# Patient Record
Sex: FEMALE | Race: WHITE | NOT HISPANIC OR LATINO | Employment: FULL TIME | URBAN - METROPOLITAN AREA
[De-identification: names, ages, dates, MRNs, and addresses within clinical notes are randomized per-mention and may not be internally consistent; named-entity substitution may affect disease eponyms.]

---

## 2017-01-10 RX ORDER — ESTROGENS, CONJUGATED 1.25 MG
TABLET ORAL
Qty: 30 TABLET | Refills: 0 | Status: SHIPPED | OUTPATIENT
Start: 2017-01-10 | End: 2017-08-15

## 2017-04-18 ENCOUNTER — TELEPHONE (OUTPATIENT)
Dept: OBSTETRICS AND GYNECOLOGY | Facility: CLINIC | Age: 47
End: 2017-04-18

## 2017-04-18 NOTE — TELEPHONE ENCOUNTER
----- Message from Bambi Castro sent at 4/18/2017  2:14 PM CDT -----  Contact: self  Patient called back, verified with patient that patient is requesting to switch from the Estradiol patch to the Premarin Oral pill. Patient states that she has a itchy red reaction around the site of the patch. Please advise, patient can be reached at [phone].

## 2017-04-18 NOTE — TELEPHONE ENCOUNTER
----- Message from Lucy Murray sent at 4/18/2017  1:24 PM CDT -----  x  1st Request  _  2nd Request  _  3rd Request        Who: BLANCA GUTHRIE [[id number]]    Why: Pt is requesting to get back on the Premarin instead of the patch. Please call her.    What Number to Call Back: [phone]    When to Expect a call back: (Before the end of the day)   -- if the call is after 12:00, the call back will be tomorrow.

## 2017-04-18 NOTE — TELEPHONE ENCOUNTER
Called patient and left message for her to call office for more information regarding her medication request.

## 2017-04-18 NOTE — TELEPHONE ENCOUNTER
Spoke with Kianna Zuleta.  She is having a reaction to patch  - redness and itching at site of patch.  Would like to switch back to Premarin.  I let her know Dr. Borrero was out of office until later in the week - he would need to approve medication change. Someone would be back in touch with her as soon as possible to accommodate her request.

## 2017-04-20 DIAGNOSIS — N95.1 MENOPAUSAL SYMPTOMS: Primary | ICD-10-CM

## 2017-04-20 NOTE — TELEPHONE ENCOUNTER
Patient unhappy with patch due to skin irritation. Per Dr. Adair Moulton 1.25 called into pharmacy. Patient notified mediation will be sent in today

## 2017-08-15 ENCOUNTER — OFFICE VISIT (OUTPATIENT)
Dept: FAMILY MEDICINE | Facility: CLINIC | Age: 47
End: 2017-08-15
Payer: COMMERCIAL

## 2017-08-15 ENCOUNTER — LAB VISIT (OUTPATIENT)
Dept: LAB | Facility: HOSPITAL | Age: 47
End: 2017-08-15
Attending: INTERNAL MEDICINE
Payer: COMMERCIAL

## 2017-08-15 VITALS
WEIGHT: 266.75 LBS | OXYGEN SATURATION: 98 % | TEMPERATURE: 99 F | HEIGHT: 64 IN | DIASTOLIC BLOOD PRESSURE: 80 MMHG | SYSTOLIC BLOOD PRESSURE: 128 MMHG | BODY MASS INDEX: 45.54 KG/M2 | HEART RATE: 91 BPM

## 2017-08-15 DIAGNOSIS — E55.9 VITAMIN D DEFICIENCY DISEASE: ICD-10-CM

## 2017-08-15 DIAGNOSIS — C7A.020 MALIGNANT CARCINOID TUMOR OF THE APPENDIX: ICD-10-CM

## 2017-08-15 DIAGNOSIS — K58.9 IRRITABLE BOWEL SYNDROME, UNSPECIFIED TYPE: ICD-10-CM

## 2017-08-15 DIAGNOSIS — Z00.00 ROUTINE MEDICAL EXAM: Primary | ICD-10-CM

## 2017-08-15 DIAGNOSIS — E03.9 HYPOTHYROIDISM, UNSPECIFIED TYPE: ICD-10-CM

## 2017-08-15 DIAGNOSIS — Z00.00 ROUTINE MEDICAL EXAM: ICD-10-CM

## 2017-08-15 LAB
25(OH)D3+25(OH)D2 SERPL-MCNC: 18 NG/ML
ALBUMIN SERPL BCP-MCNC: 4 G/DL
ALP SERPL-CCNC: 149 U/L
ALT SERPL W/O P-5'-P-CCNC: 16 U/L
ANION GAP SERPL CALC-SCNC: 10 MMOL/L
AST SERPL-CCNC: 17 U/L
BASOPHILS # BLD AUTO: 0.01 K/UL
BASOPHILS NFR BLD: 0.2 %
BILIRUB SERPL-MCNC: 0.3 MG/DL
BUN SERPL-MCNC: 12 MG/DL
CALCIUM SERPL-MCNC: 9.4 MG/DL
CHLORIDE SERPL-SCNC: 104 MMOL/L
CO2 SERPL-SCNC: 26 MMOL/L
CREAT SERPL-MCNC: 0.7 MG/DL
DIFFERENTIAL METHOD: ABNORMAL
EOSINOPHIL # BLD AUTO: 0.1 K/UL
EOSINOPHIL NFR BLD: 2.5 %
ERYTHROCYTE [DISTWIDTH] IN BLOOD BY AUTOMATED COUNT: 13.4 %
EST. GFR  (AFRICAN AMERICAN): >60 ML/MIN/1.73 M^2
EST. GFR  (NON AFRICAN AMERICAN): >60 ML/MIN/1.73 M^2
GLUCOSE SERPL-MCNC: 88 MG/DL
HCT VFR BLD AUTO: 40.7 %
HGB BLD-MCNC: 13.8 G/DL
LYMPHOCYTES # BLD AUTO: 1.3 K/UL
LYMPHOCYTES NFR BLD: 22.5 %
MCH RBC QN AUTO: 28.3 PG
MCHC RBC AUTO-ENTMCNC: 33.9 G/DL
MCV RBC AUTO: 83 FL
MONOCYTES # BLD AUTO: 0.3 K/UL
MONOCYTES NFR BLD: 5.4 %
NEUTROPHILS # BLD AUTO: 3.9 K/UL
NEUTROPHILS NFR BLD: 69.2 %
PLATELET # BLD AUTO: 211 K/UL
PMV BLD AUTO: 8.6 FL
POTASSIUM SERPL-SCNC: 3.9 MMOL/L
PROT SERPL-MCNC: 7.6 G/DL
RBC # BLD AUTO: 4.88 M/UL
SODIUM SERPL-SCNC: 140 MMOL/L
T4 FREE SERPL-MCNC: 0.8 NG/DL
TSH SERPL DL<=0.005 MIU/L-ACNC: 3.19 UIU/ML
WBC # BLD AUTO: 5.56 K/UL

## 2017-08-15 PROCEDURE — 84439 ASSAY OF FREE THYROXINE: CPT

## 2017-08-15 PROCEDURE — 83036 HEMOGLOBIN GLYCOSYLATED A1C: CPT

## 2017-08-15 PROCEDURE — 36415 COLL VENOUS BLD VENIPUNCTURE: CPT | Mod: PO

## 2017-08-15 PROCEDURE — 86316 IMMUNOASSAY TUMOR OTHER: CPT

## 2017-08-15 PROCEDURE — 99396 PREV VISIT EST AGE 40-64: CPT | Mod: S$GLB,,, | Performed by: INTERNAL MEDICINE

## 2017-08-15 PROCEDURE — 85025 COMPLETE CBC W/AUTO DIFF WBC: CPT

## 2017-08-15 PROCEDURE — 84260 ASSAY OF SEROTONIN: CPT

## 2017-08-15 PROCEDURE — 82306 VITAMIN D 25 HYDROXY: CPT

## 2017-08-15 PROCEDURE — 99999 PR PBB SHADOW E&M-EST. PATIENT-LVL III: CPT | Mod: PBBFAC,,, | Performed by: INTERNAL MEDICINE

## 2017-08-15 PROCEDURE — 84443 ASSAY THYROID STIM HORMONE: CPT

## 2017-08-15 PROCEDURE — 80053 COMPREHEN METABOLIC PANEL: CPT

## 2017-08-15 RX ORDER — LEVOTHYROXINE SODIUM 75 UG/1
75 TABLET ORAL DAILY
Qty: 90 TABLET | Refills: 3 | Status: SHIPPED | OUTPATIENT
Start: 2017-08-15 | End: 2017-11-14

## 2017-08-15 NOTE — PROGRESS NOTES
Chief complaint physical exam    47-year-old white female with irritable bowel syndrome and hypothyroidism.  She simply ran out of her thyroid medication many many months ago.  She has gained about 14 pounds over the last 2 years.  She has not seen her neuroendocrine surgeon in about 2 years.  She has some occasional flushing and occasional diarrhea but nothing in a pattern to suggest recurrent carcinoid all her blood work over the years has been normal.  She gets an echo yearly with her outside cardiologist [facility].  She's had no cardiopulmonary symptoms.  She is working as an .  He discussed lab reassessment and eventually reassessing her lipids once her thyroid is back under control.  She continues to follow with her GYN and she is up-to-date on mammogram.  She follows with GI as well    ROS:   CONST: weight stable. EYES: no vision change. ENT: no sore throat. CV: no chest pain w/ exertion. RESP: no shortness of breath. GI: no nausea, vomiting, diarrhea. No dysphagia. : no urinary issues. MUSCULOSKELETAL: no new myalgias or arthralgias. SKIN: no new changes. NEURO: no focal deficits. PSYCH: no new issues. ENDOCRINE: no polyuria. HEME: no lymph nodes. ALLERGY: no general pruritis.    PAST MEDICAL HISTORY:                                                        1. Intersitial cystitis.                                                     2. Fibromyalgia.                                                             3. Mitral valve prolapse.                                                    4. IBS. Dr Rowe , now Dr. Mckeon                                                                 5. L-spine disk bulging.  L4-5 -Dr Singh at                                                    6. Total hysterectomy in 2004.   7.  Hypothyroid.                                                             8.  Incidental carcinoid tumor of the appendix.                              9.  Now on  gluten-free diet.                                                 10.  Vitamin D deficiency, followed by outside Rheumatology.                  11.  Neurogenic bladder, followed by outside urologist. Ranjeet Christina    PAST SURGICAL HISTORY: Laparotomy x5 Left pelvic mass 12/20/05, pelvic pain 07/31/03, pelvic pain 06/13/02, endometriosis Laparoscopy 10/01,BUBBA and BS&O 04/08/04, Lap cholecystectomy 12/20/05, Appendectomy 12/20/05,Letitia surgery 5/08,Placement of new right InterStim lead and Ablation 1/09, Da Char-assisted lysis of severe adhesions, resection of severe adhesions/possible ovarian remnant.    SOCIAL HISTORY: Never smoked. The patient seldom drinks alcohol. Doesn't use recreational drugs. Lives alone. Single.     FAMILY HISTORY: The patient's family members had Ovarian cancer.No family history of breast cancer. No family history of colon cancer    Vitals as above,   Gen: no distress  EYES: conjunctiva clear, non-icteric, PERRL  ENT: nose clear, nasal mucosa normal, oropharynx clear and moist, teeth good  NECK:supple, thyroid non-palpable  RESP: effort is good, lungs clear  CV: heart RRR w/o murmur, gallops or rubs; no carotid bruits, no edema  GI: abdomen soft, non-distended, non-tender, no hepatosplenomegaly  MS: gait normal, no clubbing or cyanosis of the digits  SKIN: no rashes, warm to touch    Prior labs and interval clinic notes reviewed    Kianna was seen today for [facility].    Diagnoses and all orders for this visit:    Routine medical exam, keep follow-up with GYN, update lab work  -     SEROTONIN SERUM; Future  -     CHROMOGRANIN A; Future  -     5-HIAA Plasma (Neuroendocrine); Future  -     Hemoglobin A1c; Future  -     Comprehensive metabolic panel; Future  -     Vitamin D; Future  -     CBC auto differential; Future  -     TSH; Future  -     T4, free; Future    Hypothyroidism, unspecified type, likely uncontrolled, expect thyroid to be abnormal and we'll restart the prior Synthroid 75  -      "TSH; Future  -     T4, free; Future    Vitamin D deficiency disease, reassess  -     Vitamin D; Future    Irritable bowel syndrome, unspecified type, stable, keep follow-up with GYN    Malignant carcinoid tumor of the appendix, will send at least some initial blood work that was done before, keep echo follow-up with cardiology, consider reestablishing follow-up with their endocrine surgery  -     SEROTONIN SERUM; Future  -     CHROMOGRANIN A; Future  -     5-HIAA Plasma (Neuroendocrine); Future    Other orders  -     levothyroxine (SYNTHROID) 75 MCG tablet; Take 1 tablet (75 mcg total) by mouth once daily.         Based on patient's anxiety referable to the above as well as compliance issues, access would not be therapeutic"This note will not be shared with the patient."  "

## 2017-08-16 LAB
ESTIMATED AVG GLUCOSE: 100 MG/DL
HBA1C MFR BLD HPLC: 5.1 %

## 2017-08-21 LAB
CGA SERPL-MCNC: 5 NG/ML
SEROTONIN: <30 NG/ML

## 2017-09-26 ENCOUNTER — OFFICE VISIT (OUTPATIENT)
Dept: URGENT CARE | Facility: CLINIC | Age: 47
End: 2017-09-26
Payer: COMMERCIAL

## 2017-09-26 ENCOUNTER — OFFICE VISIT (OUTPATIENT)
Dept: SLEEP MEDICINE | Facility: CLINIC | Age: 47
End: 2017-09-26
Payer: COMMERCIAL

## 2017-09-26 VITALS
RESPIRATION RATE: 16 BRPM | DIASTOLIC BLOOD PRESSURE: 86 MMHG | BODY MASS INDEX: 42.68 KG/M2 | WEIGHT: 250 LBS | TEMPERATURE: 99 F | HEIGHT: 64 IN | HEART RATE: 100 BPM | SYSTOLIC BLOOD PRESSURE: 125 MMHG | OXYGEN SATURATION: 97 %

## 2017-09-26 VITALS
BODY MASS INDEX: 45.37 KG/M2 | HEART RATE: 88 BPM | WEIGHT: 264.31 LBS | SYSTOLIC BLOOD PRESSURE: 153 MMHG | DIASTOLIC BLOOD PRESSURE: 98 MMHG

## 2017-09-26 DIAGNOSIS — F51.01 PRIMARY INSOMNIA: ICD-10-CM

## 2017-09-26 DIAGNOSIS — S90.121A CONTUSION OF FIFTH TOE OF RIGHT FOOT, INITIAL ENCOUNTER: ICD-10-CM

## 2017-09-26 DIAGNOSIS — G47.30 SLEEP APNEA, UNSPECIFIED TYPE: Primary | ICD-10-CM

## 2017-09-26 DIAGNOSIS — S99.921A RIGHT FOOT INJURY, INITIAL ENCOUNTER: Primary | ICD-10-CM

## 2017-09-26 PROCEDURE — 99204 OFFICE O/P NEW MOD 45 MIN: CPT | Mod: S$GLB,,, | Performed by: PSYCHIATRY & NEUROLOGY

## 2017-09-26 PROCEDURE — 99213 OFFICE O/P EST LOW 20 MIN: CPT | Mod: S$GLB,,, | Performed by: NURSE PRACTITIONER

## 2017-09-26 PROCEDURE — 99999 PR PBB SHADOW E&M-EST. PATIENT-LVL III: CPT | Mod: PBBFAC,,, | Performed by: PSYCHIATRY & NEUROLOGY

## 2017-09-26 PROCEDURE — 3008F BODY MASS INDEX DOCD: CPT | Mod: S$GLB,,, | Performed by: PSYCHIATRY & NEUROLOGY

## 2017-09-26 PROCEDURE — 3008F BODY MASS INDEX DOCD: CPT | Mod: S$GLB,,, | Performed by: NURSE PRACTITIONER

## 2017-09-26 NOTE — PATIENT INSTRUCTIONS
Patient is candidate for home sleep testing.  Set up testing with [facility].  Scheduled by [facility]    1. Kianna/Eduardo will contact you to schedule your sleep study [phone] (ext [phone])  2. Sleep Lab is located in the [facility], take [place] elevator to 7th floor; You will see sign for [facility]

## 2017-09-26 NOTE — PATIENT INSTRUCTIONS
Lower Extremity Contusion  You have a contusion (bruise) of a lower extremity (leg, knee, ankle, foot, or toe). Symptoms include pain, swelling, and skin discoloration. No bones are broken. This injury may take from a few days to a few weeks to heal.  During that time, the bruise may change from reddish in color, to purple-blue, to green-yellow, to yellow-brown.  Home care  · Unless another medicine was prescribed, you can take acetaminophen, ibuprofen, or naproxen to control pain. (If you have chronic liver or kidney disease or ever had a stomach ulcer or gastrointestinal bleeding, talk with your doctor before using these medicines.)  · Elevate the injured area to reduce pain and swelling. As much as possible, sit or lie down with the injured area raised about the level of your heart. This is especially important during the first 48 hours.  · Ice the injured area to help reduce pain and swelling. Wrap a cold source (ice pack or ice cubes in a plastic bag) in a thin towel. Apply to the bruised area for 20 minutes every 1 to 2 hours the first day. Continue this 3 to 4 times a day until the pain and swelling goes away.  · If crutches have been advised, do not bear full weight on the injured leg until you can do so without pain. You may return to sports when you are able to put full weight and impact on the injured leg without pain.  Follow up  Follow up with your healthcare provider or our staff as advised. Call if you are not improving within the next 1 to 2 weeks.  When to seek medical advice   Call your healthcare provider right away if any of these occur:  · Increased pain or swelling  · Foot or toes become cold, blue, numb or tingly  · Signs of infection: Warmth, drainage, or increased redness or pain around the injury  · Inability to move the injured area   · Frequent bruising for unknown reasons  Date Last Reviewed: 2/1/2017  © 0185-5962 [employer]. [address] [place] [address], [place] [address].  All rights reserved. This information is not intended as a substitute for professional medical care. Always follow your healthcare professional's instructions.        R.I.C.E.    R.I.C.E. stands for Rest, Ice, Compression, and Elevation. Doing these things helps limit pain and swelling after an injury. R.I.C.E. also helps injuries heal faster. Use R.I.C.E. for sprains, strains, and severe bruises or bumps. Follow the tips on this handout and begin R.I.C.E. as soon as possible after an injury.  ? Rest  Pain is your bodys way of telling you to rest an injured area. Whether you have hurt an elbow, hand, foot, or knee, limiting its use will prevent further injury and help you heal.  ? Ice  Applying ice right after an injury helps prevent swelling and reduce pain. Dont place ice directly on your skin.  · Wrap a cold pack or bag of ice in a thin cloth. Place it over the injured area.  · Ice for 10 minutes every 3 hours. Dont ice for more than 20 minutes at a time.  ? Compression  Putting pressure (compression) on an injury helps prevent swelling and provides support.  · Wrap the injured area firmly with an elastic bandage. If your hand or foot tingles, becomes discolored, or feels cold to the touch, the bandage may be too tight. Rewrap it more loosely.  · If your bandage becomes too loose, rewrap it.  · Do not wear an elastic bandage overnight.  ? Elevation  Keeping an injury elevated helps reduce swelling, pain, and throbbing. Elevation is most effective when the injury is kept elevated higher than the heart.     Call your healthcare provider if you notice any of the following:  · Fingers or toes feel numb, are cold to the touch, or change color  · Skin looks shiny or tight  · Pain, swelling, or bruising worsens and is not improved with elevation   Date Last Reviewed: 9/3/2015  © 9222-2364 [employer]. [address] [place] [address], [place] [address]. All rights reserved. This information is not intended as a  substitute for professional medical care. Always follow your healthcare professional's instructions.

## 2017-09-26 NOTE — PROGRESS NOTES
"Subjective:       Patient ID: Kianna Zuleta is a 47 y.o. female.    Vitals:  height is 5' 4" (1.626 m) and weight is 113.4 kg (250 lb). Her tympanic temperature is 98.9 °F (37.2 °C). Her blood pressure is 125/86 and her pulse is 100. Her respiration is 16 and oxygen saturation is 97%.     Chief Complaint: Foot Injury    States she was getting out of the shower and she slipped. States her right 4th and 5th digit on the foot went under the other toes and popped out. States it hurts to bear weight on the affected extremity.      Foot Injury    The incident occurred less than 1 hour ago. The incident occurred at home. The injury mechanism was a twisting injury. The pain is present in the right toes. The quality of the pain is described as burning and stabbing. The pain is at a severity of 9/10. The pain is severe. The pain has been intermittent since onset. Pertinent negatives include no inability to bear weight, numbness or tingling. She reports no foreign bodies present. The symptoms are aggravated by weight bearing and movement. She has tried nothing for the symptoms.     Review of Systems   Constitution: Negative for weakness and malaise/fatigue.   HENT: Negative for nosebleeds.    Cardiovascular: Negative for chest pain and syncope.   Respiratory: Negative for shortness of breath.    Skin: Negative for color change.   Musculoskeletal: Positive for joint pain. Negative for back pain and neck pain.   Gastrointestinal: Negative for abdominal pain.   Genitourinary: Negative for hematuria.   Neurological: Negative for dizziness, numbness and tingling.   All other systems reviewed and are negative.      Objective:      Physical Exam   Constitutional: She is oriented to person, place, and time. She appears well-developed and well-nourished.   HENT:   Head: Normocephalic and atraumatic.   Eyes: Conjunctivae and EOM are normal. Pupils are equal, round, and reactive to light.   Neck: Normal range of motion. Neck supple. "   Cardiovascular: Normal rate and intact distal pulses.    Pulses:       Dorsalis pedis pulses are 2+ on the right side.        Posterior tibial pulses are 2+ on the right side.   Pulmonary/Chest: Effort normal.   Musculoskeletal: Normal range of motion. She exhibits tenderness.        Right foot: There is tenderness and bony tenderness. There is normal range of motion, no swelling, normal capillary refill, no crepitus, no deformity and no laceration.        Feet:    Feet:   Right Foot:   Skin Integrity: Negative for skin breakdown, erythema or warmth.   Neurological: She is alert and oriented to person, place, and time. She displays normal reflexes. No cranial nerve deficit or sensory deficit. She exhibits normal muscle tone. Coordination normal.   Skin: Skin is warm and dry. Capillary refill takes less than 2 seconds. No rash noted. No erythema. No pallor.   Psychiatric: She has a normal mood and affect.   Nursing note reviewed.     Xray read per radiology, negative for acute fracture.   Assessment:       1. Right foot injury, initial encounter    2. Contusion of fifth toe of right foot, initial encounter        Plan:         Right foot injury, initial encounter  -     X-Ray Foot Complete Right; Future; Expected date: 09/26/2017    Contusion of fifth toe of right foot, initial encounter      Please take Ibuprofen for your pain

## 2017-09-26 NOTE — PROGRESS NOTES
"This 47 y.o. female patient presents for the evaluation of possible obstructive sleep apnea. Referred by Dr. Javier.    Patient attempted at sleep study in August 2015, but did not sleep well, because a neighbor snored all night. This occurred [place] the [place]. Records are not available today. No recents scans to media in EPIC system    Not sleeping well for years.  Currently taking Trazodone 100 mg;  Previously took ambien/sonata    ESS = 3    In bed at 8 pm in attempt to get "enough sleep"  Takes medication at 8 pm (trazodone, percocent, tizandine, neurontin)  Falling asleep at 9 pm  Wakes at 6 am  Feels rested     Functions well during the day as a .  She has cut out caffeine during the week.    She has back pains (bulging discs) for which she takes percocent, tizandine, neurontin    SLEEP ROUTINE:   Bed partner: none   Time to bed: 8 pm (weekdays) to 11 pm (weekends)   Sleep onset latency: varies   Disruptions or awakenings: 2-3 (varies)   Wakeup time: 6 am (weekdays) 8 am (weekends)   Perceived sleep quality: refreshed   Daytime naps: fair    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    MVP (mitral valve prolapse)     POTS (postural orthostatic tachycardia syndrome)     Ulcer     Vitamin D deficiency disease 11/6/2012       Past Surgical History:   Procedure Laterality Date    ANKLE SURGERY      lt    APPENDECTOMY      BACK SURGERY      CHOLECYSTECTOMY      choley & ovarian cyst removed  12/2005    cystoscope      multiple    HYSTERECTOMY  4/8/2004    BUBBA BS&O     interstim      OOPHORECTOMY  4/8/2004    with hysterectomy     PELVIC LAPAROSCOPY      ME EXPLORATORY OF ABDOMEN      SINUS SURGERY  03/01/2016    SPINE SURGERY  "        Family History   Problem Relation Age of Onset    Hypertension Father     Alcohol abuse Brother     Cancer Paternal Uncle     Colon cancer Paternal Uncle     Depression Maternal Grandmother     Hearing loss Maternal Grandmother     Heart disease Maternal Grandmother     Kidney disease Maternal Grandmother     Cancer Paternal Grandmother     Arthritis Paternal Grandfather     Diabetes Paternal Grandfather     Stroke Paternal Grandfather     Breast cancer Neg Hx     Ovarian cancer Neg Hx        Social History   Substance Use Topics    Smoking status: Never Smoker    Smokeless tobacco: Never Used    Alcohol use No       ALLERGIES: Reviewed in EPIC    CURRENT MEDICATIONS: Reviewed in EPIC    REVIEW OF SYSTEMS:  Sleep related symptoms as per HPI;    Denies weight gain;    Denies sinus problems;    Denies dyspnea;    Sometimes palpitations;    Denies acid reflux;    Denies polyuria;    Denies headaches;    Denies mood disturbance;    Denies anemia;    Otherwise, a balance of systems reviewed is negative.    PHYSICAL EXAM:  BP (!) 153/98 (BP Location: Right arm, Patient Position: Sitting)   Pulse 88   Wt 119.9 kg (264 lb 5.3 oz)   BMI 45.37 kg/m²   GENERAL: Well groomed; Normally developed; Overweight  HEENT: Conjunctivae are non-erythematous; Pupils equal, round, and reactive to light;    Nose is symmetrical; Nasal mucosa is pink and moist; Septum is midline;    Turbinates are normal; Nasal airflow is normal;    Posterior pharynx is pink, but crowded due to lateral narrowing; Posterior palate is normal; Modified Mallampati: I   Uvula is normal; Tongue is normal; Tonsils +1;    Dentition is fair; No TMJ tenderness;    Jaw opening and protrusion without click and without discomfort.  NECK: Supple. No thyromegaly. No palpable nodes. Neck circumference in inches is 16  SKIN: On face and neck: No abrasions, no rashes, no lesions.     No subcutaneous nodules are palpable.  RESPIRATORY: Chest is clear  to auscultation.     Normal chest expansion and non-labored breathing at rest.  CARDIOVASCULAR: Normal S1, S2.  No murmurs, gallops or rubs.   No carotid bruits bilaterally.  EXTREMITIES: No clubbing. No cyanosis. Edema is absent.   NEURO: Oriented to time, place and person.    Normal attention span and concentration.  Station normal. Gait normal.  PSYCH: Affect is full. Mood is normal.      ASSESSMENT:    1. Insomnia - difficulty with sleep initiation and maintenance, requiring medication for sleep. With sleep medication, initiation of sleep is improved, but maintenance issues persist.    2. Sleep Apnea, unspecified. The patient symptomatically has difficulty with with exam findings of a crowded oral airway, elevated neck circumference with medical co-morbidities of (elevated blood pressure) and obesity. By STOP-BANG criteria, she qualifies for moderate-high risk of sleep apnea (3 of 8 score). This warrants further investigation for possible obstructive sleep apnea.         PLAN:    Diagnostic: Home sleep study. The nature of this procedure and its indication was discussed with the patient.    Education: During our discussion today, we talked about the etiology of obstructive sleep apnea as well as the potential ramifications of untreated sleep apnea, which could include daytime sleepiness, hypertension, heart disease and/or stroke.       Precautions: The patient was advised to abstain from driving should they feel sleepy or drowsy.    Follow up: I will see the patient back after the sleep study has been completed. At this time, we can review the data and discuss potential treatment options. MD/NP

## 2017-10-01 ENCOUNTER — OFFICE VISIT (OUTPATIENT)
Dept: URGENT CARE | Facility: CLINIC | Age: 47
End: 2017-10-01
Payer: COMMERCIAL

## 2017-10-01 VITALS
BODY MASS INDEX: 42.68 KG/M2 | RESPIRATION RATE: 18 BRPM | DIASTOLIC BLOOD PRESSURE: 78 MMHG | SYSTOLIC BLOOD PRESSURE: 123 MMHG | HEIGHT: 64 IN | HEART RATE: 112 BPM | WEIGHT: 250 LBS | OXYGEN SATURATION: 96 % | TEMPERATURE: 103 F

## 2017-10-01 DIAGNOSIS — J01.90 ACUTE NON-RECURRENT SINUSITIS, UNSPECIFIED LOCATION: Primary | ICD-10-CM

## 2017-10-01 LAB
CTP QC/QA: YES
FLUAV AG NPH QL: NEGATIVE
FLUBV AG NPH QL: NEGATIVE

## 2017-10-01 PROCEDURE — 99213 OFFICE O/P EST LOW 20 MIN: CPT | Mod: 25,S$GLB,, | Performed by: NURSE PRACTITIONER

## 2017-10-01 PROCEDURE — 3008F BODY MASS INDEX DOCD: CPT | Mod: S$GLB,,, | Performed by: NURSE PRACTITIONER

## 2017-10-01 PROCEDURE — 87804 INFLUENZA ASSAY W/OPTIC: CPT | Mod: QW,S$GLB,, | Performed by: NURSE PRACTITIONER

## 2017-10-01 RX ORDER — LEVOFLOXACIN 750 MG/1
750 TABLET ORAL DAILY
Qty: 7 TABLET | Refills: 0 | Status: SHIPPED | OUTPATIENT
Start: 2017-10-01 | End: 2017-10-08

## 2017-10-01 RX ORDER — ACETAMINOPHEN 325 MG/1
975 TABLET ORAL
Status: COMPLETED | OUTPATIENT
Start: 2017-10-01 | End: 2017-10-01

## 2017-10-01 RX ADMIN — ACETAMINOPHEN 975 MG: 325 TABLET ORAL at 09:10

## 2017-10-01 NOTE — PROGRESS NOTES
"Subjective:       Patient ID: Kianna Zuleta is a 47 y.o. female.    Vitals:  height is 5' 4" (1.626 m) and weight is 113.4 kg (250 lb). Her tympanic temperature is 102.9 °F (39.4 °C) (abnormal). Her blood pressure is 123/78 and her pulse is 112 (abnormal). Her respiration is 18 and oxygen saturation is 96%.     Chief Complaint: Nasal Congestion    Other   This is a new problem. The current episode started yesterday. The problem occurs constantly. Associated symptoms include chills, congestion, coughing, a fever and nausea. Pertinent negatives include no abdominal pain, chest pain, headaches, myalgias or sore throat. Nothing aggravates the symptoms. She has tried NSAIDs for the symptoms. The treatment provided mild relief.     Review of Systems   Constitution: Positive for chills and fever. Negative for malaise/fatigue.   HENT: Positive for congestion. Negative for ear pain, hoarse voice and sore throat.    Eyes: Negative for discharge and redness.   Cardiovascular: Negative for chest pain, dyspnea on exertion and leg swelling.   Respiratory: Positive for cough and sputum production (yellowish phlegm). Negative for shortness of breath and wheezing.    Musculoskeletal: Negative for myalgias.   Gastrointestinal: Positive for nausea. Negative for abdominal pain.   Neurological: Negative for headaches.       Objective:      Physical Exam   Constitutional: She is oriented to person, place, and time. She appears well-developed and well-nourished. She appears ill.   HENT:   Head: Normocephalic and atraumatic.   Right Ear: Hearing, tympanic membrane, external ear and ear canal normal.   Left Ear: Hearing, tympanic membrane, external ear and ear canal normal.   Nose: Mucosal edema and rhinorrhea present. Right sinus exhibits maxillary sinus tenderness. Left sinus exhibits maxillary sinus tenderness.   Mouth/Throat: Uvula is midline. Posterior oropharyngeal edema and posterior oropharyngeal erythema present. No oropharyngeal " exudate. Tonsils are 1+ on the right. Tonsils are 1+ on the left. No tonsillar exudate.   Eyes: Conjunctivae and lids are normal.   Neck: Normal range of motion and full passive range of motion without pain. Neck supple. No neck rigidity. No edema, no erythema and normal range of motion present.   Cardiovascular: Normal rate, regular rhythm and normal heart sounds.    Pulmonary/Chest: Effort normal and breath sounds normal. No accessory muscle usage. No apnea, no tachypnea and no bradypnea. No respiratory distress. She has no decreased breath sounds. She has no wheezes. She has no rhonchi. She has no rales.   Abdominal: Soft. Normal appearance and bowel sounds are normal.   Lymphadenopathy:        Head (right side): No submental, no submandibular, no tonsillar, no preauricular, no posterior auricular and no occipital adenopathy present.        Head (left side): No submental, no submandibular, no tonsillar, no preauricular, no posterior auricular and no occipital adenopathy present.     She has cervical adenopathy.        Right cervical: Superficial cervical adenopathy present.        Left cervical: Superficial cervical adenopathy present.   Neurological: She is alert and oriented to person, place, and time.   Skin: Skin is warm.   Psychiatric: She has a normal mood and affect. Her speech is normal and behavior is normal.   Nursing note and vitals reviewed.      Assessment:       1. Upper respiratory tract infection, unspecified type        Plan:         Upper respiratory tract infection, unspecified type  -     POCT Influenza A/B  -     acetaminophen tablet 975 mg; Take 3 tablets (975 mg total) by mouth one time.    Pt instructed to follow up with primary care for no improvement or go to ER for worsening of symptoms.

## 2017-10-01 NOTE — PATIENT INSTRUCTIONS
Please drink plenty of fluids.  Please get plenty of rest.    Please return here or go to the Emergency Department for any concerns or worsening of condition.    If you were prescribed antibiotics, please take them to completion.    If you were given wait & see antibiotics, please wait 5-7 days before taking them, and only take them if your symptoms have worsened or not improved.  If you do begin taking the antibiotics, please take them to completion.    If you were prescribed a narcotic medication, do not drive or operate heavy equipment or machinery while taking these medications.    If you do not have Hypertension or any history of palpitations, it is ok to take over the counter Sudafed or Mucinex D or Allegra-D or Claritin-D or Zyrtec-D.    If you do take one of the above, it is ok to combine that with plain over the counter Mucinex or Allegra or Claritin or Zyrtec.  If for example you are taking Zyrtec -D, you can combine that with Mucinex, but not Mucinex-D.  If you are taking Mucinex-D, you can combine that with plain Allegra or Claritin or Zyrtec.     If you do have Hypertension or palpitations, it is safe to take Coricidin HBP for relief of sinus symptoms.    If not allergic, please take over the counter Tylenol (Acetaminophen) and/or Motrin (Ibuprofen) as directed for control of pain and/or fever.  Please follow up with your primary care doctor or specialist as needed.    If you  smoke, please stop smoking.      Acute Bacterial Rhinosinusitis (ABRS)    Acute bacterial rhinosinusitis (ABRS) is an infection of your nasal cavity and sinuses. Its caused by bacteria. Acute means that youve had symptoms for less than 12 weeks.  Understanding your sinuses  The nasal cavity is the large air-filled space behind your nose. The sinuses are a group of spaces formed by the bones of your face. They connect with your nasal cavity. ABRS causes the tissue lining these spaces to become inflamed. Mucus may not drain  normally. This leads to facial pain and other symptoms.  What causes ABRS?  ABRS most often follows an upper respiratory infection caused by a virus. Bacteria then infect the lining of your nasal cavity and sinuses. But you can also get ABRS if you have:  · Nasal allergies  · Long-term nasal swelling and congestion not caused by allergies  · Blockage in the nose  Symptoms of ABRS  The symptoms of ABRS may be different for each person, and can include:  · Nasal congestion  · Runny nose  · Fluid draining from the nose down the throat (postnasal drip)  · Headache  · Cough  · Pain in the sinuses  · Thick, colored fluid from the nose (mucus)  · Fever  Diagnosing ABRS  ABRS may be diagnosed if youve had an upper respiratory infection like a cold and cough for longer than 10 to 14 days. Your health care provider will ask about your symptoms and your medical history. The provider will check your vital signs, including your temperature. Youll have a physical exam. The health care provider will check your ears, nose, and throat. You likely wont need any tests. If ABRS comes back, you may have a culture or other tests.  Treatment for ABRS  Treatment may include:  · Antibiotic medicine. This is for symptoms that last for at least 10 to 14 days.  · Nasal corticosteroid medicine. Drops or spray used in the nose can lessen swelling and congestion.  · Over-the-counter pain medicine. This is to lessen sinus pain and pressure.  · Nasal decongestant medicine. Spray or drops may help to lessen congestion. Do not use them for more than a few days.  · Salt wash (saline irrigation). This can help to loosen mucus.  Possible complications of ABRS  ABRS may come back or become long-term (chronic).  In rare cases, ABRS may cause complications such as:   · Inflamed tissue around the brain and spinal cord (meningitis)  · Inflamed tissue around the eyes (orbital cellulitis)  · Inflamed bones around the sinuses (osteitis)  These problems may  need to be treated in a hospital with intravenous (IV) antibiotic medicine or surgery.  When to call the health care provider  Call your health care provider if you have any of the following:  · Symptoms that dont get better, or get worse  · Symptoms that dont get better after 3 to 5 days on antibiotics  · Trouble seeing  · Swelling around your eyes  · Confusion or trouble staying awake   Date Last Reviewed: 3/3/2015  © 6153-1730 [email or web address]. [address] [place] [address], [place] [address]. All rights reserved. This information is not intended as a substitute for professional medical care. Always follow your healthcare professional's instructions.

## 2017-10-03 ENCOUNTER — TELEPHONE (OUTPATIENT)
Dept: OBSTETRICS AND GYNECOLOGY | Facility: CLINIC | Age: 47
End: 2017-10-03

## 2017-10-03 DIAGNOSIS — Z12.31 VISIT FOR SCREENING MAMMOGRAM: Primary | ICD-10-CM

## 2017-10-09 ENCOUNTER — TELEPHONE (OUTPATIENT)
Dept: FAMILY MEDICINE | Facility: CLINIC | Age: 47
End: 2017-10-09

## 2017-10-09 NOTE — TELEPHONE ENCOUNTER
----- Message from Tonja Elder sent at 10/9/2017 11:39 AM CDT -----  Contact: [phone]  Calling to speak with nurse regarding protocol to get breathing machine

## 2017-10-09 NOTE — TELEPHONE ENCOUNTER
Will bring in the discharge paperwork from [facility] to her appt in the morning with Sukhjinder Rowland.

## 2017-10-10 ENCOUNTER — OFFICE VISIT (OUTPATIENT)
Dept: FAMILY MEDICINE | Facility: CLINIC | Age: 47
End: 2017-10-10
Payer: COMMERCIAL

## 2017-10-10 VITALS
HEIGHT: 60 IN | DIASTOLIC BLOOD PRESSURE: 62 MMHG | TEMPERATURE: 99 F | OXYGEN SATURATION: 91 % | SYSTOLIC BLOOD PRESSURE: 120 MMHG | HEART RATE: 101 BPM | BODY MASS INDEX: 52.11 KG/M2 | WEIGHT: 265.44 LBS

## 2017-10-10 DIAGNOSIS — J18.9 COMMUNITY ACQUIRED PNEUMONIA, UNSPECIFIED LATERALITY: Primary | ICD-10-CM

## 2017-10-10 PROCEDURE — 99214 OFFICE O/P EST MOD 30 MIN: CPT | Mod: S$GLB,,, | Performed by: NURSE PRACTITIONER

## 2017-10-10 PROCEDURE — 99999 PR PBB SHADOW E&M-EST. PATIENT-LVL IV: CPT | Mod: PBBFAC,,, | Performed by: NURSE PRACTITIONER

## 2017-10-10 RX ORDER — HYDROCODONE BITARTRATE AND HOMATROPINE METHYLBROMIDE 5; 1.5 MG/5ML; MG/5ML
SOLUTION ORAL
Refills: 0 | COMMUNITY
Start: 2017-10-04 | End: 2017-10-13 | Stop reason: ALTCHOICE

## 2017-10-10 RX ORDER — TIZANIDINE 4 MG/1
TABLET ORAL
Refills: 0 | COMMUNITY
Start: 2017-10-02 | End: 2017-10-13 | Stop reason: SDUPTHER

## 2017-10-10 RX ORDER — AZITHROMYCIN 250 MG/1
250 TABLET, FILM COATED ORAL DAILY
Qty: 5 TABLET | Refills: 0 | Status: SHIPPED | OUTPATIENT
Start: 2017-10-10 | End: 2017-10-15

## 2017-10-10 RX ORDER — AMOXICILLIN AND CLAVULANATE POTASSIUM 875; 125 MG/1; MG/1
1 TABLET, FILM COATED ORAL EVERY 12 HOURS
Qty: 10 TABLET | Refills: 0 | Status: SHIPPED | OUTPATIENT
Start: 2017-10-10 | End: 2017-10-15

## 2017-10-10 RX ORDER — AZITHROMYCIN 1 G/1
1 POWDER, FOR SUSPENSION ORAL ONCE
COMMUNITY
End: 2017-10-13 | Stop reason: ALTCHOICE

## 2017-10-10 RX ORDER — ALBUTEROL SULFATE 90 UG/1
AEROSOL, METERED RESPIRATORY (INHALATION)
Refills: 2 | COMMUNITY
Start: 2017-10-04 | End: 2018-02-02

## 2017-10-10 RX ORDER — AZITHROMYCIN 500 MG/1
TABLET, FILM COATED ORAL
Refills: 0 | COMMUNITY
Start: 2017-10-04 | End: 2017-10-13 | Stop reason: ALTCHOICE

## 2017-10-10 NOTE — PROGRESS NOTES
This dictation has been generated using Dragon Dictation some phonetic errors may occur.     Kianna was seen today for hospital follow up.    Diagnoses and all orders for this visit:    Community acquired pneumonia, unspecified laterality    Other orders  -     amoxicillin-clavulanate 875-125mg (AUGMENTIN) 875-125 mg per tablet; Take 1 tablet by mouth every 12 (twelve) hours.  -     azithromycin (ZITHROMAX Z-JARAD) 250 MG tablet; Take 1 tablet (250 mg total) by mouth once daily.      Follow-up from the hospital.  Admitted at [facility] for 5 days for community acquired pneumonia.  She has 1 more dose of her medicines.  She still notes some shortness of breath and fevers since discharge.  Does not appear septic.  Doesn't appear acutely ill.  She is talking in full sentences.  No excessive cough and noted during office visit.  We will extend her antibiotic coverage and have her follow-up with us in 3 days.  I explained to the patient that I suspect that some of her symptoms will linger through the week that she should note gradual improvement.    Return in about 3 days (around 10/13/2017).      ________________________________________________________________  ________________________________________________________________        Chief Complaint   Patient presents with    Hospital Follow Up     [place]     History of present illness  This 47 y.o. presents today for complaint of follow-up from ER visit and admit to [facility].  Patient went to the hospital on October 4 and was admitted for community-acquired pneumonia.  Discharge date October 9 yesterday.  Patient reports that on admit she had fever coughing flushing productive cough and increased heart rate.  She was monitored on telemetry while in the hospital.  She had a urgent care visit prior to admit to the hospital but was not responding to antibiotics and steroid shot.  She has received Rocephin while in the hospital.  She was discharged on  Augmentin and Zithromax.  Patient also reports prior Levaquin use.  She saw pulmonology while admitted.  Review of systems  Still with low-grade temp.  No chills or body aches.  Patient notes continued productive cough.  No shortness of breath or chest pain.  No nausea or vomiting.  Antibiotics have given her some diarrhea.  Appetite is fair.  Past medical and social history reviewed  Reviewed the TCC information from patient.  Reviewed CBC CMP report from [place].  She had a slightly low white blood cell count and some other indices changes not surprising with acute illness.    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    MVP (mitral valve prolapse)     POTS (postural orthostatic tachycardia syndrome)     Ulcer     Vitamin D deficiency disease 11/6/2012       Past Surgical History:   Procedure Laterality Date    ANKLE SURGERY      lt    APPENDECTOMY      BACK SURGERY      CHOLECYSTECTOMY      choley & ovarian cyst removed  12/2005    cystoscope      multiple    HYSTERECTOMY  4/8/2004    BUBBA BS&O     interstim      OOPHORECTOMY  4/8/2004    with hysterectomy     PELVIC LAPAROSCOPY      [place] EXPLORATORY OF ABDOMEN      SINUS SURGERY  03/01/2016    SPINE SURGERY         Family History   Problem Relation Age of Onset    Hypertension Father     Alcohol abuse Brother     Cancer Paternal Uncle     Colon cancer Paternal Uncle     Depression Maternal Grandmother     Hearing loss Maternal Grandmother     Heart disease Maternal Grandmother     Kidney disease Maternal Grandmother     Cancer Paternal Grandmother     Arthritis Paternal Grandfather     Diabetes Paternal Grandfather     Stroke Paternal Grandfather     Breast cancer Neg  Hx     Ovarian cancer Neg Hx        Social History     Social History    Marital status: Single     Spouse name: N/A    Number of children: N/A    Years of education: N/A     Social History Main Topics    Smoking status: Never Smoker    Smokeless tobacco: Never Used    Alcohol use No    Drug use: No    Sexual activity: No     Other Topics Concern    None     Social History Narrative    None       Current Outpatient Prescriptions   Medication Sig Dispense Refill    azelastine (ASTELIN) 137 mcg (0.1 %) nasal spray U 2 SPRAYS IEN Q 12 H PRN  6    azithromycin (ZITHROMAX) 500 MG tablet TK 1 T PO QD  0    BRINTELLIX 20 mg Tab once daily.   2    dicyclomine (BENTYL) 10 MG capsule TK ONE C PO TID  11    estrogens, conjugated, (PREMARIN) 1.25 MG tablet Take 1 tablet (1.25 mg total) by mouth once daily. 30 tablet 7    estropipate (OGEN) 1.5 MG tablet TK 1 T PO ONCE D  7    gabapentin (NEURONTIN) 300 MG capsule Take 300 mg by mouth 2 (two) times daily. 2-300 mg cap in AM and 30 300 mg cap in PM      HYDROMET 5-1.5 mg/5 mL Syrp TK 15ML PO Q 6 H PRF COUGH  0    levocetirizine (XYZAL) 5 MG tablet TK 1 T PO  ONCE D  9    levothyroxine (SYNTHROID) 75 MCG tablet Take 1 tablet (75 mcg total) by mouth once daily. 90 tablet 3    metoprolol tartrate (LOPRESSOR) 50 MG tablet Take 50 mg by mouth 3 (three) times daily. 1 Tablet Oral Three times a day      montelukast (SINGULAIR) 10 mg tablet TK 1 T PO  ONCE D  3    tizanidine (ZANAFLEX) 4 MG tablet Take by mouth every 8 (eight) hours. 3 Tablet Oral Three times a day      trazodone (DESYREL) 100 MG tablet 150 mg nightly as needed.   2    amoxicillin-clavulanate 875-125mg (AUGMENTIN) 875-125 mg per tablet Take 1 tablet by mouth every 12 (twelve) hours. 10 tablet 0    azithromycin (ZITHROMAX Z-JARAD) 250 MG tablet Take 1 tablet (250 mg total) by mouth once daily. 5 tablet 0    azithromycin (ZITHROMAX) 1 gram Pack Take 1 g by mouth once.      oxycodone-acetaminophen   mg (PERCOCET)  mg per tablet Take 1 tablet by mouth every 4 (four) hours as needed.      VENTOLIN HFA 90 mcg/actuation inhaler INHALE 2 PUFFS Q 6 H PRF SOB/COUGH  2    VIRTUSSIN AC  mg/5 mL syrup TK 10 ML PO EVERY 6 H PRN FOR COUGH  0     No current facility-administered medications for this visit.        Review of patient's allergies indicates:   Allergen Reactions    Benadryl allergy decongestant      Other reaction(s): Anaphylaxis    Dilaudid  [hydromorphone (bulk)]      Other reaction(s): Anaphylaxis    Fludrocortisone Hives    Gluten      Other reaction(s) GI upset    Indomethacin Hives    Penicillins Rash    Sulfa (sulfonamide antibiotics) Rash    Vancomycin analogues Rash       Physical examination  Vitals Reviewed  Gen. Well-dressed well-nourished no apparent distress  Skin warm dry and intact.  No rashes noted.  Neck is supple without adenopathy  Chest.  Respirations are even unlabored.  Lungs are clear to auscultation.  Cardiac regular rate and rhythm.  No chest wall adenopathy noted.  Neuro. Awake alert oriented x4.  Normal judgment and cognition noted.  Extremities no clubbing cyanosis or edema noted.      Call or return to clinic prn if these symptoms worsen or fail to improve as anticipated.

## 2017-10-12 ENCOUNTER — TELEPHONE (OUTPATIENT)
Dept: FAMILY MEDICINE | Facility: CLINIC | Age: 47
End: 2017-10-12

## 2017-10-12 ENCOUNTER — TELEPHONE (OUTPATIENT)
Dept: SLEEP MEDICINE | Facility: OTHER | Age: 47
End: 2017-10-12

## 2017-10-12 NOTE — TELEPHONE ENCOUNTER
----- Message from Karrie Santo sent at 10/12/2017  9:29 AM CDT -----  Contact: Self   Patient says she has a question regarding  her medication. Patient says she was taking 2 a day and Sukhjinder Rowland changed it to 1 a day and she would like to make sure that was correct.  Please call at [phone]        azithromycin (ZITHROMAX)

## 2017-10-12 NOTE — TELEPHONE ENCOUNTER
Called pt ; no answer- left pt a message that the change was noted in NPs notes from yesterday's visit that frequency was changed from twice daily to daily.

## 2017-10-13 ENCOUNTER — HOSPITAL ENCOUNTER (OUTPATIENT)
Dept: RADIOLOGY | Facility: HOSPITAL | Age: 47
Discharge: HOME | End: 2017-10-13
Attending: NURSE PRACTITIONER
Payer: COMMERCIAL

## 2017-10-13 ENCOUNTER — OFFICE VISIT (OUTPATIENT)
Dept: FAMILY MEDICINE | Facility: CLINIC | Age: 47
End: 2017-10-13
Payer: COMMERCIAL

## 2017-10-13 ENCOUNTER — PATIENT MESSAGE (OUTPATIENT)
Dept: FAMILY MEDICINE | Facility: CLINIC | Age: 47
End: 2017-10-13

## 2017-10-13 VITALS
HEART RATE: 113 BPM | OXYGEN SATURATION: 95 % | WEIGHT: 265 LBS | TEMPERATURE: 99 F | BODY MASS INDEX: 52.03 KG/M2 | SYSTOLIC BLOOD PRESSURE: 132 MMHG | HEIGHT: 60 IN | DIASTOLIC BLOOD PRESSURE: 78 MMHG

## 2017-10-13 DIAGNOSIS — J18.9 PNEUMONIA DUE TO INFECTIOUS ORGANISM, UNSPECIFIED LATERALITY, UNSPECIFIED PART OF LUNG: ICD-10-CM

## 2017-10-13 DIAGNOSIS — J18.9 PNEUMONIA DUE TO INFECTIOUS ORGANISM, UNSPECIFIED LATERALITY, UNSPECIFIED PART OF LUNG: Primary | ICD-10-CM

## 2017-10-13 PROCEDURE — 71020 XR CHEST PA AND LATERAL: CPT | Mod: TC,PO

## 2017-10-13 PROCEDURE — 99999 PR PBB SHADOW E&M-EST. PATIENT-LVL IV: CPT | Mod: PBBFAC,,, | Performed by: NURSE PRACTITIONER

## 2017-10-13 PROCEDURE — 71020 XR CHEST PA AND LATERAL: CPT | Mod: 26,,, | Performed by: RADIOLOGY

## 2017-10-13 PROCEDURE — 99214 OFFICE O/P EST MOD 30 MIN: CPT | Mod: S$GLB,,, | Performed by: NURSE PRACTITIONER

## 2017-10-13 RX ORDER — ONDANSETRON 4 MG/1
4 TABLET, FILM COATED ORAL DAILY
Refills: 0 | COMMUNITY
Start: 2017-10-09 | End: 2017-12-06

## 2017-10-13 RX ORDER — TIZANIDINE 4 MG/1
TABLET ORAL
Qty: 120 ML | Refills: 0 | Status: SHIPPED | OUTPATIENT
Start: 2017-10-13 | End: 2018-04-03

## 2017-10-13 NOTE — PROGRESS NOTES
This dictation has been generated using Dragon Dictation some phonetic errors may occur.     Kianna was seen today for pneumonia.    Diagnoses and all orders for this visit:    Pneumonia due to infectious organism, unspecified laterality, unspecified part of lung  Comments:  LLL  Orders:  -     X-Ray Chest PA And Lateral; Future      Refill cough med.   Pneumonia left lower quadrant.  No record from [facility] obtained as of yet.  Check chest x-ray as above.  I'll review and address accordingly.  She'll still need to follow-up in 10 days for further evaluation and repeat chest x-ray.  I reviewed today's check serial x-ray images.  There is minimal patchy changes in the right middle lobe not that impressive.  Continue current antibiotics for now.  I have message patient if symptoms worsen to return to clinic and antibiotic change recommended at that point.  ________________________________________________________________  ________________________________________________________________        Chief Complaint   Patient presents with    Pneumonia     follow-up     History of present illness  This 47 y.o. presents today for complaint of follow-up on pneumonia.  I saw this patient recently for follow-up from [facility].  We had requested the records.  She still running a very low-grade temp.  She notes cough which is worse at nighttime.  Denies any chest pain.  Max temp 100 or less.  Continues to take Augmentin and Zithromax as ordered.  She'll have coverage on the antibiotics until the end of next week.  Review of systems  Feverish noted.  No chills.  No body aches.  She does note fatigue.  Denies chest pain.  Patient notes some shortness of breath and wheezing.  She notes coughing.  Cough is nonproductive.  No nausea vomiting or diarrhea.  Appetite is fair.     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    MVP (mitral valve prolapse)     POTS (postural orthostatic tachycardia syndrome)     Ulcer     Vitamin D deficiency disease 11/6/2012       Past Surgical History:   Procedure Laterality Date    ANKLE SURGERY      lt    APPENDECTOMY      BACK SURGERY      CHOLECYSTECTOMY      choley & ovarian cyst removed  12/2005    cystoscope      multiple    HYSTERECTOMY  4/8/2004    BUBBA BS&O     interstim      OOPHORECTOMY  4/8/2004    with hysterectomy     PELVIC LAPAROSCOPY      [place] EXPLORATORY OF ABDOMEN      SINUS SURGERY  03/01/2016    SPINE SURGERY         Family History   Problem Relation Age of Onset    Hypertension Father     Alcohol abuse Brother     Cancer Paternal Uncle     Colon cancer Paternal Uncle     Depression Maternal Grandmother     Hearing loss Maternal Grandmother     Heart disease Maternal Grandmother     Kidney disease Maternal Grandmother     Cancer Paternal Grandmother     Arthritis Paternal Grandfather     Diabetes Paternal Grandfather     Stroke Paternal Grandfather     Breast cancer Neg Hx     Ovarian cancer Neg Hx        Social History     Social History    Marital status: Single     Spouse name: N/A    Number of children: N/A    Years of education: N/A     Social History Main Topics    Smoking status: Never Smoker    Smokeless tobacco: Never Used    Alcohol use No    Drug use: No    Sexual activity: No     Other Topics Concern    None     Social History Narrative    None       Current Outpatient Prescriptions   Medication Sig Dispense Refill    amoxicillin-clavulanate 875-125mg (AUGMENTIN) 875-125 mg per tablet Take 1 tablet by mouth every 12 (twelve) hours. 10 tablet 0    azelastine (ASTELIN) 137 mcg (0.1 %) nasal spray U 2 SPRAYS IEN Q 12 H PRN  6     azithromycin (ZITHROMAX Z-JARAD) 250 MG tablet Take 1 tablet (250 mg total) by mouth once daily. 5 tablet 0    azithromycin (ZITHROMAX) 1 gram Pack Take 1 g by mouth once.      azithromycin (ZITHROMAX) 500 MG tablet TK 1 T PO QD  0    BRINTELLIX 20 mg Tab once daily.   2    dicyclomine (BENTYL) 10 MG capsule TK ONE C PO TID  11    estrogens, conjugated, (PREMARIN) 1.25 MG tablet Take 1 tablet (1.25 mg total) by mouth once daily. 30 tablet 7    estropipate (OGEN) 1.5 MG tablet TK 1 T PO ONCE D  7    gabapentin (NEURONTIN) 300 MG capsule Take 300 mg by mouth 2 (two) times daily. 2-300 mg cap in AM and 30 300 mg cap in PM      HYDROMET 5-1.5 mg/5 mL Syrp TK 15ML PO Q 6 H PRF COUGH  0    levocetirizine (XYZAL) 5 MG tablet TK 1 T PO  ONCE D  9    levothyroxine (SYNTHROID) 75 MCG tablet Take 1 tablet (75 mcg total) by mouth once daily. 90 tablet 3    metoprolol tartrate (LOPRESSOR) 50 MG tablet Take 50 mg by mouth 3 (three) times daily. 1 Tablet Oral Three times a day      montelukast (SINGULAIR) 10 mg tablet TK 1 T PO  ONCE D  3    oxycodone-acetaminophen  mg (PERCOCET)  mg per tablet Take 1 tablet by mouth every 4 (four) hours as needed.      tizanidine (ZANAFLEX) 4 MG tablet Take by mouth every 8 (eight) hours. 3 Tablet Oral Three times a day      trazodone (DESYREL) 100 MG tablet 150 mg nightly as needed.   2    VENTOLIN HFA 90 mcg/actuation inhaler INHALE 2 PUFFS Q 6 H PRF SOB/COUGH  2    VIRTUSSIN AC  mg/5 mL syrup TK 10 ML PO EVERY 6 H PRN FOR COUGH  0    ondansetron (ZOFRAN) 4 MG tablet Take 4 mg by mouth once daily.  0     No current facility-administered medications for this visit.        Review of patient's allergies indicates:   Allergen Reactions    Benadryl allergy decongestant      Other reaction(s): Anaphylaxis    Dilaudid  [hydromorphone (bulk)]      Other reaction(s): Anaphylaxis    Fludrocortisone Hives    Gluten      Other reaction(s) GI upset    Indomethacin  Hives    Penicillins Rash    Sulfa (sulfonamide antibiotics) Rash    Vancomycin analogues Rash       Physical examination  Vitals Reviewed  Gen. Well-dressed well-nourished no apparent distress  Skin warm dry and intact.  No rashes noted.  Neck is supple without adenopathy  Chest.  Respirations are even unlabored.  Lungs are clear to auscultation.  Cardiac regular rate and rhythm.  No chest wall adenopathy noted.  Neuro. Awake alert oriented x4.  Normal judgment and cognition noted.  Extremities no clubbing cyanosis or edema noted.     Call or return to clinic prn if these symptoms worsen or fail to improve as anticipated.

## 2017-10-14 ENCOUNTER — OFFICE VISIT (OUTPATIENT)
Dept: FAMILY MEDICINE | Facility: CLINIC | Age: 47
End: 2017-10-14
Payer: COMMERCIAL

## 2017-10-14 VITALS
SYSTOLIC BLOOD PRESSURE: 134 MMHG | RESPIRATION RATE: 12 BRPM | HEIGHT: 64 IN | DIASTOLIC BLOOD PRESSURE: 80 MMHG | TEMPERATURE: 99 F | WEIGHT: 265.63 LBS | HEART RATE: 100 BPM | OXYGEN SATURATION: 93 % | BODY MASS INDEX: 45.35 KG/M2

## 2017-10-14 DIAGNOSIS — J15.3 PNEUMONIA DUE TO GROUP B STREPTOCOCCUS, UNSPECIFIED LATERALITY, UNSPECIFIED PART OF LUNG: Primary | ICD-10-CM

## 2017-10-14 PROCEDURE — 94640 AIRWAY INHALATION TREATMENT: CPT | Mod: S$GLB,,, | Performed by: NURSE PRACTITIONER

## 2017-10-14 PROCEDURE — 99213 OFFICE O/P EST LOW 20 MIN: CPT | Mod: 25,S$GLB,, | Performed by: NURSE PRACTITIONER

## 2017-10-14 PROCEDURE — 99999 PR PBB SHADOW E&M-EST. PATIENT-LVL IV: CPT | Mod: PBBFAC,,, | Performed by: NURSE PRACTITIONER

## 2017-10-14 RX ORDER — IPRATROPIUM BROMIDE AND ALBUTEROL SULFATE 2.5; .5 MG/3ML; MG/3ML
3 SOLUTION RESPIRATORY (INHALATION)
Status: COMPLETED | OUTPATIENT
Start: 2017-10-14 | End: 2017-10-14

## 2017-10-14 RX ORDER — IPRATROPIUM BROMIDE AND ALBUTEROL SULFATE 2.5; .5 MG/3ML; MG/3ML
3 SOLUTION RESPIRATORY (INHALATION) EVERY 6 HOURS PRN
Qty: 1 BOX | Refills: 0 | Status: SHIPPED | OUTPATIENT
Start: 2017-10-14 | End: 2017-10-19 | Stop reason: SDUPTHER

## 2017-10-14 RX ORDER — BENZONATATE 200 MG/1
200 CAPSULE ORAL 3 TIMES DAILY PRN
Qty: 30 CAPSULE | Refills: 0 | Status: SHIPPED | OUTPATIENT
Start: 2017-10-14 | End: 2017-10-24

## 2017-10-14 RX ADMIN — IPRATROPIUM BROMIDE AND ALBUTEROL SULFATE 3 ML: 2.5; .5 SOLUTION RESPIRATORY (INHALATION) at 10:10

## 2017-10-14 NOTE — PROGRESS NOTES
Subjective:       Patient ID: Kianna Zuleta is a 47 y.o. female.This 47 y.o. presents today for complaint of follow-up on pneumonia, has been seen 10/10 and 10/13.  Was in [place] in Sept for pheumonia, now says she was told she had sterp pneumonia, today she is complaining of continued cough with brown sputum with blood at times, chest pain with coughing, worried because she is not better yet. Does say she thinks she might be slighlty better, low grade temp  She notes cough which is worse at nighttime, current cough med only works for one hour.  Max temp 100 or less.  Continues to take Augmentin and Zithromax as ordered.  She'll have coverage on the antibiotics until the end of next week.    Chief Complaint: fu community acquired pneumona  HPI  Review of Systems   Constitutional: Positive for activity change, fatigue and fever. Negative for appetite change, chills and diaphoresis.   HENT: Positive for congestion. Negative for dental problem, drooling, ear discharge, ear pain, facial swelling, hearing loss, mouth sores, nosebleeds, postnasal drip, rhinorrhea, sinus pain, sinus pressure, sneezing, sore throat, tinnitus, trouble swallowing and voice change.    Respiratory: Positive for cough. Negative for apnea, choking, chest tightness, shortness of breath, wheezing and stridor.    Cardiovascular: Negative for chest pain, palpitations and leg swelling.   Neurological: Negative for dizziness, facial asymmetry, light-headedness and headaches.   Psychiatric/Behavioral: Negative for agitation, behavioral problems, confusion and decreased concentration.       Objective:      Physical Exam   Constitutional: She is oriented to person, place, and time. She appears well-developed and well-nourished. No distress.   Cardiovascular: Normal rate, regular rhythm and normal heart sounds.    Pulmonary/Chest: Effort normal. No respiratory distress. She has wheezes. She has no rales. She exhibits no tenderness.   Neurological: She is alert  and oriented to person, place, and time. No cranial nerve deficit.   Skin: Skin is warm and dry.   Psychiatric: She has a normal mood and affect. Her behavior is normal. Judgment and thought content normal.   Nursing note and vitals reviewed.      Assessment:       1. Pneumonia due to group B Streptococcus, unspecified laterality, unspecified part of lung        Plan:       Nebulizer treatment while in clinic   feeling better after treatment, no wheezing  Will order nebulizer for home use    Diagnoses and all orders for this visit:    Pneumonia due to group B Streptococcus, unspecified laterality, unspecified part of lung  -     NEBULIZER FOR HOME USE  -     NEBULIZER KIT (SUPPLIES) FOR HOME USE    Other orders  -     albuterol-ipratropium 2.5mg-0.5mg/3mL nebulizer solution 3 mL; Take 3 mLs by nebulization one time.  -     albuterol-ipratropium 2.5mg-0.5mg/3mL (DUO-NEB) 0.5 mg-3 mg(2.5 mg base)/3 mL nebulizer solution; Take 3 mLs by nebulization every 6 (six) hours as needed for Wheezing. Rescue  -     benzonatate (TESSALON) 200 MG capsule; Take 1 capsule (200 mg total) by mouth 3 (three) times daily as needed.        Education  Pt has been given instructions populated from [employer] database and those entered into patient instructions field and has verbalized understanding of the after visit summary and information contained wherein.    Follow Up  Fu with AMAURI Rowland for fu CXR in 7-10 days.    In Case of Emergency   May go to ER for acute shortness of breath, lightheadedness, fever, or any other emergent complaints or changes in condition.

## 2017-10-16 ENCOUNTER — TELEPHONE (OUTPATIENT)
Dept: FAMILY MEDICINE | Facility: CLINIC | Age: 47
End: 2017-10-16

## 2017-10-16 ENCOUNTER — OFFICE VISIT (OUTPATIENT)
Dept: FAMILY MEDICINE | Facility: CLINIC | Age: 47
End: 2017-10-16
Payer: COMMERCIAL

## 2017-10-16 VITALS
HEART RATE: 96 BPM | HEIGHT: 64 IN | DIASTOLIC BLOOD PRESSURE: 89 MMHG | OXYGEN SATURATION: 97 % | SYSTOLIC BLOOD PRESSURE: 138 MMHG | TEMPERATURE: 99 F | BODY MASS INDEX: 44.58 KG/M2 | WEIGHT: 261.13 LBS

## 2017-10-16 DIAGNOSIS — R05.9 COUGH: Primary | ICD-10-CM

## 2017-10-16 DIAGNOSIS — J15.3 PNEUMONIA DUE TO GROUP B STREPTOCOCCUS, UNSPECIFIED LATERALITY, UNSPECIFIED PART OF LUNG: ICD-10-CM

## 2017-10-16 DIAGNOSIS — R21 RASH AND OTHER NONSPECIFIC SKIN ERUPTION: ICD-10-CM

## 2017-10-16 DIAGNOSIS — J30.9 CHRONIC ALLERGIC RHINITIS, UNSPECIFIED SEASONALITY, UNSPECIFIED TRIGGER: ICD-10-CM

## 2017-10-16 PROCEDURE — 99214 OFFICE O/P EST MOD 30 MIN: CPT | Mod: S$GLB,,, | Performed by: FAMILY MEDICINE

## 2017-10-16 PROCEDURE — 99999 PR PBB SHADOW E&M-EST. PATIENT-LVL III: CPT | Mod: PBBFAC,,, | Performed by: FAMILY MEDICINE

## 2017-10-16 RX ORDER — CLOTRIMAZOLE 1 %
CREAM (GRAM) TOPICAL 2 TIMES DAILY
Qty: 1 TUBE | Refills: 0 | Status: SHIPPED | OUTPATIENT
Start: 2017-10-16 | End: 2017-11-14

## 2017-10-16 NOTE — TELEPHONE ENCOUNTER
----- Message from Adrienne Vazquez sent at 10/16/2017  1:58 PM CDT -----  Contact: self  Calling regarding an order for a nebulizer that she was told was being sent to [employer] . She states the home [employer] company did not receive an order .    [phone]

## 2017-10-16 NOTE — PROGRESS NOTES
Office Visit    Patient Name: Kianna Zuleta    : 1970  MRN: [id number]      Assessment/Plan:  Kianna Zuleta is a 47 y.o. female who presents today for :    Cough  Pneumonia due to group B Streptococcus, unspecified laterality, unspecified part of lung  Chronic allergic rhinitis, unspecified seasonality, unspecified trigger    -CXR 10/13/17 reviewed.  Patient is s/p Abx treatment which consisted of Rocephin/Augmentin/Zithromax/Levaquin.    -She is AFVSS, pleasant and in no acute distress on exam today.  She continues to have a productive cough that's worse at night accompanied by increased drainage in her throat.  I suspect most of this coughing is due to post-nasal drip and have advised pt to take her singulair and Xyzal at nighttime.  -advised to use air humidifier/filter at home, changing AC filters regularly, avoid second-hand smoking  -advised cont supportive therapy and symptomatic management. May try nasal rinses.    RTC in 1 week for f/u and reapeat CXR.    Rash and other nonspecific skin eruption  -     clotrimazole (LOTRIMIN) 1 % cream; Apply topically 2 (two) times daily.  Dispense: 1 Tube; Refill: 0            Return for any evaluation as needed.     This note was created by combination of typed  and Dragon dictation.  Transcription errors may be present.  If there are any questions, please contact me.      ----------------------------------------------------------------------------------------------------------------------      HPI:  Kianna is a 47 y.o. female who presents today for:    Follow-up (pneumonia) and Rash        This patient has multiple medical diagnoses as noted below.    This patient is new to me     Pt had been seen in our clinic the past week for f/u of PNA, as she was admitted to  for inpatient treatment and was discharged on 10/9. She is s/p Abx treatment.    XR done in our clinic on 10/13 shows possible R middle lobe infiltrate.  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  Pt also has h/o allergies, needing to take daily Xyzal (morning) and Singulair (night time). She states that her cough is occasionally productive of clear/brown phelegm, cough is worse at night.   She was prescribed a neb machine on 10/14, but hadn't received it yet.  She states her albuterol inhaler is helping her breath better.        Additional ROS  No vision changes  No F/C/wt changes/fatigue  No dysphagia, mild sore throat  No CP/palpitations/swelling  No wheezing/SOB  No nausea/vomiting/abd pain/blood in stool, no constipation. She had some mild loose stools.  No muscle aches/joint pain  + red rash on L elbow the past week.  No weakness/HA/tingling/numbness  No anxiety/depression    Patient Active Problem List   Diagnosis    Vitamin D deficiency disease    Hypothyroidism    Irritable bowel syndrome    Foreign body    Elevated alkaline phosphatase level    Malignant carcinoid tumor of the appendix    Acute non-recurrent sinusitis       Past Surgical History:   Procedure Laterality Date    ANKLE SURGERY      lt    APPENDECTOMY      BACK SURGERY      CHOLECYSTECTOMY      choley & ovarian cyst removed  12/2005    cystoscope      multiple    HYSTERECTOMY  4/8/2004    BUBBA BS&O     interstim      OOPHORECTOMY  4/8/2004    with hysterectomy     PELVIC LAPAROSCOPY      [place] EXPLORATORY OF ABDOMEN      SINUS SURGERY  03/01/2016    SPINE SURGERY         Family History   Problem Relation Age of Onset    Hypertension Father     Alcohol abuse Brother     Cancer Paternal Uncle     Colon cancer Paternal Uncle  "    Depression Maternal Grandmother     Hearing loss Maternal Grandmother     Heart disease Maternal Grandmother     Kidney disease Maternal Grandmother     Cancer Paternal Grandmother     Arthritis Paternal Grandfather     Diabetes Paternal Grandfather     Stroke Paternal Grandfather     Breast cancer Neg Hx     Ovarian cancer Neg Hx        Social History     Social History    Marital status: Single     Spouse name: N/A    Number of children: N/A    Years of education: N/A     Occupational History    Not on file.     Social History Main Topics    Smoking status: Never Smoker    Smokeless tobacco: Never Used    Alcohol use No    Drug use: No    Sexual activity: No     Other Topics Concern    Not on file     Social History Narrative    No narrative on file       Current Medications  Medications reviewed and updated.     Allergies   Review of patient's allergies indicates:   Allergen Reactions    Benadryl allergy decongestant      Other reaction(s): Anaphylaxis    Dilaudid  [hydromorphone (bulk)]      Other reaction(s): Anaphylaxis    Fludrocortisone Hives    Gluten      Other reaction(s) GI upset    Indomethacin Hives    Penicillins Rash    Sulfa (sulfonamide antibiotics) Rash    Vancomycin analogues Rash             Review of Systems  See HPI      Physical Exam  /89 (BP Location: Right arm, Patient Position: Sitting, BP Method: Large (Manual))   Pulse 96   Temp 99.1 °F (37.3 °C) (Oral)   Ht 5' 4" (1.626 m)   Wt 118.5 kg (261 lb 2.2 oz)   SpO2 97%   BMI 44.82 kg/m²     GEN: NAD, well developed, pleasant, well nourished  HEENT: NCAT, PERRLA, EOMI, sclera clear, anicteric, boggy nasal mucosa, O/P with moderate drainage but otherwise clear, MMM with no lesions  NECK: normal, supple with midline trachea, no LAD, no thyromegaly  LUNGS: CTAB, no w/r/r, no increased work of breathing   HEART: RRR, normal S1 and S2, no m/r/g, no edema  ABD: s/nt/nd, NABS  MSK: warm/well perfused, " normal ROM in all 4 extremities, no c/c/e.  SKIN: normal turgor, 2cm annular erythematous macule on proximal L forearm  PSYCH: AOx3, appropriate mood and affect      Labs  Lab Results   Component Value Date    HGBA1C 5.1 08/15/2017     Lab Results   Component Value Date     08/15/2017    K 3.9 08/15/2017     08/15/2017    CO2 26 08/15/2017    BUN 12 08/15/2017    CREATININE 0.7 08/15/2017    CALCIUM 9.4 08/15/2017    ANIONGAP 10 08/15/2017    ESTGFRAFRICA >60.0 08/15/2017    EGFRNONAA >60.0 08/15/2017     Lab Results   Component Value Date    CHOL 228 (H) 10/23/2012    CHOL 202 (H) 11/08/2008    CHOL 199 12/13/2005     Lab Results   Component Value Date    HDL 55 10/23/2012    HDL 63 11/08/2008    HDL 47.0 12/13/2005     Lab Results   Component Value Date    LDLCALC 144.0 10/23/2012    LDLCALC 124.6 11/08/2008    LDLCALC 126.0 12/13/2005     Lab Results   Component Value Date    TRIG 143 10/23/2012    TRIG 72 11/08/2008    TRIG 130 12/13/2005     Lab Results   Component Value Date    CHOLHDL 24.1 10/23/2012    CHOLHDL 31.2 11/08/2008    CHOLHDL 23.6 12/13/2005     Last set of blood work has been reviewed as noted above.      Health Maintenance  Health Maintenance       Date Due Completion Date    Pneumococcal PCV13 (High Risk) (1 - PCV13 Required) 06/12/1973 ---    TETANUS VACCINE 06/12/1990 ---    Pneumococcal PPSV23 (High Risk) (1) 06/12/1990 ---    Influenza Vaccine 08/01/2017 ---    Mammogram 11/22/2018 11/22/2016    Lipid Panel 05/29/2020 5/29/2015

## 2017-10-16 NOTE — TELEPHONE ENCOUNTER
Please send a prescription, written if necessary wherever needed, or even just call into the pharmacy

## 2017-10-16 NOTE — LETTER
October 16, 2017      [facility]  [address] [facility]  [place] [address]  Phone: [phone]  Fax: [phone]       Patient: Kianna Zuleta   YOB: 1970  Date of Visit: 10/16/2017    To Whom It May Concern:    Mitra Zuleta  was at [facility] on 10/16/2017. She may return to work/school on 10/17/2017 with no restrictions. If you have any questions or concerns, or if I can be of further assistance, please do not hesitate to contact me.    Sincerely,    Laquita Virk MA

## 2017-10-17 ENCOUNTER — PATIENT MESSAGE (OUTPATIENT)
Dept: FAMILY MEDICINE | Facility: CLINIC | Age: 47
End: 2017-10-17

## 2017-10-17 ENCOUNTER — TELEPHONE (OUTPATIENT)
Dept: SURGERY | Facility: CLINIC | Age: 47
End: 2017-10-17

## 2017-10-17 NOTE — TELEPHONE ENCOUNTER
----- Message from Adrienne Vazquez sent at 10/16/2017  1:58 PM CDT -----  Contact: self  Calling regarding an order for a nebulizer that she was told was being sent to [facility] . She states the home [employer] company did not receive an order .    [phone] SJ

## 2017-10-18 ENCOUNTER — LAB VISIT (OUTPATIENT)
Dept: LAB | Facility: HOSPITAL | Age: 47
End: 2017-10-18
Attending: NURSE PRACTITIONER
Payer: COMMERCIAL

## 2017-10-18 ENCOUNTER — PATIENT MESSAGE (OUTPATIENT)
Dept: FAMILY MEDICINE | Facility: CLINIC | Age: 47
End: 2017-10-18

## 2017-10-18 ENCOUNTER — OFFICE VISIT (OUTPATIENT)
Dept: FAMILY MEDICINE | Facility: CLINIC | Age: 47
End: 2017-10-18
Payer: COMMERCIAL

## 2017-10-18 VITALS
SYSTOLIC BLOOD PRESSURE: 130 MMHG | HEIGHT: 64 IN | OXYGEN SATURATION: 95 % | DIASTOLIC BLOOD PRESSURE: 84 MMHG | WEIGHT: 260.81 LBS | HEART RATE: 102 BPM | TEMPERATURE: 99 F | BODY MASS INDEX: 44.53 KG/M2

## 2017-10-18 DIAGNOSIS — J18.9 PNEUMONIA OF RIGHT MIDDLE LOBE DUE TO INFECTIOUS ORGANISM: ICD-10-CM

## 2017-10-18 DIAGNOSIS — J18.9 PNEUMONIA OF RIGHT MIDDLE LOBE DUE TO INFECTIOUS ORGANISM: Primary | ICD-10-CM

## 2017-10-18 DIAGNOSIS — R21 RASH: ICD-10-CM

## 2017-10-18 LAB
BACTERIA #/AREA URNS AUTO: ABNORMAL /HPF
BILIRUB UR QL STRIP: NEGATIVE
CLARITY UR REFRACT.AUTO: ABNORMAL
COLOR UR AUTO: YELLOW
CREAT UR-MCNC: 168 MG/DL
GLUCOSE UR QL STRIP: NEGATIVE
HGB UR QL STRIP: NEGATIVE
KETONES UR QL STRIP: NEGATIVE
LEUKOCYTE ESTERASE UR QL STRIP: NEGATIVE
MICROSCOPIC COMMENT: ABNORMAL
NITRITE UR QL STRIP: NEGATIVE
PH UR STRIP: 6 [PH] (ref 5–8)
PROT UR QL STRIP: NEGATIVE
PROT UR-MCNC: 12 MG/DL
PROT/CREAT RATIO, UR: 0.07
RBC #/AREA URNS AUTO: 1 /HPF (ref 0–4)
SP GR UR STRIP: 1.02 (ref 1–1.03)
SQUAMOUS #/AREA URNS AUTO: 9 /HPF
URN SPEC COLLECT METH UR: ABNORMAL
UROBILINOGEN UR STRIP-ACNC: NEGATIVE EU/DL
WBC #/AREA URNS AUTO: 2 /HPF (ref 0–5)
YEAST UR QL AUTO: ABNORMAL

## 2017-10-18 PROCEDURE — 99999 PR PBB SHADOW E&M-EST. PATIENT-LVL IV: CPT | Mod: PBBFAC,,, | Performed by: NURSE PRACTITIONER

## 2017-10-18 PROCEDURE — 81001 URINALYSIS AUTO W/SCOPE: CPT

## 2017-10-18 PROCEDURE — 84156 ASSAY OF PROTEIN URINE: CPT

## 2017-10-18 PROCEDURE — 99215 OFFICE O/P EST HI 40 MIN: CPT | Mod: S$GLB,,, | Performed by: NURSE PRACTITIONER

## 2017-10-18 RX ORDER — MUPIROCIN 20 MG/G
OINTMENT TOPICAL 3 TIMES DAILY
Qty: 22 G | Refills: 1 | Status: SHIPPED | OUTPATIENT
Start: 2017-10-18 | End: 2017-10-25

## 2017-10-18 NOTE — PROGRESS NOTES
This dictation has been generated using Dragon Dictation some phonetic errors may occur.     Kianna was seen today for pneuemonia.    Diagnoses and all orders for this visit:    Pneumonia of right middle lobe due to infectious organism  -     CBC auto differential; Future  -     Comprehensive metabolic panel; Future  -     Sedimentation rate, manual; Future  -     C-reactive protein; Future  -     NIKOLAY; Future  -     Rheumatoid factor; Future  -     Cyclic citrul peptide antibody, IgG; Future  -     Sjogrens syndrome-A extractable nuclear antibody; Future  -     Cancel: Urinalysis  -     Cancel: Protein / creatinine ratio, urine  -     Hepatitis C antibody; Future  -     RPR; Future  -     Urinalysis; Future  -     Protein / creatinine ratio, urine; Future    Rash  -     CBC auto differential; Future  -     Comprehensive metabolic panel; Future  -     Sedimentation rate, manual; Future  -     C-reactive protein; Future  -     NIKOLAY; Future  -     Rheumatoid factor; Future  -     Cyclic citrul peptide antibody, IgG; Future  -     Sjogrens syndrome-A extractable nuclear antibody; Future  -     Cancel: Urinalysis  -     Cancel: Protein / creatinine ratio, urine  -     Hepatitis C antibody; Future  -     RPR; Future  -     Urinalysis; Future  -     Protein / creatinine ratio, urine; Future    Other orders  -     mupirocin (BACTROBAN) 2 % ointment; Apply topically 3 (three) times daily. Rash right arm      Follow-up on pneumonia.  She had questionable development of a right middle lobe pneumonia while on antibiotics.  Still running a low-grade fever.  Still having some fatigue.  Pulse rate is decreased to normal range.  No history of autoimmunity disease however did have prior evaluation with rheumatology.  Not sure what was going on at that time to trigger workup.  Currently concerned about autoimmune disease contributing to infection.  She also has a right arm rash which did not respond to steroids.  I'll treat her for  bacterial infection but proceed with workup as above.  Continue current therapies for now.  Still covered under the Zithromax.  I'll review results and see her back on Tuesday.  Discussed with PCP.   Reviewed images of cxr from the 13th.    Return in about 1 week (around 10/25/2017).  ________________________________________________________________  ________________________________________________________________    Chief Complaint   Patient presents with    pneuemonia     History of present illness  This 47 y.o. presents today for complaint of follow-up on pneumonia.  Patient notes still coughing which is productive of yellow sputum.  She denies any shortness of breath or chest pain.  Still running a low-grade temp.  She notes development of a rash on the right arm and scattered rash on the chest.  She does note itching.  She's also had some redness of the face.  She saw one of my partners recently who added a steroid cream to the arm.  Patient indicates flare in symptoms after application.  She had some skin peeling.  No improvement in the rash with steroid use.  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  Ros  No chills or rigors.  No coryza   Notes rash chest scattered and redness to face.  Circular rash to right arm.  No sob or GUTIÉRREZ.  No chest pain  No NVD  No urinary complaints  No polydipsia or polyuria.    Denies family history of rheumatic diseases.  Did have work up with rheum previously for unclear reason, but varied history IC, IBS, thyroid, MVP probable reasons to suspect autoimmunity.  She stopped going.  No record yet from . Reviewed the labs from the hospital follow up ([facility]) visit.  Paperwork indicated LLQ pneumonia.     Past Medical History:   Diagnosis Date    Anxiety     Bulging disc neck and back    Depression     Elevated alkaline phosphatase level 7/17/2013    Hypothyroidism 11/6/2012    Interstitial cystitis      Irritable bowel syndrome 11/6/2012    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    MVP (mitral valve prolapse)     POTS (postural orthostatic tachycardia syndrome)     Ulcer     Vitamin D deficiency disease 11/6/2012       Past Surgical History:   Procedure Laterality Date    ANKLE SURGERY      lt    APPENDECTOMY      BACK SURGERY      CHOLECYSTECTOMY      choley & ovarian cyst removed  12/2005    cystoscope      multiple    HYSTERECTOMY  4/8/2004    BUBBA BS&O     interstim      OOPHORECTOMY  4/8/2004    with hysterectomy     PELVIC LAPAROSCOPY      [place] EXPLORATORY OF ABDOMEN      SINUS SURGERY  03/01/2016    SPINE SURGERY         Family History   Problem Relation Age of Onset    Hypertension Father     Alcohol abuse Brother     Cancer Paternal Uncle     Colon cancer Paternal Uncle     Depression Maternal Grandmother     Hearing loss Maternal Grandmother     Heart disease Maternal Grandmother     Kidney disease Maternal Grandmother     Cancer Paternal Grandmother     Arthritis Paternal Grandfather     Diabetes Paternal Grandfather     Stroke Paternal Grandfather     Breast cancer Neg Hx     Ovarian cancer Neg Hx        Social History     Social History    Marital status: Single     Spouse name: N/A    Number of children: N/A    Years of education: N/A     Social History Main Topics    Smoking status: Never Smoker    Smokeless tobacco: Never Used    Alcohol use No    Drug use: No    Sexual activity: No     Other Topics Concern    None     Social History Narrative    None       Current Outpatient Prescriptions   Medication Sig Dispense Refill    ALBUTEROL INHL Inhale into the lungs.      albuterol-ipratropium 2.5mg-0.5mg/3mL (DUO-NEB) 0.5 mg-3 mg(2.5 mg base)/3 mL nebulizer solution Take 3 mLs by nebulization every 6 (six) hours as needed for Wheezing. Rescue 1 Box 0    azelastine (ASTELIN) 137 mcg (0.1 %) nasal spray U 2 SPRAYS IEN Q 12 H PRN  6     benzonatate (TESSALON) 200 MG capsule Take 1 capsule (200 mg total) by mouth 3 (three) times daily as needed. 30 capsule 0    BRINTELLIX 20 mg Tab once daily.   2    DICLOFENAC SODIUM (PENNSAID TOP) Apply topically. AA bid      dicyclomine (BENTYL) 10 MG capsule TK ONE C PO TID  11    estrogens, conjugated, (PREMARIN) 1.25 MG tablet Take 1 tablet (1.25 mg total) by mouth once daily. 30 tablet 7    estropipate (OGEN) 1.5 MG tablet TK 1 T PO ONCE D  7    gabapentin (NEURONTIN) 300 MG capsule Take 300 mg by mouth 2 (two) times daily. 2-300 mg cap in AM and 30 300 mg cap in PM      levocetirizine (XYZAL) 5 MG tablet TK 1 T PO  ONCE D  9    levothyroxine (SYNTHROID) 75 MCG tablet Take 1 tablet (75 mcg total) by mouth once daily. 90 tablet 3    metoprolol tartrate (LOPRESSOR) 50 MG tablet Take 50 mg by mouth 4 (four) times daily. 1 Tablet Oral Three times a day      montelukast (SINGULAIR) 10 mg tablet TK 1 T PO  ONCE D  3    ondansetron (ZOFRAN) 4 MG tablet Take 4 mg by mouth once daily.  0    oxycodone-acetaminophen  mg (PERCOCET)  mg per tablet Take 1 tablet by mouth every 4 (four) hours as needed.      tizanidine (ZANAFLEX) 4 MG tablet Take by mouth every 8 (eight) hours. 3 Tablet Oral Three times a day      trazodone (DESYREL) 100 MG tablet 150 mg nightly as needed.   2    VENTOLIN HFA 90 mcg/actuation inhaler INHALE 2 PUFFS Q 6 H PRF SOB/COUGH  2    VIRTUSSIN AC  mg/5 mL syrup TK 10 ML PO EVERY 6 H PRN FOR COUGH 120 mL 0    clotrimazole (LOTRIMIN) 1 % cream Apply topically 2 (two) times daily. 1 Tube 0    mupirocin (BACTROBAN) 2 % ointment Apply topically 3 (three) times daily. Rash right arm 22 g 1     No current facility-administered medications for this visit.        Review of patient's allergies indicates:   Allergen Reactions    Benadryl allergy decongestant      Other reaction(s): Anaphylaxis    Dilaudid  [hydromorphone (bulk)]      Other reaction(s): Anaphylaxis     Fludrocortisone Hives    Gluten      Other reaction(s) GI upset    Indomethacin Hives    Penicillins Rash    Sulfa (sulfonamide antibiotics) Rash    Vancomycin analogues Rash       Physical examination  Vitals Reviewed  Gen. Well-dressed well-nourished does not look septic.    Skin warm dry and intact.  Anular rash to spots about zuhair to quarter size right arm at AC space.  Scattered fine rash chest wall.  Redness to right side of the face fading at bridge of nose. Faint redness on the left.  Not a definite butterfly pattern.    Neck is supple without adenopathy  Chest.  Respirations are even unlabored.  Lungs are clear to auscultation.  Cardiac regular rate and rhythm.  No chest wall adenopathy noted.  Neuro. Awake alert oriented x4.  Normal judgment and cognition noted.  Extremities no clubbing cyanosis or edema noted.     Call or return to clinic prn if these symptoms worsen or fail to improve as anticipated.

## 2017-10-20 RX ORDER — IPRATROPIUM BROMIDE AND ALBUTEROL SULFATE 2.5; .5 MG/3ML; MG/3ML
3 SOLUTION RESPIRATORY (INHALATION) EVERY 6 HOURS PRN
Qty: 1 BOX | Refills: 0 | Status: SHIPPED | OUTPATIENT
Start: 2017-10-20 | End: 2018-02-14 | Stop reason: SDUPTHER

## 2017-10-23 ENCOUNTER — PATIENT MESSAGE (OUTPATIENT)
Dept: FAMILY MEDICINE | Facility: CLINIC | Age: 47
End: 2017-10-23

## 2017-10-24 ENCOUNTER — PATIENT MESSAGE (OUTPATIENT)
Dept: FAMILY MEDICINE | Facility: CLINIC | Age: 47
End: 2017-10-24

## 2017-10-24 ENCOUNTER — HOSPITAL ENCOUNTER (OUTPATIENT)
Dept: RADIOLOGY | Facility: HOSPITAL | Age: 47
Discharge: HOME | End: 2017-10-24
Attending: NURSE PRACTITIONER
Payer: COMMERCIAL

## 2017-10-24 ENCOUNTER — OFFICE VISIT (OUTPATIENT)
Dept: FAMILY MEDICINE | Facility: CLINIC | Age: 47
End: 2017-10-24
Payer: COMMERCIAL

## 2017-10-24 VITALS
SYSTOLIC BLOOD PRESSURE: 122 MMHG | HEIGHT: 64 IN | WEIGHT: 259.25 LBS | HEART RATE: 137 BPM | DIASTOLIC BLOOD PRESSURE: 82 MMHG | BODY MASS INDEX: 44.26 KG/M2 | OXYGEN SATURATION: 96 % | TEMPERATURE: 99 F

## 2017-10-24 DIAGNOSIS — R50.9 FEVER, UNSPECIFIED FEVER CAUSE: ICD-10-CM

## 2017-10-24 DIAGNOSIS — J18.9 PNEUMONIA OF RIGHT MIDDLE LOBE DUE TO INFECTIOUS ORGANISM: ICD-10-CM

## 2017-10-24 DIAGNOSIS — R21 RASH: Primary | ICD-10-CM

## 2017-10-24 DIAGNOSIS — R05.9 COUGH: ICD-10-CM

## 2017-10-24 DIAGNOSIS — R00.0 TACHYCARDIA: ICD-10-CM

## 2017-10-24 PROCEDURE — 93005 ELECTROCARDIOGRAM TRACING: CPT | Mod: S$GLB,,, | Performed by: NURSE PRACTITIONER

## 2017-10-24 PROCEDURE — 71020 XR CHEST PA AND LATERAL: CPT | Mod: 26,,, | Performed by: RADIOLOGY

## 2017-10-24 PROCEDURE — 99999 PR PBB SHADOW E&M-EST. PATIENT-LVL V: CPT | Mod: PBBFAC,,, | Performed by: NURSE PRACTITIONER

## 2017-10-24 PROCEDURE — 99214 OFFICE O/P EST MOD 30 MIN: CPT | Mod: S$GLB,,, | Performed by: NURSE PRACTITIONER

## 2017-10-24 PROCEDURE — 93010 ELECTROCARDIOGRAM REPORT: CPT | Mod: S$GLB,,, | Performed by: INTERNAL MEDICINE

## 2017-10-24 PROCEDURE — 71020 XR CHEST PA AND LATERAL: CPT | Mod: TC,PO

## 2017-10-24 RX ORDER — GRISEOFULVIN 250 MG/1
500 TABLET ORAL DAILY
Qty: 30 TABLET | Refills: 0 | Status: SHIPPED | OUTPATIENT
Start: 2017-10-24 | End: 2017-11-23

## 2017-10-24 NOTE — PROGRESS NOTES
This dictation has been generated using Dragon Dictation some phonetic errors may occur.     Kianna was seen today for cough and rash.    Diagnoses and all orders for this visit:    Rash    Fever, unspecified fever cause  -     X-Ray Chest PA And Lateral; Future  -     EKG 12-lead  -     HIV 1 / 2 ANTIBODY; Future    Cough    Pneumonia of right middle lobe due to infectious organism  -     X-Ray Chest PA And Lateral; Future    Tachycardia  -     EKG 12-lead    Other orders  -     griseofulvin (GRIFULVIN V) 500 mg tablet; Take 1 tablet (500 mg total) by mouth once daily.      Rash clearly ringworm.  It hasn't responded to steroids or antibiotics.  Now has classic annular pattern.  Add griseofulvin as above.  Fever and ongoing cough.  She had a left lower lobe and right middle lobe pneumonia.  She had developed a right middle lobe pneumonia on antibiotic.  Workup for autoimmune is pretty unremarkable except for mild elevation of some inflammatory markers.  She is still running fever and coughing so I asked her to follow-up with pulmonology for reevaluation.  Tachycardia has had elevated heart rate in the past.  EKG was unremarkable per my interpretation mild sinus tach rate 103.  No ST changes.  Patient notes GI upset since completion of antibiotics.  She notes soft stools and denies any watery diarrhea.  She states when she eats something goes straight through her.  We discussed taking a probiotic or yogurt and such.  She was able to get an appointment November 1 with pulmonology Dr. Jaimes.  She will be out of work until then.  She is still feverish.    Return in about 1 week (around 10/31/2017).  ________________________________________________________________  ________________________________________________________________    Chief Complaint   Patient presents with    Cough    Rash     chest arm neck itching     History of present illness  This 47 y.o. presents today for complaint of rash, fever, and follow-up on  pneumonia.  Rash has worsened since last visit.  It was not responding to a steroid cream so we put her on antibiotic ointment.  The rash has worsened.  She notes itching and some burning.  Now has some rash on her neck.  Unfortunately continues to cough.  She still running a low-grade temp.  Patient reports temperature as being 100 at home.  Cough is occasionally productive.  Denies any shortness of breath or chest pain.  Reviewed results from last visit.  No evidence of autoimmune disease.  Patient notes some GI upset since being on antibiotics.  She notes soft stools and denies any watery diarrhea.  She states whenever she goes straight through her.  She hasn't tried anything for her problem.  Review of systems  Fever noted no chills or body aches  Rash present and stated places neck and left shoulder and left elbow.  No rash elsewhere.  Denies any oral or vaginal lesions  Answers for HPI/ROS submitted by the patient on 10/23/2017   activity change: Yes  hearing loss: No  rhinorrhea: Yes  trouble swallowing: No  eye discharge: No  visual disturbance: No  chest tightness: Yes  wheezing: Yes  chest pain: Yes.  I clarified and patient said no one I asked her about chest pain.  palpitations: Yes  blood in stool: No  constipation: No  vomiting: No  diarrhea: Yes.  Described as soft stools.  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    MVP (mitral valve prolapse)     POTS (postural orthostatic  tachycardia syndrome)     Ulcer     Vitamin D deficiency disease 11/6/2012       Past Surgical History:   Procedure Laterality Date    ANKLE SURGERY      lt    APPENDECTOMY      BACK SURGERY      CHOLECYSTECTOMY      choley & ovarian cyst removed  12/2005    cystoscope      multiple    HYSTERECTOMY  4/8/2004    BUBBA BS&O     interstim      OOPHORECTOMY  4/8/2004    with hysterectomy     PELVIC LAPAROSCOPY      [place] EXPLORATORY OF ABDOMEN      SINUS SURGERY  03/01/2016    SPINE SURGERY         Family History   Problem Relation Age of Onset    Hypertension Father     Alcohol abuse Brother     Cancer Paternal Uncle     Colon cancer Paternal Uncle     Depression Maternal Grandmother     Hearing loss Maternal Grandmother     Heart disease Maternal Grandmother     Kidney disease Maternal Grandmother     Cancer Paternal Grandmother     Arthritis Paternal Grandfather     Diabetes Paternal Grandfather     Stroke Paternal Grandfather     Breast cancer Neg Hx     Ovarian cancer Neg Hx        Social History     Social History    Marital status: Single     Spouse name: N/A    Number of children: N/A    Years of education: N/A     Social History Main Topics    Smoking status: Never Smoker    Smokeless tobacco: Never Used    Alcohol use No    Drug use: No    Sexual activity: No     Other Topics Concern    None     Social History Narrative    None       Current Outpatient Prescriptions   Medication Sig Dispense Refill    ALBUTEROL INHL Inhale into the lungs.      albuterol-ipratropium 2.5mg-0.5mg/3mL (DUO-NEB) 0.5 mg-3 mg(2.5 mg base)/3 mL nebulizer solution Take 3 mLs by nebulization every 6 (six) hours as needed for Wheezing. Rescue 1 Box 0    azelastine (ASTELIN) 137 mcg (0.1 %) nasal spray U 2 SPRAYS IEN Q 12 H PRN  6    benzonatate (TESSALON) 200 MG capsule Take 1 capsule (200 mg total) by mouth 3 (three) times daily as needed. 30 capsule 0    BRINTELLIX 20 mg Tab once daily.   2     clotrimazole (LOTRIMIN) 1 % cream Apply topically 2 (two) times daily. 1 Tube 0    DICLOFENAC SODIUM (PENNSAID TOP) Apply topically. AA bid      dicyclomine (BENTYL) 10 MG capsule TK ONE C PO TID  11    estrogens, conjugated, (PREMARIN) 1.25 MG tablet Take 1 tablet (1.25 mg total) by mouth once daily. 30 tablet 7    estropipate (OGEN) 1.5 MG tablet TK 1 T PO ONCE D  7    gabapentin (NEURONTIN) 300 MG capsule Take 300 mg by mouth 2 (two) times daily. 2-300 mg cap in AM and 30 300 mg cap in PM      levocetirizine (XYZAL) 5 MG tablet TK 1 T PO  ONCE D  9    levothyroxine (SYNTHROID) 75 MCG tablet Take 1 tablet (75 mcg total) by mouth once daily. 90 tablet 3    metoprolol tartrate (LOPRESSOR) 50 MG tablet Take 50 mg by mouth 4 (four) times daily. 1 Tablet Oral Three times a day      montelukast (SINGULAIR) 10 mg tablet TK 1 T PO  ONCE D  3    mupirocin (BACTROBAN) 2 % ointment Apply topically 3 (three) times daily. Rash right arm 22 g 1    ondansetron (ZOFRAN) 4 MG tablet Take 4 mg by mouth once daily.  0    oxycodone-acetaminophen  mg (PERCOCET)  mg per tablet Take 1 tablet by mouth every 4 (four) hours as needed.      tizanidine (ZANAFLEX) 4 MG tablet Take by mouth every 8 (eight) hours. 3 Tablet Oral Three times a day      trazodone (DESYREL) 100 MG tablet 150 mg nightly as needed.   2    VENTOLIN HFA 90 mcg/actuation inhaler INHALE 2 PUFFS Q 6 H PRF SOB/COUGH  2    VIRTUSSIN AC  mg/5 mL syrup TK 10 ML PO EVERY 6 H PRN FOR COUGH 120 mL 0    griseofulvin (GRIFULVIN V) 500 mg tablet Take 1 tablet (500 mg total) by mouth once daily. 30 tablet 0     No current facility-administered medications for this visit.        Review of patient's allergies indicates:   Allergen Reactions    Benadryl allergy decongestant      Other reaction(s): Anaphylaxis    Dilaudid  [hydromorphone (bulk)]      Other reaction(s): Anaphylaxis    Fludrocortisone Hives    Gluten      Other reaction(s) GI upset     Indomethacin Hives    Penicillins Rash    Sulfa (sulfonamide antibiotics) Rash    Vancomycin analogues Rash       Physical examination  Vitals Reviewed  Gen. Well-dressed well-nourished no apparent distress  Skin warm dry and intact.  Annular rash noted left elbow AC space.  Trace rash left shoulder.  There is a annular rash appearing on the left side of the anterior neck.  HEENT.  TM intact bilateral with normal light reflex.  No mastoid tenderness during percussion.  Nares patent bilateral.  Pharynx is unremarkable.  No maxillary or frontal sinus tenderness when percussed.    Neck is supple without adenopathy  Chest.  Respirations are even unlabored.  Lungs are clear to auscultation.  Cardiac regular rate and rhythm.  No chest wall adenopathy noted.  Neuro. Awake alert oriented x4.  Normal judgment and cognition noted.  Extremities no clubbing cyanosis or edema noted.     Call or return to clinic prn if these symptoms worsen or fail to improve as anticipated.

## 2017-10-26 ENCOUNTER — TELEPHONE (OUTPATIENT)
Dept: SLEEP MEDICINE | Facility: OTHER | Age: 47
End: 2017-10-26

## 2017-10-26 ENCOUNTER — HOSPITAL ENCOUNTER (OUTPATIENT)
Dept: SLEEP MEDICINE | Facility: OTHER | Age: 47
Discharge: HOME | End: 2017-10-26
Attending: PSYCHIATRY & NEUROLOGY
Payer: COMMERCIAL

## 2017-10-26 DIAGNOSIS — G47.30 SLEEP APNEA, UNSPECIFIED TYPE: ICD-10-CM

## 2017-10-26 PROCEDURE — 95800 SLP STDY UNATTENDED: CPT | Mod: 26,,, | Performed by: PSYCHIATRY & NEUROLOGY

## 2017-10-26 PROCEDURE — 95800 SLP STDY UNATTENDED: CPT

## 2017-10-26 NOTE — PROGRESS NOTES
Per physician orders, patient was given home sleep testing device and instructed on how to apply the device before going to bed tonight.  I sized the device and showed the patient using a mirror how the device fits and what it should look like so they can use a mirror when putting it on themselves at home.  We reviewed the instruction booklet and the number to call if they have any questions at night.  Patient understood and was instructed to return the device the next back to the sleep lab.

## 2017-10-27 ENCOUNTER — PATIENT MESSAGE (OUTPATIENT)
Dept: FAMILY MEDICINE | Facility: CLINIC | Age: 47
End: 2017-10-27

## 2017-10-30 ENCOUNTER — TELEPHONE (OUTPATIENT)
Dept: SLEEP MEDICINE | Facility: OTHER | Age: 47
End: 2017-10-30

## 2017-10-30 ENCOUNTER — PATIENT MESSAGE (OUTPATIENT)
Dept: FAMILY MEDICINE | Facility: CLINIC | Age: 47
End: 2017-10-30

## 2017-10-30 DIAGNOSIS — G47.30 SLEEP APNEA, UNSPECIFIED TYPE: Primary | ICD-10-CM

## 2017-10-30 NOTE — TELEPHONE ENCOUNTER
Please inform patient that their home sleep study was negative/inconclusive. As the next step, an in-lab overnight sleep study will be ordered to more conclusively evaluate for possibility of sleep apnea. This is standard of care.

## 2017-11-01 ENCOUNTER — TELEPHONE (OUTPATIENT)
Dept: FAMILY MEDICINE | Facility: CLINIC | Age: 47
End: 2017-11-01

## 2017-11-01 ENCOUNTER — PATIENT MESSAGE (OUTPATIENT)
Dept: FAMILY MEDICINE | Facility: CLINIC | Age: 47
End: 2017-11-01

## 2017-11-01 NOTE — TELEPHONE ENCOUNTER
----- Message from Fiorella Bautista sent at 11/1/2017  1:27 PM CDT -----  Contact: Dr Sandy Delgado at [facility] calling to speak with Dr Celis directly about patient. Please call [phone].    Pulmonary Medicine

## 2017-11-01 NOTE — TELEPHONE ENCOUNTER
Spoke with pulmonary.  They plan to repeat her chest CT.  She continues with some shortness of breath but also having some flushing episodes and diarrhea and so forth and she does have a history of carcinoid.  We discussed this issue.  Apparently patient stopped following with neuroendocrine.  Pulmonary we'll do some initial serotonin blood work.    It appears that attempted recently in October to send the serotonin-related blood work she was getting before but I do not see any results.  Either the testing could not be done patient did not have done but she did have the other blood work.  Somewhat of a mystery

## 2017-11-02 ENCOUNTER — PATIENT MESSAGE (OUTPATIENT)
Dept: FAMILY MEDICINE | Facility: CLINIC | Age: 47
End: 2017-11-02

## 2017-11-03 ENCOUNTER — TELEPHONE (OUTPATIENT)
Dept: NEUROLOGY | Facility: HOSPITAL | Age: 47
End: 2017-11-03

## 2017-11-03 DIAGNOSIS — C7A.020 MALIGNANT CARCINOID TUMOR OF APPENDIX: Primary | ICD-10-CM

## 2017-11-03 NOTE — TELEPHONE ENCOUNTER
----- Message from Lucy Krissy sent at 11/3/2017 10:00 AM CDT -----  Contact: Patient  EAW- Patient called for an appointment with Dr. Olvera. She has been having a low grade fever and since had pneumonia.I gave patient appt for Monday 11/14 but  she needs to have a ct and labs before her appointment with . Patient will call back with fax number for new orders for labs and ct

## 2017-11-03 NOTE — TELEPHONE ENCOUNTER
----- Message from Lucy Rey sent at 11/3/2017  1:41 PM CDT -----  Contact: Patient  EAW- Patient would like to her to have her orders put in epic to get scheduled here at [facility]. Patients call back number is [phone]

## 2017-11-03 NOTE — TELEPHONE ENCOUNTER
----- Message from Lucy Rey sent at 11/3/2017 10:12 AM CDT -----  Contact: Patient  EAW- the fax number to send orders is [phone]

## 2017-11-06 ENCOUNTER — HOSPITAL ENCOUNTER (OUTPATIENT)
Dept: RADIOLOGY | Facility: HOSPITAL | Age: 47
Discharge: HOME | End: 2017-11-06
Attending: SURGERY
Payer: COMMERCIAL

## 2017-11-06 DIAGNOSIS — C7A.020 MALIGNANT CARCINOID TUMOR OF APPENDIX: ICD-10-CM

## 2017-11-06 LAB
EXT 24 HR UR METANEPHRINE: ABNORMAL
EXT 24 HR UR NORMETANEPHRINE: ABNORMAL
EXT 24 HR UR NORMETANEPHRINE: ABNORMAL
EXT 25 HYDROXY VIT D2: ABNORMAL
EXT 25 HYDROXY VIT D3: ABNORMAL
EXT 5 HIAA 24 HR URINE: ABNORMAL
EXT 5 HIAA BLOOD: 18 NG/ML (ref 0–22)
EXT ACTH: ABNORMAL
EXT AFP: ABNORMAL
EXT ALBUMIN: 4.3 G/DL (ref 3.6–5.1)
EXT ALKALINE PHOSPHATASE: 105 U/L (ref 33–115)
EXT ALT: 11 U/L (ref 6–29)
EXT AMYLASE: ABNORMAL
EXT ANTI ISLET CELL AB: ABNORMAL
EXT ANTI PARIETAL CELL AB: ABNORMAL
EXT ANTI THYROID AB: ABNORMAL
EXT AST: 14 U/L (ref 10–35)
EXT BILIRUBIN DIRECT: ABNORMAL
EXT BILIRUBIN TOTAL: 0.3 MG/DL (ref 0.2–1.2)
EXT BK VIRUS DNA QN PCR: ABNORMAL
EXT BUN: 15 MG/DL (ref 7–25)
EXT C PEPTIDE: ABNORMAL
EXT CA 125: ABNORMAL
EXT CA 19-9: ABNORMAL
EXT CA 27-29: ABNORMAL
EXT CALCITONIN: ABNORMAL
EXT CALCIUM: 9.2 MG/DL (ref 8.6–10.2)
EXT CEA: ABNORMAL
EXT CHLORIDE: 104 MMOL/L (ref 98–110)
EXT CHOLESTEROL: ABNORMAL
EXT CHROMOGRANIN A: ABNORMAL
EXT CO2: 27 MMOL/L (ref 20–31)
EXT CREATININE UA: ABNORMAL
EXT CREATININE: 0.77 MG/DL (ref 0.5–1.1)
EXT CYCLOSPORONE LEVEL: ABNORMAL
EXT DOPAMINE: ABNORMAL
EXT EBV DNA BY PCR: ABNORMAL
EXT EPINEPHRINE: ABNORMAL
EXT FOLATE: ABNORMAL
EXT FREE T3: ABNORMAL
EXT FREE T4: ABNORMAL
EXT FSH: ABNORMAL
EXT GASTRIN RELEASING PEPTIDE: ABNORMAL
EXT GASTRIN RELEASING PEPTIDE: ABNORMAL
EXT GASTRIN: ABNORMAL
EXT GGT: ABNORMAL
EXT GHRELIN: ABNORMAL
EXT GLUCAGON: ABNORMAL
EXT GLUCOSE: 90 MG/DL (ref 65–99)
EXT GROWTH HORMONE: ABNORMAL
EXT HCV RNA QUANT PCR: ABNORMAL
EXT HDL: ABNORMAL
EXT HEMATOCRIT: 41 % (ref 35–45)
EXT HEMOGLOBIN A1C: ABNORMAL
EXT HEMOGLOBIN: 13.4 G/DL (ref 11.7–15.5)
EXT HISTAMINE 24 HR URINE: ABNORMAL
EXT HISTAMINE: ABNORMAL
EXT IGF-1: ABNORMAL
EXT IMMUNKNOW (NON-STIMULATED): ABNORMAL
EXT IMMUNKNOW (STIMULATED): ABNORMAL
EXT INR: ABNORMAL
EXT INSULIN: ABNORMAL
EXT LANREOTIDE LEVEL: ABNORMAL
EXT LDH, TOTAL: ABNORMAL
EXT LDL CHOLESTEROL: ABNORMAL
EXT LIPASE: ABNORMAL
EXT MAGNESIUM: ABNORMAL
EXT METANEPHRINE FREE PLASMA: ABNORMAL
EXT MOTILIN: ABNORMAL
EXT NEUROKININ A CAMB: ABNORMAL
EXT NEUROKININ A ISI: 12 PG/ML (ref 0–40)
EXT NEUROTENSIN: ABNORMAL
EXT NOREPINEPHRINE: ABNORMAL
EXT NORMETANEPHRINE: ABNORMAL
EXT NSE: ABNORMAL
EXT OCTREOTIDE LEVEL: ABNORMAL
EXT PANCREASTATIN CAMB: ABNORMAL
EXT PANCREASTATIN ISI: 72 PG/ML (ref 10–135)
EXT PANCREATIC POLYPEPTIDE: ABNORMAL
EXT PHOSPHORUS: ABNORMAL
EXT PLATELETS: 210 1000/UL (ref 140–400)
EXT POTASSIUM: 4.3 MMOL/L (ref 3.5–5.3)
EXT PROGRAF LEVEL: ABNORMAL
EXT PROLACTIN: ABNORMAL
EXT PROTEIN TOTAL: 6.9 G/DL (ref 6.1–8.1)
EXT PROTEIN UA: ABNORMAL
EXT PT: ABNORMAL
EXT PTH, INTACT: ABNORMAL
EXT PTT: ABNORMAL
EXT RAPAMUNE LEVEL: ABNORMAL
EXT SEROTONIN: 22 NG/ML (ref 56–244)
EXT SODIUM: 140 MMOL/L (ref 135–146)
EXT SOMATOSTATIN: ABNORMAL
EXT SUBSTANCE P: ABNORMAL
EXT TRIGLYCERIDES: ABNORMAL
EXT TRYPTASE: ABNORMAL
EXT TSH: ABNORMAL
EXT URIC ACID: ABNORMAL
EXT URINE AMYLASE U/HR: ABNORMAL
EXT URINE AMYLASE U/L: ABNORMAL
EXT VASOACTIVE INTESTINAL POLYPEPTIDE: ABNORMAL
EXT VITAMIN B12: ABNORMAL
EXT VMA 24 HR URINE: ABNORMAL
EXT WBC: 4.4 1000/UL (ref 3.8–10.8)
NEURON SPECIFIC ENOLASE: ABNORMAL

## 2017-11-06 PROCEDURE — 74178 CT ABD&PLV WO CNTR FLWD CNTR: CPT | Mod: 26,,, | Performed by: RADIOLOGY

## 2017-11-06 PROCEDURE — 74178 CT ABD&PLV WO CNTR FLWD CNTR: CPT | Mod: TC

## 2017-11-06 PROCEDURE — 25500020 PHARM REV CODE 255: Performed by: SURGERY

## 2017-11-06 RX ADMIN — IOHEXOL 15 ML: 300 INJECTION, SOLUTION INTRAVENOUS at 02:11

## 2017-11-06 RX ADMIN — IOHEXOL 75 ML: 350 INJECTION, SOLUTION INTRAVENOUS at 02:11

## 2017-11-07 ENCOUNTER — TELEPHONE (OUTPATIENT)
Dept: FAMILY MEDICINE | Facility: CLINIC | Age: 47
End: 2017-11-07

## 2017-11-09 ENCOUNTER — HOSPITAL ENCOUNTER (OUTPATIENT)
Dept: CARDIOLOGY | Facility: HOSPITAL | Age: 47
Discharge: HOME | End: 2017-11-09
Attending: SURGERY
Payer: COMMERCIAL

## 2017-11-09 DIAGNOSIS — C7A.020 MALIGNANT CARCINOID TUMOR OF APPENDIX: ICD-10-CM

## 2017-11-09 PROCEDURE — 93306 TTE W/DOPPLER COMPLETE: CPT

## 2017-11-09 PROCEDURE — 93306 TTE W/DOPPLER COMPLETE: CPT | Mod: 26,,, | Performed by: INTERNAL MEDICINE

## 2017-11-10 LAB
DIASTOLIC DYSFUNCTION: NO
MITRAL VALVE MOBILITY: NORMAL
RETIRED EF AND QEF - SEE NOTES: 65 (ref 55–65)

## 2017-11-14 ENCOUNTER — PATIENT MESSAGE (OUTPATIENT)
Dept: FAMILY MEDICINE | Facility: CLINIC | Age: 47
End: 2017-11-14

## 2017-11-14 ENCOUNTER — OFFICE VISIT (OUTPATIENT)
Dept: NEUROLOGY | Facility: HOSPITAL | Age: 47
End: 2017-11-14
Attending: SURGERY
Payer: COMMERCIAL

## 2017-11-14 VITALS
WEIGHT: 257.25 LBS | TEMPERATURE: 99 F | SYSTOLIC BLOOD PRESSURE: 156 MMHG | RESPIRATION RATE: 16 BRPM | BODY MASS INDEX: 43.92 KG/M2 | HEIGHT: 64 IN | DIASTOLIC BLOOD PRESSURE: 100 MMHG

## 2017-11-14 DIAGNOSIS — C7A.020 MALIGNANT CARCINOID TUMOR OF APPENDIX: Primary | ICD-10-CM

## 2017-11-14 LAB
EXT 24 HR UR METANEPHRINE: NORMAL
EXT 24 HR UR NORMETANEPHRINE: NORMAL
EXT 24 HR UR NORMETANEPHRINE: NORMAL
EXT 25 HYDROXY VIT D2: NORMAL
EXT 25 HYDROXY VIT D3: NORMAL
EXT 5 HIAA 24 HR URINE: NORMAL
EXT 5 HIAA BLOOD: NORMAL
EXT ACTH: NORMAL
EXT AFP: NORMAL
EXT ALBUMIN: NORMAL
EXT ALKALINE PHOSPHATASE: NORMAL
EXT ALT: NORMAL
EXT AMYLASE: NORMAL
EXT ANTI ISLET CELL AB: NORMAL
EXT ANTI PARIETAL CELL AB: NORMAL
EXT ANTI THYROID AB: NORMAL
EXT AST: NORMAL
EXT BILIRUBIN DIRECT: NORMAL MG/DL
EXT BILIRUBIN TOTAL: NORMAL
EXT BK VIRUS DNA QN PCR: NORMAL
EXT BUN: NORMAL
EXT C PEPTIDE: NORMAL
EXT CA 125: NORMAL
EXT CA 19-9: NORMAL
EXT CA 27-29: NORMAL
EXT CALCITONIN: 3 PG/ML (ref 0–5)
EXT CALCIUM: NORMAL
EXT CEA: NORMAL
EXT CHLORIDE: NORMAL
EXT CHOLESTEROL: NORMAL
EXT CHROMOGRANIN A: NORMAL
EXT CO2: NORMAL
EXT CREATININE UA: NORMAL
EXT CREATININE: NORMAL MG/DL
EXT CYCLOSPORONE LEVEL: NORMAL
EXT DOPAMINE: NORMAL
EXT EBV DNA BY PCR: NORMAL
EXT EPINEPHRINE: NORMAL
EXT FOLATE: NORMAL
EXT FREE T3: NORMAL
EXT FREE T4: NORMAL
EXT FSH: NORMAL
EXT GASTRIN RELEASING PEPTIDE: NORMAL
EXT GASTRIN RELEASING PEPTIDE: NORMAL
EXT GASTRIN: <15 PG/ML (ref 0–100)
EXT GGT: NORMAL
EXT GHRELIN: NORMAL
EXT GLUCAGON: NORMAL
EXT GLUCOSE: NORMAL
EXT GROWTH HORMONE: NORMAL
EXT HCV RNA QUANT PCR: NORMAL
EXT HDL: NORMAL
EXT HEMATOCRIT: NORMAL
EXT HEMOGLOBIN A1C: NORMAL
EXT HEMOGLOBIN: NORMAL
EXT HISTAMINE 24 HR URINE: NORMAL
EXT HISTAMINE: <1.5 NG/ML (ref 0.1–1.8)
EXT IGF-1: NORMAL
EXT IMMUNKNOW (NON-STIMULATED): NORMAL
EXT IMMUNKNOW (STIMULATED): NORMAL
EXT INR: NORMAL
EXT INSULIN: NORMAL
EXT LANREOTIDE LEVEL: NORMAL
EXT LDH, TOTAL: NORMAL
EXT LDL CHOLESTEROL: NORMAL
EXT LIPASE: NORMAL
EXT MAGNESIUM: NORMAL
EXT METANEPHRINE FREE PLASMA: NORMAL
EXT MOTILIN: NORMAL
EXT NEUROKININ A CAMB: NORMAL
EXT NEUROKININ A ISI: NORMAL
EXT NEUROTENSIN: NORMAL
EXT NOREPINEPHRINE: NORMAL
EXT NORMETANEPHRINE: NORMAL
EXT NSE: NORMAL
EXT OCTREOTIDE LEVEL: NORMAL
EXT PANCREASTATIN CAMB: NORMAL
EXT PANCREASTATIN ISI: NORMAL
EXT PANCREATIC POLYPEPTIDE: NORMAL
EXT PHOSPHORUS: NORMAL
EXT PLATELETS: NORMAL
EXT POTASSIUM: NORMAL
EXT PROGRAF LEVEL: NORMAL
EXT PROLACTIN: NORMAL
EXT PROTEIN TOTAL: NORMAL
EXT PROTEIN UA: NORMAL
EXT PT: NORMAL
EXT PTH, INTACT: NORMAL
EXT PTT: NORMAL
EXT RAPAMUNE LEVEL: NORMAL
EXT SEROTONIN: NORMAL
EXT SODIUM: NORMAL MMOL/L
EXT SOMATOSTATIN: NORMAL
EXT SUBSTANCE P: <11 PG/ML (ref 40–270)
EXT TRIGLYCERIDES: NORMAL
EXT TRYPTASE: 3 NG/ML (ref 0–11)
EXT TSH: NORMAL
EXT URIC ACID: NORMAL
EXT URINE AMYLASE U/HR: NORMAL
EXT URINE AMYLASE U/L: NORMAL
EXT VASOACTIVE INTESTINAL POLYPEPTIDE: <50 PG/ML (ref 0–75)
EXT VITAMIN B12: NORMAL
EXT VMA 24 HR URINE: NORMAL
EXT WBC: NORMAL
NEURON SPECIFIC ENOLASE: NORMAL

## 2017-11-14 PROCEDURE — 99215 OFFICE O/P EST HI 40 MIN: CPT | Performed by: SURGERY

## 2017-11-14 NOTE — PROGRESS NOTES
"NOLANETS:  [facility]  A collaboration between [facility] and [facility]    PATIENT: Kianna Zuleta  MRN: [id number]  DATE: 11/14/2017    Vitals:    11/14/17 1047   BP: (!) 156/100   BP Location: Left arm   Patient Position: Sitting   BP Method: Small (Automatic)   Resp: 16   Temp: 99 °F (37.2 °C)   TempSrc: Oral   Weight: 116.7 kg (257 lb 4.4 oz)   Height: 5' 3.78" (1.62 m)      Last 2 Weight Measurements:   Wt Readings from Last 2 Encounters:   11/14/17 116.7 kg (257 lb 4.4 oz)   10/24/17 117.6 kg (259 lb 4.2 oz)     BMI: Body mass index is 44.47 kg/m².    Karnofsky Score    Karnofsky Score:  60% - Requires Occasional Assistance but is Able to Care for Needs       Diagnosis:   1. Malignant carcinoid tumor of appendix      Interval History: Ms. 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    MVP (mitral valve prolapse)     Pneumonia     POTS (postural orthostatic tachycardia syndrome)     Ulcer     Vitamin D deficiency disease 11/6/2012       Past Surgical History:   Procedure Laterality Date    ANKLE SURGERY      lt    APPENDECTOMY      BACK SURGERY      CHOLECYSTECTOMY      choley & ovarian cyst removed  12/2005    cystoscope      multiple    HYSTERECTOMY  4/8/2004    BUBBA BS&O     interstim      OOPHORECTOMY  4/8/2004    with hysterectomy     PELVIC LAPAROSCOPY      [place] EXPLORATORY OF ABDOMEN      SINUS SURGERY  03/01/2016    SPINE SURGERY         Review of patient's allergies indicates:   Allergen Reactions    Benadryl " allergy decongestant Anaphylaxis     Other reaction(s): Anaphylaxis    Dilaudid [hydromorphone (bulk)] Anaphylaxis     Other reaction(s): Anaphylaxis    Fludrocortisone Hives    Gluten Other (See Comments)     Other reaction(s) GI upset    Indomethacin Hives    Penicillins Rash    Sulfa (sulfonamide antibiotics) Rash    Vancomycin analogues Rash       Current Outpatient Prescriptions   Medication Sig Dispense Refill    ALBUTEROL INHL Inhale into the lungs as needed.       albuterol-ipratropium 2.5mg-0.5mg/3mL (DUO-NEB) 0.5 mg-3 mg(2.5 mg base)/3 mL nebulizer solution Take 3 mLs by nebulization every 6 (six) hours as needed for Wheezing. Rescue 1 Box 0    BRINTELLIX 20 mg Tab once daily.   2    dicyclomine (BENTYL) 10 MG capsule TK ONE C PO TID  11    estropipate (OGEN) 1.5 MG tablet TK 1 T PO ONCE D  7    gabapentin (NEURONTIN) 300 MG capsule Take 300 mg by mouth 2 (two) times daily. 2-300 mg cap in AM and 3-300 mg cap in PM      griseofulvin (GRIFULVIN V) 500 mg tablet Take 1 tablet (500 mg total) by mouth once daily. 30 tablet 0    levocetirizine (XYZAL) 5 MG tablet TK 1 T PO  ONCE Day  9    LORAZEPAM ORAL Take by mouth nightly as needed.      metoprolol tartrate (LOPRESSOR) 50 MG tablet Take 50 mg by mouth 4 (four) times daily. 1 Tablet Oral Three times a day      montelukast (SINGULAIR) 10 mg tablet TK 1 T PO  ONCE D  3    ondansetron (ZOFRAN) 4 MG tablet Take 4 mg by mouth once daily.  0    oxycodone-acetaminophen  mg (PERCOCET)  mg per tablet Take 1 tablet by mouth every 4 (four) hours as needed.      TIZANIDINE HCL (ZANAFLEX ORAL) Take 4 mg by mouth every 8 (eight) hours. 2-3 Tablet Oral Three times a day prn      VENTOLIN HFA 90 mcg/actuation inhaler INHALE 2 PUFFS Q 6 H PRF SOB/COUGH  2    VIRTUSSIN AC  mg/5 mL syrup TK 10 ML PO EVERY 6 H PRN FOR COUGH 120 mL 0    azelastine (ASTELIN) 137 mcg (0.1 %) nasal spray U 2 SPRAYS IEN Q 12 H PRN  6    DICLOFENAC SODIUM  (PENNSAID TOP) Apply topically. AA bid       No current facility-administered medications for this visit.        Review of Systems     Number of Days per Week Number per Day   DIARRHEA 7 10-15   BRISTOL STOOL SCALE RATING Type 6-7    FLUSHING 7 constant   WHEEZING 1 2--has sob--     WEIGHT GAIN/LOSS 257 today-- down on diet from 270   ENERGY RATING (0-10) 10      Physical Exam deferred.     Pathology Data:   no new tissue    Laboratory Data:  Neuroendocrine Labs Latest Ref Rng & Units 8/15/2017   EXT 5 HIAA BLOOD 0 - 22 ng/ml    EXT SEROTONIN 56 - 244 ng/ml    CHROMOGRANIN A < OR = 15 ng/mL 5   EXT CHROMOGRANIN A 0 - 15 ng/ml    SEROTONIN <=230 ng/mL <30   EXT PANCREASTATIN LINDSAY 10 - 135 pg/ml    EXT NEUROKININ A LINDSAY 0 - 40 pg/ml    FSH mIU/ml    FREE T4 0.78 - 2.19 ng/dL    TSH 0.400 - 4.000 uIU/mL 3.189   WBC 3.90 - 12.70 K/uL 5.56   EXT WBC 3.8 - 10.8 1000/ul    HGB 12.0 - 16.0 g/dL 13.8   EXT HGB 11.7 - 15.5 g/dl    HCT 37.0 - 48.5 % 40.7   EXT HCT 35.0 - 45.0 %    PLATLETS 150 - 350 K/uL 211   EXT PLATLETS 140 - 400 1000/ul    PT 9.0 - 12.5 sec    PTT 21.0 - 32.0 sec    INR 0.8 - 1.2    GLUCOSE 70 - 110 mg/dL 88   EXT GLUCOSE 65 - 99 mg/dl    BUN 6 - 20 mg/dL 12   EXT BUN 7 - 25 mg/dl    CREATININE 0.5 - 1.4 mg/dL 0.7   EXT CREATININE 0.50 - 1.10 mg/dl     - 145 mmol/L 140   EXT  - 146 mmol/l    K 3.5 - 5.1 mmol/L 3.9   EXT K 3.5 - 5.3 mmol/l    CHLORIDE 95 - 110 mmol/L 104   EXT CHLORIDE 98 - 110 mmol/l    CO2 23 - 29 mmol/L 26   EXT CO2 20 - 31 mmol/l    CALCIUM 8.7 - 10.5 mg/dL 9.4   EXT CALCIUM 8.6 - 10.2 mg/dl    PROTEIN, TOTAL 6.0 - 8.4 g/dL 7.6   EXT PROTEIN, TOTAL 6.1 - 8.1 g/dl    PHOSPHORUS 2.7 - 4.5 mg/dl    ALBUMIN 3.5 - 5.2 g/dL 4.0   EXT ALBUMIN 3.6 - 5.1 g/dl    TOTAL BILIRUBIN 0.1 - 1.0 mg/dL 0.3   EXT TOTAL BILIRUBIN 0.2 - 1.2 mg/dl    DIRECT BILIRUBIN 0.0 - 0.2 mg/dl    ALK PHOSPHATASE 55 - 135 U/L 149 (H)   EXT ALK PHOSPHATASE 33 - 115 u/l    SGOT (AST) 10 - 40 U/L 17   EXT SGOT  (AST) 10 - 35 u/l    SGPT (ALT) 10 - 44 U/L 16   EXT ALT 6 - 29 u/l    LIPASE 4 - 60 U/L    SERUM AMYLASE 20 - 110 U/L    TRIGLYCERIDES 30 - 150 mg/dL    CHOLERSTEROL 120 - 199 mg/dL    HDL 40 - 75 mg/dL    LDL 63 - 159 mg/dL    MG 1.6 - 2.4 mg/dl    24 HR URINE PROTEIN 0 - 15 mg/dL    24 HR CREATININE CLEARANCE 15.0 - 325.0 mg/dL    HEMOGLOBIN A1C 4.0 - 5.6 % 5.1   Weight     METANEPHRINE FREE PLASMA 0 - 0.49 nmol/L    NOREPINEPHRINE 70 - 750 pg/mL    METANEPHRINE FREE PLASMA 0 - 0.49 nmol/L    NORMETANEPHRINE FREE PLASMA 0 - 0.89 nmol/L    EPINEPHRINE 0 - 110 pg/mL    NOREPINEPHRINE 70 - 750 pg/mL    EXT NOREPINEPHRINE  70 - 750 pg/mL    PLASMA DOPAMINE 0 - 29 pg/mL    Rheumatoid Factor 0.0 - 15.0 IU/mL    Sedimentation Rate, Manual 0 - 20 mm/Hr          Radiology Data:    TEST DESCRIPTION   Technical Quality: This is a technically adequate study.     Aorta: The aortic root is normal in size, measuring 2.8 cm at sinotubular junction.     Left Atrium: The left atrial volume index is normal, measuring 21.41 cc/m2.     Left Ventricle: The left ventricle is normal in size, with an end-diastolic diameter of 3.7 cm, and an end-systolic diameter of 2.1 cm. LV wall thickness is normal, with the septum measuring 1.5 cm and the posterior wall measuring 1.3 cm across. Relative   wall thickness was increased at 0.70, and the LV mass index was 100.6 g/m2 consistent with concentric remodeling. There are no regional wall motion abnormalities. Left ventricular systolic function appears normal. Visually estimated ejection fraction is   60-65%. The LV Doppler derived stroke volume equals 64.0 ccs.     Diastolic indices: E wave velocity 0.7 m/s, E/A ratio 0.7,  msec., Diastolic function is normal.     Right Atrium: The right atrium is normal in size, measuring 4.0 cm in length in the apical view.     Right Ventricle: The right ventricle is normal in size measuring 2.0 cm at the base in the apical right ventricle-focused view.  Global right ventricular systolic function appears normal.     Aortic Valve:  The aortic valve is mildly sclerotic with normal leaflet mobility.     Mitral Valve:  The mitral valve is normal in structure with normal leaflet mobility.     Tricuspid Valve:  The tricuspid valve is normal in structure with normal leaflet mobility.     Pulmonary Valve:  The pulmonic valve is not well seen.     IVC: IVC is normal in size and collapses > 50% with a sniff, suggesting normal right atrial pressure of 3 mmHg.     Atrial Septum: The atrial septum is intact.     Intracavitary: There is no evidence of pericardial effusion, intracavity mass, thrombi, or vegetation.         CONCLUSIONS     1 - Normal left ventricular systolic function (EF 60-65%).     2 - Normal left ventricular diastolic function.     3 - Normal right ventricular systolic function .         Narrative     CT ABDOMEN AND PELVIS WITH AND WITHOUT CONTRAST    DATE:  C7 A.0 20    INDICATION:  CT of the abdomen and pelvis dated September 18, 2015    TECHNIQUE:  Contiguous 1.25 mm axial slices were obtained from lung bases through the pelvis with the administration of intravenous contrast.  Coronal and Sagittal reconstructions were obtained    COMPARISON:  CT of abdomen and pelvis dated September 18, 2015 and May 20, 2015    FINDINGS:  The visualized lung bases are clear bilaterally. The visualized inferior mediastinum is unremarkable.     Punctate nonocclusive renal stones are identified in the kidneys bilaterally. Otherwise, kidneys are unremarkable bilaterally. There is no hydronephrosis or hydroureter.     Note is made of fatty infiltration of the liver. The liver enhances homogenously without evidence of focal enhancing lesions.  The adrenals, spleen, and pancreas are unremarkable. Small and large bowel is normal in appearance. The appendix is unremarkable. There is no free air or free fluid. Patient status post cholecystectomy and hysterectomy. The bladder  unremarkable.    There is no inguinal, retroperitoneal or mesenteric adenopathy.  Aorta is of normal course and caliber. There is no bony abnormality.   Impression       No evidence for recurrent or metastatic disease.  Fatty infiltration of the liver.  Bilateral nephrolithiasis.  Patient status post cholecystectomy and hysterectomy.       TWO VIEW CHEST:     Comparison: 10/13/2017    Findings:    The cardiac silhouette and the pulmonary vasculature are normal in size.  The mediastinal and hilar contours are unremarkable.  There is no pneumothorax.  No pleural effusion.  The lungs are clear.  No acute bony abnormality.   Impression         No acute cardiothoracic disease evident.            Impression:  1.  JENNIFER-- needs labs report in about 3-4 weeks ( got blood recently)       Plan:restage yearly with labs ct and MRI       Labs: Markers: 12 month intervals            Other: 12 month intervals    Scans: 12 month intervals    Return to Clinic:12 month intervals    Orders placed this visit:   Orders Placed This Encounter   Procedures    CT Abdomen Pelvis With Contrast    CBC auto differential    Comprehensive metabolic panel    5-HIAA Plasma (Neuoendocrine)    Serotonin serum    Pancreastatin    Neurokinin A    Gastrin        25 Minutes Face-to-Face; Counseling/Coordination of Care > 50 percent of Visit     Roger Olvera MD, FACS  [employer] Professor of Surgery, [facility]  [address]  [place]  [address]  Cell [phone]  ph. [phone]; [phone]  fax. [phone]  [email or web address]

## 2017-11-14 NOTE — PATIENT INSTRUCTIONS
Labs every year-orders given to patient    CT prior to returning to clinic-call [phone] to schedule     Echocardiogram yearly    Return to clinic in 12 months-call for an appt

## 2017-11-14 NOTE — LETTER
November 14, 2017        Gigi Celis MD  [address]  [place] [address]       [facility]: [facility]  A collaboration between [facility] and [facility] [address]  [address]  [place] [address]  Phone: [phone]  Fax: [phone]        BRITTANY Perez M.D., FACS  Ari Garcia M.D.  Jose F Olvera M.D.   Alek Staton M.D., FACS  Luis Eduardo King, DO Roger Olvera M.D., FACS   Patient: Kianna Zuleta   MR Number: [id number]   YOB: 1970   Date of Visit: 11/14/2017     Dear Dr. Celis    Thank you for the kind referral of Kianna Zuleta. We saw this patient on 11/14/2017, and a copy of our clinic note is enclosed. We certainly appreciate the opportunity to participate in your patient's care.     If you have any questions or wish to discuss your patient further, please do not hesitate to contact us at [phone].       Kindest personal regards,          Roger Olvera MD, FACS  The [employer] Professor of Surgery & Neurosciences  [facility], Dept. Of Surgery  [address] [place] [address], [address]  [place] [address] [phone]

## 2017-11-15 ENCOUNTER — OFFICE VISIT (OUTPATIENT)
Dept: FAMILY MEDICINE | Facility: CLINIC | Age: 47
End: 2017-11-15
Payer: COMMERCIAL

## 2017-11-15 ENCOUNTER — PATIENT MESSAGE (OUTPATIENT)
Dept: FAMILY MEDICINE | Facility: CLINIC | Age: 47
End: 2017-11-15

## 2017-11-15 ENCOUNTER — LAB VISIT (OUTPATIENT)
Dept: LAB | Facility: HOSPITAL | Age: 47
End: 2017-11-15
Attending: NURSE PRACTITIONER
Payer: COMMERCIAL

## 2017-11-15 VITALS
HEART RATE: 124 BPM | WEIGHT: 255.94 LBS | SYSTOLIC BLOOD PRESSURE: 130 MMHG | BODY MASS INDEX: 45.35 KG/M2 | DIASTOLIC BLOOD PRESSURE: 80 MMHG | TEMPERATURE: 99 F | OXYGEN SATURATION: 95 % | HEIGHT: 63 IN

## 2017-11-15 DIAGNOSIS — N20.0 RENAL STONE: ICD-10-CM

## 2017-11-15 DIAGNOSIS — R19.7 DIARRHEA, UNSPECIFIED TYPE: Primary | ICD-10-CM

## 2017-11-15 DIAGNOSIS — R50.9 FEVER, UNSPECIFIED FEVER CAUSE: ICD-10-CM

## 2017-11-15 LAB
BILIRUB UR QL STRIP: NEGATIVE
CLARITY UR REFRACT.AUTO: ABNORMAL
COLOR UR AUTO: YELLOW
GLUCOSE UR QL STRIP: NEGATIVE
HGB UR QL STRIP: NEGATIVE
KETONES UR QL STRIP: NEGATIVE
LEUKOCYTE ESTERASE UR QL STRIP: NEGATIVE
NITRITE UR QL STRIP: NEGATIVE
PH UR STRIP: 5 [PH] (ref 5–8)
PROT UR QL STRIP: NEGATIVE
SP GR UR STRIP: 1.02 (ref 1–1.03)
URN SPEC COLLECT METH UR: ABNORMAL
UROBILINOGEN UR STRIP-ACNC: NEGATIVE EU/DL

## 2017-11-15 PROCEDURE — 87088 URINE BACTERIA CULTURE: CPT

## 2017-11-15 PROCEDURE — 87086 URINE CULTURE/COLONY COUNT: CPT

## 2017-11-15 PROCEDURE — 99214 OFFICE O/P EST MOD 30 MIN: CPT | Mod: S$GLB,,, | Performed by: NURSE PRACTITIONER

## 2017-11-15 PROCEDURE — 87077 CULTURE AEROBIC IDENTIFY: CPT

## 2017-11-15 PROCEDURE — 81003 URINALYSIS AUTO W/O SCOPE: CPT

## 2017-11-15 PROCEDURE — 87186 SC STD MICRODIL/AGAR DIL: CPT

## 2017-11-15 PROCEDURE — 99999 PR PBB SHADOW E&M-EST. PATIENT-LVL V: CPT | Mod: PBBFAC,,, | Performed by: NURSE PRACTITIONER

## 2017-11-15 RX ORDER — VALSARTAN 40 MG/1
40 TABLET ORAL DAILY
COMMUNITY
End: 2018-04-05

## 2017-11-15 NOTE — PROGRESS NOTES
This dictation has been generated using Dragon Dictation some phonetic errors may occur.     Kianna was seen today for pneuomina.    Diagnoses and all orders for this visit:    Diarrhea, unspecified type  -     WBC, Stool; Future  -     Stool culture; Future  -     Clostridium difficile EIA; Future    Fever, unspecified fever cause  -     Urinalysis; Future  -     Urine culture; Future  -     WBC, Stool; Future  -     Stool culture; Future  -     Clostridium difficile EIA; Future    Renal stone      Diarrhea, fever, and recent abx use. Rule out c.diff. And uti. Consider renal stones complicating infection however not obstructing.   Saw pul[facility]ology at [facility]  Saw dermatology  Rheumatology workup unremarkable  Pt saw the carcinoid specialist for workup.    Return in about 2 weeks (around 11/29/2017).      ________________________________________________________________  ________________________________________________________________        Chief Complaint   Patient presents with    pneuomina     History of present illness  This 47 y.o. presents today for complaint of follow-up on rash, pneumonia, fever, and diarrhea.  Relapse on rash.  She did see dermatology.  Their opinion was eczema.  She uses steroids without improvement.  I gave her some griseofulvin however she has relapse on the rash.  Pneumonia has resolved.  Cough is improved.  She does run fever when she moves around.  She is afebrile at the clinic today.  Patient also reports diarrhea.  What I saw on the 24th she did have soft stools.  She has been doing probiotics without improvement.  Diarrhea is intermittently watery now.  Given the multiple antibiotics were given within the last month we will check her for C. difficile.  Complains of 15 bowel movements yesterday.    Answers for HPI/ROS submitted by the patient on 11/9/2017   activity change: No  unexpected weight change: Yes  neck pain: No  hearing loss: No  rhinorrhea: Yes  trouble swallowing:  No  eye discharge: No  visual disturbance: No  chest tightness: No  wheezing: No  chest pain: No  palpitations: Yes  blood in stool: No  constipation: No  vomiting: No  diarrhea: Yes  polydipsia: No  polyuria: No  difficulty urinating: No  hematuria: No  menstrual problem: No  dysuria: No  joint swelling: No  arthralgias: No  headaches: Yes  weakness: No   confusion: No  dysphoric mood: No    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    MVP (mitral valve prolapse)     Pneumonia     POTS (postural orthostatic tachycardia syndrome)     Ulcer     Vitamin D deficiency disease 11/6/2012       Past Surgical History:   Procedure Laterality Date    ANKLE SURGERY      lt    APPENDECTOMY      BACK SURGERY      CHOLECYSTECTOMY      choley & ovarian cyst removed  12/2005    cystoscope      multiple    HYSTERECTOMY  4/8/2004    BUBBA BS&O     interstim      OOPHORECTOMY  4/8/2004    with hysterectomy     PELVIC LAPAROSCOPY      [place] EXPLORATORY OF ABDOMEN      SINUS SURGERY  03/01/2016    SPINE SURGERY         Family History   Problem Relation Age of Onset    Hypertension Father     Alcohol abuse Brother     Cancer Paternal Uncle     Colon cancer Paternal Uncle     Depression Maternal Grandmother     Hearing loss Maternal Grandmother     Heart disease Maternal Grandmother     Kidney disease Maternal Grandmother     Cancer Paternal Grandmother     Arthritis Paternal Grandfather     Diabetes Paternal Grandfather     Stroke Paternal Grandfather     Breast cancer Neg Hx     Ovarian cancer Neg Hx        Social History     Social History    Marital status: Single     Spouse name: N/A    Number of children: N/A    Years of education: N/A      Social History Main Topics    Smoking status: Never Smoker    Smokeless tobacco: Never Used    Alcohol use No    Drug use: No    Sexual activity: No     Other Topics Concern    None     Social History Narrative    None       Current Outpatient Prescriptions   Medication Sig Dispense Refill    ALBUTEROL INHL Inhale into the lungs as needed.       albuterol-ipratropium 2.5mg-0.5mg/3mL (DUO-NEB) 0.5 mg-3 mg(2.5 mg base)/3 mL nebulizer solution Take 3 mLs by nebulization every 6 (six) hours as needed for Wheezing. Rescue 1 Box 0    azelastine (ASTELIN) 137 mcg (0.1 %) nasal spray U 2 SPRAYS IEN Q 12 H PRN  6    BRINTELLIX 20 mg Tab once daily.   2    DICLOFENAC SODIUM (PENNSAID TOP) Apply topically. AA bid      dicyclomine (BENTYL) 10 MG capsule TK ONE C PO TID  11    estropipate (OGEN) 1.5 MG tablet TK 1 T PO ONCE D  7    gabapentin (NEURONTIN) 300 MG capsule Take 300 mg by mouth 2 (two) times daily. 2-300 mg cap in AM and 3-300 mg cap in PM      griseofulvin (GRIFULVIN V) 500 mg tablet Take 1 tablet (500 mg total) by mouth once daily. 30 tablet 0    levocetirizine (XYZAL) 5 MG tablet TK 1 T PO  ONCE Day  9    LORAZEPAM ORAL Take by mouth nightly as needed.      metoprolol tartrate (LOPRESSOR) 50 MG tablet Take 50 mg by mouth 4 (four) times daily. 1 Tablet Oral Three times a day      montelukast (SINGULAIR) 10 mg tablet TK 1 T PO  ONCE D  3    ondansetron (ZOFRAN) 4 MG tablet Take 4 mg by mouth once daily.  0    oxycodone-acetaminophen  mg (PERCOCET)  mg per tablet Take 1 tablet by mouth every 4 (four) hours as needed.      TIZANIDINE HCL (ZANAFLEX ORAL) Take 4 mg by mouth every 8 (eight) hours. 2-3 Tablet Oral Three times a day prn      valsartan (DIOVAN) 40 MG tablet Take 40 mg by mouth once daily.      VENTOLIN HFA 90 mcg/actuation inhaler INHALE 2 PUFFS Q 6 H PRF SOB/COUGH  2    VIRTUSSIN AC  mg/5 mL syrup TK 10 ML PO EVERY 6 H PRN FOR COUGH 120 mL 0     No current  facility-administered medications for this visit.        Review of patient's allergies indicates:   Allergen Reactions    Benadryl allergy decongestant Anaphylaxis     Other reaction(s): Anaphylaxis    Dilaudid [hydromorphone (bulk)] Anaphylaxis     Other reaction(s): Anaphylaxis    Fludrocortisone Hives    Gluten Other (See Comments)     Other reaction(s) GI upset    Indomethacin Hives    Penicillins Rash    Sulfa (sulfonamide antibiotics) Rash    Vancomycin analogues Rash       Physical examination  Vitals Reviewed  Gen. Well-dressed well-nourished no apparent distress  Skin warm dry and intact.  No rashes noted.  Neck is supple without adenopathy  Chest.  Respirations are even unlabored.    Neuro. Awake alert oriented x4.  Normal judgment and cognition noted.  Extremities no clubbing cyanosis or edema noted.     Call or return to clinic prn if these symptoms worsen or fail to improve as anticipated.

## 2017-11-17 ENCOUNTER — TELEPHONE (OUTPATIENT)
Dept: FAMILY MEDICINE | Facility: CLINIC | Age: 47
End: 2017-11-17

## 2017-11-17 NOTE — TELEPHONE ENCOUNTER
----- Message from Liu Resendez sent at 11/16/2017  9:51 AM CST -----  Regarding: Lab Client Services  Contact: [phone]  Hi,           my name is Liu I work in the lab. We received a specimen for Clostridium Difficile test. The test was unable to be performed due to the stool sample was formed stool. If you have any questions regarding this please contact client services at [phone]. Thank You

## 2017-11-18 ENCOUNTER — PATIENT MESSAGE (OUTPATIENT)
Dept: FAMILY MEDICINE | Facility: CLINIC | Age: 47
End: 2017-11-18

## 2017-11-18 LAB — BACTERIA UR CULT: NORMAL

## 2017-11-18 RX ORDER — NITROFURANTOIN 25; 75 MG/1; MG/1
100 CAPSULE ORAL 2 TIMES DAILY
Qty: 60 CAPSULE | Refills: 0 | Status: SHIPPED | OUTPATIENT
Start: 2017-11-18 | End: 2017-12-13

## 2017-11-20 ENCOUNTER — TELEPHONE (OUTPATIENT)
Dept: FAMILY MEDICINE | Facility: CLINIC | Age: 47
End: 2017-11-20

## 2017-11-20 ENCOUNTER — PATIENT MESSAGE (OUTPATIENT)
Dept: FAMILY MEDICINE | Facility: CLINIC | Age: 47
End: 2017-11-20

## 2017-11-20 RX ORDER — PHENAZOPYRIDINE HYDROCHLORIDE 200 MG/1
200 TABLET, FILM COATED ORAL 3 TIMES DAILY PRN
Qty: 30 TABLET | Refills: 0 | Status: SHIPPED | OUTPATIENT
Start: 2017-11-20 | End: 2017-11-29 | Stop reason: SDUPTHER

## 2017-11-24 ENCOUNTER — HOSPITAL ENCOUNTER (OUTPATIENT)
Dept: RADIOLOGY | Facility: HOSPITAL | Age: 47
Discharge: HOME | End: 2017-11-24
Attending: OBSTETRICS & GYNECOLOGY
Payer: COMMERCIAL

## 2017-11-24 DIAGNOSIS — Z12.31 VISIT FOR SCREENING MAMMOGRAM: ICD-10-CM

## 2017-11-24 PROCEDURE — 77063 BREAST TOMOSYNTHESIS BI: CPT | Mod: 26,,, | Performed by: RADIOLOGY

## 2017-11-24 PROCEDURE — 77067 SCR MAMMO BI INCL CAD: CPT | Mod: 26,,, | Performed by: RADIOLOGY

## 2017-11-24 PROCEDURE — 77067 SCR MAMMO BI INCL CAD: CPT | Mod: TC,PO

## 2017-11-29 ENCOUNTER — OFFICE VISIT (OUTPATIENT)
Dept: FAMILY MEDICINE | Facility: CLINIC | Age: 47
End: 2017-11-29
Payer: COMMERCIAL

## 2017-11-29 ENCOUNTER — OFFICE VISIT (OUTPATIENT)
Dept: OBSTETRICS AND GYNECOLOGY | Facility: CLINIC | Age: 47
End: 2017-11-29
Attending: OBSTETRICS & GYNECOLOGY
Payer: COMMERCIAL

## 2017-11-29 VITALS
WEIGHT: 257.94 LBS | BODY MASS INDEX: 45.7 KG/M2 | DIASTOLIC BLOOD PRESSURE: 78 MMHG | HEIGHT: 63 IN | SYSTOLIC BLOOD PRESSURE: 116 MMHG

## 2017-11-29 VITALS
SYSTOLIC BLOOD PRESSURE: 122 MMHG | DIASTOLIC BLOOD PRESSURE: 70 MMHG | OXYGEN SATURATION: 95 % | HEART RATE: 118 BPM | TEMPERATURE: 99 F | HEIGHT: 63 IN | WEIGHT: 257.5 LBS | BODY MASS INDEX: 45.62 KG/M2

## 2017-11-29 DIAGNOSIS — Z90.710 HISTORY OF HYSTERECTOMY WITH BILATERAL OOPHORECTOMY: ICD-10-CM

## 2017-11-29 DIAGNOSIS — R79.82 CRP ELEVATED: ICD-10-CM

## 2017-11-29 DIAGNOSIS — Z79.890 POSTMENOPAUSAL HRT (HORMONE REPLACEMENT THERAPY): ICD-10-CM

## 2017-11-29 DIAGNOSIS — Z01.419 WELL WOMAN EXAM WITH ROUTINE GYNECOLOGICAL EXAM: Primary | ICD-10-CM

## 2017-11-29 DIAGNOSIS — R70.0 ELEVATED SED RATE: ICD-10-CM

## 2017-11-29 DIAGNOSIS — Z90.722 HISTORY OF HYSTERECTOMY WITH BILATERAL OOPHORECTOMY: ICD-10-CM

## 2017-11-29 DIAGNOSIS — R50.9 FEVER, UNSPECIFIED FEVER CAUSE: Primary | ICD-10-CM

## 2017-11-29 DIAGNOSIS — Z12.4 PAP SMEAR FOR CERVICAL CANCER SCREENING: ICD-10-CM

## 2017-11-29 PROCEDURE — 99999 PR PBB SHADOW E&M-EST. PATIENT-LVL II: CPT | Mod: PBBFAC,,, | Performed by: OBSTETRICS & GYNECOLOGY

## 2017-11-29 PROCEDURE — 99999 PR PBB SHADOW E&M-EST. PATIENT-LVL IV: CPT | Mod: PBBFAC,,, | Performed by: NURSE PRACTITIONER

## 2017-11-29 PROCEDURE — 88142 CYTOPATH C/V THIN LAYER: CPT

## 2017-11-29 PROCEDURE — 99396 PREV VISIT EST AGE 40-64: CPT | Mod: S$GLB,,, | Performed by: OBSTETRICS & GYNECOLOGY

## 2017-11-29 PROCEDURE — 99214 OFFICE O/P EST MOD 30 MIN: CPT | Mod: S$GLB,,, | Performed by: NURSE PRACTITIONER

## 2017-11-29 RX ORDER — DIAZEPAM 5 MG/1
TABLET ORAL
Refills: 2 | COMMUNITY
Start: 2017-11-21 | End: 2017-12-13

## 2017-11-29 RX ORDER — PHENAZOPYRIDINE HYDROCHLORIDE 200 MG/1
200 TABLET, FILM COATED ORAL 3 TIMES DAILY PRN
Qty: 30 TABLET | Refills: 0 | Status: SHIPPED | OUTPATIENT
Start: 2017-11-29 | End: 2017-12-09

## 2017-11-29 RX ORDER — VALSARTAN 80 MG/1
TABLET ORAL
Refills: 3 | COMMUNITY
Start: 2017-11-22 | End: 2017-12-13

## 2017-11-29 NOTE — PROGRESS NOTES
Kianna Zuleta is a 47 y.o. year old  who presents for annual exam.  She is S/P BUBBA / BSO  on ERT.  She is currently taking estropipate daily without hot flashes / sweats.  She feels that she is doing well on ERT and desires to continue.  She desires having history of abnormal paps requiring laser to cervix prior to her hysterectomy.  Reports history of kidney stones over the past year.  No GYN complaints.    Mammogram 17: Negative    Past Medical History:   Diagnosis Date    Anxiety     Bulging disc neck and back    Depression     Elevated alkaline phosphatase level 2013    Hypothyroidism 2012    Interstitial cystitis     Irritable bowel syndrome 2012    Malignant carcinoid tumor of the appendix 2005    carcinoid    MVP (mitral valve prolapse)     Pneumonia     POTS (postural orthostatic tachycardia syndrome)     Ulcer     Vitamin D deficiency disease 2012       Past Surgical History:   Procedure Laterality Date    ANKLE SURGERY      lt    APPENDECTOMY      BACK SURGERY      CHOLECYSTECTOMY      choley & ovarian cyst removed  2005    cystoscope      multiple    HYSTERECTOMY  2004    BUBBA BS&O     interstim      OOPHORECTOMY  2004    with hysterectomy     PELVIC LAPAROSCOPY      [place] EXPLORATORY OF ABDOMEN      SINUS SURGERY  2016    SPINE SURGERY         OB History      Para Term  AB Living    0   0          SAB TAB Ectopic Multiple Live Births                       ROS:  GENERAL: Feeling well overall.   SKIN: Denies rash or lesions.   HEAD: Denies head injury or headache.   NODES: Denies enlarged lymph nodes.   CHEST: Denies chest pain or shortness of breath.   CARDIOVASCULAR: Denies palpitations or left sided chest pain.   ABDOMEN: No abdominal pain, nausea, vomiting or rectal bleeding.   URINARY: No dysuria or hematuria.  REPRODUCTIVE: See HPI.   BREASTS: Denies pain, lumps, or nipple discharge.   HEMATOLOGIC: No easy  bruisability or excessive bleeding.   MUSCULOSKELETAL: Reports some back discomfort.   NEUROLOGIC: Denies syncope or weakness.   PSYCHIATRIC: Denies depression.    PE:  (chaperone present during entire exam)  APPEARANCE: Well nourished, well developed, in no acute distress.  NODES: No inguinal lymph node enlargement.  ABDOMEN: Soft. No tenderness or masses. No hernias.  BREASTS: Symmetrical, no skin changes or visible lesions. No palpable masses, nipple discharge or adenopathy bilaterally.  PELVIC: Normal external female genitalia without lesions. Normal hair distribution. Adequate perineal body, normal urethral meatus. Vagina without lesions or discharge. No significant cystocele or rectocele. Uterus and cervix surgically absent. Bimanual exam revealed no masses, tenderness or abnormality.  ANUS: Normal.    Diagnosis:  1. Well woman exam with routine gynecological exam    2. Pap smear for cervical cancer screening    3. History of hysterectomy with bilateral oophorectomy    4. Postmenopausal HRT (hormone replacement therapy)          PLAN:    Orders Placed This Encounter    Liquid-based pap smear, screening       Patient was counseled today on postmenopausal issues and her usage of ERT: r / b / studies, WHI.  She is doing well on ERT and desires to continue.    Follow-up in 1 year.

## 2017-11-30 NOTE — PROGRESS NOTES
This dictation has been generated using Dragon Dictation some phonetic errors may occur.     Kianna was seen today for f/u pneumonia.    Diagnoses and all orders for this visit:    Fever, unspecified fever cause  -     Lactate dehydrogenase; Future  -     Ambulatory referral to Rheumatology  -     CBC auto differential; Future  -     Urinalysis; Future    Elevated sed rate  -     Lactate dehydrogenase; Future  -     Ambulatory referral to Rheumatology    CRP elevated  -     Lactate dehydrogenase; Future  -     Ambulatory referral to Rheumatology    Other orders  -     phenazopyridine (PYRIDIUM) 200 MG tablet; Take 1 tablet (200 mg total) by mouth 3 (three) times daily as needed for Pain.      Fever unknown cause.  Check LDH as above.  Refer her to rheumatology.  Elevated CRP and ESR.  With recent pneumonia and development of a second pneumonia while on antibiotics and would want to rule out any rheumatic causes.  Also, still running a low-grade temperature.  I am holding off for infectious disease referral at this point.  Patient has already seen pulmonology Dr Pop.  Some dysuria.  She does have renal stones.  Denies any hematuria.  Continue Pyridium.  The right upper quadrant abdominal symptoms.  Consider gastritis.  Recommended that the patient see if symptoms are related to eating and take Rolaids or Tums.  In excessive of 25 minutes spent with patient with greater than 50% of time dedicated to education on symptoms, diagnosis, treatments, and coordination of care.   No Follow-up on file.      ________________________________________________________________  ________________________________________________________________        Chief Complaint   Patient presents with    f/u pneumonia     History of present illness  This 47 y.o. presents today for complaint of follow-up on fever and pneumonia.  Patient had pneumonia and fevers in October.  He has been out of work this month.  MAXIMUM TEMPERATURE was 100.4.  She  does have a low-grade temp at today's visit.  We discussed referral to rheumatology.  Patient denies any joint pains other than bilateral ankle pain.  Unsure of any family history of rheumatic problems  She did have a rash in diarrhea issues recently.  Patient notes some right upper quadrant abdominal pain.  Symptoms have been a couple nights ago.  She had not eaten.  She had some associated symptoms of nausea and took nausea med with relief.  She didn't take any antacids.  Patient went to sleep.  Answers for HPI/ROS submitted by the patient on 11/28/2017   activity change: No  unexpected weight change: No  neck pain: No  hearing loss: No  rhinorrhea: No  trouble swallowing: No  eye discharge: No  visual disturbance: No  chest tightness: No  wheezing: No  chest pain: No  palpitations: Yes  blood in stool: No  constipation: No  vomiting: No  diarrhea: Yes. No watery stools. Culture rejected.   polydipsia: No  polyuria: No  difficulty urinating: No  hematuria: No  menstrual problem: No  dysuria: No  joint swelling: No  arthralgias: Yes. Bilateral ankle pain.   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    MVP (mitral valve prolapse)     Pneumonia     POTS (postural orthostatic tachycardia syndrome)     Ulcer     Vitamin D deficiency disease 11/6/2012       Past Surgical History:   Procedure Laterality Date    ANKLE SURGERY      lt    APPENDECTOMY      BACK SURGERY      CHOLECYSTECTOMY      choley & ovarian cyst removed  12/2005    cystoscope      multiple    HYSTERECTOMY  4/8/2004    BUBBA BS&O     interstim      OOPHORECTOMY  4/8/2004    with hysterectomy     PELVIC  LAPAROSCOPY      [place] EXPLORATORY OF ABDOMEN      SINUS SURGERY  03/01/2016    SPINE SURGERY         Family History   Problem Relation Age of Onset    Hypertension Father     Alcohol abuse Brother     Cancer Paternal Uncle     Colon cancer Paternal Uncle     Depression Maternal Grandmother     Hearing loss Maternal Grandmother     Heart disease Maternal Grandmother     Kidney disease Maternal Grandmother     Cancer Paternal Grandmother     Arthritis Paternal Grandfather     Diabetes Paternal Grandfather     Stroke Paternal Grandfather     Breast cancer Neg Hx     Ovarian cancer Neg Hx        Social History     Social History    Marital status: Single     Spouse name: N/A    Number of children: N/A    Years of education: N/A     Social History Main Topics    Smoking status: Never Smoker    Smokeless tobacco: Never Used    Alcohol use No    Drug use: No    Sexual activity: No     Other Topics Concern    None     Social History Narrative    None       Current Outpatient Prescriptions   Medication Sig Dispense Refill    ALBUTEROL INHL Inhale into the lungs as needed.       albuterol-ipratropium 2.5mg-0.5mg/3mL (DUO-NEB) 0.5 mg-3 mg(2.5 mg base)/3 mL nebulizer solution Take 3 mLs by nebulization every 6 (six) hours as needed for Wheezing. Rescue 1 Box 0    azelastine (ASTELIN) 137 mcg (0.1 %) nasal spray U 2 SPRAYS IEN Q 12 H PRN  6    BRINTELLIX 20 mg Tab once daily.   2    DICLOFENAC SODIUM (PENNSAID TOP) Apply topically. AA bid      dicyclomine (BENTYL) 10 MG capsule TK ONE C PO TID  11    estropipate (OGEN) 1.5 MG tablet TK 1 T PO ONCE D  7    gabapentin (NEURONTIN) 300 MG capsule Take 300 mg by mouth 2 (two) times daily. 2-300 mg cap in AM and 3-300 mg cap in PM      levocetirizine (XYZAL) 5 MG tablet TK 1 T PO  ONCE Day  9    LORAZEPAM ORAL Take by mouth nightly as needed.      metoprolol tartrate (LOPRESSOR) 50 MG tablet Take 50 mg by mouth 4 (four) times daily. 1 Tablet Oral  Three times a day      montelukast (SINGULAIR) 10 mg tablet TK 1 T PO  ONCE D  3    nitrofurantoin, macrocrystal-monohydrate, (MACROBID) 100 MG capsule Take 1 capsule (100 mg total) by mouth 2 (two) times daily. 60 capsule 0    ondansetron (ZOFRAN) 4 MG tablet Take 4 mg by mouth once daily.  0    oxycodone-acetaminophen  mg (PERCOCET)  mg per tablet Take 1 tablet by mouth every 4 (four) hours as needed.      phenazopyridine (PYRIDIUM) 200 MG tablet Take 1 tablet (200 mg total) by mouth 3 (three) times daily as needed for Pain. 30 tablet 0    TIZANIDINE HCL (ZANAFLEX ORAL) Take 4 mg by mouth every 8 (eight) hours. 2-3 Tablet Oral Three times a day prn      valsartan (DIOVAN) 40 MG tablet Take 40 mg by mouth once daily.      VENTOLIN HFA 90 mcg/actuation inhaler INHALE 2 PUFFS Q 6 H PRF SOB/COUGH  2    VIRTUSSIN AC  mg/5 mL syrup TK 10 ML PO EVERY 6 H PRN FOR COUGH 120 mL 0    diazePAM (VALIUM) 5 MG tablet TK 1 TO 2 TS PO HS PRN. MUST LAST 30 DAYS  2    valsartan (DIOVAN) 80 MG tablet TK 1 T PO QD  3     No current facility-administered medications for this visit.        Review of patient's allergies indicates:   Allergen Reactions    Benadryl allergy decongestant Anaphylaxis     Other reaction(s): Anaphylaxis    Dilaudid [hydromorphone (bulk)] Anaphylaxis     Other reaction(s): Anaphylaxis    Fludrocortisone Hives    Gluten Other (See Comments)     Other reaction(s) GI upset    Indomethacin Hives    Penicillins Rash    Sulfa (sulfonamide antibiotics) Rash    Vancomycin analogues Rash       Physical examination  Vitals Reviewed  Gen. Well-dressed well-nourished overall does look better.  Skin warm dry and intact.  No rashes noted.  Some erythematous changes to the cheeks.  HEENT.  TM intact bilateral with normal light reflex.  No mastoid tenderness during percussion.  Nares patent bilateral.  Pharynx is unremarkable.  No maxillary or frontal sinus tenderness when percussed.    Neck  is supple without adenopathy  Chest.  Respirations are even unlabored.  Lungs are clear to auscultation.  Cardiac regular rate and rhythm.  No chest wall adenopathy noted.  Neuro. Awake alert oriented x4.  Normal judgment and cognition noted.  Extremities no clubbing cyanosis or edema noted.     Call or return to clinic prn if these symptoms worsen or fail to improve as anticipated.

## 2017-12-01 ENCOUNTER — LAB VISIT (OUTPATIENT)
Dept: LAB | Facility: HOSPITAL | Age: 47
End: 2017-12-01
Payer: COMMERCIAL

## 2017-12-01 ENCOUNTER — PATIENT MESSAGE (OUTPATIENT)
Dept: FAMILY MEDICINE | Facility: CLINIC | Age: 47
End: 2017-12-01

## 2017-12-01 DIAGNOSIS — R70.0 ELEVATED SED RATE: ICD-10-CM

## 2017-12-01 DIAGNOSIS — R79.82 CRP ELEVATED: ICD-10-CM

## 2017-12-01 DIAGNOSIS — R50.9 FEVER, UNSPECIFIED FEVER CAUSE: ICD-10-CM

## 2017-12-01 LAB
BASOPHILS # BLD AUTO: 0.02 K/UL
BASOPHILS NFR BLD: 0.6 %
DIFFERENTIAL METHOD: ABNORMAL
EOSINOPHIL # BLD AUTO: 0.1 K/UL
EOSINOPHIL NFR BLD: 3.3 %
ERYTHROCYTE [DISTWIDTH] IN BLOOD BY AUTOMATED COUNT: 13 %
HCT VFR BLD AUTO: 39.4 %
HGB BLD-MCNC: 12.7 G/DL
IMM GRANULOCYTES # BLD AUTO: 0.01 K/UL
IMM GRANULOCYTES NFR BLD AUTO: 0.3 %
LDH SERPL L TO P-CCNC: 141 U/L
LYMPHOCYTES # BLD AUTO: 1.1 K/UL
LYMPHOCYTES NFR BLD: 32.6 %
MCH RBC QN AUTO: 27.7 PG
MCHC RBC AUTO-ENTMCNC: 32.2 G/DL
MCV RBC AUTO: 86 FL
MONOCYTES # BLD AUTO: 0.3 K/UL
MONOCYTES NFR BLD: 8.4 %
NEUTROPHILS # BLD AUTO: 1.8 K/UL
NEUTROPHILS NFR BLD: 54.8 %
NRBC BLD-RTO: 0 /100 WBC
PLATELET # BLD AUTO: 187 K/UL
PMV BLD AUTO: 8.9 FL
RBC # BLD AUTO: 4.58 M/UL
WBC # BLD AUTO: 3.34 K/UL

## 2017-12-01 PROCEDURE — 85025 COMPLETE CBC W/AUTO DIFF WBC: CPT

## 2017-12-01 PROCEDURE — 83615 LACTATE (LD) (LDH) ENZYME: CPT

## 2017-12-01 PROCEDURE — 36415 COLL VENOUS BLD VENIPUNCTURE: CPT | Mod: PO

## 2017-12-05 ENCOUNTER — PATIENT MESSAGE (OUTPATIENT)
Dept: FAMILY MEDICINE | Facility: CLINIC | Age: 47
End: 2017-12-05

## 2017-12-05 DIAGNOSIS — R70.0 ELEVATED SED RATE: Primary | ICD-10-CM

## 2017-12-05 DIAGNOSIS — R50.9 FEVER, UNSPECIFIED FEVER CAUSE: ICD-10-CM

## 2017-12-05 NOTE — TELEPHONE ENCOUNTER
External referral to that rheumatology put in.  The referral is printed so we can go ahead and fax it is well

## 2017-12-06 ENCOUNTER — OFFICE VISIT (OUTPATIENT)
Dept: FAMILY MEDICINE | Facility: CLINIC | Age: 47
End: 2017-12-06
Payer: COMMERCIAL

## 2017-12-06 ENCOUNTER — PATIENT MESSAGE (OUTPATIENT)
Dept: OBSTETRICS AND GYNECOLOGY | Facility: CLINIC | Age: 47
End: 2017-12-06

## 2017-12-06 ENCOUNTER — PATIENT MESSAGE (OUTPATIENT)
Dept: FAMILY MEDICINE | Facility: CLINIC | Age: 47
End: 2017-12-06

## 2017-12-06 VITALS
DIASTOLIC BLOOD PRESSURE: 62 MMHG | WEIGHT: 254.44 LBS | BODY MASS INDEX: 45.08 KG/M2 | HEIGHT: 63 IN | TEMPERATURE: 100 F | HEART RATE: 135 BPM | SYSTOLIC BLOOD PRESSURE: 124 MMHG | OXYGEN SATURATION: 96 %

## 2017-12-06 DIAGNOSIS — R19.7 NAUSEA VOMITING AND DIARRHEA: Primary | ICD-10-CM

## 2017-12-06 DIAGNOSIS — R11.2 NAUSEA VOMITING AND DIARRHEA: Primary | ICD-10-CM

## 2017-12-06 PROCEDURE — 99999 PR PBB SHADOW E&M-EST. PATIENT-LVL IV: CPT | Mod: PBBFAC,,, | Performed by: NURSE PRACTITIONER

## 2017-12-06 PROCEDURE — 99213 OFFICE O/P EST LOW 20 MIN: CPT | Mod: S$GLB,,, | Performed by: NURSE PRACTITIONER

## 2017-12-06 RX ORDER — DICYCLOMINE HYDROCHLORIDE 20 MG/1
20 TABLET ORAL EVERY 6 HOURS
Qty: 120 TABLET | Refills: 0 | Status: SHIPPED | OUTPATIENT
Start: 2017-12-06 | End: 2018-01-05

## 2017-12-06 RX ORDER — TEMAZEPAM 15 MG/1
15 CAPSULE ORAL NIGHTLY
Refills: 0 | COMMUNITY
Start: 2017-12-05 | End: 2017-12-19

## 2017-12-06 RX ORDER — ONDANSETRON 4 MG/1
4 TABLET, ORALLY DISINTEGRATING ORAL EVERY 8 HOURS PRN
Qty: 20 TABLET | Refills: 0 | Status: SHIPPED | OUTPATIENT
Start: 2017-12-06 | End: 2018-07-20 | Stop reason: SDUPTHER

## 2017-12-07 ENCOUNTER — PATIENT MESSAGE (OUTPATIENT)
Dept: FAMILY MEDICINE | Facility: CLINIC | Age: 47
End: 2017-12-07

## 2017-12-07 ENCOUNTER — HOSPITAL ENCOUNTER (EMERGENCY)
Facility: HOSPITAL | Age: 47
Discharge: HOME | End: 2017-12-07
Attending: EMERGENCY MEDICINE
Payer: COMMERCIAL

## 2017-12-07 VITALS
HEART RATE: 100 BPM | RESPIRATION RATE: 18 BRPM | SYSTOLIC BLOOD PRESSURE: 146 MMHG | WEIGHT: 254 LBS | HEIGHT: 63 IN | TEMPERATURE: 99 F | BODY MASS INDEX: 45 KG/M2 | OXYGEN SATURATION: 96 % | DIASTOLIC BLOOD PRESSURE: 70 MMHG

## 2017-12-07 DIAGNOSIS — R10.9 RIGHT FLANK PAIN: Primary | ICD-10-CM

## 2017-12-07 LAB
ALBUMIN SERPL BCP-MCNC: 4.2 G/DL
ALP SERPL-CCNC: 141 U/L
ALT SERPL W/O P-5'-P-CCNC: 16 U/L
ANION GAP SERPL CALC-SCNC: 15 MMOL/L
AST SERPL-CCNC: 20 U/L
BASOPHILS # BLD AUTO: 0.02 K/UL
BASOPHILS NFR BLD: 0.4 %
BILIRUB SERPL-MCNC: 0.4 MG/DL
BILIRUB UR QL STRIP: NEGATIVE
BUN SERPL-MCNC: 13 MG/DL
CALCIUM SERPL-MCNC: 9.7 MG/DL
CHLORIDE SERPL-SCNC: 106 MMOL/L
CLARITY UR: CLEAR
CO2 SERPL-SCNC: 20 MMOL/L
COLOR UR: YELLOW
CREAT SERPL-MCNC: 0.9 MG/DL
DIFFERENTIAL METHOD: ABNORMAL
EOSINOPHIL # BLD AUTO: 0.1 K/UL
EOSINOPHIL NFR BLD: 1.8 %
ERYTHROCYTE [DISTWIDTH] IN BLOOD BY AUTOMATED COUNT: 13.5 %
EST. GFR  (AFRICAN AMERICAN): >60 ML/MIN/1.73 M^2
EST. GFR  (NON AFRICAN AMERICAN): >60 ML/MIN/1.73 M^2
GLUCOSE SERPL-MCNC: 87 MG/DL
GLUCOSE UR QL STRIP: NEGATIVE
HCT VFR BLD AUTO: 44.7 %
HGB BLD-MCNC: 14.8 G/DL
HGB UR QL STRIP: NEGATIVE
KETONES UR QL STRIP: ABNORMAL
LEUKOCYTE ESTERASE UR QL STRIP: NEGATIVE
LIPASE SERPL-CCNC: 16 U/L
LYMPHOCYTES # BLD AUTO: 1.2 K/UL
LYMPHOCYTES NFR BLD: 25.1 %
MCH RBC QN AUTO: 27.7 PG
MCHC RBC AUTO-ENTMCNC: 33.1 G/DL
MCV RBC AUTO: 84 FL
MONOCYTES # BLD AUTO: 0.3 K/UL
MONOCYTES NFR BLD: 6.4 %
NEUTROPHILS # BLD AUTO: 3.2 K/UL
NEUTROPHILS NFR BLD: 66.3 %
NITRITE UR QL STRIP: NEGATIVE
PH UR STRIP: 5 [PH] (ref 5–8)
PLATELET # BLD AUTO: 176 K/UL
PMV BLD AUTO: 8.6 FL
POTASSIUM SERPL-SCNC: 4.1 MMOL/L
PROT SERPL-MCNC: 8 G/DL
PROT UR QL STRIP: NEGATIVE
RBC # BLD AUTO: 5.34 M/UL
SODIUM SERPL-SCNC: 141 MMOL/L
SP GR UR STRIP: 1.02 (ref 1–1.03)
URN SPEC COLLECT METH UR: ABNORMAL
UROBILINOGEN UR STRIP-ACNC: NEGATIVE EU/DL
WBC # BLD AUTO: 4.87 K/UL

## 2017-12-07 PROCEDURE — 85025 COMPLETE CBC W/AUTO DIFF WBC: CPT

## 2017-12-07 PROCEDURE — 99284 EMERGENCY DEPT VISIT MOD MDM: CPT | Mod: 25

## 2017-12-07 PROCEDURE — 83690 ASSAY OF LIPASE: CPT

## 2017-12-07 PROCEDURE — 63600175 PHARM REV CODE 636 W HCPCS: Performed by: EMERGENCY MEDICINE

## 2017-12-07 PROCEDURE — 25000003 PHARM REV CODE 250: Performed by: EMERGENCY MEDICINE

## 2017-12-07 PROCEDURE — 96374 THER/PROPH/DIAG INJ IV PUSH: CPT

## 2017-12-07 PROCEDURE — 80053 COMPREHEN METABOLIC PANEL: CPT

## 2017-12-07 PROCEDURE — 81003 URINALYSIS AUTO W/O SCOPE: CPT

## 2017-12-07 PROCEDURE — 96361 HYDRATE IV INFUSION ADD-ON: CPT

## 2017-12-07 RX ORDER — METOCLOPRAMIDE HYDROCHLORIDE 10 MG/2ML
10 INJECTION, SOLUTION INTRAMUSCULAR; INTRAVENOUS
Status: COMPLETED | OUTPATIENT
Start: 2017-12-07 | End: 2017-12-07

## 2017-12-07 RX ORDER — METOCLOPRAMIDE 10 MG/1
10 TABLET ORAL EVERY 6 HOURS
Qty: 30 TABLET | Refills: 0 | Status: SHIPPED | OUTPATIENT
Start: 2017-12-07 | End: 2018-11-02

## 2017-12-07 RX ADMIN — SODIUM CHLORIDE 1000 ML: 0.9 INJECTION, SOLUTION INTRAVENOUS at 08:12

## 2017-12-07 RX ADMIN — METOCLOPRAMIDE HYDROCHLORIDE 10 MG: 10 INJECTION, SOLUTION INTRAMUSCULAR; INTRAVENOUS at 08:12

## 2017-12-07 NOTE — PROGRESS NOTES
This dictation has been generated using Dragon Dictation some phonetic errors may occur.     Kianna was seen today for nausea and emesis.    Diagnoses and all orders for this visit:    Nausea vomiting and diarrhea    Other orders  -     ondansetron (ZOFRAN-ODT) 4 MG TbDL; Take 1 tablet (4 mg total) by mouth every 8 (eight) hours as needed (nausea).  -     dicyclomine (BENTYL) 20 mg tablet; Take 1 tablet (20 mg total) by mouth every 6 (six) hours.      Nausea and vomiting x a few episodes. Diarrhea x 1. Zofran and Bentyl as above. Increase Bentyl dosing.   We did discuss the renal stone noted on imaging and possibility of stone acting as a foreign body and infection lingering, however at this point pt does not seem to be Symptomatic.   If symptoms worsen return to clinic. Empiric therapy for viral gastro which has been prevalent.     Return if symptoms worsen or fail to improve.      ________________________________________________________________  ________________________________________________________________        Chief Complaint   Patient presents with    Nausea    Emesis     History of present illness  This 47 y.o. presents today for complaint of N/V/D.  Continues with low grade fevers of uncertain etiology.  She has seen Carcinoid specialist and labs pending.  She plans follow up with rheumatologist of her choice.  She saw pulmonology outside system after discharge from hospital-dx pneumonia.  Nausea and vomiting over the past couple of days.  One episode of diarrhea.  Patient notes vomiting to the point of vomiting bile.  She has had some epigastric abdominal pain.  Answers for HPI/ROS submitted by the patient on 12/6/2017   activity change: No  unexpected weight change: No  neck pain: No  hearing loss: No  rhinorrhea: No  trouble swallowing: No  eye discharge: No  visual disturbance: No  chest tightness: No  wheezing: No  chest pain: No  palpitations: Yes  blood in stool: No  constipation: No  vomiting:  Yes  diarrhea: Yes  polydipsia: No  polyuria: No  difficulty urinating: No  hematuria: No  menstrual problem: No  dysuria: No  joint swelling: No  arthralgias: No  headaches: No  weakness: No  confusion: No  dysphoric mood: No      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    MVP (mitral valve prolapse)     Pneumonia     POTS (postural orthostatic tachycardia syndrome)     Ulcer     Vitamin D deficiency disease 11/6/2012       Past Surgical History:   Procedure Laterality Date    ANKLE SURGERY      lt    APPENDECTOMY      BACK SURGERY      CHOLECYSTECTOMY      choley & ovarian cyst removed  12/2005    cystoscope      multiple    HYSTERECTOMY  4/8/2004    BUBBA BS&O     interstim      OOPHORECTOMY  4/8/2004    with hysterectomy     PELVIC LAPAROSCOPY      DC EXPLORATORY OF ABDOMEN      SINUS SURGERY  03/01/2016    SPINE SURGERY         Family History   Problem Relation Age of Onset    Hypertension Father     Alcohol abuse Brother     Cancer Paternal Uncle     Colon cancer Paternal Uncle     Depression Maternal Grandmother     Hearing loss Maternal Grandmother     Heart disease Maternal Grandmother     Kidney disease Maternal Grandmother     Cancer Paternal Grandmother     Arthritis Paternal Grandfather     Diabetes Paternal Grandfather     Stroke Paternal Grandfather     Breast cancer Neg Hx     Ovarian cancer Neg Hx        Social History     Social History    Marital status: Single     Spouse name: N/A    Number of children: N/A    Years of education: N/A     Social History Main Topics    Smoking status: Never Smoker    Smokeless tobacco: Never Used    Alcohol use No    Drug use: No    Sexual activity: No     Other  Topics Concern    None     Social History Narrative    None       Current Outpatient Prescriptions   Medication Sig Dispense Refill    ALBUTEROL INHL Inhale into the lungs as needed.       albuterol-ipratropium 2.5mg-0.5mg/3mL (DUO-NEB) 0.5 mg-3 mg(2.5 mg base)/3 mL nebulizer solution Take 3 mLs by nebulization every 6 (six) hours as needed for Wheezing. Rescue 1 Box 0    azelastine (ASTELIN) 137 mcg (0.1 %) nasal spray U 2 SPRAYS IEN Q 12 H PRN  6    BRINTELLIX 20 mg Tab once daily.   2    diazePAM (VALIUM) 5 MG tablet TK 1 TO 2 TS PO HS PRN. MUST LAST 30 DAYS  2    DICLOFENAC SODIUM (PENNSAID TOP) Apply topically. AA bid      estropipate (OGEN) 1.5 MG tablet TK 1 T PO ONCE D  7    gabapentin (NEURONTIN) 300 MG capsule Take 300 mg by mouth 2 (two) times daily. 2-300 mg cap in AM and 3-300 mg cap in PM      levocetirizine (XYZAL) 5 MG tablet TK 1 T PO  ONCE Day  9    LORAZEPAM ORAL Take by mouth nightly as needed.      metoprolol tartrate (LOPRESSOR) 50 MG tablet Take 50 mg by mouth 4 (four) times daily. 1 Tablet Oral Three times a day      montelukast (SINGULAIR) 10 mg tablet TK 1 T PO  ONCE D  3    nitrofurantoin, macrocrystal-monohydrate, (MACROBID) 100 MG capsule Take 1 capsule (100 mg total) by mouth 2 (two) times daily. 60 capsule 0    oxycodone-acetaminophen  mg (PERCOCET)  mg per tablet Take 1 tablet by mouth every 4 (four) hours as needed.      phenazopyridine (PYRIDIUM) 200 MG tablet Take 1 tablet (200 mg total) by mouth 3 (three) times daily as needed for Pain. 30 tablet 0    temazepam (RESTORIL) 15 mg Cap Take 15 mg by mouth every evening.  0    TIZANIDINE HCL (ZANAFLEX ORAL) Take 4 mg by mouth every 8 (eight) hours. 2-3 Tablet Oral Three times a day prn      valsartan (DIOVAN) 40 MG tablet Take 40 mg by mouth once daily.      valsartan (DIOVAN) 80 MG tablet TK 1 T PO QD  3    VENTOLIN HFA 90 mcg/actuation inhaler INHALE 2 PUFFS Q 6 H PRF SOB/COUGH  2    VIRTUSSIN AC   mg/5 mL syrup TK 10 ML PO EVERY 6 H PRN FOR COUGH 120 mL 0    dicyclomine (BENTYL) 20 mg tablet Take 1 tablet (20 mg total) by mouth every 6 (six) hours. 120 tablet 0    ondansetron (ZOFRAN-ODT) 4 MG TbDL Take 1 tablet (4 mg total) by mouth every 8 (eight) hours as needed (nausea). 20 tablet 0     No current facility-administered medications for this visit.        Review of patient's allergies indicates:   Allergen Reactions    Benadryl allergy decongestant Anaphylaxis     Other reaction(s): Anaphylaxis    Dilaudid [hydromorphone (bulk)] Anaphylaxis     Other reaction(s): Anaphylaxis    Fludrocortisone Hives    Gluten Other (See Comments)     Other reaction(s) GI upset    Indomethacin Hives    Penicillins Rash    Sulfa (sulfonamide antibiotics) Rash    Vancomycin analogues Rash       Physical examination  Vitals Reviewed  Gen. Well-dressed well-nourished no apparent distress  Skin warm dry and intact.  No rashes noted. Petechia noted. No change from previous.   Neuro. Awake alert oriented x4.  Normal judgment and cognition noted.  Extremities no clubbing cyanosis or edema noted.     Call or return to clinic prn if these symptoms worsen or fail to improve as anticipated.

## 2017-12-07 NOTE — ED TRIAGE NOTES
Arrived via personal transportation. Epigastric pain that sometimes radiates to upper quadrant of abdomen that started a few weeks ago. Right sided flank pain that also started a few weeks ago. Diarrhea and fever since September. NV that started 2 days ago. Pt reports taking zofran for NV but experienced no relief. Pt reprots having a history of cancer. AAOx3. Respirations even and unlabored.

## 2017-12-08 ENCOUNTER — PATIENT MESSAGE (OUTPATIENT)
Dept: FAMILY MEDICINE | Facility: CLINIC | Age: 47
End: 2017-12-08

## 2017-12-08 ENCOUNTER — INITIAL CONSULT (OUTPATIENT)
Dept: RHEUMATOLOGY | Facility: CLINIC | Age: 47
End: 2017-12-08
Payer: COMMERCIAL

## 2017-12-08 VITALS
HEART RATE: 108 BPM | DIASTOLIC BLOOD PRESSURE: 101 MMHG | SYSTOLIC BLOOD PRESSURE: 177 MMHG | TEMPERATURE: 99 F | WEIGHT: 255.63 LBS | BODY MASS INDEX: 43.64 KG/M2 | HEIGHT: 64 IN

## 2017-12-08 DIAGNOSIS — I10 ESSENTIAL HYPERTENSION: ICD-10-CM

## 2017-12-08 DIAGNOSIS — E03.8 OTHER SPECIFIED HYPOTHYROIDISM: ICD-10-CM

## 2017-12-08 DIAGNOSIS — R21 RASH: Primary | ICD-10-CM

## 2017-12-08 DIAGNOSIS — R74.8 ELEVATED ALKALINE PHOSPHATASE LEVEL: ICD-10-CM

## 2017-12-08 DIAGNOSIS — R50.81 FEVER IN OTHER DISEASES: ICD-10-CM

## 2017-12-08 DIAGNOSIS — R79.82 CRP ELEVATED: ICD-10-CM

## 2017-12-08 DIAGNOSIS — E66.01 MORBID OBESITY: ICD-10-CM

## 2017-12-08 DIAGNOSIS — R74.8 ALKALINE PHOSPHATASE ELEVATION: ICD-10-CM

## 2017-12-08 DIAGNOSIS — E55.9 VITAMIN D DEFICIENCY DISEASE: ICD-10-CM

## 2017-12-08 DIAGNOSIS — R70.0 ELEVATED SED RATE: ICD-10-CM

## 2017-12-08 PROBLEM — R50.9 FEVER: Status: ACTIVE | Noted: 2017-12-08

## 2017-12-08 PROCEDURE — 99204 OFFICE O/P NEW MOD 45 MIN: CPT | Mod: S$GLB,,, | Performed by: INTERNAL MEDICINE

## 2017-12-08 PROCEDURE — 99999 PR PBB SHADOW E&M-EST. PATIENT-LVL V: CPT | Mod: PBBFAC,,, | Performed by: INTERNAL MEDICINE

## 2017-12-08 RX ORDER — ERGOCALCIFEROL 1.25 MG/1
50000 CAPSULE ORAL
Qty: 12 CAPSULE | Refills: 3 | Status: SHIPPED | OUTPATIENT
Start: 2017-12-08 | End: 2018-11-01 | Stop reason: SDUPTHER

## 2017-12-08 ASSESSMENT — ROUTINE ASSESSMENT OF PATIENT INDEX DATA (RAPID3)
WHEN YOU AWAKENED IN THE MORNING OVER THE LAST WEEK, PLEASE INDICATE THE AMOUNT OF TIME IT TAKES UNTIL YOU ARE AS LIMBER AS YOU WILL BE FOR THE DAY: 30 MINS
PSYCHOLOGICAL DISTRESS SCORE: 2.2
MDHAQ FUNCTION SCORE: .7
AM STIFFNESS SCORE: 1, YES
PATIENT GLOBAL ASSESSMENT SCORE: 5
TOTAL RAPID3 SCORE: 4.78
FATIGUE SCORE: 7
PAIN SCORE: 7

## 2017-12-08 NOTE — ASSESSMENT & PLAN NOTE
99.4F    R/o vasculitis(ti ANCA with chronic sinusitis, recent pneumonitis ? Infectious), dermatomyositis, NIKOLAY neg makes SLE unlikely    ESR, CRP, ANCA, PR3, MPO, C3, C4, CH50, cryoglobulins, Hepatitis B, QG-TB, Myomarker 3  Ref to Derm for skin biopsy left antecubital rash  Ref to ID for persistent fever ? Origin  RTC 2 wks

## 2017-12-08 NOTE — LETTER
December 8, 2017      Rajesh Rowland, NP  [address]  [place] [address]           [facility]  [address]  [place] [address]  Phone: [phone]  Fax: [phone]          Patient: Kianna Zuleta   MR Number: [id number]   YOB: 1970   Date of Visit: 12/8/2017       Dear Rajesh Rowland:    Thank you for referring Kianna Zuleta to me for evaluation. Attached you will find relevant portions of my assessment and plan of care.    If you have questions, please do not hesitate to call me. I look forward to following Kianna Zuleta along with you.    Sincerely,    Luis Eduardo Brown MD    Enclosure  CC:  No Recipients    If you would like to receive this communication electronically, please contact [email or web address] or [phone] to request more information on [employer] access.    For providers and/or their staff who would like to refer a patient to [facility], please contact us through our one-stop-shop provider referral line, [facility], [place], at [phone].    If you feel you have received this communication in error or would no longer like to receive these types of communications, please e-mail [email or web address]

## 2017-12-08 NOTE — PROGRESS NOTES
"Subjective:       Patient ID: Kianna Zuleta is a 47 y.o. female.    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  In Sept hospitalized x 5 days with fever 103, cough, hemoptysis, wheeze after sinusitis, pneumonia on one side then another. Respiratory symptoms resolved with 5 different antibiotics, Since then has had daily fever 99.4 occ up to 100.4 occ non-shaking chills. Diarrhea: increased gastrocolic reflex. Loose, occ watery bilelike, no blood.  + associated abd cramps, pain. Appetite. Has lost 15# with these symptoms.  No hair fall. No oral or nasal ulcers. Occ ocular photosensitivity, no pain or redness.  Has intermittent rash left antecubital, left neck. Saw Derm ? Eczema ? Ring worm. + purpura on right forearm. No cutaneous photosensitivity.  Also some petechiae, face, neck and chest, back. Some right wrist and bilateral ankle pain, without swelling redness or warmth.  No Raynaud's. Occ dry cough, occ SOB. No chest pain +POTs on metoprolol and valsartan, has seen Marquez Posada, now Dr. Mcgrath.    States she saw Dr. Laura Rogers, rheumatologist about 7 years ago and was on Plaquenil for about a year and half. She called her office to find out diagnosis and told "osteoarthritis"     Has h/o carcinoid 2005. Had Gyn-Onc surgery with hysterectomy age 34 for endometriosis. Then BSO and incidental appendectomy, found to have carcinoid in appendix. Then starting seeing Dr. Olvera last  labs 11/14/17 gastrin, VIP, substance P calcitonin, histamine, tryptase all neg or normal 5HIAA pending  Review of Systems   Constitutional: Positive for fatigue, fever and unexpected weight change. Negative for appetite change.   HENT: Negative for mouth sores.    Eyes: Negative for visual disturbance.   Respiratory: Positive for shortness of breath. Negative for cough and wheezing.    Cardiovascular: Negative for chest pain and palpitations. " "  Gastrointestinal: Positive for diarrhea. Negative for abdominal pain, anal bleeding, blood in stool, constipation, nausea and vomiting.   Genitourinary: Negative for dysuria, frequency and urgency.   Musculoskeletal: Negative for arthralgias, back pain, gait problem, joint swelling, myalgias, neck pain and neck stiffness.   Skin: Positive for rash.   Neurological: Negative for weakness, numbness and headaches.   Hematological: Negative for adenopathy. Does not bruise/bleed easily.   Psychiatric/Behavioral: Negative for sleep disturbance. The patient is not nervous/anxious.          Objective:   BP (!) 177/101   Pulse 108   Temp 99.4 °F (37.4 °C)   Ht 5' 3.6" (1.615 m)   Wt 115.9 kg (255 lb 9.6 oz)   BMI 44.43 kg/m²      Physical Exam   Constitutional: She is oriented to person, place, and time and well-developed, well-nourished, and in no distress.   obese   HENT:   Head: Normocephalic and atraumatic.   Mouth/Throat: Oropharynx is clear and moist.   Eyes: Conjunctivae and EOM are normal.   Neck: Normal range of motion. Neck supple. No thyromegaly present.   Cardiovascular: Normal rate, regular rhythm, normal heart sounds and intact distal pulses.  Exam reveals no gallop and no friction rub.    No murmur heard.  Pulmonary/Chest: Breath sounds normal. She has no wheezes. She has no rales. She exhibits no tenderness.   Abdominal: Soft. She exhibits no distension and no mass. There is no tenderness.   obese       Right Side Rheumatological Exam     Examination finds the shoulder, elbow, wrist, knee, 1st PIP, 1st MCP, 2nd PIP, 2nd MCP, 3rd PIP, 3rd MCP, 4th PIP, 4th MCP, 5th PIP and 5th MCP normal.    Left Side Rheumatological Exam     Examination finds the shoulder, elbow, wrist, knee, 1st PIP, 1st MCP, 2nd PIP, 2nd MCP, 3rd PIP, 3rd MCP, 4th PIP, 4th MCP, 5th PIP and 5th MCP normal.      Lymphadenopathy:     She has no cervical adenopathy.   Neurological: She is alert and oriented to person, place, and time. She " displays normal reflexes. Gait normal.   Nl motor strength UE and LE bilateral   Skin: Rash (eczematous plaque just above left antecubital fossa) noted.     Faint blanchable erythema left neck 3cm diam  Faint erythema over left 3rd and 4th mcps     Musculoskeletal: She exhibits no edema.         Results for BLANCA GUTHRIE (MRN [id number]) as of 12/8/2017 14:58   Ref. Range 8/2/2007 06:00 11/7/2008 18:00 10/18/2017 11:51   Sed Rate Latest Ref Range: 0 - 20 mm/Hr 29 (H) 3 27 (H)   Results for BLANCA GUTHRIE (MRN [id number]) as of 12/8/2017 14:58   Ref. Range 10/18/2017 11:51   CRP Latest Ref Range: 0.0 - 8.2 mg/L 15.4 (H)   Results for BLANCA GUTHRIE (MRN [id number]) as of 12/8/2017 14:58   Ref. Range 10/18/2017 11:51   Anti-SSA Antibody Latest Ref Range: 0.00 - 19.99 EU 1.61   Anti-SSA Interpretation Latest Ref Range: Negative  Negative   CCP Antibodies Latest Ref Range: <5.0 U/mL <0.5   Rheumatoid Factor Latest Ref Range: 0.0 - 15.0 IU/mL <10.0   Results for BLANCA GUTHRIE (MRN [id number]) as of 12/8/2017 14:58   Ref. Range 10/18/2017 11:51   NIKOLAY Screen Latest Ref Range: Negative <1:160  Negative <1:160     Assessment/Plan         Problem List Items Addressed This Visit     RESOLVED: Vitamin D deficiency disease    Overview     Results for BLANCA GUTHRIE (MRN [id number]) as of 12/8/2017 14:58   Ref. Range 8/15/2017 16:28   Vit D, 25-Hydroxy Latest Ref Range: 30 - 96 ng/mL 18 (L)            Relevant Orders    Vitamin D    Hypothyroidism    Overview     Results for BLANCA GUTHRIE (MRN [id number]) as of 12/8/2017 14:58   Ref. Range 10/10/2011 08:09 10/23/2012 08:38 2/22/2013 08:45 3/24/2014 11:24 8/15/2017 16:28   TSH Latest Ref Range: 0.400 - 4.000 uIU/mL 3.60 4.087 (H) 0.240 (L) 0.984 3.189            Elevated alkaline phosphatase level    Overview     Results for BLANCA GUTHRIE (MRN [id number]) as of 12/8/2017 14:58   Ref. Range 10/23/2012 08:38 6/23/2013 09:50 8/15/2017 16:28 10/18/2017 11:51 12/7/2017 08:08   Alkaline Phosphatase Latest Ref Range:  55 - 135 U/L 145 (H) 169 (H) 149 (H) 132 141 (H)            Current Assessment & Plan     GGT, alk phos isoenzymes         Relevant Orders    Aldolase    CK    C3 complement    C4 complement    Sedimentation rate, manual    C-reactive protein    ANTI-NEUTROPHILIC CYTOPLASMIC ANTIBODY    Proteinase 3 Autoantibodies    Myeloperoxidase Antibody (MPO)    MyoMarker Panel 3    Ambulatory Referral to Dermatology    Ambulatory consult to Infectious Disease    Fever    Current Assessment & Plan     99.4F    R/o vasculitis(ti ANCA with chronic sinusitis, recent pneumonitis ? Infectious), dermatomyositis, NIKOLAY neg makes SLE unlikely    ESR, CRP, ANCA, PR3, MPO, C3, C4, CH50, cryoglobulins, Hepatitis B, QG-TB, Myomarker 3  Ref to Derm for skin biopsy left antecubital rash  Ref to ID for persistent fever ? Origin  RTC 2 wks                   Relevant Orders    Aldolase    CK    C3 complement    C4 complement    Sedimentation rate, manual    C-reactive protein    ANTI-NEUTROPHILIC CYTOPLASMIC ANTIBODY    Proteinase 3 Autoantibodies    Myeloperoxidase Antibody (MPO)    MyoMarker Panel 3    Ambulatory Referral to Dermatology    Ambulatory consult to Infectious Disease    Hepatitis B surface antigen    HBcAB    Hepatitis B surface antibody    Quantiferon Gold TB    CRYOGLOBULIN    Complement, total    Elevated sed rate    Overview     Results for BLANCA GUTHRIE (MRN [id number]) as of 12/8/2017 14:58   Ref. Range 8/2/2007 06:00 11/7/2008 18:00 10/18/2017 11:51   Sed Rate Latest Ref Range: 0 - 20 mm/Hr 29 (H) 3 27 (H)            Relevant Orders    Aldolase    CK    Vitamin D    C3 complement    C4 complement    Sedimentation rate, manual    C-reactive protein    ANTI-NEUTROPHILIC CYTOPLASMIC ANTIBODY    Proteinase 3 Autoantibodies    Myeloperoxidase Antibody (MPO)    MyoMarker Panel 3    Ambulatory Referral to Dermatology    Ambulatory consult to Infectious Disease    CRYOGLOBULIN    Complement, total    CRP elevated    Overview      Results for BLANCA GUTHRIE (MRN [id number]) as of 12/8/2017 14:58   Ref. Range 10/18/2017 11:51   CRP Latest Ref Range: 0.0 - 8.2 mg/L 15.4 (H)            Relevant Orders    Aldolase    CK    Vitamin D    C3 complement    C4 complement    Sedimentation rate, manual    C-reactive protein    ANTI-NEUTROPHILIC CYTOPLASMIC ANTIBODY    Proteinase 3 Autoantibodies    Myeloperoxidase Antibody (MPO)    MyoMarker Panel 3    Ambulatory Referral to Dermatology    Ambulatory consult to Infectious Disease    CRYOGLOBULIN    Complement, total    Hypertension    Current Assessment & Plan     177/101    F/u Dr. Calixensinfadi         Morbid obesity    Overview     BMI 44.43           Other Visit Diagnoses     Rash    -  Primary    Alkaline phosphatase elevation        Relevant Orders    Gamma GT    Alkaline phosphatase, isoenzymes

## 2017-12-09 ENCOUNTER — TELEPHONE (OUTPATIENT)
Dept: RHEUMATOLOGY | Facility: CLINIC | Age: 47
End: 2017-12-09

## 2017-12-09 DIAGNOSIS — R74.8 HIGH SERUM BONE-SPECIFIC ALKALINE PHOSPHATASE: Primary | ICD-10-CM

## 2017-12-09 NOTE — ED PROVIDER NOTES
"Encounter Date: 12/7/2017       History     Chief Complaint   Patient presents with    Flank Pain     "I got kidney stones, nausea, and vomitting. I have hx of carcinoid cancer. I've been running fever since September." Pain to both sides of stomach and sides. I have POTS syndrome. I'm on antibiotics"      This was a 47-year-old female complaining of pain all over.  She has been suffering with unexplained pain in different areas of her body for the past few months.  Her primary care doctor has been working her up.  There is a question that she has a recurrence of carcinoid tumor and she is awaiting the results of her CAT scan.  Today, she has lower abdominal pain that goes up to the right flank.   She has been having nausea but no vomiting or diarrhea.  There has been no fever or chills.          Review of patient's allergies indicates:   Allergen Reactions    Benadryl allergy decongestant Anaphylaxis     Other reaction(s): Anaphylaxis    Dilaudid [hydromorphone (bulk)] Anaphylaxis     Other reaction(s): Anaphylaxis    Fludrocortisone Hives    Gluten Other (See Comments)     Other reaction(s) GI upset    Indomethacin Hives    Penicillins Rash    Sulfa (sulfonamide antibiotics) Rash    Vancomycin analogues Rash     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    MVP (mitral valve prolapse)     Pneumonia     POTS (postural orthostatic tachycardia syndrome)     Ulcer     Vitamin D deficiency disease 11/6/2012     Past Surgical History:   Procedure Laterality Date    ANKLE SURGERY      lt    APPENDECTOMY      BACK SURGERY      CHOLECYSTECTOMY      choley & ovarian cyst removed  12/2005    cystoscope      multiple    HYSTERECTOMY  4/8/2004 "    BUBBA BS&O     interstim      OOPHORECTOMY  4/8/2004    with hysterectomy     PELVIC LAPAROSCOPY      ID EXPLORATORY OF ABDOMEN      SINUS SURGERY  03/01/2016    SPINE SURGERY       Family History   Problem Relation Age of Onset    Hypertension Father     Alcohol abuse Brother     Cancer Paternal Uncle     Colon cancer Paternal Uncle     Depression Maternal Grandmother     Hearing loss Maternal Grandmother     Heart disease Maternal Grandmother     Kidney disease Maternal Grandmother     Cancer Paternal Grandmother     Arthritis Paternal Grandfather     Diabetes Paternal Grandfather     Stroke Paternal Grandfather     Breast cancer Neg Hx     Ovarian cancer Neg Hx      Social History   Substance Use Topics    Smoking status: Never Smoker    Smokeless tobacco: Never Used    Alcohol use No     Review of Systems   Constitutional: Negative for fever.   HENT: Negative for sore throat.    Respiratory: Negative for shortness of breath.    Cardiovascular: Negative for chest pain.   Gastrointestinal: Negative for nausea.   Genitourinary: Negative for dysuria.   Musculoskeletal: Negative for back pain.   Skin: Negative for rash.   Neurological: Negative for weakness.   Hematological: Does not bruise/bleed easily.       Physical Exam     Initial Vitals [12/07/17 0751]   BP Pulse Resp Temp SpO2   (!) 175/81 97 20 98.9 °F (37.2 °C) 98 %      MAP       112.33         Physical Exam    Nursing note and vitals reviewed.  Constitutional: She appears well-developed and well-nourished.  Non-toxic appearance. She does not appear ill.   HENT:   Head: Normocephalic and atraumatic.   Eyes: EOM are normal.   Neck: Neck supple.   Cardiovascular: Normal rate and regular rhythm.   Pulmonary/Chest: Effort normal and breath sounds normal. No respiratory distress.   Abdominal: Soft. Normal appearance and bowel sounds are normal. She exhibits no distension. There is no tenderness.   Musculoskeletal: Normal range of motion.    Neurological: She is alert.   Skin: Skin is warm and dry.   Psychiatric: She has a normal mood and affect.         ED Course   Procedures  Labs Reviewed   CBC W/ AUTO DIFFERENTIAL - Abnormal; Notable for the following:        Result Value    MPV 8.6 (*)     All other components within normal limits   COMPREHENSIVE METABOLIC PANEL - Abnormal; Notable for the following:     CO2 20 (*)     Alkaline Phosphatase 141 (*)     All other components within normal limits   URINALYSIS - Abnormal; Notable for the following:     Ketones, UA Trace (*)     All other components within normal limits   LIPASE             Medical Decision Making:   History:   Old Medical Records: I decided to obtain old medical records.  Clinical Tests:   Lab Tests: Ordered and Reviewed  Radiological Study: Ordered and Reviewed  ED Management:  Given the patient's previous diagnosis of kidney stones and worsening right flank pain, I was concerned about an obstructing kidney stone.  A CT renal stone study was negative for an obstructing stone.  There are punctate nonobstructing renal stones bilaterally.  She has no evidence of pancreatitis, hepatitis, renal insufficiency.  There is no leukocytosis.  She does not have a UTI or pyelonephritis.  The cause of the patient's acute on chronic abdominal pain is unclear.  She was advised to follow-up with her primary care doctor for further evaluation and management.  She was discharged in stable condition.                   ED Course      Clinical Impression:   The encounter diagnosis was Right flank pain.    Disposition:   Disposition: Discharged  Condition: Stable                        Eduardo Nicole MD  12/08/17 0865

## 2017-12-11 ENCOUNTER — TELEPHONE (OUTPATIENT)
Dept: NEUROLOGY | Facility: HOSPITAL | Age: 47
End: 2017-12-11

## 2017-12-11 ENCOUNTER — PATIENT MESSAGE (OUTPATIENT)
Dept: FAMILY MEDICINE | Facility: CLINIC | Age: 47
End: 2017-12-11

## 2017-12-11 NOTE — TELEPHONE ENCOUNTER
----- Message from Lucy Rey sent at 12/11/2017  9:10 AM CST -----  Contact: Patient  EAW- Patient was calling to see if we received the rest of her lab markers ([id number]). Patient's call back number is [phone]

## 2017-12-12 ENCOUNTER — PATIENT MESSAGE (OUTPATIENT)
Dept: RHEUMATOLOGY | Facility: CLINIC | Age: 47
End: 2017-12-12

## 2017-12-13 ENCOUNTER — LAB VISIT (OUTPATIENT)
Dept: LAB | Facility: HOSPITAL | Age: 47
End: 2017-12-13
Attending: INTERNAL MEDICINE
Payer: COMMERCIAL

## 2017-12-13 ENCOUNTER — TELEPHONE (OUTPATIENT)
Dept: RHEUMATOLOGY | Facility: CLINIC | Age: 47
End: 2017-12-13

## 2017-12-13 ENCOUNTER — OFFICE VISIT (OUTPATIENT)
Dept: INFECTIOUS DISEASES | Facility: CLINIC | Age: 47
End: 2017-12-13
Payer: COMMERCIAL

## 2017-12-13 VITALS
DIASTOLIC BLOOD PRESSURE: 87 MMHG | WEIGHT: 255.31 LBS | HEIGHT: 64 IN | HEART RATE: 97 BPM | TEMPERATURE: 99 F | BODY MASS INDEX: 43.59 KG/M2 | SYSTOLIC BLOOD PRESSURE: 157 MMHG

## 2017-12-13 DIAGNOSIS — R70.0 ELEVATED SED RATE: ICD-10-CM

## 2017-12-13 DIAGNOSIS — R74.8 ELEVATED ALKALINE PHOSPHATASE LEVEL: ICD-10-CM

## 2017-12-13 DIAGNOSIS — R50.9 FUO (FEVER OF UNKNOWN ORIGIN): Primary | ICD-10-CM

## 2017-12-13 DIAGNOSIS — R79.82 CRP ELEVATED: ICD-10-CM

## 2017-12-13 DIAGNOSIS — R50.81 FEVER IN OTHER DISEASES: ICD-10-CM

## 2017-12-13 DIAGNOSIS — E66.01 MORBID OBESITY: ICD-10-CM

## 2017-12-13 DIAGNOSIS — C7A.020 MALIGNANT CARCINOID TUMOR OF APPENDIX: ICD-10-CM

## 2017-12-13 LAB
HBV CORE AB SERPL QL IA: NEGATIVE
HBV SURFACE AB SER-ACNC: NEGATIVE M[IU]/ML
HBV SURFACE AG SERPL QL IA: NEGATIVE

## 2017-12-13 PROCEDURE — 99205 OFFICE O/P NEW HI 60 MIN: CPT | Mod: S$GLB,,, | Performed by: INTERNAL MEDICINE

## 2017-12-13 PROCEDURE — 99999 PR PBB SHADOW E&M-EST. PATIENT-LVL III: CPT | Mod: PBBFAC,,, | Performed by: INTERNAL MEDICINE

## 2017-12-13 PROCEDURE — 86162 COMPLEMENT TOTAL (CH50): CPT

## 2017-12-13 PROCEDURE — 82595 ASSAY OF CRYOGLOBULIN: CPT

## 2017-12-13 PROCEDURE — 87340 HEPATITIS B SURFACE AG IA: CPT

## 2017-12-13 PROCEDURE — 86704 HEP B CORE ANTIBODY TOTAL: CPT

## 2017-12-13 PROCEDURE — 86706 HEP B SURFACE ANTIBODY: CPT

## 2017-12-13 NOTE — LETTER
December 13, 2017      Luis Eduardo Brown MD  [address]  [facility] [address]           [facility] - Infectious Diseases  [address]  [facility] [address]  Phone: [phone]  Fax: [phone]          Patient: Kianna Zuleta   MR Number: [id number]   YOB: 1970   Date of Visit: 12/13/2017       Dear Dr. Luis Eduardo Brown:    Thank you for referring Kianna Zuleta to me for evaluation. Attached you will find relevant portions of my assessment and plan of care.    If you have questions, please do not hesitate to call me. I look forward to following Kianna Zuleta along with you.    Sincerely,    Vincent Mack MD    Enclosure  CC:  No Recipients    If you would like to receive this communication electronically, please contact [email or web address] or [phone] to request more information on [employer] Link access.    For providers and/or their staff who would like to refer a patient to [facility], please contact us through our one-stop-shop provider referral line, [facility], at [phone].    If you feel you have received this communication in error or would no longer like to receive these types of communications, please e-mail [email or web address]

## 2017-12-13 NOTE — PROGRESS NOTES
Subjective:      Patient ID: Kianna Zuleta is a 47 y.o. female.    Chief Complaint:  Fever evaluation , referred by Dr. Brown      History of Present Illness  Single  who lives in [facility]. Has a pet dog. No ill relatives or friends. No travel out of [place] since July ([place])    Complex pt who has hx of carcinoid syndrome and developed febrile illness with abnormal CXR in Sept and was hospitalized at [facility] and treated with multiple antibiotics. Since then she has had a multitude of symptoms which include:    Photosensitive rashes primarily on face, upper chest and arms  Fever from .8  Intermittent watery, non-bloody diarrhea  Kidney stones  Right facial numbness  Elevated alkaline phosphatase  Elevated C reactive protein (11.3)  Follow up hospitalization CXR was normal on 10/24/17    Labs to date which are normal include:  CBC, CMP (except for alk phos), GGT, CPK, ANCA, hep B serologies, UA, anti SSA ab,  C4, total complement  (C3 elevated).    Microbiology:  Urine 11/15- > 100K enterococcus  No blood cultures at [facility]  Review of Systems   Constitution: Positive for decreased appetite and fever. Negative for chills, weakness, malaise/fatigue, night sweats, weight gain and weight loss.   HENT: Positive for congestion. Negative for ear pain, hearing loss, hoarse voice, sore throat and tinnitus.    Eyes: Negative for blurred vision, redness and visual disturbance.   Cardiovascular: Positive for palpitations. Negative for chest pain and leg swelling.   Respiratory: Negative for cough, hemoptysis, shortness of breath and sputum production.    Hematologic/Lymphatic: Negative for adenopathy. Does not bruise/bleed easily.   Skin: Positive for itching and rash. Negative for dry skin and suspicious lesions.   Musculoskeletal: Positive for back pain. Negative for joint pain, myalgias and neck pain.   Gastrointestinal: Positive for abdominal pain, diarrhea and nausea. Negative for constipation, heartburn and  vomiting.   Genitourinary: Negative for dysuria, flank pain, frequency, hematuria, hesitancy and urgency.   Neurological: Negative for dizziness, headaches, numbness and paresthesias.   Psychiatric/Behavioral: Negative for depression and memory loss. The patient does not have insomnia and is not nervous/anxious.      Objective:   Physical Exam   Constitutional: She is oriented to person, place, and time. She appears well-developed and well-nourished.   obese   HENT:   Right Ear: External ear normal.   Left Ear: External ear normal.   Mouth/Throat: Oropharynx is clear and moist.   Neck: Carotid bruit is not present. No thyroid mass and no thyromegaly present.   Cardiovascular: Normal rate, regular rhythm and normal heart sounds.  Exam reveals no gallop and no friction rub.    No murmur heard.  Pulmonary/Chest: Effort normal and breath sounds normal. No respiratory distress. She has no wheezes. She has no rales.   Abdominal: Soft. Bowel sounds are normal. She exhibits no distension and no mass. There is no splenomegaly or hepatomegaly. There is no tenderness.   Musculoskeletal: She exhibits no edema or tenderness.   Lymphadenopathy:     She has no cervical adenopathy.   Neurological: She is alert and oriented to person, place, and time.   Skin: Rash noted. There is erythema.   Dramatic erythema of face, upper chest and arms ( all sun exposed areas)   Psychiatric: She has a normal mood and affect.   Vitals reviewed.            Assessment:       1. FUO (fever of unknown origin)    2. Malignant carcinoid tumor of appendix    3. Morbid obesity    4. CRP elevated    5. Elevated alkaline phosphatase level      6.   H/O abnormal CXR Sept with resolution   7.   Photosensitive rash  Plan:        1. Continue work up with rheum for CTD   2. Blood cultures x 2   3.  Obtain records from pulmonologist (Dr. Jaimes) and [facility] for review   4. Total body gallium scan   5. Review after 1-4

## 2017-12-13 NOTE — Clinical Note
1. Continue work up with rheum for CTD  2. Blood cultures x 2  3.  Obtain records from pulmonologist (Dr. Jaimes) and [facility] for review  4. Total body gallium scan  5. Review after 1-4

## 2017-12-13 NOTE — TELEPHONE ENCOUNTER
Received from [facility] outside records:    Labs:    10/917:     WBC 2800 ANC 2160 (low)  Hb 11.7  MCV 85.5(low)    cmp nl except alk phos 137(117)  AST 40(37) eGFR > 60  Blood cultures x 2 neg    Discharged on Augmentin 875-125  Zithrromax 250mg   Ondansetron 4m g    Please tell pt the provided records only with labs    Need: discharge summary, reports of all imaging. Thanks. NADIA

## 2017-12-14 ENCOUNTER — PATIENT MESSAGE (OUTPATIENT)
Dept: RHEUMATOLOGY | Facility: CLINIC | Age: 47
End: 2017-12-14

## 2017-12-15 ENCOUNTER — PATIENT MESSAGE (OUTPATIENT)
Dept: FAMILY MEDICINE | Facility: CLINIC | Age: 47
End: 2017-12-15

## 2017-12-15 ENCOUNTER — TELEPHONE (OUTPATIENT)
Dept: RHEUMATOLOGY | Facility: CLINIC | Age: 47
End: 2017-12-15

## 2017-12-15 DIAGNOSIS — R59.0 BILATERAL HILAR ADENOPATHY SYNDROME: ICD-10-CM

## 2017-12-15 DIAGNOSIS — R59.0 MEDIASTINAL ADENOPATHY: ICD-10-CM

## 2017-12-15 DIAGNOSIS — R50.9 FUO (FEVER OF UNKNOWN ORIGIN): Primary | ICD-10-CM

## 2017-12-15 DIAGNOSIS — D3A.00 CARCINOID TUMOR: ICD-10-CM

## 2017-12-15 LAB — CH50 SERPL-ACNC: 86 U/ML

## 2017-12-16 NOTE — TELEPHONE ENCOUNTER
Please schedule PET-CT, ACE level and needs to see Derm for skin biopsy this week or next, not the end of Feb as scheduled. Thanks NADIA

## 2017-12-17 ENCOUNTER — PATIENT MESSAGE (OUTPATIENT)
Dept: FAMILY MEDICINE | Facility: CLINIC | Age: 47
End: 2017-12-17

## 2017-12-18 ENCOUNTER — PATIENT MESSAGE (OUTPATIENT)
Dept: FAMILY MEDICINE | Facility: CLINIC | Age: 47
End: 2017-12-18

## 2017-12-18 ENCOUNTER — TELEPHONE (OUTPATIENT)
Dept: RHEUMATOLOGY | Facility: CLINIC | Age: 47
End: 2017-12-18

## 2017-12-19 ENCOUNTER — OFFICE VISIT (OUTPATIENT)
Dept: RHEUMATOLOGY | Facility: CLINIC | Age: 47
End: 2017-12-19
Payer: COMMERCIAL

## 2017-12-19 ENCOUNTER — LAB VISIT (OUTPATIENT)
Dept: LAB | Facility: HOSPITAL | Age: 47
End: 2017-12-19
Attending: INTERNAL MEDICINE
Payer: COMMERCIAL

## 2017-12-19 ENCOUNTER — PATIENT MESSAGE (OUTPATIENT)
Dept: RHEUMATOLOGY | Facility: CLINIC | Age: 47
End: 2017-12-19

## 2017-12-19 ENCOUNTER — TELEPHONE (OUTPATIENT)
Dept: NEUROLOGY | Facility: HOSPITAL | Age: 47
End: 2017-12-19

## 2017-12-19 VITALS
SYSTOLIC BLOOD PRESSURE: 152 MMHG | HEART RATE: 105 BPM | RESPIRATION RATE: 20 BRPM | DIASTOLIC BLOOD PRESSURE: 98 MMHG | WEIGHT: 254.63 LBS | BODY MASS INDEX: 44.25 KG/M2

## 2017-12-19 DIAGNOSIS — R50.9 FUO (FEVER OF UNKNOWN ORIGIN): ICD-10-CM

## 2017-12-19 DIAGNOSIS — R79.82 CRP ELEVATED: ICD-10-CM

## 2017-12-19 DIAGNOSIS — E55.9 VITAMIN D DEFICIENCY: ICD-10-CM

## 2017-12-19 DIAGNOSIS — R20.0 RIGHT FACIAL NUMBNESS: Primary | ICD-10-CM

## 2017-12-19 DIAGNOSIS — R74.8 ELEVATED ALKALINE PHOSPHATASE LEVEL: ICD-10-CM

## 2017-12-19 DIAGNOSIS — R21 RASH: ICD-10-CM

## 2017-12-19 DIAGNOSIS — R50.9 FEVER, UNSPECIFIED FEVER CAUSE: ICD-10-CM

## 2017-12-19 DIAGNOSIS — C7A.020 MALIGNANT CARCINOID TUMOR OF APPENDIX: ICD-10-CM

## 2017-12-19 DIAGNOSIS — R93.89 ABNORMAL CT OF THE CHEST: ICD-10-CM

## 2017-12-19 DIAGNOSIS — I10 ESSENTIAL HYPERTENSION: ICD-10-CM

## 2017-12-19 DIAGNOSIS — R70.0 ELEVATED SED RATE: ICD-10-CM

## 2017-12-19 PROCEDURE — 99214 OFFICE O/P EST MOD 30 MIN: CPT | Mod: S$GLB,,, | Performed by: INTERNAL MEDICINE

## 2017-12-19 PROCEDURE — 99999 PR PBB SHADOW E&M-EST. PATIENT-LVL V: CPT | Mod: PBBFAC,,, | Performed by: INTERNAL MEDICINE

## 2017-12-19 PROCEDURE — 87040 BLOOD CULTURE FOR BACTERIA: CPT | Mod: 59

## 2017-12-19 PROCEDURE — 36415 COLL VENOUS BLD VENIPUNCTURE: CPT

## 2017-12-19 RX ORDER — TRAZODONE HYDROCHLORIDE 100 MG/1
TABLET ORAL
Refills: 1 | COMMUNITY
Start: 2017-12-14 | End: 2018-11-02

## 2017-12-19 ASSESSMENT — ROUTINE ASSESSMENT OF PATIENT INDEX DATA (RAPID3)
MDHAQ FUNCTION SCORE: .6
FATIGUE SCORE: 7
AM STIFFNESS SCORE: 1, YES
WHEN YOU AWAKENED IN THE MORNING OVER THE LAST WEEK, PLEASE INDICATE THE AMOUNT OF TIME IT TAKES UNTIL YOU ARE AS LIMBER AS YOU WILL BE FOR THE DAY: 30 MIN
PATIENT GLOBAL ASSESSMENT SCORE: 7
PAIN SCORE: 5
PSYCHOLOGICAL DISTRESS SCORE: 4.4
TOTAL RAPID3 SCORE: 4.67

## 2017-12-19 NOTE — TELEPHONE ENCOUNTER
Returned pts call. Pt inquiring why 5HIAA never resulted. Looked at [facility] portal, no result. Called [facility] to follow up, verbal result of 18 given. Pt notified. [facility] will fax result flowsheet.

## 2017-12-19 NOTE — TELEPHONE ENCOUNTER
----- Message from Lucy Rey sent at 12/19/2017  1:44 PM CST -----  Contact: Patient  EAW- Patient was returning nurse's call. Call back number is [phone]

## 2017-12-19 NOTE — ASSESSMENT & PLAN NOTE
CT chest suspicious for sarcoidosis which could link all symptoms, need to r/o lymphoma ? Metastatic carcinoid    Reorder Myomarker 3, not enough blood drawn last visit  ACE level, blood cultures x 2 today per Dr. Mack    PET-CT  WB NM bone scan  Gallium scan per Dr. Mack  Diagnosis will depend on tissue biopsy in all likelihood: skin, mediastinal or hilar nodes  RTC 4 wks

## 2017-12-19 NOTE — PROGRESS NOTES
Chief Complaint: [facility] reports right sided facial numbness x 6 wks. Has not been seen by Neurology nor had brain imaging. Has intermittent daily fever averager 99.5 sometimes normal Tmax 100.8 No shaking chills. Intermittent rash left antecubital fossa and also bright red face(rosacea) Some pain left lateral hip  No myalgias or arthralgias. Has some recurrent dry cough, no SOB. No chest pain or palpitations(POTs, Dr. Mcgrath Cardiologist, and  could not do ablation for technical reasons).  Has diarrhea several days/wk but overall improved.   Review of Systems      Objective:   BP (!) 152/98   Pulse 105   Resp 20   Wt 115.5 kg (254 lb 9.6 oz)   BMI 44.25 kg/m²      Physical Exam   Constitutional: She is oriented to person, place, and time and well-developed, well-nourished, and in no distress.   HENT:   Head: Normocephalic and atraumatic.   Mouth/Throat: Oropharynx is clear and moist.   Eyes: Conjunctivae and EOM are normal.   Neck: Normal range of motion. Neck supple. No thyromegaly present.   Cardiovascular: Normal rate, regular rhythm, normal heart sounds and intact distal pulses.  Exam reveals no gallop and no friction rub.    No murmur heard.  Pulmonary/Chest: Breath sounds normal. She has no wheezes. She has no rales. She exhibits no tenderness.   Abdominal: Soft. She exhibits no distension and no mass. There is tenderness (RUQ).   obese       Right Side Rheumatological Exam     Examination finds the shoulder, elbow, wrist, knee, 1st PIP, 1st MCP, 2nd PIP, 2nd MCP, 3rd PIP, 3rd MCP, 4th PIP, 4th MCP, 5th PIP and 5th MCP normal.    Left Side Rheumatological Exam     Examination finds the shoulder, elbow, wrist, knee, 1st PIP, 1st MCP, 2nd PIP, 2nd MCP, 3rd PIP, 3rd MCP, 4th PIP, 4th MCP, 5th PIP and 5th MCP normal.      Lymphadenopathy:     She has no cervical adenopathy.   Neurological: She is alert and oriented to person, place, and time. She displays normal reflexes. Gait normal.   Nl  motor strength UE and LE bilateral   Skin: Rash noted.     Rosacea like rash on cheeks(see photo)  Eczematous erythematous rash left antecubital fossa   Musculoskeletal: She exhibits no edema.                                                      Assessment/Plan         Problem List Items Addressed This Visit     Vitamin D deficiency    Overview       Results for BLANCA GUTHRIE (MRN [id number]) as of 12/19/2017 13:07   Ref. Range 8/15/2017 16:28 10/18/2017 11:51 12/1/2017 08:48 12/7/2017 08:08 12/8/2017 16:04   Vit D, 25-Hydroxy Latest Ref Range: 30 - 96 ng/mL 18 (L)    17 (L)            Current Assessment & Plan     Vitamin D2 50,000 units once a week         Elevated alkaline phosphatase level    Overview     Results for BLANCA GUTHRIE (MRN [id number]) as of 12/8/2017 14:58   Ref. Range 10/23/2012 08:38 6/23/2013 09:50 8/15/2017 16:28 10/18/2017 11:51 12/7/2017 08:08   Alkaline Phosphatase Latest Ref Range: 55 - 135 U/L 145 (H) 169 (H) 149 (H) 132 141 (H)   Results for BLANCA GUTHRIE (MRN [id number]) as of 12/19/2017 13:50   Ref. Range 12/8/2017 16:04   GGT Latest Ref Range: 8 - 55 U/L 30            Current Assessment & Plan     WB NM bone scan scheduled.         Malignant carcinoid tumor of appendix    Current Assessment & Plan     5HIAA ordered by Dr. Olvera 11/9/17 not drawn, will add to today's labs         Fever    Overview     Results for BLANCA GUTHRIE (MRN [id number]) as of 12/19/2017 13:07   Ref. Range 10/18/2017 11:51 12/8/2017 16:04 12/8/2017 16:04 12/13/2017 12:36   Anti-SSA Antibody Latest Ref Range: 0.00 - 19.99 EU 1.61      Anti-SSA Interpretation Latest Ref Range: Negative  Negative      Cytoplasmic Neutrophilic Ab Latest Ref Range: <1:20 Titer  <1:20     Perinuclear (P-ANCA) Latest Ref Range: <1:20 Titer  <1:20     ANCA Proteinase 3 Latest Ref Range: <0.4 (Negative) U  <0.2     MPO Latest Ref Range: <=20 UNITS  4     Complement (C-3) Latest Ref Range: 50 - 180 mg/dL  209 (H)     Complement (C-4) Latest Ref Range:  11 - 44 mg/dL  37     Complement,Total, Serum Latest Ref Range: 42 - 95 U/mL  92  86   Anti-Kaylynn-1 Antibody Unknown  Test Not Performed Test Not Performed    Anti-PM/Scl Ab Unknown  Test Not Performed Test Not Performed    Anti-SS-A 52 kD Ab, IgG Unknown  Test Not Performed Test Not Performed    Anti-U1-RNP  Ab Unknown  Test Not Performed Test Not Performed    CCP Antibodies Latest Ref Range: <5.0 U/mL <0.5      EJ Unknown  Test Not Performed Test Not Performed    Fibrillarin (U3 RNP) Unknown  Test Not Performed Test Not Performed    Ku Unknown  Test Not Performed Test Not Performed    MDA-5 (P140) (CADM-140) Unknown  Test Not Performed Test Not Performed    MI-2 Unknown  Test Not Performed Test Not Performed    NXP-2 (P140) Unknown  Test Not Performed Test Not Performed    OJ Unknown  Test Not Performed Test Not Performed    PL-12 Unknown  Test Not Performed Test Not Performed    PL-7 Unknown  Test Not Performed Test Not Performed    Rheumatoid Factor Latest Ref Range: 0.0 - 15.0 IU/mL <10.0      SRP Unknown  Test Not Performed Test Not Performed    TIF1 GAMMA (P155/140) Unknown  Test Not Performed Test Not Performed    U2 snRNP Unknown  Test Not Performed Test Not Performed             Current Assessment & Plan     CT chest suspicious for sarcoidosis which could link all symptoms, need to r/o lymphoma ? Metastatic carcinoid    Reorder Myomarker 3, not enough blood drawn last visit  ACE level, blood cultures x 2 today per Dr. Mack    PET-CT  WB NM bone scan  Gallium scan per Dr. Mack  Diagnosis will depend on tissue biopsy in all likelihood: skin, mediastinal or hilar nodes  RTC 4 wks         Elevated sed rate    Overview       Results for BLANCA GUTHRIE (MRN [id number]) as of 12/19/2017 13:07   Ref. Range 8/2/2007 06:00 11/7/2008 18:00 10/18/2017 11:51 12/8/2017 16:04   Sed Rate Latest Ref Range: 0 - 20 mm/Hr 29 (H) 3 27 (H) 21 (H)            Current Assessment & Plan     Sed rate improved, only mildly elevated  and lower than 2007         Relevant Orders    Ambulatory Referral to Neurology    DRVVT    Cardiolipin antibody    Beta-2 glycoprotein antibodies    CRP elevated    Overview     Results for BLANCA GUTHRIE (MRN [id number]) as of 12/19/2017 13:07   Ref. Range 10/18/2017 11:51 12/8/2017 16:04   CRP Latest Ref Range: 0.0 - 8.2 mg/L 15.4 (H) 11.3 (H)            Current Assessment & Plan     CRP mildly elevated and improving.         Relevant Orders    Ambulatory Referral to Neurology    DRVVT    Cardiolipin antibody    Beta-2 glycoprotein antibodies    Hypertension    Current Assessment & Plan     152/98    F/u Dr. Calixensinfadi         Abnormal CT of the chest    Overview     10/4/17: CTA chest: no evidence of pulmonary embolus, bilateral patchy consolidation both lower lobes/ pneumonia, mild hilar and mediastinal lymphadenopathy,  Right paratracheal and precarinal  And subcarinal region. fatty liver, prior cholecystectomy    11/3/17: interval improvement with near complete resolution of bilateral lower lobe consolidation  V. 10/4/17 with mild patch convex attenuation remaining in similar distribution. Interval decrease in size of reactive appearing mediastinal and hilar lymph nodes. mild residual appearing palatine tissue in the anterior mediastinum         Relevant Orders    MyoMarker Panel 3    Rash    Current Assessment & Plan     Expedited Derm consult for diagnostic skin biopsy with DIF         Right facial numbness - Primary    Current Assessment & Plan     Ref to Neurology  Await other imaging, may need MRI brain but will leave to Neurology         Relevant Orders    Ambulatory Referral to Neurology

## 2017-12-19 NOTE — TELEPHONE ENCOUNTER
----- Message from Daisy Treviños sent at 12/18/2017 11:15 AM CST -----  EAW- Patient is calling in regards to her lab results. Please call back to assist.

## 2017-12-20 ENCOUNTER — TELEPHONE (OUTPATIENT)
Dept: FAMILY MEDICINE | Facility: CLINIC | Age: 47
End: 2017-12-20

## 2017-12-20 ENCOUNTER — PATIENT MESSAGE (OUTPATIENT)
Dept: FAMILY MEDICINE | Facility: CLINIC | Age: 47
End: 2017-12-20

## 2017-12-20 NOTE — TELEPHONE ENCOUNTER
----- Message from Fiorella Bautista sent at 12/20/2017  9:39 AM CST -----  Contact: self  Pt is calling to discuss taking a sabbatical leave. Please call [phone].

## 2017-12-21 ENCOUNTER — PATIENT MESSAGE (OUTPATIENT)
Dept: FAMILY MEDICINE | Facility: CLINIC | Age: 47
End: 2017-12-21

## 2017-12-22 ENCOUNTER — HOSPITAL ENCOUNTER (OUTPATIENT)
Dept: RADIOLOGY | Facility: HOSPITAL | Age: 47
Discharge: HOME | End: 2017-12-22
Attending: INTERNAL MEDICINE
Payer: COMMERCIAL

## 2017-12-22 DIAGNOSIS — R59.0 BILATERAL HILAR ADENOPATHY SYNDROME: ICD-10-CM

## 2017-12-22 DIAGNOSIS — D3A.00 CARCINOID TUMOR: ICD-10-CM

## 2017-12-22 DIAGNOSIS — R59.0 MEDIASTINAL ADENOPATHY: ICD-10-CM

## 2017-12-22 DIAGNOSIS — R50.9 FUO (FEVER OF UNKNOWN ORIGIN): ICD-10-CM

## 2017-12-22 LAB — CRYOGLOB SER QL: NORMAL

## 2017-12-22 PROCEDURE — A9552 F18 FDG: HCPCS

## 2017-12-22 PROCEDURE — 78815 PET IMAGE W/CT SKULL-THIGH: CPT | Mod: 26,PI,, | Performed by: RADIOLOGY

## 2017-12-22 NOTE — TELEPHONE ENCOUNTER
Form signed, please fax and then have patient keep the original    make sure she knows we do not keep copies of the papers

## 2017-12-24 LAB
BACTERIA BLD CULT: NORMAL
BACTERIA BLD CULT: NORMAL

## 2017-12-26 LAB — POCT GLUCOSE: 88 MG/DL (ref 70–110)

## 2017-12-27 ENCOUNTER — HOSPITAL ENCOUNTER (OUTPATIENT)
Dept: RADIOLOGY | Facility: HOSPITAL | Age: 47
Discharge: HOME | End: 2017-12-27
Attending: INTERNAL MEDICINE
Payer: COMMERCIAL

## 2017-12-27 ENCOUNTER — PATIENT MESSAGE (OUTPATIENT)
Dept: RHEUMATOLOGY | Facility: CLINIC | Age: 47
End: 2017-12-27

## 2017-12-27 DIAGNOSIS — R74.8 HIGH SERUM BONE-SPECIFIC ALKALINE PHOSPHATASE: ICD-10-CM

## 2017-12-27 DIAGNOSIS — R50.9 FUO (FEVER OF UNKNOWN ORIGIN): ICD-10-CM

## 2017-12-27 PROCEDURE — 78806 NM INFLAMMATORY WHOLE BODY GALLIUM: CPT | Mod: 26,,, | Performed by: RADIOLOGY

## 2017-12-27 PROCEDURE — A9503 TC99M MEDRONATE: HCPCS

## 2017-12-27 PROCEDURE — 78306 BONE IMAGING WHOLE BODY: CPT | Mod: 26,,, | Performed by: RADIOLOGY

## 2017-12-28 ENCOUNTER — TELEPHONE (OUTPATIENT)
Dept: RADIOLOGY | Facility: HOSPITAL | Age: 47
End: 2017-12-28

## 2017-12-28 ENCOUNTER — TELEPHONE (OUTPATIENT)
Dept: SLEEP MEDICINE | Facility: OTHER | Age: 47
End: 2017-12-28

## 2017-12-29 ENCOUNTER — PATIENT MESSAGE (OUTPATIENT)
Dept: RHEUMATOLOGY | Facility: CLINIC | Age: 47
End: 2017-12-29

## 2017-12-29 ENCOUNTER — HOSPITAL ENCOUNTER (OUTPATIENT)
Dept: RADIOLOGY | Facility: HOSPITAL | Age: 47
Discharge: HOME | End: 2017-12-29
Attending: INTERNAL MEDICINE
Payer: COMMERCIAL

## 2017-12-29 PROCEDURE — A9556 GA67 GALLIUM: HCPCS

## 2018-01-01 PROBLEM — J01.90 ACUTE NON-RECURRENT SINUSITIS: Status: RESOLVED | Noted: 2017-10-01 | Resolved: 2018-01-01

## 2018-01-03 ENCOUNTER — PATIENT MESSAGE (OUTPATIENT)
Dept: INFECTIOUS DISEASES | Facility: CLINIC | Age: 48
End: 2018-01-03

## 2018-01-03 ENCOUNTER — OFFICE VISIT (OUTPATIENT)
Dept: INFECTIOUS DISEASES | Facility: CLINIC | Age: 48
End: 2018-01-03
Payer: COMMERCIAL

## 2018-01-03 VITALS
BODY MASS INDEX: 45.12 KG/M2 | HEART RATE: 80 BPM | HEIGHT: 63 IN | WEIGHT: 254.63 LBS | SYSTOLIC BLOOD PRESSURE: 148 MMHG | TEMPERATURE: 99 F | DIASTOLIC BLOOD PRESSURE: 88 MMHG

## 2018-01-03 DIAGNOSIS — R50.9 FEVER, UNSPECIFIED FEVER CAUSE: Primary | ICD-10-CM

## 2018-01-03 PROCEDURE — 99999 PR PBB SHADOW E&M-EST. PATIENT-LVL III: CPT | Mod: PBBFAC,,, | Performed by: INTERNAL MEDICINE

## 2018-01-03 PROCEDURE — 99214 OFFICE O/P EST MOD 30 MIN: CPT | Mod: S$GLB,,, | Performed by: INTERNAL MEDICINE

## 2018-01-03 NOTE — PROGRESS NOTES
Subjective:      Patient ID: Kianna Zuleta is a 47 y.o. female.    Chief Complaint:Follow-up (test results)      History of Present Illness  HPI     Follow-up    Additional comments: test results       Last edited by Leonor Castillo LPN on 1/3/2018 10:29 AM. (History)      pts results reviewed with her. Blood cultures were all negative and total body indium scan normal. Also had a PET scan which was negative. No evidence for infection as cause of fever and rash. I suspect this is a CTD. Work up by rheumatology and derm still in progress    Review of Systems   Constitution: Positive for fever. Negative for chills, decreased appetite, weakness, malaise/fatigue, night sweats, weight gain and weight loss.   HENT: Negative for congestion, ear pain, hearing loss, hoarse voice, sore throat and tinnitus.    Eyes: Negative for blurred vision, redness and visual disturbance.   Cardiovascular: Positive for palpitations. Negative for chest pain and leg swelling.   Respiratory: Positive for cough. Negative for hemoptysis, shortness of breath and sputum production.    Hematologic/Lymphatic: Negative for adenopathy. Does not bruise/bleed easily.   Skin: Positive for itching and rash. Negative for dry skin and suspicious lesions.   Musculoskeletal: Positive for back pain, joint pain and myalgias.   Gastrointestinal: Positive for diarrhea and nausea. Negative for constipation, heartburn and vomiting.   Genitourinary: Negative for dysuria, flank pain, frequency, hematuria, hesitancy and urgency.   Neurological: Positive for numbness. Negative for dizziness, headaches and paresthesias.   Psychiatric/Behavioral: Negative for depression and memory loss. The patient does not have insomnia and is not nervous/anxious.      Objective:   Physical Exam   Constitutional: She appears well-developed and well-nourished. No distress.   Skin:   Still with erythroderma on chest   Vitals reviewed.    Assessment:       1. Fever, unspecified fever cause           Plan:        f/u with rheum and derm

## 2018-01-04 ENCOUNTER — OFFICE VISIT (OUTPATIENT)
Dept: NEUROLOGY | Facility: CLINIC | Age: 48
End: 2018-01-04
Payer: COMMERCIAL

## 2018-01-04 ENCOUNTER — INITIAL CONSULT (OUTPATIENT)
Dept: DERMATOLOGY | Facility: CLINIC | Age: 48
End: 2018-01-04
Payer: COMMERCIAL

## 2018-01-04 ENCOUNTER — PATIENT MESSAGE (OUTPATIENT)
Dept: RHEUMATOLOGY | Facility: CLINIC | Age: 48
End: 2018-01-04

## 2018-01-04 VITALS
HEIGHT: 63 IN | WEIGHT: 254 LBS | DIASTOLIC BLOOD PRESSURE: 88 MMHG | HEART RATE: 78 BPM | SYSTOLIC BLOOD PRESSURE: 148 MMHG | BODY MASS INDEX: 45 KG/M2

## 2018-01-04 DIAGNOSIS — D22.9 BENIGN NEVUS: ICD-10-CM

## 2018-01-04 DIAGNOSIS — R20.2 PARESTHESIAS: Primary | ICD-10-CM

## 2018-01-04 DIAGNOSIS — L98.9 DISEASE OF SKIN AND SUBCUTANEOUS TISSUE: Primary | ICD-10-CM

## 2018-01-04 PROCEDURE — 11100 PR BIOPSY OF SKIN LESION: CPT | Mod: S$GLB,,, | Performed by: DERMATOLOGY

## 2018-01-04 PROCEDURE — 99203 OFFICE O/P NEW LOW 30 MIN: CPT | Mod: 25,S$GLB,, | Performed by: DERMATOLOGY

## 2018-01-04 PROCEDURE — 88305 TISSUE EXAM BY PATHOLOGIST: CPT | Performed by: PATHOLOGY

## 2018-01-04 PROCEDURE — 99999 PR PBB SHADOW E&M-EST. PATIENT-LVL III: CPT | Mod: PBBFAC,,, | Performed by: PSYCHIATRY & NEUROLOGY

## 2018-01-04 PROCEDURE — 99999 PR PBB SHADOW E&M-EST. PATIENT-LVL III: CPT | Mod: PBBFAC,,, | Performed by: DERMATOLOGY

## 2018-01-04 PROCEDURE — 99205 OFFICE O/P NEW HI 60 MIN: CPT | Mod: S$GLB,,, | Performed by: PSYCHIATRY & NEUROLOGY

## 2018-01-04 RX ORDER — CARBAMAZEPINE 200 MG/1
200 TABLET, EXTENDED RELEASE ORAL DAILY
Qty: 30 TABLET | Refills: 5 | Status: SHIPPED | OUTPATIENT
Start: 2018-01-04 | End: 2018-01-04 | Stop reason: SDUPTHER

## 2018-01-04 RX ORDER — VALSARTAN 80 MG/1
TABLET ORAL
Refills: 3 | COMMUNITY
Start: 2017-12-19 | End: 2018-02-02 | Stop reason: SDUPTHER

## 2018-01-04 RX ORDER — DESOXIMETASONE 0.5 MG/G
OINTMENT TOPICAL
Qty: 60 G | Refills: 3 | Status: SHIPPED | OUTPATIENT
Start: 2018-01-04 | End: 2018-04-03

## 2018-01-04 RX ORDER — CARBAMAZEPINE 200 MG/1
200 TABLET, EXTENDED RELEASE ORAL DAILY
Qty: 90 TABLET | Refills: 1 | Status: SHIPPED | OUTPATIENT
Start: 2018-01-04 | End: 2018-02-01 | Stop reason: SDUPTHER

## 2018-01-04 NOTE — LETTER
January 5, 2018      Luis Eduardo Brown MD  [address]  [facility] [address]           [facility]  [address]  [facility] [address]  Phone: [phone]  Fax: [phone]          Patient: Kianna uZleta   MR Number: [id number]   YOB: 1970   Date of Visit: 1/4/2018       Dear Dr. Luis Eduardo Brown:    Thank you for referring Kianna Zuleta to me for evaluation. Attached you will find relevant portions of my assessment and plan of care.    If you have questions, please do not hesitate to call me. I look forward to following Kianna Zuleta along with you.    Sincerely,    Ismael Almanzar MD    Enclosure  CC:  No Recipients    If you would like to receive this communication electronically, please contact [email or web address] or [phone] to request more information on [employer] Link access.    For providers and/or their staff who would like to refer a patient to [facility], please contact us through our one-stop-shop provider referral line, [facility] , at [phone].    If you feel you have received this communication in error or would no longer like to receive these types of communications, please e-mail [email or web address]

## 2018-01-04 NOTE — PATIENT INSTRUCTIONS
Punch Biopsy Wound Care    Your doctor has performed a punch biopsy today.  A band aid and antibiotic ointment has been placed over the site.  This should remain in place for 24 hours.  It is recommended that you keep the area dry for the first 24 hours.  After 24 hours, you may remove the band aid and wash the area with warm soap and water and apply Vaseline jelly.  Many patients prefer to use Neosporin or Bacitracin ointment.  This is acceptable; however know that you can develop an allergy to this medication even if you have used it safely for years.  It is important to keep the area moist.  Letting it dry out and get air slows healing time, will worsen the scar, and make it more difficult to remove the stitches if they were placed.  Band aid is optional after first 24 hours.      If you notice increasing redness, tenderness, pain, or yellow drainage at the biopsy or surgical site, please notify your doctor.  These are signs of an infection.    If your biopsy/surgical site is bleeding, apply firm pressure for 15 minutes straight.  Repeat for another 15 minutes, if it is still bleeding.   If the surgical site continues to bleed, then please contact your doctor.      [address] [place] [address]/ [phone] FAX/ [email or web address]       XEROSIS (DRY SKIN)        1. Definition    Xerosis is the term for dry skin.  We all have a natural oil coating over our skin produced by the skin oil glands.  If this oil is removed, the skin becomes dry which can lead to cracking, which can lead to inflammation.  Xerosis is usually a long-term problem that recurs often, especially in the winter.    2. Cause     Long hot baths or showers can remove our natural oil and lead to xerosis.  One should never take more than one bath or shower a day and for no longer than ten minutes.   Use of harsh soaps such as Zest, Dial, and Ivory can worsen and cause xerosis.   Cold winter weather worsens xerosis  because the amount of moisture contained in cold air is much less than the amount of moisture in warm air.    3. Treatment     Treatment is intended to restore the natural oil to your skin.  Keep the skin lubricated.     Do not take more than one bath or shower a day.  Use lukewarm water, not hot.  Hot water dries out the skin.     Use a gentle moisturizing soap such as Cetaphil soap, Oil of Olay, Dove, Basis, Ivory moisture care, Restoraderm cleanser.     When toweling dry, dont rub.  Blot the skin so there is still some water left on the skin.  You should apply a moisturizing cream to all of the skin such as Cerave cream, Cetaphil cream, Restoraderm or Eucerin Original Formula cream.   Alpha hydroxyacid lotions, i.e., AmLactin, also work very well for preventing dry skin, but may burn when used on inflamed or reddened skin.     If you like to swim during the winter months, you should not use soap when getting out of the pool.  When you have finished swimming, rinse off the chlorine with cool to warm water.  If this will be the only shower of the day, then you may use Cetaphil or another mild soap to cleanse your skin.  After the shower, apply a moisturizing cream to all of the skin as above.        [address] [place] [address]/ [phone] FAX/ [email or web address]

## 2018-01-04 NOTE — LETTER
January 4, 2018      Luis Eduardo Brown MD  [address]  [facility] [address]           [facility]  [address]  [facility] [address]  Phone: [phone]  Fax: [phone]          Patient: Kianna Zuleta   MR Number: [id number]   YOB: 1970   Date of Visit: 1/4/2018       Dear Dr. Luis Eduardo Brown:    Thank you for referring Kianna Zuleta to me for evaluation. Attached you will find relevant portions of my assessment and plan of care.    If you have questions, please do not hesitate to call me. I look forward to following Kianna Zuleta along with you.    Sincerely,    Meghan Gonzales MD    Enclosure  CC:  No Recipients    If you would like to receive this communication electronically, please contact [email or web address] or [phone] to request more information on [employer] Link access.    For providers and/or their staff who would like to refer a patient to [facility], please contact us through our one-stop-shop provider referral line, [facility], at [phone].    If you feel you have received this communication in error or would no longer like to receive these types of communications, please e-mail [email or web address]

## 2018-01-04 NOTE — PROGRESS NOTES
"  Subjective:       Patient ID:  Kianna Zuleta is a 47 y.o. female who presents for   Chief Complaint   Patient presents with    Rash     neck chest left arm legs x 2-3 months, itchy Rx-oint     Started after hospitalization end of sept - beginning of oct 2017 for pneumonia    Has h/o carcinoid appendix 2005    Seen by dr. casiano who refers pt here today for bx    NIKOLAY negative  C3 elevated at 209  10/24/17 CXR nl but pt states had chest CT done at [facility] nov 2017 which showed ? Sarcoid vs met  ESR and CRP slightly elevated    Showers/baths bid using random soaps; not too hot water; cerave cream prn      Rash  - Initial  Affected locations: left arm, neck and chest  Duration: 3 months  Signs / symptoms: itching (legs also itchy but no rash)  Timing: intermittent  Aggravated by: nothing  Treatments tried: saw Dr. Mayen 10/17 dx as "eczema" - treated with unknown cream - no help per pt.        Review of Systems   Constitutional: Positive for fever (99 - 102 since hospitalization) and fatigue. Negative for chills.   Respiratory: Positive for cough and shortness of breath.    Gastrointestinal: Positive for nausea and diarrhea.   Musculoskeletal: Positive for arthralgias (ankles - chronic). Negative for joint swelling and muscle weakness.   Skin: Positive for itching and rash.   Neurological: Positive for numbness (right side face).        Objective:    Physical Exam   Constitutional: She appears well-developed and well-nourished. She is obese.  No distress.   Neurological: She is alert and oriented to person, place, and time. She is not disoriented.   Psychiatric: She has a normal mood and affect.   Skin:   Areas Examined (abnormalities noted in diagram):   Scalp / Hair Palpated and Inspected  Head / Face Inspection Performed  Neck Inspection Performed  Chest / Axilla Inspection Performed  Abdomen Inspection Performed  Genitals / Buttocks / Groin Inspection Performed  Back Inspection Performed  RUE Inspected  LUE " Inspection Performed  RLE Inspected  LLE Inspection Performed  Nails and Digits Inspection Performed                   Diagram Legend     Erythematous scaling macule/papule c/w actinic keratosis       Vascular papule c/w angioma      Pigmented verrucoid papule/plaque c/w seborrheic keratosis      Yellow umbilicated papule c/w sebaceous hyperplasia      Irregularly shaped tan macule c/w lentigo     1-2 mm smooth white papules consistent with Milia      Movable subcutaneous cyst with punctum c/w epidermal inclusion cyst      Subcutaneous movable cyst c/w pilar cyst      Firm pink to brown papule c/w dermatofibroma      Pedunculated fleshy papule(s) c/w skin tag(s)      Evenly pigmented macule c/w junctional nevus     Mildly variegated pigmented, slightly irregular-bordered macule c/w mildly atypical nevus      Flesh colored to evenly pigmented papule c/w intradermal nevus       Pink pearly papule/plaque c/w basal cell carcinoma      Erythematous hyperkeratotic cursted plaque c/w SCC      Surgical scar with no sign of skin cancer recurrence      Open and closed comedones      Inflammatory papules and pustules      Verrucoid papule consistent consistent with wart     Erythematous eczematous patches and plaques     Dystrophic onycholytic nail with subungual debris c/w onychomycosis     Umbilicated papule    Erythematous-base heme-crusted tan verrucoid plaque consistent with inflamed seborrheic keratosis     Erythematous Silvery Scaling Plaque c/w Psoriasis     See annotation                  Assessment / Plan:      Pathology Orders:     Normal Orders This Visit    Tissue Specimen To Pathology, Dermatology     Questions:    Directional Terms:  Other(comment)    Clinical information:  r/o eczema vs sarcoid vs other    Specific Site:  right abdomen        Disease of skin and subcutaneous tissue  -     Tissue Specimen To Pathology, Dermatology  Punch biopsy procedure note:  Punch biopsy performed after verbal consent  obtained. Area marked and prepped with alcohol. Approximately 1cc of 1% lidocaine with epinephrine injected. 4 mm disposable punch used to remove lesion. Hemostasis obtained and biopsy site closed with 1 - 2 Prolene sutures. Wound care instructions reviewed with patient and handout given.    -     desoximetasone (TOPICORT) 0.05 % Oint; AAA bid  Dispense: 60 g; Refill: 3  Good skin care regimen discussed including limiting to one bath or shower/day, using lukewarm water with mild soap and moisturizing cream to skin 1 - 2x/day. Brochure was provided and reviewed with patient.  cetaphil soap and cerave cream    Benign nevus - right upper abdomen  Reassurance given to patient. No treatment is necessary.              Return in about 2 weeks (around 1/18/2018).

## 2018-01-04 NOTE — PATIENT INSTRUCTIONS
Take tegretol 1 tab (200mg) in the morning.  If no improvement after 2 weeks, increase to 2 tabs in the morning.

## 2018-01-05 ENCOUNTER — TELEPHONE (OUTPATIENT)
Dept: RHEUMATOLOGY | Facility: CLINIC | Age: 48
End: 2018-01-05

## 2018-01-05 NOTE — PROGRESS NOTES
[facility] NEUROLOGY  [facility] [place]    Date: January 5, 2018   Patient Name: Kianna Zuleta   MRN: [id number]   PCP: Gigi Celis  Referring Provider: Luis Eduardo Brown MD    Assessment:      This is Kianna Zuleta, 47 y.o. female with a history of carcinoid tumor of appendix s/p resection and recurrent fevers as well as recent development of rash who presents for abnormal sensation most consistent with trigeminal neuralgia.  Will evaluate for compressive lesion v demyelination and treat empirically     Plan:      Tegretol -400mg qd  MRI brain with and without    Follow up when complete for med check       I discussed side effects of the medications. I asked the patient to  stop the medication if she notices serious adverse effects as we discussed and to seek immediate medical attention at an ER.     Ismael Almanzar MD  [facility]   Department of Neurology    Subjective:      HPI:   Ms. Kianna Zuleta is a 47 y.o. female who presents with a chief complaint of facial paresthesias    Approximately November 2017 she developed tingling paresthesias in right>left V2 distribution which are bothersome but not extremely painful.  These will come and go without clear provocation, she has not had any associated visual changes, tearing, facial weakness, or headaches.    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    MVP (mitral valve prolapse)     Pneumonia     POTS (postural orthostatic tachycardia syndrome)     Ulcer     Vitamin D deficiency disease 11/6/2012       PAST SURGICAL HISTORY:  Past Surgical History:   Procedure Laterality Date    ANKLE SURGERY       lt    APPENDECTOMY      BACK SURGERY      CHOLECYSTECTOMY      choley & ovarian cyst removed  12/2005    cystoscope      multiple    HYSTERECTOMY  4/8/2004    BUBBA BS&O     interstim      OOPHORECTOMY  4/8/2004    with hysterectomy     PELVIC LAPAROSCOPY      [place] EXPLORATORY OF ABDOMEN      SINUS SURGERY  03/01/2016    SPINE SURGERY         CURRENT MEDS:  Current Outpatient Prescriptions   Medication Sig Dispense Refill    ALBUTEROL INHL Inhale into the lungs as needed.       albuterol-ipratropium 2.5mg-0.5mg/3mL (DUO-NEB) 0.5 mg-3 mg(2.5 mg base)/3 mL nebulizer solution Take 3 mLs by nebulization every 6 (six) hours as needed for Wheezing. Rescue 1 Box 0    azelastine (ASTELIN) 137 mcg (0.1 %) nasal spray U 2 SPRAYS IEN Q 12 H PRN  6    BRINTELLIX 20 mg Tab once daily.   2    desoximetasone (TOPICORT) 0.05 % Oint AAA bid 60 g 3    dicyclomine (BENTYL) 20 mg tablet Take 1 tablet (20 mg total) by mouth every 6 (six) hours. 120 tablet 0    ergocalciferol (ERGOCALCIFEROL) 50,000 unit Cap Take 1 capsule (50,000 Units total) by mouth every 7 days. 12 capsule 3    estropipate (OGEN) 1.5 MG tablet Take 1 tablet (1.5 mg total) by mouth once daily. 90 tablet 3    gabapentin (NEURONTIN) 300 MG capsule Take 300 mg by mouth 2 (two) times daily. 2-300 mg cap in AM and 3-300 mg cap in PM      levocetirizine (XYZAL) 5 MG tablet TK 1 T PO  ONCE Day  9    metoclopramide HCl (REGLAN) 10 MG tablet Take 1 tablet (10 mg total) by mouth every 6 (six) hours. 30 tablet 0    metoprolol tartrate (LOPRESSOR) 50 MG tablet Take 50 mg by mouth 4 (four) times daily. 1 Tablet Oral Three times a day      montelukast (SINGULAIR) 10 mg tablet TK 1 T PO  ONCE D  3    ondansetron (ZOFRAN-ODT) 4 MG TbDL Take 1 tablet (4 mg total) by mouth every 8 (eight) hours as needed (nausea). 20 tablet 0    oxycodone-acetaminophen  mg (PERCOCET)  mg per tablet Take 1 tablet by mouth every 4 (four) hours as needed.       TIZANIDINE HCL (ZANAFLEX ORAL) Take 4 mg by mouth every 8 (eight) hours. 2-3 Tablet Oral Three times a day prn      traZODone (DESYREL) 100 MG tablet TK 1 T PO  HS  1    valsartan (DIOVAN) 40 MG tablet Take 40 mg by mouth once daily.      valsartan (DIOVAN) 80 MG tablet TK 1 T PO QD  3    VENTOLIN HFA 90 mcg/actuation inhaler INHALE 2 PUFFS Q 6 H PRF SOB/COUGH  2    VIRTUSSIN AC  mg/5 mL syrup TK 10 ML PO EVERY 6 H PRN FOR COUGH 120 mL 0    carBAMazepine (TEGRETOL XR) 200 MG 12 hr tablet Take 1 tablet (200 mg total) by mouth once daily. 90 tablet 1     No current facility-administered medications for this visit.        ALLERGIES:  Review of patient's allergies indicates:   Allergen Reactions    Benadryl allergy decongestant Anaphylaxis     Other reaction(s): Anaphylaxis    Dilaudid [hydromorphone (bulk)] Anaphylaxis     Other reaction(s): Anaphylaxis    Fludrocortisone Hives    Gluten Other (See Comments)     Other reaction(s) GI upset    Indomethacin Hives    Penicillins Rash    Sulfa (sulfonamide antibiotics) Rash    Vancomycin analogues Rash       FAMILY HISTORY:  Family History   Problem Relation Age of Onset    Hypertension Father     Alcohol abuse Brother     Cancer Paternal Uncle     Colon cancer Paternal Uncle     Depression Maternal Grandmother     Hearing loss Maternal Grandmother     Heart disease Maternal Grandmother     Kidney disease Maternal Grandmother     Cancer Paternal Grandmother     Arthritis Paternal Grandfather     Diabetes Paternal Grandfather     Stroke Paternal Grandfather     Breast cancer Neg Hx     Ovarian cancer Neg Hx        SOCIAL HISTORY:  Social History   Substance Use Topics    Smoking status: Never Smoker    Smokeless tobacco: Never Used    Alcohol use No       Review of Systems:  12 review of systems is negative except for the symptoms mentioned in HPI.        Objective:     Vitals:    01/04/18 1444   BP: (!) 148/88   Pulse: 78   Weight: 115.2  "kg (254 lb)   Height: 5' 3" (1.6 m)       General: NAD, well nourished   Eyes: no tearing, discharge, no erythema   ENT: moist mucous membranes of the oral cavity, nares patent    Neck: Supple, full range of motion  Cardiovascular: Warm and well perfused, pulses equal and symmetrical  Lungs: Normal work of breathing, normal chest wall excursions  Skin: No rash, lesions, or breakdown on exposed skin  Psychiatry: Mood and affect are appropriate   Abdomen: soft, non tender, non distended  Extremeties: No cyanosis, clubbing or edema.    Neurological   MENTAL STATUS: Alert and oriented to person, place, and time. Attention and concentration within normal limits. Speech without dysarthria, able to name and repeat without difficulty. Recent and remote memory within normal limits   CRANIAL NERVES: Visual fields intact. PERRL. EOMI. Facial sensation intact. Face symmetrical. Hearing grossly intact. Full shoulder shrug bilaterally. Tongue protrudes midline   SENSORY: Sensation is intact to light touch throughout.  Negative Romberg.   MOTOR: Normal bulk and tone. No pronator drift.  5/5 deltoid, biceps, triceps, interosseous, hand  bilaterally. 5/5 iliopsoas, knee extension/flexion, foot dorsi/plantarflexion bilaterally.    REFLEXES: Symmetric and 2+ throughout.    CEREBELLAR/COORDINATION/GAIT: Gait steady with normal arm swing and stride length.  Heel to shin intact. Finger to nose intact. Normal rapid alternating movements.   "

## 2018-01-08 ENCOUNTER — TELEPHONE (OUTPATIENT)
Dept: RHEUMATOLOGY | Facility: CLINIC | Age: 48
End: 2018-01-08

## 2018-01-08 ENCOUNTER — OFFICE VISIT (OUTPATIENT)
Dept: FAMILY MEDICINE | Facility: CLINIC | Age: 48
End: 2018-01-08
Payer: COMMERCIAL

## 2018-01-08 ENCOUNTER — PATIENT MESSAGE (OUTPATIENT)
Dept: FAMILY MEDICINE | Facility: CLINIC | Age: 48
End: 2018-01-08

## 2018-01-08 VITALS
OXYGEN SATURATION: 96 % | TEMPERATURE: 99 F | BODY MASS INDEX: 43.96 KG/M2 | WEIGHT: 257.5 LBS | HEART RATE: 114 BPM | SYSTOLIC BLOOD PRESSURE: 118 MMHG | HEIGHT: 64 IN | DIASTOLIC BLOOD PRESSURE: 60 MMHG

## 2018-01-08 DIAGNOSIS — J02.9 SORE THROAT: Primary | ICD-10-CM

## 2018-01-08 DIAGNOSIS — R50.9 FUO (FEVER OF UNKNOWN ORIGIN): Primary | ICD-10-CM

## 2018-01-08 PROCEDURE — 99213 OFFICE O/P EST LOW 20 MIN: CPT | Mod: S$GLB,,, | Performed by: NURSE PRACTITIONER

## 2018-01-08 PROCEDURE — 99999 PR PBB SHADOW E&M-EST. PATIENT-LVL IV: CPT | Mod: PBBFAC,,, | Performed by: NURSE PRACTITIONER

## 2018-01-08 RX ORDER — AZELASTINE 1 MG/ML
SPRAY, METERED NASAL
Qty: 30 ML | Refills: 6 | Status: SHIPPED | OUTPATIENT
Start: 2018-01-08 | End: 2021-01-10 | Stop reason: ALTCHOICE

## 2018-01-08 NOTE — PROGRESS NOTES
Answers for HPI/ROS submitted by the patient on 1/5/2018   Sore throat  Chronicity: new  Onset: in the past 7 days  Progression since onset: unchanged  Pain worse on: right  Fever: 100 - 100.9 F  Fever duration: 1 to 2 days  Pain - numeric: 7/10  abdominal pain: Yes  congestion: Yes  cough: Yes  diarrhea: Yes  drooling: No  ear discharge: No  ear pain: Yes  headaches: Yes  hoarse voice: No  neck pain: No  plugged ear sensation: No  shortness of breath: No  stridor: No  swollen glands: Yes  trouble swallowing: No  vomiting: No  strep: No  mono: No  Treatments tried: cool liquids  Improvement on treatment: mild  Pain severity: moderate

## 2018-01-08 NOTE — PROGRESS NOTES
This dictation has been generated using Dragon Dictation some phonetic errors may occur.     Kianna was seen today for cough and sore throat.    Diagnoses and all orders for this visit:    Sore throat      Patient Instructions   Zyrtec(cetirizine) 10mg for runny nose, post nasal drip, and congestion.   Continue Singulair   Restart the Astelin.   no flu    Return in about 1 week (around 1/15/2018), or if symptoms worsen or fail to improve.      ________________________________________________________________  ________________________________________________________________        Chief Complaint   Patient presents with    Cough    Sore Throat     History of present illness  This 47 y.o. presents today for complaint of sore throat.  Symptoms are present for a week.  She notes some runny nose and postnasal drip symptoms.  She has had fever.  Difficult to tell if this is the ongoing fever issue or new illness.  She has seen dermatology and had biopsy.  Has follow-up appointment with other specialties rheumatology etc.  Answers for HPI/ROS submitted by the patient on 1/5/2018   Sore throat  Chronicity: new  Onset: in the past 7 days  Progression since onset: unchanged  Pain worse on: right  Fever: 100 - 100.9 F  Fever duration: 1 to 2 days  Pain - numeric: 7/10  abdominal pain: Yes  congestion: Yes  cough: Yes  diarrhea: Yes  drooling: No  ear discharge: No  ear pain: Yes  headaches: Yes  hoarse voice: No  neck pain: No  plugged ear sensation: No  shortness of breath: No  stridor: No  swollen glands: Yes  trouble swallowing: No  vomiting: No  strep: No  mono: No  Treatments tried: cool liquids  Improvement on treatment: mild  Pain severity: moderate      Past Medical History:   Diagnosis Date    Allergy     seasonal    Anxiety     Bulging disc neck and back    Depression     Elevated alkaline phosphatase level 7/17/2013    Hypothyroidism 11/6/2012    Interstitial cystitis     Irritable bowel syndrome 11/6/2012     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    MVP (mitral valve prolapse)     Pneumonia     POTS (postural orthostatic tachycardia syndrome)     Ulcer     Vitamin D deficiency disease 11/6/2012       Past Surgical History:   Procedure Laterality Date    ANKLE SURGERY      lt    APPENDECTOMY      BACK SURGERY      CHOLECYSTECTOMY      choley & ovarian cyst removed  12/2005    cystoscope      multiple    HYSTERECTOMY  4/8/2004    BUBBA BS&O     interstim      OOPHORECTOMY  4/8/2004    with hysterectomy     PELVIC LAPAROSCOPY      [place] EXPLORATORY OF ABDOMEN      SINUS SURGERY  03/01/2016    SPINE SURGERY         Family History   Problem Relation Age of Onset    Hypertension Father     Alcohol abuse Brother     Cancer Paternal Uncle     Colon cancer Paternal Uncle     Depression Maternal Grandmother     Hearing loss Maternal Grandmother     Heart disease Maternal Grandmother     Kidney disease Maternal Grandmother     Cancer Paternal Grandmother     Arthritis Paternal Grandfather     Diabetes Paternal Grandfather     Stroke Paternal Grandfather     Breast cancer Neg Hx     Ovarian cancer Neg Hx        Social History     Social History    Marital status: Single     Spouse name: N/A    Number of children: N/A    Years of education: N/A     Social History Main Topics    Smoking status: Never Smoker    Smokeless tobacco: Never Used    Alcohol use No    Drug use: No    Sexual activity: No     Other Topics Concern    Are You Pregnant Or Think You May Be? No    Breast-Feeding No     Social History Narrative    None       Current Outpatient Prescriptions   Medication Sig Dispense Refill    ALBUTEROL INHL Inhale into the lungs as needed.       albuterol-ipratropium 2.5mg-0.5mg/3mL (DUO-NEB) 0.5 mg-3 mg(2.5 mg base)/3 mL nebulizer solution Take 3 mLs by nebulization every 6 (six) hours as needed for Wheezing. Rescue 1 Box 0    azelastine (ASTELIN) 137 mcg (0.1 %)  nasal spray U 2 SPRAYS IEN Q 12 H PRN  6    BRINTELLIX 20 mg Tab once daily.   2    carBAMazepine (TEGRETOL XR) 200 MG 12 hr tablet Take 1 tablet (200 mg total) by mouth once daily. 90 tablet 1    desoximetasone (TOPICORT) 0.05 % Oint AAA bid 60 g 3    ergocalciferol (ERGOCALCIFEROL) 50,000 unit Cap Take 1 capsule (50,000 Units total) by mouth every 7 days. 12 capsule 3    estropipate (OGEN) 1.5 MG tablet Take 1 tablet (1.5 mg total) by mouth once daily. 90 tablet 3    gabapentin (NEURONTIN) 300 MG capsule Take 300 mg by mouth 2 (two) times daily. 2-300 mg cap in AM and 3-300 mg cap in PM      levocetirizine (XYZAL) 5 MG tablet TK 1 T PO  ONCE Day  9    metoclopramide HCl (REGLAN) 10 MG tablet Take 1 tablet (10 mg total) by mouth every 6 (six) hours. 30 tablet 0    metoprolol tartrate (LOPRESSOR) 50 MG tablet Take 50 mg by mouth 4 (four) times daily. 1 Tablet Oral Three times a day      montelukast (SINGULAIR) 10 mg tablet TK 1 T PO  ONCE D  3    ondansetron (ZOFRAN-ODT) 4 MG TbDL Take 1 tablet (4 mg total) by mouth every 8 (eight) hours as needed (nausea). 20 tablet 0    oxycodone-acetaminophen  mg (PERCOCET)  mg per tablet Take 1 tablet by mouth every 4 (four) hours as needed.      TIZANIDINE HCL (ZANAFLEX ORAL) Take 4 mg by mouth every 8 (eight) hours. 2-3 Tablet Oral Three times a day prn      traZODone (DESYREL) 100 MG tablet TK 1 T PO  HS  1    valsartan (DIOVAN) 40 MG tablet Take 40 mg by mouth once daily.      valsartan (DIOVAN) 80 MG tablet TK 1 T PO QD  3    VENTOLIN HFA 90 mcg/actuation inhaler INHALE 2 PUFFS Q 6 H PRF SOB/COUGH  2    VIRTUSSIN AC  mg/5 mL syrup TK 10 ML PO EVERY 6 H PRN FOR COUGH 120 mL 0     No current facility-administered medications for this visit.        Review of patient's allergies indicates:   Allergen Reactions    Benadryl allergy decongestant Anaphylaxis     Other reaction(s): Anaphylaxis    Dilaudid [hydromorphone (bulk)] Anaphylaxis      Other reaction(s): Anaphylaxis    Fludrocortisone Hives    Gluten Other (See Comments)     Other reaction(s) GI upset    Indomethacin Hives    Penicillins Rash    Sulfa (sulfonamide antibiotics) Rash    Vancomycin analogues Rash       Physical examination  Vitals Reviewed  Gen. Well-dressed well-nourished no apparent distress  Skin warm dry and intact.  No rashes noted.  HEENT.  TM intact bilateral with normal light reflex.  No mastoid tenderness during percussion.  Nares patent bilateral.  Pharynx is unremarkable.  No maxillary or frontal sinus tenderness when percussed.    Neck is supple without adenopathy  Chest.  Respirations are even unlabored.  Lungs are clear to auscultation.  Cardiac regular rate and rhythm.  No chest wall adenopathy noted.  Neuro. Awake alert oriented x4.  Normal judgment and cognition noted.  Extremities no clubbing cyanosis or edema noted.     Call or return to clinic prn if these symptoms worsen or fail to improve as anticipated.

## 2018-01-08 NOTE — PATIENT INSTRUCTIONS
Zyrtec(cetirizine) 10mg for runny nose, post nasal drip, and congestion.   Continue Singulair   Restart the Astelin.

## 2018-01-09 ENCOUNTER — TELEPHONE (OUTPATIENT)
Dept: RHEUMATOLOGY | Facility: CLINIC | Age: 48
End: 2018-01-09

## 2018-01-11 ENCOUNTER — TELEPHONE (OUTPATIENT)
Dept: RHEUMATOLOGY | Facility: CLINIC | Age: 48
End: 2018-01-11

## 2018-01-11 ENCOUNTER — HOSPITAL ENCOUNTER (OUTPATIENT)
Dept: RADIOLOGY | Facility: HOSPITAL | Age: 48
Discharge: HOME | End: 2018-01-11
Attending: PSYCHIATRY & NEUROLOGY
Payer: COMMERCIAL

## 2018-01-11 DIAGNOSIS — D80.1 HYPOGAMMAGLOBULINEMIA: Primary | ICD-10-CM

## 2018-01-11 DIAGNOSIS — R20.2 PARESTHESIAS: ICD-10-CM

## 2018-01-11 PROCEDURE — 25500020 PHARM REV CODE 255: Performed by: PSYCHIATRY & NEUROLOGY

## 2018-01-11 PROCEDURE — A9585 GADOBUTROL INJECTION: HCPCS | Performed by: PSYCHIATRY & NEUROLOGY

## 2018-01-11 PROCEDURE — 70553 MRI BRAIN STEM W/O & W/DYE: CPT | Mod: TC

## 2018-01-11 PROCEDURE — 70553 MRI BRAIN STEM W/O & W/DYE: CPT | Mod: 26,,, | Performed by: RADIOLOGY

## 2018-01-11 RX ORDER — GADOBUTROL 604.72 MG/ML
10 INJECTION INTRAVENOUS
Status: COMPLETED | OUTPATIENT
Start: 2018-01-11 | End: 2018-01-11

## 2018-01-11 RX ADMIN — GADOBUTROL 10 ML: 604.72 INJECTION INTRAVENOUS at 11:01

## 2018-01-12 ENCOUNTER — PATIENT MESSAGE (OUTPATIENT)
Dept: FAMILY MEDICINE | Facility: CLINIC | Age: 48
End: 2018-01-12

## 2018-01-19 ENCOUNTER — TELEPHONE (OUTPATIENT)
Dept: SLEEP MEDICINE | Facility: OTHER | Age: 48
End: 2018-01-19

## 2018-01-19 ENCOUNTER — OFFICE VISIT (OUTPATIENT)
Dept: DERMATOLOGY | Facility: CLINIC | Age: 48
End: 2018-01-19
Payer: COMMERCIAL

## 2018-01-19 DIAGNOSIS — L30.9 ECZEMA, UNSPECIFIED TYPE: Primary | ICD-10-CM

## 2018-01-19 PROCEDURE — 99999 PR PBB SHADOW E&M-EST. PATIENT-LVL II: CPT | Mod: PBBFAC,,, | Performed by: DERMATOLOGY

## 2018-01-19 PROCEDURE — 99213 OFFICE O/P EST LOW 20 MIN: CPT | Mod: S$GLB,,, | Performed by: DERMATOLOGY

## 2018-01-19 RX ORDER — TIZANIDINE HYDROCHLORIDE 4 MG/1
CAPSULE, GELATIN COATED ORAL
Refills: 1 | COMMUNITY
Start: 2018-01-12 | End: 2019-07-02 | Stop reason: SDUPTHER

## 2018-01-19 NOTE — PROGRESS NOTES
Allergy Clinic Note  [facility]    Subjective:      Patient ID: Kianna Zuleta is a 47 y.o. female.    Chief Complaint: No chief complaint on file.      Referring Provider: Luis Eduardo Brown    History of Present Illness: 47-year-old female referred for a serum IgG level of 557 found during an extensive laboratory workup for persistent low-grade fevers by Dr. Brown.    Infection history:  - Patient reports one severe episode of pneumonia requiring a 5 day admission in October 2017  - She has significant sinus disease and is status post balloon sinus last seen in March 2016.  Prior to surgery she was experiencing sinus infections approximately 4 times a year, sometimes associated with fever.  She states infections typically occurred in October and March.  Since her surgery she has noted improvement in both the severity and frequency of her sinus infections.  -Her only otitis episodes as an adult have been during sinus infections.  -She had an injection site infection around 2002.  -She denies abscesses, hepatitis, or any unusual infections.    He had chickenpox as a child without sequela.  She had all her childhood immunizations.  She had a recent tetanus shot and gets regular flu shots.  She has not had the pneumonia shot.    She is currently on sabbatical from her job as an , evidently in order to reduce infectious exposures.    Prescribed medicines include Singulair, DuoNeb, Astelin and Xyzal.    Additional History:   Past medical history is significant for malignant carcinoid tumor of the appendix, hypertension, and thyroid disease.  She is status post sinus surgery.  She has not had tonsillectomy, adenoidectomy or PE tubes.  Family history is significant for hypertension.  Client reports  reports that she has never smoked. She has never used smokeless tobacco.  Exposures are notable for elementary age schoolchildren and shelly books.  No exposure to pets, smoke, molds, or  unusual substances..     Patient Active Problem List   Diagnosis    Vitamin D deficiency    Hypothyroidism    Irritable bowel syndrome    Elevated alkaline phosphatase level    Malignant carcinoid tumor of appendix    Fever    Elevated sed rate    CRP elevated    Hypertension    Morbid obesity    Abnormal CT of the chest    Rash    Right facial numbness     Current Outpatient Prescriptions on File Prior to Visit   Medication Sig Dispense Refill    ALBUTEROL INHL Inhale into the lungs as needed.       albuterol-ipratropium 2.5mg-0.5mg/3mL (DUO-NEB) 0.5 mg-3 mg(2.5 mg base)/3 mL nebulizer solution Take 3 mLs by nebulization every 6 (six) hours as needed for Wheezing. Rescue 1 Box 0    azelastine (ASTELIN) 137 mcg (0.1 %) nasal spray 2 SPRAYS each nostril Q 12 H PRN 30 mL 6    BRINTELLIX 20 mg Tab once daily.   2    carBAMazepine (TEGRETOL XR) 200 MG 12 hr tablet Take 1 tablet (200 mg total) by mouth once daily. 90 tablet 1    desoximetasone (TOPICORT) 0.05 % Oint AAA bid 60 g 3    ergocalciferol (ERGOCALCIFEROL) 50,000 unit Cap Take 1 capsule (50,000 Units total) by mouth every 7 days. 12 capsule 3    estropipate (OGEN) 1.5 MG tablet Take 1 tablet (1.5 mg total) by mouth once daily. 90 tablet 3    gabapentin (NEURONTIN) 300 MG capsule Take 300 mg by mouth 2 (two) times daily. 2-300 mg cap in AM and 3-300 mg cap in PM      levocetirizine (XYZAL) 5 MG tablet TK 1 T PO  ONCE Day  9    metoclopramide HCl (REGLAN) 10 MG tablet Take 1 tablet (10 mg total) by mouth every 6 (six) hours. 30 tablet 0    metoprolol tartrate (LOPRESSOR) 50 MG tablet Take 50 mg by mouth 4 (four) times daily. 1 Tablet Oral Three times a day      montelukast (SINGULAIR) 10 mg tablet TK 1 T PO  ONCE D  3    ondansetron (ZOFRAN-ODT) 4 MG TbDL Take 1 tablet (4 mg total) by mouth every 8 (eight) hours as needed (nausea). 20 tablet 0    oxycodone-acetaminophen  mg (PERCOCET)  mg per tablet Take 1 tablet by mouth  "every 4 (four) hours as needed.      tiZANidine 4 mg Cap TK 2 TO 3 CS PO TID WITH FOOD  1    TIZANIDINE HCL (ZANAFLEX ORAL) Take 4 mg by mouth every 8 (eight) hours. 2-3 Tablet Oral Three times a day prn      traZODone (DESYREL) 100 MG tablet TK 1 T PO  HS  1    valsartan (DIOVAN) 40 MG tablet Take 40 mg by mouth once daily.      valsartan (DIOVAN) 80 MG tablet TK 1 T PO QD  3    VENTOLIN HFA 90 mcg/actuation inhaler INHALE 2 PUFFS Q 6 H PRF SOB/COUGH  2    VIRTUSSIN AC  mg/5 mL syrup TK 10 ML PO EVERY 6 H PRN FOR COUGH 120 mL 0     No current facility-administered medications on file prior to visit.          Review of Systems   Constitutional: Positive for fever. Negative for chills and malaise/fatigue.   HENT: Negative for ear discharge.    Eyes: Negative for pain and discharge.   Respiratory: Negative for hemoptysis, shortness of breath, wheezing and stridor.    Cardiovascular: Negative for chest pain and palpitations.   Gastrointestinal: Negative for abdominal pain, blood in stool, nausea and vomiting.   Genitourinary: Negative for dysuria, flank pain and hematuria.   Musculoskeletal: Negative for joint pain and myalgias.   Skin: Positive for rash. Negative for itching.        Intermittent redness of her arms, chest, or face with out itching, lasting about an hour for several years.   Neurological: Negative for seizures and loss of consciousness.       Objective:   BP (!) 140/98 (BP Location: Right arm, Patient Position: Sitting)   Ht 5' 3" (1.6 m)   Wt 117.6 kg (259 lb 4.2 oz)   BMI 45.93 kg/m²       Physical Exam   Constitutional: She is oriented to person, place, and time and well-developed, well-nourished, and in no distress. No distress.   HENT:   Right Ear: Tympanic membrane normal.   Left Ear: Tympanic membrane normal.   Nose: No nasal deformity. No epistaxis.  No foreign bodies.   Mouth/Throat: No uvula swelling. No oropharyngeal exudate or tonsillar abscesses.   Oropharynx red rimmed.  " Nares pink, non-boggy bilaterally.   Eyes: Conjunctivae are normal. Right eye exhibits no discharge. Left eye exhibits no discharge.   Cardiovascular: Normal heart sounds.    Pulmonary/Chest: Breath sounds normal. No stridor. She has no wheezes. She has no rales.   Abdominal: Soft. She exhibits no mass. There is no tenderness.   Musculoskeletal: She exhibits no edema or deformity.   Nails normal   Lymphadenopathy:     She has cervical adenopathy.   Neurological: She is alert and oriented to person, place, and time. Gait normal.   Skin: Skin is warm and dry. Rash noted.   Flushing of her upper chest   Psychiatric: Memory, affect and judgment normal.       Data:   IgG 557 mg/dl (normal 650 to 1600)  IgA 347  IgM 90  IgG subtypes low for IgG 1, 2, and 4    Assessment:     1. Low serum IgG for age    2. Sinus disease    3. Rhinitis, unspecified chronicity, unspecified type    4. Hyposmia      Discussion: Doubt that borderline low IgG level is related to low-grade fevers.  The question to be answered is whether it was related to the pneumonia episode and/or recurrent sinus infections.  Need to reviewed available documentation of past infections, and need to check for other comorbidities such as ALLERGIC rhinitis or an anatomic abnormality of the sinuses.    Plan to further investigate immune status with specific antibody titers to prior vaccinations.  She has not received the Pneumovax or H flu vaccination, I substituted varicella-zoster and mumps.  Plan:     Diagnoses and all orders for this visit:    Low serum IgG for age  Dx  -     IgM; Future  -     IgG; Future  -     Diphtheria / Tetanus Antibody Panel; Future  -     Haemophilius influenzae B Ab IgG; Future  -     IgA; Future  -     Rubella antibody, IgG; Future  -     VARICELLA ZOSTER ANTIBODY, IGG; Future  -     Attempt to obtain discharge summary from pneumonia       hospitalization at next visit  Tx   -     (In Office Administered) Pneumococcal Polysaccharide  Vaccine  (23 Valent) (SQ/IM)               - Anticipate checking titer in one month    Sinus disease  -     CT Sinuses without Contrast; Future    Rhinitis, unspecified chronicity, unspecified type  -     IgE; Future  -     Dermatophagoides Depue; Future  -     Dermatophagoides Pteronyssinus; Future  -     [place]; Future  -     [place]; Future  -     [place]; Future  -     English Plantain; Future  -     Oak Pecan; Future  -     [place]; Future  -     Ragweed; Future  -     Alternaria; Future  -     Aspergillus; Future  -     Cat; Future  -     Cockroach; Future  -     Dog; Future      Hyposmia  -     CT Sinuses without Contrast; Future              Patient Instructions   IgG level is a little low, but not too bad.  Need to figure out whether low IgG is contributing to sinus infections.    Doubt that low IgE has anything to do with your fevers.    I do not recommend IgG type food testing.  IgG measures exposure, not allergy.      Testing  Blood work for allergy testing and further immune testing today  CAT scan of sinuses at your convenience  Plan to check your response to pneumonia vaccine in a month    Treatment  Continue same nasal sprays for now  Pneumonia vaccine (Pneumovax) given today  Consider vaccination with H. Influenza in future, depending on titers          Return to see me one week after scan        Follow-up in about 2 weeks (around 2/5/2018).    Kaylin Miller MD

## 2018-01-19 NOTE — PROGRESS NOTES
Subjective:       Patient ID:  Kianna Zuleta is a 47 y.o. female who presents for   Chief Complaint   Patient presents with    Rash     suture removal and biopsy results     bx done at last visit 1/4/18:    FINAL PATHOLOGIC DIAGNOSIS  1. Skin, right abdomen, punch biopsy:  - SUBACUTE SPONGIOTIC DERMATITIS (See Comment).  MICROSCOPIC DESCRIPTION: The biopsy shows parakeratosis, epidermal acanthosis with elongation of rete  ridges, spongiosis with exocytosis of lymphocytes and a superficial perivascular dermal lymphohistiocytic infiltrate  with interstitial eosinophils.  COMMENT: Differential diagnosis includes atopic dermatitis, allergic contact dermatitis, nummular dermatitis or id  reaction.      Rash  - Follow-up  Symptom course: improving  Currently using: Topicort oint bid.  Affected locations: chest  Signs / symptoms: asymptomatic        Review of Systems   Skin: Positive for rash. Negative for itching.        Objective:    Physical Exam   Constitutional: She appears well-developed and well-nourished. She is obese.  No distress.   Neurological: She is alert and oriented to person, place, and time. She is not disoriented.   Psychiatric: She has a normal mood and affect.   Skin:   Areas Examined (abnormalities noted in diagram):   Scalp / Hair Palpated and Inspected  Head / Face Inspection Performed  Neck Inspection Performed  Chest / Axilla Inspection Performed  Abdomen Inspection Performed  Genitals / Buttocks / Groin Inspection Performed  Back Inspection Performed  RUE Inspected  LUE Inspection Performed  RLE Inspected  LLE Inspection Performed  Nails and Digits Inspection Performed              Diagram Legend     Erythematous scaling macule/papule c/w actinic keratosis       Vascular papule c/w angioma      Pigmented verrucoid papule/plaque c/w seborrheic keratosis      Yellow umbilicated papule c/w sebaceous hyperplasia      Irregularly shaped tan macule c/w lentigo     1-2 mm smooth white papules  consistent with Milia      Movable subcutaneous cyst with punctum c/w epidermal inclusion cyst      Subcutaneous movable cyst c/w pilar cyst      Firm pink to brown papule c/w dermatofibroma      Pedunculated fleshy papule(s) c/w skin tag(s)      Evenly pigmented macule c/w junctional nevus     Mildly variegated pigmented, slightly irregular-bordered macule c/w mildly atypical nevus      Flesh colored to evenly pigmented papule c/w intradermal nevus       Pink pearly papule/plaque c/w basal cell carcinoma      Erythematous hyperkeratotic cursted plaque c/w SCC      Surgical scar with no sign of skin cancer recurrence      Open and closed comedones      Inflammatory papules and pustules      Verrucoid papule consistent consistent with wart     Erythematous eczematous patches and plaques     Dystrophic onycholytic nail with subungual debris c/w onychomycosis     Umbilicated papule    Erythematous-base heme-crusted tan verrucoid plaque consistent with inflamed seborrheic keratosis     Erythematous Silvery Scaling Plaque c/w Psoriasis     See annotation      Assessment / Plan:        Eczema, unspecified type - much improved  No new flares  Cont topicort oint bid to one remaining area right chest  Cont good skin care regimen             Follow-up if symptoms worsen or fail to improve.

## 2018-01-22 ENCOUNTER — OFFICE VISIT (OUTPATIENT)
Dept: ALLERGY | Facility: CLINIC | Age: 48
End: 2018-01-22
Payer: COMMERCIAL

## 2018-01-22 ENCOUNTER — HOSPITAL ENCOUNTER (OUTPATIENT)
Dept: RADIOLOGY | Facility: HOSPITAL | Age: 48
Discharge: HOME | End: 2018-01-22
Attending: STUDENT IN AN ORGANIZED HEALTH CARE EDUCATION/TRAINING PROGRAM
Payer: COMMERCIAL

## 2018-01-22 VITALS
WEIGHT: 259.25 LBS | SYSTOLIC BLOOD PRESSURE: 140 MMHG | BODY MASS INDEX: 45.93 KG/M2 | HEIGHT: 63 IN | DIASTOLIC BLOOD PRESSURE: 98 MMHG

## 2018-01-22 DIAGNOSIS — J34.9 SINUS DISEASE: ICD-10-CM

## 2018-01-22 DIAGNOSIS — R76.8 LOW SERUM IGG FOR AGE: Primary | ICD-10-CM

## 2018-01-22 DIAGNOSIS — J31.0 RHINITIS, UNSPECIFIED CHRONICITY, UNSPECIFIED TYPE: ICD-10-CM

## 2018-01-22 DIAGNOSIS — R43.8 HYPOSMIA: ICD-10-CM

## 2018-01-22 PROCEDURE — 90732 PPSV23 VACC 2 YRS+ SUBQ/IM: CPT | Mod: S$GLB,,, | Performed by: STUDENT IN AN ORGANIZED HEALTH CARE EDUCATION/TRAINING PROGRAM

## 2018-01-22 PROCEDURE — 90471 IMMUNIZATION ADMIN: CPT | Mod: S$GLB,,, | Performed by: STUDENT IN AN ORGANIZED HEALTH CARE EDUCATION/TRAINING PROGRAM

## 2018-01-22 PROCEDURE — 99244 OFF/OP CNSLTJ NEW/EST MOD 40: CPT | Mod: 25,S$GLB,, | Performed by: STUDENT IN AN ORGANIZED HEALTH CARE EDUCATION/TRAINING PROGRAM

## 2018-01-22 PROCEDURE — 70486 CT MAXILLOFACIAL W/O DYE: CPT | Mod: 26,,, | Performed by: RADIOLOGY

## 2018-01-22 PROCEDURE — 99999 PR PBB SHADOW E&M-EST. PATIENT-LVL III: CPT | Mod: PBBFAC,,, | Performed by: STUDENT IN AN ORGANIZED HEALTH CARE EDUCATION/TRAINING PROGRAM

## 2018-01-22 PROCEDURE — 70486 CT MAXILLOFACIAL W/O DYE: CPT | Mod: TC

## 2018-01-22 NOTE — LETTER
January 22, 2018      Luis Eduardo Brown MD  [address]  [facility] [address]           [facility]  [address]  [facility] [address]  Phone: [phone]  Fax: [phone]          Patient: Kianna Zuleta   MR Number: [id number]   YOB: 1970   Date of Visit: 1/22/2018       Dear Dr. Luis Eduardo Brown:    Thank you for referring Kianna Zuleta to me for evaluation. Attached you will find relevant portions of my assessment and plan of care.    If you have questions, please do not hesitate to call me. I look forward to following Kianna Zuleta along with you.    Sincerely,    Kaylin Miller MD    Enclosure  CC:  No Recipients    If you would like to receive this communication electronically, please contact [email or web address] or [phone] to request more information on [facility] access.    For providers and/or their staff who would like to refer a patient to [facility], please contact us through our one-stop-shop provider referral line, [facility], at [phone].    If you feel you have received this communication in error or would no longer like to receive these types of communications, please e-mail [email or web address]

## 2018-01-22 NOTE — PATIENT INSTRUCTIONS
IgG level is a little low, but not too bad.  Need to figure out whether low IgG is contributing to sinus infections.    Doubt that low IgE has anything to do with your fevers.    I do not recommend IgG type food testing.  IgG measures exposure, not allergy.      Testing  Blood work for allergy testing and further immune testing today  CAT scan of sinuses at your convenience  Plan to check your response to pneumonia vaccine in a month    Treatment  Continue same nasal sprays for now  Pneumonia vaccine (Pneumovax) given today  Consider vaccination with H. Influenza in future, depending on titers          Return to see me one week after scan

## 2018-01-26 ENCOUNTER — PATIENT MESSAGE (OUTPATIENT)
Dept: ALLERGY | Facility: CLINIC | Age: 48
End: 2018-01-26

## 2018-01-30 ENCOUNTER — PATIENT MESSAGE (OUTPATIENT)
Dept: FAMILY MEDICINE | Facility: CLINIC | Age: 48
End: 2018-01-30

## 2018-02-01 ENCOUNTER — OFFICE VISIT (OUTPATIENT)
Dept: NEUROLOGY | Facility: CLINIC | Age: 48
End: 2018-02-01
Payer: COMMERCIAL

## 2018-02-01 VITALS — BODY MASS INDEX: 45.04 KG/M2 | HEIGHT: 63 IN | WEIGHT: 254.19 LBS

## 2018-02-01 DIAGNOSIS — E53.8 VITAMIN B12 DEFICIENCY: Primary | ICD-10-CM

## 2018-02-01 DIAGNOSIS — R20.2 PARESTHESIAS: ICD-10-CM

## 2018-02-01 PROCEDURE — 99213 OFFICE O/P EST LOW 20 MIN: CPT | Mod: S$GLB,,, | Performed by: PSYCHIATRY & NEUROLOGY

## 2018-02-01 PROCEDURE — 99999 PR PBB SHADOW E&M-EST. PATIENT-LVL III: CPT | Mod: PBBFAC,,, | Performed by: PSYCHIATRY & NEUROLOGY

## 2018-02-01 PROCEDURE — 3008F BODY MASS INDEX DOCD: CPT | Mod: S$GLB,,, | Performed by: PSYCHIATRY & NEUROLOGY

## 2018-02-01 RX ORDER — CARBAMAZEPINE 200 MG/1
TABLET, EXTENDED RELEASE ORAL
Qty: 120 TABLET | Refills: 5 | Status: SHIPPED | OUTPATIENT
Start: 2018-02-01 | End: 2018-09-28

## 2018-02-01 NOTE — PROGRESS NOTES
[facility] - NEUROLOGY  [facility] [place] [facility]    Date: February 1, 2018   Patient Name: Kianna Zuleta   MRN: [id number]   PCP: Gigi Celis  Referring Provider: No ref. provider found    Assessment:      This is Kianna Zuleta, 47 y.o. female with a history of carcinoid tumor of appendix s/p resection and recurrent fevers as well as recent development of rash who presents for abnormal sensation most consistent with trigeminal neuralgia.  Images of MRI brain reviewed and without pathology to explain symptoms.      Plan:      Tegretol XR 400mg bid  Labs today    Follow up 3 motnhs       I discussed side effects of the medications. I asked the patient to  stop the medication if she notices serious adverse effects as we discussed and to seek immediate medical attention at an ER.     Ismael Almanzar MD  [facility]   Department of Neurology    Subjective:   No improvement, no changes, currently taking CMP only 200mg daily    HPI 1/2018:   Ms. Kianna Zuleta is a 47 y.o. female who presents with a chief complaint of facial paresthesias    Approximately November 2017 she developed tingling paresthesias in right>left V2 distribution which are bothersome but not extremely painful.  These will come and go without clear provocation, she has not had any associated visual changes, tearing, facial weakness, or headaches.    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    MVP (mitral valve prolapse)     Pneumonia     POTS (postural orthostatic tachycardia syndrome)     Recurrent upper respiratory infection (URI)     Ulcer     Vitamin D deficiency disease 11/6/2012       PAST  SURGICAL HISTORY:  Past Surgical History:   Procedure Laterality Date    ANKLE SURGERY      lt    APPENDECTOMY      BACK SURGERY      CHOLECYSTECTOMY      choley & ovarian cyst removed  12/2005    cystoscope      multiple    HYSTERECTOMY  4/8/2004    BUBBA BS&O     interstim      OOPHORECTOMY  4/8/2004    with hysterectomy     PELVIC LAPAROSCOPY      TN EXPLORATORY OF ABDOMEN      SINUS SURGERY  03/01/2016    SPINE SURGERY         CURRENT MEDS:  Current Outpatient Prescriptions   Medication Sig Dispense Refill    ALBUTEROL INHL Inhale into the lungs as needed.       albuterol-ipratropium 2.5mg-0.5mg/3mL (DUO-NEB) 0.5 mg-3 mg(2.5 mg base)/3 mL nebulizer solution Take 3 mLs by nebulization every 6 (six) hours as needed for Wheezing. Rescue 1 Box 0    azelastine (ASTELIN) 137 mcg (0.1 %) nasal spray 2 SPRAYS each nostril Q 12 H PRN 30 mL 6    BRINTELLIX 20 mg Tab once daily.   2    carBAMazepine (TEGRETOL XR) 200 MG 12 hr tablet Take 1 tablet (200 mg total) by mouth once daily. 90 tablet 1    desoximetasone (TOPICORT) 0.05 % Oint AAA bid 60 g 3    ergocalciferol (ERGOCALCIFEROL) 50,000 unit Cap Take 1 capsule (50,000 Units total) by mouth every 7 days. 12 capsule 3    estropipate (OGEN) 1.5 MG tablet Take 1 tablet (1.5 mg total) by mouth once daily. 90 tablet 3    gabapentin (NEURONTIN) 300 MG capsule Take 300 mg by mouth 2 (two) times daily. 2-300 mg cap in AM and 3-300 mg cap in PM      levocetirizine (XYZAL) 5 MG tablet TK 1 T PO  ONCE Day  9    metoclopramide HCl (REGLAN) 10 MG tablet Take 1 tablet (10 mg total) by mouth every 6 (six) hours. 30 tablet 0    metoprolol tartrate (LOPRESSOR) 50 MG tablet Take 50 mg by mouth 4 (four) times daily. 1 Tablet Oral Three times a day      montelukast (SINGULAIR) 10 mg tablet TK 1 T PO  ONCE D  3    ondansetron (ZOFRAN-ODT) 4 MG TbDL Take 1 tablet (4 mg total) by mouth every 8 (eight) hours as needed (nausea). 20 tablet 0    oxycodone-acetaminophen   mg (PERCOCET)  mg per tablet Take 1 tablet by mouth every 4 (four) hours as needed.      tiZANidine 4 mg Cap TK 2 TO 3 CS PO TID WITH FOOD  1    TIZANIDINE HCL (ZANAFLEX ORAL) Take 4 mg by mouth every 8 (eight) hours. 2-3 Tablet Oral Three times a day prn      traZODone (DESYREL) 100 MG tablet TK 1 T PO  HS  1    valsartan (DIOVAN) 40 MG tablet Take 40 mg by mouth once daily.      valsartan (DIOVAN) 80 MG tablet TK 1 T PO QD  3    VENTOLIN HFA 90 mcg/actuation inhaler INHALE 2 PUFFS Q 6 H PRF SOB/COUGH  2    VIRTUSSIN AC  mg/5 mL syrup TK 10 ML PO EVERY 6 H PRN FOR COUGH 120 mL 0     No current facility-administered medications for this visit.        ALLERGIES:  Review of patient's allergies indicates:   Allergen Reactions    Benadryl allergy decongestant Anaphylaxis     Other reaction(s): Anaphylaxis    Dilaudid [hydromorphone (bulk)] Anaphylaxis     Other reaction(s): Anaphylaxis    Fludrocortisone Hives    Gluten Other (See Comments)     Other reaction(s) GI upset    Indomethacin Hives    Penicillins Rash    Sulfa (sulfonamide antibiotics) Rash    Vancomycin analogues Rash       FAMILY HISTORY:  Family History   Problem Relation Age of Onset    Hypertension Father     Alcohol abuse Brother     Cancer Paternal Uncle     Colon cancer Paternal Uncle     Depression Maternal Grandmother     Hearing loss Maternal Grandmother     Heart disease Maternal Grandmother     Kidney disease Maternal Grandmother     Cancer Paternal Grandmother     Arthritis Paternal Grandfather     Diabetes Paternal Grandfather     Stroke Paternal Grandfather     Breast cancer Neg Hx     Ovarian cancer Neg Hx        SOCIAL HISTORY:  Social History   Substance Use Topics    Smoking status: Never Smoker    Smokeless tobacco: Never Used    Alcohol use No       Review of Systems:  12 review of systems is negative except for the symptoms mentioned in HPI.        Objective:     Vitals:    02/01/18  "0954   Weight: 115.3 kg (254 lb 3.1 oz)   Height: 5' 3" (1.6 m)       General: NAD, well nourished   Eyes: no tearing, discharge, no erythema   ENT: moist mucous membranes of the oral cavity, nares patent    Neck: Supple, full range of motion  Cardiovascular: Warm and well perfused, pulses equal and symmetrical  Lungs: Normal work of breathing, normal chest wall excursions  Skin: No rash, lesions, or breakdown on exposed skin  Psychiatry: Mood and affect are appropriate   Abdomen: soft, non tender, non distended  Extremeties: No cyanosis, clubbing or edema.    Neurological   MENTAL STATUS: Alert and oriented to person, place, and time. Attention and concentration within normal limits. Speech without dysarthria, able to name and repeat without difficulty. Recent and remote memory within normal limits   CRANIAL NERVES: Visual fields intact. PERRL. EOMI. Facial decreased to LT in left V2. Face symmetrical. Hearing grossly intact. Full shoulder shrug bilaterally. Tongue protrudes midline   SENSORY: Sensation is intact to light touch throughout.  Negative Romberg.   MOTOR: Normal bulk and tone. No pronator drift.  5/5 deltoid, biceps, triceps, interosseous, hand  bilaterally. 5/5 iliopsoas, knee extension/flexion, foot dorsi/plantarflexion bilaterally.    REFLEXES: Symmetric and 2+ throughout.    CEREBELLAR/COORDINATION/GAIT: Gait steady with normal arm swing and stride length.  Heel to shin intact. Finger to nose intact. Normal rapid alternating movements.   "

## 2018-02-01 NOTE — PATIENT INSTRUCTIONS
TAKE TEGRETOL 1 TAB TWICE DAILY.  IF NO IMPROVEMENT AFTER 2 WEEKS INCREASE TO 2 TABS TWICE DAILY.

## 2018-02-02 ENCOUNTER — OFFICE VISIT (OUTPATIENT)
Dept: FAMILY MEDICINE | Facility: CLINIC | Age: 48
End: 2018-02-02
Payer: COMMERCIAL

## 2018-02-02 ENCOUNTER — TELEPHONE (OUTPATIENT)
Dept: ALLERGY | Facility: CLINIC | Age: 48
End: 2018-02-02

## 2018-02-02 ENCOUNTER — OFFICE VISIT (OUTPATIENT)
Dept: RHEUMATOLOGY | Facility: CLINIC | Age: 48
End: 2018-02-02
Payer: COMMERCIAL

## 2018-02-02 ENCOUNTER — PATIENT MESSAGE (OUTPATIENT)
Dept: RHEUMATOLOGY | Facility: CLINIC | Age: 48
End: 2018-02-02

## 2018-02-02 VITALS
HEIGHT: 65 IN | WEIGHT: 252.13 LBS | SYSTOLIC BLOOD PRESSURE: 198 MMHG | DIASTOLIC BLOOD PRESSURE: 83 MMHG | HEART RATE: 94 BPM | BODY MASS INDEX: 42.01 KG/M2

## 2018-02-02 VITALS
OXYGEN SATURATION: 99 % | WEIGHT: 251.13 LBS | BODY MASS INDEX: 42.87 KG/M2 | HEIGHT: 64 IN | TEMPERATURE: 99 F | SYSTOLIC BLOOD PRESSURE: 150 MMHG | HEART RATE: 94 BPM | DIASTOLIC BLOOD PRESSURE: 90 MMHG

## 2018-02-02 DIAGNOSIS — G50.0 TRIGEMINAL NEURALGIA OF RIGHT SIDE OF FACE: ICD-10-CM

## 2018-02-02 DIAGNOSIS — R74.8 ELEVATED ALKALINE PHOSPHATASE LEVEL: ICD-10-CM

## 2018-02-02 DIAGNOSIS — L30.8 OTHER ECZEMA: ICD-10-CM

## 2018-02-02 DIAGNOSIS — E55.9 VITAMIN D DEFICIENCY: ICD-10-CM

## 2018-02-02 DIAGNOSIS — I10 ESSENTIAL HYPERTENSION: Primary | ICD-10-CM

## 2018-02-02 DIAGNOSIS — E53.8 B12 DEFICIENCY: ICD-10-CM

## 2018-02-02 DIAGNOSIS — R70.0 ELEVATED SED RATE: ICD-10-CM

## 2018-02-02 DIAGNOSIS — E53.8 VITAMIN B 12 DEFICIENCY: ICD-10-CM

## 2018-02-02 DIAGNOSIS — I10 ESSENTIAL HYPERTENSION: ICD-10-CM

## 2018-02-02 DIAGNOSIS — R93.89 ABNORMAL CT OF THE CHEST: ICD-10-CM

## 2018-02-02 DIAGNOSIS — R50.9 FEVER, UNSPECIFIED FEVER CAUSE: ICD-10-CM

## 2018-02-02 DIAGNOSIS — R93.89 ABNORMAL CHEST CT: Primary | ICD-10-CM

## 2018-02-02 DIAGNOSIS — R79.82 CRP ELEVATED: ICD-10-CM

## 2018-02-02 DIAGNOSIS — D80.3 IGG DEFICIENCY: ICD-10-CM

## 2018-02-02 PROBLEM — L30.9 ECZEMA: Status: ACTIVE | Noted: 2017-12-19

## 2018-02-02 PROCEDURE — 99999 PR PBB SHADOW E&M-EST. PATIENT-LVL IV: CPT | Mod: PBBFAC,,, | Performed by: NURSE PRACTITIONER

## 2018-02-02 PROCEDURE — 99215 OFFICE O/P EST HI 40 MIN: CPT | Mod: S$GLB,,, | Performed by: NURSE PRACTITIONER

## 2018-02-02 PROCEDURE — 3008F BODY MASS INDEX DOCD: CPT | Mod: S$GLB,,, | Performed by: INTERNAL MEDICINE

## 2018-02-02 PROCEDURE — 99999 PR PBB SHADOW E&M-EST. PATIENT-LVL IV: CPT | Mod: PBBFAC,,, | Performed by: INTERNAL MEDICINE

## 2018-02-02 PROCEDURE — 99214 OFFICE O/P EST MOD 30 MIN: CPT | Mod: S$GLB,,, | Performed by: INTERNAL MEDICINE

## 2018-02-02 PROCEDURE — 3008F BODY MASS INDEX DOCD: CPT | Mod: S$GLB,,, | Performed by: NURSE PRACTITIONER

## 2018-02-02 ASSESSMENT — ROUTINE ASSESSMENT OF PATIENT INDEX DATA (RAPID3)
PAIN SCORE: 5
FATIGUE SCORE: 8
TOTAL RAPID3 SCORE: 4.5
AM STIFFNESS SCORE: 1, YES
MDHAQ FUNCTION SCORE: .6
WHEN YOU AWAKENED IN THE MORNING OVER THE LAST WEEK, PLEASE INDICATE THE AMOUNT OF TIME IT TAKES UNTIL YOU ARE AS LIMBER AS YOU WILL BE FOR THE DAY: 15 MINS
PSYCHOLOGICAL DISTRESS SCORE: 2.2
PATIENT GLOBAL ASSESSMENT SCORE: 6.5

## 2018-02-02 NOTE — TELEPHONE ENCOUNTER
"----- Message from Kaylin Miller MD sent at 1/22/2018  5:09 PM CST -----  Please call pt "sinus scan is normal"  "

## 2018-02-02 NOTE — PROGRESS NOTES
"[id number]:       Patient ID: Kianna Zuleta is a 47 y.o. female.    Chief Complaint: FUO, h/o carcinoid, trigeminal neuralgia    HPI Has diarrhea this week. Fever unchanged. Skin improved. Still cough and SOB  Review of Systems   Constitutional: Positive for fever and unexpected weight change. Negative for appetite change and fatigue.   HENT: Positive for mouth sores.    Eyes: Negative for visual disturbance.   Respiratory: Positive for cough and shortness of breath. Negative for wheezing.    Cardiovascular: Negative for chest pain and palpitations.   Gastrointestinal: Positive for diarrhea. Negative for abdominal pain, anal bleeding, blood in stool, constipation, nausea and vomiting.   Genitourinary: Negative for dysuria, frequency and urgency.   Musculoskeletal: Negative for arthralgias, back pain, gait problem, joint swelling, myalgias, neck pain and neck stiffness.   Skin: Positive for rash.   Neurological: Positive for headaches. Negative for weakness and numbness.   Hematological: Negative for adenopathy. Does not bruise/bleed easily.   Psychiatric/Behavioral: Negative for sleep disturbance. The patient is not nervous/anxious.          Objective:   BP (!) 153/107   Pulse 94   Ht 5' 4.8" (1.646 m)   Wt 114.4 kg (252 lb 1.6 oz)   BMI 42.21 kg/m²      Physical Exam   Constitutional: She is oriented to person, place, and time and well-developed, well-nourished, and in no distress.   HENT:   Head: Normocephalic and atraumatic.   Mouth/Throat: Oropharynx is clear and moist.   Eyes: Conjunctivae and EOM are normal.   Neck: Normal range of motion. Neck supple. No thyromegaly present.   Cardiovascular: Normal rate, regular rhythm, normal heart sounds and intact distal pulses.  Exam reveals no gallop and no friction rub.    No murmur heard.  Pulmonary/Chest: Breath sounds normal. She has no wheezes. She has no rales. She exhibits no tenderness.   Abdominal: Soft. She exhibits no distension and no mass. There is no " tenderness.       Right Side Rheumatological Exam     Examination finds the shoulder, elbow, wrist, knee, 1st PIP, 1st MCP, 2nd PIP, 2nd MCP, 3rd PIP, 3rd MCP, 4th PIP, 4th MCP, 5th PIP and 5th MCP normal.    Left Side Rheumatological Exam     Examination finds the shoulder, elbow, wrist, knee, 1st PIP, 1st MCP, 2nd PIP, 2nd MCP, 3rd PIP, 3rd MCP, 4th PIP, 4th MCP, 5th PIP and 5th MCP normal.      Lymphadenopathy:     She has no cervical adenopathy.   Neurological: She is alert and oriented to person, place, and time. She displays normal reflexes. Gait normal.   Nl motor strength UE and LE bilateral   Skin:     Dry scaly erythema face.   Musculoskeletal: She exhibits no edema.           Assessment/Plan         Problem List Items Addressed This Visit     Vitamin D deficiency    Overview       Results for BLANCA GUTHRIE (MRN [id number]) as of 12/19/2017 13:07   Ref. Range 8/15/2017 16:28 10/18/2017 11:51 12/1/2017 08:48 12/7/2017 08:08 12/8/2017 16:04   Vit D, 25-Hydroxy Latest Ref Range: 30 - 96 ng/mL 18 (L)    17 (L)   Results for BLANCA GUTHRIE (MRN [id number]) as of 2/2/2018 08:02   Ref. Range 2/1/2018 11:00   Vit D, 25-Hydroxy Latest Ref Range: 30 - 96 ng/mL 22 (L)            Current Assessment & Plan     Cont vitamin D2 50,000 units once a week and vitamin D3 1000 units daily  Recheck vitamin D in 3 mo         Elevated alkaline phosphatase level    Overview     Results for BLANAC GUTHRIE (MRN [id number]) as of 12/8/2017 14:58   Ref. Range 10/23/2012 08:38 6/23/2013 09:50 8/15/2017 16:28 10/18/2017 11:51 12/7/2017 08:08   Alkaline Phosphatase Latest Ref Range: 55 - 135 U/L 145 (H) 169 (H) 149 (H) 132 141 (H)   Results for BLANCA GUTHRIE (MRN [id number]) as of 12/19/2017 13:50   Ref. Range 12/8/2017 16:04   GGT Latest Ref Range: 8 - 55 U/L 30     Findings: Patient was administered 24.9 mCi of technetium 99m labeled intravenously.    There is hyperostosis of the skull. There is degenerative changes of the shoulders, hands, wrists,  knees, ankles, feet and spine, and hips. There is no evidence of trauma, infection, neoplasm, or metabolic bone disease. No evidence of Paget's disease.   Impression      No significant abnormality seen.              Current Assessment & Plan     Alk phos isoenzymes today         Fever    Overview     Results for BLANCA GUTHRIE (MRN [id number]) as of 12/19/2017 13:07   Ref. Range 10/18/2017 11:51 12/8/2017 16:04 12/8/2017 16:04 12/13/2017 12:36   Anti-SSA Antibody Latest Ref Range: 0.00 - 19.99 EU 1.61      Anti-SSA Interpretation Latest Ref Range: Negative  Negative      Cytoplasmic Neutrophilic Ab Latest Ref Range: <1:20 Titer  <1:20     Perinuclear (P-ANCA) Latest Ref Range: <1:20 Titer  <1:20     ANCA Proteinase 3 Latest Ref Range: <0.4 (Negative) U  <0.2     MPO Latest Ref Range: <=20 UNITS  4     Complement (C-3) Latest Ref Range: 50 - 180 mg/dL  209 (H)     Complement (C-4) Latest Ref Range: 11 - 44 mg/dL  37     Complement,Total, Serum Latest Ref Range: 42 - 95 U/mL  92  86   Anti-Kaylynn-1 Antibody Unknown  Test Not Performed Test Not Performed    Anti-PM/Scl Ab Unknown  Test Not Performed Test Not Performed    Anti-SS-A 52 kD Ab, IgG Unknown  Test Not Performed Test Not Performed    Anti-U1-RNP  Ab Unknown  Test Not Performed Test Not Performed    CCP Antibodies Latest Ref Range: <5.0 U/mL <0.5      EJ Unknown  Test Not Performed Test Not Performed    Fibrillarin (U3 RNP) Unknown  Test Not Performed Test Not Performed    Ku Unknown  Test Not Performed Test Not Performed    MDA-5 (P140) (CADM-140) Unknown  Test Not Performed Test Not Performed    MI-2 Unknown  Test Not Performed Test Not Performed    NXP-2 (P140) Unknown  Test Not Performed Test Not Performed    OJ Unknown  Test Not Performed Test Not Performed    PL-12 Unknown  Test Not Performed Test Not Performed    PL-7 Unknown  Test Not Performed Test Not Performed    Rheumatoid Factor Latest Ref Range: 0.0 - 15.0 IU/mL <10.0      SRP Unknown  Test Not Performed  Test Not Performed    TIF1 GAMMA (P155/140) Unknown  Test Not Performed Test Not Performed    U2 snRNP Unknown  Test Not Performed Test Not Performed      Findings: Patient was administered 8.1 mCi of Ga-67 citrate. There is physiologic salivary, nasopharynx, marrow, liver, spleen, GI and  activity. There is no evidence of an infectious inflammatory focus.   Impression      No evidence of recurrent neoplasm.      Electronically signed by: JAYCEE DAMICO MD  Date: 12/29/17  Time: 10:28      The PET-CT is negative, but for mild muscle activity Your muscle enzymes and strength are normal. Await the myositis autoantibody panel(Myomarker 3). [facility]          PACS Images     Show images for NM PET CT Routine Skull to Mid Thigh   Reviewed By Marika Brown MD on 12/22/2017 23:21   Patient Result Comments     Viewed by Kianna Zuleta on 1/6/2018  6:29 PM   Written by Luis Eduardo Brown MD on 12/22/2017 11:21 PM   The PET-CT is negative, but for mild muscle activity Your muscle enzymes and strength are normal. Await the myositis autoantibody panel(Myomarker 3). [facility]   External Result Report     External Result Report   Narrative     Findings: Patient was administered 11.13 mCi of FDG intravenously. There is physiologic intracranial, head, neck activity. There is red marrow reconversion. Heart and mediastinum are normal. There is mild muscle activation. There is physiologic liver, spleen, GI  and pelvic activity. Bowel loops are normal. No osseous or lung lesions are seen.   Impression      No evidence of active malignancy.      Electronically signed by: JAYCEE DAMICO MD  Date: 12/22/17  Time: 11:41     Encounter     View Encounter            <20  Test Not PerformedR, CM  Test Not PerformedR, CM        PL-7 Negative Negative  Test Not PerformedR, CM  Test Not PerformedR, CM     PL-12 Negative Negative  Test Not PerformedR, CM  Test Not PerformedR, CM     EJ Negative Negative  Test Not PerformedR, CM  Test Not  PerformedR, CM     OJ Negative Negative  Test Not PerformedR, CM  Test Not PerformedR, CM     SRP Negative Negative  Test Not PerformedR, CM  Test Not PerformedR, CM     MI-2 Negative Negative  Test Not PerformedR, CM  Test Not PerformedR, CM     TIF1 GAMMA (P155/140) <20 Units <20  Test Not PerformedR, CM  Test Not PerformedR, CM     MDA-5 (P140) (CADM-140) <20 Units <20  Test Not PerformedR, CM  Test Not PerformedR, CM     NXP-2 (P140) <20 Units <20  Test Not PerformedR, CM  Test Not PerformedR, CM     Anti-PM/Scl Ab <20 Units <20  Test Not PerformedR, CM  Test Not PerformedR, CM     Fibrillarin (U3 RNP) Negative Negative  Test Not PerformedR, CM  Test Not PerformedR, CM     U2 snRNP Negative Negative  Test Not PerformedR, CM  Test Not PerformedR, CM     Anti-U1-RNP  Ab <20 Units <20  Test Not PerformedR, CM  Test Not PerformedR, CM     Ku Negative Negative  Test Not PerformedR, CM  Test Not PerformedR, CM     Anti-SS-A 52 kD Ab, IgG <20 Units <20  Test Not PerformedR, CM  Test Not PerformedR, CM    Comments: -------------------ADDITIONAL INFORMATION-------------------      Your sinus scan is normal.     AB    External Result Report     External Result Report   Narrative     Noncontrast CT of the paranasal sinuses..     Technique:  0.625 mm axial images of the paranasal sinuses without contrast.  Coronal reformatted imaging from the axial acquisition.    Comparison: None    Findings:   The frontal sinuses are clear bilaterally.                   Current Assessment & Plan     T 98.9 today         Relevant Orders    CBC auto differential    Comprehensive metabolic panel    Aldolase    CK    Alkaline phosphatase, isoenzymes    Sedimentation rate, manual    C-reactive protein    Ambulatory Referral to Pulmonary Disease Management    Elevated sed rate    Overview       Results for BLANCA GUTHRIE (MRN [id number]) as of 12/19/2017 13:07   Ref. Range 8/2/2007 06:00 11/7/2008 18:00 10/18/2017 11:51 12/8/2017 16:04   Sed Rate  Latest Ref Range: 0 - 20 mm/Hr 29 (H) 3 27 (H) 21 (H)            Current Assessment & Plan     Sed rate today         CRP elevated    Overview     Results for BLANCA GUTHRIE (MRN [id number]) as of 12/19/2017 13:07   Ref. Range 10/18/2017 11:51 12/8/2017 16:04   CRP Latest Ref Range: 0.0 - 8.2 mg/L 15.4 (H) 11.3 (H)            Current Assessment & Plan     CRP today         Hypertension    Current Assessment & Plan     153/107      Message to Dr. Celis's ma sent  F/u Dr. Celis. With fever and severe hypertension, may need w/u for pheo         Abnormal CT of the chest    Overview     10/4/17: CTA chest: no evidence of pulmonary embolus, bilateral patchy consolidation both lower lobes/ pneumonia, mild hilar and mediastinal lymphadenopathy,  Right paratracheal and precarinal  And subcarinal region. fatty liver, prior cholecystectomy    11/3/17: interval improvement with near complete resolution of bilateral lower lobe consolidation  V. 10/4/17 with mild patch convex attenuation remaining in similar distribution. Interval decrease in size of reactive appearing mediastinal and hilar lymph nodes. mild residual appearing palatine tissue in the anterior mediastinum         Current Assessment & Plan     Obtain Pulmonary consult here of abnormal CT chest ? sarcoid         Eczema    Overview     bx done at last visit 1/4/18:     FINAL PATHOLOGIC DIAGNOSIS  1. Skin, right abdomen, punch biopsy:  - SUBACUTE SPONGIOTIC DERMATITIS (See Comment).  MICROSCOPIC DESCRIPTION: The biopsy shows parakeratosis, epidermal acanthosis with elongation of rete  ridges, spongiosis with exocytosis of lymphocytes and a superficial perivascular dermal lymphohistiocytic infiltrate  with interstitial eosinophils.  COMMENT: Differential diagnosis includes atopic dermatitis, allergic contact dermatitis, nummular dermatitis or id  Reaction.     Eczema, unspecified type - much improved  No new flares  Cont topicort oint bid to one remaining area right  chest  Cont good skin care regimen           Current Assessment & Plan     Eczema, unspecified type - much improved  No new flares  Cont topicort oint bid to one remaining area right chest  Cont good skin care regimen           Trigeminal neuralgia of right side of face    Overview      Unremarkable imaging of the proximal trigeminal nerve specifically without evidence for abnormal signal, mass effect or enhancement along the cisternal or  ganglionic portions of the trigeminal nerve trigeminal neuralgia.    Otherwise unremarkable MRI of the brain specifically without evidence for acute infarction or enhancing lesion.      Electronically signed by: LUCY AVILES DO  Date: 01/11/18  Time: 11:31     Encounter     View Encounter            Unremarkable imaging of the proximal trigeminal nerve specifically without evidence for abnormal signal, mass effect or enhancement along the cisternal or  ganglionic portions of the trigeminal nerve trigeminal neuralgia.    Otherwise unremarkable MRI of the brain specifically without evidence for acute infarction or enhancing lesion.      Electronically signed by: LUCY AVILES DO  Date: 01/11/18  Time: 11:31     Encounter     View Encounter                    Current Assessment & Plan     Saw Dr. Almanzar in Neurology yesterday, diagnosed trigeminal neuralgia  Tegretol XR 400mg bid  Labs today     Follow up 3 motnhs         IgG deficiency    Overview     Results for BLANCA GUTHRIE (MRN [id number]) as of 2/2/2018 08:02   Ref. Range 1/11/2018 08:06 1/22/2018 14:42   IgG - Serum Latest Ref Range: 650 - 1600 mg/dL 557 (L) 600 (L)   IgM Latest Ref Range: 50 - 300 mg/dL 90 78   IgA Latest Ref Range: 40 - 350 mg/dL 347 343   IgE Latest Ref Range: 0 - 100 IU/mL  <35   IgG 1 Latest Ref Range: 382 - 929 mg/dL 293 (L)    IgG 2 Latest Ref Range: 242 - 700 mg/dL 126 (L)    IgG 3 Latest Ref Range: 22 - 176 mg/dL 52    IgG 4 Latest Ref Range: 4 - 86 mg/dL 3 (L)               Other Visit Diagnoses      Abnormal chest CT    -  Primary    Relevant Orders    CBC auto differential    Comprehensive metabolic panel    Aldolase    CK    Alkaline phosphatase, isoenzymes    Sedimentation rate, manual    C-reactive protein    Ambulatory Referral to Pulmonary Disease Management    B12 deficiency        Relevant Orders    METHYLMALONIC ACID, SERUM

## 2018-02-02 NOTE — ASSESSMENT & PLAN NOTE
153/107      Message to Dr. Celis's ma sent  F/u Dr. Celis. With fever and severe hypertension, may need w/u for pheo

## 2018-02-02 NOTE — ASSESSMENT & PLAN NOTE
Cont vitamin D2 50,000 units once a week and vitamin D3 1000 units daily  Recheck vitamin D in 3 mo

## 2018-02-02 NOTE — PROGRESS NOTES
"This dictation has been generated using Dragon Dictation some phonetic errors may occur.     Kianna was seen today for hypertension.    Diagnoses and all orders for this visit:    Essential hypertension    Vitamin B 12 deficiency    Other orders  -     syringe with needle, safety 3 mL 22 gauge x 1 1/2" Syrg; B12 injection every day for 5 days then weekly for 5 weeks then monthly.      htn BP variations. Rheum request work up for Pheo. I reviewed their note and NE note.  I discussed with PCP who advised getting Neuroendocrine input on workup. Messaged.  Vitamin B 12 Needles.  In excessive of 45 minutes spent with patient with greater than 50% of time dedicated to coordination of care.     No Follow-up on file.      ________________________________________________________________  ________________________________________________________________        Chief Complaint   Patient presents with    Hypertension     History of present illness  This 47 y.o. presents today for complaint of elevated blood pressure.  Patient saw rheumatology earlier today.  Blood pressure was noted to be remarkably elevated.  Patient does have palpitations but does have POTS.  She does have occasional headaches.  She had some redness to arms and face.  She does have a history of hypertension.  She is taking her blood pressure medicine.  Answers for HPI/ROS submitted by the patient on 2/2/2018   Hypertension  Chronicity: chronic  Onset: more than 1 year ago  Progression since onset: waxing and waning  Condition status: resistant  anxiety: Yes  blurred vision: Yes  chest pain: Yes  headaches: Yes  malaise/fatigue: Yes  neck pain: No  orthopnea: No  palpitations: No  peripheral edema: No  PND: No  shortness of breath: Yes  sweats: No  Agents associated with hypertension: no associated agents  CAD risks: obesity, sedentary lifestyle  Compliance problems: exercise  Past treatments: calcium channel blockers  Improvement on treatment: no " improvement      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    MVP (mitral valve prolapse)     Pneumonia     POTS (postural orthostatic tachycardia syndrome)     Recurrent upper respiratory infection (URI)     Ulcer     Vitamin D deficiency disease 11/6/2012       Past Surgical History:   Procedure Laterality Date    ANKLE SURGERY      lt    APPENDECTOMY      BACK SURGERY      CHOLECYSTECTOMY      choley & ovarian cyst removed  12/2005    cystoscope      multiple    HYSTERECTOMY  4/8/2004    BUBBA BS&O     interstim      OOPHORECTOMY  4/8/2004    with hysterectomy     PELVIC LAPAROSCOPY      [place] EXPLORATORY OF ABDOMEN      SINUS SURGERY  03/01/2016    SPINE SURGERY         Family History   Problem Relation Age of Onset    Hypertension Father     Alcohol abuse Brother     Cancer Paternal Uncle     Colon cancer Paternal Uncle     Depression Maternal Grandmother     Hearing loss Maternal Grandmother     Heart disease Maternal Grandmother     Kidney disease Maternal Grandmother     Cancer Paternal Grandmother     Arthritis Paternal Grandfather     Diabetes Paternal Grandfather     Stroke Paternal Grandfather     Breast cancer Neg Hx     Ovarian cancer Neg Hx        Social History     Social History    Marital status: Single     Spouse name: N/A    Number of children: N/A    Years of education: N/A     Social History Main Topics    Smoking status: Never Smoker    Smokeless tobacco: Never Used    Alcohol use No    Drug use: No    Sexual activity: No     Other Topics Concern    Are You Pregnant Or Think You May Be? No    Breast-Feeding No     Social History Narrative    None       Current  Outpatient Prescriptions   Medication Sig Dispense Refill    ALBUTEROL INHL Inhale into the lungs as needed.       albuterol-ipratropium 2.5mg-0.5mg/3mL (DUO-NEB) 0.5 mg-3 mg(2.5 mg base)/3 mL nebulizer solution Take 3 mLs by nebulization every 6 (six) hours as needed for Wheezing. Rescue 1 Box 0    azelastine (ASTELIN) 137 mcg (0.1 %) nasal spray 2 SPRAYS each nostril Q 12 H PRN 30 mL 6    BRINTELLIX 20 mg Tab once daily.   2    carBAMazepine (TEGRETOL XR) 200 MG 12 hr tablet Take 1 tab twice daily.  If no improvement after 2 weeks, take 2 tabs twice daily. 120 tablet 5    cyanocobalamin, vitamin B-12, 1,000 mcg/mL Kit Inject 1,000 mcg as directed every 28 days. Take daily for 5 days then weekly for 5 weeks then monthly 12 kit 1    desoximetasone (TOPICORT) 0.05 % Oint AAA bid 60 g 3    ergocalciferol (ERGOCALCIFEROL) 50,000 unit Cap Take 1 capsule (50,000 Units total) by mouth every 7 days. 12 capsule 3    estropipate (OGEN) 1.5 MG tablet Take 1 tablet (1.5 mg total) by mouth once daily. 90 tablet 3    gabapentin (NEURONTIN) 300 MG capsule Take 300 mg by mouth 2 (two) times daily. 2-300 mg cap in AM and 3-300 mg cap in PM      levocetirizine (XYZAL) 5 MG tablet TK 1 T PO  ONCE Day  9    metoclopramide HCl (REGLAN) 10 MG tablet Take 1 tablet (10 mg total) by mouth every 6 (six) hours. 30 tablet 0    metoprolol tartrate (LOPRESSOR) 50 MG tablet Take 50 mg by mouth 4 (four) times daily. 1 Tablet Oral Three times a day      montelukast (SINGULAIR) 10 mg tablet TK 1 T PO  ONCE D  3    ondansetron (ZOFRAN-ODT) 4 MG TbDL Take 1 tablet (4 mg total) by mouth every 8 (eight) hours as needed (nausea). 20 tablet 0    oxycodone-acetaminophen  mg (PERCOCET)  mg per tablet Take 1 tablet by mouth every 4 (four) hours as needed.      tiZANidine 4 mg Cap TK 2 TO 3 CS PO TID WITH FOOD  1    TIZANIDINE HCL (ZANAFLEX ORAL) Take 4 mg by mouth every 8 (eight) hours. 2-3 Tablet Oral Three times a day prn    "   traZODone (DESYREL) 100 MG tablet TK 1 T PO  HS  1    valsartan (DIOVAN) 40 MG tablet Take 40 mg by mouth once daily.      VIRTUSSIN AC  mg/5 mL syrup TK 10 ML PO EVERY 6 H PRN FOR COUGH 120 mL 0    syringe with needle, safety 3 mL 22 gauge x 1 1/2" Syrg B12 injection every day for 5 days then weekly for 5 weeks then monthly. 50 Syringe 3     No current facility-administered medications for this visit.        Review of patient's allergies indicates:   Allergen Reactions    Benadryl allergy decongestant Anaphylaxis     Other reaction(s): Anaphylaxis    Dilaudid [hydromorphone (bulk)] Anaphylaxis     Other reaction(s): Anaphylaxis    Fludrocortisone Hives    Gluten Other (See Comments)     Other reaction(s) GI upset    Indomethacin Hives    Penicillins Rash    Sulfa (sulfonamide antibiotics) Rash    Vancomycin analogues Rash       Physical examination  Vitals Reviewed  Gen. Well-dressed well-nourished no apparent distress  Skin warm dry and intact.  No rashes noted.  Neck is supple without adenopathy  Chest.  Respirations are even unlabored.  Lungs are clear to auscultation.  Cardiac regular rate and rhythm.  No chest wall adenopathy noted.    Neuro. Awake alert oriented x4.  Normal judgment and cognition noted.  Extremities no clubbing cyanosis or edema noted.     Call or return to clinic prn if these symptoms worsen or fail to improve as anticipated.    "

## 2018-02-02 NOTE — ASSESSMENT & PLAN NOTE
Eczema, unspecified type - much improved  No new flares  Cont topicort oint bid to one remaining area right chest  Cont good skin care regimen

## 2018-02-02 NOTE — ASSESSMENT & PLAN NOTE
Saw Dr. Almanzar in Neurology yesterday, diagnosed trigeminal neuralgia  Tegretol XR 400mg bid  Labs today     Follow up 3 motnhs

## 2018-02-04 ENCOUNTER — PATIENT MESSAGE (OUTPATIENT)
Dept: ALLERGY | Facility: CLINIC | Age: 48
End: 2018-02-04

## 2018-02-05 ENCOUNTER — TELEPHONE (OUTPATIENT)
Dept: ALLERGY | Facility: CLINIC | Age: 48
End: 2018-02-05

## 2018-02-05 NOTE — TELEPHONE ENCOUNTER
----- Message from Dante Kwong sent at 2/5/2018 10:07 AM CST -----  Contact: self [phone]  Patient is returning a call from the office . Please leave a details voicemail if she doesn't answer patient request  . Please advise, Thanks

## 2018-02-06 ENCOUNTER — PATIENT MESSAGE (OUTPATIENT)
Dept: FAMILY MEDICINE | Facility: CLINIC | Age: 48
End: 2018-02-06

## 2018-02-08 ENCOUNTER — PATIENT MESSAGE (OUTPATIENT)
Dept: FAMILY MEDICINE | Facility: CLINIC | Age: 48
End: 2018-02-08

## 2018-02-08 DIAGNOSIS — I99.8 BLOOD PRESSURE INSTABILITY: Primary | ICD-10-CM

## 2018-02-09 ENCOUNTER — PATIENT MESSAGE (OUTPATIENT)
Dept: FAMILY MEDICINE | Facility: CLINIC | Age: 48
End: 2018-02-09

## 2018-02-12 ENCOUNTER — OFFICE VISIT (OUTPATIENT)
Dept: FAMILY MEDICINE | Facility: CLINIC | Age: 48
End: 2018-02-12
Payer: COMMERCIAL

## 2018-02-12 ENCOUNTER — PATIENT MESSAGE (OUTPATIENT)
Dept: FAMILY MEDICINE | Facility: CLINIC | Age: 48
End: 2018-02-12

## 2018-02-12 ENCOUNTER — LAB VISIT (OUTPATIENT)
Dept: LAB | Facility: HOSPITAL | Age: 48
End: 2018-02-12
Attending: NURSE PRACTITIONER
Payer: COMMERCIAL

## 2018-02-12 VITALS
OXYGEN SATURATION: 96 % | WEIGHT: 251.31 LBS | DIASTOLIC BLOOD PRESSURE: 78 MMHG | HEART RATE: 115 BPM | SYSTOLIC BLOOD PRESSURE: 140 MMHG | TEMPERATURE: 99 F | BODY MASS INDEX: 43.14 KG/M2

## 2018-02-12 DIAGNOSIS — I99.8 BLOOD PRESSURE INSTABILITY: ICD-10-CM

## 2018-02-12 DIAGNOSIS — J01.00 ACUTE NON-RECURRENT MAXILLARY SINUSITIS: ICD-10-CM

## 2018-02-12 DIAGNOSIS — R50.9 FEVER, UNSPECIFIED FEVER CAUSE: Primary | ICD-10-CM

## 2018-02-12 LAB
FLUAV AG SPEC QL IA: POSITIVE
FLUBV AG SPEC QL IA: NEGATIVE
SPECIMEN SOURCE: ABNORMAL

## 2018-02-12 PROCEDURE — 99214 OFFICE O/P EST MOD 30 MIN: CPT | Mod: S$GLB,,, | Performed by: NURSE PRACTITIONER

## 2018-02-12 PROCEDURE — 36415 COLL VENOUS BLD VENIPUNCTURE: CPT | Mod: PO

## 2018-02-12 PROCEDURE — 83835 ASSAY OF METANEPHRINES: CPT

## 2018-02-12 PROCEDURE — 99999 PR PBB SHADOW E&M-EST. PATIENT-LVL IV: CPT | Mod: PBBFAC,,, | Performed by: NURSE PRACTITIONER

## 2018-02-12 PROCEDURE — 87400 INFLUENZA A/B EACH AG IA: CPT | Mod: 59,PO

## 2018-02-12 PROCEDURE — 3008F BODY MASS INDEX DOCD: CPT | Mod: S$GLB,,, | Performed by: NURSE PRACTITIONER

## 2018-02-12 RX ORDER — SYRINGE WITH NEEDLE, 1 ML 25GX5/8"
SYRINGE, EMPTY DISPOSABLE MISCELLANEOUS
Refills: 3 | COMMUNITY
Start: 2018-02-02 | End: 2020-07-08

## 2018-02-12 RX ORDER — OSELTAMIVIR PHOSPHATE 75 MG/1
75 CAPSULE ORAL 2 TIMES DAILY
Qty: 10 CAPSULE | Refills: 0 | Status: SHIPPED | OUTPATIENT
Start: 2018-02-12 | End: 2018-02-17

## 2018-02-12 RX ORDER — DOXYCYCLINE 100 MG/1
100 CAPSULE ORAL 2 TIMES DAILY
Qty: 20 CAPSULE | Refills: 0 | Status: SHIPPED | OUTPATIENT
Start: 2018-02-12 | End: 2018-04-03

## 2018-02-12 RX ORDER — CYANOCOBALAMIN 1000 UG/ML
INJECTION, SOLUTION INTRAMUSCULAR; SUBCUTANEOUS
Refills: 1 | COMMUNITY
Start: 2018-02-01 | End: 2018-04-03 | Stop reason: SDUPTHER

## 2018-02-12 RX ORDER — PROMETHAZINE HYDROCHLORIDE AND DEXTROMETHORPHAN HYDROBROMIDE 6.25; 15 MG/5ML; MG/5ML
5 SYRUP ORAL 4 TIMES DAILY PRN
Qty: 120 ML | Refills: 0 | Status: SHIPPED | OUTPATIENT
Start: 2018-02-12 | End: 2018-02-19

## 2018-02-12 NOTE — PROGRESS NOTES
"This dictation has been generated using Dragon Dictation some phonetic errors may occur.     Kianna was seen today for fever, cough and headache.    Diagnoses and all orders for this visit:    Fever, unspecified fever cause  -     Influenza antigen Nasopharyngeal Swab  -     oseltamivir (TAMIFLU) 75 MG capsule; Take 1 capsule (75 mg total) by mouth 2 (two) times daily.    Acute non-recurrent maxillary sinusitis  -     Influenza antigen Nasopharyngeal Swab    Other orders  -     doxycycline (MONODOX) 100 MG capsule; Take 1 capsule (100 mg total) by mouth 2 (two) times daily.  -     promethazine-dextromethorphan (PROMETHAZINE-DM) 6.25-15 mg/5 mL Syrp; Take 5 mLs by mouth 4 (four) times daily as needed.      Fever without sudden onset of symptoms doubtful of influenza however patient did spike a fever 101 yesterday.  Check influenza as above.  Testing was positive treat with Tamiflu.  Acute sinusitis.  Doxycycline Promethazine DM as above.  Hold antibiotic for 2 days and if symptoms are not improving start med.  Patient may contact us for advice.  I released her results.    Follow-up if symptoms worsen or fail to improve.      ________________________________________________________________  ________________________________________________________________        Chief Complaint   Patient presents with    Fever    Cough    Headache     History of present illness  This 47 y.o. presents today for complaint of headache fever and coughing.  She notes some sore throat symptoms which started on Thursday or Friday.  She's had some cough productive of the "neon yellow sputum."  Yesterday she spiked a fever of 101.  She does note that she coughed up small spots of blood more recently.  Review of systems  Chills and body aches noted  Patient notes sinus pain frontal and maxillary  No chest pain  No shortness of breath  No nausea vomiting or diarrhea  Productive cough without rustAnswers for HPI/ROS submitted by the patient on " 2/10/2018   Sore throat  Onset: in the past 7 days  Progression since onset: gradually worsening  Pain worse on: neither  Fever: no fever  Fever duration: 5 days or more  Pain - numeric: 5/10  congestion: Yes  cough: No  hoarse voice: Yes  neck pain: No  plugged ear sensation: No  shortness of breath: No  stridor: No  swollen glands: No  trouble swallowing: No  vomiting: No  strep: No  mono: No  Treatments tried: NSAIDs  Improvement on treatment: mild  Pain severity: mild   colored sputum.      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    MVP (mitral valve prolapse)     Pneumonia     POTS (postural orthostatic tachycardia syndrome)     Recurrent upper respiratory infection (URI)     Ulcer     Vitamin D deficiency disease 11/6/2012       Past Surgical History:   Procedure Laterality Date    ANKLE SURGERY      lt    APPENDECTOMY      BACK SURGERY      CHOLECYSTECTOMY      choley & ovarian cyst removed  12/2005    cystoscope      multiple    HYSTERECTOMY  4/8/2004    BUBBA BS&O     interstim      OOPHORECTOMY  4/8/2004    with hysterectomy     PELVIC LAPAROSCOPY      IN EXPLORATORY OF ABDOMEN      SINUS SURGERY  03/01/2016    SPINE SURGERY         Family History   Problem Relation Age of Onset    Hypertension Father     Alcohol abuse Brother     Cancer Paternal Uncle     Colon cancer Paternal Uncle     Depression Maternal Grandmother     Hearing loss Maternal Grandmother     Heart disease Maternal Grandmother     Kidney disease Maternal Grandmother     Cancer Paternal Grandmother     Arthritis Paternal Grandfather     Diabetes Paternal Grandfather     Stroke Paternal Grandfather     Breast cancer Neg  Hx     Ovarian cancer Neg Hx        Social History     Social History    Marital status: Single     Spouse name: N/A    Number of children: N/A    Years of education: N/A     Social History Main Topics    Smoking status: Never Smoker    Smokeless tobacco: Never Used    Alcohol use No    Drug use: No    Sexual activity: No     Other Topics Concern    Are You Pregnant Or Think You May Be? No    Breast-Feeding No     Social History Narrative    None       Current Outpatient Prescriptions   Medication Sig Dispense Refill    ALBUTEROL INHL Inhale into the lungs as needed.       albuterol-ipratropium 2.5mg-0.5mg/3mL (DUO-NEB) 0.5 mg-3 mg(2.5 mg base)/3 mL nebulizer solution Take 3 mLs by nebulization every 6 (six) hours as needed for Wheezing. Rescue 1 Box 0    azelastine (ASTELIN) 137 mcg (0.1 %) nasal spray 2 SPRAYS each nostril Q 12 H PRN 30 mL 6    BRINTELLIX 20 mg Tab once daily.   2    carBAMazepine (TEGRETOL XR) 200 MG 12 hr tablet Take 1 tab twice daily.  If no improvement after 2 weeks, take 2 tabs twice daily. 120 tablet 5    cyanocobalamin, vitamin B-12, 1,000 mcg/mL Kit Inject 1,000 mcg as directed every 28 days. Take daily for 5 days then weekly for 5 weeks then monthly 12 kit 1    desoximetasone (TOPICORT) 0.05 % Oint AAA bid 60 g 3    ergocalciferol (ERGOCALCIFEROL) 50,000 unit Cap Take 1 capsule (50,000 Units total) by mouth every 7 days. 12 capsule 3    estropipate (OGEN) 1.5 MG tablet Take 1 tablet (1.5 mg total) by mouth once daily. 90 tablet 3    gabapentin (NEURONTIN) 300 MG capsule Take 300 mg by mouth 2 (two) times daily. 2-300 mg cap in AM and 3-300 mg cap in PM      levocetirizine (XYZAL) 5 MG tablet TK 1 T PO  ONCE Day  9    metoclopramide HCl (REGLAN) 10 MG tablet Take 1 tablet (10 mg total) by mouth every 6 (six) hours. 30 tablet 0    metoprolol tartrate (LOPRESSOR) 50 MG tablet Take 50 mg by mouth 4 (four) times daily. 1 Tablet Oral Three times a day      montelukast  "(SINGULAIR) 10 mg tablet TK 1 T PO  ONCE D  3    ondansetron (ZOFRAN-ODT) 4 MG TbDL Take 1 tablet (4 mg total) by mouth every 8 (eight) hours as needed (nausea). 20 tablet 0    oxycodone-acetaminophen  mg (PERCOCET)  mg per tablet Take 1 tablet by mouth every 4 (four) hours as needed.      syringe with needle, safety 3 mL 22 gauge x 1 1/2" Syrg B12 injection every day for 5 days then weekly for 5 weeks then monthly. 50 Syringe 3    tiZANidine 4 mg Cap TK 2 TO 3 CS PO TID WITH FOOD  1    TIZANIDINE HCL (ZANAFLEX ORAL) Take 4 mg by mouth every 8 (eight) hours. 2-3 Tablet Oral Three times a day prn      traZODone (DESYREL) 100 MG tablet TK 1 T PO  HS  1    valsartan (DIOVAN) 40 MG tablet Take 40 mg by mouth once daily.      VIRTUSSIN AC  mg/5 mL syrup TK 10 ML PO EVERY 6 H PRN FOR COUGH 120 mL 0    BD LUER-NANCY SYRINGE 3 mL 22 gauge x 1" Syrg INJECT QD FOR 5 DAYS THEN WEEKLY FOR 5 WEEKS THEN MONTHLY  3    cyanocobalamin 1,000 mcg/mL injection   1    doxycycline (MONODOX) 100 MG capsule Take 1 capsule (100 mg total) by mouth 2 (two) times daily. 20 capsule 0    oseltamivir (TAMIFLU) 75 MG capsule Take 1 capsule (75 mg total) by mouth 2 (two) times daily. 10 capsule 0    promethazine-dextromethorphan (PROMETHAZINE-DM) 6.25-15 mg/5 mL Syrp Take 5 mLs by mouth 4 (four) times daily as needed. 120 mL 0     No current facility-administered medications for this visit.        Review of patient's allergies indicates:   Allergen Reactions    Benadryl allergy decongestant Anaphylaxis     Other reaction(s): Anaphylaxis    Dilaudid [hydromorphone (bulk)] Anaphylaxis     Other reaction(s): Anaphylaxis    Fludrocortisone Hives    Gluten Other (See Comments)     Other reaction(s) GI upset    Indomethacin Hives    Penicillins Rash    Sulfa (sulfonamide antibiotics) Rash    Vancomycin analogues Rash       Physical examination  Vitals Reviewed  Gen. Well-dressed well-nourished no apparent " distress  Skin warm dry and intact.  No rashes noted.  HEENT.  TM intact bilateral with normal light reflex.  No mastoid tenderness during percussion.  Nares patent bilateral.  Pharynx is unremarkable.  No maxillary or frontal sinus tenderness when percussed.    Neck is supple without adenopathy  Chest.  Respirations are even unlabored.  Lungs are clear to auscultation.  Cardiac regular rate and rhythm.  No chest wall adenopathy noted.  Neuro. Awake alert oriented x4.  Normal judgment and cognition noted.  Extremities no clubbing cyanosis or edema noted.     Call or return to clinic prn if these symptoms worsen or fail to improve as anticipated.

## 2018-02-14 ENCOUNTER — PATIENT MESSAGE (OUTPATIENT)
Dept: FAMILY MEDICINE | Facility: CLINIC | Age: 48
End: 2018-02-14

## 2018-02-14 RX ORDER — IPRATROPIUM BROMIDE AND ALBUTEROL SULFATE 2.5; .5 MG/3ML; MG/3ML
3 SOLUTION RESPIRATORY (INHALATION) EVERY 6 HOURS PRN
Qty: 1 BOX | Refills: 2 | Status: SHIPPED | OUTPATIENT
Start: 2018-02-14 | End: 2019-08-01 | Stop reason: ALTCHOICE

## 2018-02-15 ENCOUNTER — PATIENT MESSAGE (OUTPATIENT)
Dept: FAMILY MEDICINE | Facility: CLINIC | Age: 48
End: 2018-02-15

## 2018-02-19 LAB
METANEPH FREE SERPL-MCNC: 27 PG/ML
METANEPHS SERPL-MCNC: 144 PG/ML
NORMETANEPH FREE SERPL-MCNC: 117 PG/ML

## 2018-02-20 ENCOUNTER — PATIENT MESSAGE (OUTPATIENT)
Dept: FAMILY MEDICINE | Facility: CLINIC | Age: 48
End: 2018-02-20

## 2018-03-01 ENCOUNTER — HOSPITAL ENCOUNTER (OUTPATIENT)
Dept: RADIOLOGY | Facility: HOSPITAL | Age: 48
Discharge: HOME | End: 2018-03-01
Attending: NURSE PRACTITIONER
Payer: COMMERCIAL

## 2018-03-01 DIAGNOSIS — I99.8 BLOOD PRESSURE INSTABILITY: ICD-10-CM

## 2018-03-01 PROCEDURE — 78803 RP LOCLZJ TUM SPECT 1 AREA: CPT | Mod: 26,,, | Performed by: RADIOLOGY

## 2018-03-01 PROCEDURE — 78804 RP LOCLZJ TUM WHBDY 2+D IMG: CPT | Mod: 26,,, | Performed by: RADIOLOGY

## 2018-03-02 ENCOUNTER — HOSPITAL ENCOUNTER (OUTPATIENT)
Dept: RADIOLOGY | Facility: HOSPITAL | Age: 48
Discharge: HOME | End: 2018-03-02
Attending: NURSE PRACTITIONER
Payer: COMMERCIAL

## 2018-03-02 ENCOUNTER — OFFICE VISIT (OUTPATIENT)
Dept: PULMONOLOGY | Facility: CLINIC | Age: 48
End: 2018-03-02
Payer: COMMERCIAL

## 2018-03-02 ENCOUNTER — TELEPHONE (OUTPATIENT)
Dept: FAMILY MEDICINE | Facility: CLINIC | Age: 48
End: 2018-03-02

## 2018-03-02 ENCOUNTER — PATIENT MESSAGE (OUTPATIENT)
Dept: DERMATOLOGY | Facility: CLINIC | Age: 48
End: 2018-03-02

## 2018-03-02 VITALS
HEART RATE: 108 BPM | BODY MASS INDEX: 43.1 KG/M2 | HEIGHT: 64 IN | DIASTOLIC BLOOD PRESSURE: 82 MMHG | OXYGEN SATURATION: 98 % | WEIGHT: 252.44 LBS | RESPIRATION RATE: 14 BRPM | SYSTOLIC BLOOD PRESSURE: 150 MMHG

## 2018-03-02 DIAGNOSIS — C7A.020 MALIGNANT CARCINOID TUMOR OF APPENDIX: Primary | ICD-10-CM

## 2018-03-02 DIAGNOSIS — E66.01 MORBID OBESITY: ICD-10-CM

## 2018-03-02 DIAGNOSIS — R50.9 FEVER, UNSPECIFIED FEVER CAUSE: ICD-10-CM

## 2018-03-02 DIAGNOSIS — J18.9 PNEUMONIA OF BOTH LOWER LOBES DUE TO INFECTIOUS ORGANISM: ICD-10-CM

## 2018-03-02 DIAGNOSIS — R23.2 FLUSHING REACTION: ICD-10-CM

## 2018-03-02 PROCEDURE — 78804 RP LOCLZJ TUM WHBDY 2+D IMG: CPT | Mod: TC

## 2018-03-02 PROCEDURE — 3077F SYST BP >= 140 MM HG: CPT | Mod: S$GLB,,, | Performed by: INTERNAL MEDICINE

## 2018-03-02 PROCEDURE — 78803 RP LOCLZJ TUM SPECT 1 AREA: CPT | Mod: 26,,, | Performed by: RADIOLOGY

## 2018-03-02 PROCEDURE — 78803 RP LOCLZJ TUM SPECT 1 AREA: CPT | Mod: TC

## 2018-03-02 PROCEDURE — 99999 PR PBB SHADOW E&M-EST. PATIENT-LVL III: CPT | Mod: PBBFAC,,, | Performed by: INTERNAL MEDICINE

## 2018-03-02 PROCEDURE — 3079F DIAST BP 80-89 MM HG: CPT | Mod: S$GLB,,, | Performed by: INTERNAL MEDICINE

## 2018-03-02 PROCEDURE — 99204 OFFICE O/P NEW MOD 45 MIN: CPT | Mod: S$GLB,,, | Performed by: INTERNAL MEDICINE

## 2018-03-02 NOTE — PATIENT INSTRUCTIONS
Further evaluation of lungs likely low yield at present.  Would reconsider if she develops recurrent parenchymal infiltrates or increased resp symptoms.  Call/return if needed.

## 2018-03-02 NOTE — TELEPHONE ENCOUNTER
----- Message from Siddhartha Davies sent at 3/1/2018  9:52 AM CST -----  Contact: Shirin [phone]  Pt needs orders added to the system for the tests that she is having done in [place] today. Please call at your earliest convenience.

## 2018-03-02 NOTE — PROGRESS NOTES
Subjective:       Patient ID: Kianna Zuleta is a 47 y.o. female.    Chief Complaint: Pneumonia    HPI HISTORY OF PRESENT ILLNESS:  Mrs. Zuleta is a 47-year-old nonsmoker, who is   referred for evaluation of a previous pneumonia and chronic fever.  She has had    ongoing fever which prompted an extensive medical evaluation.  She   explains that she was hospitalized at [facility] in October   of last year with high fever, cough, and sputum which was discolored and   bloody.  She was treated for acute pneumonia.  Although her micro studies were   apparently negative, she had a favorable response to this empiric therapy.  Her respiratory symptoms have now largely resolved.  She does have a mild residual   cough.  She has not had any wheezing or shortness of breath.  She was prescribed   albuterol, which she has used occasionally over the last several months.  She   actually dislikes this medication because it interferes with her ability to sleep   at night.    Mrs. Zuleta does not know of any proven past lung diseases.  Her activities are   not normally limited by respiratory symptoms.  The question of possible   sarcoidosis was raised, but her ACE level is normal and a [facility] did not show granulomatous histology.  She is a lifelong   nonsmoker.  Her family history is of note in that her paternal grandmother had   lung cancer.  She was a long-term smoker.  She does not know of any other family   history for lung diseases.  She works as a teacher and .    DATA:  I have reviewed the images from chest x-rays done in October.  The   cardiac silhouette is not enlarged.  There are no acute vascular abnormalities   or pleural effusions.  The x-rays done here in October were several weeks after   her hospitalization at [facility].  In the earlier x-ray   there is an ill-defined focal opacity in the upper lung zone on the right.  This    appears resolved in a subsequent chest x-ray later that month.    I have also reviewed the images from the PET CT study from December.  These show   mild mosaic variation in lung parenchymal density at the bases.  Otherwise, the   lungs appear clear.      CB/HN  dd: 03/02/2018 19:54:15 (CST)  td: 03/03/2018 14:04:55 (CST)  Doc ID   #[id number]  Job ID #[id number]    CC:       Review of Systems   Constitutional: Positive for fever. Negative for fatigue.   HENT: Negative for postnasal drip, sinus pressure, voice change and congestion.    Respiratory: Positive for cough. Negative for sputum production, shortness of breath, wheezing and dyspnea on extertion.    Cardiovascular: Negative for chest pain and leg swelling.   Genitourinary: Negative for difficulty urinating.   Musculoskeletal: Negative for arthralgias and back pain.   Skin: Positive for rash.   Gastrointestinal: Negative for abdominal pain and acid reflux.   Neurological: Negative for dizziness and weakness.   Hematological: Negative for adenopathy.       Objective:      Physical Exam   Constitutional: She is oriented to person, place, and time. She appears well-developed and well-nourished.   HENT:   Head: Normocephalic.   Nose: Nose normal.   Mouth/Throat: No oropharyngeal exudate.   Neck: Normal range of motion. No JVD present. No tracheal deviation present. No thyromegaly present.   Cardiovascular: Regular rhythm and normal heart sounds.    No murmur heard.  Regular Tachycardia.   Pulmonary/Chest: No stridor. She has no wheezes. She has no rhonchi. She has no rales. She exhibits no tenderness.   Abdominal: Soft. There is no tenderness.   Musculoskeletal: She exhibits no edema.   Lymphadenopathy:     She has no cervical adenopathy.   Neurological: She is alert and oriented to person, place, and time.   Skin: Skin is warm and dry. No rash noted. No erythema. Nails show no clubbing.   Psychiatric: She has a normal mood and affect.   Vitals reviewed.    Personal  "Diagnostic Review    No flowsheet data found.      Assessment:       1. Malignant carcinoid tumor of appendix    2. Fever, unspecified fever cause    3. Pneumonia of both lower lobes due to infectious organism    4. Morbid obesity        Outpatient Encounter Prescriptions as of 3/2/2018   Medication Sig Dispense Refill    ALBUTEROL INHL Inhale into the lungs as needed.       albuterol-ipratropium 2.5mg-0.5mg/3mL (DUO-NEB) 0.5 mg-3 mg(2.5 mg base)/3 mL nebulizer solution Take 3 mLs by nebulization every 6 (six) hours as needed for Wheezing. Rescue 1 Box 2    azelastine (ASTELIN) 137 mcg (0.1 %) nasal spray 2 SPRAYS each nostril Q 12 H PRN 30 mL 6    BD LUER-NANCY SYRINGE 3 mL 22 gauge x 1" Syrg INJECT QD FOR 5 DAYS THEN WEEKLY FOR 5 WEEKS THEN MONTHLY  3    BRINTELLIX 20 mg Tab once daily.   2    carBAMazepine (TEGRETOL XR) 200 MG 12 hr tablet Take 1 tab twice daily.  If no improvement after 2 weeks, take 2 tabs twice daily. 120 tablet 5    cyanocobalamin 1,000 mcg/mL injection   1    cyanocobalamin, vitamin B-12, 1,000 mcg/mL Kit Inject 1,000 mcg as directed every 28 days. Take daily for 5 days then weekly for 5 weeks then monthly 12 kit 1    desoximetasone (TOPICORT) 0.05 % Oint AAA bid 60 g 3    doxycycline (MONODOX) 100 MG capsule Take 1 capsule (100 mg total) by mouth 2 (two) times daily. 20 capsule 0    ergocalciferol (ERGOCALCIFEROL) 50,000 unit Cap Take 1 capsule (50,000 Units total) by mouth every 7 days. 12 capsule 3    estropipate (OGEN) 1.5 MG tablet Take 1 tablet (1.5 mg total) by mouth once daily. 90 tablet 3    gabapentin (NEURONTIN) 300 MG capsule Take 300 mg by mouth 2 (two) times daily. 2-300 mg cap in AM and 3-300 mg cap in PM      levocetirizine (XYZAL) 5 MG tablet TK 1 T PO  ONCE Day  9    metoclopramide HCl (REGLAN) 10 MG tablet Take 1 tablet (10 mg total) by mouth every 6 (six) hours. 30 tablet 0    metoprolol tartrate (LOPRESSOR) 50 MG tablet Take 50 mg by mouth 4 (four) times " "daily. 1 Tablet Oral Three times a day      montelukast (SINGULAIR) 10 mg tablet TK 1 T PO  ONCE D  3    ondansetron (ZOFRAN-ODT) 4 MG TbDL Take 1 tablet (4 mg total) by mouth every 8 (eight) hours as needed (nausea). 20 tablet 0    oxycodone-acetaminophen  mg (PERCOCET)  mg per tablet Take 1 tablet by mouth every 4 (four) hours as needed.      syringe with needle, safety 3 mL 22 gauge x 1 1/2" Syrg B12 injection every day for 5 days then weekly for 5 weeks then monthly. 50 Syringe 3    tiZANidine 4 mg Cap TK 2 TO 3 CS PO TID WITH FOOD  1    TIZANIDINE HCL (ZANAFLEX ORAL) Take 4 mg by mouth every 8 (eight) hours. 2-3 Tablet Oral Three times a day prn      traZODone (DESYREL) 100 MG tablet TK 1 T PO  HS  1    valsartan (DIOVAN) 40 MG tablet Take 40 mg by mouth once daily.      VIRTUSSIN AC  mg/5 mL syrup TK 10 ML PO EVERY 6 H PRN FOR COUGH 120 mL 0     No facility-administered encounter medications on file as of 3/2/2018.      No orders of the defined types were placed in this encounter.    Plan:     Further evaluation of lungs likely low yield at present.  Would reconsider if she develops recurrent parenchymal infiltrates or increased resp symptoms.  Call/return if needed.       "

## 2018-03-05 NOTE — TELEPHONE ENCOUNTER
We needed to speak to radiology the day of.  Probably no point discussing now with staff.   Ask pt if test was done.

## 2018-03-06 ENCOUNTER — TELEPHONE (OUTPATIENT)
Dept: FAMILY MEDICINE | Facility: CLINIC | Age: 48
End: 2018-03-06

## 2018-03-06 ENCOUNTER — PATIENT MESSAGE (OUTPATIENT)
Dept: FAMILY MEDICINE | Facility: CLINIC | Age: 48
End: 2018-03-06

## 2018-03-09 ENCOUNTER — PATIENT MESSAGE (OUTPATIENT)
Dept: FAMILY MEDICINE | Facility: CLINIC | Age: 48
End: 2018-03-09

## 2018-03-12 ENCOUNTER — PATIENT MESSAGE (OUTPATIENT)
Dept: FAMILY MEDICINE | Facility: CLINIC | Age: 48
End: 2018-03-12

## 2018-03-14 ENCOUNTER — TELEPHONE (OUTPATIENT)
Dept: FAMILY MEDICINE | Facility: CLINIC | Age: 48
End: 2018-03-14

## 2018-03-14 NOTE — TELEPHONE ENCOUNTER
----- Message from Ulises Hernandez sent at 3/13/2018 12:27 PM CDT -----  Contact: Ric Mart  Called to clarify restrictions on medical request form dated- Jan 24. Roseline states form states pt can not go back to work until May- calling to verify this information. Roseline can be reached @ [phone].

## 2018-03-16 ENCOUNTER — OFFICE VISIT (OUTPATIENT)
Dept: FAMILY MEDICINE | Facility: CLINIC | Age: 48
End: 2018-03-16
Payer: COMMERCIAL

## 2018-03-16 ENCOUNTER — OFFICE VISIT (OUTPATIENT)
Dept: ALLERGY | Facility: CLINIC | Age: 48
End: 2018-03-16
Payer: COMMERCIAL

## 2018-03-16 VITALS
WEIGHT: 253.75 LBS | HEART RATE: 112 BPM | SYSTOLIC BLOOD PRESSURE: 128 MMHG | TEMPERATURE: 99 F | DIASTOLIC BLOOD PRESSURE: 82 MMHG | BODY MASS INDEX: 43.56 KG/M2 | OXYGEN SATURATION: 98 %

## 2018-03-16 VITALS
DIASTOLIC BLOOD PRESSURE: 80 MMHG | WEIGHT: 253.75 LBS | BODY MASS INDEX: 43.56 KG/M2 | SYSTOLIC BLOOD PRESSURE: 112 MMHG

## 2018-03-16 DIAGNOSIS — R23.2 FLUSHING: ICD-10-CM

## 2018-03-16 DIAGNOSIS — J06.9 UPPER RESPIRATORY TRACT INFECTION, UNSPECIFIED TYPE: ICD-10-CM

## 2018-03-16 DIAGNOSIS — Z23 NEED FOR IMMUNIZATION AGAINST RUBELLA: ICD-10-CM

## 2018-03-16 DIAGNOSIS — D80.3 IGG DEFICIENCY: ICD-10-CM

## 2018-03-16 DIAGNOSIS — J31.0 NONALLERGIC RHINITIS: ICD-10-CM

## 2018-03-16 DIAGNOSIS — R09.81 SINUS CONGESTION: Primary | ICD-10-CM

## 2018-03-16 DIAGNOSIS — D84.9: ICD-10-CM

## 2018-03-16 DIAGNOSIS — R76.8 LOW SERUM IGG FOR AGE: Primary | ICD-10-CM

## 2018-03-16 PROCEDURE — 90670 PCV13 VACCINE IM: CPT | Mod: S$GLB,,, | Performed by: STUDENT IN AN ORGANIZED HEALTH CARE EDUCATION/TRAINING PROGRAM

## 2018-03-16 PROCEDURE — 3074F SYST BP LT 130 MM HG: CPT | Mod: CPTII,S$GLB,, | Performed by: STUDENT IN AN ORGANIZED HEALTH CARE EDUCATION/TRAINING PROGRAM

## 2018-03-16 PROCEDURE — 90707 MMR VACCINE SC: CPT | Mod: S$GLB,,, | Performed by: STUDENT IN AN ORGANIZED HEALTH CARE EDUCATION/TRAINING PROGRAM

## 2018-03-16 PROCEDURE — 90471 IMMUNIZATION ADMIN: CPT | Mod: S$GLB,,, | Performed by: STUDENT IN AN ORGANIZED HEALTH CARE EDUCATION/TRAINING PROGRAM

## 2018-03-16 PROCEDURE — 99999 PR PBB SHADOW E&M-EST. PATIENT-LVL IV: CPT | Mod: PBBFAC,,, | Performed by: NURSE PRACTITIONER

## 2018-03-16 PROCEDURE — 99214 OFFICE O/P EST MOD 30 MIN: CPT | Mod: S$GLB,,, | Performed by: NURSE PRACTITIONER

## 2018-03-16 PROCEDURE — 99214 OFFICE O/P EST MOD 30 MIN: CPT | Mod: 25,S$GLB,, | Performed by: STUDENT IN AN ORGANIZED HEALTH CARE EDUCATION/TRAINING PROGRAM

## 2018-03-16 PROCEDURE — 3079F DIAST BP 80-89 MM HG: CPT | Mod: CPTII,S$GLB,, | Performed by: STUDENT IN AN ORGANIZED HEALTH CARE EDUCATION/TRAINING PROGRAM

## 2018-03-16 PROCEDURE — 90472 IMMUNIZATION ADMIN EACH ADD: CPT | Mod: S$GLB,,, | Performed by: STUDENT IN AN ORGANIZED HEALTH CARE EDUCATION/TRAINING PROGRAM

## 2018-03-16 PROCEDURE — 3074F SYST BP LT 130 MM HG: CPT | Mod: CPTII,S$GLB,, | Performed by: NURSE PRACTITIONER

## 2018-03-16 PROCEDURE — 99999 PR PBB SHADOW E&M-EST. PATIENT-LVL IV: CPT | Mod: PBBFAC,,, | Performed by: STUDENT IN AN ORGANIZED HEALTH CARE EDUCATION/TRAINING PROGRAM

## 2018-03-16 PROCEDURE — 3079F DIAST BP 80-89 MM HG: CPT | Mod: CPTII,S$GLB,, | Performed by: NURSE PRACTITIONER

## 2018-03-16 NOTE — PROGRESS NOTES
This dictation has been generated using Dragon Dictation some phonetic errors may occur.     Problem List Items Addressed This Visit     IgG deficiency    Overview     Results for BLANCA GUTHRIE (MRN [id number]) as of 2/2/2018 08:02   Ref. Range 1/11/2018 08:06 1/22/2018 14:42   IgG - Serum Latest Ref Range: 650 - 1600 mg/dL 557 (L) 600 (L)   IgM Latest Ref Range: 50 - 300 mg/dL 90 78   IgA Latest Ref Range: 40 - 350 mg/dL 347 343   IgE Latest Ref Range: 0 - 100 IU/mL  <35   IgG 1 Latest Ref Range: 382 - 929 mg/dL 293 (L)    IgG 2 Latest Ref Range: 242 - 700 mg/dL 126 (L)    IgG 3 Latest Ref Range: 22 - 176 mg/dL 52    IgG 4 Latest Ref Range: 4 - 86 mg/dL 3 (L)               Other Visit Diagnoses     Sinus congestion    -  Primary    Flushing                 Sinus congestion with some left sided maxillary pressure. She has an appointment with Allergy/Immunology this PM so no antihistamines for now. I will discuss with pt again this pm after that visit.   Continue nasal spray. Call for fever or sinus pain.   Flushing consider continuing mass cell stabilizer.  Also trial of clonidine  In excessive of 25 minutes spent with patient with greater than 50% of time dedicated to education on symptoms, diagnosis, treatments, and coordination of care.     No Follow-up on file.    ________________________________________________________________  ________________________________________________________________      Chief Complaint   Patient presents with    paperwork    Follow-up    Sinusitis     History of present illness  This 47 y.o. presents today for complaint of sinus congestion, Flushing, paperwork, and approval for epidural.  Patient notes sinus congestion present less than 10 days.  She notes some left-sided maxillary sinus pressure greater than right-sided sinus pressure.  She denies any fever or chills.  She hasn't taken any meds for her symptoms.  Patient notes flushing.  She notes that at times her arms are more  flushed than her face.  Sometimes the left arm is more flushed than the right arm.  She does ask about medicines but we do have to consider history of POTS.    Pt needs paperwork completed. They are requesting clarification of dates and restrictions. 10/4/17-2/5/18. RTW without restrictions.   Requesting ok for epidural. Pt receives injections in the back and neck. She has disc bulges.     Answers for HPI/ROS submitted by the patient on 3/14/2018   activity change: No  unexpected weight change: No  neck pain: No  hearing loss: No  rhinorrhea: Yes  trouble swallowing: No  eye discharge: No  visual disturbance: No  chest tightness: Yes  wheezing: No  chest pain: No  palpitations: Yes  blood in stool: No  constipation: No  vomiting: No  diarrhea: No  polydipsia: No  polyuria: No  difficulty urinating: No  hematuria: No  menstrual problem: No  dysuria: No  joint swelling: No  arthralgias: No  headaches: Yes  weakness: No  confusion: No  dysphoric mood: No      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    MVP (mitral valve prolapse)     Pneumonia     POTS (postural orthostatic tachycardia syndrome)     Recurrent upper respiratory infection (URI)     Ulcer     Vitamin D deficiency disease 11/6/2012       Past Surgical History:   Procedure Laterality Date    ANKLE SURGERY      lt    APPENDECTOMY      BACK SURGERY      CHOLECYSTECTOMY      choley & ovarian cyst removed  12/2005    cystoscope      multiple    HYSTERECTOMY  4/8/2004    BUBBA BS&O     interstim      OOPHORECTOMY  4/8/2004    with hysterectomy     PELVIC LAPAROSCOPY      VT EXPLORATORY OF ABDOMEN      SINUS SURGERY  03/01/2016    SPINE SURGERY    "      Family History   Problem Relation Age of Onset    Hypertension Father     Alcohol abuse Brother     Cancer Paternal Uncle     Colon cancer Paternal Uncle     Depression Maternal Grandmother     Hearing loss Maternal Grandmother     Heart disease Maternal Grandmother     Kidney disease Maternal Grandmother     Cancer Paternal Grandmother     Arthritis Paternal Grandfather     Diabetes Paternal Grandfather     Stroke Paternal Grandfather     Breast cancer Neg Hx     Ovarian cancer Neg Hx     Asthma Neg Hx     Emphysema Neg Hx        Social History     Social History    Marital status: Single     Spouse name: N/A    Number of children: N/A    Years of education: N/A     Social History Main Topics    Smoking status: Never Smoker    Smokeless tobacco: Never Used    Alcohol use No    Drug use: No    Sexual activity: No     Other Topics Concern    Are You Pregnant Or Think You May Be? No    Breast-Feeding No     Social History Narrative    None       Current Outpatient Prescriptions   Medication Sig Dispense Refill    ALBUTEROL INHL Inhale into the lungs as needed.       albuterol-ipratropium 2.5mg-0.5mg/3mL (DUO-NEB) 0.5 mg-3 mg(2.5 mg base)/3 mL nebulizer solution Take 3 mLs by nebulization every 6 (six) hours as needed for Wheezing. Rescue 1 Box 2    azelastine (ASTELIN) 137 mcg (0.1 %) nasal spray 2 SPRAYS each nostril Q 12 H PRN 30 mL 6    BD LUER-NANCY SYRINGE 3 mL 22 gauge x 1" Syrg INJECT QD FOR 5 DAYS THEN WEEKLY FOR 5 WEEKS THEN MONTHLY  3    BRINTELLIX 20 mg Tab once daily.   2    carBAMazepine (TEGRETOL XR) 200 MG 12 hr tablet Take 1 tab twice daily.  If no improvement after 2 weeks, take 2 tabs twice daily. 120 tablet 5    cyanocobalamin 1,000 mcg/mL injection   1    cyanocobalamin, vitamin B-12, 1,000 mcg/mL Kit Inject 1,000 mcg as directed every 28 days. Take daily for 5 days then weekly for 5 weeks then monthly 12 kit 1    desoximetasone (TOPICORT) 0.05 % Oint AAA bid " "60 g 3    doxycycline (MONODOX) 100 MG capsule Take 1 capsule (100 mg total) by mouth 2 (two) times daily. 20 capsule 0    ergocalciferol (ERGOCALCIFEROL) 50,000 unit Cap Take 1 capsule (50,000 Units total) by mouth every 7 days. 12 capsule 3    estropipate (OGEN) 1.5 MG tablet Take 1 tablet (1.5 mg total) by mouth once daily. 90 tablet 3    gabapentin (NEURONTIN) 300 MG capsule Take 300 mg by mouth 2 (two) times daily. 2-300 mg cap in AM and 3-300 mg cap in PM      levocetirizine (XYZAL) 5 MG tablet TK 1 T PO  ONCE Day  9    metoclopramide HCl (REGLAN) 10 MG tablet Take 1 tablet (10 mg total) by mouth every 6 (six) hours. 30 tablet 0    metoprolol tartrate (LOPRESSOR) 50 MG tablet Take 50 mg by mouth 4 (four) times daily. 1 Tablet Oral Three times a day      montelukast (SINGULAIR) 10 mg tablet TK 1 T PO  ONCE D  3    ondansetron (ZOFRAN-ODT) 4 MG TbDL Take 1 tablet (4 mg total) by mouth every 8 (eight) hours as needed (nausea). 20 tablet 0    oxycodone-acetaminophen  mg (PERCOCET)  mg per tablet Take 1 tablet by mouth every 4 (four) hours as needed.      syringe with needle, safety 3 mL 22 gauge x 1 1/2" Syrg B12 injection every day for 5 days then weekly for 5 weeks then monthly. 50 Syringe 3    tiZANidine 4 mg Cap TK 2 TO 3 CS PO TID WITH FOOD  1    TIZANIDINE HCL (ZANAFLEX ORAL) Take 4 mg by mouth every 8 (eight) hours. 2-3 Tablet Oral Three times a day prn      traZODone (DESYREL) 100 MG tablet TK 1 T PO  HS  1    valsartan (DIOVAN) 40 MG tablet Take 40 mg by mouth once daily.      VIRTUSSIN AC  mg/5 mL syrup TK 10 ML PO EVERY 6 H PRN FOR COUGH 120 mL 0     No current facility-administered medications for this visit.        Review of patient's allergies indicates:   Allergen Reactions    Benadryl allergy decongestant Anaphylaxis     Other reaction(s): Anaphylaxis    Dilaudid [hydromorphone (bulk)] Anaphylaxis     Other reaction(s): Anaphylaxis    Fludrocortisone Hives    " Gluten Other (See Comments)     Other reaction(s) GI upset    Indomethacin Hives    Penicillins Rash    Sulfa (sulfonamide antibiotics) Rash    Vancomycin analogues Rash       Physical examination  Vitals Reviewed  Gen. Well-dressed well-nourished no apparent distress  Skin warm dry and intact.  No rashes noted.  Redness noted to the arms.   HEENT.  TM intact bilateral with normal light reflex.  No mastoid tenderness during percussion.  Nares patent bilateral.  Pharynx is unremarkable.  No maxillary or frontal sinus tenderness when percussed.    Neck is supple without adenopathy  Chest.  Respirations are even unlabored.  Lungs are clear to auscultation.  Cardiac regular rate and rhythm.  No chest wall adenopathy noted.  Neuro. Awake alert oriented x4.  Normal judgment and cognition noted.  Extremities no clubbing cyanosis or edema noted.     Call or return to clinic prn if these symptoms worsen or fail to improve as anticipated.

## 2018-03-16 NOTE — PATIENT INSTRUCTIONS
No evidence of allergies  No longstanding sinus infection  Mildly deviated nasal septum    Continue Astelin as needed for post nasal drip  Nasal rinse BID minimum until well.

## 2018-03-16 NOTE — PROGRESS NOTES
"Allergy Clinic Note  [facility]    Subjective:      Patient ID: Kianna Zuleta is a 47 y.o. female.    Chief Complaint: Follow-up    Allergy problem list  IgG 557 r/o immune deficiency  Frequent sinus infections  Nonallergic rhinitis    History of Present Illness: 47-year-old female initially referred for borderline low IgG level (557).  She has been lost to follow-up since 1/22/2018.    Today she presents complaining of sinus symptoms since yesterday manifest by postnasal drip and face pain.    No other problems or complaints    Additional History:  Interval history is significant for episode of influenza B, otherwise past medical, family, and social histories are unchanged.          Patient Active Problem List   Diagnosis    Vitamin D deficiency    Hypothyroidism    Irritable bowel syndrome    Elevated alkaline phosphatase level    Malignant carcinoid tumor of appendix    Fever    Elevated sed rate    CRP elevated    Hypertension    Morbid obesity    Abnormal CT of the chest    Eczema    Trigeminal neuralgia of right side of face    IgG deficiency     Current Outpatient Prescriptions on File Prior to Visit   Medication Sig Dispense Refill    ALBUTEROL INHL Inhale into the lungs as needed.       albuterol-ipratropium 2.5mg-0.5mg/3mL (DUO-NEB) 0.5 mg-3 mg(2.5 mg base)/3 mL nebulizer solution Take 3 mLs by nebulization every 6 (six) hours as needed for Wheezing. Rescue 1 Box 2    azelastine (ASTELIN) 137 mcg (0.1 %) nasal spray 2 SPRAYS each nostril Q 12 H PRN 30 mL 6    BD LUER-NANCY SYRINGE 3 mL 22 gauge x 1" Syrg INJECT QD FOR 5 DAYS THEN WEEKLY FOR 5 WEEKS THEN MONTHLY  3    BRINTELLIX 20 mg Tab once daily.   2    carBAMazepine (TEGRETOL XR) 200 MG 12 hr tablet Take 1 tab twice daily.  If no improvement after 2 weeks, take 2 tabs twice daily. 120 tablet 5    cyanocobalamin 1,000 mcg/mL injection   1    cyanocobalamin, vitamin B-12, 1,000 mcg/mL Kit Inject 1,000 mcg as directed " "every 28 days. Take daily for 5 days then weekly for 5 weeks then monthly 12 kit 1    desoximetasone (TOPICORT) 0.05 % Oint AAA bid 60 g 3    doxycycline (MONODOX) 100 MG capsule Take 1 capsule (100 mg total) by mouth 2 (two) times daily. 20 capsule 0    ergocalciferol (ERGOCALCIFEROL) 50,000 unit Cap Take 1 capsule (50,000 Units total) by mouth every 7 days. 12 capsule 3    estropipate (OGEN) 1.5 MG tablet Take 1 tablet (1.5 mg total) by mouth once daily. 90 tablet 3    gabapentin (NEURONTIN) 300 MG capsule Take 300 mg by mouth 2 (two) times daily. 2-300 mg cap in AM and 3-300 mg cap in PM      levocetirizine (XYZAL) 5 MG tablet TK 1 T PO  ONCE Day  9    metoclopramide HCl (REGLAN) 10 MG tablet Take 1 tablet (10 mg total) by mouth every 6 (six) hours. 30 tablet 0    metoprolol tartrate (LOPRESSOR) 50 MG tablet Take 50 mg by mouth 4 (four) times daily. 1 Tablet Oral Three times a day      montelukast (SINGULAIR) 10 mg tablet TK 1 T PO  ONCE D  3    ondansetron (ZOFRAN-ODT) 4 MG TbDL Take 1 tablet (4 mg total) by mouth every 8 (eight) hours as needed (nausea). 20 tablet 0    oxycodone-acetaminophen  mg (PERCOCET)  mg per tablet Take 1 tablet by mouth every 4 (four) hours as needed.      syringe with needle, safety 3 mL 22 gauge x 1 1/2" Syrg B12 injection every day for 5 days then weekly for 5 weeks then monthly. 50 Syringe 3    tiZANidine 4 mg Cap TK 2 TO 3 CS PO TID WITH FOOD  1    TIZANIDINE HCL (ZANAFLEX ORAL) Take 4 mg by mouth every 8 (eight) hours. 2-3 Tablet Oral Three times a day prn      traZODone (DESYREL) 100 MG tablet TK 1 T PO  HS  1    valsartan (DIOVAN) 40 MG tablet Take 40 mg by mouth once daily.      VIRTUSSIN AC  mg/5 mL syrup TK 10 ML PO EVERY 6 H PRN FOR COUGH 120 mL 0     No current facility-administered medications on file prior to visit.          Review of Systems   Constitutional: Negative for chills and fever.   HENT: Negative for ear discharge and " "nosebleeds.    Eyes: Negative for discharge and redness.   Respiratory: Negative for hemoptysis, sputum production and stridor.    Gastrointestinal: Negative for blood in stool, melena and vomiting.   Genitourinary: Negative for hematuria.   Skin: Negative for itching and rash.   Neurological: Negative for seizures and loss of consciousness.       Objective:   /80 (BP Location: Left arm, Patient Position: Sitting, BP Method: Medium (Manual))   Wt 115.1 kg (253 lb 12 oz)   BMI 43.56 kg/m²     Physical Exam   Constitutional: She is oriented to person, place, and time and well-developed, well-nourished, and in no distress.   HENT:   Head: Normocephalic and atraumatic.   Nose: Nose normal.   Eyes: Conjunctivae are normal. No scleral icterus.   Neck: Neck supple.   Cardiovascular: Normal rate, regular rhythm and intact distal pulses.    Pulmonary/Chest: Effort normal. No stridor. No respiratory distress.   Abdominal: She exhibits no distension.   Musculoskeletal: She exhibits no edema or deformity.   Neurological: She is alert and oriented to person, place, and time.   Skin: No rash noted. No erythema.   Psychiatric: Affect and judgment normal.       Data: Labs 1/22/2018:  IgG 600  IgM 78  IgA 43  Low titers for anti-rubella antibodies  Normal titers for varicella antibodies  Normal titers for diptheria antibodies  Normal titers for Haemophilus influenza antibodies    Negative aeroallergen testing by the serum immunocap method.    23 valent Pneumovax given 1/22/2018    Labs 3/1/2018  Low "post" pneumococcal antibodies for 13 of 14 serotypes.    CT sinus 1/22/2018: Normal  "Unremarkable noncontrast CT of the paranasal sinuses without significant paranasal sinus opacification or air-fluid level."  Assessment:     1. Low serum IgG for age    2. Nonallergic rhinitis    3. Alteration in immune system    4. Need for immunization against rubella    5. Upper respiratory tract infection, unspecified type  "       Comorbidities:  HTN  fever  Plan:     Kianna was seen today for follow-up.    Diagnoses and all orders for this visit:    Alteration in immune system / Low serum IgG for age  M/b poor response to pneumovax  Give Prevnar and recheck titers  Client may have specific antibody deficiency  -     (In Office Administered) Pneumococcal Conjugate Vaccine (13 Valent) (IM)  - recheck titers  - will also check T and B cell subsets    Need for immunization against rubella  -     (In Office Administered) MMR Vaccine (SQ)        -     plan to rechack titers      Nonallergic rhinitis  Laboratory results discussed and copy given  CT results discussed and copy given.  No need for environmental changes, antibiotics, or surgery.    URI vs early sinusitis  Recommended Astelin as needed for postnasal drip  Recommended nasal washes twice a day minimum to help prevent bacterial sinusitis.      Patient Instructions   No evidence of allergies  No longstanding sinus infection  Mildly deviated nasal septum    Continue Astelin as needed for post nasal drip  Nasal rinse BID minimum until well.      Follow-up in about 5 weeks (around 4/20/2018).    Kaylin Miller MD

## 2018-03-22 ENCOUNTER — PATIENT MESSAGE (OUTPATIENT)
Dept: FAMILY MEDICINE | Facility: CLINIC | Age: 48
End: 2018-03-22

## 2018-03-22 RX ORDER — CLONIDINE HYDROCHLORIDE 0.1 MG/1
0.1 TABLET ORAL EVERY 6 HOURS PRN
Qty: 20 TABLET | Refills: 0 | Status: SHIPPED | OUTPATIENT
Start: 2018-03-22 | End: 2018-03-24 | Stop reason: SDUPTHER

## 2018-03-26 RX ORDER — CLONIDINE HYDROCHLORIDE 0.1 MG/1
TABLET ORAL
Qty: 20 TABLET | Refills: 0 | Status: SHIPPED | OUTPATIENT
Start: 2018-03-26 | End: 2018-04-03 | Stop reason: SDUPTHER

## 2018-03-26 RX ORDER — CLONIDINE HYDROCHLORIDE 0.1 MG/1
TABLET ORAL
Qty: 20 TABLET | Refills: 0 | Status: SHIPPED | OUTPATIENT
Start: 2018-03-26 | End: 2018-11-02

## 2018-03-28 RX ORDER — CLONIDINE HYDROCHLORIDE 0.1 MG/1
TABLET ORAL
Qty: 20 TABLET | Refills: 0 | Status: SHIPPED | OUTPATIENT
Start: 2018-03-28 | End: 2018-04-03 | Stop reason: SDUPTHER

## 2018-04-03 ENCOUNTER — LAB VISIT (OUTPATIENT)
Dept: LAB | Facility: HOSPITAL | Age: 48
End: 2018-04-03
Payer: COMMERCIAL

## 2018-04-03 ENCOUNTER — OFFICE VISIT (OUTPATIENT)
Dept: RHEUMATOLOGY | Facility: CLINIC | Age: 48
End: 2018-04-03
Payer: COMMERCIAL

## 2018-04-03 VITALS
SYSTOLIC BLOOD PRESSURE: 166 MMHG | WEIGHT: 256.63 LBS | BODY MASS INDEX: 42.76 KG/M2 | HEART RATE: 100 BPM | HEIGHT: 65 IN | DIASTOLIC BLOOD PRESSURE: 99 MMHG

## 2018-04-03 DIAGNOSIS — R79.89 LOW VITAMIN D LEVEL: ICD-10-CM

## 2018-04-03 DIAGNOSIS — R70.0 ELEVATED SED RATE: ICD-10-CM

## 2018-04-03 DIAGNOSIS — R79.82 CRP ELEVATED: Primary | ICD-10-CM

## 2018-04-03 DIAGNOSIS — R79.82 CRP ELEVATED: ICD-10-CM

## 2018-04-03 LAB
25(OH)D3+25(OH)D2 SERPL-MCNC: 24 NG/ML
CRP SERPL-MCNC: 25.8 MG/L
ERYTHROCYTE [SEDIMENTATION RATE] IN BLOOD BY WESTERGREN METHOD: 12 MM/HR

## 2018-04-03 PROCEDURE — 99999 PR PBB SHADOW E&M-EST. PATIENT-LVL IV: CPT | Mod: PBBFAC,,, | Performed by: INTERNAL MEDICINE

## 2018-04-03 PROCEDURE — 86140 C-REACTIVE PROTEIN: CPT

## 2018-04-03 PROCEDURE — 3077F SYST BP >= 140 MM HG: CPT | Mod: CPTII,S$GLB,, | Performed by: INTERNAL MEDICINE

## 2018-04-03 PROCEDURE — 82306 VITAMIN D 25 HYDROXY: CPT

## 2018-04-03 PROCEDURE — 36415 COLL VENOUS BLD VENIPUNCTURE: CPT

## 2018-04-03 PROCEDURE — 85651 RBC SED RATE NONAUTOMATED: CPT

## 2018-04-03 PROCEDURE — 3080F DIAST BP >= 90 MM HG: CPT | Mod: CPTII,S$GLB,, | Performed by: INTERNAL MEDICINE

## 2018-04-03 PROCEDURE — 99214 OFFICE O/P EST MOD 30 MIN: CPT | Mod: S$GLB,,, | Performed by: INTERNAL MEDICINE

## 2018-04-03 ASSESSMENT — ROUTINE ASSESSMENT OF PATIENT INDEX DATA (RAPID3)
PAIN SCORE: 5
TOTAL RAPID3 SCORE: 3.67
PATIENT GLOBAL ASSESSMENT SCORE: 4
MDHAQ FUNCTION SCORE: .6
AM STIFFNESS SCORE: 1, YES
PSYCHOLOGICAL DISTRESS SCORE: 3.3
WHEN YOU AWAKENED IN THE MORNING OVER THE LAST WEEK, PLEASE INDICATE THE AMOUNT OF TIME IT TAKES UNTIL YOU ARE AS LIMBER AS YOU WILL BE FOR THE DAY: 30 MINS
FATIGUE SCORE: 7

## 2018-04-03 NOTE — PROGRESS NOTES
I have personally taken the history and examined the patient and agree with the resident,s note as stated above  Has new erythematous maculopapular rash forearms> upper arms, non-photosensitive. ? Fever, she no longer checks    Exam: confirms rash as above, otherwise normal exam    Results for BLANCA GUTHRIE (MRN [id number]) as of 4/3/2018 11:09   Ref. Range 2/2/2018 09:27   WBC Latest Ref Range: 3.90 - 12.70 K/uL 3.74 (L)   RBC Latest Ref Range: 4.00 - 5.40 M/uL 4.94   Hemoglobin Latest Ref Range: 12.0 - 16.0 g/dL 13.9   Hematocrit Latest Ref Range: 37.0 - 48.5 % 41.6   MCV Latest Ref Range: 82 - 98 fL 84   MCH Latest Ref Range: 27.0 - 31.0 pg 28.1   MCHC Latest Ref Range: 32.0 - 36.0 g/dL 33.4   RDW Latest Ref Range: 11.5 - 14.5 % 13.2   Platelets Latest Ref Range: 150 - 350 K/uL 174   MPV Latest Ref Range: 9.2 - 12.9 fL 8.7 (L)   Gran% Latest Ref Range: 38.0 - 73.0 % 68.2   Gran # (ANC) Latest Ref Range: 1.8 - 7.7 K/uL 2.6   Immature Granulocytes Latest Ref Range: 0.0 - 0.5 % 0.3   Immature Grans (Abs) Latest Ref Range: 0.00 - 0.04 K/uL 0.01   Lymph% Latest Ref Range: 18.0 - 48.0 % 22.7   Lymph # Latest Ref Range: 1.0 - 4.8 K/uL 0.9 (L)   Mono% Latest Ref Range: 4.0 - 15.0 % 5.9   Mono # Latest Ref Range: 0.3 - 1.0 K/uL 0.2 (L)   Eosinophil% Latest Ref Range: 0.0 - 8.0 % 2.1   Eos # Latest Ref Range: 0.0 - 0.5 K/uL 0.1   Basophil% Latest Ref Range: 0.0 - 1.9 % 0.8   Baso # Latest Ref Range: 0.00 - 0.20 K/uL 0.03   nRBC Latest Ref Range: 0 /100 WBC 0   Methlymalonic Acid Latest Ref Range: <0.40 umol/L 0.18   Sed Rate Latest Ref Range: 0 - 20 mm/Hr 13   Sodium Latest Ref Range: 136 - 145 mmol/L 143   Potassium Latest Ref Range: 3.5 - 5.1 mmol/L 3.8   Chloride Latest Ref Range: 95 - 110 mmol/L 105   CO2 Latest Ref Range: 23 - 29 mmol/L 26   Anion Gap Latest Ref Range: 8 - 16 mmol/L 12   BUN, Bld Latest Ref Range: 6 - 20 mg/dL 10   Creatinine Latest Ref Range: 0.5 - 1.4 mg/dL 0.8   eGFR if non African American  Latest Ref Range: >60 mL/min/1.73 m^2 >60.0   eGFR if African American Latest Ref Range: >60 mL/min/1.73 m^2 >60.0   Glucose Latest Ref Range: 70 - 110 mg/dL 86   Calcium Latest Ref Range: 8.7 - 10.5 mg/dL 9.5   Alkaline Phosphatase Latest Ref Range: 55 - 135 U/L 145 (H)   Total Protein Latest Ref Range: 6.0 - 8.4 g/dL 7.7   Albumin Latest Ref Range: 3.5 - 5.2 g/dL 4.0   Total Bilirubin Latest Ref Range: 0.1 - 1.0 mg/dL 0.2   AST Latest Ref Range: 10 - 40 U/L 23   ALT Latest Ref Range: 10 - 44 U/L 28   CRP Latest Ref Range: 0.0 - 8.2 mg/L 10.0 (H)   CPK Latest Ref Range: 20 - 180 U/L 87   Bone, Alk Phos Latest Ref Range: 5 - 58 U/L 40     Results for BLANCA GUTHRIE (MRN [id number]) as of 4/3/2018 11:09   Ref. Range 1/22/2018 14:42   IgG - Serum Latest Ref Range: 650 - 1600 mg/dL 600 (L)   IgM Latest Ref Range: 50 - 300 mg/dL 78   IgA Latest Ref Range: 40 - 350 mg/dL 343   IgE Latest Ref Range: 0 - 100 IU/mL <35        bx done at last visit 1/4/18:     FINAL PATHOLOGIC DIAGNOSIS  1. Skin, right abdomen, punch biopsy:  - SUBACUTE SPONGIOTIC DERMATITIS (See Comment).  MICROSCOPIC DESCRIPTION: The biopsy shows parakeratosis, epidermal acanthosis with elongation of rete  ridges, spongiosis with exocytosis of lymphocytes and a superficial perivascular dermal lymphohistiocytic infiltrate  with interstitial eosinophils.  COMMENT: Differential diagnosis includes atopic dermatitis, allergic contact dermatitis, nummular dermatitis or id  Reaction.             Resolved eczema  Vitamin D deficiency  Hypertension not controlled with metoprolol 50mg qid, clonidine 0.1mg, valsartan 40mg daily  Asthma  Allergic rhinitis  Mild IgG deficiency  H/o fever  H/o carcinoid  Trigeminal neuralgia on carbamazepine, gabapentin      Recheck esr, crp, vitamin D today  Ref back to Dr. Gonzales for new rash  Cont vitamin D2 50,000 units once a week and vitamin D3 1000 units daily  F/u Dr. Celis re: hypertension  RTC  6 months

## 2018-04-03 NOTE — PROGRESS NOTES
"Subjective:       Patient ID: Kianna Zuleta is a 47 y.o. female.    Chief Complaint: FUO, hx carcinoid, trigeminal neuralgia    Since last clinic visit pt says she does not think she has had any fevers but is not sure. Continues to have episodes of flushing, HTN, and feeling warm. Two weeks ago developed bilateral upper extremity (predominantly forearm with some upper arm involvement) rash that is red, non-pruritic, flat, and not associated with exposure to the sun. Pt says that she has not been exposed to any new foods / soaps / detergents or any other known allergens. No other symptoms associated with rash, including joint pain/swellling, other rashes, or pain.    Last ESR normalized, CRP high-normal, Vit D last 22, pt on 1000U daily + 50K U weekly D3    Saw AI for hypo IgG, saw pulm for lymph node (said unlikely to be sarcoidosis given normal ACE and lack of findings on skin biopsy). Prior skin biopsy consistent with nonspecific inflammation or atopic dermatitis, pt using ointment and bland soap.      Review of Systems   Constitutional: Negative for unexpected weight change.   HENT: Negative for congestion.    Eyes: Negative for visual disturbance.   Respiratory: Negative for shortness of breath.    Cardiovascular: Negative for chest pain.   Gastrointestinal: Negative for abdominal pain.   Musculoskeletal: Negative for arthralgias.   Skin: Negative for wound.   Neurological: Negative for headaches.   Psychiatric/Behavioral: Negative for sleep disturbance.         Objective:   BP (!) 166/99   Pulse 100   Ht 5' 4.8" (1.646 m)   Wt 116.4 kg (256 lb 9.6 oz)   BMI 42.96 kg/m²      Physical Exam   Constitutional: She is oriented to person, place, and time and well-developed, well-nourished, and in no distress. No distress.   HENT:   Head: Normocephalic and atraumatic.   Right Ear: External ear normal.   Left Ear: External ear normal.   Eyes: Conjunctivae and EOM are normal. Pupils are equal, round, and " reactive to light.   Neck: Normal range of motion. No JVD present.   Cardiovascular: Normal rate, regular rhythm, normal heart sounds and intact distal pulses.    No murmur heard.  Pulmonary/Chest: Effort normal and breath sounds normal. She has no wheezes. She has no rales.   Abdominal: Soft. There is no tenderness.   Lymphadenopathy:     She has no cervical adenopathy.   Neurological: She is alert and oriented to person, place, and time.   Skin: Rash (Bilateral forearm rash on extensor surfaces. Rash is erythematous, macular with ill-defined borders and pinpoint papules approx 1-2 mm which are bright red. Rash is blanching and non-painful, non-pruritic, not associated with sun exposure.) noted.     Musculoskeletal: Normal range of motion.           Assessment:       1. CRP elevated    2. Elevated sed rate    3. Low vitamin D level            Plan:       Recheck CRP, ESR, Vit D    Refer to dermatology for further evaluation of rash    Return in 6 months

## 2018-04-04 ENCOUNTER — PATIENT MESSAGE (OUTPATIENT)
Dept: RHEUMATOLOGY | Facility: CLINIC | Age: 48
End: 2018-04-04

## 2018-04-04 ENCOUNTER — TELEPHONE (OUTPATIENT)
Dept: DERMATOLOGY | Facility: CLINIC | Age: 48
End: 2018-04-04

## 2018-04-04 NOTE — TELEPHONE ENCOUNTER
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

## 2018-04-05 ENCOUNTER — PATIENT MESSAGE (OUTPATIENT)
Dept: FAMILY MEDICINE | Facility: CLINIC | Age: 48
End: 2018-04-05

## 2018-04-05 ENCOUNTER — OFFICE VISIT (OUTPATIENT)
Dept: FAMILY MEDICINE | Facility: CLINIC | Age: 48
End: 2018-04-05
Payer: COMMERCIAL

## 2018-04-05 VITALS
WEIGHT: 254.19 LBS | HEART RATE: 100 BPM | HEIGHT: 64 IN | SYSTOLIC BLOOD PRESSURE: 122 MMHG | DIASTOLIC BLOOD PRESSURE: 90 MMHG | OXYGEN SATURATION: 95 % | BODY MASS INDEX: 43.4 KG/M2 | TEMPERATURE: 99 F

## 2018-04-05 DIAGNOSIS — L29.9 ITCHING: ICD-10-CM

## 2018-04-05 DIAGNOSIS — R21 RASH: Primary | ICD-10-CM

## 2018-04-05 PROCEDURE — 3080F DIAST BP >= 90 MM HG: CPT | Mod: CPTII,S$GLB,, | Performed by: NURSE PRACTITIONER

## 2018-04-05 PROCEDURE — 3074F SYST BP LT 130 MM HG: CPT | Mod: CPTII,S$GLB,, | Performed by: NURSE PRACTITIONER

## 2018-04-05 PROCEDURE — 99214 OFFICE O/P EST MOD 30 MIN: CPT | Mod: S$GLB,,, | Performed by: NURSE PRACTITIONER

## 2018-04-05 PROCEDURE — 99999 PR PBB SHADOW E&M-EST. PATIENT-LVL IV: CPT | Mod: PBBFAC,,, | Performed by: NURSE PRACTITIONER

## 2018-04-05 RX ORDER — VALSARTAN 80 MG/1
TABLET ORAL
Refills: 8 | COMMUNITY
Start: 2018-03-11 | End: 2018-09-28

## 2018-04-05 RX ORDER — TEMAZEPAM 15 MG/1
15 CAPSULE ORAL NIGHTLY
Refills: 3 | COMMUNITY
Start: 2018-04-04 | End: 2018-09-28

## 2018-04-05 RX ORDER — ZALEPLON 10 MG/1
10 CAPSULE ORAL DAILY
Refills: 2 | COMMUNITY
Start: 2018-04-04 | End: 2024-02-20

## 2018-04-05 RX ORDER — HYDROXYZINE HYDROCHLORIDE 25 MG/1
25 TABLET, FILM COATED ORAL 3 TIMES DAILY
Qty: 45 TABLET | Refills: 1 | Status: SHIPPED | OUTPATIENT
Start: 2018-04-05 | End: 2018-07-20 | Stop reason: SDUPTHER

## 2018-04-06 ENCOUNTER — LAB VISIT (OUTPATIENT)
Dept: LAB | Facility: HOSPITAL | Age: 48
End: 2018-04-06
Attending: STUDENT IN AN ORGANIZED HEALTH CARE EDUCATION/TRAINING PROGRAM
Payer: COMMERCIAL

## 2018-04-06 DIAGNOSIS — R76.8 LOW SERUM IGG FOR AGE: ICD-10-CM

## 2018-04-06 PROCEDURE — 86317 IMMUNOASSAY INFECTIOUS AGENT: CPT | Mod: 59

## 2018-04-06 PROCEDURE — 86359 T CELLS TOTAL COUNT: CPT

## 2018-04-06 PROCEDURE — 86357 NK CELLS TOTAL COUNT: CPT

## 2018-04-06 PROCEDURE — 86762 RUBELLA ANTIBODY: CPT

## 2018-04-06 PROCEDURE — 36415 COLL VENOUS BLD VENIPUNCTURE: CPT | Mod: PO

## 2018-04-06 PROCEDURE — 86360 T CELL ABSOLUTE COUNT/RATIO: CPT

## 2018-04-06 PROCEDURE — 86355 B CELLS TOTAL COUNT: CPT

## 2018-04-09 LAB
CD3+CD4+ CELLS # BLD: 437 CELLS/UL (ref 300–1400)
CD3+CD4+ CELLS NFR BLD: 43.4 % (ref 28–57)
LYMPHOCYTES NFR CSF MANUAL: 1.01 % (ref 0.9–3.6)
LYMPHOCYTES NFR CSF MANUAL: 4.1 % (ref 7–31)
LYMPHOCYTES NFR CSF MANUAL: 41 CELLS/UL (ref 90–600)
LYMPHOCYTES NFR CSF MANUAL: 43.1 % (ref 10–39)
LYMPHOCYTES NFR CSF MANUAL: 434 CELLS/UL (ref 200–900)
LYMPHOCYTES NFR CSF MANUAL: 6.1 % (ref 6–19)
LYMPHOCYTES NFR CSF MANUAL: 61 CELLS/UL (ref 100–500)
LYMPHOCYTES NFR CSF MANUAL: 88.6 % (ref 55–83)
LYMPHOCYTES NFR CSF MANUAL: 893 CELLS/UL (ref 700–2100)
RUBV IGG SER-ACNC: 210 IU/ML
RUBV IGG SER-IMP: REACTIVE

## 2018-04-10 ENCOUNTER — PATIENT MESSAGE (OUTPATIENT)
Dept: FAMILY MEDICINE | Facility: CLINIC | Age: 48
End: 2018-04-10

## 2018-04-10 ENCOUNTER — PATIENT MESSAGE (OUTPATIENT)
Dept: ALLERGY | Facility: CLINIC | Age: 48
End: 2018-04-10

## 2018-04-11 ENCOUNTER — PATIENT MESSAGE (OUTPATIENT)
Dept: FAMILY MEDICINE | Facility: CLINIC | Age: 48
End: 2018-04-11

## 2018-04-11 LAB
DEPRECATED S PNEUM 1 IGG SER-MCNC: 1.2 MCG/ML
DEPRECATED S PNEUM12 IGG SER-MCNC: 0.3 MCG/ML
DEPRECATED S PNEUM14 IGG SER-MCNC: 0.4 MCG/ML
DEPRECATED S PNEUM19 IGG SER-MCNC: 0.6 MCG/ML
DEPRECATED S PNEUM23 IGG SER-MCNC: <0.3 MCG/ML
DEPRECATED S PNEUM3 IGG SER-MCNC: 3.5 MCG/ML
DEPRECATED S PNEUM4 IGG SER-MCNC: <0.3 MCG/ML
DEPRECATED S PNEUM5 IGG SER-MCNC: <0.3 MCG/ML
DEPRECATED S PNEUM8 IGG SER-MCNC: <0.3 MCG/ML
DEPRECATED S PNEUM9 IGG SER-MCNC: 1 MCG/ML
S PNEUM DA 18C IGG SER-MCNC: <0.3 MCG/ML
S PNEUM DA 6B IGG SER-MCNC: 0.6 MCG/ML
S PNEUM DA 7F IGG SER-MCNC: <0.3 MCG/ML
S PNEUM DA 9V IGG SER-MCNC: <0.3 MCG/ML

## 2018-04-13 ENCOUNTER — OFFICE VISIT (OUTPATIENT)
Dept: ALLERGY | Facility: CLINIC | Age: 48
End: 2018-04-13
Payer: COMMERCIAL

## 2018-04-13 VITALS
HEIGHT: 64 IN | SYSTOLIC BLOOD PRESSURE: 136 MMHG | DIASTOLIC BLOOD PRESSURE: 76 MMHG | WEIGHT: 257.25 LBS | BODY MASS INDEX: 43.92 KG/M2

## 2018-04-13 DIAGNOSIS — D83.9 COMMON VARIABLE IMMUNODEFICIENCY: Primary | ICD-10-CM

## 2018-04-13 DIAGNOSIS — D80.6 SPECIFIC ANTIBODY DEFICIENCY WITH NORMAL IG CONCENTRATION AND NORMAL NUMBER OF B CELLS: ICD-10-CM

## 2018-04-13 PROCEDURE — 3075F SYST BP GE 130 - 139MM HG: CPT | Mod: CPTII,S$GLB,, | Performed by: STUDENT IN AN ORGANIZED HEALTH CARE EDUCATION/TRAINING PROGRAM

## 2018-04-13 PROCEDURE — 3078F DIAST BP <80 MM HG: CPT | Mod: CPTII,S$GLB,, | Performed by: STUDENT IN AN ORGANIZED HEALTH CARE EDUCATION/TRAINING PROGRAM

## 2018-04-13 PROCEDURE — 99999 PR PBB SHADOW E&M-EST. PATIENT-LVL II: CPT | Mod: PBBFAC,,, | Performed by: STUDENT IN AN ORGANIZED HEALTH CARE EDUCATION/TRAINING PROGRAM

## 2018-04-13 PROCEDURE — 99214 OFFICE O/P EST MOD 30 MIN: CPT | Mod: S$GLB,,, | Performed by: STUDENT IN AN ORGANIZED HEALTH CARE EDUCATION/TRAINING PROGRAM

## 2018-04-13 NOTE — PATIENT INSTRUCTIONS
6 month trial of IVIg      If okayed by insurance      And if scheduling is acceptable.    Continue current regimen for now    Infection diary.

## 2018-04-13 NOTE — PROGRESS NOTES
"Allergy Clinic Note  [facility]    Subjective:      Patient ID: Kianna Zuleta is a 47 y.o. female.    Chief Complaint: Follow-up (discuss labs)        History of Present Illness: Nonatopic 47-year-old female with multiple sinopulmonary infections returns at my request to discuss immune findings and treatment options.    Her only complaint is of a constant rash on her forearms which is intermittently pruritic.  She is undergoing workup at dermatology.    Infection history reviewed:   Pneumonia:  One episode pneumonia (October 2017, [facility]).  "Community acquired pneumonia", bilateral, admitted for 5 days.  No other pneumonias  Otitis : 3-5 times a year as an adult.  Sometimes asymptomatic sometimes presenting with an off-balance sensation  Strep pharyngitis: 7-8 episodes as an adult  Sinusitis: Distended 12 episodes per year, 3-4 with fever greater than 101.    Additional History:  She is a lifetime nonsmoker. Past medical, family, and social histories are unchanged.      Patient Active Problem List   Diagnosis    Vitamin D deficiency    Hypothyroidism    Irritable bowel syndrome    Elevated alkaline phosphatase level    Malignant carcinoid tumor of appendix    Fever    Elevated sed rate    CRP elevated    Hypertension    Morbid obesity    Abnormal CT of the chest    Eczema    Trigeminal neuralgia of right side of face    IgG deficiency     Current Outpatient Prescriptions on File Prior to Visit   Medication Sig Dispense Refill    azelastine (ASTELIN) 137 mcg (0.1 %) nasal spray 2 SPRAYS each nostril Q 12 H PRN 30 mL 6    BD LUER-NANCY SYRINGE 3 mL 22 gauge x 1" Syrg INJECT QD FOR 5 DAYS THEN WEEKLY FOR 5 WEEKS THEN MONTHLY  3    BRINTELLIX 20 mg Tab once daily.   2    carBAMazepine (TEGRETOL XR) 200 MG 12 hr tablet Take 1 tab twice daily.  If no improvement after 2 weeks, take 2 tabs twice daily. 120 tablet 5    cloNIDine (CATAPRES) 0.1 MG tablet TAKE 1 TABLET BY MOUTH EVERY 6 HOURS AS " "NEEDED FOR FLUSHING 20 tablet 0    cyanocobalamin, vitamin B-12, 1,000 mcg/mL Kit Inject 1,000 mcg as directed every 28 days. Take daily for 5 days then weekly for 5 weeks then monthly 12 kit 1    ergocalciferol (ERGOCALCIFEROL) 50,000 unit Cap Take 1 capsule (50,000 Units total) by mouth every 7 days. 12 capsule 3    estropipate (OGEN) 1.5 MG tablet Take 1 tablet (1.5 mg total) by mouth once daily. 90 tablet 3    gabapentin (NEURONTIN) 300 MG capsule Take 300 mg by mouth 2 (two) times daily. 2-300 mg cap in AM and 3-300 mg cap in PM      hydrOXYzine HCl (ATARAX) 25 MG tablet Take 1 tablet (25 mg total) by mouth 3 (three) times daily. 45 tablet 1    levocetirizine (XYZAL) 5 MG tablet TK 1 T PO  ONCE Day  9    metoprolol tartrate (LOPRESSOR) 50 MG tablet Take 50 mg by mouth 4 (four) times daily. 1 Tablet Oral Three times a day      montelukast (SINGULAIR) 10 mg tablet TK 1 T PO  ONCE D  3    oxycodone-acetaminophen  mg (PERCOCET)  mg per tablet Take 1 tablet by mouth every 4 (four) hours as needed.      syringe with needle, safety 3 mL 22 gauge x 1 1/2" Syrg B12 injection every day for 5 days then weekly for 5 weeks then monthly. 50 Syringe 3    temazepam (RESTORIL) 15 mg Cap Take 15 mg by mouth every evening.  3    tiZANidine 4 mg Cap TK 2 TO 3 CS PO TID WITH FOOD  1    traZODone (DESYREL) 100 MG tablet TK 1 T PO  HS  1    valsartan (DIOVAN) 80 MG tablet TK 1 T PO QD  8    zaleplon (SONATA) 10 MG capsule Take 10 mg by mouth once daily.  2    ALBUTEROL INHL Inhale into the lungs as needed.       albuterol-ipratropium 2.5mg-0.5mg/3mL (DUO-NEB) 0.5 mg-3 mg(2.5 mg base)/3 mL nebulizer solution Take 3 mLs by nebulization every 6 (six) hours as needed for Wheezing. Rescue 1 Box 2    metoclopramide HCl (REGLAN) 10 MG tablet Take 1 tablet (10 mg total) by mouth every 6 (six) hours. 30 tablet 0    ondansetron (ZOFRAN-ODT) 4 MG TbDL Take 1 tablet (4 mg total) by mouth every 8 (eight) hours as " "needed (nausea). 20 tablet 0     No current facility-administered medications on file prior to visit.          Review of Systems   Constitutional: Negative for chills and fever.   HENT: Negative for ear discharge and nosebleeds.    Eyes: Negative for discharge and redness.   Respiratory: Negative for hemoptysis, sputum production and stridor.    Gastrointestinal: Negative for blood in stool, melena and vomiting.   Genitourinary: Negative for hematuria.   Skin: Negative for itching and rash.   Neurological: Negative for seizures and loss of consciousness.       Objective:   /76 (BP Location: Left arm, Patient Position: Sitting, BP Method: Medium (Manual))   Ht 5' 4" (1.626 m)   Wt 116.7 kg (257 lb 4.4 oz)   BMI 44.16 kg/m²     Physical Exam   Constitutional: She is oriented to person, place, and time and well-developed, well-nourished, and in no distress.   HENT:   Head: Normocephalic and atraumatic.   Nose: Nose normal.   Eyes: Conjunctivae are normal. No scleral icterus.   Neck: Neck supple.   Pulmonary/Chest: Effort normal. No stridor. No respiratory distress.   Abdominal: She exhibits no distension.   Musculoskeletal: She exhibits no edema or deformity.   Neurological: She is alert and oriented to person, place, and time.   Skin: Rash noted. No erythema.   bilateral forearms:  Posterior surface red from wrist to elbow with pinpoint darker red papules.  Punch biopsy site healing well left forearm   Psychiatric: Affect and judgment normal.       Data:   Immune function:  Pneumo  - poor response to vaccine x2  titers low x2 (3/1/2018, 4/6/2018)         S/p Pneumovax 1/22/2018         S/p Prev-13   3/16/2018    Rubellas titers - good response to booster  Low titers   S/p MMR vaccine 3/16/2018  Normal titers 4/6/2018    Normal varicella zoster titer (1/22/2018)    Assessment:     1. Common variable immunodeficiency      Laboratory Dx of CVID with:  - low # of B cells  - slightly low IgG  - poor response to " pneumococcal vaccine, both unconjugated and conjugated.    Clinical history of multiple sinopulmonary infections  Plan:     Kianna was seen today for follow-up.    Diagnoses and all orders for this visit:    Common variable immunodeficiency  Discussed immune findings in detail and copies given.  Discussed risks and benefits of IVIG  After an extensive discussion, we are opting for a 6 month trial of treatment.    Defer to derm re rash.  Patient Instructions   6 month trial of IVIg      If okayed by insurance      And if scheduling is acceptable.    Continue current regimen for now    Infection diary.          Follow-up in about 3 months (around 7/13/2018).    Kaylin Miller MD

## 2018-04-14 ENCOUNTER — PATIENT MESSAGE (OUTPATIENT)
Dept: FAMILY MEDICINE | Facility: CLINIC | Age: 48
End: 2018-04-14

## 2018-04-16 ENCOUNTER — PATIENT MESSAGE (OUTPATIENT)
Dept: FAMILY MEDICINE | Facility: CLINIC | Age: 48
End: 2018-04-16

## 2018-04-16 ENCOUNTER — PATIENT MESSAGE (OUTPATIENT)
Dept: RHEUMATOLOGY | Facility: CLINIC | Age: 48
End: 2018-04-16

## 2018-04-18 ENCOUNTER — PATIENT MESSAGE (OUTPATIENT)
Dept: ALLERGY | Facility: CLINIC | Age: 48
End: 2018-04-18

## 2018-04-18 ENCOUNTER — PATIENT MESSAGE (OUTPATIENT)
Dept: FAMILY MEDICINE | Facility: CLINIC | Age: 48
End: 2018-04-18

## 2018-04-20 ENCOUNTER — INITIAL CONSULT (OUTPATIENT)
Dept: ELECTROPHYSIOLOGY | Facility: CLINIC | Age: 48
End: 2018-04-20
Payer: COMMERCIAL

## 2018-04-20 ENCOUNTER — HOSPITAL ENCOUNTER (OUTPATIENT)
Dept: CARDIOLOGY | Facility: CLINIC | Age: 48
Discharge: HOME | End: 2018-04-20
Payer: COMMERCIAL

## 2018-04-20 VITALS
BODY MASS INDEX: 48.2 KG/M2 | HEIGHT: 61 IN | WEIGHT: 255.31 LBS | DIASTOLIC BLOOD PRESSURE: 84 MMHG | SYSTOLIC BLOOD PRESSURE: 146 MMHG | HEART RATE: 107 BPM

## 2018-04-20 DIAGNOSIS — G90.A POTS (POSTURAL ORTHOSTATIC TACHYCARDIA SYNDROME): Chronic | ICD-10-CM

## 2018-04-20 DIAGNOSIS — E66.01 MORBID OBESITY: ICD-10-CM

## 2018-04-20 DIAGNOSIS — G50.0 TRIGEMINAL NEURALGIA OF RIGHT SIDE OF FACE: ICD-10-CM

## 2018-04-20 DIAGNOSIS — I10 ESSENTIAL HYPERTENSION: Primary | ICD-10-CM

## 2018-04-20 DIAGNOSIS — E03.4 HYPOTHYROIDISM DUE TO ACQUIRED ATROPHY OF THYROID: ICD-10-CM

## 2018-04-20 DIAGNOSIS — G90.A POTS (POSTURAL ORTHOSTATIC TACHYCARDIA SYNDROME): Primary | ICD-10-CM

## 2018-04-20 PROCEDURE — 93000 ELECTROCARDIOGRAM COMPLETE: CPT | Mod: S$GLB,,, | Performed by: INTERNAL MEDICINE

## 2018-04-20 PROCEDURE — 99999 PR PBB SHADOW E&M-EST. PATIENT-LVL III: CPT | Mod: PBBFAC,,, | Performed by: INTERNAL MEDICINE

## 2018-04-20 PROCEDURE — 99244 OFF/OP CNSLTJ NEW/EST MOD 40: CPT | Mod: S$GLB,,, | Performed by: INTERNAL MEDICINE

## 2018-04-20 NOTE — LETTER
April 20, 2018      Rajesh Rowland, NP  [address]  [place] [address]           [facility]  [address]  [facility] [address]  Phone: [phone]  Fax: [phone]          Patient: Kianna Zuleta   MR Number: [id number]   YOB: 1970   Date of Visit: 4/20/2018       Dear Rajesh Rowland:    Thank you for referring Kianna Zuleta to me for evaluation. Attached you will find relevant portions of my assessment and plan of care.    If you have questions, please do not hesitate to call me. I look forward to following Kianna Zuleta along with you.    Sincerely,    Marquez Posada MD    Enclosure  CC:  No Recipients    If you would like to receive this communication electronically, please contact [email or web address] or [phone] to request more information on [facility] access.    For providers and/or their staff who would like to refer a patient to [facility], please contact us through our one-stop-shop provider referral line, [facility], at [phone].    If you feel you have received this communication in error or would no longer like to receive these types of communications, please e-mail [email or web address]

## 2018-04-20 NOTE — PROGRESS NOTES
Subjective:    Patient ID:  Kianna Zuleta is a 47 y.o. female who presents for evaluation of POTS      HPI 48 yo female with IAST/POTS, morbid obesity, Htn, hypothyroidism, trigeminal neuralgia.  Seen by me in 2082-0607 for IAST.  We ultimately performed EPS and considered sinus node modification.  Noted phrenic nerve capture >> deferred extensive RFA.  Has not been seen by me since 2010.  Things ultimately stabilized, and she was feeling relatively well for a period of time.  Has noted increasing palpitations over the past couple of months.  Notes flushing, elevated HR, and elevated blood pressure, associated with chest tightness.  10/17 admitted with pneumonia to [facility].  Recently diagnosed with CVID.  Has noted 30-40 lbs weight gain over the same time period.  Sleep medications recently adjusted as they were not effective.  ECG reveals sinus tachycardia at 107 bpm.      Review of Systems   Constitution: Positive for malaise/fatigue. Negative for weakness.   Cardiovascular: Positive for palpitations. Negative for chest pain, dyspnea on exertion, irregular heartbeat, leg swelling, near-syncope, orthopnea, paroxysmal nocturnal dyspnea and syncope.   Respiratory: Positive for shortness of breath. Negative for cough.    Neurological: Negative for dizziness and light-headedness.   All other systems reviewed and are negative.       Objective:    Physical Exam   Constitutional: She is oriented to person, place, and time. She appears well-developed and well-nourished.   Eyes: Conjunctivae are normal. No scleral icterus.   Neck: No JVD present. No tracheal deviation present.   Cardiovascular: Normal rate and regular rhythm.  PMI is not displaced.    Pulmonary/Chest: Effort normal and breath sounds normal. No respiratory distress.   Abdominal: Soft. There is no hepatosplenomegaly. There is no tenderness.   Musculoskeletal: She exhibits no edema or tenderness.   Neurological: She is alert and oriented to person,  place, and time.   Skin: Skin is warm and dry. No rash noted.   Psychiatric: She has a normal mood and affect. Her behavior is normal.         Assessment:       1. Essential hypertension    2. POTS (postural orthostatic tachycardia syndrome)    3. Trigeminal neuralgia of right side of face    4. Hypothyroidism due to acquired atrophy of thyroid    5. Morbid obesity         Plan:       Her symptoms are c/w hyper-adrenergia as opposed to arrhythmia.  Discussed at length.  Discussed lifestyle modification (hydration, improve sleep patterns, evaluate for sleep apnea which has been scheduled, and exercise).

## 2018-04-23 ENCOUNTER — TELEPHONE (OUTPATIENT)
Dept: RHEUMATOLOGY | Facility: CLINIC | Age: 48
End: 2018-04-23

## 2018-04-23 ENCOUNTER — PATIENT MESSAGE (OUTPATIENT)
Dept: RHEUMATOLOGY | Facility: CLINIC | Age: 48
End: 2018-04-23

## 2018-04-23 DIAGNOSIS — R76.0 LUPUS ANTICOAGULANT POSITIVE: Primary | ICD-10-CM

## 2018-04-24 ENCOUNTER — PATIENT MESSAGE (OUTPATIENT)
Dept: FAMILY MEDICINE | Facility: CLINIC | Age: 48
End: 2018-04-24

## 2018-04-25 ENCOUNTER — LAB VISIT (OUTPATIENT)
Dept: LAB | Facility: HOSPITAL | Age: 48
End: 2018-04-25
Attending: INTERNAL MEDICINE
Payer: COMMERCIAL

## 2018-04-25 DIAGNOSIS — R76.0 LUPUS ANTICOAGULANT POSITIVE: ICD-10-CM

## 2018-04-25 PROCEDURE — 86147 CARDIOLIPIN ANTIBODY EA IG: CPT | Mod: 59

## 2018-04-25 PROCEDURE — 86146 BETA-2 GLYCOPROTEIN ANTIBODY: CPT | Mod: 59

## 2018-04-25 PROCEDURE — 85598 HEXAGNAL PHOSPH PLTLT NEUTRL: CPT

## 2018-04-25 PROCEDURE — 85613 RUSSELL VIPER VENOM DILUTED: CPT

## 2018-04-26 ENCOUNTER — PATIENT MESSAGE (OUTPATIENT)
Dept: FAMILY MEDICINE | Facility: CLINIC | Age: 48
End: 2018-04-26

## 2018-04-26 RX ORDER — DOXYCYCLINE 100 MG/1
100 CAPSULE ORAL 2 TIMES DAILY
Qty: 20 CAPSULE | Refills: 0 | Status: SHIPPED | OUTPATIENT
Start: 2018-04-26 | End: 2018-09-28

## 2018-04-27 LAB
CARDIOLIPIN IGG SER IA-ACNC: <9.4 GPL
CARDIOLIPIN IGM SER IA-ACNC: <9.4 MPL

## 2018-04-28 LAB
B2 GLYCOPROT1 IGA SER QL: <9 SAU
B2 GLYCOPROT1 IGG SER QL: <9 SGU
B2 GLYCOPROT1 IGM SER QL: <9 SMU

## 2018-04-30 ENCOUNTER — TELEPHONE (OUTPATIENT)
Dept: RHEUMATOLOGY | Facility: CLINIC | Age: 48
End: 2018-04-30

## 2018-04-30 LAB — APTT HEX PL PPP: NEGATIVE S

## 2018-05-01 ENCOUNTER — PATIENT MESSAGE (OUTPATIENT)
Dept: ALLERGY | Facility: CLINIC | Age: 48
End: 2018-05-01

## 2018-05-01 LAB — LA PPP-IMP: NEGATIVE

## 2018-05-02 NOTE — TELEPHONE ENCOUNTER
Dr. Miller,    I do not see where any IVIG medication was prescribed. I see your chart note that you wanted to try a trial IVIG.   Please advise.    Thanks,    Joanie

## 2018-05-07 ENCOUNTER — PATIENT MESSAGE (OUTPATIENT)
Dept: ALLERGY | Facility: CLINIC | Age: 48
End: 2018-05-07

## 2018-05-07 ENCOUNTER — PATIENT MESSAGE (OUTPATIENT)
Dept: FAMILY MEDICINE | Facility: CLINIC | Age: 48
End: 2018-05-07

## 2018-05-11 NOTE — TELEPHONE ENCOUNTER
Is the patient doing Privigen at the hospital?  If so, you have to enter a treatment plan in EPIC so that a Prior Authorization can be initiated?    Is patient doing sub-q infusions or another type of IVIG that is not done at [facility]?  I do not see anything in her medication list.    Please advise.

## 2018-05-15 ENCOUNTER — PATIENT MESSAGE (OUTPATIENT)
Dept: ALLERGY | Facility: CLINIC | Age: 48
End: 2018-05-15

## 2018-05-15 ENCOUNTER — OFFICE VISIT (OUTPATIENT)
Dept: FAMILY MEDICINE | Facility: CLINIC | Age: 48
End: 2018-05-15
Payer: COMMERCIAL

## 2018-05-15 VITALS
OXYGEN SATURATION: 96 % | SYSTOLIC BLOOD PRESSURE: 130 MMHG | HEIGHT: 64 IN | TEMPERATURE: 99 F | BODY MASS INDEX: 43.96 KG/M2 | DIASTOLIC BLOOD PRESSURE: 68 MMHG | HEART RATE: 115 BPM | WEIGHT: 257.5 LBS

## 2018-05-15 DIAGNOSIS — H65.92 LEFT OTITIS MEDIA WITH EFFUSION: Primary | ICD-10-CM

## 2018-05-15 PROCEDURE — 3075F SYST BP GE 130 - 139MM HG: CPT | Mod: CPTII,S$GLB,, | Performed by: NURSE PRACTITIONER

## 2018-05-15 PROCEDURE — 3008F BODY MASS INDEX DOCD: CPT | Mod: CPTII,S$GLB,, | Performed by: NURSE PRACTITIONER

## 2018-05-15 PROCEDURE — 3078F DIAST BP <80 MM HG: CPT | Mod: CPTII,S$GLB,, | Performed by: NURSE PRACTITIONER

## 2018-05-15 PROCEDURE — 99214 OFFICE O/P EST MOD 30 MIN: CPT | Mod: S$GLB,,, | Performed by: NURSE PRACTITIONER

## 2018-05-15 PROCEDURE — 99999 PR PBB SHADOW E&M-EST. PATIENT-LVL V: CPT | Mod: PBBFAC,,, | Performed by: NURSE PRACTITIONER

## 2018-05-15 RX ORDER — AZITHROMYCIN 250 MG/1
TABLET, FILM COATED ORAL
Qty: 6 TABLET | Refills: 0 | Status: SHIPPED | OUTPATIENT
Start: 2018-05-15 | End: 2018-05-28

## 2018-05-15 NOTE — PATIENT INSTRUCTIONS
Claritin(loratadine), Allegra(fexofenadine), or zyrtec(cetirizine) for runny nose and congestion.

## 2018-05-16 ENCOUNTER — PATIENT MESSAGE (OUTPATIENT)
Dept: FAMILY MEDICINE | Facility: CLINIC | Age: 48
End: 2018-05-16

## 2018-05-16 NOTE — PROGRESS NOTES
This dictation has been generated using Dragon Dictation some phonetic errors may occur.     Problem List Items Addressed This Visit     None      Visit Diagnoses     Left otitis media with effusion    -  Primary          Orders Placed This Encounter    azithromycin (Z-JARAD) 250 MG tablet     Otitis media with effusion.  Z-Jarad as above.  Decongestants contraindicated.  Currently taking an antihistamine.  Steroids contraindicated.  Refer to ENT if symptoms are not improving.  Patient has ENT.  Still awaiting paperwork from dermatology on last visit and biopsy result 2 forearm rash.  Needs to follow-up with [facility] immunology.    Follow-up if symptoms worsen or fail to improve.    ________________________________________________________________  ________________________________________________________________      Chief Complaint   Patient presents with    Otalgia    Cough     History of present illness  This 47 y.o. presents today for complaint of earache on the left and some coughing.  Earache started yesterday.  Cough isn't present for a longer period of time.  She has some drainage in the ear over the weekend but that resolved.  She noted that the drainage was brown.  Denies any external ear pain.  Notes sharp in her ear pains.  She does take antihistamines currently.  She does have a history of pots and we would avoid the decongestants.  Discussed ongoing rash issue.  Laboratory Dx of CVID with: - low # of B cells - slightly low IgG - poor response to pneumococcal vaccine, both unconjugated and conjugated  Review of systems  No fever or chills  No chest pain or shortness of breath.  Patient does note palpitations.  Patient notes ongoing rash and itching  Denies gland swelling  No sore throat.  Note some current sinus issues.      Past Medical History:   Diagnosis Date    Allergy     seasonal    Anxiety     Bulging disc neck and back    Depression     Elevated alkaline phosphatase level 7/17/2013     Hypothyroidism 11/6/2012    Interstitial cystitis     Irritable bowel syndrome 11/6/2012    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    MVP (mitral valve prolapse)     Pneumonia     POTS (postural orthostatic tachycardia syndrome)     Recurrent upper respiratory infection (URI)     Ulcer     Vitamin D deficiency disease 11/6/2012       Past Surgical History:   Procedure Laterality Date    ANKLE SURGERY      lt    APPENDECTOMY      BACK SURGERY      CHOLECYSTECTOMY      choley & ovarian cyst removed  12/2005    cystoscope      multiple    HYSTERECTOMY  4/8/2004    BUBBA BS&O     interstim      OOPHORECTOMY  4/8/2004    with hysterectomy     PELVIC LAPAROSCOPY      MT EXPLORATORY OF ABDOMEN      SINUS SURGERY  03/01/2016    SPINE SURGERY         Family History   Problem Relation Age of Onset    Hypertension Father     Alcohol abuse Brother     Cancer Paternal Uncle     Colon cancer Paternal Uncle     Depression Maternal Grandmother     Hearing loss Maternal Grandmother     Heart disease Maternal Grandmother     Kidney disease Maternal Grandmother     Cancer Paternal Grandmother     Arthritis Paternal Grandfather     Diabetes Paternal Grandfather     Stroke Paternal Grandfather     Breast cancer Neg Hx     Ovarian cancer Neg Hx     Asthma Neg Hx     Emphysema Neg Hx        Social History     Social History    Marital status: Single     Spouse name: N/A    Number of children: N/A    Years of education: N/A     Social History Main Topics    Smoking status: Never Smoker    Smokeless tobacco: Never Used    Alcohol use No    Drug use: No    Sexual activity: No     Other Topics Concern    Are You Pregnant Or Think You May Be? No    Breast-Feeding No     Social History Narrative    None       Current Outpatient Prescriptions   Medication Sig Dispense Refill    ALBUTEROL INHL Inhale into the lungs as needed.       albuterol-ipratropium 2.5mg-0.5mg/3mL  "(DUO-NEB) 0.5 mg-3 mg(2.5 mg base)/3 mL nebulizer solution Take 3 mLs by nebulization every 6 (six) hours as needed for Wheezing. Rescue 1 Box 2    azelastine (ASTELIN) 137 mcg (0.1 %) nasal spray 2 SPRAYS each nostril Q 12 H PRN 30 mL 6    BD LUER-NANCY SYRINGE 3 mL 22 gauge x 1" Syrg INJECT QD FOR 5 DAYS THEN WEEKLY FOR 5 WEEKS THEN MONTHLY  3    BRINTELLIX 20 mg Tab once daily.   2    carBAMazepine (TEGRETOL XR) 200 MG 12 hr tablet Take 1 tab twice daily.  If no improvement after 2 weeks, take 2 tabs twice daily. 120 tablet 5    cloNIDine (CATAPRES) 0.1 MG tablet TAKE 1 TABLET BY MOUTH EVERY 6 HOURS AS NEEDED FOR FLUSHING 20 tablet 0    cyanocobalamin, vitamin B-12, 1,000 mcg/mL Kit Inject 1,000 mcg as directed every 28 days. Take daily for 5 days then weekly for 5 weeks then monthly 12 kit 1    doxycycline (MONODOX) 100 MG capsule Take 1 capsule (100 mg total) by mouth 2 (two) times daily. 20 capsule 0    ergocalciferol (ERGOCALCIFEROL) 50,000 unit Cap Take 1 capsule (50,000 Units total) by mouth every 7 days. 12 capsule 3    estropipate (OGEN) 1.5 MG tablet Take 1 tablet (1.5 mg total) by mouth once daily. 90 tablet 3    gabapentin (NEURONTIN) 300 MG capsule Take 300 mg by mouth 2 (two) times daily. 2-300 mg cap in AM and 3-300 mg cap in PM      hydrOXYzine HCl (ATARAX) 25 MG tablet Take 1 tablet (25 mg total) by mouth 3 (three) times daily. 45 tablet 1    immune globul G-gly-IgA avg 46 (GAMUNEX-C) 40 gram/400 mL (10 %) Soln Inject 400 mLs (40 g total) as directed every 28 days. 1 vial 5    levocetirizine (XYZAL) 5 MG tablet TK 1 T PO  ONCE Day  9    metoclopramide HCl (REGLAN) 10 MG tablet Take 1 tablet (10 mg total) by mouth every 6 (six) hours. 30 tablet 0    metoprolol tartrate (LOPRESSOR) 50 MG tablet Take 50 mg by mouth 4 (four) times daily. 1 Tablet Oral Three times a day      montelukast (SINGULAIR) 10 mg tablet TK 1 T PO  ONCE D  3    ondansetron (ZOFRAN-ODT) 4 MG TbDL Take 1 tablet (4 " "mg total) by mouth every 8 (eight) hours as needed (nausea). 20 tablet 0    oxycodone-acetaminophen  mg (PERCOCET)  mg per tablet Take 1 tablet by mouth every 4 (four) hours as needed.      syringe with needle, safety 3 mL 22 gauge x 1 1/2" Syrg B12 injection every day for 5 days then weekly for 5 weeks then monthly. 50 Syringe 3    temazepam (RESTORIL) 15 mg Cap Take 15 mg by mouth every evening.  3    tiZANidine 4 mg Cap TK 2 TO 3 CS PO TID WITH FOOD  1    traZODone (DESYREL) 100 MG tablet TK 1 T PO  HS  1    valsartan (DIOVAN) 80 MG tablet TK 1 T PO QD  8    zaleplon (SONATA) 10 MG capsule Take 10 mg by mouth once daily.  2    azithromycin (Z-JARAD) 250 MG tablet Take 2 tablets by mouth on day 1; Take 1 tablet by mouth on days 2-5 6 tablet 0     No current facility-administered medications for this visit.        Review of patient's allergies indicates:   Allergen Reactions    Benadryl allergy decongestant Anaphylaxis     Other reaction(s): Anaphylaxis    Dilaudid [hydromorphone (bulk)] Anaphylaxis     Other reaction(s): Anaphylaxis    Fludrocortisone Hives    Gluten Other (See Comments)     Other reaction(s) GI upset    Diphenhydramine hcl     Hydromorphone hcl     Indomethacin Hives    Penicillins Rash    Sulfa (sulfonamide antibiotics) Rash    Vancomycin analogues Rash       Physical examination  Vitals Reviewed  Gen. Well-dressed well-nourished no apparent distress  Skin warm dry and intact.  No rashes noted.  HEENT.  TM intact on the right with normal light reflex.  Left TM slightly red and bulging.  No mastoid tenderness during percussion.  Nares patent bilateral.  Pharynx is unremarkable.  No maxillary or frontal sinus tenderness when percussed.    Neck is supple without adenopathy.   Chest.  Respirations are even unlabored.  Lungs are clear to auscultation.  Cardiac regular rate and rhythm.  No chest wall adenopathy noted.  Neuro. Awake alert oriented x4.  Normal judgment and " cognition noted.  Extremities no clubbing cyanosis or edema noted.     Call or return to clinic prn if these symptoms worsen or fail to improve as anticipated.

## 2018-05-17 DIAGNOSIS — D83.9 COMMON VARIABLE IMMUNODEFICIENCY: Primary | ICD-10-CM

## 2018-05-19 ENCOUNTER — PATIENT MESSAGE (OUTPATIENT)
Dept: FAMILY MEDICINE | Facility: CLINIC | Age: 48
End: 2018-05-19

## 2018-05-20 ENCOUNTER — PATIENT MESSAGE (OUTPATIENT)
Dept: FAMILY MEDICINE | Facility: CLINIC | Age: 48
End: 2018-05-20

## 2018-05-25 DIAGNOSIS — D83.9 COMMON VARIABLE IMMUNODEFICIENCY: ICD-10-CM

## 2018-05-28 ENCOUNTER — OFFICE VISIT (OUTPATIENT)
Dept: NEUROLOGY | Facility: CLINIC | Age: 48
End: 2018-05-28
Payer: COMMERCIAL

## 2018-05-28 ENCOUNTER — OFFICE VISIT (OUTPATIENT)
Dept: ALLERGY | Facility: CLINIC | Age: 48
End: 2018-05-28
Payer: COMMERCIAL

## 2018-05-28 VITALS
HEIGHT: 64 IN | SYSTOLIC BLOOD PRESSURE: 122 MMHG | WEIGHT: 258.63 LBS | BODY MASS INDEX: 44.15 KG/M2 | DIASTOLIC BLOOD PRESSURE: 90 MMHG

## 2018-05-28 VITALS
BODY MASS INDEX: 43.92 KG/M2 | HEIGHT: 64 IN | SYSTOLIC BLOOD PRESSURE: 122 MMHG | WEIGHT: 257.25 LBS | DIASTOLIC BLOOD PRESSURE: 90 MMHG | HEART RATE: 78 BPM

## 2018-05-28 DIAGNOSIS — D83.9 COMMON VARIABLE IMMUNODEFICIENCY: Primary | ICD-10-CM

## 2018-05-28 DIAGNOSIS — G50.0 TRIGEMINAL NEURALGIA OF RIGHT SIDE OF FACE: Primary | ICD-10-CM

## 2018-05-28 DIAGNOSIS — H69.90 DYSFUNCTION OF EUSTACHIAN TUBE, UNSPECIFIED LATERALITY: ICD-10-CM

## 2018-05-28 PROCEDURE — 99212 OFFICE O/P EST SF 10 MIN: CPT | Mod: S$GLB,,, | Performed by: STUDENT IN AN ORGANIZED HEALTH CARE EDUCATION/TRAINING PROGRAM

## 2018-05-28 PROCEDURE — 3080F DIAST BP >= 90 MM HG: CPT | Mod: CPTII,S$GLB,, | Performed by: STUDENT IN AN ORGANIZED HEALTH CARE EDUCATION/TRAINING PROGRAM

## 2018-05-28 PROCEDURE — 3074F SYST BP LT 130 MM HG: CPT | Mod: CPTII,S$GLB,, | Performed by: STUDENT IN AN ORGANIZED HEALTH CARE EDUCATION/TRAINING PROGRAM

## 2018-05-28 PROCEDURE — 3080F DIAST BP >= 90 MM HG: CPT | Mod: CPTII,S$GLB,, | Performed by: PSYCHIATRY & NEUROLOGY

## 2018-05-28 PROCEDURE — 99213 OFFICE O/P EST LOW 20 MIN: CPT | Mod: S$GLB,,, | Performed by: PSYCHIATRY & NEUROLOGY

## 2018-05-28 PROCEDURE — 3008F BODY MASS INDEX DOCD: CPT | Mod: CPTII,S$GLB,, | Performed by: STUDENT IN AN ORGANIZED HEALTH CARE EDUCATION/TRAINING PROGRAM

## 2018-05-28 PROCEDURE — 99999 PR PBB SHADOW E&M-EST. PATIENT-LVL III: CPT | Mod: PBBFAC,,, | Performed by: STUDENT IN AN ORGANIZED HEALTH CARE EDUCATION/TRAINING PROGRAM

## 2018-05-28 PROCEDURE — 3074F SYST BP LT 130 MM HG: CPT | Mod: CPTII,S$GLB,, | Performed by: PSYCHIATRY & NEUROLOGY

## 2018-05-28 PROCEDURE — 3008F BODY MASS INDEX DOCD: CPT | Mod: CPTII,S$GLB,, | Performed by: PSYCHIATRY & NEUROLOGY

## 2018-05-28 PROCEDURE — 99999 PR PBB SHADOW E&M-EST. PATIENT-LVL III: CPT | Mod: PBBFAC,,, | Performed by: PSYCHIATRY & NEUROLOGY

## 2018-05-28 RX ORDER — HYDROXYZINE HYDROCHLORIDE 25 MG/1
TABLET, FILM COATED ORAL
COMMUNITY
End: 2018-05-28 | Stop reason: SDUPTHER

## 2018-05-28 RX ORDER — ERGOCALCIFEROL 1.25 MG/1
CAPSULE ORAL
COMMUNITY
End: 2018-05-28 | Stop reason: SDUPTHER

## 2018-05-28 RX ORDER — CLONIDINE HYDROCHLORIDE 0.1 MG/1
TABLET ORAL
COMMUNITY
End: 2018-05-28 | Stop reason: SDUPTHER

## 2018-05-28 RX ORDER — CARBAMAZEPINE 200 MG/1
TABLET ORAL
COMMUNITY
End: 2018-05-28 | Stop reason: SDUPTHER

## 2018-05-28 RX ORDER — VALSARTAN 40 MG/1
TABLET ORAL
COMMUNITY
End: 2018-05-28 | Stop reason: SDUPTHER

## 2018-05-28 RX ORDER — TRIAMCINOLONE ACETONIDE 1 MG/G
CREAM TOPICAL 2 TIMES DAILY
Refills: 0 | COMMUNITY
Start: 2018-04-05 | End: 2018-11-02

## 2018-05-28 RX ORDER — TRAZODONE HYDROCHLORIDE 100 MG/1
TABLET ORAL
COMMUNITY
End: 2018-05-28 | Stop reason: SDUPTHER

## 2018-05-28 RX ORDER — ONDANSETRON 8 MG/1
TABLET, ORALLY DISINTEGRATING ORAL
COMMUNITY
End: 2018-05-28 | Stop reason: SDUPTHER

## 2018-05-28 RX ORDER — CYANOCOBALAMIN 1000 UG/ML
INJECTION, SOLUTION INTRAMUSCULAR; SUBCUTANEOUS
COMMUNITY
End: 2018-05-28 | Stop reason: SDUPTHER

## 2018-05-28 RX ORDER — MONTELUKAST SODIUM 10 MG/1
10 TABLET ORAL DAILY
COMMUNITY
End: 2023-04-21

## 2018-05-28 RX ORDER — ZALEPLON 10 MG/1
CAPSULE ORAL
COMMUNITY
End: 2018-05-28 | Stop reason: SDUPTHER

## 2018-05-28 RX ORDER — LEVOCETIRIZINE DIHYDROCHLORIDE 5 MG/1
TABLET, FILM COATED ORAL
COMMUNITY
End: 2018-05-28 | Stop reason: SDUPTHER

## 2018-05-28 RX ORDER — DICYCLOMINE HYDROCHLORIDE 10 MG/1
CAPSULE ORAL
COMMUNITY
End: 2021-06-17

## 2018-05-28 RX ORDER — DESOXIMETASONE 0.5 MG/G
CREAM TOPICAL
COMMUNITY
End: 2019-08-01 | Stop reason: ALTCHOICE

## 2018-05-28 RX ORDER — GABAPENTIN 300 MG/1
CAPSULE ORAL
COMMUNITY
Start: 2018-04-04 | End: 2018-05-28 | Stop reason: SDUPTHER

## 2018-05-28 RX ORDER — OXYCODONE AND ACETAMINOPHEN 10; 325 MG/1; MG/1
TABLET ORAL
COMMUNITY
Start: 2018-05-24 | End: 2018-06-22

## 2018-05-28 RX ORDER — TIZANIDINE HYDROCHLORIDE 4 MG/1
CAPSULE, GELATIN COATED ORAL
COMMUNITY
Start: 2018-04-11 | End: 2018-07-09

## 2018-05-28 RX ORDER — METOPROLOL TARTRATE 50 MG/1
TABLET ORAL
COMMUNITY
End: 2018-05-28 | Stop reason: SDUPTHER

## 2018-05-28 RX ORDER — TEMAZEPAM 15 MG/1
CAPSULE ORAL
COMMUNITY
End: 2018-05-28 | Stop reason: SDUPTHER

## 2018-05-28 NOTE — PROGRESS NOTES
"Allergy Clinic Note  [facility]    Subjective:          Chief Complaint: Follow-up (discuss IVIG)      Allergy problem list  CVID/SAD  Recurrent sinopulm infections    History of Present Illness: Kianna Zuleta is a 47 y.o. 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    After last visit, I ordered IVIg.  Order later changed to SQ immunoglobulin at client's request.  Hizentra at [facility] is anticipated, with change to home self-administration.    Pt asks about pros/cons of IV vs SQ and any precautions needed.    Addendum to infection Hx:  Pt reports an episode of urosepsis about 10 years ago, that strated with a kidney stone.    Interval Hx is ear pain Dx/Tx at ENT as eustachian tube dysfunction.  She continues to have intermittent pain.  She is unable to take decongestants.    No other new problems or complaints.    Additional History:   Client is a lifetime nonsmoker.   Past medical, family, and social histories are unchanged.       Patient Active Problem List   Diagnosis    Vitamin D deficiency    Hypothyroidism    Irritable bowel syndrome    Elevated alkaline phosphatase level    Malignant carcinoid tumor of appendix    Fever    Elevated sed rate    CRP elevated    Hypertension    Morbid obesity    Abnormal CT of the chest    Eczema    Trigeminal neuralgia of right side of face    IgG deficiency    POTS (postural orthostatic tachycardia syndrome)     Current Outpatient Prescriptions on File Prior to Visit   Medication Sig Dispense Refill    azelastine (ASTELIN) 137 mcg (0.1 %) nasal spray 2 SPRAYS each nostril Q 12 H PRN 30 mL 6    BD LUER-NANCY SYRINGE 3 mL 22 gauge x 1" Syrg INJECT QD FOR 5 DAYS THEN WEEKLY FOR 5 WEEKS THEN MONTHLY  3    BRINTELLIX 20 mg Tab once daily.   2    carBAMazepine (TEGRETOL XR) 200 MG 12 hr tablet Take 1 tab twice daily.  If no improvement after 2 weeks, take 2 tabs twice " "daily. 120 tablet 5    cloNIDine (CATAPRES) 0.1 MG tablet TAKE 1 TABLET BY MOUTH EVERY 6 HOURS AS NEEDED FOR FLUSHING 20 tablet 0    cyanocobalamin, vitamin B-12, 1,000 mcg/mL Kit Inject 1,000 mcg as directed every 28 days. Take daily for 5 days then weekly for 5 weeks then monthly 12 kit 1    doxycycline (MONODOX) 100 MG capsule Take 1 capsule (100 mg total) by mouth 2 (two) times daily. 20 capsule 0    ergocalciferol (ERGOCALCIFEROL) 50,000 unit Cap Take 1 capsule (50,000 Units total) by mouth every 7 days. 12 capsule 3    estropipate (OGEN) 1.5 MG tablet Take 1 tablet (1.5 mg total) by mouth once daily. 90 tablet 3    gabapentin (NEURONTIN) 300 MG capsule Take 300 mg by mouth 2 (two) times daily. 2-300 mg cap in AM and 3-300 mg cap in PM      hydrOXYzine HCl (ATARAX) 25 MG tablet Take 1 tablet (25 mg total) by mouth 3 (three) times daily. 45 tablet 1    immun glob G,IgG,-pro-IgA 0-50 (HIZENTRA) 10 gram/50 mL (20 %) Soln Inject 200 mg/kg into the skin every 7 days. 4 vial 5    levocetirizine (XYZAL) 5 MG tablet TK 1 T PO  ONCE Day  9    metoclopramide HCl (REGLAN) 10 MG tablet Take 1 tablet (10 mg total) by mouth every 6 (six) hours. 30 tablet 0    metoprolol tartrate (LOPRESSOR) 50 MG tablet Take 50 mg by mouth 4 (four) times daily. 1 Tablet Oral Three times a day      ondansetron (ZOFRAN-ODT) 4 MG TbDL Take 1 tablet (4 mg total) by mouth every 8 (eight) hours as needed (nausea). 20 tablet 0    syringe with needle, safety 3 mL 22 gauge x 1 1/2" Syrg B12 injection every day for 5 days then weekly for 5 weeks then monthly. 50 Syringe 3    temazepam (RESTORIL) 15 mg Cap Take 15 mg by mouth every evening.  3    tiZANidine 4 mg Cap TK 2 TO 3 CS PO TID WITH FOOD  1    traZODone (DESYREL) 100 MG tablet TK 1 T PO  HS  1    valsartan (DIOVAN) 80 MG tablet TK 1 T PO QD  8    zaleplon (SONATA) 10 MG capsule Take 10 mg by mouth once daily.  2    ALBUTEROL INHL Inhale into the lungs as needed.       " "albuterol-ipratropium 2.5mg-0.5mg/3mL (DUO-NEB) 0.5 mg-3 mg(2.5 mg base)/3 mL nebulizer solution Take 3 mLs by nebulization every 6 (six) hours as needed for Wheezing. Rescue 1 Box 2    oxycodone-acetaminophen  mg (PERCOCET)  mg per tablet Take 1 tablet by mouth every 4 (four) hours as needed.      [DISCONTINUED] azithromycin (Z-JARAD) 250 MG tablet Take 2 tablets by mouth on day 1; Take 1 tablet by mouth on days 2-5 6 tablet 0    [DISCONTINUED] montelukast (SINGULAIR) 10 mg tablet TK 1 T PO  ONCE D  3     No current facility-administered medications on file prior to visit.          Review of Systems   Constitutional: Negative for chills and fever.   HENT: Positive for ear pain. Negative for ear discharge and nosebleeds.    Eyes: Negative for discharge and redness.   Respiratory: Negative for hemoptysis, sputum production and stridor.    Gastrointestinal: Negative for blood in stool, melena and vomiting.   Genitourinary: Negative for hematuria.   Skin: Negative for itching and rash.   Neurological: Negative for seizures and loss of consciousness.       Objective:   BP (!) 122/90 (BP Location: Left arm, Patient Position: Sitting)   Ht 5' 4" (1.626 m)   Wt 117.3 kg (258 lb 9.6 oz)   BMI 44.39 kg/m²       Physical Exam   Constitutional: She is oriented to person, place, and time and well-developed, well-nourished, and in no distress.   HENT:   Head: Normocephalic and atraumatic.   Nose: Nose normal.   Eyes: Conjunctivae are normal. No scleral icterus.   Neck: Neck supple.   Pulmonary/Chest: Effort normal. No stridor. No respiratory distress.   Abdominal: She exhibits no distension.   Musculoskeletal: She exhibits no edema or deformity.   Neurological: She is alert and oriented to person, place, and time.   Skin: No rash noted. No erythema.   Psychiatric: Affect and judgment normal.       Data:   IgG 600 (01/25/2018)  Poor response to pneumococcal vaccine    Assessment:     1. Common variable " immunodeficiency    2. Dysfunction of Eustachian tube, unspecified laterality        Plan:     Kianna was seen today for follow-up.    Diagnoses and all orders for this visit:    Common variable immunodeficiency  SQ Ig when approved  Discussed importance of cultures with infections   Discussed using bacteriocidal antibiotics  Discussed infusion-related side effects      Dysfunction of Eustachian tube, unspecified laterality  Increase nasal steroid      Patient Instructions   Plan SQ Ig   You will be contacted when ready.    For your ear recommend increasing dose or frequency of nasal spray.      Follow-up in about 3 months (around 8/28/2018), or if symptoms worsen or fail to improve.    Kaylin Miller MD

## 2018-05-28 NOTE — PATIENT INSTRUCTIONS
Plan SQ Ig   You will be contacted when ready.    For your ear recommend increasing dose or frequency of nasal spray.

## 2018-05-30 LAB
EXT 24 HR UR METANEPHRINE: NORMAL
EXT 24 HR UR NORMETANEPHRINE: NORMAL
EXT 24 HR UR NORMETANEPHRINE: NORMAL
EXT 25 HYDROXY VIT D2: NORMAL
EXT 25 HYDROXY VIT D3: NORMAL
EXT 5 HIAA 24 HR URINE: NORMAL
EXT 5 HIAA BLOOD: 9 NG/ML (ref 0–22)
EXT ACTH: NORMAL
EXT AFP: NORMAL
EXT ALBUMIN: NORMAL
EXT ALKALINE PHOSPHATASE: NORMAL
EXT ALT: NORMAL
EXT AMYLASE: NORMAL
EXT ANTI ISLET CELL AB: NORMAL
EXT ANTI PARIETAL CELL AB: NORMAL
EXT ANTI THYROID AB: NORMAL
EXT AST: NORMAL
EXT BILIRUBIN DIRECT: NORMAL MG/DL
EXT BILIRUBIN TOTAL: NORMAL
EXT BK VIRUS DNA QN PCR: NORMAL
EXT BUN: NORMAL
EXT C PEPTIDE: NORMAL
EXT CA 125: NORMAL
EXT CA 19-9: NORMAL
EXT CA 27-29: NORMAL
EXT CALCITONIN: NORMAL
EXT CALCIUM: NORMAL
EXT CEA: NORMAL
EXT CHLORIDE: NORMAL
EXT CHOLESTEROL: NORMAL
EXT CHROMOGRANIN A: NORMAL
EXT CO2: NORMAL
EXT CREATININE UA: NORMAL
EXT CREATININE: NORMAL MG/DL
EXT CYCLOSPORONE LEVEL: NORMAL
EXT DOPAMINE: NORMAL
EXT EBV DNA BY PCR: NORMAL
EXT EPINEPHRINE: NORMAL
EXT FOLATE: NORMAL
EXT FREE T3: NORMAL
EXT FREE T4: NORMAL
EXT FSH: NORMAL
EXT GASTRIN RELEASING PEPTIDE: NORMAL
EXT GASTRIN RELEASING PEPTIDE: NORMAL
EXT GASTRIN: NORMAL
EXT GGT: NORMAL
EXT GHRELIN: NORMAL
EXT GLUCAGON: NORMAL
EXT GLUCOSE: NORMAL
EXT GROWTH HORMONE: NORMAL
EXT HCV RNA QUANT PCR: NORMAL
EXT HDL: NORMAL
EXT HEMATOCRIT: NORMAL
EXT HEMOGLOBIN A1C: NORMAL
EXT HEMOGLOBIN: NORMAL
EXT HISTAMINE 24 HR URINE: NORMAL
EXT HISTAMINE: NORMAL
EXT IGF-1: NORMAL
EXT IMMUNKNOW (NON-STIMULATED): NORMAL
EXT IMMUNKNOW (STIMULATED): NORMAL
EXT INR: NORMAL
EXT INSULIN: NORMAL
EXT LANREOTIDE LEVEL: NORMAL
EXT LDH, TOTAL: NORMAL
EXT LDL CHOLESTEROL: NORMAL
EXT LIPASE: NORMAL
EXT MAGNESIUM: NORMAL
EXT METANEPHRINE FREE PLASMA: NORMAL
EXT MOTILIN: NORMAL
EXT NEUROKININ A CAMB: NORMAL
EXT NEUROKININ A ISI: 15 PG/ML (ref 0–40)
EXT NEUROTENSIN: NORMAL
EXT NOREPINEPHRINE: NORMAL
EXT NORMETANEPHRINE: NORMAL
EXT NSE: NORMAL
EXT OCTREOTIDE LEVEL: NORMAL
EXT PANCREASTATIN CAMB: NORMAL
EXT PANCREASTATIN ISI: 60 PG/ML (ref 10–135)
EXT PANCREATIC POLYPEPTIDE: NORMAL
EXT PHOSPHORUS: NORMAL
EXT PLATELETS: NORMAL
EXT POTASSIUM: NORMAL
EXT PROGRAF LEVEL: NORMAL
EXT PROLACTIN: NORMAL
EXT PROTEIN TOTAL: NORMAL
EXT PROTEIN UA: NORMAL
EXT PT: NORMAL
EXT PTH, INTACT: NORMAL
EXT PTT: NORMAL
EXT RAPAMUNE LEVEL: NORMAL
EXT SEROTONIN: <10 NG/ML (ref 56–244)
EXT SODIUM: NORMAL MMOL/L
EXT SOMATOSTATIN: NORMAL
EXT SUBSTANCE P: NORMAL
EXT TRIGLYCERIDES: NORMAL
EXT TRYPTASE: NORMAL
EXT TSH: NORMAL
EXT URIC ACID: NORMAL
EXT URINE AMYLASE U/HR: NORMAL
EXT URINE AMYLASE U/L: NORMAL
EXT VASOACTIVE INTESTINAL POLYPEPTIDE: NORMAL
EXT VITAMIN B12: NORMAL
EXT VMA 24 HR URINE: NORMAL
EXT WBC: NORMAL
NEURON SPECIFIC ENOLASE: NORMAL

## 2018-05-31 ENCOUNTER — TELEPHONE (OUTPATIENT)
Dept: ALLERGY | Facility: CLINIC | Age: 48
End: 2018-05-31

## 2018-05-31 NOTE — TELEPHONE ENCOUNTER
Spoke to Lee Ann.  She is questioning the dosage for Hizentra.  She wants to know if you want it to be mg/kg which is about 22g or 14grams.

## 2018-05-31 NOTE — TELEPHONE ENCOUNTER
----- Message from Aristeo Hare sent at 5/31/2018 11:53 AM CDT -----  Contact: Lee Ann/Stephen/[phone]  Office is calling to get clarification on the medication immun glob G,IgG,-pro-IgA 0-50 (HIZENTRA) 10 gram/50 mL (20 %) Soln. Please advise.      Thanks

## 2018-06-05 ENCOUNTER — TELEPHONE (OUTPATIENT)
Dept: ALLERGY | Facility: CLINIC | Age: 48
End: 2018-06-05

## 2018-06-05 ENCOUNTER — PATIENT MESSAGE (OUTPATIENT)
Dept: ALLERGY | Facility: CLINIC | Age: 48
End: 2018-06-05

## 2018-06-05 NOTE — TELEPHONE ENCOUNTER
Called Lee Ann at [facility].  Had questions re:  Correct dosage of Hizentra.  Patient was originally going to receive 40g of Gamunex q4wks.  Patients then changed to Hizentra.  Dosage needs to be correct for conversion.  Called Joanie who spoke with Dr. Dennison for conversion.  Based on pts wt of 116 kg, 400-600mg/kg to convert is 116kg x 400 = 46.4g/mo or 12g/wk.  Dose was rounded to 14g/ wk.  Called Lee Ann at [facility] and gave verbal order for 14g/week of Hizentra.  Lee Ann will call insurance company to get approval.  She will then fax over new order and we will have one of our physicians sign order.

## 2018-06-05 NOTE — TELEPHONE ENCOUNTER
----- Message from Hailey Hebert MA sent at 6/5/2018  9:54 AM CDT -----  Contact: Paige/ [facility]/[phone]  Nurse would like speak with someone regarding the pt's Hizentra orders and dosing. Please advise.    Thanks

## 2018-06-06 ENCOUNTER — PATIENT MESSAGE (OUTPATIENT)
Dept: FAMILY MEDICINE | Facility: CLINIC | Age: 48
End: 2018-06-06

## 2018-06-07 ENCOUNTER — TELEPHONE (OUTPATIENT)
Dept: ALLERGY | Facility: CLINIC | Age: 48
End: 2018-06-07

## 2018-06-07 RX ORDER — EPINEPHRINE 0.3 MG/.3ML
2 INJECTION SUBCUTANEOUS ONCE
Qty: 0.6 ML | Refills: 0 | Status: SHIPPED | OUTPATIENT
Start: 2018-06-07 | End: 2018-06-12 | Stop reason: SDUPTHER

## 2018-06-07 NOTE — TELEPHONE ENCOUNTER
Received orders for Hizentra 14gm/70 mL from [facility]. Order was signed by Dr. Miller and faxed back to [phone].

## 2018-06-11 ENCOUNTER — PATIENT MESSAGE (OUTPATIENT)
Dept: ALLERGY | Facility: CLINIC | Age: 48
End: 2018-06-11

## 2018-06-12 NOTE — TELEPHONE ENCOUNTER
Patient has fist infusion on Thursday and [facility] has not dispensed Auvi Q. Patient requesting that epinephrine be called into [facility].

## 2018-06-13 ENCOUNTER — PATIENT MESSAGE (OUTPATIENT)
Dept: FAMILY MEDICINE | Facility: CLINIC | Age: 48
End: 2018-06-13

## 2018-06-14 ENCOUNTER — PATIENT MESSAGE (OUTPATIENT)
Dept: FAMILY MEDICINE | Facility: CLINIC | Age: 48
End: 2018-06-14

## 2018-06-14 ENCOUNTER — PATIENT MESSAGE (OUTPATIENT)
Dept: ALLERGY | Facility: CLINIC | Age: 48
End: 2018-06-14

## 2018-06-14 ENCOUNTER — TELEPHONE (OUTPATIENT)
Dept: ALLERGY | Facility: CLINIC | Age: 48
End: 2018-06-14

## 2018-06-14 RX ORDER — EPINEPHRINE 0.3 MG/.3ML
1 INJECTION SUBCUTANEOUS ONCE
Qty: 2 DEVICE | Refills: 0 | Status: SHIPPED | OUTPATIENT
Start: 2018-06-14 | End: 2023-02-22 | Stop reason: SDUPTHER

## 2018-06-14 NOTE — TELEPHONE ENCOUNTER
----- Message from Hailey Hebert MA sent at 6/14/2018  3:06 PM CDT -----  Contact: [facility]/[phone]  Pharmacy needs clarification on the pt's rx for EPINEPHrine (AUVI-Q) 0.3 mg/0.3 mL AtIn. Please advise.    Thanks

## 2018-06-14 NOTE — TELEPHONE ENCOUNTER
----- Message from Dante Kwong sent at 6/14/2018  3:48 PM CDT -----  Contact: [facility] [phone]  [facility] stated they need clarification on medication EPINEPHrine (AUVI-Q) 0.3 mg/0.3 mL AtIn . Please call and advise , Thanks

## 2018-07-07 ENCOUNTER — PATIENT MESSAGE (OUTPATIENT)
Dept: ALLERGY | Facility: CLINIC | Age: 48
End: 2018-07-07

## 2018-07-10 ENCOUNTER — PATIENT MESSAGE (OUTPATIENT)
Dept: ALLERGY | Facility: CLINIC | Age: 48
End: 2018-07-10

## 2018-07-20 ENCOUNTER — PATIENT MESSAGE (OUTPATIENT)
Dept: ALLERGY | Facility: CLINIC | Age: 48
End: 2018-07-20

## 2018-07-20 ENCOUNTER — PATIENT MESSAGE (OUTPATIENT)
Dept: RHEUMATOLOGY | Facility: CLINIC | Age: 48
End: 2018-07-20

## 2018-07-20 RX ORDER — ONDANSETRON 4 MG/1
4 TABLET, ORALLY DISINTEGRATING ORAL EVERY 8 HOURS PRN
Qty: 20 TABLET | Refills: 0 | Status: SHIPPED | OUTPATIENT
Start: 2018-07-20 | End: 2018-07-27 | Stop reason: SDUPTHER

## 2018-07-20 RX ORDER — HYDROXYZINE HYDROCHLORIDE 25 MG/1
25 TABLET, FILM COATED ORAL 3 TIMES DAILY
Qty: 45 TABLET | Refills: 1 | Status: SHIPPED | OUTPATIENT
Start: 2018-07-20 | End: 2020-07-16 | Stop reason: SDUPTHER

## 2018-07-23 ENCOUNTER — TELEPHONE (OUTPATIENT)
Dept: ALLERGY | Facility: CLINIC | Age: 48
End: 2018-07-23

## 2018-07-23 NOTE — TELEPHONE ENCOUNTER
Telephoned patient explaining regime she is using for side effects are good, refills ordered----- Message from Kaylin Miller MD sent at 7/20/2018  5:02 PM CDT -----  Junior.  ----- Message -----  From: Brittanie Frye LPN  Sent: 7/20/2018   4:04 PM  To: Kaylin Miller MD    Yes ma'am, she states on the 12th and 18th(while/after infusing), she has N/V, itching, redness and burning at site along with a low grade fever(99.5). She has used Zofran and atarax for the symptoms, is this ok and she will need refills on these meds.    Brittanie  ----- Message -----  From: Kaylin Miller MD  Sent: 7/20/2018   2:34 PM  To: Brittanie Frye LPN    Thank you for the prompt.  I left a portal message for Ms Zuleta about the timing of he SC Ig.  Is there anything else I need to do?

## 2018-07-27 ENCOUNTER — PATIENT MESSAGE (OUTPATIENT)
Dept: ALLERGY | Facility: CLINIC | Age: 48
End: 2018-07-27

## 2018-07-27 DIAGNOSIS — D83.9 CVID (COMMON VARIABLE IMMUNODEFICIENCY): Primary | ICD-10-CM

## 2018-07-27 RX ORDER — ONDANSETRON 4 MG/1
4 TABLET, ORALLY DISINTEGRATING ORAL EVERY 8 HOURS PRN
Qty: 20 TABLET | Refills: 0 | Status: SHIPPED | OUTPATIENT
Start: 2018-07-27 | End: 2019-12-27 | Stop reason: SDUPTHER

## 2018-08-28 ENCOUNTER — PATIENT MESSAGE (OUTPATIENT)
Dept: ALLERGY | Facility: CLINIC | Age: 48
End: 2018-08-28

## 2018-09-10 ENCOUNTER — PATIENT MESSAGE (OUTPATIENT)
Dept: FAMILY MEDICINE | Facility: CLINIC | Age: 48
End: 2018-09-10

## 2018-09-10 ENCOUNTER — PATIENT MESSAGE (OUTPATIENT)
Dept: ALLERGY | Facility: CLINIC | Age: 48
End: 2018-09-10

## 2018-09-13 ENCOUNTER — PATIENT MESSAGE (OUTPATIENT)
Dept: ALLERGY | Facility: CLINIC | Age: 48
End: 2018-09-13

## 2018-09-13 ENCOUNTER — PATIENT MESSAGE (OUTPATIENT)
Dept: FAMILY MEDICINE | Facility: CLINIC | Age: 48
End: 2018-09-13

## 2018-09-13 ENCOUNTER — OFFICE VISIT (OUTPATIENT)
Dept: FAMILY MEDICINE | Facility: CLINIC | Age: 48
End: 2018-09-13
Payer: COMMERCIAL

## 2018-09-13 VITALS
BODY MASS INDEX: 44.23 KG/M2 | TEMPERATURE: 99 F | WEIGHT: 259.06 LBS | DIASTOLIC BLOOD PRESSURE: 88 MMHG | HEART RATE: 98 BPM | SYSTOLIC BLOOD PRESSURE: 126 MMHG | OXYGEN SATURATION: 98 % | HEIGHT: 64 IN

## 2018-09-13 DIAGNOSIS — J06.9 UPPER RESPIRATORY TRACT INFECTION, UNSPECIFIED TYPE: Primary | ICD-10-CM

## 2018-09-13 LAB
FLUAV AG SPEC QL IA: NEGATIVE
FLUBV AG SPEC QL IA: NEGATIVE
SPECIMEN SOURCE: NORMAL

## 2018-09-13 PROCEDURE — 87400 INFLUENZA A/B EACH AG IA: CPT | Mod: PO

## 2018-09-13 PROCEDURE — 3079F DIAST BP 80-89 MM HG: CPT | Mod: CPTII,S$GLB,, | Performed by: NURSE PRACTITIONER

## 2018-09-13 PROCEDURE — 3008F BODY MASS INDEX DOCD: CPT | Mod: CPTII,S$GLB,, | Performed by: NURSE PRACTITIONER

## 2018-09-13 PROCEDURE — 3074F SYST BP LT 130 MM HG: CPT | Mod: CPTII,S$GLB,, | Performed by: NURSE PRACTITIONER

## 2018-09-13 PROCEDURE — 99999 PR PBB SHADOW E&M-EST. PATIENT-LVL V: CPT | Mod: PBBFAC,,, | Performed by: NURSE PRACTITIONER

## 2018-09-13 PROCEDURE — 99214 OFFICE O/P EST MOD 30 MIN: CPT | Mod: S$GLB,,, | Performed by: NURSE PRACTITIONER

## 2018-09-13 RX ORDER — CODEINE PHOSPHATE AND GUAIFENESIN 10; 100 MG/5ML; MG/5ML
10 SOLUTION ORAL EVERY 4 HOURS PRN
Qty: 120 ML | Refills: 0 | Status: SHIPPED | OUTPATIENT
Start: 2018-09-13 | End: 2019-05-31

## 2018-09-13 NOTE — PATIENT INSTRUCTIONS
Zyrtec(cetirizine) 10 mg twice a day for runny nose and congestion. Take for 5 days.   astelin  flonase

## 2018-09-13 NOTE — PROGRESS NOTES
This dictation has been generated using Modal Fluency Dictation some phonetic errors may occur. Please contact author for clarification if needed.     Problem List Items Addressed This Visit     None      Visit Diagnoses     Upper respiratory tract infection, unspecified type    -  Primary    Relevant Orders    Influenza antigen Nasopharyngeal Swab          Orders Placed This Encounter    Influenza antigen Nasopharyngeal Swab    guaifenesin-codeine 100-10 mg/5 ml (CHERATUSSIN AC)  mg/5 mL syrup     Patient Instructions   Zyrtec(cetirizine) 10 mg twice a day for runny nose and congestion. Take for 5 days.   astelin  flonase       Upper respiratory infection by our Radiology suspected.  Eval for flu.  Patient states flu contact.  No Follow-up on file.    ________________________________________________________________  ________________________________________________________________      Chief Complaint   Patient presents with    Sinus Problem    Cough     History of present illness  This 48 y.o. presents today for complaint of sinus fever and coughing.  Symptoms are worse at night.  Symptoms started last weekend and worsened Sunday and Monday.  She notes fever chills and body aches.  She has had sinus symptoms and cough.  Cough is productive green sputum.  Denies any shortness of breath or chest pain.  She has been taking Astelin, antihistamine    Answers for HPI/ROS submitted by the patient on 9/13/2018   Cough  Chronicity: recurrent  Onset: in the past 7 days  Progression since onset: rapidly worsening  Frequency: every few minutes  Cough characteristics: productive of purulent sputum  chest pain: No  chills: Yes  ear congestion: Yes  ear pain: No  fever: Yes  headaches: Yes  heartburn: No  hemoptysis: No  myalgias: No  nasal congestion: Yes  postnasal drip: Yes  rash: No  rhinorrhea: Yes  shortness of breath: No  sore throat: Yes  sweats: Yes  weight loss: No  wheezing: No  Aggravated by: other  asthma:  No  bronchiectasis: No  bronchitis: Yes  COPD: No  emphysema: No  environmental allergies: Yes  pneumonia: Yes  Treatments tried: leukotriene antagonists, steroid inhaler  Improvement on treatment: no relief      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    MVP (mitral valve prolapse)     Pneumonia     POTS (postural orthostatic tachycardia syndrome)     Recurrent upper respiratory infection (URI)     Ulcer     Vitamin D deficiency disease 11/6/2012       Past Surgical History:   Procedure Laterality Date    ANKLE SURGERY      lt    APPENDECTOMY      BACK SURGERY      CHOLECYSTECTOMY      choley & ovarian cyst removed  12/2005    cystoscope      multiple    HYSTERECTOMY  4/8/2004    BUBBA BS&O     interstim      OOPHORECTOMY  4/8/2004    with hysterectomy     PELVIC LAPAROSCOPY      [facility] EXPLORATORY OF ABDOMEN      REMOVAL, NEUROSTIMULATOR, SACRAL N/A 5/15/2013    Per[facility]    SINUS SURGERY  03/01/2016    SPINE SURGERY         Family History   Problem Relation Age of Onset    Hypertension Father     Alcohol abuse Brother     Cancer Paternal Uncle     Colon cancer Paternal Uncle     Depression Maternal Grandmother     Hearing loss Maternal Grandmother     Heart disease Maternal Grandmother     Kidney disease Maternal Grandmother     Cancer Paternal Grandmother     Arthritis Paternal Grandfather     Diabetes Paternal Grandfather     Stroke Paternal Grandfather     Breast cancer Neg Hx     Ovarian cancer Neg Hx     Asthma Neg Hx     Emphysema Neg Hx        Social History     Socioeconomic History    Marital status: Single     Spouse name: None    Number of  "children: None    Years of education: None    Highest education level: None   Social Needs    Financial resource strain: None    Food insecurity - worry: None    Food insecurity - inability: None    Transportation needs - medical: None    Transportation needs - non-medical: None   Occupational History    None   Tobacco Use    Smoking status: Never Smoker    Smokeless tobacco: Never Used   Substance and Sexual Activity    Alcohol use: No    Drug use: No    Sexual activity: No   Other Topics Concern    Are you pregnant or think you may be? No    Breast-feeding No   Social History Narrative    None       Current Outpatient Medications   Medication Sig Dispense Refill    ALBUTEROL INHL Inhale into the lungs as needed.       albuterol-ipratropium 2.5mg-0.5mg/3mL (DUO-NEB) 0.5 mg-3 mg(2.5 mg base)/3 mL nebulizer solution Take 3 mLs by nebulization every 6 (six) hours as needed for Wheezing. Rescue 1 Box 2    azelastine (ASTELIN) 137 mcg (0.1 %) nasal spray 2 SPRAYS each nostril Q 12 H PRN 30 mL 6    BD LUER-NANCY SYRINGE 3 mL 22 gauge x 1" Syrg INJECT QD FOR 5 DAYS THEN WEEKLY FOR 5 WEEKS THEN MONTHLY  3    BRINTELLIX 20 mg Tab once daily.   2    carBAMazepine (TEGRETOL XR) 200 MG 12 hr tablet Take 1 tab twice daily.  If no improvement after 2 weeks, take 2 tabs twice daily. 120 tablet 5    cloNIDine (CATAPRES) 0.1 MG tablet TAKE 1 TABLET BY MOUTH EVERY 6 HOURS AS NEEDED FOR FLUSHING 20 tablet 0    cyanocobalamin, vitamin B-12, 1,000 mcg/mL Kit Inject 1,000 mcg as directed every 28 days. Take daily for 5 days then weekly for 5 weeks then monthly 12 kit 1    desoximetasone (TOPICORT) 0.05 % cream Apply topically.      dicyclomine (BENTYL) 10 MG capsule Take by mouth.      doxycycline (MONODOX) 100 MG capsule Take 1 capsule (100 mg total) by mouth 2 (two) times daily. 20 capsule 0    ergocalciferol (ERGOCALCIFEROL) 50,000 unit Cap Take 1 capsule (50,000 Units total) by mouth every 7 days. 12 " "capsule 3    estrogens, conjugated, (PREMARIN) 1.25 MG tablet Take by mouth.      estropipate (OGEN) 1.5 MG tablet Take 1 tablet (1.5 mg total) by mouth once daily. 90 tablet 3    gabapentin (NEURONTIN) 300 MG capsule Take 300 mg by mouth 2 (two) times daily. 2-300 mg cap in AM and 3-300 mg cap in PM      hydrOXYzine HCl (ATARAX) 25 MG tablet Take 1 tablet (25 mg total) by mouth 3 (three) times daily. 45 tablet 1    immun glob G,IgG,-pro-IgA 0-50 (HIZENTRA) 10 gram/50 mL (20 %) Soln Inject 200 mg/kg into the skin every 7 days. 4 vial 5    levocetirizine (XYZAL) 5 MG tablet TK 1 T PO  ONCE Day  9    metoclopramide HCl (REGLAN) 10 MG tablet Take 1 tablet (10 mg total) by mouth every 6 (six) hours. 30 tablet 0    metoprolol tartrate (LOPRESSOR) 50 MG tablet Take 50 mg by mouth 4 (four) times daily. 1 Tablet Oral Three times a day      montelukast (SINGULAIR) 10 mg tablet Take by mouth.      ondansetron (ZOFRAN-ODT) 4 MG TbDL Take 1 tablet (4 mg total) by mouth every 8 (eight) hours as needed (nausea). 20 tablet 0    oxycodone-acetaminophen  mg (PERCOCET)  mg per tablet Take 1 tablet by mouth every 4 (four) hours as needed.      syringe with needle, safety 3 mL 22 gauge x 1 1/2" Syrg B12 injection every day for 5 days then weekly for 5 weeks then monthly. 50 Syringe 3    temazepam (RESTORIL) 15 mg Cap Take 15 mg by mouth every evening.  3    tiZANidine 4 mg Cap TK 2 TO 3 CS PO TID WITH FOOD  1    traZODone (DESYREL) 100 MG tablet TK 1 T PO  HS  1    triamcinolone acetonide 0.1% (KENALOG) 0.1 % cream 2 (two) times daily. Apply to affected area  0    valsartan (DIOVAN) 80 MG tablet TK 1 T PO QD  8    vortioxetine (BRINTELLIX) 10 mg Tab Take by mouth.      zaleplon (SONATA) 10 MG capsule Take 10 mg by mouth once daily.  2    EPINEPHrine (AUVI-Q) 0.3 mg/0.3 mL AtIn Inject 0.3 mLs (0.3 mg total) into the muscle once. 2 Device 0    guaifenesin-codeine 100-10 mg/5 ml (CHERATUSSIN AC)  " mg/5 mL syrup Take 10 mLs by mouth every 4 (four) hours as needed for Cough. 120 mL 0     No current facility-administered medications for this visit.        Review of patient's allergies indicates:   Allergen Reactions    Benadryl allergy decongestant Anaphylaxis     Other reaction(s): Anaphylaxis    Dilaudid [hydromorphone (bulk)] Anaphylaxis     Other reaction(s): Anaphylaxis    Fludrocortisone Hives    Gluten Other (See Comments)     Other reaction(s) GI upset    Diphenhydramine hcl     Hydromorphone hcl     Indomethacin Hives    Penicillins Rash    Sulfa (sulfonamide antibiotics) Rash    Vancomycin analogues Rash       Physical examination  Vitals Reviewed  Gen. Well-dressed well-nourished   Skin warm dry and intact.  No rashes noted.  HEENT.  TM intact bilateral with normal light reflex.  No mastoid tenderness during percussion.  Nares patent bilateral.  Pharynx is unremarkable except postnasal drip.  No maxillary or frontal sinus tenderness when percussed.    Neck is supple without adenopathy  Chest.  Respirations are even unlabored.  Lungs are clear to auscultation.  Cardiac regular rate and rhythm.  No chest wall adenopathy noted.  Neuro. Awake alert oriented x4.  Normal judgment and cognition noted.  Extremities no clubbing cyanosis or edema noted.     Call or return to clinic prn if these symptoms worsen or fail to improve as anticipated.

## 2018-09-14 ENCOUNTER — TELEPHONE (OUTPATIENT)
Dept: FAMILY MEDICINE | Facility: CLINIC | Age: 48
End: 2018-09-14

## 2018-09-14 ENCOUNTER — PATIENT MESSAGE (OUTPATIENT)
Dept: FAMILY MEDICINE | Facility: CLINIC | Age: 48
End: 2018-09-14

## 2018-09-14 NOTE — TELEPHONE ENCOUNTER
----- Message from Siddhartha Davies sent at 9/14/2018  3:57 PM CDT -----  Contact: Ramila [phone]  Patient is calling to notify  that her glands are still swollen. Please call at your earliest convenience.

## 2018-09-17 ENCOUNTER — OFFICE VISIT (OUTPATIENT)
Dept: FAMILY MEDICINE | Facility: CLINIC | Age: 48
End: 2018-09-17
Payer: COMMERCIAL

## 2018-09-17 VITALS
SYSTOLIC BLOOD PRESSURE: 120 MMHG | HEART RATE: 102 BPM | DIASTOLIC BLOOD PRESSURE: 80 MMHG | OXYGEN SATURATION: 98 % | HEIGHT: 64 IN | BODY MASS INDEX: 44.48 KG/M2 | WEIGHT: 260.56 LBS | TEMPERATURE: 99 F

## 2018-09-17 DIAGNOSIS — J06.9 UPPER RESPIRATORY TRACT INFECTION, UNSPECIFIED TYPE: Primary | ICD-10-CM

## 2018-09-17 DIAGNOSIS — R59.9 ADENOPATHY: ICD-10-CM

## 2018-09-17 PROCEDURE — 3074F SYST BP LT 130 MM HG: CPT | Mod: CPTII,S$GLB,, | Performed by: NURSE PRACTITIONER

## 2018-09-17 PROCEDURE — 99999 PR PBB SHADOW E&M-EST. PATIENT-LVL III: CPT | Mod: PBBFAC,,, | Performed by: NURSE PRACTITIONER

## 2018-09-17 PROCEDURE — 3079F DIAST BP 80-89 MM HG: CPT | Mod: CPTII,S$GLB,, | Performed by: NURSE PRACTITIONER

## 2018-09-17 PROCEDURE — 3008F BODY MASS INDEX DOCD: CPT | Mod: CPTII,S$GLB,, | Performed by: NURSE PRACTITIONER

## 2018-09-17 PROCEDURE — 99212 OFFICE O/P EST SF 10 MIN: CPT | Mod: S$GLB,,, | Performed by: NURSE PRACTITIONER

## 2018-09-17 NOTE — PROGRESS NOTES
This dictation has been generated using [employer] Dictation some phonetic errors may occur. Please contact author for clarification if needed.     Problem List Items Addressed This Visit     None      Visit Diagnoses     Upper respiratory tract infection, unspecified type    -  Primary    Adenopathy            URI with cough  Trace adenopathy in the right anterior cervical chain. Reactive. Reassured. No strep. Monitor     No Follow-up on file.    ________________________________________________________________  ________________________________________________________________      Chief Complaint   Patient presents with    URI     History of present illness  This 48 y.o. presents today for complaint of following up on URI with cough and gland swelling right neck. At last visit she noted: sinus fever and coughing.  Symptoms are worse at night.  Symptoms started last weekend and worsened Sunday and Monday.  She notes fever chills and body aches.  She has had sinus symptoms and cough.  Cough is productive green sputum.  Denies any shortness of breath or chest pain.  She has been taking Astelin, antihistamine  Today she is concerned about the gland swelling right anterior cervical. They are tender.     Answers for HPI/ROS submitted by the patient on 9/15/2018   activity change: No  unexpected weight change: No  neck pain: Yes  hearing loss: No  rhinorrhea: Yes  trouble swallowing: No  eye discharge: No  visual disturbance: No  chest tightness: No  wheezing: No  chest pain: No  palpitations: No  blood in stool: No  constipation: No  vomiting: No  diarrhea: No  polydipsia: No  polyuria: No  difficulty urinating: No  hematuria: No  menstrual problem: No  dysuria: No  joint swelling: No  arthralgias: No  headaches: Yes  weakness: No  confusion: No  dysphoric mood: No        Past Medical History:   Diagnosis Date    Allergy     seasonal    Anxiety     Bulging disc neck and back    Depression     Elevated alkaline  phosphatase level 7/17/2013    Hypothyroidism 11/6/2012    Interstitial cystitis     Irritable bowel syndrome 11/6/2012    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    MVP (mitral valve prolapse)     Pneumonia     POTS (postural orthostatic tachycardia syndrome)     Recurrent upper respiratory infection (URI)     Ulcer     Vitamin D deficiency disease 11/6/2012       Past Surgical History:   Procedure Laterality Date    ANKLE SURGERY      lt    APPENDECTOMY      BACK SURGERY      CHOLECYSTECTOMY      choley & ovarian cyst removed  12/2005    cystoscope      multiple    HYSTERECTOMY  4/8/2004    BUBBA BS&O     interstim      OOPHORECTOMY  4/8/2004    with hysterectomy     PELVIC LAPAROSCOPY      NM EXPLORATORY OF ABDOMEN      REMOVAL, NEUROSTIMULATOR, SACRAL N/A 5/15/2013    Performed by Olimpia Germain MD at [facility] OR    SINUS SURGERY  03/01/2016    SPINE SURGERY         Family History   Problem Relation Age of Onset    Hypertension Father     Alcohol abuse Brother     Cancer Paternal Uncle     Colon cancer Paternal Uncle     Depression Maternal Grandmother     Hearing loss Maternal Grandmother     Heart disease Maternal Grandmother     Kidney disease Maternal Grandmother     Cancer Paternal Grandmother     Arthritis Paternal Grandfather     Diabetes Paternal Grandfather     Stroke Paternal Grandfather     Breast cancer Neg Hx     Ovarian cancer Neg Hx     Asthma Neg Hx     Emphysema Neg Hx        Social History     Socioeconomic History    Marital status: Single     Spouse name: None    Number of children: None    Years of education: None    Highest education level: None   Social Needs    Financial resource strain: None    Food insecurity - worry: None    Food insecurity - inability: None    Transportation needs - medical: None    Transportation needs - non-medical: None   Occupational History    None   Tobacco Use    Smoking status: Never Smoker  "   Smokeless tobacco: Never Used   Substance and Sexual Activity    Alcohol use: No    Drug use: No    Sexual activity: No   Other Topics Concern    Are you pregnant or think you may be? No    Breast-feeding No   Social History Narrative    None       Current Outpatient Medications   Medication Sig Dispense Refill    ALBUTEROL INHL Inhale into the lungs as needed.       albuterol-ipratropium 2.5mg-0.5mg/3mL (DUO-NEB) 0.5 mg-3 mg(2.5 mg base)/3 mL nebulizer solution Take 3 mLs by nebulization every 6 (six) hours as needed for Wheezing. Rescue 1 Box 2    azelastine (ASTELIN) 137 mcg (0.1 %) nasal spray 2 SPRAYS each nostril Q 12 H PRN 30 mL 6    BD LUER-NANCY SYRINGE 3 mL 22 gauge x 1" Syrg INJECT QD FOR 5 DAYS THEN WEEKLY FOR 5 WEEKS THEN MONTHLY  3    BRINTELLIX 20 mg Tab once daily.   2    carBAMazepine (TEGRETOL XR) 200 MG 12 hr tablet Take 1 tab twice daily.  If no improvement after 2 weeks, take 2 tabs twice daily. 120 tablet 5    cloNIDine (CATAPRES) 0.1 MG tablet TAKE 1 TABLET BY MOUTH EVERY 6 HOURS AS NEEDED FOR FLUSHING 20 tablet 0    cyanocobalamin, vitamin B-12, 1,000 mcg/mL Kit Inject 1,000 mcg as directed every 28 days. Take daily for 5 days then weekly for 5 weeks then monthly 12 kit 1    desoximetasone (TOPICORT) 0.05 % cream Apply topically.      dicyclomine (BENTYL) 10 MG capsule Take by mouth.      doxycycline (MONODOX) 100 MG capsule Take 1 capsule (100 mg total) by mouth 2 (two) times daily. 20 capsule 0    ergocalciferol (ERGOCALCIFEROL) 50,000 unit Cap Take 1 capsule (50,000 Units total) by mouth every 7 days. 12 capsule 3    estrogens, conjugated, (PREMARIN) 1.25 MG tablet Take by mouth.      estropipate (OGEN) 1.5 MG tablet Take 1 tablet (1.5 mg total) by mouth once daily. 90 tablet 3    gabapentin (NEURONTIN) 300 MG capsule Take 300 mg by mouth 2 (two) times daily. 2-300 mg cap in AM and 3-300 mg cap in PM      guaifenesin-codeine 100-10 mg/5 ml (CHERATUSSIN AC)  " "mg/5 mL syrup Take 10 mLs by mouth every 4 (four) hours as needed for Cough. 120 mL 0    hydrOXYzine HCl (ATARAX) 25 MG tablet Take 1 tablet (25 mg total) by mouth 3 (three) times daily. 45 tablet 1    immun glob G,IgG,-pro-IgA 0-50 (HIZENTRA) 10 gram/50 mL (20 %) Soln Inject 200 mg/kg into the skin every 7 days. 4 vial 5    levocetirizine (XYZAL) 5 MG tablet TK 1 T PO  ONCE Day  9    metoclopramide HCl (REGLAN) 10 MG tablet Take 1 tablet (10 mg total) by mouth every 6 (six) hours. 30 tablet 0    metoprolol tartrate (LOPRESSOR) 50 MG tablet Take 50 mg by mouth 4 (four) times daily. 1 Tablet Oral Three times a day      montelukast (SINGULAIR) 10 mg tablet Take by mouth.      ondansetron (ZOFRAN-ODT) 4 MG TbDL Take 1 tablet (4 mg total) by mouth every 8 (eight) hours as needed (nausea). 20 tablet 0    oxycodone-acetaminophen  mg (PERCOCET)  mg per tablet Take 1 tablet by mouth every 4 (four) hours as needed.      syringe with needle, safety 3 mL 22 gauge x 1 1/2" Syrg B12 injection every day for 5 days then weekly for 5 weeks then monthly. 50 Syringe 3    temazepam (RESTORIL) 15 mg Cap Take 15 mg by mouth every evening.  3    tiZANidine 4 mg Cap TK 2 TO 3 CS PO TID WITH FOOD  1    traZODone (DESYREL) 100 MG tablet TK 1 T PO  HS  1    triamcinolone acetonide 0.1% (KENALOG) 0.1 % cream 2 (two) times daily. Apply to affected area  0    valsartan (DIOVAN) 80 MG tablet TK 1 T PO QD  8    vortioxetine (BRINTELLIX) 10 mg Tab Take by mouth.      zaleplon (SONATA) 10 MG capsule Take 10 mg by mouth once daily.  2    EPINEPHrine (AUVI-Q) 0.3 mg/0.3 mL AtIn Inject 0.3 mLs (0.3 mg total) into the muscle once. 2 Device 0     No current facility-administered medications for this visit.        Review of patient's allergies indicates:   Allergen Reactions    Benadryl allergy decongestant Anaphylaxis     Other reaction(s): Anaphylaxis    Dilaudid [hydromorphone (bulk)] Anaphylaxis     Other reaction(s): " Anaphylaxis    Fludrocortisone Hives    Gluten Other (See Comments)     Other reaction(s) GI upset    Diphenhydramine hcl     Hydromorphone hcl     Indomethacin Hives    Penicillins Rash    Sulfa (sulfonamide antibiotics) Rash    Vancomycin analogues Rash       Physical examination  Vitals Reviewed  Gen. Well-dressed well-nourished   Skin warm dry and intact.  No rashes noted.  HEENT.  TM intact bilateral with normal light reflex.  No mastoid tenderness during percussion.  Nares patent bilateral.  Pharynx is unremarkable except postnasal drip.  No maxillary or frontal sinus tenderness when percussed.    Neck is supple without adenopathy  Chest.  Respirations are even unlabored.  Lungs are clear to auscultation.  Cardiac regular rate and rhythm.  No chest wall adenopathy noted.  Neuro. Awake alert oriented x4.  Normal judgment and cognition noted.  Extremities no clubbing cyanosis or edema noted.     Call or return to clinic prn if these symptoms worsen or fail to improve as anticipated.

## 2018-09-27 ENCOUNTER — PATIENT MESSAGE (OUTPATIENT)
Dept: FAMILY MEDICINE | Facility: CLINIC | Age: 48
End: 2018-09-27

## 2018-09-27 NOTE — TELEPHONE ENCOUNTER
Please advise.   Pt message: Rajesh,    I am still suffering with the sinus issues.  Mainly postnasal and cough that worsens at night time.  Should this keep lingering? It improved and I was feeling much better, however that last few days it has worsened. I started waking up with nose bleeds so I dropped down from 2 zyrtecs to one per day last Wednesday.     Thanks  Kianna Zuleta

## 2018-09-28 ENCOUNTER — OFFICE VISIT (OUTPATIENT)
Dept: ALLERGY | Facility: CLINIC | Age: 48
End: 2018-09-28
Payer: COMMERCIAL

## 2018-09-28 ENCOUNTER — LAB VISIT (OUTPATIENT)
Dept: LAB | Facility: HOSPITAL | Age: 48
End: 2018-09-28
Attending: NURSE PRACTITIONER
Payer: COMMERCIAL

## 2018-09-28 VITALS
SYSTOLIC BLOOD PRESSURE: 138 MMHG | BODY MASS INDEX: 45.03 KG/M2 | DIASTOLIC BLOOD PRESSURE: 88 MMHG | WEIGHT: 262.38 LBS

## 2018-09-28 DIAGNOSIS — D83.9 COMMON VARIABLE IMMUNODEFICIENCY: Primary | ICD-10-CM

## 2018-09-28 DIAGNOSIS — R35.0 URINARY FREQUENCY: ICD-10-CM

## 2018-09-28 DIAGNOSIS — J31.0 NONALLERGIC RHINITIS: ICD-10-CM

## 2018-09-28 DIAGNOSIS — R35.0 URINARY FREQUENCY: Primary | ICD-10-CM

## 2018-09-28 DIAGNOSIS — H69.90 DYSFUNCTION OF EUSTACHIAN TUBE, UNSPECIFIED LATERALITY: ICD-10-CM

## 2018-09-28 LAB
BILIRUB UR QL STRIP: NEGATIVE
CLARITY UR REFRACT.AUTO: CLEAR
COLOR UR AUTO: YELLOW
GLUCOSE UR QL STRIP: NEGATIVE
HGB UR QL STRIP: NEGATIVE
KETONES UR QL STRIP: NEGATIVE
LEUKOCYTE ESTERASE UR QL STRIP: NEGATIVE
NITRITE UR QL STRIP: NEGATIVE
PH UR STRIP: 7 [PH] (ref 5–8)
PROT UR QL STRIP: NEGATIVE
SP GR UR STRIP: 1.02 (ref 1–1.03)
URN SPEC COLLECT METH UR: NORMAL
UROBILINOGEN UR STRIP-ACNC: NEGATIVE EU/DL

## 2018-09-28 PROCEDURE — 81003 URINALYSIS AUTO W/O SCOPE: CPT

## 2018-09-28 PROCEDURE — 3079F DIAST BP 80-89 MM HG: CPT | Mod: CPTII,S$GLB,, | Performed by: STUDENT IN AN ORGANIZED HEALTH CARE EDUCATION/TRAINING PROGRAM

## 2018-09-28 PROCEDURE — 99213 OFFICE O/P EST LOW 20 MIN: CPT | Mod: S$GLB,,, | Performed by: STUDENT IN AN ORGANIZED HEALTH CARE EDUCATION/TRAINING PROGRAM

## 2018-09-28 PROCEDURE — 3075F SYST BP GE 130 - 139MM HG: CPT | Mod: CPTII,S$GLB,, | Performed by: STUDENT IN AN ORGANIZED HEALTH CARE EDUCATION/TRAINING PROGRAM

## 2018-09-28 PROCEDURE — 99999 PR PBB SHADOW E&M-EST. PATIENT-LVL II: CPT | Mod: PBBFAC,,, | Performed by: STUDENT IN AN ORGANIZED HEALTH CARE EDUCATION/TRAINING PROGRAM

## 2018-09-28 PROCEDURE — 3008F BODY MASS INDEX DOCD: CPT | Mod: CPTII,S$GLB,, | Performed by: STUDENT IN AN ORGANIZED HEALTH CARE EDUCATION/TRAINING PROGRAM

## 2018-09-28 RX ORDER — METOPROLOL TARTRATE 50 MG/1
TABLET ORAL
COMMUNITY
End: 2018-11-02 | Stop reason: SDUPTHER

## 2018-09-28 RX ORDER — NITROFURANTOIN MACROCRYSTALS 50 MG/1
CAPSULE ORAL
COMMUNITY
End: 2018-12-04

## 2018-09-29 ENCOUNTER — PATIENT MESSAGE (OUTPATIENT)
Dept: FAMILY MEDICINE | Facility: CLINIC | Age: 48
End: 2018-09-29

## 2018-09-29 DIAGNOSIS — R39.15 URINARY URGENCY: ICD-10-CM

## 2018-09-29 DIAGNOSIS — R35.0 URINARY FREQUENCY: Primary | ICD-10-CM

## 2018-10-01 ENCOUNTER — TELEPHONE (OUTPATIENT)
Dept: FAMILY MEDICINE | Facility: CLINIC | Age: 48
End: 2018-10-01

## 2018-10-01 ENCOUNTER — PATIENT MESSAGE (OUTPATIENT)
Dept: FAMILY MEDICINE | Facility: CLINIC | Age: 48
End: 2018-10-01

## 2018-10-02 ENCOUNTER — PATIENT MESSAGE (OUTPATIENT)
Dept: OBSTETRICS AND GYNECOLOGY | Facility: CLINIC | Age: 48
End: 2018-10-02

## 2018-10-02 ENCOUNTER — PATIENT MESSAGE (OUTPATIENT)
Dept: FAMILY MEDICINE | Facility: CLINIC | Age: 48
End: 2018-10-02

## 2018-10-02 ENCOUNTER — TELEPHONE (OUTPATIENT)
Dept: OBSTETRICS AND GYNECOLOGY | Facility: CLINIC | Age: 48
End: 2018-10-02

## 2018-10-02 DIAGNOSIS — Z12.31 VISIT FOR SCREENING MAMMOGRAM: Primary | ICD-10-CM

## 2018-10-02 NOTE — TELEPHONE ENCOUNTER
----- Message from Daisy Hardy sent at 10/1/2018 11:40 AM CDT -----  Contact: self [phone]  Pt returned staff's call.

## 2018-10-18 ENCOUNTER — PATIENT MESSAGE (OUTPATIENT)
Dept: ALLERGY | Facility: CLINIC | Age: 48
End: 2018-10-18

## 2018-10-27 ENCOUNTER — OFFICE VISIT (OUTPATIENT)
Dept: URGENT CARE | Facility: CLINIC | Age: 48
End: 2018-10-27
Payer: COMMERCIAL

## 2018-10-27 VITALS
RESPIRATION RATE: 18 BRPM | TEMPERATURE: 99 F | HEIGHT: 64 IN | OXYGEN SATURATION: 97 % | BODY MASS INDEX: 44.39 KG/M2 | HEART RATE: 104 BPM | WEIGHT: 260 LBS

## 2018-10-27 DIAGNOSIS — J02.9 SORE THROAT: ICD-10-CM

## 2018-10-27 DIAGNOSIS — J02.9 ACUTE PHARYNGITIS, UNSPECIFIED ETIOLOGY: Primary | ICD-10-CM

## 2018-10-27 LAB
CTP QC/QA: YES
S PYO RRNA THROAT QL PROBE: NEGATIVE

## 2018-10-27 PROCEDURE — 3008F BODY MASS INDEX DOCD: CPT | Mod: CPTII,S$GLB,, | Performed by: NURSE PRACTITIONER

## 2018-10-27 PROCEDURE — 87880 STREP A ASSAY W/OPTIC: CPT | Mod: QW,S$GLB,, | Performed by: NURSE PRACTITIONER

## 2018-10-27 PROCEDURE — 99214 OFFICE O/P EST MOD 30 MIN: CPT | Mod: S$GLB,,, | Performed by: NURSE PRACTITIONER

## 2018-10-27 NOTE — PATIENT INSTRUCTIONS
FOLLOW UP IF YOU WORSEN OR DO NOT IMPROVE.     You must understand that you've received an Urgent Care treatment only and that you may be released before all your medical problems are known or treated. You, the patient, will arrange for follow up care as instructed.  If your condition worsens we recommend that you receive another evaluation at the emergency room immediately or contact your primary medical clinics after hours call service to discuss your concerns.  Please return here or go to the Emergency Department for any concerns or worsening of condition.      Self-Care for Sore Throats    Sore throats happen for many reasons, such as colds, allergies, and infections caused by viruses or bacteria. In any case, your throat becomes red and sore. Your goal for self-care is to reduce your discomfort while giving your throat a chance to heal.  Moisten and soothe your throat  Tips include the following:  · Try a sip of water first thing after waking up.  · Keep your throat moist by drinking 6 or more glasses of clear liquids every day.  · Run a cool-air humidifier in your room overnight.  · Avoid cigarette smoke.   · Suck on throat lozenges, cough drops, hard candy, ice chips, or frozen fruit-juice bars. Use the sugar-free versions if your diet or medical condition requires them.  Gargle to ease irritation  Gargling every hour or 2 can ease irritation. Try gargling with 1 of these solutions:  · 1/4 teaspoon of salt in 1/2 cup of warm water  · An over-the-counter anesthetic gargle  Use medicine for more relief  Over-the-counter medicine can reduce sore throat symptoms. Ask your pharmacist if you have questions about which medicine to use:  · Ease pain with anesthetic sprays. Aspirin or an aspirin substitute also helps. Remember, never give aspirin to anyone 18 or younger, or if you are already taking blood thinners.   · For sore throats caused by allergies, try antihistamines to block the allergic  reaction.  · Remember: unless a sore throat is caused by a bacterial infection, antibiotics wont help you.  Prevent future sore throats  Prevention tips include the following:  · Stop smoking or reduce contact with secondhand smoke. Smoke irritates the tender throat lining.  · Limit contact with pets and with allergy-causing substances, such as pollen and mold.  · When youre around someone with a sore throat or cold, wash your hands often to keep viruses or bacteria from spreading.  · Dont strain your vocal cords.  Call your healthcare provider  Contact your healthcare provider if you have:  · A temperature over 101°F (38.3°C)  · White spots on the throat  · Great difficulty swallowing  · Trouble breathing  · A skin rash  · Recent exposure to someone else with strep bacteria  · Severe hoarseness and swollen glands in the neck or jaw   Date Last Reviewed: 8/1/2016  © 4414-2283 [employer]. [address] [place] [address], [place] [address]. All rights reserved. This information is not intended as a substitute for professional medical care. Always follow your healthcare professional's instructions.

## 2018-10-27 NOTE — PROGRESS NOTES
"Subjective:       Patient ID: Kianna Zuleta is a 48 y.o. female.    Vitals:  height is 5' 4" (1.626 m) and weight is 117.9 kg (260 lb). Her oral temperature is 99.4 °F (37.4 °C). Her pulse is 104. Her respiration is 18 and oxygen saturation is 97%.     Chief Complaint: Sore Throat    Patient states she is a . She has been having a sore throat since Wednesday and reports that kids have been out with strep throat.       Sore Throat    This is a new problem. The current episode started in the past 7 days. The problem has been gradually worsening. The pain is worse on the right side. The pain is at a severity of 7/10. The pain is moderate. Associated symptoms include congestion, swollen glands and trouble swallowing. Pertinent negatives include no abdominal pain, coughing, ear pain, headaches, hoarse voice or shortness of breath. She has tried gargles and acetaminophen for the symptoms. The treatment provided no relief.     Review of Systems   Constitution: Positive for chills, fever and night sweats. Negative for malaise/fatigue.   HENT: Positive for congestion, sore throat and trouble swallowing. Negative for ear pain and hoarse voice.    Eyes: Negative for discharge and redness.   Cardiovascular: Negative for chest pain, dyspnea on exertion and leg swelling.   Respiratory: Negative for cough, shortness of breath, sputum production and wheezing.    Musculoskeletal: Negative for myalgias.   Gastrointestinal: Negative for abdominal pain and nausea.   Neurological: Negative for headaches.       Objective:      Physical Exam   Constitutional: She is oriented to person, place, and time. She appears well-developed and well-nourished. She is cooperative.  Non-toxic appearance. She does not appear ill. No distress.   HENT:   Head: Normocephalic and atraumatic.   Right Ear: Hearing, tympanic membrane, external ear and ear canal normal.   Left Ear: Hearing, tympanic membrane, external ear and ear canal " normal.   Nose: Nose normal. No mucosal edema, rhinorrhea or nasal deformity. No epistaxis. Right sinus exhibits no maxillary sinus tenderness and no frontal sinus tenderness. Left sinus exhibits no maxillary sinus tenderness and no frontal sinus tenderness.   Mouth/Throat: Uvula is midline and mucous membranes are normal. No trismus in the jaw. Normal dentition. No uvula swelling. Posterior oropharyngeal erythema present. Tonsils are 1+ on the right. Tonsils are 1+ on the left. No tonsillar exudate.   Eyes: Conjunctivae and lids are normal. No scleral icterus.   Sclera clear bilat   Neck: Trachea normal, full passive range of motion without pain and phonation normal. Neck supple.   Cardiovascular: Normal rate, regular rhythm, normal heart sounds, intact distal pulses and normal pulses.   Pulmonary/Chest: Effort normal and breath sounds normal. No respiratory distress.   Abdominal: Soft. Normal appearance and bowel sounds are normal. She exhibits no distension. There is no tenderness.   Musculoskeletal: Normal range of motion. She exhibits no edema or deformity.   Neurological: She is alert and oriented to person, place, and time. She exhibits normal muscle tone. Coordination normal.   Skin: Skin is warm, dry and intact. She is not diaphoretic. No pallor.   Psychiatric: She has a normal mood and affect. Her speech is normal and behavior is normal. Judgment and thought content normal. Cognition and memory are normal.   Nursing note and vitals reviewed.      Results for orders placed or performed in visit on 10/27/18   POCT rapid strep A   Result Value Ref Range    Rapid Strep A Screen Negative Negative     Acceptable Yes      Assessment:       1. Acute pharyngitis, unspecified etiology    2. Sore throat        Plan:         Acute pharyngitis, unspecified etiology    Sore throat  -     POCT rapid strep A            Patient Instructions     FOLLOW UP IF YOU WORSEN OR DO NOT IMPROVE.     You must  understand that you've received an Urgent Care treatment only and that you may be released before all your medical problems are known or treated. You, the patient, will arrange for follow up care as instructed.  If your condition worsens we recommend that you receive another evaluation at the emergency room immediately or contact your primary medical clinics after hours call service to discuss your concerns.  Please return here or go to the Emergency Department for any concerns or worsening of condition.      Self-Care for Sore Throats    Sore throats happen for many reasons, such as colds, allergies, and infections caused by viruses or bacteria. In any case, your throat becomes red and sore. Your goal for self-care is to reduce your discomfort while giving your throat a chance to heal.  Moisten and soothe your throat  Tips include the following:  · Try a sip of water first thing after waking up.  · Keep your throat moist by drinking 6 or more glasses of clear liquids every day.  · Run a cool-air humidifier in your room overnight.  · Avoid cigarette smoke.   · Suck on throat lozenges, cough drops, hard candy, ice chips, or frozen fruit-juice bars. Use the sugar-free versions if your diet or medical condition requires them.  Gargle to ease irritation  Gargling every hour or 2 can ease irritation. Try gargling with 1 of these solutions:  · 1/4 teaspoon of salt in 1/2 cup of warm water  · An over-the-counter anesthetic gargle  Use medicine for more relief  Over-the-counter medicine can reduce sore throat symptoms. Ask your pharmacist if you have questions about which medicine to use:  · Ease pain with anesthetic sprays. Aspirin or an aspirin substitute also helps. Remember, never give aspirin to anyone 18 or younger, or if you are already taking blood thinners.   · For sore throats caused by allergies, try antihistamines to block the allergic reaction.  · Remember: unless a sore throat is caused by a bacterial  infection, antibiotics wont help you.  Prevent future sore throats  Prevention tips include the following:  · Stop smoking or reduce contact with secondhand smoke. Smoke irritates the tender throat lining.  · Limit contact with pets and with allergy-causing substances, such as pollen and mold.  · When youre around someone with a sore throat or cold, wash your hands often to keep viruses or bacteria from spreading.  · Dont strain your vocal cords.  Call your healthcare provider  Contact your healthcare provider if you have:  · A temperature over 101°F (38.3°C)  · White spots on the throat  · Great difficulty swallowing  · Trouble breathing  · A skin rash  · Recent exposure to someone else with strep bacteria  · Severe hoarseness and swollen glands in the neck or jaw   Date Last Reviewed: 8/1/2016  © 2562-8713 [employer]. [address] [place] [address], [place] [address]. All rights reserved. This information is not intended as a substitute for professional medical care. Always follow your healthcare professional's instructions.

## 2018-10-30 ENCOUNTER — OFFICE VISIT (OUTPATIENT)
Dept: FAMILY MEDICINE | Facility: CLINIC | Age: 48
End: 2018-10-30
Payer: COMMERCIAL

## 2018-10-30 VITALS
BODY MASS INDEX: 44.45 KG/M2 | HEIGHT: 64 IN | WEIGHT: 260.38 LBS | TEMPERATURE: 99 F | DIASTOLIC BLOOD PRESSURE: 104 MMHG | HEART RATE: 100 BPM | OXYGEN SATURATION: 95 % | SYSTOLIC BLOOD PRESSURE: 176 MMHG

## 2018-10-30 DIAGNOSIS — E03.9 HYPOTHYROIDISM, UNSPECIFIED TYPE: ICD-10-CM

## 2018-10-30 DIAGNOSIS — R50.9 FEVER, UNSPECIFIED FEVER CAUSE: ICD-10-CM

## 2018-10-30 DIAGNOSIS — J02.9 ACUTE PHARYNGITIS, UNSPECIFIED ETIOLOGY: Primary | ICD-10-CM

## 2018-10-30 DIAGNOSIS — D80.3 IGG DEFICIENCY: ICD-10-CM

## 2018-10-30 PROCEDURE — 3077F SYST BP >= 140 MM HG: CPT | Mod: CPTII,S$GLB,, | Performed by: INTERNAL MEDICINE

## 2018-10-30 PROCEDURE — 3080F DIAST BP >= 90 MM HG: CPT | Mod: CPTII,S$GLB,, | Performed by: INTERNAL MEDICINE

## 2018-10-30 PROCEDURE — 99214 OFFICE O/P EST MOD 30 MIN: CPT | Mod: S$GLB,,, | Performed by: INTERNAL MEDICINE

## 2018-10-30 PROCEDURE — 99999 PR PBB SHADOW E&M-EST. PATIENT-LVL III: CPT | Mod: PBBFAC,,, | Performed by: INTERNAL MEDICINE

## 2018-10-30 PROCEDURE — 3008F BODY MASS INDEX DOCD: CPT | Mod: CPTII,S$GLB,, | Performed by: INTERNAL MEDICINE

## 2018-10-30 RX ORDER — AZITHROMYCIN 250 MG/1
TABLET, FILM COATED ORAL
Qty: 6 TABLET | Refills: 0 | Status: SHIPPED | OUTPATIENT
Start: 2018-10-30 | End: 2018-12-04

## 2018-10-30 NOTE — PROGRESS NOTES
Chief complaint follow-up on sore throat      48-year-old white female with irritable bowel syndrome and hypothyroidism.  Patient went to Urgent Care over the weekend.  Today is Tuesday.  Apparently she had sore throat but no fever.  Her strep testing was negative.  I can tell by records that she was given any particular treatment or antibiotics.  She is followed by Allergy and immunology for an immune immunodeficiency.  She is treated for that condition.  Moderate sore throat getting worse over the last couple of days and yesterday she developed a fever of 101.  She did not have fever before that.  She is trying lozenges and so forth without much relief.  She has multiple allergies which were reviewed.  She did have exposure to children with strep and she works as a teacher.  Her strep testing was negative but we did discuss the possibility of still having strep.  She really has no other URI symptoms.     She is working as an .     She continues to follow with her GYN and she is up-to-date on mammogram.  She follows with GI as well    ROS:   CONST: weight stable. EYES: no vision change. ENT: no sore throat. CV: no chest pain w/ exertion. RESP: no shortness of breath. GI: no nausea, vomiting, diarrhea. No dysphagia. : no urinary issues. MUSCULOSKELETAL: no new myalgias or arthralgias. SKIN: no new changes. NEURO: no focal deficits. PSYCH: no new issues. ENDOCRINE: no polyuria. HEME: no lymph nodes. ALLERGY: no general pruritis.    PAST MEDICAL HISTORY:                                                        1. Intersitial cystitis.                                                     2. Fibromyalgia.                                                             3. Mitral valve prolapse.                                                    4. IBS. Dr Rowe , now Dr. Mckeon                                                                 5. L-spine disk bulging.  L4-5 -Dr Singh at [facility]                                                    6. Total hysterectomy in 2004.   7.  Hypothyroid.                                                             8.  Incidental carcinoid tumor of the appendix.                              9.  Now on gluten-free diet.                                                 10.  Vitamin D deficiency, followed by outside Rheumatology.                  11.  Neurogenic bladder, followed by outside urologist. Ranjeet Vasquez  12.  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    SOCIAL HISTORY: Never smoked. The patient seldom drinks alcohol. Doesn't use recreational drugs. Lives alone. Single.     FAMILY HISTORY: The patient's family members had Ovarian cancer.No family history of breast cancer. No family history of colon cancer    Vitals as above,   Gen: no distress  EYES: conjunctiva clear, non-icteric, PERRL  ENT: nose congested, nasal mucosa red, oropharynx moderately red and moist, teeth good, tonsils are small with no exudate  NECK:supple, thyroid non-palpable, no cervical lymph nodes  RESP: effort is good, lungs clear  CV: heart RRR w/o murmur, gallops or rubs; no carotid bruits, no edema  GI: abdomen soft, non-distended, non-tender  MS: gait normal, no clubbing or cyanosis of the digits  SKIN: no rashes, warm to touch, no sinus pain to touch      Assessment and plan:    1.  Acute pharyngitis associated with progressive sore throat, now with documented fever associated  "with immuno deficiency.  Although strep testing negative, this still well could be strep especially with exposure to strep and will cover with Z-Levi.  Otherwise it may well be self limited and continue to use topical anesthetics.  I did give 1 sample of viscous lidocaine for patient to use at night and she has tolerated and use this from her ENT in the past.    Two.  Elevated blood pressure recording, anxious and in pain and encouraged her to monitor       Based on patient's anxiety referable to the above as well as compliance issues, access would not be therapeutic"  Answers for HPI/ROS submitted by the patient on 10/30/2018   Sore throat  Chronicity: recurrent  Onset: in the past 7 days  Progression since onset: gradually worsening  Pain worse on: neither  Fever: 100 - 100.9 F  Fever duration: 5 days or more  Pain - numeric: 7/10  abdominal pain: No  congestion: Yes  cough: Yes  diarrhea: No  drooling: No  ear discharge: No  ear pain: No  headaches: Yes  hoarse voice: Yes  neck pain: Yes  plugged ear sensation: Yes  shortness of breath: Yes  stridor: No  swollen glands: Yes  trouble swallowing: Yes  vomiting: No  strep: Yes  mono: No  Treatments tried: NSAIDs, acetaminophen, cool liquids, gargles, oral narcotic analgesics  Improvement on treatment: mild  Pain severity: severe      Clinical no be sensitive based on her expressed anxiety referral to the issues above"This note will not be shared with the patient."  "

## 2018-11-01 RX ORDER — ERGOCALCIFEROL 1.25 MG/1
CAPSULE ORAL
Qty: 12 CAPSULE | Refills: 3 | Status: SHIPPED | OUTPATIENT
Start: 2018-11-01 | End: 2019-09-29 | Stop reason: SDUPTHER

## 2018-11-02 ENCOUNTER — TELEPHONE (OUTPATIENT)
Dept: RHEUMATOLOGY | Facility: CLINIC | Age: 48
End: 2018-11-02

## 2018-11-02 ENCOUNTER — OFFICE VISIT (OUTPATIENT)
Dept: RHEUMATOLOGY | Facility: CLINIC | Age: 48
End: 2018-11-02
Payer: COMMERCIAL

## 2018-11-02 ENCOUNTER — CLINICAL SUPPORT (OUTPATIENT)
Dept: INFECTIOUS DISEASES | Facility: CLINIC | Age: 48
End: 2018-11-02
Payer: COMMERCIAL

## 2018-11-02 VITALS
DIASTOLIC BLOOD PRESSURE: 99 MMHG | HEART RATE: 104 BPM | BODY MASS INDEX: 43.32 KG/M2 | HEIGHT: 65 IN | SYSTOLIC BLOOD PRESSURE: 159 MMHG | WEIGHT: 260 LBS

## 2018-11-02 DIAGNOSIS — D80.1 HYPOGAMMAGLOBULINEMIA: ICD-10-CM

## 2018-11-02 DIAGNOSIS — Z23 IMMUNIZATION DUE: ICD-10-CM

## 2018-11-02 DIAGNOSIS — E55.9 VITAMIN D DEFICIENCY: Primary | ICD-10-CM

## 2018-11-02 DIAGNOSIS — R50.9 FEVER, UNSPECIFIED FEVER CAUSE: ICD-10-CM

## 2018-11-02 DIAGNOSIS — M60.9 MYOSITIS, UNSPECIFIED MYOSITIS TYPE, UNSPECIFIED SITE: ICD-10-CM

## 2018-11-02 PROCEDURE — 90471 IMMUNIZATION ADMIN: CPT | Mod: S$GLB,,, | Performed by: INTERNAL MEDICINE

## 2018-11-02 PROCEDURE — 3080F DIAST BP >= 90 MM HG: CPT | Mod: CPTII,S$GLB,, | Performed by: INTERNAL MEDICINE

## 2018-11-02 PROCEDURE — 99999 PR PBB SHADOW E&M-EST. PATIENT-LVL III: CPT | Mod: PBBFAC,,,

## 2018-11-02 PROCEDURE — 90715 TDAP VACCINE 7 YRS/> IM: CPT | Mod: S$GLB,,, | Performed by: INTERNAL MEDICINE

## 2018-11-02 PROCEDURE — 99999 PR PBB SHADOW E&M-EST. PATIENT-LVL IV: CPT | Mod: PBBFAC,,, | Performed by: INTERNAL MEDICINE

## 2018-11-02 PROCEDURE — 99213 OFFICE O/P EST LOW 20 MIN: CPT | Mod: S$GLB,,, | Performed by: INTERNAL MEDICINE

## 2018-11-02 PROCEDURE — 3077F SYST BP >= 140 MM HG: CPT | Mod: CPTII,S$GLB,, | Performed by: INTERNAL MEDICINE

## 2018-11-02 PROCEDURE — 3008F BODY MASS INDEX DOCD: CPT | Mod: CPTII,S$GLB,, | Performed by: INTERNAL MEDICINE

## 2018-11-02 ASSESSMENT — ROUTINE ASSESSMENT OF PATIENT INDEX DATA (RAPID3)
WHEN YOU AWAKENED IN THE MORNING OVER THE LAST WEEK, PLEASE INDICATE THE AMOUNT OF TIME IT TAKES UNTIL YOU ARE AS LIMBER AS YOU WILL BE FOR THE DAY: 1 HR
MDHAQ FUNCTION SCORE: .6
AM STIFFNESS SCORE: 1, YES
TOTAL RAPID3 SCORE: 4
PSYCHOLOGICAL DISTRESS SCORE: 6.6
FATIGUE SCORE: 8
PAIN SCORE: 4
PATIENT GLOBAL ASSESSMENT SCORE: 6

## 2018-11-02 NOTE — PROGRESS NOTES
"Subjective:       Patient ID: Kianna Zuleta is a 48 y.o. female.    Chief Complaint: No chief complaint on file.     is a 51 year old with a hx of eczema, Vitamin D deficiency, Hypertension not controlled with metoprolol 50mg qid, clonidine 0.1mg, valsartan 40mg daily, Allergic rhinitis, Mild IgG deficiency, H/o fever, H/o carcinoid, Trigeminal neuralgia on carbamazepine, gabapentin. She recently was given the diagnosis of common variable immunodeficiency. She had a rash at there last visit and was referred to dermatology. She has been taking the IVIG for 21 weeks. She says she is much better than she was last year. Fewer fevers and fewer infections. She has been injecting herself once a week. She has been having fewer flare ups of eczema. Her rash is much improved as well.       Review of Systems   Constitutional: Positive for fatigue and fever (Fewer fevers since IVIG.). Negative for activity change.   HENT: Positive for congestion (nasal.) and sore throat.    Eyes: Negative for pain and redness.   Respiratory: Positive for cough. Negative for shortness of breath.    Cardiovascular: Negative for palpitations and leg swelling.   Gastrointestinal: Positive for abdominal pain (intermittently; IBS). Negative for nausea and vomiting.   Endocrine: Negative for polydipsia and polyuria.   Genitourinary: Negative for dysuria and frequency.   Musculoskeletal: Positive for back pain (Constant). Negative for joint swelling.   Skin: Negative for color change.   Neurological: Negative for dizziness and numbness.   Psychiatric/Behavioral: Negative for behavioral problems.         Objective:   BP (!) 159/99   Pulse 104   Ht 5' 4.8" (1.646 m)   Wt 117.9 kg (260 lb)   BMI 43.53 kg/m²      Physical Exam   Constitutional: She is oriented to person, place, and time and well-developed, well-nourished, and in no distress. No distress.   HENT:   Head: Normocephalic and atraumatic.   Nose: Nose normal.   Mouth/Throat: No " oropharyngeal exudate.   Eyes: Conjunctivae are normal. Left eye exhibits no discharge. No scleral icterus.   Cardiovascular: Normal rate, regular rhythm and normal heart sounds.    Pulmonary/Chest: Effort normal and breath sounds normal. No respiratory distress. She has no wheezes. She has no rales. She exhibits no tenderness.   Abdominal: She exhibits no distension. There is no tenderness.       Right Side Rheumatological Exam     Examination finds the shoulder, elbow, wrist, knee, 1st PIP, 1st MCP, 2nd PIP, 2nd MCP, 3rd PIP, 3rd MCP, 4th PIP, 4th MCP, 5th PIP and 5th MCP normal.    Shoulder Exam   Tenderness Location: no tenderness    Range of Motion   The patient has normal right shoulder ROM.    Knee Exam     Range of Motion   The patient has normal right knee ROM.    Elbow/Wrist Exam   Tenderness Location: no elbow tenderness    Muscle Strength (0-5 scale):  Deltoid:  5  Biceps: 5/5   Triceps:  5  : 5/5   Iliopsoas: 5  Quadriceps:  5   Distal Lower Extremity: 5    Left Side Rheumatological Exam     Examination finds the shoulder, wrist, knee, 1st PIP, 1st MCP, 2nd PIP, 2nd MCP, 3rd PIP, 3rd MCP, 4th PIP, 4th MCP, 5th PIP and 5th MCP normal.    Shoulder Exam   Tenderness Location: no tenderness    Range of Motion   The patient has normal left shoulder ROM.    Knee Exam     Range of Motion   The patient has normal left knee ROM.    Elbow/Wrist Exam   Tenderness Location: no elbow tenderness    Muscle Strength (0-5 scale):  Deltoid:  5  Biceps: 5/5   Triceps:  5  :  5/5   Iliopsoas: 5  Quadriceps:  5   Distal Lower Extremity: 5      Neurological: She is alert and oriented to person, place, and time. She has normal reflexes. No cranial nerve deficit. Gait normal. GCS score is 15.   Reflex Scores:       Bicep reflexes are 2+ on the right side and 2+ on the left side.       Brachioradialis reflexes are 2+ on the right side and 2+ on the left side.       Patellar reflexes are 2+ on the right side and 2+ on  the left side.       Achilles reflexes are 2+ on the right side and 2+ on the left side.  Skin: Skin is warm and dry. No rash noted. She is not diaphoretic. No erythema.     Psychiatric: Mood, memory, affect and judgment normal.   Musculoskeletal: Normal range of motion. She exhibits no edema or tenderness.           Assessment:        is a 51 year old with a hx of CVID, eczema, Vitamin D deficiency, Hypertension not controlled with metoprolol 50mg qid, clonidine 0.1mg, valsartan 40mg daily. She has been doing better after taking her IVIG weekly. Fewer fevers and fewer infections. Rash and eczema also improving. Myositis labs today.        1. Vitamin D deficiency    2. Fever, unspecified fever cause    3. Myositis, unspecified myositis type, unspecified site            Plan:       -Sed rate, CRP, CBC and diff, CMP, CK, Aldolase, Vit D labs today.  -TDap today  -Return to clinic PRN; pending lab results.

## 2018-11-02 NOTE — PROGRESS NOTES
I have personally taken the history and examined the patient and agree with the resident,s note as stated above  Much better since starting Ig sc, fewer infections, no more rash she feels improved with CeraVe.      Results for BLANCA GUTHRIE (MRN [id number]) as of 11/2/2018 08:18   Ref. Range 4/3/2018 10:39   Vit D, 25-Hydroxy Latest Ref Range: 30 - 96 ng/mL 24 (L)   Results for BLANCA GUTHRIE (MRN [id number]) as of 11/2/2018 08:18   Ref. Range 9/28/2018 16:46   Specimen UA Unknown Urine, Clean Catch   Color, UA Latest Ref Range: Yellow, Straw, Staci  Yellow   pH, UA Latest Ref Range: 5.0 - 8.0  7.0   Specific Gravity, UA Latest Ref Range: 1.005 - 1.030  1.020   Appearance, UA Latest Ref Range: Clear  Clear   Protein, UA Latest Ref Range: Negative  Negative   Glucose, UA Latest Ref Range: Negative  Negative   Ketones, UA Latest Ref Range: Negative  Negative   Occult Blood UA Latest Ref Range: Negative  Negative   Nitrite, UA Latest Ref Range: Negative  Negative   Urobilinogen, UA Latest Ref Range: <2.0 EU/dL Negative   Bilirubin (UA) Latest Ref Range: Negative  Negative   Leukocytes, UA Latest Ref Range: Negative  Negative     Results for BLANCA GUTHRIE (MRN [id number]) as of 11/2/2018 08:18   Ref. Range 12/19/2017 14:42   Anti-Kaylynn-1 Antibody Latest Ref Range: <20 Units <20   Anti-PM/Scl Ab Latest Ref Range: <20 Units <20   Anti-SS-A 52 kD Ab, IgG Latest Ref Range: <20 Units <20   Anti-U1-RNP  Ab Latest Ref Range: <20 Units <20   [facility] Latest Ref Range: Negative  Negative   Fibrillarin (U3 RNP) Latest Ref Range: Negative  Negative   Ku Latest Ref Range: Negative  Negative   MDA-5 (P140) (CADM-140) Latest Ref Range: <20 Units <20   MI-2 Latest Ref Range: Negative  Negative   NXP-2 (P140) Latest Ref Range: <20 Units <20   [facility]J Latest Ref Range: Negative  Negative   PL-12 Latest Ref Range: Negative  Negative   PL-7 Latest Ref Range: Negative  Negative   SRP Latest Ref Range: Negative  Negative   TIF1 GAMMA (P155/140)  Latest Ref Range: <20 Units <20   U2 snRNP Latest Ref Range: Negative  Negative   Results for BLANCA GUTHRIE (MRN [id number]) as of 11/2/2018 08:18   Ref. Range 10/18/2017 11:51   Anti-SSA Interpretation Latest Ref Range: Negative  Negative     Results for BLANCA GUTHRIE (MRN [id number]) as of 11/2/2018 08:18   Ref. Range 12/8/2017 16:04   ANCA Proteinase 3 Latest Ref Range: <0.4 (Negative) U <0.2   Cytoplasmic Neutrophilic Ab Latest Ref Range: <1:20 Titer <1:20   MPO Latest Ref Range: <=20 UNITS 4   Perinuclear (P-ANCA) Latest Ref Range: <1:20 Titer <1:20       Results for BLANCA GUTHRIE (MRN [id number]) as of 11/2/2018 08:18   Ref. Range 12/8/2017 16:04 12/13/2017 12:36   Complement (C-3) Latest Ref Range: 50 - 180 mg/dL 209 (H)    Complement (C-4) Latest Ref Range: 11 - 44 mg/dL 37    Complement,Total, Serum Latest Ref Range: 42 - 95 U/mL 92 86       Results for BLANCA GUTHRIE (MRN [id number]) as of 11/2/2018 08:18   Ref. Range 1/11/2018 08:06 1/22/2018 14:42   IgG - Serum Latest Ref Range: 650 - 1600 mg/dL 557 (L) 600 (L)   IgM Latest Ref Range: 50 - 300 mg/dL 90 78   IgA Latest Ref Range: 40 - 350 mg/dL 347 343   IgE Latest Ref Range: 0 - 100 IU/mL  <35   IgG 1 Latest Ref Range: 382 - 929 mg/dL 293 (L)    IgG 2 Latest Ref Range: 242 - 700 mg/dL 126 (L)    IgG 3 Latest Ref Range: 22 - 176 mg/dL 52    IgG 4 Latest Ref Range: 4 - 86 mg/dL 3 (L)                      Results for BLANCA GUTHRIE (MRN [id number]) as of 11/2/2018 08:18   Ref. Range 12/13/2017 12:36   Cryoglobulin, Qualitative Latest Ref Range: Absent  Absent            Normal abdominal SPECT I-123 MIBG examination. No MIBG avid lesions are seen.        Resolved eczema  Vitamin D deficiency  Hypertension not controlled with metoprolol 50mg qid, clonidine 0.1mg, valsartan 40mg daily 159/99 today  Asthma  Allergic rhinitis   IgG deficiency(IgG1, IgG2, IgG4)  H/o fever  H/o carcinoid  Trigeminal neuralgia on carbamazepine, gabapentin      *Tdap  today  Ig sc per Dr. Miller  Recheck cbc, cmp, esr, crp, ck, aldolase, vitamin D today  cont vitamin D2 50,000 units once a week and vitamin D3 1000 units daily  F/u Dr. Celis re: hypertension  RTC prn if today's labs normal

## 2018-11-06 LAB
EXT 24 HR UR METANEPHRINE: ABNORMAL
EXT 24 HR UR NORMETANEPHRINE: ABNORMAL
EXT 24 HR UR NORMETANEPHRINE: ABNORMAL
EXT 25 HYDROXY VIT D2: ABNORMAL
EXT 25 HYDROXY VIT D3: ABNORMAL
EXT 5 HIAA 24 HR URINE: ABNORMAL
EXT 5 HIAA BLOOD: 8 NG/ML (ref 0–22)
EXT ACTH: ABNORMAL
EXT AFP: ABNORMAL
EXT ALBUMIN: ABNORMAL
EXT ALKALINE PHOSPHATASE: ABNORMAL
EXT ALT: ABNORMAL
EXT AMYLASE: ABNORMAL
EXT ANTI ISLET CELL AB: ABNORMAL
EXT ANTI PARIETAL CELL AB: ABNORMAL
EXT ANTI THYROID AB: ABNORMAL
EXT AST: ABNORMAL
EXT BILIRUBIN DIRECT: ABNORMAL MG/DL
EXT BILIRUBIN TOTAL: ABNORMAL
EXT BK VIRUS DNA QN PCR: ABNORMAL
EXT BUN: ABNORMAL
EXT C PEPTIDE: ABNORMAL
EXT CA 125: ABNORMAL
EXT CA 19-9: ABNORMAL
EXT CA 27-29: ABNORMAL
EXT CALCITONIN: ABNORMAL
EXT CALCIUM: ABNORMAL
EXT CEA: ABNORMAL
EXT CHLORIDE: ABNORMAL
EXT CHOLESTEROL: ABNORMAL
EXT CHROMOGRANIN A: ABNORMAL
EXT CO2: ABNORMAL
EXT CREATININE UA: ABNORMAL
EXT CREATININE: ABNORMAL MG/DL
EXT CYCLOSPORONE LEVEL: ABNORMAL
EXT DOPAMINE: ABNORMAL
EXT EBV DNA BY PCR: ABNORMAL
EXT EPINEPHRINE: ABNORMAL
EXT FOLATE: ABNORMAL
EXT FREE T3: ABNORMAL
EXT FREE T4: ABNORMAL
EXT FSH: ABNORMAL
EXT GASTRIN RELEASING PEPTIDE: ABNORMAL
EXT GASTRIN RELEASING PEPTIDE: ABNORMAL
EXT GASTRIN: ABNORMAL
EXT GGT: ABNORMAL
EXT GHRELIN: ABNORMAL
EXT GLUCAGON: ABNORMAL
EXT GLUCOSE: ABNORMAL
EXT GROWTH HORMONE: ABNORMAL
EXT HCV RNA QUANT PCR: ABNORMAL
EXT HDL: ABNORMAL
EXT HEMATOCRIT: ABNORMAL
EXT HEMOGLOBIN A1C: ABNORMAL
EXT HEMOGLOBIN: ABNORMAL
EXT HISTAMINE 24 HR URINE: ABNORMAL
EXT HISTAMINE: ABNORMAL
EXT IGF-1: ABNORMAL
EXT IMMUNKNOW (NON-STIMULATED): ABNORMAL
EXT IMMUNKNOW (STIMULATED): ABNORMAL
EXT INR: ABNORMAL
EXT INSULIN: ABNORMAL
EXT LANREOTIDE LEVEL: ABNORMAL
EXT LDH, TOTAL: ABNORMAL
EXT LDL CHOLESTEROL: ABNORMAL
EXT LIPASE: ABNORMAL
EXT MAGNESIUM: ABNORMAL
EXT METANEPHRINE FREE PLASMA: ABNORMAL
EXT MOTILIN: ABNORMAL
EXT NEUROKININ A CAMB: ABNORMAL
EXT NEUROKININ A ISI: 12 PG/ML (ref 0–40)
EXT NEUROTENSIN: ABNORMAL
EXT NOREPINEPHRINE: ABNORMAL
EXT NORMETANEPHRINE: ABNORMAL
EXT NSE: ABNORMAL
EXT OCTREOTIDE LEVEL: ABNORMAL
EXT PANCREASTATIN CAMB: ABNORMAL
EXT PANCREASTATIN ISI: 52 PG/ML (ref 10–135)
EXT PANCREATIC POLYPEPTIDE: ABNORMAL
EXT PHOSPHORUS: ABNORMAL
EXT PLATELETS: ABNORMAL
EXT POTASSIUM: ABNORMAL
EXT PROGRAF LEVEL: ABNORMAL
EXT PROLACTIN: ABNORMAL
EXT PROTEIN TOTAL: ABNORMAL
EXT PROTEIN UA: ABNORMAL
EXT PT: ABNORMAL
EXT PTH, INTACT: ABNORMAL
EXT PTT: ABNORMAL
EXT RAPAMUNE LEVEL: ABNORMAL
EXT SEROTONIN: 45 NG/ML (ref 56–244)
EXT SODIUM: ABNORMAL MMOL/L
EXT SOMATOSTATIN: ABNORMAL
EXT SUBSTANCE P: ABNORMAL
EXT TRIGLYCERIDES: ABNORMAL
EXT TRYPTASE: ABNORMAL
EXT TSH: ABNORMAL
EXT URIC ACID: ABNORMAL
EXT URINE AMYLASE U/HR: ABNORMAL
EXT URINE AMYLASE U/L: ABNORMAL
EXT VASOACTIVE INTESTINAL POLYPEPTIDE: ABNORMAL
EXT VITAMIN B12: ABNORMAL
EXT VMA 24 HR URINE: ABNORMAL
EXT WBC: ABNORMAL
NEURON SPECIFIC ENOLASE: ABNORMAL

## 2018-11-13 ENCOUNTER — CLINICAL SUPPORT (OUTPATIENT)
Dept: FAMILY MEDICINE | Facility: CLINIC | Age: 48
End: 2018-11-13
Payer: COMMERCIAL

## 2018-11-13 VITALS — OXYGEN SATURATION: 97 % | SYSTOLIC BLOOD PRESSURE: 138 MMHG | HEART RATE: 94 BPM | DIASTOLIC BLOOD PRESSURE: 82 MMHG

## 2018-11-13 DIAGNOSIS — I10 ESSENTIAL HYPERTENSION: Primary | ICD-10-CM

## 2018-11-13 PROCEDURE — 99499 UNLISTED E&M SERVICE: CPT | Mod: S$GLB,,, | Performed by: INTERNAL MEDICINE

## 2018-11-13 PROCEDURE — 99999 PR PBB SHADOW E&M-EST. PATIENT-LVL II: CPT | Mod: PBBFAC,,,

## 2018-11-13 NOTE — PROGRESS NOTES
"Kianna Zuleta 48 y.o. female is here today for Blood Pressure check.   History of HTN yes.    Review of patient's allergies indicates:   Allergen Reactions    Benadryl allergy decongestant Anaphylaxis     Other reaction(s): Anaphylaxis    Dilaudid [hydromorphone (bulk)] Anaphylaxis     Other reaction(s): Anaphylaxis    Fludrocortisone Hives    Gluten Other (See Comments)     Other reaction(s) GI upset    Diphenhydramine hcl     Hydromorphone hcl     Indomethacin Hives    Penicillins Rash    Sulfa (sulfonamide antibiotics) Rash    Vancomycin analogues Rash     Creatinine   Date Value Ref Range Status   11/02/2018 0.7 0.5 - 1.4 mg/dL Final     Sodium   Date Value Ref Range Status   11/02/2018 140 136 - 145 mmol/L Final     Potassium   Date Value Ref Range Status   11/02/2018 3.8 3.5 - 5.1 mmol/L Final   ]  Patient verifies taking blood pressure medications on a regular basis at the same time of the day.     Current Outpatient Medications:     ALBUTEROL INHL, Inhale into the lungs as needed. , Disp: , Rfl:     albuterol-ipratropium 2.5mg-0.5mg/3mL (DUO-NEB) 0.5 mg-3 mg(2.5 mg base)/3 mL nebulizer solution, Take 3 mLs by nebulization every 6 (six) hours as needed for Wheezing. Rescue, Disp: 1 Box, Rfl: 2    azelastine (ASTELIN) 137 mcg (0.1 %) nasal spray, 2 SPRAYS each nostril Q 12 H PRN, Disp: 30 mL, Rfl: 6    azithromycin (ZITHROMAX Z-JARAD) 250 MG tablet, 2 pills on day 1, then 1 pill a day, Disp: 6 tablet, Rfl: 0    BD LUER-NANCY SYRINGE 3 mL 22 gauge x 1" Syrg, INJECT QD FOR 5 DAYS THEN WEEKLY FOR 5 WEEKS THEN MONTHLY, Disp: , Rfl: 3    cyanocobalamin, vitamin B-12, 1,000 mcg/mL Kit, Inject 1,000 mcg as directed every 28 days. Take daily for 5 days then weekly for 5 weeks then monthly, Disp: 12 kit, Rfl: 1    desoximetasone (TOPICORT) 0.05 % cream, Apply topically., Disp: , Rfl:     diclofenac sodium (PENNSAID) 2 % SoPk, Apply topically., Disp: , Rfl:     dicyclomine (BENTYL) 10 MG capsule, " "Take by mouth., Disp: , Rfl:     EPINEPHrine (AUVI-Q) 0.3 mg/0.3 mL AtIn, Inject 0.3 mLs (0.3 mg total) into the muscle once., Disp: 2 Device, Rfl: 0    ergocalciferol (ERGOCALCIFEROL) 50,000 unit Cap, TAKE 1 CAPSULE BY MOUTH EVERY 7 DAYS, Disp: 12 capsule, Rfl: 3    estropipate (OGEN) 1.5 MG tablet, Take 1 tablet (1.5 mg total) by mouth once daily., Disp: 90 tablet, Rfl: 3    gabapentin (NEURONTIN) 300 MG capsule, Take 300 mg by mouth 2 (two) times daily. 2-300 mg cap in AM and 3-300 mg cap in PM, Disp: , Rfl:     guaifenesin-codeine 100-10 mg/5 ml (CHERATUSSIN AC)  mg/5 mL syrup, Take 10 mLs by mouth every 4 (four) hours as needed for Cough., Disp: 120 mL, Rfl: 0    hydrOXYzine HCl (ATARAX) 25 MG tablet, Take 1 tablet (25 mg total) by mouth 3 (three) times daily., Disp: 45 tablet, Rfl: 1    immun glob G,IgG,-pro-IgA 0-50 (HIZENTRA) 10 gram/50 mL (20 %) Soln, Inject 200 mg/kg into the skin every 7 days., Disp: 4 vial, Rfl: 5    levocetirizine (XYZAL) 5 MG tablet, TK 1 T PO  ONCE Day, Disp: , Rfl: 9    metoprolol tartrate (LOPRESSOR) 50 MG tablet, Take 50 mg by mouth 4 (four) times daily. 1 Tablet Oral Three times a day, Disp: , Rfl:     montelukast (SINGULAIR) 10 mg tablet, Take by mouth., Disp: , Rfl:     nitrofurantoin (MACRODANTIN) 50 MG capsule, , Disp: , Rfl:     ondansetron (ZOFRAN-ODT) 4 MG TbDL, Take 1 tablet (4 mg total) by mouth every 8 (eight) hours as needed (nausea)., Disp: 20 tablet, Rfl: 0    syringe with needle, safety 3 mL 22 gauge x 1 1/2" Syrg, B12 injection every day for 5 days then weekly for 5 weeks then monthly., Disp: 50 Syringe, Rfl: 3    tiZANidine 4 mg Cap, TK 2 TO 3 CS PO TID WITH FOOD, Disp: , Rfl: 1    vortioxetine (BRINTELLIX) 10 mg Tab, Take by mouth., Disp: , Rfl:     zaleplon (SONATA) 10 MG capsule, Take 10 mg by mouth once daily., Disp: , Rfl: 2  Does patient have record of home blood pressure readings no. .   Last dose of blood pressure medication was taken " at 12:00 noon.  Patient is asymptomatic.   Complains of none.    Vitals:    11/13/18 1538   BP: 138/82   BP Location: Right arm   Patient Position: Sitting   BP Method: Large (Manual)   Pulse: 94   SpO2: 97%         Dr. Celis informed of nurse visit.

## 2018-11-16 ENCOUNTER — PATIENT MESSAGE (OUTPATIENT)
Dept: FAMILY MEDICINE | Facility: CLINIC | Age: 48
End: 2018-11-16

## 2018-11-26 ENCOUNTER — PATIENT MESSAGE (OUTPATIENT)
Dept: FAMILY MEDICINE | Facility: CLINIC | Age: 48
End: 2018-11-26

## 2018-11-27 ENCOUNTER — HOSPITAL ENCOUNTER (OUTPATIENT)
Dept: RADIOLOGY | Facility: HOSPITAL | Age: 48
Discharge: HOME | End: 2018-11-27
Attending: OBSTETRICS & GYNECOLOGY
Payer: COMMERCIAL

## 2018-11-27 DIAGNOSIS — Z12.31 VISIT FOR SCREENING MAMMOGRAM: ICD-10-CM

## 2018-11-27 PROCEDURE — 77063 BREAST TOMOSYNTHESIS BI: CPT | Mod: 26,,, | Performed by: RADIOLOGY

## 2018-11-27 PROCEDURE — 77067 SCR MAMMO BI INCL CAD: CPT | Mod: TC,PO

## 2018-11-27 PROCEDURE — 77063 BREAST TOMOSYNTHESIS BI: CPT | Mod: TC,PO

## 2018-11-27 PROCEDURE — 77067 SCR MAMMO BI INCL CAD: CPT | Mod: 26,,, | Performed by: RADIOLOGY

## 2018-12-04 ENCOUNTER — PATIENT MESSAGE (OUTPATIENT)
Dept: OBSTETRICS AND GYNECOLOGY | Facility: CLINIC | Age: 48
End: 2018-12-04

## 2018-12-04 ENCOUNTER — OFFICE VISIT (OUTPATIENT)
Dept: OBSTETRICS AND GYNECOLOGY | Facility: CLINIC | Age: 48
End: 2018-12-04
Payer: COMMERCIAL

## 2018-12-04 VITALS
SYSTOLIC BLOOD PRESSURE: 138 MMHG | HEIGHT: 65 IN | WEIGHT: 267.19 LBS | BODY MASS INDEX: 44.52 KG/M2 | DIASTOLIC BLOOD PRESSURE: 94 MMHG

## 2018-12-04 DIAGNOSIS — Z12.31 VISIT FOR SCREENING MAMMOGRAM: Primary | ICD-10-CM

## 2018-12-04 DIAGNOSIS — N95.1 MENOPAUSAL SYMPTOM: ICD-10-CM

## 2018-12-04 PROCEDURE — 99999 PR PBB SHADOW E&M-EST. PATIENT-LVL IV: CPT | Mod: PBBFAC,,, | Performed by: NURSE PRACTITIONER

## 2018-12-04 PROCEDURE — 3080F DIAST BP >= 90 MM HG: CPT | Mod: CPTII,S$GLB,, | Performed by: NURSE PRACTITIONER

## 2018-12-04 PROCEDURE — 3075F SYST BP GE 130 - 139MM HG: CPT | Mod: CPTII,S$GLB,, | Performed by: NURSE PRACTITIONER

## 2018-12-04 PROCEDURE — 99396 PREV VISIT EST AGE 40-64: CPT | Mod: S$GLB,,, | Performed by: NURSE PRACTITIONER

## 2018-12-04 RX ORDER — LOSARTAN POTASSIUM 25 MG/1
TABLET ORAL
Refills: 0 | COMMUNITY
Start: 2018-11-06 | End: 2019-07-24

## 2018-12-04 RX ORDER — OXYCODONE AND ACETAMINOPHEN 10; 325 MG/1; MG/1
TABLET ORAL
Refills: 0 | COMMUNITY
Start: 2018-10-17

## 2018-12-04 NOTE — PROGRESS NOTES
CC: Annial  HPI: Pt is a 48 y.o.  female who presents for routine annual exam.  Pt is a .  She went to [employer].  She is s/p hysterectomy and BSO in 2004.  She is currently taking estropipate daily without hot flashes.  She reports she is doing well on ERT and desires to continue.   She does not want STD screening.  Denies any GYN complaints.  The patient participates in regular exercise: no.  The patient does not smoke.  The patient wears seatbelts.   Pt denies any domestic violence.  Reports she was diagnosed with CVID- Common variable immune deficiency in 5/2018- gets weekly IgG infusions at home. Pt also reports area of redness to her arms and chest.  She is unsure if this is r/t stress.  Mammogram is UTD- 11/2018 WNL.      FH:  Breast cancer: none  Colon cancer: none  Ovarian cancer: none  Endometrial cancer: none      ROS:  GENERAL: Feeling well overall.  SKIN: Denies rash or lesions.   HEAD: Denies head injury or headache.   NODES: Denies enlarged lymph nodes.   CHEST: Denies chest pain or shortness of breath.   CARDIOVASCULAR: Denies palpitations or left sided chest pain.   ABDOMEN: No abdominal pain, nausea, vomiting or rectal bleeding.   URINARY: No dysuria or hematuria.  REPRODUCTIVE: See HPI.   BREASTS: Denies pain, lumps, or nipple discharge.   HEMATOLOGIC: No easy bruisability or excessive bleeding.   MUSCULOSKELETAL: Denies joint pain or swelling.   NEUROLOGIC: Denies syncope or weakness.   PSYCHIATRIC: Denies depression.    PE:    APPEARANCE: Well nourished, well developed, in no acute distress.  NODES: No inguinal lymph node enlargement.  ABDOMEN: Soft. No tenderness or masses. No hernias.  BREASTS: Symmetrical, no skin changes or visible lesions. No palpable masses, nipple discharge or adenopathy bilaterally.  PELVIC: Normal external female genitalia without lesions. Normal hair distribution. Adequate perineal body, normal urethral meatus. Vagina without lesions or  discharge. No significant cystocele or rectocele. Uterus and cervix surgically absent. Bimanual exam revealed no masses, tenderness or abnormality.  ANUS: Normal.    Diagnosis:  1. Visit for screening mammogram    2. Menopausal symptom          PLAN:  Pap not indicated s/p hyst - had pap of vag cuff in 11/17 WNL  Mammo in 11/2019  Discussed R/B/S ERT- pt wishes to continue   [facility] refilled    Orders Placed This Encounter    Mammo Digital Screening Bilat w/ [facility]    estropipate (OGEN) 1.5 MG tablet       Patient was counseled today on postmenopausal issues.     Follow-up in 1 year.    Mary Palacios, RANDI-C

## 2018-12-05 ENCOUNTER — PATIENT MESSAGE (OUTPATIENT)
Dept: ALLERGY | Facility: CLINIC | Age: 48
End: 2018-12-05

## 2018-12-12 ENCOUNTER — PATIENT MESSAGE (OUTPATIENT)
Dept: FAMILY MEDICINE | Facility: CLINIC | Age: 48
End: 2018-12-12

## 2018-12-12 RX ORDER — AZITHROMYCIN 250 MG/1
TABLET, FILM COATED ORAL
Qty: 6 TABLET | Refills: 0 | Status: SHIPPED | OUTPATIENT
Start: 2018-12-12 | End: 2019-05-31

## 2018-12-13 DIAGNOSIS — Z51.81 THERAPEUTIC DRUG MONITORING: Primary | ICD-10-CM

## 2018-12-13 NOTE — PROGRESS NOTES
Labs (on SCIg) 11/2/2018  CBC essentially nl  CMP notable for ALK Phos 168 (nl ) with previous level 145    Will add UA for therapeutic drug monitoring re SCIg x 6 months  (Currently up-to-date guidelines recommend CBC, CMP and UA every 6-12 months on any type of immune globulin replacement therapy.

## 2018-12-14 ENCOUNTER — PATIENT MESSAGE (OUTPATIENT)
Dept: FAMILY MEDICINE | Facility: CLINIC | Age: 48
End: 2018-12-14

## 2018-12-15 ENCOUNTER — OFFICE VISIT (OUTPATIENT)
Dept: URGENT CARE | Facility: CLINIC | Age: 48
End: 2018-12-15
Payer: COMMERCIAL

## 2018-12-15 VITALS
SYSTOLIC BLOOD PRESSURE: 168 MMHG | OXYGEN SATURATION: 100 % | TEMPERATURE: 99 F | HEART RATE: 98 BPM | HEIGHT: 64 IN | RESPIRATION RATE: 16 BRPM | DIASTOLIC BLOOD PRESSURE: 113 MMHG | WEIGHT: 260 LBS | BODY MASS INDEX: 44.39 KG/M2

## 2018-12-15 DIAGNOSIS — J06.9 VIRAL URI: Primary | ICD-10-CM

## 2018-12-15 DIAGNOSIS — J30.9 ALLERGIC RHINITIS, UNSPECIFIED SEASONALITY, UNSPECIFIED TRIGGER: ICD-10-CM

## 2018-12-15 DIAGNOSIS — R05.9 COUGH: ICD-10-CM

## 2018-12-15 PROCEDURE — 3080F DIAST BP >= 90 MM HG: CPT | Mod: CPTII,S$GLB,, | Performed by: NURSE PRACTITIONER

## 2018-12-15 PROCEDURE — 3077F SYST BP >= 140 MM HG: CPT | Mod: CPTII,S$GLB,, | Performed by: NURSE PRACTITIONER

## 2018-12-15 PROCEDURE — 71046 X-RAY EXAM CHEST 2 VIEWS: CPT | Mod: S$GLB,,, | Performed by: RADIOLOGY

## 2018-12-15 PROCEDURE — 99214 OFFICE O/P EST MOD 30 MIN: CPT | Mod: S$GLB,,, | Performed by: NURSE PRACTITIONER

## 2018-12-15 PROCEDURE — 3008F BODY MASS INDEX DOCD: CPT | Mod: CPTII,S$GLB,, | Performed by: NURSE PRACTITIONER

## 2018-12-15 RX ORDER — QUETIAPINE FUMARATE 50 MG/1
TABLET, FILM COATED ORAL
Refills: 0 | COMMUNITY
Start: 2018-12-14 | End: 2019-08-01 | Stop reason: ALTCHOICE

## 2018-12-15 RX ORDER — ALBUTEROL SULFATE 0.83 MG/ML
2.5 SOLUTION RESPIRATORY (INHALATION) EVERY 6 HOURS PRN
Qty: 1 BOX | Refills: 0 | Status: SHIPPED | OUTPATIENT
Start: 2018-12-15 | End: 2019-09-12 | Stop reason: SDUPTHER

## 2018-12-15 RX ORDER — BENZONATATE 100 MG/1
200 CAPSULE ORAL 3 TIMES DAILY PRN
Qty: 30 CAPSULE | Refills: 0 | Status: SHIPPED | OUTPATIENT
Start: 2018-12-15 | End: 2019-05-31

## 2018-12-15 NOTE — PATIENT INSTRUCTIONS
Sinusitis (No Antibiotics)    The sinuses are air-filled spaces within the bones of the face. They connect to the inside of the nose. Sinusitis is an inflammation of the tissue lining the sinus cavity. Sinus inflammation can occur during a cold. It can also be due to allergies to pollens and other particles in the air. It can cause symptoms such as sinus congestion, headache, sore throat, facial swelling and fullness. It may also cause a low-grade fever. No infection is present, and no antibiotic treatment is needed.  Home care  · Drink plenty of water, hot tea, and other liquids. This may help thin mucus. It also may promote sinus drainage.  · Heat may help soothe painful areas of the face. Use a towel soaked in hot water. Or,  the shower and direct the hot spray onto your face. Using a vaporizer along with a menthol rub at night may also help.   · An expectorant containing guaifenesin may help thin the mucus and promote drainage from the sinuses.  · Over-the-counter decongestants may be used unless a similar medicine was prescribed. Nasal sprays work the fastest. Use one that contains phenylephrine or oxymetazoline. First blow the nose gently. Then use the spray. Do not use these medicines more often than directed on the label or symptoms may get worse. You may also use tablets containing pseudoephedrine. Avoid products that combine ingredients, because side effects may be increased. Read labels. You can also ask the pharmacist for help. (NOTE: Persons with high blood pressure should not use decongestants. They can raise blood pressure.)  · Over-the-counter antihistamines may help if allergies contributed to your sinusitis.    · Use acetaminophen or ibuprofen to control pain, unless another pain medicine was prescribed. (If you have chronic liver or kidney disease or ever had a stomach ulcer, talk with your doctor before using these medicines. Aspirin should never be used in anyone under 18 years of age  "who is ill with a fever. It may cause severe liver damage.)  · Use nasal rinses or irrigation as instructed by your health care provider.  · Don't smoke. This can worsen symptoms.  Follow-up care  Follow up with your healthcare provider or our staff if you are not improving within the next week.  When to seek medical advice  Call your healthcare provider if any of these occur:  · Green or yellow discharge from the nose or into the throat  · Facial pain or headache becoming more severe  · Stiff neck  · Unusual drowsiness or confusion  · Swelling of the forehead or eyelids  · Vision problems, including blurred or double vision  · Fever of 100.4ºF (38ºC) or higher, or as directed by your healthcare provider  · Seizure  · Breathing problems  · Symptoms not resolving within 10 days  Date Last Reviewed: 4/13/2015  © 8868-9179 [employer]. [address] [place] [address]. All rights reserved. This information is not intended as a substitute for professional medical care. Always follow your healthcare professional's instructions.        Viral Syndrome (Adult)  A viral illness may cause a number of symptoms. The symptoms depend on the part of the body that the virus affects. If it settles in your nose, throat, and lungs, it may cause cough, sore throat, congestion, and sometimes headache. If it settles in your stomach and intestinal tract, it may cause vomiting and diarrhea. Sometimes it causes vague symptoms like "aching all over," feeling tired, loss of appetite, or fever.  A viral illness usually lasts 1 to 2 weeks, but sometimes it lasts longer. In some cases, a more serious infection can look like a viral syndrome in the first few days of the illness. You may need another exam and additional tests to know the difference. Watch for the warning signs listed below.  Home care  Follow these guidelines for taking care of yourself at home:  · If symptoms are severe, rest at home for the first 2 to 3 " days.  · Stay away from cigarette smoke - both your smoke and the smoke from others.  · You may use over-the-counter acetaminophen or ibuprofen for fever, muscle aching, and headache, unless another medicine was prescribed for this. If you have chronic liver or kidney disease or ever had a stomach ulcer or GI bleeding, talk with your doctor before using these medicines. No one who is younger than 18 and ill with a fever should take aspirin. It may cause severe disease or death.  · Your appetite may be poor, so a light diet is fine. Avoid dehydration by drinking 8 to 12 8-ounce glasses of fluids each day. This may include water; orange juice; lemonade; apple, grape, and cranberry juice; clear fruit drinks; electrolyte replacement and sports drinks; and decaffeinated teas and coffee. If you have been diagnosed with a kidney disease, ask your doctor how much and what types of fluids you should drink to prevent dehydration. If you have kidney disease, drinking too much fluid can cause it build up in the your body and be dangerous to your health.  · Over-the-counter remedies won't shorten the length of the illness but may be helpful for cough, sore throat; and nasal and sinus congestion. Don't use decongestants if you have high blood pressure.  Follow-up care  Follow up with your healthcare provider if you do not improve over the next week.  Call 911  Get emergency medical care if any of the following occur:  · Convulsion  · Feeling weak, dizzy, or like you are going to faint  · Chest pain, shortness of breath, wheezing, or difficulty breathing  When to seek medical advice  Call your healthcare provider right away if any of these occur:  · Cough with lots of colored sputum (mucus) or blood in your sputum  · Chest pain, shortness of breath, wheezing, or difficulty breathing  · Severe headache; face, neck, or ear pain  · Severe, constant pain in the lower right side of your belly (abdominal)  · Continued vomiting (cant  keep liquids down)  · Frequent diarrhea (more than 5 times a day); blood (red or black color) or mucus in diarrhea  · Feeling weak, dizzy, or like you are going to faint  · Extreme thirst  · Fever of 100.4°F (38°C) or higher, or as directed by your healthcare provider  Date Last Reviewed: 9/25/2015  © 2083-3442 [employer]. [address] [place] [address]. All rights reserved. This information is not intended as a substitute for professional medical care. Always follow your healthcare professional's instructions.

## 2018-12-15 NOTE — PROGRESS NOTES
"Subjective:       Patient ID: Kianna Zuleta is a 48 y.o. female.    Vitals:  height is 5' 4" (1.626 m) and weight is 117.9 kg (260 lb). Her temperature is 99 °F (37.2 °C). Her blood pressure is 168/113 (abnormal) and her pulse is 98. Her respiration is 16 and oxygen saturation is 100%.     Chief Complaint: Cough    Pt has not taken BP medicine today      Cough   This is a new problem. The current episode started in the past 7 days. The problem has been gradually worsening. The problem occurs every few hours. The cough is productive of purulent sputum. Associated symptoms include ear pain, a fever, nasal congestion and postnasal drip. Nothing aggravates the symptoms. She has tried OTC cough suppressant and steroid inhaler for the symptoms. The treatment provided mild relief.       Constitution: Positive for fever.   HENT: Positive for ear pain, congestion, postnasal drip, sinus pain and sinus pressure.    Respiratory: Positive for cough.        Objective:      Physical Exam   Constitutional: She is oriented to person, place, and time. She appears well-developed and well-nourished. She is cooperative.  Non-toxic appearance. She does not appear ill. No distress.   HENT:   Head: Normocephalic and atraumatic.   Right Ear: Hearing and ear canal normal. A middle ear effusion is present.   Left Ear: Hearing and ear canal normal. A middle ear effusion is present.   Nose: Mucosal edema present. No rhinorrhea or nasal deformity. No epistaxis. Right sinus exhibits no maxillary sinus tenderness and no frontal sinus tenderness. Left sinus exhibits no maxillary sinus tenderness and no frontal sinus tenderness.   Mouth/Throat: Uvula is midline and mucous membranes are normal. No trismus in the jaw. Normal dentition. No uvula swelling. Posterior oropharyngeal erythema present.   Eyes: Conjunctivae and lids are normal. Right eye exhibits no discharge. Left eye exhibits no discharge. No scleral icterus.   Sclera clear bilat "   Neck: Trachea normal, normal range of motion, full passive range of motion without pain and phonation normal. Neck supple.   Cardiovascular: Normal rate, regular rhythm and normal pulses.   Pulmonary/Chest: Effort normal. No respiratory distress. She has decreased breath sounds.   Non productive cough throughout exam   Abdominal: Soft. Normal appearance and bowel sounds are normal. She exhibits no distension, no pulsatile midline mass and no mass. There is no tenderness.   Musculoskeletal: Normal range of motion. She exhibits no edema or deformity.   Neurological: She is alert and oriented to person, place, and time. She exhibits normal muscle tone. Coordination normal.   Skin: Skin is warm, dry and intact. She is not diaphoretic. No pallor.   Psychiatric: She has a normal mood and affect. Her speech is normal and behavior is normal. Judgment and thought content normal. Cognition and memory are normal.   Nursing note and vitals reviewed.        Chest xray negative today--discussed with pt symptoms most probably viral especially since she states she has been on 3 rounds of antibiotics and still not feeling better  Assessment:       1. Viral URI    2. Cough    3. Allergic rhinitis, unspecified seasonality, unspecified trigger        Plan:       Patient Instructions     Sinusitis (No Antibiotics)    The sinuses are air-filled spaces within the bones of the face. They connect to the inside of the nose. Sinusitis is an inflammation of the tissue lining the sinus cavity. Sinus inflammation can occur during a cold. It can also be due to allergies to pollens and other particles in the air. It can cause symptoms such as sinus congestion, headache, sore throat, facial swelling and fullness. It may also cause a low-grade fever. No infection is present, and no antibiotic treatment is needed.  Home care  · Drink plenty of water, hot tea, and other liquids. This may help thin mucus. It also may promote sinus drainage.  · Heat  may help soothe painful areas of the face. Use a towel soaked in hot water. Or,  the shower and direct the hot spray onto your face. Using a vaporizer along with a menthol rub at night may also help.   · An expectorant containing guaifenesin may help thin the mucus and promote drainage from the sinuses.  · Over-the-counter decongestants may be used unless a similar medicine was prescribed. Nasal sprays work the fastest. Use one that contains phenylephrine or oxymetazoline. First blow the nose gently. Then use the spray. Do not use these medicines more often than directed on the label or symptoms may get worse. You may also use tablets containing pseudoephedrine. Avoid products that combine ingredients, because side effects may be increased. Read labels. You can also ask the pharmacist for help. (NOTE: Persons with high blood pressure should not use decongestants. They can raise blood pressure.)  · Over-the-counter antihistamines may help if allergies contributed to your sinusitis.    · Use acetaminophen or ibuprofen to control pain, unless another pain medicine was prescribed. (If you have chronic liver or kidney disease or ever had a stomach ulcer, talk with your doctor before using these medicines. Aspirin should never be used in anyone under 18 years of age who is ill with a fever. It may cause severe liver damage.)  · Use nasal rinses or irrigation as instructed by your health care provider.  · Don't smoke. This can worsen symptoms.  Follow-up care  Follow up with your healthcare provider or our staff if you are not improving within the next week.  When to seek medical advice  Call your healthcare provider if any of these occur:  · Green or yellow discharge from the nose or into the throat  · Facial pain or headache becoming more severe  · Stiff neck  · Unusual drowsiness or confusion  · Swelling of the forehead or eyelids  · Vision problems, including blurred or double vision  · Fever of 100.4ºF  "(38ºC) or higher, or as directed by your healthcare provider  · Seizure  · Breathing problems  · Symptoms not resolving within 10 days  Date Last Reviewed: 4/13/2015  © 6374-3546 [employer]. [address] [place] [address], [place] [address]. All rights reserved. This information is not intended as a substitute for professional medical care. Always follow your healthcare professional's instructions.        Viral Syndrome (Adult)  A viral illness may cause a number of symptoms. The symptoms depend on the part of the body that the virus affects. If it settles in your nose, throat, and lungs, it may cause cough, sore throat, congestion, and sometimes headache. If it settles in your stomach and intestinal tract, it may cause vomiting and diarrhea. Sometimes it causes vague symptoms like "aching all over," feeling tired, loss of appetite, or fever.  A viral illness usually lasts 1 to 2 weeks, but sometimes it lasts longer. In some cases, a more serious infection can look like a viral syndrome in the first few days of the illness. You may need another exam and additional tests to know the difference. Watch for the warning signs listed below.  Home care  Follow these guidelines for taking care of yourself at home:  · If symptoms are severe, rest at home for the first 2 to 3 days.  · Stay away from cigarette smoke - both your smoke and the smoke from others.  · You may use over-the-counter acetaminophen or ibuprofen for fever, muscle aching, and headache, unless another medicine was prescribed for this. If you have chronic liver or kidney disease or ever had a stomach ulcer or GI bleeding, talk with your doctor before using these medicines. No one who is younger than 18 and ill with a fever should take aspirin. It may cause severe disease or death.  · Your appetite may be poor, so a light diet is fine. Avoid dehydration by drinking 8 to 12 8-ounce glasses of fluids each day. This may include water; orange juice; " lemonade; apple, grape, and cranberry juice; clear fruit drinks; electrolyte replacement and sports drinks; and decaffeinated teas and coffee. If you have been diagnosed with a kidney disease, ask your doctor how much and what types of fluids you should drink to prevent dehydration. If you have kidney disease, drinking too much fluid can cause it build up in the your body and be dangerous to your health.  · Over-the-counter remedies won't shorten the length of the illness but may be helpful for cough, sore throat; and nasal and sinus congestion. Don't use decongestants if you have high blood pressure.  Follow-up care  Follow up with your healthcare provider if you do not improve over the next week.  Call 911  Get emergency medical care if any of the following occur:  · Convulsion  · Feeling weak, dizzy, or like you are going to faint  · Chest pain, shortness of breath, wheezing, or difficulty breathing  When to seek medical advice  Call your healthcare provider right away if any of these occur:  · Cough with lots of colored sputum (mucus) or blood in your sputum  · Chest pain, shortness of breath, wheezing, or difficulty breathing  · Severe headache; face, neck, or ear pain  · Severe, constant pain in the lower right side of your belly (abdominal)  · Continued vomiting (cant keep liquids down)  · Frequent diarrhea (more than 5 times a day); blood (red or black color) or mucus in diarrhea  · Feeling weak, dizzy, or like you are going to faint  · Extreme thirst  · Fever of 100.4°F (38°C) or higher, or as directed by your healthcare provider  Date Last Reviewed: 9/25/2015 © 2000-2017 [employer]. [address] [place] [address]. All rights reserved. This information is not intended as a substitute for professional medical care. Always follow your healthcare professional's instructions.              Viral URI    Cough  -     X-Ray Chest PA And Lateral; Future; Expected date:  12/15/2018    Allergic rhinitis, unspecified seasonality, unspecified trigger    Other orders  -     benzonatate (TESSALON PERLES) 100 MG capsule; Take 2 capsules (200 mg total) by mouth 3 (three) times daily as needed.  Dispense: 30 capsule; Refill: 0  -     albuterol (PROVENTIL) 2.5 mg /3 mL (0.083 %) nebulizer solution; Take 3 mLs (2.5 mg total) by nebulization every 6 (six) hours as needed for Wheezing. Rescue  Dispense: 1 Box; Refill: 0

## 2018-12-27 ENCOUNTER — LAB VISIT (OUTPATIENT)
Dept: LAB | Facility: HOSPITAL | Age: 48
End: 2018-12-27
Attending: STUDENT IN AN ORGANIZED HEALTH CARE EDUCATION/TRAINING PROGRAM
Payer: COMMERCIAL

## 2018-12-27 DIAGNOSIS — Z51.81 THERAPEUTIC DRUG MONITORING: ICD-10-CM

## 2018-12-27 LAB
BILIRUB UR QL STRIP: NEGATIVE
CLARITY UR REFRACT.AUTO: CLEAR
COLOR UR AUTO: NORMAL
GLUCOSE UR QL STRIP: NEGATIVE
HGB UR QL STRIP: NEGATIVE
KETONES UR QL STRIP: NEGATIVE
LEUKOCYTE ESTERASE UR QL STRIP: NEGATIVE
NITRITE UR QL STRIP: NEGATIVE
PH UR STRIP: 5 [PH] (ref 5–8)
PROT UR QL STRIP: NEGATIVE
SP GR UR STRIP: 1.01 (ref 1–1.03)
URN SPEC COLLECT METH UR: NORMAL

## 2018-12-27 PROCEDURE — 81003 URINALYSIS AUTO W/O SCOPE: CPT

## 2019-01-07 ENCOUNTER — PATIENT MESSAGE (OUTPATIENT)
Dept: OBSTETRICS AND GYNECOLOGY | Facility: CLINIC | Age: 49
End: 2019-01-07

## 2019-01-08 DIAGNOSIS — N95.1 MENOPAUSAL SYMPTOMS: Primary | ICD-10-CM

## 2019-01-08 RX ORDER — ESTRADIOL 2 MG/1
2 TABLET ORAL DAILY
Qty: 30 TABLET | Refills: 11 | Status: SHIPPED | OUTPATIENT
Start: 2019-01-08 | End: 2019-12-17 | Stop reason: SDUPTHER

## 2019-02-01 ENCOUNTER — OFFICE VISIT (OUTPATIENT)
Dept: URGENT CARE | Facility: CLINIC | Age: 49
End: 2019-02-01
Payer: COMMERCIAL

## 2019-02-01 VITALS
HEART RATE: 94 BPM | BODY MASS INDEX: 44.39 KG/M2 | SYSTOLIC BLOOD PRESSURE: 148 MMHG | TEMPERATURE: 98 F | RESPIRATION RATE: 20 BRPM | WEIGHT: 260 LBS | DIASTOLIC BLOOD PRESSURE: 90 MMHG | HEIGHT: 64 IN | OXYGEN SATURATION: 98 %

## 2019-02-01 DIAGNOSIS — R50.9 FEVER, UNSPECIFIED FEVER CAUSE: ICD-10-CM

## 2019-02-01 DIAGNOSIS — J06.9 UPPER RESPIRATORY INFECTION, ACUTE: Primary | ICD-10-CM

## 2019-02-01 DIAGNOSIS — R52 BODY ACHES: ICD-10-CM

## 2019-02-01 LAB
CTP QC/QA: YES
FLUAV AG NPH QL: NEGATIVE
FLUBV AG NPH QL: NEGATIVE

## 2019-02-01 PROCEDURE — 3077F SYST BP >= 140 MM HG: CPT | Mod: CPTII,S$GLB,, | Performed by: NURSE PRACTITIONER

## 2019-02-01 PROCEDURE — 3080F PR MOST RECENT DIASTOLIC BLOOD PRESSURE >= 90 MM HG: ICD-10-PCS | Mod: CPTII,S$GLB,, | Performed by: NURSE PRACTITIONER

## 2019-02-01 PROCEDURE — 99214 OFFICE O/P EST MOD 30 MIN: CPT | Mod: S$GLB,,, | Performed by: NURSE PRACTITIONER

## 2019-02-01 PROCEDURE — 3077F PR MOST RECENT SYSTOLIC BLOOD PRESSURE >= 140 MM HG: ICD-10-PCS | Mod: CPTII,S$GLB,, | Performed by: NURSE PRACTITIONER

## 2019-02-01 PROCEDURE — 3080F DIAST BP >= 90 MM HG: CPT | Mod: CPTII,S$GLB,, | Performed by: NURSE PRACTITIONER

## 2019-02-01 PROCEDURE — 3008F PR BODY MASS INDEX (BMI) DOCUMENTED: ICD-10-PCS | Mod: CPTII,S$GLB,, | Performed by: NURSE PRACTITIONER

## 2019-02-01 PROCEDURE — 99214 PR OFFICE/OUTPT VISIT, EST, LEVL IV, 30-39 MIN: ICD-10-PCS | Mod: S$GLB,,, | Performed by: NURSE PRACTITIONER

## 2019-02-01 PROCEDURE — 3008F BODY MASS INDEX DOCD: CPT | Mod: CPTII,S$GLB,, | Performed by: NURSE PRACTITIONER

## 2019-02-01 PROCEDURE — 87804 INFLUENZA ASSAY W/OPTIC: CPT | Mod: QW,S$GLB,, | Performed by: NURSE PRACTITIONER

## 2019-02-01 PROCEDURE — 87804 POCT INFLUENZA A/B: ICD-10-PCS | Mod: 59,QW,S$GLB, | Performed by: NURSE PRACTITIONER

## 2019-02-01 NOTE — PATIENT INSTRUCTIONS
Use an antihistamine such as Claritin, Zyrtec or Allegra to dry you out.     Use pseudoephedrine (behind the counter) to decongest. Pseudoephedrine  30 mg up to 240 mg /day. It can raise your blood pressure and give you palpitations.    Use mucinex (guaifenisin) to break up mucous up to 2400mg/day to loosen any mucous. The mucinex DM pill has a cough suppressant that can be used at night to stop the tickle at the back of your throat.    Use Flonase 1-2 sprays/nostril per day. It is a local acting steroid nasal spray, if you develop a bloody nose, stop using the medication immediately.    Use warm salt water gargles to ease your throat pain. Warm salt water gargles as needed for sore throat-  1/2 tsp salt to 1 cup warm water, gargle as desired. Hot tea with honey.     Sometimes Nyquil at night is beneficial to help you get some rest, however it is sedating and it does have an antihistamine, and tylenol.      Viral Upper Respiratory Illness (Adult)  You have a viral upper respiratory illness (URI), which is another term for the common cold. This illness is contagious during the first few days. It is spread through the air by coughing and sneezing. It may also be spread by direct contact (touching the sick person and then touching your own eyes, nose, or mouth). Frequent handwashing will decrease risk of spread. Most viral illnesses go away within 7 to 10 days with rest and simple home remedies. Sometimes the illness may last for several weeks. Antibiotics will not kill a virus, and they are generally not prescribed for this condition.    Home care  · If symptoms are severe, rest at home for the first 2 to 3 days. When you resume activity, don't let yourself get too tired.  · Avoid being exposed to cigarette smoke (yours or others).  · You may use acetaminophen or ibuprofen to control pain and fever, unless another medicine was prescribed. (Note: If you have chronic liver or kidney disease, have ever had a stomach  ulcer or gastrointestinal bleeding, or are taking blood-thinning medicines, talk with your healthcare provider before using these medicines.) Aspirin should never be given to anyone under 18 years of age who is ill with a viral infection or fever. It may cause severe liver or brain damage.  · Your appetite may be poor, so a light diet is fine. Avoid dehydration by drinking 6 to 8 glasses of fluids per day (water, soft drinks, juices, tea, or soup). Extra fluids will help loosen secretions in the nose and lungs.  · Over-the-counter cold medicines will not shorten the length of time youre sick, but they may be helpful for the following symptoms: cough, sore throat, and nasal and sinus congestion. (Note: Do not use decongestants if you have high blood pressure.)  Follow-up care  Follow up with your healthcare provider, or as advised.  When to seek medical advice  Call your healthcare provider right away if any of these occur:  · Cough with lots of colored sputum (mucus)  · Severe headache; face, neck, or ear pain  · Difficulty swallowing due to throat pain  · Fever of 100.4°F (38°C)  Call 911, or get immediate medical care  Call emergency services right away if any of these occur:  · Chest pain, shortness of breath, wheezing, or difficulty breathing  · Coughing up blood  · Inability to swallow due to throat pain  Date Last Reviewed: 9/13/2015  © 1143-7364 [employer]. [address] [place] [address]. All rights reserved. This information is not intended as a substitute for professional medical care. Always follow your healthcare professional's instructions.

## 2019-02-01 NOTE — PROGRESS NOTES
"Subjective:       Patient ID: Kianna Zuleta is a 48 y.o. female.    Vitals:  height is 5' 4" (1.626 m) and weight is 117.9 kg (260 lb). Her temperature is 97.6 °F (36.4 °C). Her blood pressure is 148/90 (abnormal) and her pulse is 94. Her respiration is 20 and oxygen saturation is 98%.     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 This has been going on for 3 days now. She has been taking tylenol , advil and aleve alternating between and it has helped. She is using a Astelin nasal spray and singular that has also help.      Sinus Problem   This is a new problem. Episode onset: 2 days ago. The problem is unchanged. The maximum temperature recorded prior to her arrival was 100.4 - 100.9 F. She is experiencing no pain. Associated symptoms include congestion, headaches and sinus pressure. Pertinent negatives include no coughing, diaphoresis, ear pain, shortness of breath or sore throat. Past treatments include oral decongestants and spray decongestants.       Constitution: Negative for sweating, fatigue and fever.   HENT: Positive for congestion and sinus pressure. Negative for ear pain, sinus pain, sore throat and voice change.    Neck: Negative for painful lymph nodes.   Eyes: Negative for eye redness.   Respiratory: Negative for chest tightness, cough, sputum production, bloody sputum, COPD, shortness of breath, stridor, wheezing and asthma.    Gastrointestinal: Negative for nausea and vomiting.   Musculoskeletal: Negative for muscle ache.   Skin: Negative for rash.   Allergic/Immunologic: Negative for seasonal allergies and asthma.   Neurological: Positive for headaches.   Hematologic/Lymphatic: Negative for swollen lymph nodes.       Objective:      Physical Exam   Constitutional: She is oriented to person, place, and time. She appears well-developed and well-nourished. She is cooperative.  Non-toxic appearance. She does not " appear ill. No distress.   HENT:   Head: Normocephalic and atraumatic.   Right Ear: Hearing, tympanic membrane, external ear and ear canal normal.   Left Ear: Hearing, tympanic membrane, external ear and ear canal normal.   Nose: No mucosal edema, rhinorrhea or nasal deformity. No epistaxis. Right sinus exhibits maxillary sinus tenderness and frontal sinus tenderness. Left sinus exhibits maxillary sinus tenderness and frontal sinus tenderness.   Mouth/Throat: Uvula is midline and mucous membranes are normal. No trismus in the jaw. Normal dentition. No uvula swelling. Posterior oropharyngeal erythema present.   Post nasal drip    Eyes: Conjunctivae and lids are normal. No scleral icterus.   Sclera clear bilat   Neck: Trachea normal, full passive range of motion without pain and phonation normal. Neck supple.   Cardiovascular: Normal rate, regular rhythm, normal heart sounds, intact distal pulses and normal pulses.   Pulmonary/Chest: Effort normal and breath sounds normal. No respiratory distress.   Abdominal: Soft. Normal appearance and bowel sounds are normal. She exhibits no distension. There is no tenderness.   Musculoskeletal: Normal range of motion. She exhibits no edema or deformity.   Neurological: She is alert and oriented to person, place, and time. She exhibits normal muscle tone. Coordination normal.   Skin: Skin is warm, dry and intact. She is not diaphoretic. No pallor.   Psychiatric: She has a normal mood and affect. Her speech is normal and behavior is normal. Judgment and thought content normal. Cognition and memory are normal.   Nursing note and vitals reviewed.      Results for orders placed or performed in visit on 02/01/19   POCT Influenza A/B   Result Value Ref Range    Rapid Influenza A Ag Negative Negative    Rapid Influenza B Ag Negative Negative     Acceptable Yes      Assessment:       1. Upper respiratory infection, acute    2. Fever, unspecified fever cause    3. Body aches         Plan:         Upper respiratory infection, acute    Fever, unspecified fever cause  -     POCT Influenza A/B    Body aches  -     POCT Influenza A/B      Patient Instructions   Use an antihistamine such as Claritin, Zyrtec or Allegra to dry you out.     Use pseudoephedrine (behind the counter) to decongest. Pseudoephedrine  30 mg up to 240 mg /day. It can raise your blood pressure and give you palpitations.    Use mucinex (guaifenisin) to break up mucous up to 2400mg/day to loosen any mucous. The mucinex DM pill has a cough suppressant that can be used at night to stop the tickle at the back of your throat.    Use Flonase 1-2 sprays/nostril per day. It is a local acting steroid nasal spray, if you develop a bloody nose, stop using the medication immediately.    Use warm salt water gargles to ease your throat pain. Warm salt water gargles as needed for sore throat-  1/2 tsp salt to 1 cup warm water, gargle as desired. Hot tea with honey.     Sometimes Nyquil at night is beneficial to help you get some rest, however it is sedating and it does have an antihistamine, and tylenol.      Viral Upper Respiratory Illness (Adult)  You have a viral upper respiratory illness (URI), which is another term for the common cold. This illness is contagious during the first few days. It is spread through the air by coughing and sneezing. It may also be spread by direct contact (touching the sick person and then touching your own eyes, nose, or mouth). Frequent handwashing will decrease risk of spread. Most viral illnesses go away within 7 to 10 days with rest and simple home remedies. Sometimes the illness may last for several weeks. Antibiotics will not kill a virus, and they are generally not prescribed for this condition.    Home care  · If symptoms are severe, rest at home for the first 2 to 3 days. When you resume activity, don't let yourself get too tired.  · Avoid being exposed to cigarette smoke (yours or  others).  · You may use acetaminophen or ibuprofen to control pain and fever, unless another medicine was prescribed. (Note: If you have chronic liver or kidney disease, have ever had a stomach ulcer or gastrointestinal bleeding, or are taking blood-thinning medicines, talk with your healthcare provider before using these medicines.) Aspirin should never be given to anyone under 18 years of age who is ill with a viral infection or fever. It may cause severe liver or brain damage.  · Your appetite may be poor, so a light diet is fine. Avoid dehydration by drinking 6 to 8 glasses of fluids per day (water, soft drinks, juices, tea, or soup). Extra fluids will help loosen secretions in the nose and lungs.  · Over-the-counter cold medicines will not shorten the length of time youre sick, but they may be helpful for the following symptoms: cough, sore throat, and nasal and sinus congestion. (Note: Do not use decongestants if you have high blood pressure.)  Follow-up care  Follow up with your healthcare provider, or as advised.  When to seek medical advice  Call your healthcare provider right away if any of these occur:  · Cough with lots of colored sputum (mucus)  · Severe headache; face, neck, or ear pain  · Difficulty swallowing due to throat pain  · Fever of 100.4°F (38°C)  Call 911, or get immediate medical care  Call emergency services right away if any of these occur:  · Chest pain, shortness of breath, wheezing, or difficulty breathing  · Coughing up blood  · Inability to swallow due to throat pain  Date Last Reviewed: 9/13/2015 © 2000-2017 [employer]. [address] [place] [address], [place] [address]. All rights reserved. This information is not intended as a substitute for professional medical care. Always follow your healthcare professional's instructions.

## 2019-02-19 ENCOUNTER — LAB VISIT (OUTPATIENT)
Dept: LAB | Facility: HOSPITAL | Age: 49
End: 2019-02-19
Attending: INTERNAL MEDICINE
Payer: COMMERCIAL

## 2019-02-19 DIAGNOSIS — E55.9 VITAMIN D DEFICIENCY: ICD-10-CM

## 2019-02-19 LAB — 25(OH)D3+25(OH)D2 SERPL-MCNC: 24 NG/ML

## 2019-02-19 PROCEDURE — 82306 VITAMIN D 25 HYDROXY: CPT

## 2019-02-19 PROCEDURE — 36415 COLL VENOUS BLD VENIPUNCTURE: CPT | Mod: PO

## 2019-02-20 ENCOUNTER — TELEPHONE (OUTPATIENT)
Dept: RHEUMATOLOGY | Facility: CLINIC | Age: 49
End: 2019-02-20

## 2019-02-20 RX ORDER — CHOLECALCIFEROL (VITAMIN D3) 25 MCG
1000 TABLET ORAL DAILY
COMMUNITY

## 2019-02-23 ENCOUNTER — OFFICE VISIT (OUTPATIENT)
Dept: URGENT CARE | Facility: CLINIC | Age: 49
End: 2019-02-23
Payer: COMMERCIAL

## 2019-02-23 VITALS
OXYGEN SATURATION: 98 % | SYSTOLIC BLOOD PRESSURE: 159 MMHG | HEIGHT: 64 IN | HEART RATE: 92 BPM | BODY MASS INDEX: 44.39 KG/M2 | TEMPERATURE: 98 F | WEIGHT: 260 LBS | DIASTOLIC BLOOD PRESSURE: 103 MMHG

## 2019-02-23 DIAGNOSIS — J06.9 VIRAL URI: Primary | ICD-10-CM

## 2019-02-23 DIAGNOSIS — R03.0 ELEVATED BLOOD PRESSURE READING: ICD-10-CM

## 2019-02-23 DIAGNOSIS — R05.9 COUGH: ICD-10-CM

## 2019-02-23 LAB
CTP QC/QA: YES
FLUAV AG NPH QL: NEGATIVE
FLUBV AG NPH QL: NEGATIVE

## 2019-02-23 PROCEDURE — 99214 OFFICE O/P EST MOD 30 MIN: CPT | Mod: S$GLB,,, | Performed by: PHYSICIAN ASSISTANT

## 2019-02-23 PROCEDURE — 99214 PR OFFICE/OUTPT VISIT, EST, LEVL IV, 30-39 MIN: ICD-10-PCS | Mod: S$GLB,,, | Performed by: PHYSICIAN ASSISTANT

## 2019-02-23 PROCEDURE — 87804 INFLUENZA ASSAY W/OPTIC: CPT | Mod: QW,S$GLB,, | Performed by: PHYSICIAN ASSISTANT

## 2019-02-23 PROCEDURE — 3008F BODY MASS INDEX DOCD: CPT | Mod: CPTII,S$GLB,, | Performed by: PHYSICIAN ASSISTANT

## 2019-02-23 PROCEDURE — 87804 POCT INFLUENZA A/B: ICD-10-PCS | Mod: 59,QW,S$GLB, | Performed by: PHYSICIAN ASSISTANT

## 2019-02-23 PROCEDURE — 3077F PR MOST RECENT SYSTOLIC BLOOD PRESSURE >= 140 MM HG: ICD-10-PCS | Mod: CPTII,S$GLB,, | Performed by: PHYSICIAN ASSISTANT

## 2019-02-23 PROCEDURE — 3080F PR MOST RECENT DIASTOLIC BLOOD PRESSURE >= 90 MM HG: ICD-10-PCS | Mod: CPTII,S$GLB,, | Performed by: PHYSICIAN ASSISTANT

## 2019-02-23 PROCEDURE — 3008F PR BODY MASS INDEX (BMI) DOCUMENTED: ICD-10-PCS | Mod: CPTII,S$GLB,, | Performed by: PHYSICIAN ASSISTANT

## 2019-02-23 PROCEDURE — 3080F DIAST BP >= 90 MM HG: CPT | Mod: CPTII,S$GLB,, | Performed by: PHYSICIAN ASSISTANT

## 2019-02-23 PROCEDURE — 3077F SYST BP >= 140 MM HG: CPT | Mod: CPTII,S$GLB,, | Performed by: PHYSICIAN ASSISTANT

## 2019-02-23 NOTE — PROGRESS NOTES
"Subjective:       Patient ID: Kianna Zuleta is a 48 y.o. female.    Vitals:  height is 5' 4" (1.626 m) and weight is 117.9 kg (260 lb). Her temperature is 97.8 °F (36.6 °C). Her blood pressure is 159/103 (abnormal) and her pulse is 92. Her oxygen saturation is 98%.     Chief Complaint: Influenza    Patient is a  at a school. Patient is here to be tested for flu because lots of sick contacts with positive flu at school.      Influenza   This is a new problem. The current episode started yesterday. The problem occurs constantly. The problem has been gradually worsening. Associated symptoms include chills, coughing, fatigue, headaches and myalgias. Pertinent negatives include no congestion, diaphoresis, fever, nausea, rash, sore throat or vomiting. Treatments tried: singulair  The treatment provided no relief.   URI    This is a new problem. The current episode started yesterday. The problem has been gradually worsening. There has been no fever. Associated symptoms include coughing, headaches, rhinorrhea, sinus pain and sneezing. Pertinent negatives include no congestion, ear pain, nausea, rash, sore throat, vomiting or wheezing.       Constitution: Positive for chills and fatigue. Negative for sweating and fever.   HENT: Positive for sinus pain and sinus pressure. Negative for ear pain, congestion, sore throat and voice change.    Neck: Negative for painful lymph nodes.   Eyes: Negative for eye redness.   Respiratory: Positive for cough. Negative for chest tightness, sputum production, bloody sputum, COPD, shortness of breath, stridor, wheezing and asthma.    Gastrointestinal: Negative for nausea and vomiting.   Musculoskeletal: Positive for muscle ache.   Skin: Negative for rash.   Allergic/Immunologic: Positive for sneezing. Negative for seasonal allergies and asthma.   Neurological: Positive for headaches.   Hematologic/Lymphatic: Negative for swollen lymph nodes.       Objective:      Physical Exam "   Constitutional: She is oriented to person, place, and time. She appears well-developed and well-nourished. She is cooperative.  Non-toxic appearance. She does not have a sickly appearance. She does not appear ill. No distress.   HENT:   Head: Normocephalic and atraumatic.   Right Ear: Hearing, tympanic membrane, external ear and ear canal normal.   Left Ear: Hearing, tympanic membrane, external ear and ear canal normal.   Nose: Nose normal. No mucosal edema, rhinorrhea or nasal deformity. No epistaxis. Right sinus exhibits no maxillary sinus tenderness and no frontal sinus tenderness. Left sinus exhibits no maxillary sinus tenderness and no frontal sinus tenderness.   Mouth/Throat: Uvula is midline and mucous membranes are normal. No trismus in the jaw. Normal dentition. No uvula swelling. Posterior oropharyngeal erythema present. No oropharyngeal exudate or posterior oropharyngeal edema.   Eyes: Conjunctivae, EOM and lids are normal. Pupils are equal, round, and reactive to light. No scleral icterus.   Sclera clear bilat   Neck: Trachea normal, full passive range of motion without pain and phonation normal. Neck supple.   Cardiovascular: Normal rate, regular rhythm, normal heart sounds, intact distal pulses and normal pulses.   Pulmonary/Chest: Effort normal and breath sounds normal. No accessory muscle usage or stridor. No respiratory distress. She has no decreased breath sounds. She has no wheezes. She has no rhonchi. She has no rales.   Abdominal: Soft. Normal appearance and bowel sounds are normal. She exhibits no distension. There is no tenderness.   Musculoskeletal: Normal range of motion. She exhibits no edema or deformity.   Lymphadenopathy:     She has no cervical adenopathy.   Neurological: She is alert and oriented to person, place, and time. She has normal strength. No cranial nerve deficit or sensory deficit. She exhibits normal muscle tone. She displays a negative Romberg sign. Coordination normal.    Skin: Skin is warm, dry and intact. No rash noted. She is not diaphoretic. No pallor.   Psychiatric: She has a normal mood and affect. Her speech is normal and behavior is normal. Judgment and thought content normal. Cognition and memory are normal.   Nursing note and vitals reviewed.      Results for orders placed or performed in visit on 02/23/19   POCT Influenza A/B   Result Value Ref Range    Rapid Influenza A Ag Negative Negative    Rapid Influenza B Ag Negative Negative     Acceptable Yes      Assessment:       1. Viral URI    2. Cough    3. Elevated blood pressure reading        Plan:         Viral URI    Cough  -     POCT Influenza A/B    Elevated blood pressure reading    Other orders  -     Cancel: POCT Urinalysis, Dipstick, Automated, W/O Scope      Patient Instructions   If you were prescribed a narcotic or controlled medication, do not drive or operate heavy equipment or machinery while taking these medications.  You must understand that you've received an Urgent Care treatment only and that you may be released before all your medical problems are known or treated. You, the patient, will arrange for follow up care as instructed.  Follow up with your PCP or specialty clinic as directed if not improved or as needed. You can call [phone] to schedule an appointment with the appropriate provider.  If your condition worsens we recommend that you receive another evaluation at the Emergency Department for any concerns or worsening of condition.  Patient aware and verbalized understanding.    You tested negative for the flu today.  Patient's BP is elevated today. Patient did not take their medication today. No Chest pain, or SOB. Patient has been advised to take their medicine and monitor the BP for the next few days.  If it stays elevated, patient should contact their PCP.      Viral Upper Respiratory Illness (Adult)  You have a viral upper respiratory illness (URI), which is another  term for the common cold. This illness is contagious during the first few days. It is spread through the air by coughing and sneezing. It may also be spread by direct contact (touching the sick person and then touching your own eyes, nose, or mouth). Frequent handwashing will decrease risk of spread. Most viral illnesses go away within 7 to 10 days with rest and simple home remedies. Sometimes the illness may last for several weeks. Antibiotics will not kill a virus, and they are generally not prescribed for this condition.    Home care  · If symptoms are severe, rest at home for the first 2 to 3 days. When you resume activity, don't let yourself get too tired.  · Avoid being exposed to cigarette smoke (yours or others).  · You may use acetaminophen or ibuprofen to control pain and fever, unless another medicine was prescribed. (Note: If you have chronic liver or kidney disease, have ever had a stomach ulcer or gastrointestinal bleeding, or are taking blood-thinning medicines, talk with your healthcare provider before using these medicines.) Aspirin should never be given to anyone under 18 years of age who is ill with a viral infection or fever. It may cause severe liver or brain damage.  · Your appetite may be poor, so a light diet is fine. Avoid dehydration by drinking 6 to 8 glasses of fluids per day (water, soft drinks, juices, tea, or soup). Extra fluids will help loosen secretions in the nose and lungs.  · Over-the-counter cold medicines will not shorten the length of time youre sick, but they may be helpful for the following symptoms: cough, sore throat, and nasal and sinus congestion. (Note: Do not use decongestants if you have high blood pressure.)  Follow-up care  Follow up with your healthcare provider, or as advised.  When to seek medical advice  Call your healthcare provider right away if any of these occur:  · Cough with lots of colored sputum (mucus)  · Severe headache; face, neck, or ear  pain  · Difficulty swallowing due to throat pain  · Fever of 100.4°F (38°C)  Call 911, or get immediate medical care  Call emergency services right away if any of these occur:  · Chest pain, shortness of breath, wheezing, or difficulty breathing  · Coughing up blood  · Inability to swallow due to throat pain  Date Last Reviewed: 9/13/2015  © 9307-9948 [employer]. [address] [place] [address], [place] [address]. All rights reserved. This information is not intended as a substitute for professional medical care. Always follow your healthcare professional's instructions.

## 2019-02-23 NOTE — PATIENT INSTRUCTIONS
If you were prescribed a narcotic or controlled medication, do not drive or operate heavy equipment or machinery while taking these medications.  You must understand that you've received an Urgent Care treatment only and that you may be released before all your medical problems are known or treated. You, the patient, will arrange for follow up care as instructed.  Follow up with your PCP or specialty clinic as directed if not improved or as needed. You can call [phone] to schedule an appointment with the appropriate provider.  If your condition worsens we recommend that you receive another evaluation at the Emergency Department for any concerns or worsening of condition.  Patient aware and verbalized understanding.    You tested negative for the flu today.  Patient's BP is elevated today. Patient did not take their medication today. No Chest pain, or SOB. Patient has been advised to take their medicine and monitor the BP for the next few days.  If it stays elevated, patient should contact their PCP.      Viral Upper Respiratory Illness (Adult)  You have a viral upper respiratory illness (URI), which is another term for the common cold. This illness is contagious during the first few days. It is spread through the air by coughing and sneezing. It may also be spread by direct contact (touching the sick person and then touching your own eyes, nose, or mouth). Frequent handwashing will decrease risk of spread. Most viral illnesses go away within 7 to 10 days with rest and simple home remedies. Sometimes the illness may last for several weeks. Antibiotics will not kill a virus, and they are generally not prescribed for this condition.    Home care  · If symptoms are severe, rest at home for the first 2 to 3 days. When you resume activity, don't let yourself get too tired.  · Avoid being exposed to cigarette smoke (yours or others).  · You may use acetaminophen or ibuprofen to control pain and fever, unless  another medicine was prescribed. (Note: If you have chronic liver or kidney disease, have ever had a stomach ulcer or gastrointestinal bleeding, or are taking blood-thinning medicines, talk with your healthcare provider before using these medicines.) Aspirin should never be given to anyone under 18 years of age who is ill with a viral infection or fever. It may cause severe liver or brain damage.  · Your appetite may be poor, so a light diet is fine. Avoid dehydration by drinking 6 to 8 glasses of fluids per day (water, soft drinks, juices, tea, or soup). Extra fluids will help loosen secretions in the nose and lungs.  · Over-the-counter cold medicines will not shorten the length of time youre sick, but they may be helpful for the following symptoms: cough, sore throat, and nasal and sinus congestion. (Note: Do not use decongestants if you have high blood pressure.)  Follow-up care  Follow up with your healthcare provider, or as advised.  When to seek medical advice  Call your healthcare provider right away if any of these occur:  · Cough with lots of colored sputum (mucus)  · Severe headache; face, neck, or ear pain  · Difficulty swallowing due to throat pain  · Fever of 100.4°F (38°C)  Call 911, or get immediate medical care  Call emergency services right away if any of these occur:  · Chest pain, shortness of breath, wheezing, or difficulty breathing  · Coughing up blood  · Inability to swallow due to throat pain  Date Last Reviewed: 9/13/2015 © 2000-2017 [employer]. [address] [place] [address], [place] [address]. All rights reserved. This information is not intended as a substitute for professional medical care. Always follow your healthcare professional's instructions.

## 2019-02-25 ENCOUNTER — PATIENT MESSAGE (OUTPATIENT)
Dept: ALLERGY | Facility: CLINIC | Age: 49
End: 2019-02-25

## 2019-04-04 ENCOUNTER — PATIENT MESSAGE (OUTPATIENT)
Dept: RHEUMATOLOGY | Facility: CLINIC | Age: 49
End: 2019-04-04

## 2019-05-31 ENCOUNTER — OFFICE VISIT (OUTPATIENT)
Dept: ALLERGY | Facility: CLINIC | Age: 49
End: 2019-05-31
Payer: COMMERCIAL

## 2019-05-31 VITALS
HEIGHT: 64 IN | DIASTOLIC BLOOD PRESSURE: 86 MMHG | WEIGHT: 271.38 LBS | SYSTOLIC BLOOD PRESSURE: 132 MMHG | BODY MASS INDEX: 46.33 KG/M2

## 2019-05-31 DIAGNOSIS — D83.9 COMMON VARIABLE IMMUNODEFICIENCY: Primary | ICD-10-CM

## 2019-05-31 DIAGNOSIS — J31.0 NONALLERGIC RHINITIS: ICD-10-CM

## 2019-05-31 DIAGNOSIS — Z29.89 NEED FOR PROPHYLACTIC IMMUNOTHERAPY: ICD-10-CM

## 2019-05-31 PROCEDURE — 3079F PR MOST RECENT DIASTOLIC BLOOD PRESSURE 80-89 MM HG: ICD-10-PCS | Mod: CPTII,S$GLB,, | Performed by: STUDENT IN AN ORGANIZED HEALTH CARE EDUCATION/TRAINING PROGRAM

## 2019-05-31 PROCEDURE — 3079F DIAST BP 80-89 MM HG: CPT | Mod: CPTII,S$GLB,, | Performed by: STUDENT IN AN ORGANIZED HEALTH CARE EDUCATION/TRAINING PROGRAM

## 2019-05-31 PROCEDURE — 3075F PR MOST RECENT SYSTOLIC BLOOD PRESS GE 130-139MM HG: ICD-10-PCS | Mod: CPTII,S$GLB,, | Performed by: STUDENT IN AN ORGANIZED HEALTH CARE EDUCATION/TRAINING PROGRAM

## 2019-05-31 PROCEDURE — 99999 PR PBB SHADOW E&M-EST. PATIENT-LVL III: CPT | Mod: PBBFAC,,, | Performed by: STUDENT IN AN ORGANIZED HEALTH CARE EDUCATION/TRAINING PROGRAM

## 2019-05-31 PROCEDURE — 3075F SYST BP GE 130 - 139MM HG: CPT | Mod: CPTII,S$GLB,, | Performed by: STUDENT IN AN ORGANIZED HEALTH CARE EDUCATION/TRAINING PROGRAM

## 2019-05-31 PROCEDURE — 99214 PR OFFICE/OUTPT VISIT, EST, LEVL IV, 30-39 MIN: ICD-10-PCS | Mod: S$GLB,,, | Performed by: STUDENT IN AN ORGANIZED HEALTH CARE EDUCATION/TRAINING PROGRAM

## 2019-05-31 PROCEDURE — 99214 OFFICE O/P EST MOD 30 MIN: CPT | Mod: S$GLB,,, | Performed by: STUDENT IN AN ORGANIZED HEALTH CARE EDUCATION/TRAINING PROGRAM

## 2019-05-31 PROCEDURE — 99999 PR PBB SHADOW E&M-EST. PATIENT-LVL III: ICD-10-PCS | Mod: PBBFAC,,, | Performed by: STUDENT IN AN ORGANIZED HEALTH CARE EDUCATION/TRAINING PROGRAM

## 2019-05-31 PROCEDURE — 3008F BODY MASS INDEX DOCD: CPT | Mod: CPTII,S$GLB,, | Performed by: STUDENT IN AN ORGANIZED HEALTH CARE EDUCATION/TRAINING PROGRAM

## 2019-05-31 PROCEDURE — 3008F PR BODY MASS INDEX (BMI) DOCUMENTED: ICD-10-PCS | Mod: CPTII,S$GLB,, | Performed by: STUDENT IN AN ORGANIZED HEALTH CARE EDUCATION/TRAINING PROGRAM

## 2019-06-03 ENCOUNTER — TELEPHONE (OUTPATIENT)
Dept: RHEUMATOLOGY | Facility: CLINIC | Age: 49
End: 2019-06-03

## 2019-06-10 ENCOUNTER — TELEPHONE (OUTPATIENT)
Dept: ALLERGY | Facility: CLINIC | Age: 49
End: 2019-06-10

## 2019-06-10 NOTE — TELEPHONE ENCOUNTER
Received [facility] fax stating that Hizentra 2 gram/10 ml vial was approved.    Approval number [id number]    Good from 6/5/19-6/3/2020    Sent to scanning.

## 2019-07-01 ENCOUNTER — TELEPHONE (OUTPATIENT)
Dept: ADMINISTRATIVE | Facility: HOSPITAL | Age: 49
End: 2019-07-01

## 2019-07-01 RX ORDER — CYANOCOBALAMIN 1000 UG/ML
1 INJECTION, SOLUTION INTRAMUSCULAR; SUBCUTANEOUS
COMMUNITY
End: 2019-07-02

## 2019-07-01 RX ORDER — TIZANIDINE HYDROCHLORIDE 4 MG/1
2-3 CAPSULE, GELATIN COATED ORAL
COMMUNITY
Start: 2019-05-20 | End: 2019-08-17

## 2019-07-01 RX ORDER — GABAPENTIN 300 MG/1
2 CAPSULE ORAL
COMMUNITY
Start: 2019-05-09 | End: 2019-07-02 | Stop reason: SDUPTHER

## 2019-07-01 RX ORDER — OXYCODONE AND ACETAMINOPHEN 10; 325 MG/1; MG/1
1 TABLET ORAL
COMMUNITY
Start: 2019-06-04 | End: 2019-07-02 | Stop reason: SDUPTHER

## 2019-07-01 RX ORDER — SODIUM, POTASSIUM,MAG SULFATES 17.5-3.13G
SOLUTION, RECONSTITUTED, ORAL ORAL
Refills: 0 | COMMUNITY
Start: 2019-06-05 | End: 2019-07-02

## 2019-07-01 RX ORDER — QUETIAPINE FUMARATE 25 MG/1
TABLET, FILM COATED ORAL
Refills: 0 | COMMUNITY
Start: 2019-06-06 | End: 2020-07-08

## 2019-07-02 ENCOUNTER — OFFICE VISIT (OUTPATIENT)
Dept: RHEUMATOLOGY | Facility: CLINIC | Age: 49
End: 2019-07-02
Payer: COMMERCIAL

## 2019-07-02 ENCOUNTER — LAB VISIT (OUTPATIENT)
Dept: LAB | Facility: HOSPITAL | Age: 49
End: 2019-07-02
Attending: INTERNAL MEDICINE
Payer: COMMERCIAL

## 2019-07-02 VITALS
WEIGHT: 277.38 LBS | HEIGHT: 64 IN | SYSTOLIC BLOOD PRESSURE: 144 MMHG | BODY MASS INDEX: 47.36 KG/M2 | HEART RATE: 86 BPM | DIASTOLIC BLOOD PRESSURE: 92 MMHG

## 2019-07-02 DIAGNOSIS — R70.0 ELEVATED SED RATE: ICD-10-CM

## 2019-07-02 DIAGNOSIS — R74.8 ALKALINE PHOSPHATASE ELEVATION: ICD-10-CM

## 2019-07-02 DIAGNOSIS — E55.9 VITAMIN D DEFICIENCY: ICD-10-CM

## 2019-07-02 DIAGNOSIS — E66.01 MORBID OBESITY: ICD-10-CM

## 2019-07-02 DIAGNOSIS — D80.3 IGG DEFICIENCY: Primary | ICD-10-CM

## 2019-07-02 LAB
25(OH)D3+25(OH)D2 SERPL-MCNC: 20 NG/ML (ref 30–96)
ALBUMIN SERPL BCP-MCNC: 3.6 G/DL (ref 3.5–5.2)
ALP SERPL-CCNC: 144 U/L (ref 55–135)
ALT SERPL W/O P-5'-P-CCNC: 14 U/L (ref 10–44)
ANION GAP SERPL CALC-SCNC: 10 MMOL/L (ref 8–16)
AST SERPL-CCNC: 18 U/L (ref 10–40)
BILIRUB SERPL-MCNC: 0.3 MG/DL (ref 0.1–1)
BUN SERPL-MCNC: 17 MG/DL (ref 6–20)
CALCIUM SERPL-MCNC: 9.3 MG/DL (ref 8.7–10.5)
CHLORIDE SERPL-SCNC: 103 MMOL/L (ref 95–110)
CO2 SERPL-SCNC: 25 MMOL/L (ref 23–29)
CREAT SERPL-MCNC: 0.8 MG/DL (ref 0.5–1.4)
CRP SERPL-MCNC: 32.6 MG/L (ref 0–8.2)
ERYTHROCYTE [SEDIMENTATION RATE] IN BLOOD BY WESTERGREN METHOD: 47 MM/HR (ref 0–36)
EST. GFR  (AFRICAN AMERICAN): >60 ML/MIN/1.73 M^2
EST. GFR  (NON AFRICAN AMERICAN): >60 ML/MIN/1.73 M^2
GLUCOSE SERPL-MCNC: 81 MG/DL (ref 70–110)
POTASSIUM SERPL-SCNC: 4 MMOL/L (ref 3.5–5.1)
PROT SERPL-MCNC: 7.7 G/DL (ref 6–8.4)
SODIUM SERPL-SCNC: 138 MMOL/L (ref 136–145)

## 2019-07-02 PROCEDURE — 80053 COMPREHEN METABOLIC PANEL: CPT

## 2019-07-02 PROCEDURE — 85652 RBC SED RATE AUTOMATED: CPT

## 2019-07-02 PROCEDURE — 99999 PR PBB SHADOW E&M-EST. PATIENT-LVL V: ICD-10-PCS | Mod: PBBFAC,,, | Performed by: INTERNAL MEDICINE

## 2019-07-02 PROCEDURE — 3008F PR BODY MASS INDEX (BMI) DOCUMENTED: ICD-10-PCS | Mod: CPTII,S$GLB,, | Performed by: INTERNAL MEDICINE

## 2019-07-02 PROCEDURE — 82306 VITAMIN D 25 HYDROXY: CPT

## 2019-07-02 PROCEDURE — 84080 ASSAY ALKALINE PHOSPHATASES: CPT

## 2019-07-02 PROCEDURE — 84075 ASSAY ALKALINE PHOSPHATASE: CPT

## 2019-07-02 PROCEDURE — 3077F SYST BP >= 140 MM HG: CPT | Mod: CPTII,S$GLB,, | Performed by: INTERNAL MEDICINE

## 2019-07-02 PROCEDURE — 3077F PR MOST RECENT SYSTOLIC BLOOD PRESSURE >= 140 MM HG: ICD-10-PCS | Mod: CPTII,S$GLB,, | Performed by: INTERNAL MEDICINE

## 2019-07-02 PROCEDURE — 99213 OFFICE O/P EST LOW 20 MIN: CPT | Mod: S$GLB,,, | Performed by: INTERNAL MEDICINE

## 2019-07-02 PROCEDURE — 86140 C-REACTIVE PROTEIN: CPT

## 2019-07-02 PROCEDURE — 99999 PR PBB SHADOW E&M-EST. PATIENT-LVL V: CPT | Mod: PBBFAC,,, | Performed by: INTERNAL MEDICINE

## 2019-07-02 PROCEDURE — 3008F BODY MASS INDEX DOCD: CPT | Mod: CPTII,S$GLB,, | Performed by: INTERNAL MEDICINE

## 2019-07-02 PROCEDURE — 3080F PR MOST RECENT DIASTOLIC BLOOD PRESSURE >= 90 MM HG: ICD-10-PCS | Mod: CPTII,S$GLB,, | Performed by: INTERNAL MEDICINE

## 2019-07-02 PROCEDURE — 99213 PR OFFICE/OUTPT VISIT, EST, LEVL III, 20-29 MIN: ICD-10-PCS | Mod: S$GLB,,, | Performed by: INTERNAL MEDICINE

## 2019-07-02 PROCEDURE — 3080F DIAST BP >= 90 MM HG: CPT | Mod: CPTII,S$GLB,, | Performed by: INTERNAL MEDICINE

## 2019-07-02 ASSESSMENT — ROUTINE ASSESSMENT OF PATIENT INDEX DATA (RAPID3)
PATIENT GLOBAL ASSESSMENT SCORE: 6
PAIN SCORE: 2
FATIGUE SCORE: 2
PSYCHOLOGICAL DISTRESS SCORE: 3.3
AM STIFFNESS SCORE: 1, YES
MDHAQ FUNCTION SCORE: .9
TOTAL RAPID3 SCORE: 3.67
WHEN YOU AWAKENED IN THE MORNING OVER THE LAST WEEK, PLEASE INDICATE THE AMOUNT OF TIME IT TAKES UNTIL YOU ARE AS LIMBER AS YOU WILL BE FOR THE DAY: 1HR

## 2019-07-02 NOTE — PROGRESS NOTES
I have personally taken the history and examined the patient and agree with the resident,s note as stated above       Results for BLANCA GUTHRIE (MRN [id number]) as of 7/2/2019 07:05   Ref. Range 2/19/2019 15:42   Vit D, 25-Hydroxy Latest Ref Range: 30 - 96 ng/mL 24 (L)       Resolved eczema  Vitamin D deficiency  Hypertension not controlled with metoprolol 50mg qid, clonidine 0.1mg, losartan 20mg daily 144/92  today  Asthma  Allergic rhinitis   IgG deficiency and IgG subclass deficiency(IgG1, IgG2, IgG4)  H/o fever  H/o carcinoid  Trigeminal neuralgia on  gabapentin   Morbid obesity, gained 17 #    Cont Ig(Hizentra) sc per Dr. Miller  Vitamin D, ESR, CRP, GGT, CMP, fasting alk phos isoenzymes  Ref to Bariatric Medicine  Recheck cbc, cmp, esr, crp, ck, aldolase, vitamin D today  cont vitamin D2 50,000 units once a week and vitamin D3 1000 units daily  F/u Dr. Mcgrath re: hypertension  RTC 1 year/ prn if today's labs normal

## 2019-07-02 NOTE — PATIENT INSTRUCTIONS
Fasting labs today.  Follow up with Dr. Mcgrath cardiology for elevated blood pressure.  Sagrario will make appointment with Dr. Ruiz in Bariatric medicine.

## 2019-07-02 NOTE — PROGRESS NOTES
"Subjective:       Patient ID: Kianna Zuleta is a 49 y.o. female.    Chief Complaint: No chief complaint on file.    HPI \   is a 49 year old with a hx of eczema, morbid obesity, Vitamin D deficiency, Hypertension not controlled with metoprolol 50mg qid, losartan 25mg qday, Allergic rhinitis, IgG deficiency, H/o fever, H/o carcinoid, Trigeminal neuralgia on carbamazepine, gabapentin. For her common variable immunodeficiency she has been undergoing self administered subcuetaneous IgG (Hizentra) injections weekly for the past year. She experienced one self resolving mild sinus infection 2 weeks ago without complication and a splotchy neck rash on a return flight from [place] last week which resolved without intervention. Otherwise she has been infection and fever free since her last visit.  No other complaints.  She has experienced some weight gain of 17 pounds since last visit. She does not use a particular diet or exercise. She is aware of the problems that come with such physiological changes. Her previous labs also indicated an elevated Alk Phos, ESR, and CRP as well as a decreased Vitamin D level.     Review of Systems   Constitutional: Negative for fever and unexpected weight change.   HENT: Negative for congestion, ear pain, mouth sores, rhinorrhea, sore throat and trouble swallowing.    Eyes: Negative for redness.   Respiratory: Negative for cough and shortness of breath.    Cardiovascular: Negative for chest pain.   Gastrointestinal: Negative for abdominal pain, constipation and diarrhea.   Genitourinary: Negative for dysuria and genital sores.   Musculoskeletal: Positive for arthralgias, back pain and myalgias. Gait problem: back pain, chronic in nature.   Skin: Positive for rash.   Neurological: Negative for headaches.   Hematological: Does not bruise/bleed easily.         Objective:   BP (!) 144/92   Pulse 86   Ht 5' 3.6" (1.615 m)   Wt 125.8 kg (277 lb 6.4 oz)   BMI 48.22 kg/m²    "   Physical Exam   Constitutional: She is well-developed, well-nourished, and in no distress.   HENT:   Head: Normocephalic.   Eyes: Pupils are equal, round, and reactive to light. Right eye exhibits no discharge. Left eye exhibits no discharge.   Neck: Normal range of motion.   Cardiovascular: Normal rate and normal heart sounds.    Pulmonary/Chest: Effort normal. She has no wheezes. She has no rales.   Abdominal: Soft.   obese       Right Side Rheumatological Exam     Examination finds the shoulder, elbow, wrist, knee, 1st PIP, 1st MCP, 2nd PIP, 2nd MCP, 3rd PIP, 3rd MCP, 4th PIP, 4th MCP, 5th PIP and 5th MCP normal.    Shoulder Exam   Tenderness Location: no tenderness    Left Side Rheumatological Exam     Examination finds the shoulder, elbow, wrist, knee, 1st PIP, 1st MCP, 2nd PIP, 2nd MCP, 3rd PIP, 3rd MCP, 4th PIP, 4th MCP, 5th PIP and 5th MCP normal.    Shoulder Exam   Tenderness Location: no tenderness      Neurological: She is alert.   Skin: Skin is warm and dry. Bruising (secondary to subcutaneous IgG injectins) noted. No rash noted. No erythema.          Musculoskeletal: Normal range of motion. She exhibits no edema, tenderness or deformity.           Assessment:       1. IgG deficiency    2. Elevated sed rate    3. Vitamin D deficiency    4. Alkaline phosphatase elevation    5. Morbid obesity          Resolved eczema  Vitamin D deficiency  Hypertension not controlled with metoprolol 50mg qid, clonidine 0.1mg, losartan 20mg daily 144/92  today  Asthma  Allergic rhinitis   IgG deficiency and IgG subclass deficiency(IgG1, IgG2, IgG4)  H/o fever  H/o carcinoid  Trigeminal neuralgia on  gabapentin   Morbid obesity, gained 17 lb       Plan:   Cont Ig(Hizentra) sc per Dr. Miller  Vitamin D, ESR, CRP, GGT, CMP, fasting alk phos isoenzymes  Ref to Bariatric Medicine  Recheck cbc, cmp, esr, crp, ck, aldolase, vitamin D today  cont vitamin D2 50,000 units once a week and vitamin D3 1000 units daily  F/u   Ehrensing re: hypertension  RTC prn if today's labs normal

## 2019-07-06 LAB
ALP BONE CFR SERPL: 25 % (ref 28–66)
ALP INTEST CFR SERPL: 5 % (ref 1–24)
ALP LIVER CFR SERPL: 70 % (ref 25–69)
ALP PLAC CFR SERPL: 0 %
ALP SERPL-CCNC: 133 U/L (ref 33–115)

## 2019-07-07 ENCOUNTER — TELEPHONE (OUTPATIENT)
Dept: RHEUMATOLOGY | Facility: CLINIC | Age: 49
End: 2019-07-07

## 2019-07-07 DIAGNOSIS — K76.0 FATTY LIVER: ICD-10-CM

## 2019-07-07 DIAGNOSIS — R70.0 SEDIMENTATION RATE ELEVATION: ICD-10-CM

## 2019-07-07 DIAGNOSIS — R79.82 CRP ELEVATED: ICD-10-CM

## 2019-07-07 DIAGNOSIS — R74.8 ALKALINE PHOSPHATASE ELEVATION: Primary | ICD-10-CM

## 2019-07-07 NOTE — TELEPHONE ENCOUNTER
Please tell pt her alk phos appears to be of liver origin. Please add GGT, AMA to labs, schedule abd US and appt in Hepatology to further evaluate liver. She has h/o fatty liver on prior CT. NADIA

## 2019-07-08 ENCOUNTER — DOCUMENTATION ONLY (OUTPATIENT)
Dept: TRANSPLANT | Facility: CLINIC | Age: 49
End: 2019-07-08

## 2019-07-08 ENCOUNTER — TELEPHONE (OUTPATIENT)
Dept: RHEUMATOLOGY | Facility: CLINIC | Age: 49
End: 2019-07-08

## 2019-07-08 ENCOUNTER — TELEPHONE (OUTPATIENT)
Dept: BARIATRICS | Facility: CLINIC | Age: 49
End: 2019-07-08

## 2019-07-08 NOTE — LETTER
July 8, 2019    Kianna Zuleta  [address] [facility] [address]      Dear Kianna Zuleta:    Your doctor has referred you to the [facility]. We are sending this letter to remind you to make an appointment with us to complete the referral process.     Please call us at [phone] to schedule an appointment. We look forward to seeing you soon.     If you received a call and have been scheduled, please disregard this letter.       Sincerely,        [facility]   [address] [place] [id number]  [phone]

## 2019-07-08 NOTE — TELEPHONE ENCOUNTER
Spoke to ms Hutchison and explained to her that dr Ruiz is not a internal med doctor anymore. I did contact deedy the  and ask if she can check on ms Hutchison Zuleta insurance to make sure she is good to go to see Dr Ruiz 07/29/19

## 2019-07-08 NOTE — TELEPHONE ENCOUNTER
----- Message from Arthur Helton sent at 7/8/2019 12:55 PM CDT -----  Contact: Pt  Needs Advice    Reason for call:The Pt states that her insurance told her that Dr. Ruiz also is an Internal Medicine doctor and the Pt would like a call back to confirm this.  The Pt was also interested in a sooner appt then the 7/29/19 appt.        Communication Preference:[phone]    Additional Information:

## 2019-07-08 NOTE — NURSING
Pt records reviewed.   Pt will be referred to Hepatology.  Alkaline phosphatase elevation  Fatty liver  Sedimentation rate elevation  CRP elevated  Initial referral received  from the [facility]que.   Referring Provider/diagnosis  Luis Eduardo Brown MD      Referral letter sent to patient.

## 2019-07-09 ENCOUNTER — PATIENT MESSAGE (OUTPATIENT)
Dept: RHEUMATOLOGY | Facility: CLINIC | Age: 49
End: 2019-07-09

## 2019-07-10 ENCOUNTER — HOSPITAL ENCOUNTER (OUTPATIENT)
Dept: RADIOLOGY | Facility: HOSPITAL | Age: 49
Discharge: HOME | End: 2019-07-10
Attending: INTERNAL MEDICINE
Payer: COMMERCIAL

## 2019-07-10 DIAGNOSIS — R74.8 ALKALINE PHOSPHATASE ELEVATION: ICD-10-CM

## 2019-07-10 PROCEDURE — 76700 US EXAM ABDOM COMPLETE: CPT | Mod: TC

## 2019-07-10 PROCEDURE — 76700 US EXAM ABDOM COMPLETE: CPT | Mod: 26,,, | Performed by: RADIOLOGY

## 2019-07-10 PROCEDURE — 76700 US ABDOMEN COMPLETE: ICD-10-PCS | Mod: 26,,, | Performed by: RADIOLOGY

## 2019-07-11 ENCOUNTER — PATIENT MESSAGE (OUTPATIENT)
Dept: RHEUMATOLOGY | Facility: CLINIC | Age: 49
End: 2019-07-11

## 2019-07-11 ENCOUNTER — DOCUMENTATION ONLY (OUTPATIENT)
Dept: BARIATRICS | Facility: CLINIC | Age: 49
End: 2019-07-11

## 2019-07-11 DIAGNOSIS — E66.9 OBESITY (BMI 30-39.9): Primary | ICD-10-CM

## 2019-07-11 NOTE — PROGRESS NOTES
FW: DENIED   Received: Today   Message Contents   Agnes GARCIA Staff             Please cancel 07/29/2019 appt.     Thanks    Previous Messages      ----- Message -----   From: Subha Villela   Sent: 7/2/2019   9:20 AM   To: Agnes Rose   Subject: DENIED                                           Per [employer] website pt has an EXCLUSION to her plan for weight reduction programs. Please inform pt.

## 2019-07-11 NOTE — PROGRESS NOTES
Call patient to advise of coverage exclusion for upcoming appt with Fiorella Ruiz MD. Pt request appointment cancellation, basket sent to MA. No action  required.

## 2019-07-17 NOTE — PROGRESS NOTES
HEPATOLOGY CLINIC VISIT NOTE     REFERRING PROVIDER: Dr. Luis Eduardo Brown    REASON FOR VISIT: elevated alk phos    HISTORY: This is a 49 y.o. White female here for eval of elevated alk phos, referred by rheum. Labs shown alk phos intermittently elevated since 2005 with persistent elevation since 12/2017. Range from 130s-169. Isoenzymes suggest liver cause. GGT WNL. AMA negative.     U/S with hepatic steatosis. Liver measuring 17.7 cm, spleen 13.5 cm. No ascites    Pt denies signs of hepatic decompensation including: jaundice, dark urine, abdominal distention, hematemesis, melena, slowed mentation.    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    MVP (mitral valve prolapse)     Pneumonia     POTS (postural orthostatic tachycardia syndrome)     Recurrent upper respiratory infection (URI)     Ulcer     Vitamin D deficiency disease 11/6/2012     Past Surgical History:   Procedure Laterality Date    ANKLE SURGERY      lt    APPENDECTOMY      BACK SURGERY      CHOLECYSTECTOMY      choley & ovarian cyst removed  12/2005    cystoscope      multiple    HYSTERECTOMY  4/8/2004    [facility] BS[facility]     interstim      OOPHORECTOMY  4/8/2004    with hysterectomy     PELVIC LAPAROSCOPY      LA EXPLORATORY OF ABDOMEN      REMOVAL, NEUROSTIMULATOR, SACRAL N/A 5/15/2013    Performed by [facility]ain [facility] at [facility]    SINUS SURGERY  03/01/2016    SPINE SURGERY       FAMILY HISTORY: Negative for liver disease    SOCIAL HISTORY:   Teacher, [employer]      Social  History     Tobacco Use   Smoking Status Never Smoker   Smokeless Tobacco Never Used       Social History     Substance and Sexual Activity   Alcohol Use No   very rare     Social History     Substance and Sexual Activity   Drug Use No     ROS:   No fever, chills  No chest pain,  dyspnea, cough  No abdominal pain,  nausea, vomiting  No skin rashes, (+) erythema   No headaches, visual changes  No lower extremity edema, (+) intermittent left arm edema   No depression or anxiety      PHYSICAL EXAM:  Friendly White female, in no acute distress; alert and oriented to person, place and time  VITALS: reviewed  HEENT: Sclerae anicteric.   NECK: Supple  CVS: Regular rate and rhythm. No murmurs  LUNGS: Normal respiratory effort. Clear bilaterally  ABDOMEN: Flat, soft, nontender. No organomegaly or masses. No ascites or hernias. Good bowel sounds.    SKIN: Warm and dry. No jaundice, No obvious rashes.   EXTREMITIES: mild  lower extremity edema  NEURO/PSYCH: Normal gate. Memory intact. Thought and speech pattern appropriate. Behavior normal. No depression or anxiety noted.    RECENT LABS:  Lab Results   Component Value Date    WBC 4.37 11/02/2018    HGB 13.6 11/02/2018     11/02/2018     Lab Results   Component Value Date    INR 0.9 01/12/2009     Lab Results   Component Value Date    AST 18 07/02/2019    ALT 14 07/02/2019    BILITOT 0.3 07/02/2019    ALBUMIN 3.6 07/02/2019    ALKPHOS 144 (H) 07/02/2019    CREATININE 0.8 07/02/2019    BUN 17 07/02/2019     07/02/2019    K 4.0 07/02/2019     RECENT IMAGING:  US Abdomen Complete   Order: [id number]   Status:  Final result   Visible to patient:  Yes (Patient Portal)   Next appt:  07/24/2019 at 06:45 AM in Radiology ([facility])   Dx:  Alkaline phosphatase elevation   Details     Reading Physician Reading Date Result Priority   Khris Bassett MD 7/10/2019       Narrative     EXAMINATION:  US ABDOMEN COMPLETE    CLINICAL HISTORY:  Abnormal levels of other serum  enzymes    TECHNIQUE:  Complete abdominal ultrasound (including pancreas, aorta, liver, gallbladder, common bile duct, IVC, kidneys, and spleen) was performed.    COMPARISON:  Abdomen CT 12/07/2017    FINDINGS:  There is an echogenic pancreas without pancreatic masses or pancreatic ductal dilatation.  Normal appearance of the abdominal aorta and the IVC.    The liver measures approximately 17.7 cm in its craniocaudal dimension.  Echogenic liver suggesting hepatic steatosis.  No intrahepatic biliary ductal dilatation or hepatic masses noted.    Gallbladder is surgically absent.  The extrahepatic common bile duct measures approximately 4 mm and is within normal limits.    The right kidney measures 11.5 cm and the left kidney measures 11.6 cm.  There is no hydronephrosis or renal masses.    Spleen measures approximately 13.5 cm.  No focal splenic masses.  There is no ascites.      Impression       1. Borderline hepatosplenomegaly.  2. Hepatic steatosis.  3. Status post cholecystectomy.           ASSESSMENT  49 y.o. White female with:  1. ELEVATED ALK PHOS  -- etiology unclear, isoenzymes suggest liver cause, GGT WNL, minimal elevation  -- AMA negative  -- MRI/MRCP with elastography to stage fibrosis  -- suspect some component of NAFLD    2. HTN  -- asymptomatic   -- suggested seeing PCP for digital medicine program enrollment    PLAN:  1. MRI/MRCP and elastography  2. F/u TBD     Thank you for allowing me to participate in the care of Kianna Hammer PA-C

## 2019-07-18 ENCOUNTER — OFFICE VISIT (OUTPATIENT)
Dept: HEPATOLOGY | Facility: CLINIC | Age: 49
End: 2019-07-18
Payer: COMMERCIAL

## 2019-07-18 ENCOUNTER — PATIENT MESSAGE (OUTPATIENT)
Dept: FAMILY MEDICINE | Facility: CLINIC | Age: 49
End: 2019-07-18

## 2019-07-18 VITALS
DIASTOLIC BLOOD PRESSURE: 100 MMHG | SYSTOLIC BLOOD PRESSURE: 179 MMHG | HEIGHT: 64 IN | OXYGEN SATURATION: 95 % | BODY MASS INDEX: 46.52 KG/M2 | WEIGHT: 272.5 LBS | HEART RATE: 93 BPM

## 2019-07-18 DIAGNOSIS — R74.8 ELEVATED ALKALINE PHOSPHATASE LEVEL: Primary | ICD-10-CM

## 2019-07-18 DIAGNOSIS — I10 ESSENTIAL HYPERTENSION: ICD-10-CM

## 2019-07-18 PROCEDURE — 3008F BODY MASS INDEX DOCD: CPT | Mod: CPTII,S$GLB,, | Performed by: PHYSICIAN ASSISTANT

## 2019-07-18 PROCEDURE — 99244 OFF/OP CNSLTJ NEW/EST MOD 40: CPT | Mod: S$GLB,,, | Performed by: PHYSICIAN ASSISTANT

## 2019-07-18 PROCEDURE — 99999 PR PBB SHADOW E&M-EST. PATIENT-LVL V: CPT | Mod: PBBFAC,,, | Performed by: PHYSICIAN ASSISTANT

## 2019-07-18 PROCEDURE — 3077F PR MOST RECENT SYSTOLIC BLOOD PRESSURE >= 140 MM HG: ICD-10-PCS | Mod: CPTII,S$GLB,, | Performed by: PHYSICIAN ASSISTANT

## 2019-07-18 PROCEDURE — 3077F SYST BP >= 140 MM HG: CPT | Mod: CPTII,S$GLB,, | Performed by: PHYSICIAN ASSISTANT

## 2019-07-18 PROCEDURE — 99244 PR OFFICE CONSULTATION,LEVEL IV: ICD-10-PCS | Mod: S$GLB,,, | Performed by: PHYSICIAN ASSISTANT

## 2019-07-18 PROCEDURE — 99999 PR PBB SHADOW E&M-EST. PATIENT-LVL V: ICD-10-PCS | Mod: PBBFAC,,, | Performed by: PHYSICIAN ASSISTANT

## 2019-07-18 PROCEDURE — 3080F PR MOST RECENT DIASTOLIC BLOOD PRESSURE >= 90 MM HG: ICD-10-PCS | Mod: CPTII,S$GLB,, | Performed by: PHYSICIAN ASSISTANT

## 2019-07-18 PROCEDURE — 3008F PR BODY MASS INDEX (BMI) DOCUMENTED: ICD-10-PCS | Mod: CPTII,S$GLB,, | Performed by: PHYSICIAN ASSISTANT

## 2019-07-18 PROCEDURE — 3080F DIAST BP >= 90 MM HG: CPT | Mod: CPTII,S$GLB,, | Performed by: PHYSICIAN ASSISTANT

## 2019-07-18 NOTE — PROGRESS NOTES
I have reviewed and concur with the ALEX's history, physical, assessment, and plan.  I have personally interviewed and examined the patient at bedside.  See below addendum for my evaluation and additional findings.     49 y.o. female that presents for evaluation of isolated elevation of AP.  GGT is normal.  US consistent with hepatic steatosis and hepatosplenomegaly.    Risk factors for DALTON include BMI 48 and hypertension.  She is also followed in rheumatology clinic for CVID with IgG injections.   AMA negative for PBC.     Recommend MRCP to evaluate for PSC along with MR elastography for fibrosis staging as body habitus precludes fibroscan.  Discussed importance of healthy lifestyle for fatty liver and patient has been referred to Bariatric Medicine from rheumatology clinic.      Based on initial work-up, will determine if liver biopsy is indicated.     Patient will return to clinic with BOB Long

## 2019-07-18 NOTE — LETTER
July 18, 2019      Luis Eduardo Brown MD  [address]  [facility] [address]           [facility]  [address]  [facility] [address]  Phone: [phone]  Fax: [phone]          Patient: Kianna Zuleta   MR Number: [id number]   YOB: 1970   Date of Visit: 7/18/2019       Dear Dr. Luis Eduardo Brown:    Thank you for referring Kianna Zuleta to me for evaluation. Attached you will find relevant portions of my assessment and plan of care.    If you have questions, please do not hesitate to call me. I look forward to following Kianna Zuleta along with you.    Sincerely,    Eduardo Hammer PA-C    Enclosure  CC:  No Recipients    If you would like to receive this communication electronically, please contact [email or web address] or [phone] to request more information on [employer] access.    For providers and/or their staff who would like to refer a patient to [facility], please contact us through our one-stop-shop provider referral line, [facility], at [phone].    If you feel you have received this communication in error or would no longer like to receive these types of communications, please e-mail [email or web address]

## 2019-07-21 ENCOUNTER — PATIENT MESSAGE (OUTPATIENT)
Dept: RHEUMATOLOGY | Facility: CLINIC | Age: 49
End: 2019-07-21

## 2019-07-21 ENCOUNTER — PATIENT MESSAGE (OUTPATIENT)
Dept: FAMILY MEDICINE | Facility: CLINIC | Age: 49
End: 2019-07-21

## 2019-07-22 ENCOUNTER — PATIENT MESSAGE (OUTPATIENT)
Dept: FAMILY MEDICINE | Facility: CLINIC | Age: 49
End: 2019-07-22

## 2019-07-22 ENCOUNTER — TELEPHONE (OUTPATIENT)
Dept: RHEUMATOLOGY | Facility: CLINIC | Age: 49
End: 2019-07-22

## 2019-07-22 DIAGNOSIS — E66.9 OBESITY (BMI 30-39.9): Primary | ICD-10-CM

## 2019-07-22 DIAGNOSIS — C7A.020 MALIGNANT CARCINOID TUMOR OF APPENDIX: Primary | ICD-10-CM

## 2019-07-22 NOTE — TELEPHONE ENCOUNTER
Left voice message for patient to schedule appointment from referral to Nutrition Services..  Zenon GARZA  [phone]

## 2019-07-23 ENCOUNTER — PATIENT MESSAGE (OUTPATIENT)
Dept: ADMINISTRATIVE | Facility: OTHER | Age: 49
End: 2019-07-23

## 2019-07-23 ENCOUNTER — PATIENT MESSAGE (OUTPATIENT)
Dept: RHEUMATOLOGY | Facility: CLINIC | Age: 49
End: 2019-07-23

## 2019-07-23 ENCOUNTER — PATIENT MESSAGE (OUTPATIENT)
Dept: FAMILY MEDICINE | Facility: CLINIC | Age: 49
End: 2019-07-23

## 2019-07-24 ENCOUNTER — PATIENT OUTREACH (OUTPATIENT)
Dept: OTHER | Facility: OTHER | Age: 49
End: 2019-07-24

## 2019-07-24 ENCOUNTER — OFFICE VISIT (OUTPATIENT)
Dept: FAMILY MEDICINE | Facility: CLINIC | Age: 49
End: 2019-07-24
Payer: COMMERCIAL

## 2019-07-24 ENCOUNTER — HOSPITAL ENCOUNTER (OUTPATIENT)
Dept: RADIOLOGY | Facility: HOSPITAL | Age: 49
Discharge: HOME | End: 2019-07-24
Attending: PHYSICIAN ASSISTANT
Payer: COMMERCIAL

## 2019-07-24 ENCOUNTER — PATIENT MESSAGE (OUTPATIENT)
Dept: FAMILY MEDICINE | Facility: CLINIC | Age: 49
End: 2019-07-24

## 2019-07-24 VITALS
TEMPERATURE: 99 F | DIASTOLIC BLOOD PRESSURE: 98 MMHG | HEART RATE: 112 BPM | BODY MASS INDEX: 45.91 KG/M2 | WEIGHT: 268.94 LBS | SYSTOLIC BLOOD PRESSURE: 158 MMHG | OXYGEN SATURATION: 97 % | HEIGHT: 64 IN

## 2019-07-24 DIAGNOSIS — R74.8 ELEVATED ALKALINE PHOSPHATASE LEVEL: ICD-10-CM

## 2019-07-24 DIAGNOSIS — E66.01 MORBID OBESITY: ICD-10-CM

## 2019-07-24 DIAGNOSIS — I10 ESSENTIAL HYPERTENSION: Primary | ICD-10-CM

## 2019-07-24 DIAGNOSIS — K76.0 FATTY LIVER: ICD-10-CM

## 2019-07-24 DIAGNOSIS — G90.A POTS (POSTURAL ORTHOSTATIC TACHYCARDIA SYNDROME): ICD-10-CM

## 2019-07-24 DIAGNOSIS — E03.9 HYPOTHYROIDISM, UNSPECIFIED TYPE: ICD-10-CM

## 2019-07-24 PROCEDURE — 99999 PR PBB SHADOW E&M-EST. PATIENT-LVL V: ICD-10-PCS | Mod: PBBFAC,,, | Performed by: INTERNAL MEDICINE

## 2019-07-24 PROCEDURE — 99214 PR OFFICE/OUTPT VISIT, EST, LEVL IV, 30-39 MIN: ICD-10-PCS | Mod: S$GLB,,, | Performed by: INTERNAL MEDICINE

## 2019-07-24 PROCEDURE — 76391 MR ELASTOGRAPHY: CPT | Mod: TC

## 2019-07-24 PROCEDURE — 3080F PR MOST RECENT DIASTOLIC BLOOD PRESSURE >= 90 MM HG: ICD-10-PCS | Mod: CPTII,S$GLB,, | Performed by: INTERNAL MEDICINE

## 2019-07-24 PROCEDURE — 3077F SYST BP >= 140 MM HG: CPT | Mod: CPTII,S$GLB,, | Performed by: INTERNAL MEDICINE

## 2019-07-24 PROCEDURE — 3008F BODY MASS INDEX DOCD: CPT | Mod: CPTII,S$GLB,, | Performed by: INTERNAL MEDICINE

## 2019-07-24 PROCEDURE — 3008F PR BODY MASS INDEX (BMI) DOCUMENTED: ICD-10-PCS | Mod: CPTII,S$GLB,, | Performed by: INTERNAL MEDICINE

## 2019-07-24 PROCEDURE — 76376 3D RENDER W/INTRP POSTPROCES: CPT | Mod: 26,,, | Performed by: RADIOLOGY

## 2019-07-24 PROCEDURE — 3080F DIAST BP >= 90 MM HG: CPT | Mod: CPTII,S$GLB,, | Performed by: INTERNAL MEDICINE

## 2019-07-24 PROCEDURE — 3077F PR MOST RECENT SYSTOLIC BLOOD PRESSURE >= 140 MM HG: ICD-10-PCS | Mod: CPTII,S$GLB,, | Performed by: INTERNAL MEDICINE

## 2019-07-24 PROCEDURE — 74181 MRI ABDOMEN W/O CONTRAST: CPT | Mod: 26,,, | Performed by: RADIOLOGY

## 2019-07-24 PROCEDURE — 99214 OFFICE O/P EST MOD 30 MIN: CPT | Mod: S$GLB,,, | Performed by: INTERNAL MEDICINE

## 2019-07-24 PROCEDURE — 76376 3D RENDER W/INTRP POSTPROCES: CPT | Mod: TC

## 2019-07-24 PROCEDURE — 76391 MRI ABDOMEN ELASTOGRAPHY: ICD-10-PCS | Mod: 26,,, | Performed by: RADIOLOGY

## 2019-07-24 PROCEDURE — 99999 PR PBB SHADOW E&M-EST. PATIENT-LVL V: CPT | Mod: PBBFAC,,, | Performed by: INTERNAL MEDICINE

## 2019-07-24 PROCEDURE — 76376 MRI ABDOMEN WITHOUT CONTRAST MRCP: ICD-10-PCS | Mod: 26,,, | Performed by: RADIOLOGY

## 2019-07-24 PROCEDURE — 74181 MRI ABDOMEN W/O CONTRAST: CPT | Mod: TC

## 2019-07-24 PROCEDURE — 74181 MRI ABDOMEN WITHOUT CONTRAST MRCP: ICD-10-PCS | Mod: 26,,, | Performed by: RADIOLOGY

## 2019-07-24 PROCEDURE — 76391 MR ELASTOGRAPHY: CPT | Mod: 26,,, | Performed by: RADIOLOGY

## 2019-07-24 RX ORDER — VALSARTAN 320 MG/1
320 TABLET ORAL DAILY
Qty: 90 TABLET | Refills: 3 | Status: SHIPPED | OUTPATIENT
Start: 2019-07-24 | End: 2019-09-27

## 2019-07-24 NOTE — PROGRESS NOTES
Chief complaint follow-up on blood pressure      49-year-old white female with irritable bowel syndrome and hypothyroidism.  Apparently taken off thyroid supplements while ago and been a year since she had labs.  Lately been having high blood pressure problems. I reviewed interval visits.  She is on digital hypertension.  Her pulse has been running somewhat high as well despite the metoprolol 50 mg 3 times per day.  She is very anxious.  [facility] pots syndrome and hypertension.  Her losartan appears to have been increased to 100 mg a day and she takes the metoprolol 50 mg 3 times per day which was different than our medication list.  We discussed switching the losartan to a better valsartan 320.  We might increase the metoprolol to 100 mg 3 times per day or possibly switch to carvedilol 25 twice a day but I would prefer to increase the metoprolol which she is already on 1st.  We will assess response.  She will message me blood pressure readings and she is also followed by digital hypertension.  She was counseled regarding all these issues and plan at length.  We will send her down for thyroid function testing today.  I reviewed interval lab work.Total time over 25 minutes with over 50% counseling.     She is working as an .       She continues to follow with her GYN and she is up-to-date on mammogram.  She follows with GI as well    ROS:   CONST: weight stable. EYES: no vision change. ENT: no sore throat. CV: no chest pain w/ exertion. RESP: no shortness of breath. GI: no nausea, vomiting, diarrhea. No dysphagia. : no urinary issues. MUSCULOSKELETAL: no new myalgias or arthralgias. SKIN: no new changes. NEURO: no focal deficits. PSYCH: no new issues. ENDOCRINE: no polyuria. HEME: no lymph nodes. ALLERGY: no general pruritis.    PAST MEDICAL HISTORY:                                                        1. Intersitial cystitis.                                                      2. Fibromyalgia.                                                             3. Mitral valve prolapse.                                                    4. IBS. Dr Rowe , now Dr. Mckeon                                                                 5. L-spine disk bulging.  L4-5 -Dr Singh at                                                    6. Total hysterectomy in 2004.   7.  Hypothyroid.                                                             8.  Incidental carcinoid tumor of the appendix.                              9.  Now on gluten-free diet.                                                 10.  Vitamin D deficiency, followed by outside Rheumatology.                  11.  Neurogenic bladder, followed by outside urologist. Ranjeet Vasquez  12.  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 Trigeminal neuralgia of right side of face  14. POTS (postural orthostatic tachycardia syndrome)  15. HTN          PAST SURGICAL HISTORY: Laparotomy x5 Left pelvic mass 12/20/05, pelvic pain 07/31/03, pelvic pain 06/13/02, endometriosis Laparoscopy 10/01,BUBBA and BS&O 04/08/04, Lap cholecystectomy 12/20/05, Appendectomy 12/20/05,Letitia surgery 5/08,Placement of new right InterStim lead and Ablation 1/09, Da Char-assisted lysis of severe adhesions, resection of severe adhesions/possible ovarian remnant.    SOCIAL HISTORY: Never smoked. The patient seldom drinks alcohol. Doesn't use recreational drugs. Lives alone. Single.     FAMILY HISTORY: The patient's family members had Ovarian cancer.No family history of breast cancer. No family history of colon cancer    Vitals as above,   Gen: no distress    Exam otherwise deferred    Kianna was seen today for blood pressure check.    Diagnoses and all orders for this visit:    Essential hypertension, uncontrolled, rule out secondary causes, switched to valsartan and make other adjustments as above in the  "future    Hypothyroidism, unspecified type, reassess  -     TSH; Future  -     T4, free; Future    Morbid obesity    Elevated alkaline phosphatase level, noted and reviewed hepatology notes    Fatty liver    POTS (postural orthostatic tachycardia syndrome) still tachycardic but not having any syncope, may have room to increase beta-blocker    Other orders  -     valsartan (DIOVAN) 320 MG tablet; Take 1 tablet (320 mg total) by mouth once daily.             Clinical no be sensitive based on her expressed anxiety referral to the issues above"This note will not be shared with the patient."  "

## 2019-07-24 NOTE — PROGRESS NOTES
In attempt to complete enrollment call pt disconnected the call after I stated the purpose of the call. Will attempt again in 3 days.      Last 5 Patient Entered Readings                                      Current 30 Day Average: 151/93     Recent Readings 7/23/2019 7/23/2019 7/23/2019 7/23/2019 7/23/2019    SBP (mmHg) 151 151 169 169 150    DBP (mmHg) 94 94 95 95 91    Pulse 107 107 90 90 84

## 2019-07-24 NOTE — LETTER
July 29, 2019     Kianna Zuleta  [address] [facility] [address]       Dear Kianna,    Welcome to [facility]! Our goal is to make care effective, proactive and convenient by using data you send us from home to better treat your chronic conditions.          My name is Liss Bowman, and I am your dedicated Digital Medicine clinician. As an expert in medication management, I will help ensure that the medications you are taking continue to provide the intended benefits and help you reach your goals. You can reach me directly at  or by sending me a message directly through your [facility] account.      I am Cathryn Wellington and I will be your health . My job is to help you identify lifestyle changes to improve your disease control. We will talk about nutrition, exercise, and other ways you may be able to adjust your current habits to better your health. Additionally, we will help ensure you are completing the tests and screenings that are necessary to help manage your conditions. You can reach me directly at [phone] or by sending me a message directly through your [facility] account.    Most importantly, YOU are at the center of this team. Together, we will work to improve your overall health and encourage you to meet your goals for a healthier lifestyle.     What we expect from YOU:  · Please take frequent home blood pressure measurements. We ask that you take at least 1 blood pressure reading per week, but more information will better help us get you know you. Be sure you rest for a few minutes before taking the reading in a quiet, comfortable place.     Be available to receive phone calls or [facility] messages, when appropriate, from your care team. Please let us know if there are any specific days or times that work best for us to reach you via phone.     Complete routine tests and screenings. Dont worry, we will help keep you on track!           What you should expect from your  Digital Medicine Care Team:   We will work with you to create a personalized plan of care and provide you with encouragement and education, including regarding lifestyle changes, that could help you manage your disease states.     We will adjust your current medications, if needed, and continue to monitor your long-term progress.     We will provide you and your physician with monthly progress reports after you have been in the program for more than 30 days.     We will send you reminders through [facility] and text messages to help ensure you do not miss any testing deadlines to help manage your disease states.    You will be able to reach us by phone or through your [facility] account by clicking our names under Care Team on the right side of the home screen.    I look forward to working with you to achieve your blood pressure goals!    We look forward to working with you to help manage your health,    Sincerely,    Your Digital Medicine Team    Please visit our websites to learn more:   · Hypertension: [email or web address]      Remember, we are not available for emergencies. If you have an emergency, please contact your doctors office directly or call [facility] ([phone] or [phone]) or 911.

## 2019-07-25 ENCOUNTER — TELEPHONE (OUTPATIENT)
Dept: HEPATOLOGY | Facility: CLINIC | Age: 49
End: 2019-07-25

## 2019-07-25 NOTE — TELEPHONE ENCOUNTER
----- Message from Eduardo Hammer PA-C sent at 7/25/2019  3:38 PM CDT -----  MRI noting steatosis, MR elastography with F0  F/u in 1 year

## 2019-07-25 NOTE — TELEPHONE ENCOUNTER
MA called patient inform her that her MRI showed no scar on her liver follow up in 1 year. She understood and verbalized understanding.     Recall entered.

## 2019-07-29 NOTE — PROGRESS NOTES
Last 5 Patient Entered Readings                                      Current 30 Day Average: 132/90     Recent Readings 7/28/2019 7/28/2019 7/26/2019 7/26/2019 7/26/2019    SBP (mmHg) 126 126 114 114 114    DBP (mmHg) 85 85 75 75 75    Pulse 79 79 78 78 78        Digital Medicine Enrollment Call    Introduced Ms. Kianna Zuleta to Digital Medicine.     Discussed program expectations and requirements.    Introduced digital medicine care team.     Reviewed the importance of self-monitoring for digital medicine participation.     Reviewed that the Digital Medicine team is not available for emergencies and instructed the patient to call 911 or [facility] ([phone] or [phone]) if one arises. Patient voiced verbal understanding and agrees with goal. Preferences confirmed; demographics confirmed; best contact phone number confirmed.

## 2019-08-01 ENCOUNTER — PATIENT OUTREACH (OUTPATIENT)
Dept: OTHER | Facility: OTHER | Age: 49
End: 2019-08-01

## 2019-08-01 NOTE — PROGRESS NOTES
Encounter opened in error.  Last 5 Patient Entered Readings                                      Current 30 Day Average: 132/88     Recent Readings 7/30/2019 7/30/2019 7/29/2019 7/29/2019 7/28/2019    SBP (mmHg) 157 157 107 107 126    DBP (mmHg) 87 87 74 74 85    Pulse 90 90 74 74 79

## 2019-08-01 NOTE — PROGRESS NOTES
HPI:  Ms. 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 Patient endorses adherence to medication regimen. Patient denies hypotensive s/sx (lightheadedness, dizziness, nausea, fatigue); patient denies hypertensive s/sx (SOB, CP, severe headaches, changes in vision). Instructed patient to seek medical care if BP > 180/110 and is accompanied by hypertensive s/sx associated, patient confirms understanding. 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 She states that she really appreciates valsartan and believes that medication has been controlling her blood pressure. Patient would like to continue to take metoprolol 3 times per day instead of 4 times per day.     Last 5 Patient Entered Readings                                      Current 30 Day Average: 132/88     Recent Readings 7/30/2019 7/30/2019 7/29/2019 7/29/2019 7/28/2019    SBP (mmHg) 157 157 107 107 126    DBP (mmHg) 87 87 74 74 85    Pulse 90 90 74 74 79          Assessment:  Reviewed recent readings. Per 2017 ACC/ AHA HTN guidelines (goal of BP < 130/80), current 30-day average need to be addressed more throroughly today. However, blood pressure has been very well maintained prior to reading on 07/30/19.      Plan:  Continue current medication regimen.  Informed patient on proper BP  measurement techniques; patient verbalizes understanding.   I will continue to monitor regularly and will follow-up in 2, sooner if blood pressure begins to trend upward or downward.     Current medication regimen:  Hypertension Medications             metoprolol tartrate (LOPRESSOR) 50 MG tablet Take 50 mg by mouth 4 (four) times daily. 1 Tablet Oral Three times a day    valsartan (DIOVAN) 320 MG tablet Take 1 tablet (320 mg total) by mouth once daily.          Patient denies having questions or concerns. Patient has my contact information and knows to call with any concerns or clinical changes.

## 2019-08-01 NOTE — PROGRESS NOTES
"Last 5 Patient Entered Readings                                      Current 30 Day Average: 132/88     Recent Readings 7/30/2019 7/30/2019 7/29/2019 7/29/2019 7/28/2019    SBP (mmHg) 157 157 107 107 126    DBP (mmHg) 87 87 74 74 85    Pulse 90 90 74 74 79        Digital Medicine: Health  Introduction    Introduced Mrs. Kianna Zuleta to Digital Medicine. Discussed health  role and recommended lifestyle modifications.    Lifestyle Assessment:  Current Dietary Habits(i.e. low sodium, food labels, dining out): Ms. Hutchison attributes her diet problems to only eating one meal per day. Patient does not add salt or "use very much". She states she is just "not really hungry", especially during the summer time. Yesterday all she ate was a grilled chicken sandwich on white bread. Encouraged patient to eat breakfast, no matter how big, and continue following low-sodium guidelines. Patient states as a child she would "throw up breakfast" if she ate it, no matter what she ate. She will not eat eggs because "it's like eating a chicken period." Will continue working with patient on a regular eating schedule and making low-sodium choices.    Exercise: Ms. Hutchison goes to [employer] 2-3 days per week. She has an apple watch but has not been getting very many steps because "it's so hot outside". Encouraged patient to aim for 1,000 steps higher than her normal average and personalize her settings and she agreed. Patient states she gets "a lot of steps while at work" because she is an . Will continue to encourage activity and work with patient on her step goal once she starts school.     Alcohol/Tobacco: Deferred    Medication Adherence: has been compliant with the medicaiton regimen. Patient does not have questions concerning her BP medication or readings at this time. Will discuss correct BP reading technique with patient at next outreach.    Reviewed AHA/AACE " recommendations:  Limit sodium intake to <2000mg/day  Recommended CHO intake, 45-65% of daily caloric intake  Perform 150 minutes of physical activity per week    Reviewed the importance of self-monitoring, medication adherence, and that the health  can be used as a resource for lifestyle modifications to help reduce or maintain a healthy lifestyle.  Reviewed that the Digital Medicine team is not available for emergencies and instructed the patient to call 911 or [facility] ([phone] or [phone]) if one arises.

## 2019-08-06 ENCOUNTER — PATIENT OUTREACH (OUTPATIENT)
Dept: OTHER | Facility: OTHER | Age: 49
End: 2019-08-06

## 2019-08-06 NOTE — PROGRESS NOTES
HPI:  Called patient to follow up. Patient endorses adherence to medication regimen. Patient denies hypotensive s/sx (lightheadedness, nausea, fatigue); patient denies hypertensive s/sx (SOB, CP, severe headaches, changes in vision). Instructed patient to seek medical care if BP > 180/110 and is accompanied by hypertensive s/sx associated, patient confirms understanding. Patient went to the gym yesterday and worked out more than she typically works out since joining a medical weight loss program. She states that she was feeling a little dizzy but did not pass out or feel nauseated. She denies falling over or passing out. She states that she knows her limits and would like to gradually build up to working out at her maximum potential    Last 5 Patient Entered Readings                                      Current 30 Day Average: 125/84     Recent Readings 8/5/2019 8/5/2019 8/5/2019 8/5/2019 8/2/2019    SBP (mmHg) 66 66 83 83 110    DBP (mmHg) 46 46 58 58 79    Pulse 84 84 78 78 84          Assessment:  Reviewed recent readings. Per 2017 ACC/ AHA HTN guidelines (goal of BP < 130/80), current 30-day average need to be addressed more throroughly today.      Plan:  Continue current medication regimen. She has not yet taken valsartan; informed patient that if BP this evening is less than 90/60 mmHg, then hold valsartan dose and resume tomorrow. Patient verbalizes understanding.  Reviewed proper BP measurement techniques with patient.  Encouraged patient to remain physically active, but to start with mild exercise.  I will continue to monitor regularly and will follow-up in 2 weeks, sooner if blood pressure begins to trend upward or downward.     Current medication regimen:  Hypertension Medications             metoprolol tartrate (LOPRESSOR) 50 MG tablet Take 50 mg by mouth 4 (four) times daily. 1 Tablet Oral Three times a day    valsartan (DIOVAN) 320 MG tablet Take 1 tablet (320 mg total) by mouth once daily.           Patient denies having questions or concerns. Patient has my contact information and knows to call with any concerns or clinical changes.

## 2019-08-08 ENCOUNTER — PATIENT MESSAGE (OUTPATIENT)
Dept: FAMILY MEDICINE | Facility: CLINIC | Age: 49
End: 2019-08-08

## 2019-08-08 ENCOUNTER — PATIENT OUTREACH (OUTPATIENT)
Dept: OTHER | Facility: OTHER | Age: 49
End: 2019-08-08

## 2019-08-08 ENCOUNTER — TELEPHONE (OUTPATIENT)
Dept: FAMILY MEDICINE | Facility: CLINIC | Age: 49
End: 2019-08-08

## 2019-08-08 ENCOUNTER — PATIENT MESSAGE (OUTPATIENT)
Dept: OTHER | Facility: OTHER | Age: 49
End: 2019-08-08

## 2019-08-08 DIAGNOSIS — I10 ESSENTIAL HYPERTENSION: Primary | ICD-10-CM

## 2019-08-08 RX ORDER — INDAPAMIDE 1.25 MG/1
1.25 TABLET ORAL DAILY
Qty: 30 TABLET | Refills: 5 | Status: SHIPPED | OUTPATIENT
Start: 2019-08-08 | End: 2019-10-14 | Stop reason: ALTCHOICE

## 2019-08-08 NOTE — TELEPHONE ENCOUNTER
----- Message from Liss Bowman PharmD sent at 8/8/2019  5:01 PM CDT -----  Varun Celis,    I am the pharmacist that is caring for Ms. Zuleta in the HTN digital medicine program.  I just wanted to inform you that I spoke with her and asked her to hold off on the valsartan 360 mg for now and ordered low dose indapamide 1.25 mg to pull off fluid that she reports to have. She will get labs done in 2 weeks.    Liss Bowman PharmD  Digital Medicine Care Team  [phone]

## 2019-08-08 NOTE — PROGRESS NOTES
HPI:  Patient endorses adherence to medication regimen, when blood pressure is >90/60 mmHg. Patient denies hypotensive s/sx (lightheadedness, dizziness, nausea, fatigue); patient denies hypertensive s/sx (SOB, CP, severe headaches, changes in vision). Instructed patient to seek medical care if BP > 180/110 and is accompanied by hypertensive s/sx associated, patient confirms understanding. Patient called in response to message regarding low BP readings. She reports that she is unsure why her blood pressure has been dropping so low since she did not work out last night. She reports that she felt lightheaded at the time and is unsure if she took valsartan 360 mg when she noticed her blood pressure come back up last night. 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 She spoke with Dr. Celis and also states that she woke up feeling fluid overloaded with hand swelling and redness around her eyes. She denies changes in her diet or at home routine that would cause an allergy.     Last 5 Patient Entered Readings                                      Current 30 Day Average: 121/80     Recent Readings 8/7/2019 8/7/2019 8/7/2019 8/7/2019 8/7/2019    SBP (mmHg) 104 104 70 70 75    DBP (mmHg) 70 70 45 45 46    Pulse 84 84 81 81 83          Assessment:  Reviewed recent readings. Per 2017 ACC/ AHA HTN guidelines (goal of BP < 130/80), current 30-day average is well controlled, however patient has been having several instances of hypotension.     Plan:  Stopped valsartan 360 mg for now and ordered indapamide 1.25 mg PO daily.   Educated patient on medication side effects and proper BP measurement techniques. Informed her that if BP is <90/60 mmHg to skip one dose of metoprolol. Patient verbalizes understanding.  Ordered BMP labs to be taken on August 23, 2019.  I will continue to monitor regularly and will follow-up in 2 weeks, sooner if blood pressure begins to trend upward or  downward.     Current medication regimen:  Hypertension Medications             indapamide (LOZOL) 1.25 MG Tab Take 1 tablet (1.25 mg total) by mouth once daily.    metoprolol tartrate (LOPRESSOR) 50 MG tablet Take 1 Tablet Oral Three times a day    valsartan (DIOVAN) 320 MG tablet Take 1 tablet (320 mg total) by mouth once daily.          Patient denies having questions or concerns. Patient has my contact information and knows to call with any concerns or clinical changes.

## 2019-08-14 ENCOUNTER — PATIENT OUTREACH (OUTPATIENT)
Dept: OTHER | Facility: OTHER | Age: 49
End: 2019-08-14

## 2019-08-14 ENCOUNTER — PATIENT MESSAGE (OUTPATIENT)
Dept: OTHER | Facility: OTHER | Age: 49
End: 2019-08-14

## 2019-08-14 NOTE — PROGRESS NOTES
"HPI:  Called patient to follow up. Patient endorses adherence to medication regimen. Patient denies hypotensive s/sx (lightheadedness, dizziness, nausea, fatigue); patient denies hypertensive s/sx (SOB, CP, severe headaches, changes in vision). Instructed patient to seek medical care if BP > 180/110 and is accompanied by hypertensive s/sx associated, patient confirms understanding. Patient called to discuss elevated BP readings. She states that she is unsure why here BP is higher since she has not changed her diet recently. She reports that she is "still feeling a little puffy" since starting indapamide last week.    Last 5 Patient Entered Readings                                      Current 30 Day Average: 125/84     Recent Readings 8/13/2019 8/13/2019 8/13/2019 8/13/2019 8/12/2019    SBP (mmHg) 162 162 173 173 159    DBP (mmHg) 114 114 101 101 101    Pulse 82 82 79 79 101          Assessment:  Reviewed recent readings. Per 2017 ACC/ AHA HTN guidelines (goal of BP < 130/80), current 30-day average need to be addressed more throroughly today (125/85 mmHg) however most recent BG readings are above goal.     Plan:  Continue current medication regimen.   Informed patient that she can go ahead and take one dose of valsartan this evening with metoprolol and check her BP about 1 hour afterward. Will follow up with patient regarding further instructions. Patient verbalizes understanding.  I will continue to monitor regularly and will follow-up in 2 weeks, sooner if blood pressure begins to trend upward or downward.     Current medication regimen:  Hypertension Medications             indapamide (LOZOL) 1.25 MG Tab Take 1 tablet (1.25 mg total) by mouth once daily.    metoprolol tartrate (LOPRESSOR) 50 MG tablet Take 50 mg by mouth 4 (four) times daily. 1 Tablet Oral Three times a day    valsartan (DIOVAN) 320 MG tablet Take 1 tablet (320 mg total) by mouth once daily.          Patient denies having questions or concerns. " Patient has my contact information and knows to call with any concerns or clinical changes.

## 2019-08-15 ENCOUNTER — PATIENT MESSAGE (OUTPATIENT)
Dept: OTHER | Facility: OTHER | Age: 49
End: 2019-08-15

## 2019-08-16 ENCOUNTER — PATIENT MESSAGE (OUTPATIENT)
Dept: OTHER | Facility: OTHER | Age: 49
End: 2019-08-16

## 2019-08-23 ENCOUNTER — LAB VISIT (OUTPATIENT)
Dept: LAB | Facility: HOSPITAL | Age: 49
End: 2019-08-23
Attending: INTERNAL MEDICINE
Payer: COMMERCIAL

## 2019-08-23 DIAGNOSIS — I10 ESSENTIAL HYPERTENSION: ICD-10-CM

## 2019-08-23 LAB
ANION GAP SERPL CALC-SCNC: 8 MMOL/L (ref 8–16)
BUN SERPL-MCNC: 15 MG/DL (ref 6–20)
CALCIUM SERPL-MCNC: 9.5 MG/DL (ref 8.7–10.5)
CHLORIDE SERPL-SCNC: 102 MMOL/L (ref 95–110)
CO2 SERPL-SCNC: 28 MMOL/L (ref 23–29)
CREAT SERPL-MCNC: 0.9 MG/DL (ref 0.5–1.4)
EST. GFR  (AFRICAN AMERICAN): >60 ML/MIN/1.73 M^2
EST. GFR  (NON AFRICAN AMERICAN): >60 ML/MIN/1.73 M^2
GLUCOSE SERPL-MCNC: 105 MG/DL (ref 70–110)
POTASSIUM SERPL-SCNC: 3.9 MMOL/L (ref 3.5–5.1)
SODIUM SERPL-SCNC: 138 MMOL/L (ref 136–145)

## 2019-08-23 PROCEDURE — 36415 COLL VENOUS BLD VENIPUNCTURE: CPT | Mod: PO

## 2019-08-23 PROCEDURE — 80048 BASIC METABOLIC PNL TOTAL CA: CPT

## 2019-08-27 ENCOUNTER — PATIENT MESSAGE (OUTPATIENT)
Dept: OTHER | Facility: OTHER | Age: 49
End: 2019-08-27

## 2019-08-29 ENCOUNTER — PATIENT OUTREACH (OUTPATIENT)
Dept: OTHER | Facility: OTHER | Age: 49
End: 2019-08-29

## 2019-08-29 NOTE — PROGRESS NOTES
Last 5 Patient Entered Readings                                      Current 30 Day Average: 125/81     Recent Readings 8/28/2019 8/28/2019 8/24/2019 8/24/2019 8/21/2019    SBP (mmHg) 111 111 101 101 157    DBP (mmHg) 66 66 68 68 98    Pulse 94 94 78 78 85      Digital Medicine: Health  Follow Up  Left voicemail to follow up with Mrs. Kianna Zuleta.  Current BP average 125/81 mmHg is nearly at goal <130/80

## 2019-08-29 NOTE — PROGRESS NOTES
"HPI:  Called patient to follow up. Patient endorses adherence to medication regimen. Patient denies hypotensive s/sx (lightheadedness, dizziness, nausea, fatigue); patient denies hypertensive s/sx (SOB, CP, severe headaches, changes in vision). Instructed patient to seek medical care if BP > 180/110 and is accompanied by hypertensive s/sx associated, patient confirms understanding. Patient reports that she has been getting eye floaters about 1 week ago but has not spoke with her optometrist about this issue. She denies feeling dizzy since last encounter however she states that metoprolol makes her feel "a little tired at school" after she takes her noon dose. She is questioning the need for three times a day dosing.      Last 5 Patient Entered Readings                                      Current 30 Day Average: 125/81     Recent Readings 8/28/2019 8/28/2019 8/24/2019 8/24/2019 8/21/2019    SBP (mmHg) 111 111 101 101 157    DBP (mmHg) 66 66 68 68 98    Pulse 94 94 78 78 85        Assessment:  Reviewed recent readings. Per 2017 ACC/ AHA HTN guidelines (goal of BP < 130/80), current 30-day average needs to be addressed more thoroughly today.     Plan:  Continue current medication regimen. Will address tapering afternoon dose of metoprolol with continued evaluation of BP readings with controlled HR.  Encouraged patient to check BP at least 1 hour after taking evening dose of metoprolol and valsartan. She verbalizes understanding.   I will continue to monitor regularly and will follow-up in 2 to 3 weeks, sooner if blood pressure begins to trend upward or downward.     Current medication regimen:  Hypertension Medications             indapamide (LOZOL) 1.25 MG Tab Take 1 tablet (1.25 mg total) by mouth once daily.    metoprolol tartrate (LOPRESSOR) 50 MG tablet Take 50 mg by mouth, 1 Tablet Oral Three times a day    valsartan (DIOVAN) 320 MG tablet Take 1 tablet (320 mg total) by mouth once daily.          Patient denies " having questions or concerns. Patient has my contact information and knows to call with any concerns or clinical changes.

## 2019-09-06 ENCOUNTER — PATIENT MESSAGE (OUTPATIENT)
Dept: ADMINISTRATIVE | Facility: OTHER | Age: 49
End: 2019-09-06

## 2019-09-06 ENCOUNTER — PATIENT MESSAGE (OUTPATIENT)
Dept: OTHER | Facility: OTHER | Age: 49
End: 2019-09-06

## 2019-09-06 NOTE — PROGRESS NOTES
Last 5 Patient Entered Readings                                      Current 30 Day Average: 124/79     Recent Readings 9/5/2019 9/5/2019 9/4/2019 9/4/2019 8/30/2019    SBP (mmHg) 67 67 166 166 113    DBP (mmHg) 40 40 105 105 76    Pulse 76 76 93 93 75      Digital Medicine: Health  Follow Up  Left voicemail to follow up with Mrs. Kianna Zuleta.  Current BP average 124/79 mmHg is at goal <130/80  2nd failed attempt

## 2019-09-09 ENCOUNTER — TELEPHONE (OUTPATIENT)
Dept: RHEUMATOLOGY | Facility: CLINIC | Age: 49
End: 2019-09-09

## 2019-09-12 ENCOUNTER — PATIENT MESSAGE (OUTPATIENT)
Dept: RHEUMATOLOGY | Facility: CLINIC | Age: 49
End: 2019-09-12

## 2019-09-12 ENCOUNTER — PATIENT MESSAGE (OUTPATIENT)
Dept: FAMILY MEDICINE | Facility: CLINIC | Age: 49
End: 2019-09-12

## 2019-09-12 RX ORDER — ALBUTEROL SULFATE 90 UG/1
2 AEROSOL, METERED RESPIRATORY (INHALATION) EVERY 6 HOURS PRN
Qty: 18 G | Refills: 12 | Status: SHIPPED | OUTPATIENT
Start: 2019-09-12 | End: 2020-07-17

## 2019-09-12 RX ORDER — ALBUTEROL SULFATE 0.83 MG/ML
2.5 SOLUTION RESPIRATORY (INHALATION) EVERY 6 HOURS PRN
Qty: 1 BOX | Refills: 12 | Status: SHIPPED | OUTPATIENT
Start: 2019-09-12 | End: 2019-11-26

## 2019-09-13 NOTE — PROGRESS NOTES
"Digital Medicine: Health  Follow-Up    Patient has bronchitis and agreed to stay hydrated while sick.  Patient states her BP drops very low towards the end of the day and she "feels horrible" when it does. Patient is unsure what's causing the BP drops but is aware of hypotensive treatments.        Hypertension             Diet:   Patient reports eating or drinking the following: water    Patient states she drinks "water all day long" and never skips meals while at work.  Patient confirms that she has already been informed to drink water and eat a salty snack when BP drops low but did not confirm she has been doing it.   Patient agreed to try and have a scheduled daily snack to prevent drop in BP.     Physical Activity:       Patient acknowledges she "hasn't been going to the gym as much" as she should be.     Assigning the following patient goal(s): increase physical activity      SDOH Deferred    INTERVENTION(S)  recommended diet modifications, recommend physical activity, encouragement/support and goal setting    PLAN  patient verbalizes understanding and demonstrates understanding via teach back      There are no preventive care reminders to display for this patient.    Last 5 Patient Entered Readings                                      Current 30 Day Average: 117/72     Recent Readings 9/12/2019 9/12/2019 9/9/2019 9/9/2019 9/8/2019    SBP (mmHg) 129 129 154 154 80    DBP (mmHg) 81 81 104 104 51    Pulse 86 86 92 92 71                "

## 2019-09-18 ENCOUNTER — PATIENT MESSAGE (OUTPATIENT)
Dept: OTHER | Facility: OTHER | Age: 49
End: 2019-09-18

## 2019-09-18 ENCOUNTER — PATIENT MESSAGE (OUTPATIENT)
Dept: ADMINISTRATIVE | Facility: OTHER | Age: 49
End: 2019-09-18

## 2019-09-19 ENCOUNTER — PATIENT OUTREACH (OUTPATIENT)
Dept: OTHER | Facility: OTHER | Age: 49
End: 2019-09-19

## 2019-09-19 NOTE — PROGRESS NOTES
Digital Medicine: Clinician Follow-Up    Patient endorses adherence to medication regimen. Patient denies hypotensive s/sx (lightheadedness, dizziness, nausea, fatigue); patient denies hypertensive s/sx (SOB, CP, severe headaches, changes in vision). Instructed patient to seek medical care if BP > 180/110 and is accompanied by hypertensive s/sx associated, patient confirms understanding. She states that she is unsure why her blood pressure drops so low. She states that she is no longer having the swelling on her feet and hands. She reports to remain hydrated while taking diuretic. She has paused going to the gym for some time due to her low BP readings.        The history is provided by the patient. No  was used.     Follow Up  Follow-up reason(s): reading review      Alert received.   Care Team received low BP alert.  Patient is experiencing symptomsof hypotension.    Medication Change: stop therapy      Last 5 Patient Entered Readings                                      Current 30 Day Average: 106/65     Recent Readings 9/18/2019 9/18/2019 9/17/2019 9/17/2019 9/17/2019    SBP (mmHg) 68 68 68 68 62    DBP (mmHg) 48 48 40 40 47    Pulse 84 84 76 76 79            Sleep Apnea  Patient not previously diagnosed with LUIS and     Medication Affordability  Patient is currently not having problems affording medications    Medication Adherence:   She misses doses: never    Patient is not selectively taking diuretics.    She does not wonder if medications are working.  She knows purpose of medications.      Patient identified the following reasons for non-compliance: none    Adherence tools used: none    Assessment:  Reviewed recent readings. Per 2017 ACC/ AHA HTN guidelines (goal of BP < 130/80), current 30-day average is well controlled.            INTERVENTION(S)  reviewed appropriate dose schedule, reviewed monitoring technique, recommended med change, encouragement/support and goal  setting    PLAN  patient verbalizes understanding, demonstrates understanding via teach back, patient amenable to changes and additional monitoring needed    Hold valsartan 320 mg until readings are further evaluated. Informed patient to call if she experiences symptoms of hypertension or hypotension. Reviewed proper BP measurement techniques and informed patient to charge BP cuff base at least once monthly. I will continue to monitor regularly and will follow-up in 2 to 3 weeks, sooner if blood pressure begins to trend upward or downward.       There are no preventive care reminders to display for this patient.    Hypertension Medications             indapamide (LOZOL) 1.25 MG Tab Take 1 tablet (1.25 mg total) by mouth once daily.    metoprolol tartrate (LOPRESSOR) 50 MG tablet Take 50 mg by mouth 4 (four) times daily. 1 Tablet Oral Three times a day    valsartan (DIOVAN) 320 MG tablet Take 1 tablet (320 mg total) by mouth once daily.

## 2019-09-24 ENCOUNTER — PATIENT MESSAGE (OUTPATIENT)
Dept: FAMILY MEDICINE | Facility: CLINIC | Age: 49
End: 2019-09-24

## 2019-09-24 ENCOUNTER — PATIENT MESSAGE (OUTPATIENT)
Dept: RHEUMATOLOGY | Facility: CLINIC | Age: 49
End: 2019-09-24

## 2019-09-25 ENCOUNTER — PATIENT MESSAGE (OUTPATIENT)
Dept: FAMILY MEDICINE | Facility: CLINIC | Age: 49
End: 2019-09-25

## 2019-09-25 NOTE — TELEPHONE ENCOUNTER
pt. Messaged back and she is not having any symptoms this was just to let you know what her high blood pressure read was 188/1008. Her messages are not connected

## 2019-09-27 ENCOUNTER — PATIENT OUTREACH (OUTPATIENT)
Dept: OTHER | Facility: OTHER | Age: 49
End: 2019-09-27

## 2019-09-27 DIAGNOSIS — I10 ESSENTIAL HYPERTENSION: Primary | ICD-10-CM

## 2019-09-27 RX ORDER — VALSARTAN 320 MG/1
160 TABLET ORAL DAILY
Qty: 30 TABLET | Refills: 3
Start: 2019-09-27 | End: 2019-10-21

## 2019-09-27 NOTE — PROGRESS NOTES
Digital Medicine: Clinician Follow-Up    HPI:  Called patient to follow up. Patient endorses adherence to medication regimen. Patient denies hypotensive s/sx (lightheadedness, dizziness, nausea, fatigue); patient denies hypertensive s/sx (SOB, CP, severe headaches, changes in vision). Instructed patient to seek medical care if BP > 180/110 and is accompanied by hypertensive s/sx associated, patient confirms understanding.           The history is provided by the patient. No  was used.     Last 5 Patient Entered Readings                                      Current 30 Day Average: 106/67     Recent Readings 9/26/2019 9/26/2019 9/25/2019 9/25/2019 9/25/2019    SBP (mmHg) 179 179 92 92 177    DBP (mmHg) 98 98 63 63 95    Pulse 103 103 76 76 94            Sleep Apnea  Patient not previously diagnosed with LUIS and     Medication Affordability  Patient is currently not having problems affording medications    Medication Adherence:   She misses doses: never    Patient is not selectively taking diuretics.    She does not wonder if medications are working.  She knows purpose of medications.      Patient identified the following reasons for non-compliance: none    Adherence tools used: none    Assessment:  Reviewed recent readings. Per 2017 ACC/ AHA HTN guidelines (goal of BP < 130/80), current 30-day average is well controlled.         INTERVENTION(S)  reviewed appropriate dose schedule, reviewed monitoring technique, recommended med change, encouragement/support and goal setting    PLAN  patient verbalizes understanding, demonstrates understanding via teach back, patient amenable to changes and additional monitoring needed    Continue current medication regimen. Initiate valsartan 160 mg daily. Patient has valsartan 320 mg at home and has been instructed to break in half. Encouraged patient to check BP in the evening and 1 hour after taking valsartan. Reviewed that patient should not take valsartan prior  to working out. Patient has history of POTS and is currently on a beta blocker which has been proven beneficial for POTS control. I will continue to monitor regularly and will follow-up in 2 to 3 weeks, sooner if blood pressure begins to trend upward or downward.         There are no preventive care reminders to display for this patient.      Hypertension Medications             indapamide (LOZOL) 1.25 MG Tab Take 1 tablet (1.25 mg total) by mouth once daily.    metoprolol tartrate (LOPRESSOR) 50 MG tablet Take 50 mg by mouth 4 (four) times daily. 1 Tablet Oral Three times a day    valsartan (DIOVAN) 320 MG tablet Take 1 tablet (320 mg total) by mouth once daily.

## 2019-09-29 RX ORDER — ERGOCALCIFEROL 1.25 MG/1
CAPSULE ORAL
Qty: 12 CAPSULE | Refills: 3 | Status: SHIPPED | OUTPATIENT
Start: 2019-09-29 | End: 2020-08-23

## 2019-10-07 NOTE — TELEPHONE ENCOUNTER
----- Message from Bere Mendenhall RN sent at 10/3/2017  3:41 PM CDT -----  Contact: Patient      ----- Message -----  From: Abby Singer  Sent: 10/3/2017   3:37 PM  To: [facility] Staff    X _1st Request  _  2nd Request  _  3rd Request    Who:BLANCA GUTHRIE [[id number]]    Why:Patient is requesting orders be placed in the system for her annual mammogram     What Number to Call Back:[phone]    When to Expect a call back: (Before the end of the day)   -- if call after 3:00 call back will be tomorrow.       rolling walker

## 2019-10-11 ENCOUNTER — PATIENT OUTREACH (OUTPATIENT)
Dept: OTHER | Facility: OTHER | Age: 49
End: 2019-10-11

## 2019-10-14 ENCOUNTER — PATIENT OUTREACH (OUTPATIENT)
Dept: OTHER | Facility: OTHER | Age: 49
End: 2019-10-14

## 2019-10-14 NOTE — PROGRESS NOTES
Digital Medicine: Clinician Follow-Up    Called patient to follow up. Patient endorses adherence to medication regimen. Patient denies hypotensive s/sx (lightheadedness, dizziness, nausea, fatigue); patient denies hypertensive s/sx (SOB, CP, severe headaches, changes in vision). Instructed patient to seek medical care if BP > 180/110 and is accompanied by hypertensive s/sx associated, patient confirms understanding. She reports that she is seeing her cardiologist this Friday who is an outside provider. She denies any continuous fluid build up in her hands.       The history is provided by the patient. No  was used.     Follow Up  Follow-up reason(s): routine education      Medication Change: stop therapy      Last 5 Patient Entered Readings                                      Current 30 Day Average: 115/76     Recent Readings 10/12/2019 10/12/2019 10/11/2019 10/11/2019 10/10/2019    SBP (mmHg) 104 104 74 74 103    DBP (mmHg) 69 69 46 46 43    Pulse 87 87 71 71 86            Sleep Apnea  Patient not previously diagnosed with LUIS and     Medication Affordability  Patient is currently not having problems affording medications    Medication Adherence:   She misses doses: never    Patient is not selectively taking diuretics.    She does not wonder if medications are working.  She knows purpose of medications.      Patient identified the following reasons for non-compliance: none    Adherence tools used: none    Assessment:  Reviewed recent readings. Per 2017 ACC/ AHA HTN guidelines (goal of BP < 130/80), current 30-day average is well controlled.        INTERVENTION(S)  reviewed appropriate dose schedule, reviewed monitoring technique, recommended med change, encouragement/support and goal setting    PLAN  patient verbalizes understanding, demonstrates understanding via teach back and patient amenable to changes    Continue current medication regimen. Discontinue indapamide given that patient's hand  swelling has resolved. Thiazide diuretic lowers DBP and patient's DBP is currently below goal. Patient has POTS and is on adequate therapy for condition. I will continue to monitor regularly and will follow-up in ~3 weeks, sooner if blood pressure begins to trend upward or downward.       There are no preventive care reminders to display for this patient.    Hypertension Medications             metoprolol tartrate (LOPRESSOR) 50 MG tablet Take 50 mg by mouth 4 (four) times daily. 1 Tablet Oral Three times a day    valsartan (DIOVAN) 320 MG tablet Take 0.5 tablets (160 mg total) by mouth once daily.

## 2019-10-21 ENCOUNTER — PATIENT OUTREACH (OUTPATIENT)
Dept: OTHER | Facility: OTHER | Age: 49
End: 2019-10-21

## 2019-10-21 DIAGNOSIS — I10 ESSENTIAL HYPERTENSION: ICD-10-CM

## 2019-10-21 RX ORDER — INDAPAMIDE 1.25 MG/1
1.25 TABLET ORAL DAILY
Qty: 30 TABLET | Refills: 3
Start: 2019-10-21 | End: 2020-01-21

## 2019-10-21 RX ORDER — VALSARTAN 40 MG/1
40 TABLET ORAL EVERY 12 HOURS
Qty: 30 TABLET | Refills: 3
Start: 2019-10-21 | End: 2020-07-29 | Stop reason: SDUPTHER

## 2019-10-21 NOTE — PROGRESS NOTES
"Digital Medicine: Clinician Follow-Up    Called patient to follow up. Patient endorses adherence to medication regimen. Patient denies hypotensive s/sx (lightheadedness, dizziness, nausea, fatigue); patient denies hypertensive s/sx (SOB, CP, severe headaches, changes in vision). Instructed patient to seek medical care if BP > 180/110 and is accompanied by hypertensive s/sx associated, patient confirms understanding. Patient messaged over the weekend that she has been "puffy" without indapamide. She would like to re-initiate to help control her symptoms. She reports that her cardiologist decreased her valsartan dose to 40 mg BID.           The history is provided by the patient. No  was used.     Follow Up  Follow-up reason(s): routine education      Medication Change: new med      Last 5 Patient Entered Readings                                      Current 30 Day Average: 123/82     Recent Readings 10/20/2019 10/20/2019 10/18/2019 10/18/2019 10/17/2019    SBP (mmHg) 116 116 118 118 114    DBP (mmHg) 79 79 61 61 77    Pulse 90 90 85 85 92            Sleep Apnea  Patient not previously diagnosed with LUIS and     Medication Affordability  Patient is currently not having problems affording medications    Medication Adherence:   She misses doses: never    Patient is not selectively taking diuretics.    She does not wonder if medications are working.  She knows purpose of medications.      Patient identified the following reasons for non-compliance: none    Adherence tools used: none    Assessment:  Reviewed recent readings. Per 2017 ACC/ AHA HTN guidelines (goal of BP < 130/80), current 30-day average is well controlled.       INTERVENTION(S)  reviewed appropriate dose schedule, reviewed monitoring technique and encouragement/support    PLAN  patient verbalizes understanding    Continue current medication regimen. Re-initiated indapamide 1.25 mg daily. Reviewed proper BP measurement techniques. " Informed patient if BP drops below 90/60 mmHg, to skip dose of indapamide. I will continue to monitor regularly and will follow-up in 2 to 3 weeks, sooner if blood pressure begins to trend upward or downward.     There are no preventive care reminders to display for this patient.    Hypertension Medications             indapamide (LOZOL) 1.25 MG Tab Take 1 tablet (1.25 mg total) by mouth once daily.    metoprolol tartrate (LOPRESSOR) 50 MG tablet Take 50 mg by mouth 4 (four) times daily. 1 Tablet Oral Three times a day    valsartan (DIOVAN) 40 MG tablet Take 1 tablet (40 mg total) by mouth every 12 (twelve) hours.

## 2019-11-05 NOTE — PROGRESS NOTES
"Digital Medicine: Health  Follow-Up    Patient is still having respiratory issues but she has been to the doctor and is on medication for her illness.  Patient states her BP is still higher during the day and lower in the evening, "especially after exercising." Patient feels flushed or fatigued during the day when BP is high.    The history is provided by the patient.         Will e-mail resources on IBS dietary habits in order to increase the amount of meals patient is eating. Patient plans on focusing on having breakfast everyday.    There are no preventive care reminders to display for this patient.    Last 5 Patient Entered Readings                                      Current 30 Day Average: 126/81     Recent Readings 10/30/2019 10/30/2019 10/28/2019 10/28/2019 10/26/2019    SBP (mmHg) 133 133 155 155 161    DBP (mmHg) 81 81 90 90 102    Pulse 89 89 97 97 79              Diet Screening   Breakfast is typically between. Usually nothing, sometimes cantaloup or a cheese stick.  Lunch is typically between. Turkey sandwich (no cheese) on white bread.    Dinner is typically between. None.    Snacks are typically. Cheese stick, nuts.    Patient reports eating or drinking the following: no eggs    Patient suffers from IBS and often "doesn't feel like eating." Patient is snacking on things like [employer] black bean brownie before exercising and states it does not help to prevent her BP from dropping.  Stressed the importance of eating consistent meals and encouraged patient try to eat a bigger breakfast. Patient agreed.    Intervention(s): regularly scheduling meals and reducing sodium intake    Assigning the following patient goals: maintain low sodium diet    Physical Activity Screening   When asked if exercising, patient responded: yes    "

## 2019-11-15 ENCOUNTER — PATIENT OUTREACH (OUTPATIENT)
Dept: OTHER | Facility: OTHER | Age: 49
End: 2019-11-15

## 2019-11-15 NOTE — PROGRESS NOTES
11/15/19 4:02 PM - Called patient and left voicemail with call back number. Will follow up with patient at next encounter.   12/05/19 5:51 PM - Called patient and left voicemail with call back number. Will follow up with patient at next encounter.

## 2019-11-26 ENCOUNTER — OFFICE VISIT (OUTPATIENT)
Dept: RHEUMATOLOGY | Facility: CLINIC | Age: 49
End: 2019-11-26
Payer: COMMERCIAL

## 2019-11-26 VITALS
DIASTOLIC BLOOD PRESSURE: 93 MMHG | WEIGHT: 278.25 LBS | HEART RATE: 91 BPM | HEIGHT: 64 IN | SYSTOLIC BLOOD PRESSURE: 152 MMHG | BODY MASS INDEX: 47.5 KG/M2

## 2019-11-26 DIAGNOSIS — C7A.020 MALIGNANT CARCINOID TUMOR OF APPENDIX: ICD-10-CM

## 2019-11-26 DIAGNOSIS — R74.8 ELEVATED ALKALINE PHOSPHATASE LEVEL: ICD-10-CM

## 2019-11-26 DIAGNOSIS — R79.82 CRP ELEVATED: ICD-10-CM

## 2019-11-26 DIAGNOSIS — E03.4 HYPOTHYROIDISM DUE TO ACQUIRED ATROPHY OF THYROID: ICD-10-CM

## 2019-11-26 DIAGNOSIS — M60.9 MYOSITIS, UNSPECIFIED MYOSITIS TYPE, UNSPECIFIED SITE: ICD-10-CM

## 2019-11-26 DIAGNOSIS — G50.0 TRIGEMINAL NEURALGIA OF RIGHT SIDE OF FACE: ICD-10-CM

## 2019-11-26 DIAGNOSIS — R70.0 ELEVATED SED RATE: ICD-10-CM

## 2019-11-26 DIAGNOSIS — E66.01 MORBID OBESITY: ICD-10-CM

## 2019-11-26 DIAGNOSIS — D80.3 IGG DEFICIENCY: ICD-10-CM

## 2019-11-26 DIAGNOSIS — E55.9 VITAMIN D DEFICIENCY: Primary | ICD-10-CM

## 2019-11-26 PROBLEM — L30.9 ECZEMA: Status: RESOLVED | Noted: 2017-12-19 | Resolved: 2019-11-26

## 2019-11-26 PROCEDURE — 3008F BODY MASS INDEX DOCD: CPT | Mod: CPTII,S$GLB,, | Performed by: INTERNAL MEDICINE

## 2019-11-26 PROCEDURE — 3080F DIAST BP >= 90 MM HG: CPT | Mod: CPTII,S$GLB,, | Performed by: INTERNAL MEDICINE

## 2019-11-26 PROCEDURE — 99999 PR PBB SHADOW E&M-EST. PATIENT-LVL IV: CPT | Mod: PBBFAC,,, | Performed by: INTERNAL MEDICINE

## 2019-11-26 PROCEDURE — 99214 PR OFFICE/OUTPT VISIT, EST, LEVL IV, 30-39 MIN: ICD-10-PCS | Mod: S$GLB,,, | Performed by: INTERNAL MEDICINE

## 2019-11-26 PROCEDURE — 99999 PR PBB SHADOW E&M-EST. PATIENT-LVL IV: ICD-10-PCS | Mod: PBBFAC,,, | Performed by: INTERNAL MEDICINE

## 2019-11-26 PROCEDURE — 3080F PR MOST RECENT DIASTOLIC BLOOD PRESSURE >= 90 MM HG: ICD-10-PCS | Mod: CPTII,S$GLB,, | Performed by: INTERNAL MEDICINE

## 2019-11-26 PROCEDURE — 3008F PR BODY MASS INDEX (BMI) DOCUMENTED: ICD-10-PCS | Mod: CPTII,S$GLB,, | Performed by: INTERNAL MEDICINE

## 2019-11-26 PROCEDURE — 3077F PR MOST RECENT SYSTOLIC BLOOD PRESSURE >= 140 MM HG: ICD-10-PCS | Mod: CPTII,S$GLB,, | Performed by: INTERNAL MEDICINE

## 2019-11-26 PROCEDURE — 99214 OFFICE O/P EST MOD 30 MIN: CPT | Mod: S$GLB,,, | Performed by: INTERNAL MEDICINE

## 2019-11-26 PROCEDURE — 3077F SYST BP >= 140 MM HG: CPT | Mod: CPTII,S$GLB,, | Performed by: INTERNAL MEDICINE

## 2019-11-26 RX ORDER — PROMETHAZINE HYDROCHLORIDE AND PHENYLEPHRINE HYDROCHLORIDE 6.25; 5 MG/5ML; MG/5ML
SYRUP ORAL
COMMUNITY
End: 2020-07-08

## 2019-11-26 RX ORDER — TIZANIDINE HYDROCHLORIDE 4 MG/1
CAPSULE, GELATIN COATED ORAL
Refills: 0 | COMMUNITY
Start: 2019-11-21 | End: 2024-02-20

## 2019-11-26 ASSESSMENT — ROUTINE ASSESSMENT OF PATIENT INDEX DATA (RAPID3)
MDHAQ FUNCTION SCORE: .6
PSYCHOLOGICAL DISTRESS SCORE: 2.2
FATIGUE SCORE: 4
AM STIFFNESS SCORE: 1, YES
PAIN SCORE: 6
PATIENT GLOBAL ASSESSMENT SCORE: 6
TOTAL RAPID3 SCORE: 4.67

## 2019-11-26 NOTE — PROGRESS NOTES
Rapid3 Question Responses and Scores 11/25/2019   MDHAQ Score 0.6   Psychologic Score 2.2   Pain Score 6   When you awakened in the morning OVER THE LAST WEEK, did you feel stiff? Yes   If Yes, please indicate the number of hours until you are as limber as you will be for the day 4   Fatigue Score 4   Global Health Score 6   RAPID3 Score 4.66       Answers for HPI/ROS submitted by the patient on 11/25/2019   fever: No  eye redness: No  headaches: No  shortness of breath: No  chest pain: No  trouble swallowing: No  diarrhea: Yes  constipation: No  unexpected weight change: No  genital sore: No  dysuria: No  During the last 3 days, have you had a skin rash?: No  Bruises or bleeds easily: Yes  cough: Yes

## 2019-11-26 NOTE — PROGRESS NOTES
"Subjective:       Patient ID: Kianna Zuleta is a 49 y.o. female.    Chief Complaint: Disease Management    Mrs. Zuleta is a 50 y/o woman with PMHx of POTs, IBS, Hx of carcinoid tumor and CVID presenting for a 6 mo follow up. She is doing well. Still getting sinus infections about once a month but does feel improved since being on the IgG and follow with Immunology. No more fevers. She has been following with Cardiology and they think she might have POTs and they are trying different medications. She is still taking Gabapentin for back pain. She is working with her primary care doctor on weight loss. Not getting any exercise right now. She saw Hepatology and they are not concerned about the the Alk Phos level being elevated and suggested monitoring for now.    She denies fever, weight loss, dry eyes or mouth, photosensitivity, rash, ulcer, raynaud's phenomenon, alopecia, dysphagia, diarrhea or blood in the stools.      Review of Systems   Constitutional: Negative for fever and unexpected weight change.   HENT: Negative for trouble swallowing.    Eyes: Negative for redness.   Respiratory: Positive for cough (from sinus infection). Negative for shortness of breath.    Cardiovascular: Negative for chest pain.   Gastrointestinal: Positive for diarrhea (IBS). Negative for blood in stool and constipation.   Genitourinary: Negative for dysuria and genital sores.   Musculoskeletal: Positive for back pain.   Skin: Negative for rash.   Neurological: Negative for headaches.   Hematological: Bruises/bleeds easily.         Objective:   BP (!) 152/93   Pulse 91   Ht 5' 4" (1.626 m)   Wt 126.2 kg (278 lb 3.5 oz)   BMI 47.76 kg/m²      Physical Exam   Constitutional: She is oriented to person, place, and time and well-developed, well-nourished, and in no distress. No distress.   HENT:   Head: Normocephalic.   Neck: Normal range of motion.   Cardiovascular: Normal rate, regular rhythm, normal heart sounds and intact distal " pulses.  Exam reveals no gallop and no friction rub.    No murmur heard.  Pulmonary/Chest: Effort normal and breath sounds normal. No respiratory distress. She has no wheezes. She has no rales. She exhibits no tenderness.   Neurological: She is alert and oriented to person, place, and time. No cranial nerve deficit.   Skin: Skin is warm and dry. There is erythema (on chest and arms, eczema).     Musculoskeletal: Normal range of motion. She exhibits tenderness (elbows and shoulders from a fall). She exhibits no edema or deformity.         Results for BLANCA GUTHRIE (MRN [id number]) as of 11/26/2019 09:00   Ref. Range 2/23/2019 14:47 7/2/2019 08:00 7/10/2019 08:59 7/24/2019 16:45 8/23/2019 15:40   Sed Rate Latest Ref Range: 0 - 36 mm/Hr  47 (H)      Sodium Latest Ref Range: 136 - 145 mmol/L  138   138   Potassium Latest Ref Range: 3.5 - 5.1 mmol/L  4.0   3.9   Chloride Latest Ref Range: 95 - 110 mmol/L  103   102   CO2 Latest Ref Range: 23 - 29 mmol/L  25   28   Anion Gap Latest Ref Range: 8 - 16 mmol/L  10   8   BUN, Bld Latest Ref Range: 6 - 20 mg/dL  17   15   Creatinine Latest Ref Range: 0.5 - 1.4 mg/dL  0.8   0.9   eGFR if non African American Latest Ref Range: >60 mL/min/1.73 m^2  >60.0   >60.0   eGFR if African American Latest Ref Range: >60 mL/min/1.73 m^2  >60.0   >60.0   Glucose Latest Ref Range: 70 - 110 mg/dL  81   105   Calcium Latest Ref Range: 8.7 - 10.5 mg/dL  9.3   9.5   Alkaline Phosphatase Latest Ref Range: 55 - 135 U/L  144 (H)      Alkaline Phosphatase, Total Latest Ref Range: 33 - 115 U/L  133 (H)      PROTEIN TOTAL Latest Ref Range: 6.0 - 8.4 g/dL  7.7      Albumin Latest Ref Range: 3.5 - 5.2 g/dL  3.6      BILIRUBIN TOTAL Latest Ref Range: 0.1 - 1.0 mg/dL  0.3      AST Latest Ref Range: 10 - 40 U/L  18      ALT Latest Ref Range: 10 - 44 U/L  14      GGT Latest Ref Range: 8 - 55 U/L   25     CRP Latest Ref Range: 0.0 - 8.2 mg/L  32.6 (H)      Intestine, Alk Phos Latest Ref Range: 1 - 24 %  5       Liver, Alk Phos Latest Ref Range: 25 - 69 %  70 (H)      PLACENTAL ISOENZYME Latest Ref Range: 0 %  0      Vit D, 25-Hydroxy Latest Ref Range: 30 - 96 ng/mL  20 (L)      TSH Latest Ref Range: 0.400 - 4.000 uIU/mL    2.978    Free T4 Latest Ref Range: 0.71 - 1.51 ng/dL    0.79    Anti-Mitochon Ab IFA Latest Ref Range: Negative    Negative 1:40     IgG - Serum Latest Ref Range: 650 - 1600 mg/dL  964      Rapid Influenza A Ag Latest Ref Range: Negative  Negative       Rapid Influenza B Ag Latest Ref Range: Negative  Negative        Acceptable Unknown Yes       Bone, Alk Phos Latest Ref Range: 28 - 66 %  25 (L)           Assessment:       1. Vitamin D deficiency    2. Elevated sed rate    3. CRP elevated    4. Elevated alkaline phosphatase level    5. Hypothyroidism due to acquired atrophy of thyroid    6. Malignant carcinoid tumor of appendix    7. Morbid obesity    8. Trigeminal neuralgia of right side of face    9. IgG deficiency    10. Myositis, unspecified myositis type, unspecified site            Plan:       Problem List Items Addressed This Visit        Neuro    Trigeminal neuralgia of right side of face     Cont gabapentin 600mg in am and 900mg in pm.            Immunology/Multi System    IgG deficiency     Cont Hyzentra per Dr. Miller.             Oncology    Malignant carcinoid tumor of appendix     F/u with Gastro         Elevated sed rate     ESR            Endocrine    Vitamin D deficiency - Primary     Vitamin D    Cont vitamin D2 50,000 units once a week  Cont vitamin D3 1000 units daily         Relevant Orders    Vitamin D    Hypothyroidism    Morbid obesity     F/u Dr. Ruiz in Bariatric Medicine            Other    Elevated alkaline phosphatase level     Fasting alk phos isoenzyme, ggt, phosphorus         Relevant Orders    Alkaline phosphatase, isoenzymes    Gamma GT    PHOSPHORUS    CRP elevated     CRP           Other Visit Diagnoses     Myositis, unspecified myositis type,  unspecified site        Relevant Orders    CK    Aldolase        Fasting Labs as above  Follow up with Cardiology, Immunology, and PCP  Can follow up in a year as needed    Pt was seen and discussed with Dr. Brown who is in agreement with the plan.  Merly Rasheed MD  [employer] [facility] PGY1

## 2019-11-26 NOTE — PROGRESS NOTES
"Subjective:       Patient ID: Kianna Guthrie is a 49 y.o. female.    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   HENT: Negative for trouble swallowing.    Eyes: Negative for redness.   Respiratory: Positive for cough. Negative for shortness of breath.    Cardiovascular: Negative for chest pain.   Gastrointestinal: Positive for diarrhea. Negative for constipation.   Genitourinary: Negative for dysuria and genital sores.   Skin: Negative for rash.   Neurological: Negative for headaches.   Hematological: Bruises/bleeds easily.         Objective:   BP (!) 152/93   Pulse 91   Ht 5' 4" (1.626 m)   Wt 126.2 kg (278 lb 3.5 oz)   BMI 47.76 kg/m²      Physical Exam      Assessment/Plan         Vitamin D deficiency  -     Vitamin D; Future; Expected date: 11/26/2019    Elevated sed rate    CRP elevated    Elevated alkaline phosphatase level  -     Alkaline phosphatase, isoenzymes; Future; Expected date: 11/26/2019  -     Gamma GT; Future; Expected date: 11/26/2019  -     PHOSPHORUS; Future; Expected date: 11/26/2019    Hypothyroidism due to acquired atrophy of thyroid    Malignant carcinoid tumor of appendix    Morbid obesity    Trigeminal neuralgia of right side of face    IgG deficiency    Myositis, unspecified myositis type, unspecified site  -     CK; Future; Expected date: 11/26/2019  -     Aldolase; Future; Expected date: 11/26/2019       Problem List Items Addressed This Visit     CRP elevated    Overview     Results for KIANNA GUTHRIE (MRN [id number]) as of 11/26/2019 07:42   Ref. Range 12/8/2017 16:04 2/2/2018 09:27 4/3/2018 10:39 11/2/2018 09:32 7/2/2019 08:00   CRP Latest Ref Range: 0.0 - 8.2 mg/L 11.3 (H) 10.0 (H) 25.8 (H) 16.4 (H) 32.6 (H) "            Current Assessment & Plan     CRP  RTC 1 year/prn         Elevated alkaline phosphatase level    Overview     Results for BLANCA GUTHRIE (MRN [id number]) as of 11/26/2019 07:42   Ref. Range 7/2/2019 08:00 7/10/2019 08:59   Alkaline Phosphatase Latest Ref Range: 55 - 135 U/L 144 (H)    Alkaline Phosphatase, Total Latest Ref Range: 33 - 115 U/L 133 (H)    PROTEIN TOTAL Latest Ref Range: 6.0 - 8.4 g/dL 7.7    Albumin Latest Ref Range: 3.5 - 5.2 g/dL 3.6    BILIRUBIN TOTAL Latest Ref Range: 0.1 - 1.0 mg/dL 0.3    AST Latest Ref Range: 10 - 40 U/L 18    ALT Latest Ref Range: 10 - 44 U/L 14    GGT Latest Ref Range: 8 - 55 U/L  25   CRP Latest Ref Range: 0.0 - 8.2 mg/L 32.6 (H)    Intestine, Alk Phos Latest Ref Range: 1 - 24 % 5    Liver, Alk Phos Latest Ref Range: 25 - 69 % 70 (H)             Current Assessment & Plan     Fasting alk phos isoenzyme, ggt, phosphorus         Relevant Orders    Alkaline phosphatase, isoenzymes    Gamma GT    PHOSPHORUS    Elevated sed rate    Overview     Results for BLANCA GUTHRIE (MRN [id number]) as of 11/26/2019 07:42   Ref. Range 12/8/2017 16:04 2/2/2018 09:27 4/3/2018 10:39 11/2/2018 09:32 7/2/2019 08:00   Sed Rate Latest Ref Range: 0 - 36 mm/Hr 21 (H) 13 12 54 (H) 47 (H)            Current Assessment & Plan     ESR  RTC  1year/prn         Hypothyroidism    Overview     Results for BLANCA GUTHRIE (MRN [id number]) as of 11/26/2019 07:42   Ref. Range 7/24/2019 16:45   TSH Latest Ref Range: 0.400 - 4.000 uIU/mL 2.978            IgG deficiency    Overview     Results for BLANCA GUTHRIE (MRN [id number]) as of 2/2/2018 08:02   Ref. Range 1/11/2018 08:06 1/22/2018 14:42   IgG - Serum Latest Ref Range: 650 - 1600 mg/dL 557 (L) 600 (L)   IgM Latest Ref Range: 50 - 300 mg/dL 90 78   IgA Latest Ref Range: 40 - 350 mg/dL 347 343   IgE Latest Ref Range: 0 - 100 IU/mL  <35   IgG 1 Latest Ref Range: 382 - 929 mg/dL 293 (L)    IgG 2 Latest Ref Range: 242 - 700 mg/dL 126 (L)    IgG 3 Latest Ref  Range: 22 - 176 mg/dL 52    IgG 4 Latest Ref Range: 4 - 86 mg/dL 3 (L)             Current Assessment & Plan     Cont Hyzentra per Dr. Miller.          Malignant carcinoid tumor of appendix    Current Assessment & Plan     F/u with Gastro         Morbid obesity    Overview     BMI 44.43         Current Assessment & Plan     Insurance would not cover Bariatric Medicine referral with Dr. Ruiz  Gained 1# since last here         Trigeminal neuralgia of right side of face    Overview      Unremarkable imaging of the proximal trigeminal nerve specifically without evidence for abnormal signal, mass effect or enhancement along the cisternal or  ganglionic portions of the trigeminal nerve trigeminal neuralgia.    Otherwise unremarkable MRI of the brain specifically without evidence for acute infarction or enhancing lesion.      Electronically signed by: LUCY AVILES DO  Date: 01/11/18  Time: 11:31     Encounter     View Encounter            Unremarkable imaging of the proximal trigeminal nerve specifically without evidence for abnormal signal, mass effect or enhancement along the cisternal or  ganglionic portions of the trigeminal nerve trigeminal neuralgia.    Otherwise unremarkable MRI of the brain specifically without evidence for acute infarction or enhancing lesion.      Electronically signed by: LUCY AVILES DO  Date: 01/11/18  Time: 11:31     Encounter     View Encounter                    Current Assessment & Plan     Cont gabapentin 600mg in am and 900mg in pm.         Vitamin D deficiency - Primary    Overview       Results for BLANCA GUTHRIE (MRN [id number]) as of 12/19/2017 13:07   Ref. Range 8/15/2017 16:28 10/18/2017 11:51 12/1/2017 08:48 12/7/2017 08:08 12/8/2017 16:04   Vit D, 25-Hydroxy Latest Ref Range: 30 - 96 ng/mL 18 (L)    17 (L)   Results for BLANCA GUTHRIE (MRN [id number]) as of 2/2/2018 08:02   Ref. Range 2/1/2018 11:00   Vit D, 25-Hydroxy Latest Ref Range: 30 - 96 ng/mL 22 (L)            Current  Assessment & Plan     Vitamin D    Cont vitamin D2 50,000 units once a week  Cont vitamin D3 1000 units daily         Relevant Orders    Vitamin D      Other Visit Diagnoses     Myositis, unspecified myositis type, unspecified site        Relevant Orders    CK    Aldolase

## 2019-11-27 ENCOUNTER — LAB VISIT (OUTPATIENT)
Dept: LAB | Facility: HOSPITAL | Age: 49
End: 2019-11-27
Attending: INTERNAL MEDICINE
Payer: COMMERCIAL

## 2019-11-27 ENCOUNTER — PATIENT MESSAGE (OUTPATIENT)
Dept: FAMILY MEDICINE | Facility: CLINIC | Age: 49
End: 2019-11-27

## 2019-11-27 DIAGNOSIS — R74.8 ELEVATED ALKALINE PHOSPHATASE LEVEL: ICD-10-CM

## 2019-11-27 DIAGNOSIS — R50.9 FEVER, UNSPECIFIED FEVER CAUSE: ICD-10-CM

## 2019-11-27 DIAGNOSIS — M60.9 MYOSITIS, UNSPECIFIED MYOSITIS TYPE, UNSPECIFIED SITE: ICD-10-CM

## 2019-11-27 DIAGNOSIS — E55.9 VITAMIN D DEFICIENCY: ICD-10-CM

## 2019-11-27 LAB
25(OH)D3+25(OH)D2 SERPL-MCNC: 24 NG/ML (ref 30–96)
ALBUMIN SERPL BCP-MCNC: 3.7 G/DL (ref 3.5–5.2)
ALP SERPL-CCNC: 130 U/L (ref 55–135)
ALT SERPL W/O P-5'-P-CCNC: 13 U/L (ref 10–44)
ANION GAP SERPL CALC-SCNC: 9 MMOL/L (ref 8–16)
AST SERPL-CCNC: 18 U/L (ref 10–40)
BASOPHILS # BLD AUTO: 0.03 K/UL (ref 0–0.2)
BASOPHILS NFR BLD: 0.8 % (ref 0–1.9)
BILIRUB SERPL-MCNC: 0.1 MG/DL (ref 0.1–1)
BUN SERPL-MCNC: 9 MG/DL (ref 6–20)
CALCIUM SERPL-MCNC: 9.7 MG/DL (ref 8.7–10.5)
CHLORIDE SERPL-SCNC: 103 MMOL/L (ref 95–110)
CK SERPL-CCNC: 81 U/L (ref 20–180)
CK SERPL-CCNC: 81 U/L (ref 20–180)
CO2 SERPL-SCNC: 27 MMOL/L (ref 23–29)
CREAT SERPL-MCNC: 0.8 MG/DL (ref 0.5–1.4)
CRP SERPL-MCNC: 20.9 MG/L (ref 0–8.2)
DIFFERENTIAL METHOD: ABNORMAL
EOSINOPHIL # BLD AUTO: 0.1 K/UL (ref 0–0.5)
EOSINOPHIL NFR BLD: 2.3 % (ref 0–8)
ERYTHROCYTE [DISTWIDTH] IN BLOOD BY AUTOMATED COUNT: 13.5 % (ref 11.5–14.5)
ERYTHROCYTE [SEDIMENTATION RATE] IN BLOOD BY WESTERGREN METHOD: 39 MM/HR (ref 0–36)
EST. GFR  (AFRICAN AMERICAN): >60 ML/MIN/1.73 M^2
EST. GFR  (NON AFRICAN AMERICAN): >60 ML/MIN/1.73 M^2
GGT SERPL-CCNC: 24 U/L (ref 8–55)
GLUCOSE SERPL-MCNC: 84 MG/DL (ref 70–110)
HCT VFR BLD AUTO: 42.8 % (ref 37–48.5)
HGB BLD-MCNC: 13.7 G/DL (ref 12–16)
IMM GRANULOCYTES # BLD AUTO: 0.02 K/UL (ref 0–0.04)
IMM GRANULOCYTES NFR BLD AUTO: 0.5 % (ref 0–0.5)
LYMPHOCYTES # BLD AUTO: 1.2 K/UL (ref 1–4.8)
LYMPHOCYTES NFR BLD: 31.9 % (ref 18–48)
MCH RBC QN AUTO: 29.1 PG (ref 27–31)
MCHC RBC AUTO-ENTMCNC: 32 G/DL (ref 32–36)
MCV RBC AUTO: 91 FL (ref 82–98)
MONOCYTES # BLD AUTO: 0.2 K/UL (ref 0.3–1)
MONOCYTES NFR BLD: 6.3 % (ref 4–15)
NEUTROPHILS # BLD AUTO: 2.2 K/UL (ref 1.8–7.7)
NEUTROPHILS NFR BLD: 58.2 % (ref 38–73)
NRBC BLD-RTO: 0 /100 WBC
PHOSPHATE SERPL-MCNC: 3 MG/DL (ref 2.7–4.5)
PLATELET # BLD AUTO: 235 K/UL (ref 150–350)
PMV BLD AUTO: 9.1 FL (ref 9.2–12.9)
POTASSIUM SERPL-SCNC: 3.9 MMOL/L (ref 3.5–5.1)
PROT SERPL-MCNC: 7.9 G/DL (ref 6–8.4)
RBC # BLD AUTO: 4.7 M/UL (ref 4–5.4)
SODIUM SERPL-SCNC: 139 MMOL/L (ref 136–145)
WBC # BLD AUTO: 3.83 K/UL (ref 3.9–12.7)

## 2019-11-27 PROCEDURE — 82550 ASSAY OF CK (CPK): CPT

## 2019-11-27 PROCEDURE — 84100 ASSAY OF PHOSPHORUS: CPT

## 2019-11-27 PROCEDURE — 36415 COLL VENOUS BLD VENIPUNCTURE: CPT | Mod: PO

## 2019-11-27 PROCEDURE — 84080 ASSAY ALKALINE PHOSPHATASES: CPT

## 2019-11-27 PROCEDURE — 84075 ASSAY ALKALINE PHOSPHATASE: CPT

## 2019-11-27 PROCEDURE — 85652 RBC SED RATE AUTOMATED: CPT

## 2019-11-27 PROCEDURE — 82977 ASSAY OF GGT: CPT

## 2019-11-27 PROCEDURE — 86140 C-REACTIVE PROTEIN: CPT

## 2019-11-27 PROCEDURE — 80053 COMPREHEN METABOLIC PANEL: CPT

## 2019-11-27 PROCEDURE — 82306 VITAMIN D 25 HYDROXY: CPT

## 2019-11-27 PROCEDURE — 82085 ASSAY OF ALDOLASE: CPT

## 2019-11-27 PROCEDURE — 85025 COMPLETE CBC W/AUTO DIFF WBC: CPT

## 2019-11-29 LAB
ALDOLASE SERPL-CCNC: 2 U/L (ref 1.2–7.6)
ALDOLASE SERPL-CCNC: 2 U/L (ref 1.2–7.6)

## 2019-12-03 LAB
ALP BONE CFR SERPL: 28 % (ref 28–66)
ALP INTEST CFR SERPL: 17 % (ref 1–24)
ALP LIVER CFR SERPL: 55 % (ref 25–69)
ALP PLAC CFR SERPL: 0 %
ALP SERPL-CCNC: 115 U/L (ref 33–115)

## 2019-12-05 ENCOUNTER — HOSPITAL ENCOUNTER (OUTPATIENT)
Dept: RADIOLOGY | Facility: HOSPITAL | Age: 49
Discharge: HOME | End: 2019-12-05
Attending: NURSE PRACTITIONER
Payer: COMMERCIAL

## 2019-12-05 VITALS — HEIGHT: 64 IN | BODY MASS INDEX: 47.5 KG/M2 | WEIGHT: 278.25 LBS

## 2019-12-05 DIAGNOSIS — Z12.31 VISIT FOR SCREENING MAMMOGRAM: ICD-10-CM

## 2019-12-05 PROCEDURE — 77067 MAMMO DIGITAL SCREENING BILAT WITH TOMOSYNTHESIS_CAD: ICD-10-PCS | Mod: 26,,, | Performed by: RADIOLOGY

## 2019-12-05 PROCEDURE — 77067 SCR MAMMO BI INCL CAD: CPT | Mod: TC,PO

## 2019-12-05 PROCEDURE — 77063 BREAST TOMOSYNTHESIS BI: CPT | Mod: 26,,, | Performed by: RADIOLOGY

## 2019-12-05 PROCEDURE — 77067 SCR MAMMO BI INCL CAD: CPT | Mod: 26,,, | Performed by: RADIOLOGY

## 2019-12-05 PROCEDURE — 77063 MAMMO DIGITAL SCREENING BILAT WITH TOMOSYNTHESIS_CAD: ICD-10-PCS | Mod: 26,,, | Performed by: RADIOLOGY

## 2019-12-05 NOTE — PROGRESS NOTES
Digital Medicine: Clinician Follow-Up    Called patient to follow up. Patient endorses adherence to medication regimen. Patient denies hypotensive s/sx (lightheadedness, dizziness, nausea, fatigue); patient denies hypertensive s/sx (SOB, CP, severe headaches, changes in vision). Instructed patient to seek medical care if BP > 180/110 and is accompanied by hypertensive s/sx associated, patient confirms understanding. She reports that she has not been going to the gym lately since she suffered a shoulder injury at work. She reports that she tripped over a phone cord but did not hit her head.      The history is provided by the patient. No  was used.     Follow Up  Follow-up reason(s): routine education      Routine Education Topics: physical activity      Last 5 Patient Entered Readings                                      Current 30 Day Average: 120/80     Recent Readings 12/4/2019 12/4/2019 11/26/2019 11/26/2019 11/25/2019    SBP (mmHg) 132 132 108 108 108    DBP (mmHg) 91 91 78 78 74    Pulse 96 96 78 78 82        INTERVENTION(S)  reviewed appropriate dose schedule, encouragement/support and goal setting    PLAN  patient verbalizes understanding and continue monitoring    Assessment:  Reviewed recent readings. Per 2017 ACC/ AHA HTN guidelines (goal of BP < 130/80), current 30-day average is well controlled.     Plan:  Continue current medication regimen. Reviewed proper BP measurement techniques with patient. I will continue to monitor regularly and will follow-up in ~12 weeks, sooner if blood pressure begins to trend upward or downward.         There are no preventive care reminders to display for this patient.    Hypertension Medications             indapamide (LOZOL) 1.25 MG Tab Take 1 tablet (1.25 mg total) by mouth once daily.    metoprolol tartrate (LOPRESSOR) 50 MG tablet Take 50 mg by mouth 4 (four) times daily. 1 Tablet Oral Three times a day    valsartan (DIOVAN) 40 MG tablet Take 1  tablet (40 mg total) by mouth every 12 (twelve) hours.

## 2019-12-11 ENCOUNTER — PATIENT OUTREACH (OUTPATIENT)
Dept: OTHER | Facility: OTHER | Age: 49
End: 2019-12-11

## 2019-12-17 DIAGNOSIS — N95.1 MENOPAUSAL SYMPTOMS: ICD-10-CM

## 2019-12-17 RX ORDER — ESTRADIOL 2 MG/1
2 TABLET ORAL DAILY
Qty: 30 TABLET | Refills: 2 | Status: SHIPPED | OUTPATIENT
Start: 2019-12-17 | End: 2020-03-16 | Stop reason: SDUPTHER

## 2019-12-23 NOTE — PROGRESS NOTES
Allergy Clinic Note  [facility]    Subjective:      Chief Complaint: Follow-up (infusions )    Allergy Problem List  Common variable immunodeficiency          On Hizentra since June 2018,  200 mg/kg weekly  Nonallergic rhinitis         normal CT sinus    History of Present Illness: Kianna Zuleta is a 49 y.o. female with common variable immunodeficiency (CVID) who returns at my request to follow up. She was last seen May 2019.    Related medications  Hizentra 10gram/50ml (20% soln):  200mg/kg every week.                     Started June 2018  DuoNeb p.r.n. -- not needing  Albuterol p.r.n. - no needing  Azelastine -- uses rarely  Xyzal 5 mg - not needing  (beta-blocker)    She continues to do well on Hizentra.  She has had no bacterial infections since last visit.  Her only side effects are nausea and some itching which are adequately controlled on Zofran.  She continues to have viral upper respiratory infections but no gustavo bacterial infections.  She says she continues to receive antibiotics and steroids from her ENT (Dr. Canada).    She denies any chest symptoms.  She says she has not needed albuterol.    Her rhinitis and symptoms have also improved.  She is no longer using Astelin on a regular basis.    Additional History:   Interval history is unremarkable.   Past medical history is significant for malignant carcinoid tumor of the appendix, hypertension, and thyroid disease.  She is status post sinus surgery.  She has not had tonsillectomy, adenoidectomy or PE tubes.  Family history is significant for hypertension.  Client reports  reports that she has never smoked. She has never used smokeless tobacco.  Exposures are notable for elementary age schoolchildren and shelly books.  No exposure to pets, smoke, molds, or unusual substances.   Past medical, family, and social histories are unchanged.      Patient Active Problem List   Diagnosis    Vitamin D deficiency    Hypothyroidism    Irritable  "bowel syndrome    Elevated alkaline phosphatase level    Malignant carcinoid tumor of appendix    Fever    Elevated sed rate    CRP elevated    Hypertension    Morbid obesity    Abnormal CT of the chest    Trigeminal neuralgia of right side of face    IgG deficiency    POTS (postural orthostatic tachycardia syndrome)     Current Outpatient Medications on File Prior to Visit   Medication Sig Dispense Refill    albuterol (PROVENTIL/VENTOLIN HFA) 90 mcg/actuation inhaler Inhale 2 puffs into the lungs every 6 (six) hours as needed for Wheezing. Rescue 18 g 12    azelastine (ASTELIN) 137 mcg (0.1 %) nasal spray 2 SPRAYS each nostril Q 12 H PRN 30 mL 6    BD LUER-NANCY SYRINGE 3 mL 22 gauge x 1" Syrg INJECT QD FOR 5 DAYS THEN WEEKLY FOR 5 WEEKS THEN MONTHLY  3    dicyclomine (BENTYL) 10 MG capsule Take by mouth.      ergocalciferol (ERGOCALCIFEROL) 50,000 unit Cap TAKE 1 CAPSULE BY MOUTH EVERY 7 DAYS 12 capsule 3    estradiol (ESTRACE) 2 MG tablet Take 1 tablet (2 mg total) by mouth once daily. 30 tablet 2    gabapentin (NEURONTIN) 300 MG capsule Take 300 mg by mouth 2 (two) times daily. 2-300 mg cap in AM and 3-300 mg cap in PM      hydrOXYzine HCl (ATARAX) 25 MG tablet Take 1 tablet (25 mg total) by mouth 3 (three) times daily. 45 tablet 1    immun glob G,IgG,-pro-IgA 0-50 (HIZENTRA) 10 gram/50 mL (20 %) Soln Inject 200 mg/kg into the skin every 7 days. 4 vial 5    indapamide (LOZOL) 1.25 MG Tab Take 1 tablet (1.25 mg total) by mouth once daily. 30 tablet 3    levocetirizine (XYZAL) 5 MG tablet TK 1 T PO  ONCE Day  9    metoprolol tartrate (LOPRESSOR) 50 MG tablet Take 50 mg by mouth 4 (four) times daily. 1 Tablet Oral Three times a day      montelukast (SINGULAIR) 10 mg tablet Take by mouth.      oxyCODONE-acetaminophen (PERCOCET)  mg per tablet TK 1 T PO Q 6 H PRN  0    promethazine-phenylephrine (PROMETHAZINE VC) 6.25-5 mg/5 mL Syrp Take by mouth.      QUEtiapine (SEROQUEL) 25 MG Tab TK " "1 AND 1/2 TS PO QPM  0    syringe with needle, safety 3 mL 22 gauge x 1 1/2" Syrg B12 injection every day for 5 days then weekly for 5 weeks then monthly. 50 Syringe 3    tiZANidine 4 mg Cap TK 2 TO 3 C XD PO TID WF  0    valsartan (DIOVAN) 40 MG tablet Take 1 tablet (40 mg total) by mouth every 12 (twelve) hours. 30 tablet 3    vitamin D (VITAMIN D3) 1000 units Tab Take 1,000 Units by mouth once daily.      vortioxetine (TRINTELLIX) 10 mg Tab take 1 tablet (10MG)  by oral route  every day at the same time each day      zaleplon (SONATA) 10 MG capsule Take 10 mg by mouth once daily.  2    [DISCONTINUED] ondansetron (ZOFRAN-ODT) 4 MG TbDL Take 1 tablet (4 mg total) by mouth every 8 (eight) hours as needed (nausea). 20 tablet 0    EPINEPHrine (AUVI-Q) 0.3 mg/0.3 mL AtIn Inject 0.3 mLs (0.3 mg total) into the muscle once. 2 Device 0     No current facility-administered medications on file prior to visit.          Review of Systems   Constitutional: Negative for chills and fever.   HENT: Negative for ear discharge and nosebleeds.    Eyes: Negative for discharge and redness.   Respiratory: Negative for hemoptysis, sputum production, shortness of breath, wheezing and stridor.    Cardiovascular: Negative for chest pain and palpitations.   Gastrointestinal: Negative for blood in stool, melena and vomiting.   Genitourinary: Negative for dysuria and hematuria.   Skin: Negative for itching and rash.   Neurological: Negative for seizures and loss of consciousness.       Objective:   Ht 5' 4" (1.626 m)   Wt 126.1 kg (278 lb)   BMI 47.72 kg/m²       Physical Exam   Constitutional: She is oriented to person, place, and time and well-developed, well-nourished, and in no distress.   HENT:   Head: Normocephalic and atraumatic.   Nose: Nose normal.   Mouth/Throat: No oropharyngeal exudate.   Eyes: Conjunctivae are normal. No scleral icterus.   Neck: Neck supple.   Cardiovascular: Normal rate, regular rhythm, normal heart " sounds and intact distal pulses.   Pulmonary/Chest: Effort normal and breath sounds normal. No stridor. No respiratory distress. She has no wheezes.   Abdominal: She exhibits no distension.   Musculoskeletal: She exhibits no edema or deformity.   Lymphadenopathy:     She has no cervical adenopathy.   Neurological: She is alert and oriented to person, place, and time.   Skin: No rash noted. No erythema.   Psychiatric: Affect and judgment normal.       Data:   IgG 600 (01/25/2018)  Poor response to pneumococcal vaccines x2    Immunoglobulins before replacement  Component      Latest Ref Rng & Units 1/22/2018 1/11/2018   IgG - Serum      650 - 1600 mg/dL 600 (L) 557 (L)   IgA      40 - 350 mg/dL 343 347   IgM      50 - 300 mg/dL 78 90       Pneumococcal titers  Component      Latest Ref Rng & Units 4/6/2018 3/1/2018   S.pneumoniae Type 1      mcg/mL 1.2 1.1   S.pneumoniae Type 3      mcg/mL 3.5 3.3   Strep pneumo Type 4      mcg/mL <0.3 <0.3   S.pneumoniae Type 5      mcg/mL <0.3 <0.3   S.pneumoniae Type 8      mcg/mL <0.3 <0.3   S.pneumoniae Type 9N      mcg/mL 1.0 0.8   S.pneumoniae Type 12F      mcg/mL 0.3 <0.3   Strep pneumo Type 14      mcg/mL 0.4 0.5   S.pneumoniae Type 19F      mcg/mL 0.6 0.6   S.pneumoniae Type 23F      mcg/mL <0.3 <0.3   S.pneumoniae Type 6B      mcg/mL 0.6 0.9   S.pneumoniae Type 7F      mcg/mL <0.3 0.3   S.pneumoniae Type 18C      mcg/mL <0.3 <0.3   S.pneumoniae Type 9V Abs      mcg/mL <0.3 <0.3   Status post pneumococcal polysaccharide vaccine 01/22/2018  Status post pneumococcal protein conjugated vaccine 03/16/2018    Other immune titers  Normal diphtheria and Hib titers (01/22/2018  Low rubella IgG (120 to/2018), now normal (04/06/2018)  Normal varicella IgG (01/22/2018    Lymphocyte subsets (04/06/2018)  notable for low B cells at 61 (normal 100-500).  She also had decrease number and proportion of NK cell at (41, normal ).  CD4 and CD8 T-cell numbers were normal    Other  "labs  CBC unremarkable (11/02/2018) with Eo count 100  IgG subsets (01/11/2018) low IgG 1, IgG 2, and IgG 3      Aeroallergen skin testing by the Immunocap method (122/2018) was entirely negative.  Total serum IgE < 35     Chest x-ray (PA and lateral, 10/24/2017):  Normal    CT sinus (01/22/2018): "Unremarkable noncontrast CT of the paranasal sinuses without significant paranasal sinus opacification or air-fluid level."         Assessment:     1. CVID (common variable immunodeficiency)        Plan:     Medical decision making:  Ms. Farnsworth common variable immune deficiency has responded very well to replacement immunoglobulin.  Plan to continue infusions for at least the next 6 months.  Will check interval IgG at pt convenience to see if dose can be decreased.         Nonallergic rhinitis has also improved significantly, no longer requiring regular medications.    Kianna was seen today for follow-up.      Patient Instructions   Doing well.  Continue Hizentra.    Your next authorization will be due in May.    Next follow-up should be in 6 months      Follow up in about 6 months (around 6/27/2020).    Kaylin Miller MD       Addend:  Sent communication to ENT explaining that she is no longer at high risk for bacterial superinfections.  "

## 2019-12-27 ENCOUNTER — OFFICE VISIT (OUTPATIENT)
Dept: ALLERGY | Facility: CLINIC | Age: 49
End: 2019-12-27
Payer: COMMERCIAL

## 2019-12-27 VITALS — WEIGHT: 278 LBS | BODY MASS INDEX: 47.46 KG/M2 | HEIGHT: 64 IN

## 2019-12-27 DIAGNOSIS — D83.9 CVID (COMMON VARIABLE IMMUNODEFICIENCY): ICD-10-CM

## 2019-12-27 PROCEDURE — 3008F PR BODY MASS INDEX (BMI) DOCUMENTED: ICD-10-PCS | Mod: CPTII,S$GLB,, | Performed by: STUDENT IN AN ORGANIZED HEALTH CARE EDUCATION/TRAINING PROGRAM

## 2019-12-27 PROCEDURE — 99999 PR PBB SHADOW E&M-EST. PATIENT-LVL III: CPT | Mod: PBBFAC,,, | Performed by: STUDENT IN AN ORGANIZED HEALTH CARE EDUCATION/TRAINING PROGRAM

## 2019-12-27 PROCEDURE — 99214 PR OFFICE/OUTPT VISIT, EST, LEVL IV, 30-39 MIN: ICD-10-PCS | Mod: S$GLB,,, | Performed by: STUDENT IN AN ORGANIZED HEALTH CARE EDUCATION/TRAINING PROGRAM

## 2019-12-27 PROCEDURE — 99214 OFFICE O/P EST MOD 30 MIN: CPT | Mod: S$GLB,,, | Performed by: STUDENT IN AN ORGANIZED HEALTH CARE EDUCATION/TRAINING PROGRAM

## 2019-12-27 PROCEDURE — 3008F BODY MASS INDEX DOCD: CPT | Mod: CPTII,S$GLB,, | Performed by: STUDENT IN AN ORGANIZED HEALTH CARE EDUCATION/TRAINING PROGRAM

## 2019-12-27 PROCEDURE — 99999 PR PBB SHADOW E&M-EST. PATIENT-LVL III: ICD-10-PCS | Mod: PBBFAC,,, | Performed by: STUDENT IN AN ORGANIZED HEALTH CARE EDUCATION/TRAINING PROGRAM

## 2019-12-27 RX ORDER — ONDANSETRON 4 MG/1
4 TABLET, ORALLY DISINTEGRATING ORAL EVERY 8 HOURS PRN
Qty: 20 TABLET | Refills: 0 | Status: SHIPPED | OUTPATIENT
Start: 2019-12-27 | End: 2022-04-25

## 2019-12-27 NOTE — LETTER
December 27, 2019        Tahir Canada MD  [address] [place] [address] [address]  [place] [address]             [facility]  [address] [facility] [address]  [place] [address]  Phone: [phone]   Patient: Kianna Zuleta   MR Number: [id number]   YOB: 1970   Date of Visit: 12/27/2019     Dear Dr. Canada,     Ms Zuleta is now on replacement immunoglobulin therapy.  This means she has a normal antibody level and repetoir.  In other words she is no longer at high risk of a bacterial superinfection on top of a viral URI.  Her viral infections can be treated the same you would any other low risk patient.    Sincerely,      Kaylin Miller MD            CC  No Recipients    Enclosure

## 2019-12-27 NOTE — PATIENT INSTRUCTIONS
Doing well.  Continue Hizentra.    Your next authorization will be due in May.    Next follow-up should be in 6 months

## 2020-01-09 ENCOUNTER — OFFICE VISIT (OUTPATIENT)
Dept: FAMILY MEDICINE | Facility: CLINIC | Age: 50
End: 2020-01-09
Payer: COMMERCIAL

## 2020-01-09 ENCOUNTER — PATIENT MESSAGE (OUTPATIENT)
Dept: FAMILY MEDICINE | Facility: CLINIC | Age: 50
End: 2020-01-09

## 2020-01-09 VITALS
DIASTOLIC BLOOD PRESSURE: 86 MMHG | SYSTOLIC BLOOD PRESSURE: 186 MMHG | BODY MASS INDEX: 46.77 KG/M2 | HEIGHT: 64 IN | OXYGEN SATURATION: 97 % | TEMPERATURE: 99 F | WEIGHT: 273.94 LBS | HEART RATE: 84 BPM

## 2020-01-09 DIAGNOSIS — R21 RASH: ICD-10-CM

## 2020-01-09 DIAGNOSIS — D80.3 IGG DEFICIENCY: ICD-10-CM

## 2020-01-09 DIAGNOSIS — M62.830 BACK SPASM: ICD-10-CM

## 2020-01-09 DIAGNOSIS — B02.9 HERPES ZOSTER WITHOUT COMPLICATION: ICD-10-CM

## 2020-01-09 DIAGNOSIS — R07.81 RIB PAIN ON RIGHT SIDE: Primary | ICD-10-CM

## 2020-01-09 PROCEDURE — 3079F PR MOST RECENT DIASTOLIC BLOOD PRESSURE 80-89 MM HG: ICD-10-PCS | Mod: CPTII,S$GLB,, | Performed by: NURSE PRACTITIONER

## 2020-01-09 PROCEDURE — 99999 PR PBB SHADOW E&M-EST. PATIENT-LVL V: ICD-10-PCS | Mod: PBBFAC,,, | Performed by: NURSE PRACTITIONER

## 2020-01-09 PROCEDURE — 3077F PR MOST RECENT SYSTOLIC BLOOD PRESSURE >= 140 MM HG: ICD-10-PCS | Mod: CPTII,S$GLB,, | Performed by: NURSE PRACTITIONER

## 2020-01-09 PROCEDURE — 3079F DIAST BP 80-89 MM HG: CPT | Mod: CPTII,S$GLB,, | Performed by: NURSE PRACTITIONER

## 2020-01-09 PROCEDURE — 99214 OFFICE O/P EST MOD 30 MIN: CPT | Mod: S$GLB,,, | Performed by: NURSE PRACTITIONER

## 2020-01-09 PROCEDURE — 3077F SYST BP >= 140 MM HG: CPT | Mod: CPTII,S$GLB,, | Performed by: NURSE PRACTITIONER

## 2020-01-09 PROCEDURE — 99999 PR PBB SHADOW E&M-EST. PATIENT-LVL V: CPT | Mod: PBBFAC,,, | Performed by: NURSE PRACTITIONER

## 2020-01-09 PROCEDURE — 99214 PR OFFICE/OUTPT VISIT, EST, LEVL IV, 30-39 MIN: ICD-10-PCS | Mod: S$GLB,,, | Performed by: NURSE PRACTITIONER

## 2020-01-09 PROCEDURE — 3008F PR BODY MASS INDEX (BMI) DOCUMENTED: ICD-10-PCS | Mod: CPTII,S$GLB,, | Performed by: NURSE PRACTITIONER

## 2020-01-09 PROCEDURE — 3008F BODY MASS INDEX DOCD: CPT | Mod: CPTII,S$GLB,, | Performed by: NURSE PRACTITIONER

## 2020-01-09 RX ORDER — IBUPROFEN 800 MG/1
800 TABLET ORAL 3 TIMES DAILY
Qty: 30 TABLET | Refills: 0 | Status: SHIPPED | OUTPATIENT
Start: 2020-01-09 | End: 2020-01-16

## 2020-01-09 RX ORDER — PROMETHAZINE HYDROCHLORIDE AND CODEINE PHOSPHATE 6.25; 1 MG/5ML; MG/5ML
SOLUTION ORAL
Refills: 1 | COMMUNITY
Start: 2019-12-11 | End: 2020-07-08

## 2020-01-09 RX ORDER — VALACYCLOVIR HYDROCHLORIDE 1 G/1
1000 TABLET, FILM COATED ORAL 3 TIMES DAILY
Qty: 21 TABLET | Refills: 0 | Status: SHIPPED | OUTPATIENT
Start: 2020-01-09 | End: 2020-07-08

## 2020-01-09 NOTE — PATIENT INSTRUCTIONS
Ibuprofen 800 mg one every 8 hours with food for pain  Continue Muscle relaxants   Message me if a rash develops on right side

## 2020-01-09 NOTE — PROGRESS NOTES
Subjective:       Patient ID: Kianna Zuleta is a 49 y.o. female.    Chief Complaint: Pain (Under breast to mid back 2 days)    49-year-old female presents to the clinic today with complaint of pain to bilateral lower rib cage pain radiating around to midback area for the past 2 days.  She states it is mostly a aching sensation.  She denies any rash or lesions. She denies any known trauma.  She has slight diarrhea.  She denies any abdominal pain, nausea, or vomiting.  Her blood pressure today is 186/86.  She did not take her mid day blood pressure medication.  She is on the digital home blood pressure monitoring program.  She denies any headaches, dizziness, or blurred vision.  Her blood pressure drops in the evenings.  She takes a muscle relaxer every night for chronic back problems. She took 2 Aleve today with minimal relief.    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    MVP (mitral valve prolapse)     Pneumonia     POTS (postural orthostatic tachycardia syndrome)     Recurrent upper respiratory infection (URI)     Ulcer     Vitamin D deficiency disease 11/6/2012     Past Surgical History:   Procedure Laterality Date    ANKLE SURGERY      lt    APPENDECTOMY      BACK SURGERY      CHOLECYSTECTOMY      choley & ovarian cyst removed  12/2005    cystoscope      multiple    HYSTERECTOMY  4/8/2004    BUBBA BS&O     interstim      OOPHORECTOMY  4/8/2004    with hysterectomy     PELVIC LAPAROSCOPY      MT EXPLORATORY OF ABDOMEN      SINUS SURGERY  03/01/2016    SPINE SURGERY        reports that she has never smoked. She has never used smokeless tobacco. She reports that she does not drink alcohol or use  drugs.  Review of Systems   Respiratory: Negative for cough, shortness of breath and wheezing.    Cardiovascular: Negative for chest pain, palpitations and leg swelling.   Gastrointestinal: Negative for abdominal pain, diarrhea, nausea and vomiting.   Musculoskeletal: Positive for back pain. Negative for gait problem.        Lower rib cage pain radiating around to back    Skin: Negative for rash.        Denies any lesions        Objective:      Physical Exam   Constitutional: She is oriented to person, place, and time. She appears well-developed and well-nourished. No distress.   HENT:   Head: Normocephalic and atraumatic.   Right Ear: External ear normal.   Left Ear: External ear normal.   Nose: Nose normal.   Mouth/Throat: Oropharynx is clear and moist. No oropharyngeal exudate.   Eyes: Pupils are equal, round, and reactive to light. Conjunctivae and EOM are normal. Right eye exhibits no discharge. Left eye exhibits no discharge. No scleral icterus.   Neck: Normal range of motion. Neck supple.   Cardiovascular: Normal rate and regular rhythm. Exam reveals no gallop and no friction rub.   No murmur heard.  Pulmonary/Chest: Effort normal and breath sounds normal. No respiratory distress. She has no wheezes. She has no rales.   Abdominal: Soft. Bowel sounds are normal. There is no tenderness. There is no rebound and no guarding.   Musculoskeletal: Normal range of motion. She exhibits tenderness. She exhibits no edema.   Mild tender right and left sided lower rib cage area no rash or lesions noted back spine non-tender to palpation mild tenderness right sided paraspinal muscles T4-T7 area    Lymphadenopathy:     She has no cervical adenopathy.   Neurological: She is alert and oriented to person, place, and time.   Skin: Skin is warm and dry. No rash noted. She is not diaphoretic.   Psychiatric: She has a normal mood and affect.       Assessment:       1. Rib pain on right side    2. Back spasm    3. IgG deficiency         Plan:         Rib pain on right side  -     ibuprofen (ADVIL,MOTRIN) 800 MG tablet; Take 1 tablet (800 mg total) by mouth 3 (three) times daily. for 10 days  Dispense: 30 tablet; Refill: 0    Back spasm  - continue Zanaflex at night     IgG deficiency  - continue IgG therapy     - She called stating that she noticed a blister under her right breast and it is burning like pain. I told her that I was going to send her a prescription of Valtrex to her pharmacy. Patient verbalized understanding of above.

## 2020-01-09 NOTE — LETTER
January 15, 2020      [facility]  [address]  [place] [address]  Phone: [phone]  Fax: [phone]       Patient: Kianna Zuleta   YOB: 1970  Date of Visit: 01/09/2020    To Whom It May Concern:    Mitra Zuleta  was at [facility] on 1/9/2020.  She may return to work/school on 1/16/2020 with no restrictions. If you have any questions or concerns, or if I can be of further assistance, please do not hesitate to contact me.    Sincerely,      Tawanda Waller, RANDI-C

## 2020-01-13 NOTE — PROGRESS NOTES
Digital Medicine: Health  Follow-Up    The history is provided by the patient.       Intervention/Plan    There are no preventive care reminders to display for this patient.    Last 5 Patient Entered Readings                                      Current 30 Day Average: 99/65     Recent Readings 1/11/2020 1/11/2020 1/10/2020 1/10/2020 1/6/2020    SBP (mmHg) 71 71 70 70 85    DBP (mmHg) 53 53 50 50 53    Pulse 73 73 76 76 89                  Screenings    SDOH

## 2020-01-13 NOTE — PROGRESS NOTES
"Patient is doing okay at this time and does not have any questions concerning BP readings, medication or lifestyle routine. Patient has the shingles and is feeling "miserable." Agreed to f/u in another few weeks.    "

## 2020-01-14 ENCOUNTER — PATIENT OUTREACH (OUTPATIENT)
Dept: OTHER | Facility: OTHER | Age: 50
End: 2020-01-14

## 2020-01-14 NOTE — PROGRESS NOTES
01/14/2020 5:06 PM - Called patient and unable to leave voicemail with call back number. Will follow up with patient at next encounter.   01/23/2020 4:06 PM Called patient and left voicemail with call back number. Will follow up with patient at next encounter. HTN alert

## 2020-01-15 ENCOUNTER — PATIENT MESSAGE (OUTPATIENT)
Dept: FAMILY MEDICINE | Facility: CLINIC | Age: 50
End: 2020-01-15

## 2020-01-16 DIAGNOSIS — R07.81 RIB PAIN ON RIGHT SIDE: ICD-10-CM

## 2020-01-16 RX ORDER — IBUPROFEN 800 MG/1
TABLET ORAL
Qty: 30 TABLET | Refills: 0 | Status: SHIPPED | OUTPATIENT
Start: 2020-01-16 | End: 2020-01-24

## 2020-01-21 DIAGNOSIS — I10 ESSENTIAL HYPERTENSION: ICD-10-CM

## 2020-01-21 RX ORDER — INDAPAMIDE 1.25 MG/1
TABLET ORAL
Qty: 30 TABLET | Refills: 3 | Status: SHIPPED | OUTPATIENT
Start: 2020-01-21 | End: 2020-04-29

## 2020-01-23 NOTE — PROGRESS NOTES
Digital Medicine: Clinician Follow-Up    Called patient to address HTN alert. Patient endorses adherence to medication regimen. Patient denies hypotensive s/sx (lightheadedness, dizziness, nausea, fatigue); patient denies hypertensive s/sx (SOB, CP, severe headaches, changes in vision). Instructed patient to seek medical care if BP > 180/110 and is accompanied by hypertensive s/sx associated, patient confirms understanding.     She reports that she is currently dealing with shingles and is currently in pain.       The history is provided by the patient. No  was used.     Follow Up  Follow-up reason(s): reading review and routine education      Alert received.   Care Team received high BP alert.  Patient is not experiencing symptoms.    Last 5 Patient Entered Readings                                      Current 30 Day Average: 118/77     Recent Readings 1/20/2020 1/20/2020 1/20/2020 1/20/2020 1/19/2020    SBP (mmHg) 167 167 191 191 175    DBP (mmHg) 89 89 111 111 101    Pulse 84 84 89 89 98        INTERVENTION(S)  reviewed appropriate dose schedule, reviewed monitoring technique, encouragement/support and goal setting    PLAN  patient verbalizes understanding, additional monitoring needed and continue monitoring    Assessment:  Reviewed recent readings. Per 2017 ACC/ AHA HTN guidelines (goal of BP < 130/80), current 30-day average is well controlled.     Plan:  Continue current medication regimen. I will continue to monitor regularly and will follow-up in ~4 weeks, sooner if blood pressure begins to trend upward or downward.     Patient denies having questions or concerns. Patient has my contact information and knows to call with any concerns or clinical changes.      There are no preventive care reminders to display for this patient.    Hypertension Medications             indapamide (LOZOL) 1.25 MG Tab TAKE 1 TABLET BY MOUTH ONCE DAILY    metoprolol tartrate (LOPRESSOR) 50 MG tablet Take 50 mg  by mouth 4 (four) times daily. 1 Tablet Oral Three times a day    valsartan (DIOVAN) 40 MG tablet Take 1 tablet (40 mg total) by mouth every 12 (twelve) hours.

## 2020-01-24 DIAGNOSIS — R07.81 RIB PAIN ON RIGHT SIDE: ICD-10-CM

## 2020-01-24 RX ORDER — IBUPROFEN 800 MG/1
TABLET ORAL
Qty: 30 TABLET | Refills: 0 | Status: SHIPPED | OUTPATIENT
Start: 2020-01-24 | End: 2020-02-06

## 2020-02-06 DIAGNOSIS — R07.81 RIB PAIN ON RIGHT SIDE: ICD-10-CM

## 2020-02-06 RX ORDER — IBUPROFEN 800 MG/1
TABLET ORAL
Qty: 30 TABLET | Refills: 0 | Status: SHIPPED | OUTPATIENT
Start: 2020-02-06 | End: 2020-02-14

## 2020-02-14 DIAGNOSIS — R07.81 RIB PAIN ON RIGHT SIDE: ICD-10-CM

## 2020-02-14 RX ORDER — IBUPROFEN 800 MG/1
TABLET ORAL
Qty: 30 TABLET | Refills: 0 | Status: SHIPPED | OUTPATIENT
Start: 2020-02-14 | End: 2020-02-26

## 2020-02-20 ENCOUNTER — PATIENT OUTREACH (OUTPATIENT)
Dept: OTHER | Facility: OTHER | Age: 50
End: 2020-02-20

## 2020-02-20 NOTE — PROGRESS NOTES
02/20/2020 4:56 PM Called patient and left voicemail with call back number. Will follow up with patient at next encounter.

## 2020-02-26 ENCOUNTER — PATIENT MESSAGE (OUTPATIENT)
Dept: FAMILY MEDICINE | Facility: CLINIC | Age: 50
End: 2020-02-26

## 2020-02-26 DIAGNOSIS — R07.81 RIB PAIN ON RIGHT SIDE: ICD-10-CM

## 2020-02-26 RX ORDER — IBUPROFEN 800 MG/1
TABLET ORAL
Qty: 30 TABLET | Refills: 0 | Status: SHIPPED | OUTPATIENT
Start: 2020-02-26 | End: 2020-03-04

## 2020-03-04 DIAGNOSIS — R07.81 RIB PAIN ON RIGHT SIDE: ICD-10-CM

## 2020-03-04 RX ORDER — IBUPROFEN 800 MG/1
TABLET ORAL
Qty: 30 TABLET | Refills: 0 | Status: SHIPPED | OUTPATIENT
Start: 2020-03-04 | End: 2020-03-11

## 2020-03-11 DIAGNOSIS — R07.81 RIB PAIN ON RIGHT SIDE: ICD-10-CM

## 2020-03-11 RX ORDER — IBUPROFEN 800 MG/1
TABLET ORAL
Qty: 30 TABLET | Refills: 0 | Status: SHIPPED | OUTPATIENT
Start: 2020-03-11 | End: 2020-03-19

## 2020-03-16 DIAGNOSIS — N95.1 MENOPAUSAL SYMPTOMS: ICD-10-CM

## 2020-03-16 RX ORDER — ESTRADIOL 2 MG/1
2 TABLET ORAL DAILY
Qty: 30 TABLET | Refills: 2 | Status: SHIPPED | OUTPATIENT
Start: 2020-03-16 | End: 2020-06-12

## 2020-03-18 NOTE — PROGRESS NOTES
"Digital Medicine: Health  Follow-Up    Patient is doing good at this time and does not have any questions concerning BP readings, medication or lifestyle routine.  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 Encouraged staying inside, self distancing, etc.    Patient was not interested in further discussing her new habits/adjustments.        INTERVENTION(S)  encouragement/support, denied further coaching, denied resources, denied support and denied questions    PLAN  continue monitoring    There are no preventive care reminders to display for this patient.    Last 5 Patient Entered Readings                                      Current 30 Day Average: 120/81     Recent Readings 2/29/2020 2/29/2020 2/27/2020 2/27/2020 2/11/2020    SBP (mmHg) 135 135 104 104 114    DBP (mmHg) 87 87 74 74 83    Pulse 69 69 84 84 90            Physical Activity Screening   When asked if exercising, patient responded: no    She identified the following barriers to physical activity: "respiratory illness"    "

## 2020-03-19 DIAGNOSIS — R07.81 RIB PAIN ON RIGHT SIDE: ICD-10-CM

## 2020-03-19 RX ORDER — IBUPROFEN 800 MG/1
TABLET ORAL
Qty: 30 TABLET | Refills: 0 | Status: SHIPPED | OUTPATIENT
Start: 2020-03-19 | End: 2020-03-26

## 2020-03-24 ENCOUNTER — PATIENT MESSAGE (OUTPATIENT)
Dept: FAMILY MEDICINE | Facility: CLINIC | Age: 50
End: 2020-03-24

## 2020-03-24 ENCOUNTER — TELEPHONE (OUTPATIENT)
Dept: FAMILY MEDICINE | Facility: CLINIC | Age: 50
End: 2020-03-24

## 2020-03-24 NOTE — TELEPHONE ENCOUNTER
".Regarding Kianna Zuleta:    Have they had fever of 100.4F or higher?  No    Does the patient report symptoms of a respiratory infection (select all that apply).  cough    Does the patient meet any criteria for a "high risk" population for sean COVID-19 (select all that apply)?  taking immunosuppressant medications      "

## 2020-03-24 NOTE — TELEPHONE ENCOUNTER
----- Message from Lilian Crowley sent at 3/24/2020  1:05 PM CDT -----  Contact: BLANCA GUTHRIE [[id number]]  Type: PINK DOT    Who Called: BLANCA GTUHRIE [[id number]]    Symptoms: Fever (100.8), cough, chills and diarrhea     How long has patient had these symptoms: 1 week    Would the patient rather a call back or a response via [facility]? Call back    Best Call Back Number: [phone]    Additional Information: Patient denies any international travel and being around anyone with COVID-19.

## 2020-03-24 NOTE — TELEPHONE ENCOUNTER
Please clarify, the message below says the temperature was elevated and then the 2nd message says there was no fever.    Indeed patient has had a temperature above 100.4 along with the cough she could be sent for corona virus testing.  Please address these issues ASAP and then verbally come and tell me so that we can get the appointment scheduled for the drive-through testing    Patient is active on [facility] so I will also try to send her a message that way so please also refer to the patient advice messages for her reply

## 2020-03-25 ENCOUNTER — PATIENT MESSAGE (OUTPATIENT)
Dept: FAMILY MEDICINE | Facility: CLINIC | Age: 50
End: 2020-03-25

## 2020-03-25 ENCOUNTER — CLINICAL SUPPORT (OUTPATIENT)
Dept: INTERNAL MEDICINE | Facility: CLINIC | Age: 50
End: 2020-03-25
Payer: COMMERCIAL

## 2020-03-25 DIAGNOSIS — R50.9 FEVER, UNSPECIFIED FEVER CAUSE: Primary | ICD-10-CM

## 2020-03-25 DIAGNOSIS — R50.9 FEVER, UNSPECIFIED FEVER CAUSE: ICD-10-CM

## 2020-03-25 DIAGNOSIS — D80.3 IGG DEFICIENCY: ICD-10-CM

## 2020-03-25 PROCEDURE — U0002 COVID-19 LAB TEST NON-CDC: HCPCS

## 2020-03-26 DIAGNOSIS — R07.81 RIB PAIN ON RIGHT SIDE: ICD-10-CM

## 2020-03-26 RX ORDER — IBUPROFEN 800 MG/1
TABLET ORAL
Qty: 30 TABLET | Refills: 0 | Status: SHIPPED | OUTPATIENT
Start: 2020-03-26 | End: 2020-07-08

## 2020-03-27 ENCOUNTER — PATIENT MESSAGE (OUTPATIENT)
Dept: FAMILY MEDICINE | Facility: CLINIC | Age: 50
End: 2020-03-27

## 2020-03-27 LAB — SARS-COV-2 RNA RESP QL NAA+PROBE: NOT DETECTED

## 2020-04-02 DIAGNOSIS — R07.81 RIB PAIN ON RIGHT SIDE: ICD-10-CM

## 2020-04-02 RX ORDER — IBUPROFEN 800 MG/1
TABLET ORAL
Qty: 30 TABLET | Refills: 0 | OUTPATIENT
Start: 2020-04-02

## 2020-04-29 DIAGNOSIS — I10 ESSENTIAL HYPERTENSION: ICD-10-CM

## 2020-04-29 RX ORDER — INDAPAMIDE 1.25 MG/1
TABLET ORAL
Qty: 30 TABLET | Refills: 32 | Status: SHIPPED | OUTPATIENT
Start: 2020-04-29 | End: 2021-05-25

## 2020-05-19 ENCOUNTER — PATIENT OUTREACH (OUTPATIENT)
Dept: OTHER | Facility: OTHER | Age: 50
End: 2020-05-19

## 2020-05-27 NOTE — PROGRESS NOTES
Allergy Clinic Note  [facility]    Subjective:      Chief Complaint: Follow-up (SQIG Hizentra)    Allergy Problem List  Common variable immunodeficiency          On Hizentra since June 2018,  200 mg/kg weekly  Nonallergic rhinitis         normal CT sinus    History of Present Illness: Kianna Zuleta is a 49 y.o. female with common variable immunodeficiency (CVID) who returns at my request to follow up. She was last seen May 2019.    Related medications  Hizentra 10gram/50ml (20% soln):  200mg/kg every week.                     Started June 2018  DuoNeb p.r.n. -- not needing  Albuterol p.r.n. - no needing  Azelastine -- uses rarely  Xyzal 5 mg - not needing  (beta-blocker)    Pt continues to do well on Hizentra.  She reports one febrile illness (COVID negative) but no recent sinus infections or antibiotics.  She is very happy with her improvement on immunoglobulin replacement and would like to continue.      Her only complaint to day is frquent aphthous ulcers.    Additional History:   Interval history is unremarkable.   Past medical history is significant for malignant carcinoid tumor of the appendix, hypertension, and thyroid disease.  She is status post sinus surgery.  She has not had tonsillectomy, adenoidectomy or PE tubes.  Family history is significant for hypertension.  Client reports  reports that she has never smoked. She has never used smokeless tobacco.  Exposures are notable for elementary age schoolchildren and shelly books.  No exposure to pets, smoke, molds, or unusual substances.   Past medical, family, and social histories are unchanged.      Patient Active Problem List   Diagnosis    Vitamin D deficiency    Hypothyroidism    Irritable bowel syndrome    Elevated alkaline phosphatase level    Malignant carcinoid tumor of appendix    Fever    Elevated sed rate    CRP elevated    Hypertension    Morbid obesity    Abnormal CT of the chest    Trigeminal neuralgia of right side  of face    IgG deficiency    POTS (postural orthostatic tachycardia syndrome)     Current Outpatient Medications on File Prior to Visit   Medication Sig Dispense Refill    albuterol (PROVENTIL/VENTOLIN HFA) 90 mcg/actuation inhaler Inhale 2 puffs into the lungs every 6 (six) hours as needed for Wheezing. Rescue 18 g 12    azelastine (ASTELIN) 137 mcg (0.1 %) nasal spray 2 SPRAYS each nostril Q 12 H PRN 30 mL 6    dicyclomine (BENTYL) 10 MG capsule Take by mouth.      ergocalciferol (ERGOCALCIFEROL) 50,000 unit Cap TAKE 1 CAPSULE BY MOUTH EVERY 7 DAYS 12 capsule 3    estradioL (ESTRACE) 2 MG tablet Take 1 tablet (2 mg total) by mouth once daily. 30 tablet 2    gabapentin (NEURONTIN) 300 MG capsule Take 600 mg by mouth 2 (two) times daily. 2-300 mg cap in AM and 3-300 mg cap in PM      hydrOXYzine HCl (ATARAX) 25 MG tablet Take 1 tablet (25 mg total) by mouth 3 (three) times daily. 45 tablet 1    indapamide (LOZOL) 1.25 MG Tab TAKE 1 TABLET BY MOUTH ONCE DAILY 30 tablet 32    levocetirizine (XYZAL) 5 MG tablet TK 1 T PO  ONCE Day  9    metoprolol tartrate (LOPRESSOR) 50 MG tablet Take 50 mg by mouth 4 (four) times daily. 1 Tablet Oral Three times a day      montelukast (SINGULAIR) 10 mg tablet 10 mg once daily.       ondansetron (ZOFRAN-ODT) 4 MG TbDL Take 1 tablet (4 mg total) by mouth every 8 (eight) hours as needed (nausea). 20 tablet 0    oxyCODONE-acetaminophen (PERCOCET)  mg per tablet TK 1 T PO Q 6 H PRN  0    tiZANidine 4 mg Cap TK 2 TO 3 C XD PO TID WF  0    valsartan (DIOVAN) 40 MG tablet Take 1 tablet (40 mg total) by mouth every 12 (twelve) hours. 30 tablet 3    vitamin D (VITAMIN D3) 1000 units Tab Take 1,000 Units by mouth once daily.      vortioxetine (TRINTELLIX) 10 mg Tab take 1 tablet (10MG)  by oral route  every day at the same time each day      zaleplon (SONATA) 10 MG capsule Take 10 mg by mouth once daily.  2    [DISCONTINUED] immun glob G,IgG,-pro-IgA 0-50 (HIZENTRA)  "10 gram/50 mL (20 %) Soln Inject 200 mg/kg into the skin every 7 days. 4 vial 5    BD LUER-NANCY SYRINGE 3 mL 22 gauge x 1" Syrg INJECT QD FOR 5 DAYS THEN WEEKLY FOR 5 WEEKS THEN MONTHLY  3    EPINEPHrine (AUVI-Q) 0.3 mg/0.3 mL AtIn Inject 0.3 mLs (0.3 mg total) into the muscle once. 2 Device 0    ibuprofen (ADVIL,MOTRIN) 800 MG tablet TAKE 1 TABLET(800 MG) BY MOUTH THREE TIMES DAILY FOR 10 DAYS 30 tablet 0    promethazine-codeine 6.25-10 mg/5 ml (PHENERGAN WITH CODEINE) 6.25-10 mg/5 mL syrup TK 5 ML PO Q 4 H PRN COU  1    promethazine-phenylephrine (PROMETHAZINE VC) 6.25-5 mg/5 mL Syrp Take by mouth.      QUEtiapine (SEROQUEL) 25 MG Tab TK 1 AND 1/2 TS PO QPM  0    syringe with needle, safety 3 mL 22 gauge x 1 1/2" Syrg B12 injection every day for 5 days then weekly for 5 weeks then monthly. 50 Syringe 3    valACYclovir (VALTREX) 1000 MG tablet Take 1 tablet (1,000 mg total) by mouth 3 (three) times daily. for 7 days 21 tablet 0     No current facility-administered medications on file prior to visit.          Review of Systems   Constitutional: Negative for chills and fever.   HENT: Negative for ear discharge and nosebleeds.    Eyes: Negative for discharge and redness.   Respiratory: Negative for hemoptysis, sputum production, shortness of breath, wheezing and stridor.    Cardiovascular: Negative for chest pain and palpitations.   Gastrointestinal: Negative for blood in stool, melena and vomiting.   Genitourinary: Negative for dysuria and hematuria.   Musculoskeletal: Positive for back pain. Negative for joint pain and myalgias.   Skin: Negative for itching and rash.   Neurological: Negative for seizures and loss of consciousness.       Objective:   Temp 97.2 °F (36.2 °C) (Skin)   Ht 5' 4" (1.626 m)   Wt 125.5 kg (276 lb 10.8 oz)   BMI 47.49 kg/m²       Physical Exam   Constitutional: She is well-developed, well-nourished, and in no distress.   HENT:   Head: Normocephalic and atraumatic.   Nose: Nose " normal.   Mouth/Throat: No oropharyngeal exudate.   Tympanic membranes clear bilaterally.  Nares pink with no significant turbinates swelling.  Oropharynx benign without exudate.  Tongue is not coated.   Eyes: Conjunctivae are normal. No scleral icterus.   Neck: Neck supple.   Cardiovascular: Normal rate, regular rhythm, normal heart sounds and intact distal pulses.   Pulmonary/Chest: Effort normal and breath sounds normal. No stridor. No respiratory distress. She has no wheezes.   Abdominal: Soft. She exhibits no distension. There is no tenderness.   Liver aprox 12 cm by scratch.  No hepatic or splenic tenderness.   Musculoskeletal: She exhibits no edema or deformity.   Lymphadenopathy:     She has no cervical adenopathy.   Neurological: She is alert. GCS score is 15.   Skin: No rash noted. No erythema.   Psychiatric: Memory and affect normal.       Data:   IgG 600 (01/25/2018)  Poor response to pneumococcal vaccines x2    Immunoglobulins before replacement  Component      Latest Ref Rng & Units 1/22/2018 1/11/2018   IgG - Serum      650 - 1600 mg/dL 600 (L) 557 (L)   IgA      40 - 350 mg/dL 343 347   IgM      50 - 300 mg/dL 78 90       Pneumococcal titers  Component      Latest Ref Rng & Units 4/6/2018 3/1/2018   S.pneumoniae Type 1      mcg/mL 1.2 1.1   S.pneumoniae Type 3      mcg/mL 3.5 3.3   Strep pneumo Type 4      mcg/mL <0.3 <0.3   S.pneumoniae Type 5      mcg/mL <0.3 <0.3   S.pneumoniae Type 8      mcg/mL <0.3 <0.3   S.pneumoniae Type 9N      mcg/mL 1.0 0.8   S.pneumoniae Type 12F      mcg/mL 0.3 <0.3   Strep pneumo Type 14      mcg/mL 0.4 0.5   S.pneumoniae Type 19F      mcg/mL 0.6 0.6   S.pneumoniae Type 23F      mcg/mL <0.3 <0.3   S.pneumoniae Type 6B      mcg/mL 0.6 0.9   S.pneumoniae Type 7F      mcg/mL <0.3 0.3   S.pneumoniae Type 18C      mcg/mL <0.3 <0.3   S.pneumoniae Type 9V Abs      mcg/mL <0.3 <0.3   Status post pneumococcal polysaccharide vaccine 01/22/2018  Status post pneumococcal protein  "conjugated vaccine 03/16/2018    Other immune titers  Normal diphtheria and Hib titers (01/22/2018  Low rubella IgG (120 to/2018), now normal (04/06/2018)  Normal varicella IgG (01/22/2018    Lymphocyte subsets (04/06/2018)  notable for low B cells at 61 (normal 100-500).  She also had decrease number and proportion of NK cell at (41, normal ).  CD4 and CD8 T-cell numbers were normal    Other labs  CBC unremarkable (11/02/2018) with Eo count 100  IgG subsets (01/11/2018) low IgG 1, IgG 2, and IgG 3    Aeroallergen testing by the Immunocap method (122/2018) was entirely negative.  Total serum IgE < 35     Chest x-ray (PA and lateral, 10/24/2017):  Normal    CT sinus (01/22/2018): "Unremarkable noncontrast CT of the paranasal sinuses without significant paranasal sinus opacification or air-fluid level."         Assessment:     1. Need for prophylactic immunotherapy (immunoglobulin)    2. Common variable immunodeficiency    3. Nonallergic rhinitis        Plan:     Medical decision making:  Ms. Zuleta's common variable immune deficiency has responded very well to replacement immunoglobulin.  Her IgG level is good on treatment.     CVID  Renew authorization for Hizentra at same dose.  200mg/kg --> 1 gram SQ ever 7 days.      Patient Instructions   Return 6 months or sooner if needed.      Follow up in about 6 months (around 11/29/2020).    Kaylin Miller MD     Coordination of care (renewal of Hizentra authorization) > 50%    "

## 2020-05-29 ENCOUNTER — OFFICE VISIT (OUTPATIENT)
Dept: ALLERGY | Facility: CLINIC | Age: 50
End: 2020-05-29
Payer: COMMERCIAL

## 2020-05-29 VITALS — HEIGHT: 64 IN | TEMPERATURE: 97 F | WEIGHT: 276.69 LBS | BODY MASS INDEX: 47.24 KG/M2

## 2020-05-29 DIAGNOSIS — J31.0 NONALLERGIC RHINITIS: ICD-10-CM

## 2020-05-29 DIAGNOSIS — Z29.89 NEED FOR PROPHYLACTIC IMMUNOTHERAPY: Primary | ICD-10-CM

## 2020-05-29 DIAGNOSIS — D83.9 COMMON VARIABLE IMMUNODEFICIENCY: ICD-10-CM

## 2020-05-29 PROCEDURE — 3008F PR BODY MASS INDEX (BMI) DOCUMENTED: ICD-10-PCS | Mod: CPTII,S$GLB,, | Performed by: STUDENT IN AN ORGANIZED HEALTH CARE EDUCATION/TRAINING PROGRAM

## 2020-05-29 PROCEDURE — 3008F BODY MASS INDEX DOCD: CPT | Mod: CPTII,S$GLB,, | Performed by: STUDENT IN AN ORGANIZED HEALTH CARE EDUCATION/TRAINING PROGRAM

## 2020-05-29 PROCEDURE — 99214 OFFICE O/P EST MOD 30 MIN: CPT | Mod: S$GLB,,, | Performed by: STUDENT IN AN ORGANIZED HEALTH CARE EDUCATION/TRAINING PROGRAM

## 2020-05-29 PROCEDURE — 99999 PR PBB SHADOW E&M-EST. PATIENT-LVL III: ICD-10-PCS | Mod: PBBFAC,,, | Performed by: STUDENT IN AN ORGANIZED HEALTH CARE EDUCATION/TRAINING PROGRAM

## 2020-05-29 PROCEDURE — 99214 PR OFFICE/OUTPT VISIT, EST, LEVL IV, 30-39 MIN: ICD-10-PCS | Mod: S$GLB,,, | Performed by: STUDENT IN AN ORGANIZED HEALTH CARE EDUCATION/TRAINING PROGRAM

## 2020-05-29 PROCEDURE — 99999 PR PBB SHADOW E&M-EST. PATIENT-LVL III: CPT | Mod: PBBFAC,,, | Performed by: STUDENT IN AN ORGANIZED HEALTH CARE EDUCATION/TRAINING PROGRAM

## 2020-06-30 ENCOUNTER — OFFICE VISIT (OUTPATIENT)
Dept: OBSTETRICS AND GYNECOLOGY | Facility: CLINIC | Age: 50
End: 2020-06-30
Payer: COMMERCIAL

## 2020-06-30 VITALS
WEIGHT: 277.31 LBS | BODY MASS INDEX: 47.34 KG/M2 | SYSTOLIC BLOOD PRESSURE: 122 MMHG | HEIGHT: 64 IN | DIASTOLIC BLOOD PRESSURE: 70 MMHG

## 2020-06-30 DIAGNOSIS — Z12.31 VISIT FOR SCREENING MAMMOGRAM: ICD-10-CM

## 2020-06-30 DIAGNOSIS — N95.1 MENOPAUSAL SYMPTOMS: ICD-10-CM

## 2020-06-30 DIAGNOSIS — Z01.419 ENCOUNTER FOR GYNECOLOGICAL EXAMINATION WITHOUT ABNORMAL FINDING: Primary | ICD-10-CM

## 2020-06-30 PROCEDURE — 99396 PR PREVENTIVE VISIT,EST,40-64: ICD-10-PCS | Mod: S$GLB,,, | Performed by: OBSTETRICS & GYNECOLOGY

## 2020-06-30 PROCEDURE — 99999 PR PBB SHADOW E&M-EST. PATIENT-LVL III: CPT | Mod: PBBFAC,,, | Performed by: OBSTETRICS & GYNECOLOGY

## 2020-06-30 PROCEDURE — 3078F DIAST BP <80 MM HG: CPT | Mod: CPTII,S$GLB,, | Performed by: OBSTETRICS & GYNECOLOGY

## 2020-06-30 PROCEDURE — 99396 PREV VISIT EST AGE 40-64: CPT | Mod: S$GLB,,, | Performed by: OBSTETRICS & GYNECOLOGY

## 2020-06-30 PROCEDURE — 99999 PR PBB SHADOW E&M-EST. PATIENT-LVL III: ICD-10-PCS | Mod: PBBFAC,,, | Performed by: OBSTETRICS & GYNECOLOGY

## 2020-06-30 PROCEDURE — 3078F PR MOST RECENT DIASTOLIC BLOOD PRESSURE < 80 MM HG: ICD-10-PCS | Mod: CPTII,S$GLB,, | Performed by: OBSTETRICS & GYNECOLOGY

## 2020-06-30 PROCEDURE — 3074F PR MOST RECENT SYSTOLIC BLOOD PRESSURE < 130 MM HG: ICD-10-PCS | Mod: CPTII,S$GLB,, | Performed by: OBSTETRICS & GYNECOLOGY

## 2020-06-30 PROCEDURE — 3074F SYST BP LT 130 MM HG: CPT | Mod: CPTII,S$GLB,, | Performed by: OBSTETRICS & GYNECOLOGY

## 2020-06-30 RX ORDER — VALSARTAN 80 MG/1
80 TABLET ORAL
COMMUNITY
Start: 2020-04-15 | End: 2021-04-07

## 2020-06-30 RX ORDER — QUETIAPINE FUMARATE 50 MG/1
TABLET, FILM COATED ORAL
COMMUNITY
Start: 2020-05-23

## 2020-06-30 RX ORDER — ESTRADIOL 2 MG/1
2 TABLET ORAL DAILY
Qty: 90 TABLET | Refills: 3 | Status: SHIPPED | OUTPATIENT
Start: 2020-06-30 | End: 2020-09-03

## 2020-06-30 RX ORDER — VORTIOXETINE 20 MG/1
TABLET, FILM COATED ORAL
COMMUNITY
Start: 2020-05-03

## 2020-06-30 NOTE — PROGRESS NOTES
Well-woman exam    Kianna Zuleta is a 50 y.o. year old  who presents for annual exam.  She is S/P BUBBA/BSO.  Patient has no gynecologic complaints today.    Chart reviewed    Past Medical History:   Diagnosis Date    Allergy     seasonal    Anxiety     Bulging disc neck and back    Depression     Elevated alkaline phosphatase level 2013    Hypothyroidism 2012    Interstitial cystitis     Irritable bowel syndrome 2012    Malignant carcinoid tumor of the appendix 2005    carcinoid    MVP (mitral valve prolapse)     Pneumonia     POTS (postural orthostatic tachycardia syndrome)     Recurrent upper respiratory infection (URI)     Ulcer     Vitamin D deficiency disease 2012       Past Surgical History:   Procedure Laterality Date    ANKLE SURGERY      lt    APPENDECTOMY      BACK SURGERY      CHOLECYSTECTOMY      choley & ovarian cyst removed  2005    cystoscope      multiple    HYSTERECTOMY  2004    BUBBA BS&O     interstim      OOPHORECTOMY  2004    with hysterectomy     PELVIC LAPAROSCOPY      GA EXPLORATORY OF ABDOMEN      SINUS SURGERY  2016    SPINE SURGERY         OB History        0    Para        Term   0            AB        Living           SAB        TAB        Ectopic        Multiple        Live Births                     ROS:  GENERAL: Feeling well overall.   SKIN: Denies rash or lesions.   HEAD: Denies head injury or headache.   NODES: Denies enlarged lymph nodes.   CHEST: Denies chest pain or shortness of breath.   CARDIOVASCULAR: Denies palpitations or left sided chest pain.   ABDOMEN: No abdominal pain, nausea, vomiting or rectal bleeding.   URINARY: No dysuria, hematuria, or burning on urination.  REPRODUCTIVE: See HPI.   BREASTS: Denies pain, lumps, or nipple discharge.   HEMATOLOGIC: No easy bruisability or excessive bleeding.   MUSCULOSKELETAL: Denies joint pain or swelling.   NEUROLOGIC: Denies syncope or  weakness.   PSYCHIATRIC: Denies depression.    PE:   APPEARANCE: Well nourished, well developed, in no acute distress.  NECK: Neck symmetric without masses or thyromegaly.  NODES: No inguinal lymph node enlargement.  ABDOMEN: Soft. No tenderness or masses. No hernias.  BREASTS: Symmetrical, no skin changes or visible lesions. No palpable masses, nipple discharge or adenopathy bilaterally.  PELVIC: Normal external female genitalia without lesions. Normal hair distribution. Adequate perineal body, normal urethral meatus. Vagina mildly atrophic without lesions or discharge. No significant cystocele or rectocele. Uterus and cervix surgically absent. Bimanual exam revealed no masses, tenderness or abnormality.  ANUS: Normal.    Diagnosis:  1. Encounter for gynecological examination without abnormal finding    2. Menopausal symptoms    3. Visit for screening mammogram        PLAN:  Age specific counseling    Screening mammogram ordered today    Patient was counseled today on postmenopausal issues.  Risk and benefits to ERT reviewed.  Patient to continue ERT (Estrace 2 mg by mouth per day).    Follow-up in 1 year.    Alan Borrero IV, MD

## 2020-07-08 ENCOUNTER — LAB VISIT (OUTPATIENT)
Dept: LAB | Facility: HOSPITAL | Age: 50
End: 2020-07-08
Attending: INTERNAL MEDICINE
Payer: COMMERCIAL

## 2020-07-08 ENCOUNTER — OFFICE VISIT (OUTPATIENT)
Dept: FAMILY MEDICINE | Facility: CLINIC | Age: 50
End: 2020-07-08
Payer: COMMERCIAL

## 2020-07-08 VITALS
BODY MASS INDEX: 47.27 KG/M2 | DIASTOLIC BLOOD PRESSURE: 86 MMHG | SYSTOLIC BLOOD PRESSURE: 122 MMHG | WEIGHT: 276.88 LBS | HEIGHT: 64 IN | OXYGEN SATURATION: 96 % | HEART RATE: 95 BPM | TEMPERATURE: 98 F

## 2020-07-08 DIAGNOSIS — G90.A POTS (POSTURAL ORTHOSTATIC TACHYCARDIA SYNDROME): ICD-10-CM

## 2020-07-08 DIAGNOSIS — E66.01 MORBID OBESITY: ICD-10-CM

## 2020-07-08 DIAGNOSIS — I10 ESSENTIAL HYPERTENSION: ICD-10-CM

## 2020-07-08 DIAGNOSIS — E03.9 HYPOTHYROIDISM, UNSPECIFIED TYPE: ICD-10-CM

## 2020-07-08 DIAGNOSIS — D80.3 IGG DEFICIENCY: ICD-10-CM

## 2020-07-08 DIAGNOSIS — Z20.9 EXPOSURE TO COMMUNICABLE DISEASE: Primary | ICD-10-CM

## 2020-07-08 DIAGNOSIS — C7A.020 MALIGNANT CARCINOID TUMOR OF APPENDIX: ICD-10-CM

## 2020-07-08 LAB — SARS-COV-2 IGG SERPLBLD QL IA.RAPID: NEGATIVE

## 2020-07-08 PROCEDURE — 3074F PR MOST RECENT SYSTOLIC BLOOD PRESSURE < 130 MM HG: ICD-10-PCS | Mod: CPTII,S$GLB,, | Performed by: INTERNAL MEDICINE

## 2020-07-08 PROCEDURE — 36415 COLL VENOUS BLD VENIPUNCTURE: CPT | Mod: PO

## 2020-07-08 PROCEDURE — 3074F SYST BP LT 130 MM HG: CPT | Mod: CPTII,S$GLB,, | Performed by: INTERNAL MEDICINE

## 2020-07-08 PROCEDURE — 99999 PR PBB SHADOW E&M-EST. PATIENT-LVL IV: CPT | Mod: PBBFAC,,, | Performed by: INTERNAL MEDICINE

## 2020-07-08 PROCEDURE — 86769 SARS-COV-2 COVID-19 ANTIBODY: CPT

## 2020-07-08 PROCEDURE — 3079F DIAST BP 80-89 MM HG: CPT | Mod: CPTII,S$GLB,, | Performed by: INTERNAL MEDICINE

## 2020-07-08 PROCEDURE — 3079F PR MOST RECENT DIASTOLIC BLOOD PRESSURE 80-89 MM HG: ICD-10-PCS | Mod: CPTII,S$GLB,, | Performed by: INTERNAL MEDICINE

## 2020-07-08 PROCEDURE — 99999 PR PBB SHADOW E&M-EST. PATIENT-LVL IV: ICD-10-PCS | Mod: PBBFAC,,, | Performed by: INTERNAL MEDICINE

## 2020-07-08 PROCEDURE — 99214 OFFICE O/P EST MOD 30 MIN: CPT | Mod: S$GLB,,, | Performed by: INTERNAL MEDICINE

## 2020-07-08 PROCEDURE — 3008F BODY MASS INDEX DOCD: CPT | Mod: CPTII,S$GLB,, | Performed by: INTERNAL MEDICINE

## 2020-07-08 PROCEDURE — 99214 PR OFFICE/OUTPT VISIT, EST, LEVL IV, 30-39 MIN: ICD-10-PCS | Mod: S$GLB,,, | Performed by: INTERNAL MEDICINE

## 2020-07-08 PROCEDURE — 3008F PR BODY MASS INDEX (BMI) DOCUMENTED: ICD-10-PCS | Mod: CPTII,S$GLB,, | Performed by: INTERNAL MEDICINE

## 2020-07-08 RX ORDER — CHOLESTYRAMINE LIGHT 4 G/5.7G
POWDER, FOR SUSPENSION ORAL
COMMUNITY
Start: 2020-07-02 | End: 2022-11-22

## 2020-07-08 NOTE — PROGRESS NOTES
Chief complaint discuss exposure issues      50-year-old white female with irritable bowel syndrome and hypothyroidism.  She also has an immuno deficiency.  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  She will be going back to school soon.  She is concerned about her exposure to the COVID virus and any precautions she should take.  We discussed wearing a mask at all times and probably a face shield and discussed contact precautions with using washing hands and hand  and so forth.  Apparently young children will not be required to wear a mask.  We did obviously discussed that the more people she is around the more exposure she will have but may well be unavoidable in her situation but she should be perfectly safe if she does not touch her face, wash her hands and wear a mask as well as a face shield.  She did have an illness back in March leading her to be tested for COVID which was negative.  We did discuss the false negative rate and at this point would be good for her to know if she is antibody positive her not.  She was counseled at length and left more reassured.Total time over 25 minutes with over 50% counseling.   She is working as an .       She continues to follow with her GYN.  She follows with GI as well    ROS:   CONST: weight stable. EYES: no vision change. ENT: no sore throat. CV: no chest pain w/ exertion. RESP: no shortness of breath. GI: no nausea, vomiting, diarrhea. No dysphagia. : no urinary issues. MUSCULOSKELETAL: no new myalgias or arthralgias. SKIN: no new changes. NEURO: no focal deficits. PSYCH: no new issues. ENDOCRINE: no polyuria. HEME: no lymph nodes. ALLERGY: no general pruritis.    PAST MEDICAL HISTORY:                                                        1. Intersitial cystitis.                                                     2. Fibromyalgia.                                                             3. Mitral  valve prolapse.                                                    4. IBS. Dr Rowe , now Dr. Mckeon                                                                 5. L-spine disk bulging.  L4-5 -Dr Singh at                                                    6. Total hysterectomy in 2004.   7.  Hypothyroid.                                                             8.  Incidental carcinoid tumor of the appendix.                              9.  Now on gluten-free diet.                                                 10.  Vitamin D deficiency, followed by outside Rheumatology.                  11.  Neurogenic bladder, followed by outside urologist. Ranjeet Vasquez  12.  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 Trigeminal neuralgia of right side of face  14. POTS (postural orthostatic tachycardia syndrome)  15. HTN          PAST SURGICAL HISTORY: Laparotomy x5 Left pelvic mass 12/20/05, pelvic pain 07/31/03, pelvic pain 06/13/02, endometriosis Laparoscopy 10/01,BUBBA and BS&O 04/08/04, Lap cholecystectomy 12/20/05, Appendectomy 12/20/05,Letitia surgery 5/08,Placement of new right InterStim lead and Ablation 1/09, Da Char-assisted lysis of severe adhesions, resection of severe adhesions/possible ovarian remnant.    SOCIAL HISTORY: Never smoked. The patient seldom drinks alcohol. Doesn't use recreational drugs. Lives alone. Single.     FAMILY HISTORY: The patient's family members had Ovarian cancer.No family history of breast cancer. No family history of colon cancer    Vitals as above,   Gen: no distress  Diffuse redness to the skin which is usual for her      Kianna was seen today for health questions.    Diagnoses and all orders for this visit:    Exposure to communicable disease, discussed ways to reduce exposure    IgG deficiency, likely does place her at higher risk for certain infections    Essential hypertension, chronic and stable    Hypothyroidism, unspecified  "type, chronic and stable    Malignant carcinoid tumor of appendix, has not made follow-up for blood work and sometime admittedly but apparently none of her blood work showed any signs of metastatic carcinoid.  She does have chronic diarrhea and the redness to her skin which apparently are not related to carcinoid but we did discuss that    POTS (postural orthostatic tachycardia syndrome)    Morbid obesity    Other orders  -     COVID-19 (SARS CoV-2) IgG Antibody; Future       Clinical no be sensitive based on her expressed anxiety referral to the issues above"This note will not be shared with the patient."    Answers for HPI/ROS submitted by the patient on 7/8/2020   activity change: No  unexpected weight change: No  neck pain: No  hearing loss: No  rhinorrhea: No  trouble swallowing: No  eye discharge: No  visual disturbance: No  chest tightness: No  wheezing: No  chest pain: No  palpitations: No  blood in stool: No  constipation: No  vomiting: No  diarrhea: No  polydipsia: No  polyuria: No  difficulty urinating: No  hematuria: No  menstrual problem: No  dysuria: No  joint swelling: No  arthralgias: No  headaches: No  weakness: No  confusion: No  dysphoric mood: No    "

## 2020-07-16 RX ORDER — HYDROXYZINE HYDROCHLORIDE 25 MG/1
25 TABLET, FILM COATED ORAL 3 TIMES DAILY
Qty: 45 TABLET | Refills: 1 | Status: SHIPPED | OUTPATIENT
Start: 2020-07-16 | End: 2023-04-21 | Stop reason: SDUPTHER

## 2020-07-27 ENCOUNTER — TELEPHONE (OUTPATIENT)
Dept: ALLERGY | Facility: CLINIC | Age: 50
End: 2020-07-27

## 2020-07-27 NOTE — TELEPHONE ENCOUNTER
[facility] placed on Dr. Miller's desk at [facility] to review.

## 2020-07-27 NOTE — TELEPHONE ENCOUNTER
Hizentra rx, along with all clinicals, labs and facesheet faxed to "[employer] [phone]. This was sent to renew authorization.

## 2020-07-27 NOTE — TELEPHONE ENCOUNTER
Current rx for Hizentra was written for 1 gram weekly. Patient has been on 14grams weekly.  Can you re write Hizentra 14 grams weekly and print out and give to me so I can fax to [employer].

## 2020-07-27 NOTE — TELEPHONE ENCOUNTER
----- Message from Lolis Moe sent at 7/27/2020 10:45 AM CDT -----   Name of Who is Calling:     What is the request in detail:request call back in reference to clarify order that has been faxed over  Please contact to further discuss and advise      Can the clinic reply by [facility]: no     What Number [facility] Back [facility]  bio albert / adrianne / [phone]

## 2020-07-29 ENCOUNTER — OFFICE VISIT (OUTPATIENT)
Dept: HEPATOLOGY | Facility: CLINIC | Age: 50
End: 2020-07-29
Payer: COMMERCIAL

## 2020-07-29 ENCOUNTER — LAB VISIT (OUTPATIENT)
Dept: LAB | Facility: HOSPITAL | Age: 50
End: 2020-07-29
Payer: COMMERCIAL

## 2020-07-29 VITALS
SYSTOLIC BLOOD PRESSURE: 132 MMHG | DIASTOLIC BLOOD PRESSURE: 83 MMHG | HEIGHT: 63 IN | HEART RATE: 86 BPM | RESPIRATION RATE: 20 BRPM | WEIGHT: 280.19 LBS | BODY MASS INDEX: 49.64 KG/M2 | OXYGEN SATURATION: 96 %

## 2020-07-29 DIAGNOSIS — R74.8 ELEVATED ALKALINE PHOSPHATASE LEVEL: ICD-10-CM

## 2020-07-29 DIAGNOSIS — K76.0 FATTY LIVER DISEASE, NONALCOHOLIC: ICD-10-CM

## 2020-07-29 DIAGNOSIS — K76.0 FATTY LIVER DISEASE, NONALCOHOLIC: Primary | ICD-10-CM

## 2020-07-29 LAB
ALBUMIN SERPL BCP-MCNC: 3.5 G/DL (ref 3.5–5.2)
ALP SERPL-CCNC: 115 U/L (ref 55–135)
ALT SERPL W/O P-5'-P-CCNC: 14 U/L (ref 10–44)
AST SERPL-CCNC: 18 U/L (ref 10–40)
BILIRUB DIRECT SERPL-MCNC: 0.1 MG/DL (ref 0.1–0.3)
BILIRUB SERPL-MCNC: 0.2 MG/DL (ref 0.1–1)
ESTIMATED AVG GLUCOSE: 111 MG/DL (ref 68–131)
HBA1C MFR BLD HPLC: 5.5 % (ref 4–5.6)
PROT SERPL-MCNC: 7.6 G/DL (ref 6–8.4)

## 2020-07-29 PROCEDURE — 99999 PR PBB SHADOW E&M-EST. PATIENT-LVL V: ICD-10-PCS | Mod: PBBFAC,,, | Performed by: NURSE PRACTITIONER

## 2020-07-29 PROCEDURE — 99214 PR OFFICE/OUTPT VISIT, EST, LEVL IV, 30-39 MIN: ICD-10-PCS | Mod: S$GLB,,, | Performed by: NURSE PRACTITIONER

## 2020-07-29 PROCEDURE — 99999 PR PBB SHADOW E&M-EST. PATIENT-LVL V: CPT | Mod: PBBFAC,,, | Performed by: NURSE PRACTITIONER

## 2020-07-29 PROCEDURE — 3008F BODY MASS INDEX DOCD: CPT | Mod: CPTII,S$GLB,, | Performed by: NURSE PRACTITIONER

## 2020-07-29 PROCEDURE — 3008F PR BODY MASS INDEX (BMI) DOCUMENTED: ICD-10-PCS | Mod: CPTII,S$GLB,, | Performed by: NURSE PRACTITIONER

## 2020-07-29 PROCEDURE — 80076 HEPATIC FUNCTION PANEL: CPT

## 2020-07-29 PROCEDURE — 3079F DIAST BP 80-89 MM HG: CPT | Mod: CPTII,S$GLB,, | Performed by: NURSE PRACTITIONER

## 2020-07-29 PROCEDURE — 99214 OFFICE O/P EST MOD 30 MIN: CPT | Mod: S$GLB,,, | Performed by: NURSE PRACTITIONER

## 2020-07-29 PROCEDURE — 36415 COLL VENOUS BLD VENIPUNCTURE: CPT

## 2020-07-29 PROCEDURE — 3075F SYST BP GE 130 - 139MM HG: CPT | Mod: CPTII,S$GLB,, | Performed by: NURSE PRACTITIONER

## 2020-07-29 PROCEDURE — 3075F PR MOST RECENT SYSTOLIC BLOOD PRESS GE 130-139MM HG: ICD-10-PCS | Mod: CPTII,S$GLB,, | Performed by: NURSE PRACTITIONER

## 2020-07-29 PROCEDURE — 83036 HEMOGLOBIN GLYCOSYLATED A1C: CPT

## 2020-07-29 PROCEDURE — 3079F PR MOST RECENT DIASTOLIC BLOOD PRESSURE 80-89 MM HG: ICD-10-PCS | Mod: CPTII,S$GLB,, | Performed by: NURSE PRACTITIONER

## 2020-07-29 NOTE — PROGRESS NOTES
Digital Medicine: Clinician Follow-Up    Called patient to follow up on HTN DMP. She states that she has received a warning on her Rhapsody itzel stating that her BP cuff has a leak. She plans to return to work as a  on August 3rd. She states that even with IgG deficiency that she was considered low risk but plans to still wear a mask at work. When asked about hydration status, she states that she knows that she needs to drink more water. She drinks gatorade on the day of her infusions. When asked about her valsartan dose she states that she takes 80 mg QAM and 40 mg QPM.     The history is provided by the patient.   Follow-up reason(s): routine follow up.   Care Team received high BP alert.      Patient is experiencing signs/symptoms of hypotension.  Patient is not experiencing signs/symptoms of hypertension.   She sometimes feels lightheaded at night. Informed her to discuss with PCP if symptoms becoem more frequent.        Last 5 Patient Entered Readings                                      Current 30 Day Average: 118/68     Recent Readings 7/28/2020 7/28/2020 7/20/2020 7/20/2020 7/14/2020    SBP (mmHg) 181 181 73 73 101    DBP (mmHg) 97 97 47 47 59    Pulse 114 114 85 85 84            Depression Screening  Did not address depression screening.    Sleep Apnea Screening    Did not address sleep apnea screening.     Medication Affordability Screening  Did not address medication affordability screening.     Medication Adherence-Medication adherence was assessed.          ASSESSMENT(S)  Patients BP average is 118/68 mmHg, which is at goal. Patient's BP goal is less than or equal to 130/80 per 2017 ACC/AHA Hypertension Guidelines.  Patient has POTS which serves as a clinical outlier to adequately assess BP.     PLAN  Additional monitoring needed:  Continue current therapy: Updated medication list to reflect accurately the way that patient is taking at home.  Placed task for tech support: Created a CRM  for patient to receive a new ihealth BP cuff since her current cuff has a leak.  Provided patient education:    Patient verbalizes understanding. Patient did not express questions or concerns and patient has contact information if needed.          There are no preventive care reminders to display for this patient.      Hypertension Medications             indapamide (LOZOL) 1.25 MG Tab TAKE 1 TABLET BY MOUTH ONCE DAILY    metoprolol tartrate (LOPRESSOR) 50 MG tablet Take 50 mg by mouth 4 (four) times daily. 1 Tablet Oral Three times a day    valsartan (DIOVAN) 80 MG tablet Take 80 mg by mouth in the morning and half tablet (40 mg) in the evening.

## 2020-07-29 NOTE — PROGRESS NOTES
HEPATOLOGY CLINIC VISIT NOTE     REFERRING PROVIDER: Dr. Luis Eduardo Brown    REASON FOR VISIT: H/O Elevated ALP, Fatty Liver Disease    HISTORY: Ms. Zuleta is a 50 y.o. White female here for follow up in the management of fatty liver disease and elevated alk phos, referred by rheum. Labs shown alk phos intermittently elevated since 2005 with persistent elevation since 12/2017. Range from 130s-169. Isoenzymes suggest liver cause. GGT WNL. AMA negative. U/S showed hepatic steatosis. Liver measuring 17.7 cm, spleen 13.5 cm. No ascites. MRI with MRCP in 2019 showed persistent dilatation of the extrahepatic bile ducts, similar when compared to the previous CT and possibly related to the patient's post cholecystectomy status.  No MRCP findings to suggest choledocholithiasis.  No definite extrinsic compressive masses.  No intrahepatic bile duct dilatation. Hepatomegaly and mild hepatic steatosis, with borderline splenomegaly. MRE showed mild hepatic steatosis and minimal to no fibrosis. She denies family history of liver disease. She drinks alcohol, with prior history of heavy use in high school/college, but not currently.  Today in clinic patient is well appearing and has no acute complaints. denies signs of hepatic decompensation including: jaundice, dark urine, abdominal distention, hematemesis, melena, slowed mentation.    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    MVP (mitral valve prolapse)     Pneumonia     POTS (postural orthostatic tachycardia syndrome)     Recurrent upper respiratory infection (URI)     Ulcer     Vitamin D deficiency disease 11/6/2012     Past Surgical History:   Procedure Laterality Date    ANKLE  "SURGERY      lt    APPENDECTOMY      BACK SURGERY      CHOLECYSTECTOMY      choley & ovarian cyst removed  12/2005    cystoscope      multiple    HYSTERECTOMY  4/8/2004    BUBBA BS&O     interstim      OOPHORECTOMY  4/8/2004    with hysterectomy     PELVIC LAPAROSCOPY      SD EXPLORATORY OF ABDOMEN      SINUS SURGERY  03/01/2016    SPINE SURGERY       FAMILY HISTORY: Negative for liver disease    SOCIAL HISTORY:   Teacher, [employer]      Social History     Tobacco Use   Smoking Status Never Smoker   Smokeless Tobacco Never Used       Social History     Substance and Sexual Activity   Alcohol Use No    Frequency: Monthly or less    Drinks per session: 1 or 2    Binge frequency: Never   very rare     Social History     Substance and Sexual Activity   Drug Use No     ROS:   No fever, chills  No chest pain,  dyspnea, cough  No abdominal pain, + nausea, vomiting - relieved with PRN use of Ondansetron.  No skin rashes  No headaches, visual changes  No lower extremity edema  No depression or anxiety    PHYSICAL EXAM:  Friendly White female, in no acute distress; alert and oriented to person, place and time  VITALS: /83 (BP Location: Right arm, Patient Position: Sitting, BP Method: Medium (Automatic))   Pulse 86   Resp 20   Ht 5' 3" (1.6 m)   Wt 127.1 kg (280 lb 3.3 oz)   SpO2 96%   BMI 49.64 kg/m²   HEENT: Sclerae anicteric.   NECK: Supple  CVS: Regular rate and rhythm. No murmurs  LUNGS: Normal respiratory effort. Clear bilaterally  ABDOMEN: Soft, obese abdomen, nontender. No organomegaly or masses palpable. No obvious ascites or hernias. + BS in all 4 quadrants.  SKIN: Warm and dry. No jaundice, No obvious rashes.   EXTREMITIES: No lower extremity edema  NEURO/PSYCH: Normal gate. Memory intact. Thought and speech pattern appropriate. Behavior normal. No depression or anxiety noted.    RECENT LABS:  Lab Results   Component Value Date    WBC 3.83 (L) 11/27/2019    HGB 13.7 11/27/2019    "  11/27/2019     Lab Results   Component Value Date    INR 0.9 01/12/2009     Lab Results   Component Value Date    AST 18 11/27/2019    ALT 13 11/27/2019    BILITOT 0.1 11/27/2019    ALBUMIN 3.7 11/27/2019    ALKPHOS 130 11/27/2019    CREATININE 0.8 11/27/2019    BUN 9 11/27/2019     11/27/2019    K 3.9 11/27/2019     RECENT IMAGING:    MRI ABDOMEN WITH MRCP 7/24/2019:    FINDINGS:  The liver is enlarged.  Hepatic parenchyma demonstrates homogeneous signal intensity without focal lesions.  There is mild parenchymal signal dropout on out of phase imaging suggesting fatty infiltration.  No intrahepatic bile duct dilatation.  Portal venous flow voids are present.     Gallbladder is surgically absent.  Common bile duct is dilated measuring 1 cm in short axis with tapering at the level of the ampulla, unchanged when compared to CT dated 12/07/2017.  No intraductal filling defects to suggest choledocholithiasis.     Stomach, duodenum, pancreas and adrenal glands are within normal limits.     Spleen is enlarged.  Subcentimeter cystic foci noted about the splenic surface, possibly small cyst or loculated fluid.     Kidneys are normal in size and location.  No contour deforming renal masses.  No hydronephrosis or hydroureter.     The small bowel is normal in caliber.  Colon demonstrates no significant abnormalities.     No ascites.  No pleural or pericardial effusions.     The abdominal aorta tapers normally.     There is trace subcutaneous edema.  No marrow signal abnormality to suggest an infiltrative process.     Impression:     1. Status post cholecystectomy.  Persistent dilatation of the extrahepatic bile ducts, similar when compared to the previous CT and possibly related to the patient's post cholecystectomy status.  No MRCP findings to suggest choledocholithiasis.  No definite extrinsic compressive masses.  No intrahepatic bile duct dilatation.  2. Hepatomegaly and mild hepatic steatosis.  3. Borderline  splenomegaly.  4. Additional findings as in the body of the report.    MR Elastography 7/24/2019:    FINDINGS:  The liver is enlarged.  Hepatic parenchyma demonstrates homogeneous signal intensity without focal lesions.  Gallbladder is surgically absent.  Common bile duct is dilated measuring 1 cm in short axis with tapering at the level of the ampulla.  Stomach, duodenum, pancreas and adrenal glands are within normal limits.  The spleen is slightly enlarged.  T2 hyperintense foci are noted about the spleen surface, possibly cysts or trace loculated fluid.  Kidneys are normal in size and location.  Subcentimeter cortical T2 hyperintense lesions noted within both kidneys, too small to accurately characterize but likely cysts.  No hydronephrosis or hydroureter.  No ascites.  No pleural or pericardial effusions.  Visualized small bowel is normal in caliber.  Colon demonstrates no significant abnormalities.  The aorta tapers normally.  Subcutaneous soft tissues are within normal limits.  No marrow signal abnormality to suggest an infiltrative process.     Liver fat fraction measures 5.3%, consistent with mild steatosis.     Liver elasticity measures 2.37 kPa consistent with normal or F0 fibrosis.     Impression:     1. Liver fat fraction measures 5.3%, consistent with mild steatosis.  2. Liver elasticity measures 2.37 kPa consistent with normal or F0 fibrosis.  REFERENCE RANGES:     Fat fraction analysis:     <5%: Normal.     5 to 15%: Mild steatosis.     >15%: Moderate or severe steatosis.     Elastography:     <2.93 kPa: Normal or F0 fibrosis.     2.93 to 4.15 kPa: F1 or F2 fibrosis.     >4.15 kPa: F3 or F4 fibrosis.    US ABDOMEN COMPLETE 7/10/2019:    FINDINGS:  There is an echogenic pancreas without pancreatic masses or pancreatic ductal dilatation.  Normal appearance of the abdominal aorta and the IVC.     The liver measures approximately 17.7 cm in its craniocaudal dimension.  Echogenic liver suggesting hepatic  steatosis.  No intrahepatic biliary ductal dilatation or hepatic masses noted.     Gallbladder is surgically absent.  The extrahepatic common bile duct measures approximately 4 mm and is within normal limits.     The right kidney measures 11.5 cm and the left kidney measures 11.6 cm.  There is no hydronephrosis or renal masses.     Spleen measures approximately 13.5 cm.  No focal splenic masses.  There is no ascites.     Impression:     1. Borderline hepatosplenomegaly.  2. Hepatic steatosis.  3. Status post cholecystectomy.    ASSESSMENT  50 y.o. White female with:  1. Fatty Liver Disease:  -- She also has a history of elevated ALP, and isoenzymes suggest liver cause.   -- However, last ALP in 11/2019 was WNL at 130, and synthetic function remains normal.  -- AMA was negative and GGT was normal.   -- MRE in 7/2019 showed mild hepatic steatosis with minimal to no fibrosis.    PLAN:    1. Labs today to monitor liver function and screen for diabetes.  2. Recommend low carb, low calorie, high fiber diet. Aim for 30-45 grams of carbs or less per meal.   3. Recommend 30 pound weight loss.   4. Recommend aerobic exercise 30-45 minutes most days of the week.  5. Repeat MR Elastography next year to monitor for progression of fibrosis and steatosis in the liver.   6. Return to clinic in 1 year, sooner if needed.     Thank you for allowing me to participate in the care of Kianna Zuleta       Hepatology Nurse Practitioner  [facility]  7/29/2020 @  [id number]

## 2020-07-29 NOTE — PATIENT INSTRUCTIONS
1. Labs today to monitor liver function and screen for diabetes.  2. Recommend low carb, low calorie, high fiber diet. Aim for 30-45 grams of carbs or less per meal.   3. Recommend 30 pound weight loss.   4. Recommend aerobic exercise 30-45 minutes most days of the week.  5. Repeat MR Elastography next year to monitor fibrosis and steatosis in the liver.   6. Return to clinic in 1 year sooner if needed.

## 2020-08-23 ENCOUNTER — TELEPHONE (OUTPATIENT)
Dept: RHEUMATOLOGY | Facility: CLINIC | Age: 50
End: 2020-08-23

## 2020-08-23 DIAGNOSIS — E55.9 VITAMIN D DEFICIENCY: Primary | ICD-10-CM

## 2020-08-25 ENCOUNTER — TELEPHONE (OUTPATIENT)
Dept: NEUROLOGY | Facility: HOSPITAL | Age: 50
End: 2020-08-25

## 2020-08-25 NOTE — TELEPHONE ENCOUNTER
----- Message from Harriett Nayak sent at 8/25/2020 10:10 AM CDT -----  Contact: [phone]/Self  MM  Patient is calling to ask if she can come  her lab work orders today. Please advise.

## 2020-08-25 NOTE — TELEPHONE ENCOUNTER
Returned patients call. Advised patient that she has not been seen in 3 years, and would have to be worked up as a new patient with labs and scans, as patient has not had scans in 2 years or been seen in 3 years. Message routed to navigation team to schedule. Patient advised and has no further questions at this time.

## 2020-08-28 ENCOUNTER — PATIENT MESSAGE (OUTPATIENT)
Dept: OTHER | Facility: OTHER | Age: 50
End: 2020-08-28

## 2020-09-01 DIAGNOSIS — C26.9 MALIGNANT NEOPLASM OF ILL-DEFINED SITES WITHIN THE DIGESTIVE SYSTEM: ICD-10-CM

## 2020-09-02 ENCOUNTER — DOCUMENTATION ONLY (OUTPATIENT)
Dept: NEUROLOGY | Facility: HOSPITAL | Age: 50
End: 2020-09-02

## 2020-09-02 ENCOUNTER — TELEPHONE (OUTPATIENT)
Dept: NEUROLOGY | Facility: HOSPITAL | Age: 50
End: 2020-09-02

## 2020-09-02 NOTE — TELEPHONE ENCOUNTER
----- Message from Lauryn Lara sent at 8/27/2020  9:20 AM CDT -----  Regarding: NEW REFERRAL- Last seen 11/2017  Please call patient and work up as a new patient, as patient has not had scans, labs or appt here in 3 years.   Thanks-   Routing comment     Flor Abbott MA 2 days ago         Returned patients call. Advised patient that she has not been seen in 3 years, and would have to be worked up as a new patient with labs and scans, as patient has not had scans in 2 years or been seen in 3 years. Message routed to navigation team to schedule. Patient advised and has no further questions at this time.

## 2020-09-02 NOTE — TELEPHONE ENCOUNTER
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 Olvera. 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 The patient states she has constant flushing so much so that people think she is sun gilmer as well as diarrhea. Patient has been scheduled for a CT and a clinic appointment in 2 weeks per her request.

## 2020-09-11 DIAGNOSIS — C7A.020 MALIGNANT CARCINOID TUMOR OF APPENDIX: Primary | ICD-10-CM

## 2020-09-14 ENCOUNTER — HOSPITAL ENCOUNTER (OUTPATIENT)
Dept: RADIOLOGY | Facility: HOSPITAL | Age: 50
Discharge: HOME | End: 2020-09-14
Attending: SURGERY
Payer: COMMERCIAL

## 2020-09-14 DIAGNOSIS — C26.9 MALIGNANT NEOPLASM OF ILL-DEFINED SITES WITHIN THE DIGESTIVE SYSTEM: ICD-10-CM

## 2020-09-14 PROCEDURE — 74177 CT ABD & PELVIS W/CONTRAST: CPT | Mod: TC

## 2020-09-14 PROCEDURE — 25500020 PHARM REV CODE 255: Performed by: SURGERY

## 2020-09-14 PROCEDURE — 74177 CT ABD & PELVIS W/CONTRAST: CPT | Mod: 26,,, | Performed by: RADIOLOGY

## 2020-09-14 PROCEDURE — 74177 CT ABDOMEN PELVIS WITH CONTRAST: ICD-10-PCS | Mod: 26,,, | Performed by: RADIOLOGY

## 2020-09-14 RX ADMIN — IOHEXOL 15 ML: 300 INJECTION, SOLUTION INTRAVENOUS at 11:09

## 2020-09-14 RX ADMIN — IOHEXOL 100 ML: 350 INJECTION, SOLUTION INTRAVENOUS at 11:09

## 2020-09-16 ENCOUNTER — PATIENT MESSAGE (OUTPATIENT)
Dept: NEUROLOGY | Facility: HOSPITAL | Age: 50
End: 2020-09-16

## 2020-09-16 ENCOUNTER — OFFICE VISIT (OUTPATIENT)
Dept: NEUROLOGY | Facility: HOSPITAL | Age: 50
End: 2020-09-16
Attending: SURGERY
Payer: COMMERCIAL

## 2020-09-16 DIAGNOSIS — C7A.020 MALIGNANT CARCINOID TUMOR OF APPENDIX: Primary | ICD-10-CM

## 2020-09-16 NOTE — PROGRESS NOTES
NOLANETS:  [facility]  A collaboration between [facility] and [facility]      PATIENT: Kianna Zuleta  MRN: [id number]  DATE: 9/16/2020    Subjective:      Chief Complaint: appendiceal carcinoid, lost to follow up     The patient location is: home  The chief complaint leading to consultation is: history of appendiceal carcinoid, persistent flushing    Visit type: audiovisual    Face to Face time with patient: 25  40 minutes of total time spent on the encounter, which includes face to face time and non-face to face time preparing to see the patient (eg, review of tests), Obtaining and/or reviewing separately obtained history, Documenting clinical information in the electronic or other health record, Independently interpreting results (not separately reported) and communicating results to the patient/family/caregiver, or Care coordination (not separately reported).     Each patient to whom he or she provides medical services by telemedicine is:  (1) informed of the relationship between the physician and patient and the respective role of any other health care provider with respect to management of the patient; and (2) notified that he or she may decline to receive medical services by telemedicine and may withdraw from such care at any time.    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  It was in the setting of an appendectomy done for other reasons    Interval History:     Pneumonia 2017  Common variable immune deficiency 2018    CT A/P with contrast 9/2020      Vitals: There were no vitals taken for this visit. 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    MVP (mitral valve prolapse)     Pneumonia     POTS (postural orthostatic tachycardia syndrome)     Recurrent upper respiratory infection (URI)     Ulcer     Vitamin D deficiency disease 11/6/2012       Past Surgical History:  Past Surgical History:   Procedure Laterality Date    ANKLE SURGERY      lt    APPENDECTOMY      BACK SURGERY      CHOLECYSTECTOMY      choley & ovarian cyst removed  12/2005    cystoscope      multiple    HYSTERECTOMY  4/8/2004    BUBBA BS&O     interstim      OOPHORECTOMY  4/8/2004    with hysterectomy     PELVIC LAPAROSCOPY      GA EXPLORATORY OF ABDOMEN      SINUS SURGERY  03/01/2016    SPINE SURGERY         Family History:  Family History   Problem Relation Age of Onset    Hypertension Father     Alcohol abuse Brother     Cancer Paternal Uncle     Colon cancer Paternal Uncle     Depression Maternal Grandmother     Hearing loss Maternal Grandmother     Heart disease Maternal Grandmother     Kidney disease Maternal Grandmother     Cancer Paternal Grandmother     Arthritis Paternal Grandfather     Diabetes Paternal Grandfather     Stroke Paternal Grandfather     Breast cancer Neg Hx     Ovarian cancer Neg Hx     Asthma Neg Hx     Emphysema Neg Hx        Allergies:  Benadryl allergy decongestant, Fludrocortisone, Gluten, Diphenhydramine hcl, Hydromorphone hcl, Indomethacin, Penicillins, Sulfa (sulfonamide antibiotics), and Vancomycin analogues    Medications:  Current Outpatient Medications   Medication Sig    azelastine (ASTELIN) 137 mcg (0.1 %) nasal spray 2 SPRAYS each nostril Q 12 H PRN     CHOLESTYRAMINE LIGHT 4 gram packet DIS CNTS IN WATER AND DRK PO BID    dicyclomine (BENTYL) 10 MG capsule Take by mouth.    EPINEPHrine (AUVI-Q) 0.3 mg/0.3 mL AtIn Inject 0.3 mLs (0.3 mg total) into the muscle once.    ergocalciferol (ERGOCALCIFEROL) 50,000 unit Cap TAKE 1 CAPSULE BY MOUTH EVERY 7 DAYS    estradioL (ESTRACE) 2 MG tablet TAKE 1 TABLET BY MOUTH EVERY DAY    gabapentin (NEURONTIN) 300 MG capsule Take 600 mg by mouth 2 (two) times daily. 2-300 mg cap in AM and 3-300 mg cap in PM    hydrOXYzine HCL (ATARAX) 25 MG tablet Take 1 tablet (25 mg total) by mouth 3 (three) times daily.    immun glob G,IgG,-pro-IgA 0-50 (HIZENTRA) 10 gram/50 mL (20 %) Soln Inject 5 mLs (1 g total) into the skin every 7 days.    indapamide (LOZOL) 1.25 MG Tab TAKE 1 TABLET BY MOUTH ONCE DAILY    levocetirizine (XYZAL) 5 MG tablet TK 1 T PO  ONCE Day    metoprolol tartrate (LOPRESSOR) 50 MG tablet Take 50 mg by mouth 4 (four) times daily. 1 Tablet Oral Three times a day    montelukast (SINGULAIR) 10 mg tablet 10 mg once daily.     ondansetron (ZOFRAN-ODT) 4 MG TbDL Take 1 tablet (4 mg total) by mouth every 8 (eight) hours as needed (nausea).    oxyCODONE-acetaminophen (PERCOCET)  mg per tablet TK 1 T PO Q 6 H PRN    QUEtiapine (SEROQUEL) 50 MG tablet TK 1 T PO Q NIGHT    tiZANidine 4 mg Cap TK 2 TO 3 C XD PO TID WF    TRINTELLIX 20 mg Tab TK 1 T PO QAM    valsartan (DIOVAN) 80 MG tablet Take 80 mg by mouth.    VENTOLIN HFA 90 mcg/actuation inhaler INHALE 2 PUFFS INTO LUNGS EVERY 6 HOURS AS NEEDED FOR WHEEZING RESCUE    vitamin D (VITAMIN D3) 1000 units Tab Take 1,000 Units by mouth once daily.    vortioxetine (TRINTELLIX) 10 mg Tab take 1 tablet (10MG)  by oral route  every day at the same time each day    zaleplon (SONATA) 10 MG capsule Take 10 mg by mouth once daily.     No current facility-administered medications for this visit.         Review of Systems   Constitutional: Positive for diaphoresis.  Negative for activity change.        Flushing   HENT: Negative for hearing loss.    Eyes: Negative for photophobia and visual disturbance.   Respiratory: Negative for chest tightness and shortness of breath.    Cardiovascular: Negative for chest pain.   Gastrointestinal: Positive for abdominal distention, nausea and vomiting.   Endocrine: Positive for heat intolerance. Negative for cold intolerance.   Genitourinary: Negative for difficulty urinating and dysuria.   Musculoskeletal: Positive for back pain and myalgias.   Skin: Negative for rash and wound.   Allergic/Immunologic: Positive for immunocompromised state. Negative for food allergies.        Common variable immune deficiency disorder   Neurological: Negative for tremors and weakness.   Hematological: Does not bruise/bleed easily.   Psychiatric/Behavioral: Negative.           Objective:      Physical Exam  Constitutional:       General: She is not in acute distress.     Appearance: She is obese. She is not ill-appearing, toxic-appearing or diaphoretic.   HENT:      Head: Normocephalic.   Pulmonary:      Effort: No respiratory distress.      Breath sounds: No stridor.   Skin:     Coloration: Skin is not jaundiced.      Comments: Flushed complexion   Neurological:      Mental Status: She is alert and oriented to person, place, and time.   Psychiatric:         Mood and Affect: Mood normal.         Behavior: Behavior normal.         Thought Content: Thought content normal.         Judgment: Judgment normal.            Pathology:  12/2005    Laboratory Data:    Neuroendocrine Labs Latest Ref Rng & Units 11/6/2018   EXT 5 HIAA BLOOD 0 - 22 ng/ml 8   EXT GASTRIN 0 - 100 pg/ml    SEROTONIN <=230 ng/mL    EXT SEROTONIN 56 - 244 ng/ml 45 (A)   CHROMOGRANIN A < OR = 15 ng/mL    EXT CHROMOGRANIN A 0 - 15 ng/ml    EXT PANCREASTATIN LINDSAY 10 - 135 pg/ml 52   EXT NEUROKININ A LINDSAY 0 - 40 pg/ml 12     Neuroendocrine Labs Latest Ref Rng & Units 7/29/2020   PROTEIN, TOTAL 6.0 -  8.4 g/dL 7.6   EXT PROTEIN, TOTAL 6.1 - 8.1 g/dl    PHOSPHORUS 2.7 - 4.5 mg/dL    ALBUMIN 3.5 - 5.2 g/dL 3.5   EXT ALBUMIN 3.6 - 5.1 g/dl    TOTAL BILIRUBIN 0.1 - 1.0 mg/dL 0.2   EXT TOTAL BILIRUBIN 0.2 - 1.2 mg/dl    DIRECT BILIRUBIN 0.1 - 0.3 mg/dL 0.1   ALK PHOSPHATASE 55 - 135 U/L 115   EXT ALK PHOSPHATASE 33 - 115 u/l    GGT 8 - 55 U/L    SGOT (AST) 10 - 40 U/L 18   EXT SGOT (AST) 10 - 35 u/l    SGPT (ALT) 10 - 44 U/L 14     Neuroendocrine Labs Latest Ref Rng & Units 11/27/2019   WBC 3.90 - 12.70 K/uL 3.83 (L)   EXT WBC 3.8 - 10.8 1000/ul    RBC 4.00 - 5.40 M/uL 4.70   HGB 12.0 - 16.0 g/dL 13.7   EXT HGB 11.7 - 15.5 g/dl    HCT 37.0 - 48.5 % 42.8   EXT HCT 35.0 - 45.0 %    MCV 82 - 98 fL 91   MCH 27.0 - 31.0 pg 29.1   MCHC 32.0 - 36.0 g/dL 32.0   RDW 11.5 - 14.5 % 13.5   PLATLETS 150 - 350 K/uL 235   EXT PLATLETS 140 - 400 1000/ul    MPV 9.2 - 12.9 fL 9.1 (L)   GRAN # 1.8 - 7.7 K/uL 2.2   LYMPH # 1.0 - 4.8 K/uL 1.2   MONO # 0.3 - 1.0 K/uL 0.2 (L)   EOS # 0.0 - 0.5 K/uL 0.1   BASO # 0.00 - 0.20 K/uL 0.03   GRAN % 38.0 - 73.0 % 58.2   LYMPH % 18.0 - 48.0 % 31.9   MONO % 4.0 - 15.0 % 6.3   EOS % 0.0 - 8.0 % 2.3   BASO % 0.0 - 1.9 % 0.8   DIFF METHOD  Automated   PT 9.0 - 12.5 sec    PTT 21.0 - 32.0 sec    INR 0.8 - 1.2    GLUCOSE 70 - 110 mg/dL 84   EXT GLUCOSE 65 - 99 mg/dl    BUN 6 - 20 mg/dL 9   EXT BUN 7 - 25 mg/dl    CREATININE 0.5 - 1.4 mg/dL 0.8   EXT CREATININE 0.50 - 1.10 mg/dl     - 145 mmol/L 139   EXT  - 146 mmol/l    K 3.5 - 5.1 mmol/L 3.9   EXT K 3.5 - 5.3 mmol/l    CHLORIDE 95 - 110 mmol/L 103   EXT CHLORIDE 98 - 110 mmol/l    CO2 23 - 29 mmol/L 27   EXT CO2 20 - 31 mmol/l    CALCIUM 8.7 - 10.5 mg/dL 9.7   EXT CALCIUM 8.6 - 10.2 mg/dl    PROTEIN, TOTAL 6.0 - 8.4 g/dL 7.9   EXT PROTEIN, TOTAL 6.1 - 8.1 g/dl    PHOSPHORUS 2.7 - 4.5 mg/dL 3.0   ALBUMIN 3.5 - 5.2 g/dL 3.7   EXT ALBUMIN 3.6 - 5.1 g/dl    TOTAL BILIRUBIN 0.1 - 1.0 mg/dL 0.1   EXT TOTAL BILIRUBIN 0.2 - 1.2 mg/dl    DIRECT  BILIRUBIN 0.1 - 0.3 mg/dL    ALK PHOSPHATASE 55 - 135 U/L 130   EXT ALK PHOSPHATASE 33 - 115 u/l    GGT 8 - 55 U/L 24   SGOT (AST) 10 - 40 U/L 18   EXT SGOT (AST) 10 - 35 u/l    SGPT (ALT) 10 - 44 U/L 13     Scans:   CT Abdomen Pelvis With Contrast -9/14/20  Narrative: EXAMINATION:  CT ABDOMEN PELVIS WITH CONTRAST    CLINICAL HISTORY:  Gastrointestinal cancer, surveillance; Malignant neoplasm of ill-defined sites within the digestive system    TECHNIQUE:  Low dose axial images, sagittal and coronal reformations were obtained from the lung bases to the pubic symphysis following the IV administration of 100 mL of Omnipaque 350 and the oral administration of 15 mL of Omnipaque 350 diluted in water.    COMPARISON:  MRI of the abdomen 07/24/2019, CT scan of the abdomen and pelvis 12/07/2017    FINDINGS:  Lung bases are clear.  There is no pleural effusion or pericardial effusions.    There is normal position and contour of the liver.  There is no focal enhancing masses within the liver.  There is mild the central hepatic biliary ductal dilatation, that was also seen on the previous MRI.  It is possible that this is related to the patient's prior history of the cholecystectomy.  No persistent filling defects in the portal vein or the splenic vein or the SMV.    The spleen, the pancreas and the adrenal glands demonstrate no significant abnormalities.  Gallbladder is surgically absent.  There is a small nodule in the splenic hilum, likely representing an accessory spleen.    There are symmetric nephrograms bilaterally.  No abnormally enhancing renal masses.  There is no hydronephrosis or abnormal perinephric stranding of the fat.  Along the course of the ureters no abnormal calcifications are noted.  The visualized urinary bladder demonstrates no significant abnormalities.    Uterus not identified, likely from prior hysterectomy.  There is no adnexal masses.  The visualized portions of the gastrointestinal tract demonstrate  no inflammatory processes or any significant masses.  There is no significant intra-abdominal lymphadenopathy.    Normal tapering of the abdominal aorta without aneurysmal dilatation.  Ischiorectal fossa are symmetric and within normal limits.    In the lumbar spine and the pelvis there is no significant osteoblastic or lytic lesions.  There is degenerative disc disease L5-S1 disc space.  There is no significant abdominal wall hernias identified.  Impression: 1. Status post cholecystectomy.  Mild extrahepatic and central biliary ductal dilatation unchanged from previous study and likely related to patient's prior history of cholecystectomy.  2. Status post hysterectomy.  3. No definite signs for intra-abdominal metastatic disease.  The      MRI ABDOMEN WITHOUT CONTRAST MRCP -7/24/19     CLINICAL HISTORY:  elevated alk phos;  Abnormal levels of other serum enzymes     TECHNIQUE:  Multiplanar, multisequence images are performed through the liver and upper abdomen.  Additionally 2D and 3D MRCP sequences are performed as well as MIP images.     COMPARISON:  Ultrasound 07/10/2019.  CT 12/07/2017.     FINDINGS:  The liver is enlarged.  Hepatic parenchyma demonstrates homogeneous signal intensity without focal lesions.  There is mild parenchymal signal dropout on out of phase imaging suggesting fatty infiltration.  No intrahepatic bile duct dilatation.  Portal venous flow voids are present.     Gallbladder is surgically absent.  Common bile duct is dilated measuring 1 cm in short axis with tapering at the level of the ampulla, unchanged when compared to CT dated 12/07/2017.  No intraductal filling defects to suggest choledocholithiasis.     Stomach, duodenum, pancreas and adrenal glands are within normal limits.     Spleen is enlarged.  Subcentimeter cystic foci noted about the splenic surface, possibly small cyst or loculated fluid.     Kidneys are normal in size and location.  No contour deforming renal masses.  No  hydronephrosis or hydroureter.     The small bowel is normal in caliber.  Colon demonstrates no significant abnormalities.     No ascites.  No pleural or pericardial effusions.     The abdominal aorta tapers normally.     There is trace subcutaneous edema.  No marrow signal abnormality to suggest an infiltrative process.     Impression:     1. Status post cholecystectomy.  Persistent dilatation of the extrahepatic bile ducts, similar when compared to the previous CT and possibly related to the patient's post cholecystectomy status.  No MRCP findings to suggest choledocholithiasis.  No definite extrinsic compressive masses.  No intrahepatic bile duct dilatation.  2. Hepatomegaly and mild hepatic steatosis.  3. Borderline splenomegaly.  4. Additional findings as in the body of the report.      I-123 MIBG abdominal SPECT examination -3/2/18     Clinical History: Flushing.     Technique: 24 hours after the intravenous injection of 10  mCi of I-123 MIBG, abdominal and SPECT acquisition over 360 degrees was carried out with subsequent reconstruction of the projection data into transaxial, sagittal, and coronal images.     Findings: No prior SPECT MIBG examinations are available for comparison. Comparison with prior study from 12/29/17 and 12/22/13 Quality of the study is good.     The patient demonstrates normal biodistribution of the radiolabeled norepinephrine transporter analogue in the abdominal SPECT field of view. Physiologic uptake/excretion of the radiopharmaceutical is evident in the lower lung fields, cardiac sympathetic neuronal structures, liver, and GI tract. No MIBG avid lesions are seen.  IMPRESSION:    Normal abdominal SPECT I-123 MIBG examination. No MIBG avid lesions are seen.      OctreoScan SPECT examinations. -9/26/13    Clinical History: 43-year-old female with malignant carcinoid tumor of the appendix.     Technique: 24 hours after the intravenous injection of 6.0  mCi of In-111 pentetreotide, SPECT  acquisition of projection data was obtained over 360 degrees in the abdomen and pelvis. Subsequently this projection data was reconstructed into transaxial,   sagittal, and coronal images for analysis.     Findings: There are no prior abdominal SPECT examinations available for comparison. Quality of the study is good.     The patient demonstrates no abnormal somatostatin receptor avid sites in the SPECT fields of view, which includes the lung bases, liver, abdomen and pelvis. Physiologic radiotracer excretion/uptake is evident in the liver, spleen, kidneys, and GI tract.      Impression      Normal abdominal and pelvic SPECT OctreoScan examinations.         Echo- 11/9/17  CONCLUSIONS     1 - Normal left ventricular systolic function (EF 60-65%).     2 - Normal left ventricular diastolic function.     3 - Normal right ventricular systolic function     Assessment/Impression:         Problem List Items Addressed This Visit     None           Plan:       I had a long conversation with the patient about the features of her case that support the treatment and surveillance recommendations outlined below:  1.  She had a well-differentiated, appendiceal carcinoid removed with appendectomy in 2005.  Prior to around 2010, the histopathologic features that we currently use had not been established.  It is histopathologic features that drive both surgical and medical decision making in appendiceal carcinoid.  The pathology report suggest that she had a completely resected, small, appendiceal carcinoid at the tip of her appendix.  The favorable aspects of this disease are a slow in indolent biologic behavior, patients live a long time even in the setting of more advanced disease, an unlike other primary sites appendiceal carcinoids are less likely to become metastatic over a patients remaining lifetime.  In in early stage, completely resected primary, follow-up after 5 years would likely be unnecessary if cross-sectional imaging  and blood work show no evidence of disease at that juncture.  With more contemporary histopathologic evaluation, small, grade 1, well-differentiated, appendiceal primaries that are resected with a negative margin often do not need followup past 2 years.  The fact that she is now 15 years out from her original diagnosis with no evidence of disease on her most recent cross-sectional imaging, I believe that she has experienced the favorable aspects of this subtype of neuroendocrine tumor and is unlikely to experience a neuroendocrine specific event related to the appendix.  2.  She had somatostatin receptor PET imaging in 2013 with an octreotide scan.  It showed no evidence of somatostatin receptor active disease at that time.  This would be a pertinent negative in someone who did not have additional surgery to document the angel stage.  There are handful of reasons I would consider getting follow-up somatostatin receptor PET imaging in a patient with a history of a neuroendocrine tumor:  1.  New or worsening symptoms suggestive of a non neuroendocrine syndrome, 2.  Changes in blood work that would suggest a serologic recurrence of disease, or 3.  New findings on cross-sectional imaging, even if that imaging is done for other reasons, that would require putting a history of a neuroendocrine tumor in perspective.    She and I briefly discussed her persistent flushing.  Although I think this is more likely to be related to other medical conditions, patients with a history of neuroendocrine tumors can experience symptoms of carcinoid syndrome despite having all measurable disease removed.  I will order some blood work that would be able to document updated values of the common carcinoid markers.  If the flushing persists and her immunologist does not feel it is related to her common variable immunodeficiency or the treatment of the condition, we can try short-acting somatostatin receptor inhibitor therapy.  In patients  who experience symptoms suggestive of carcinoid syndrome, a large subset of them will get palliation of those symptoms with short-acting octreotide.  The goal of treatment is to suppress the firing of the neuroendocrine system with certain triggers and blunt the manifestation of the syndrome.  Given in short-acting formulations, it is less likely to impact other medical conditions.  I would be reluctant to put someone her age and with her come comorbid conditions on long-acting somatostatin receptor inhibitor therapy since this is associated with changes in carbohydrate metabolism and increased risk of diabetes.    Neuroendocrine blood work  Hold on cross-sectional imaging        Daisy Wilde MD, MPH, FACS  Professor of Surgery, [facility]  Neuroendocrine Surgery, Hepatic/Pancreatic & General Surgical Oncology  [address]  [place]  [address]  ph. [phone]; [phone]  fax. [phone]

## 2020-09-30 ENCOUNTER — PATIENT MESSAGE (OUTPATIENT)
Dept: NEUROLOGY | Facility: HOSPITAL | Age: 50
End: 2020-09-30

## 2020-10-01 ENCOUNTER — PATIENT MESSAGE (OUTPATIENT)
Dept: NEUROLOGY | Facility: HOSPITAL | Age: 50
End: 2020-10-01

## 2020-10-01 ENCOUNTER — PATIENT OUTREACH (OUTPATIENT)
Dept: OTHER | Facility: OTHER | Age: 50
End: 2020-10-01

## 2020-10-01 DIAGNOSIS — I10 ESSENTIAL HYPERTENSION: Primary | ICD-10-CM

## 2020-10-05 ENCOUNTER — PATIENT MESSAGE (OUTPATIENT)
Dept: NEUROLOGY | Facility: HOSPITAL | Age: 50
End: 2020-10-05

## 2020-10-05 NOTE — PATIENT INSTRUCTIONS
Tumor markers: every 3 months  --- due NOW  Orders emailed to patient    Scans: hold on cross-sectional imaging    Virtual Appointment: after labs are done with Dr. Wilde - patient will call to schedule

## 2020-10-06 NOTE — TELEPHONE ENCOUNTER
Ms. Zuleta,  As you and I dicussed at your last visit, your history of carcinoid tumor and current symptoms of flushing do not preclude you getting steroid injections to treat complications of degenerative disc and joint disease. They would not need to premedicate you as long as the procedure is a local injection. Please call my office with any questions.    Dr Wilde

## 2020-10-08 ENCOUNTER — PATIENT MESSAGE (OUTPATIENT)
Dept: NEUROLOGY | Facility: HOSPITAL | Age: 50
End: 2020-10-08

## 2020-10-23 ENCOUNTER — PATIENT MESSAGE (OUTPATIENT)
Dept: NEUROLOGY | Facility: HOSPITAL | Age: 50
End: 2020-10-23

## 2020-10-23 ENCOUNTER — PATIENT MESSAGE (OUTPATIENT)
Dept: FAMILY MEDICINE | Facility: CLINIC | Age: 50
End: 2020-10-23

## 2020-10-23 DIAGNOSIS — E03.9 HYPOTHYROIDISM, UNSPECIFIED TYPE: ICD-10-CM

## 2020-10-23 DIAGNOSIS — I10 ESSENTIAL HYPERTENSION: Primary | ICD-10-CM

## 2020-10-26 ENCOUNTER — PATIENT OUTREACH (OUTPATIENT)
Dept: OTHER | Facility: OTHER | Age: 50
End: 2020-10-26

## 2020-10-26 NOTE — PROGRESS NOTES
Digital Medicine: Health  Follow-Up    Quick check in w/ patient today - She was driving and had bad cell reception. Patient is feeling good and feels confident with BP readings and current lifestyle routine - Plans to continue as she  has been.  Confirms no questions/concerns.  Patient has my number and knows to call if needed. Will continue to monitor and f/u on progress as scheduled.    The history is provided by the patient.             Reason for review: Blood pressure at goal    Patient needs assistance troubleshooting: patient reminder needed.      Topics Covered on Call: physical activity and Diet            Diet-no change to diet    No change to diet.  Patient reports eating or drinking the following: Patient reports no changes to her dietary habits - Feels confident w/ current routine and plans to continue as she has been.      Physical Activity-no change to routine  No change to exercise routine.       Additional physical activity details: Patient reports no changes to her activity routine - Keeping active as much as possible. Feels confident w/ current routine and plans to continue as she has been.      Medication Adherence-Medication adherence was assessed.      Substance, Sleep, Stress-Not assessed      Additional monitoring needed. Requested patient submit at least 1 BP reading / week - Confirms understanding and agreed.  Continue current diet/physical activity routine. Patient plans to continue with her current activity/dietary habits       Addressed patient questions and patient has my contact information if needed prior to next outreach. Patient verbalizes understanding.      Explained the importance of self-monitoring and medication adherence. Encouraged the patient to communicate with their health  for lifestyle modifications to help improve or maintain a healthy lifestyle.               There are no preventive care reminders to display for this patient.      Last 5 Patient Entered  Readings                                      Current 30 Day Average: 89/63     Recent Readings 10/24/2020 10/24/2020 10/1/2020 10/1/2020 9/25/2020    SBP (mmHg) 109 109 69 69 118    DBP (mmHg) 80 80 46 46 79    Pulse 66 66 66 66 83

## 2020-10-27 ENCOUNTER — PATIENT MESSAGE (OUTPATIENT)
Dept: FAMILY MEDICINE | Facility: CLINIC | Age: 50
End: 2020-10-27

## 2020-10-27 ENCOUNTER — PATIENT MESSAGE (OUTPATIENT)
Dept: NEUROLOGY | Facility: HOSPITAL | Age: 50
End: 2020-10-27

## 2020-10-28 ENCOUNTER — PATIENT MESSAGE (OUTPATIENT)
Dept: FAMILY MEDICINE | Facility: CLINIC | Age: 50
End: 2020-10-28

## 2020-10-31 ENCOUNTER — LAB VISIT (OUTPATIENT)
Dept: LAB | Facility: HOSPITAL | Age: 50
End: 2020-10-31
Attending: INTERNAL MEDICINE
Payer: COMMERCIAL

## 2020-10-31 DIAGNOSIS — I10 ESSENTIAL HYPERTENSION: ICD-10-CM

## 2020-10-31 DIAGNOSIS — C7A.020 MALIGNANT CARCINOID TUMOR OF APPENDIX: ICD-10-CM

## 2020-10-31 DIAGNOSIS — E03.9 HYPOTHYROIDISM, UNSPECIFIED TYPE: ICD-10-CM

## 2020-10-31 LAB
25(OH)D3+25(OH)D2 SERPL-MCNC: 23 NG/ML (ref 30–96)
ALBUMIN SERPL BCP-MCNC: 3.4 G/DL (ref 3.5–5.2)
ALP SERPL-CCNC: 116 U/L (ref 55–135)
ALT SERPL W/O P-5'-P-CCNC: 20 U/L (ref 10–44)
ANION GAP SERPL CALC-SCNC: 12 MMOL/L (ref 8–16)
AST SERPL-CCNC: 17 U/L (ref 10–40)
BILIRUB SERPL-MCNC: 0.3 MG/DL (ref 0.1–1)
BUN SERPL-MCNC: 11 MG/DL (ref 6–20)
CALCIUM SERPL-MCNC: 8.6 MG/DL (ref 8.7–10.5)
CHLORIDE SERPL-SCNC: 102 MMOL/L (ref 95–110)
CHOLEST SERPL-MCNC: 221 MG/DL (ref 120–199)
CHOLEST/HDLC SERPL: 3.8 {RATIO} (ref 2–5)
CO2 SERPL-SCNC: 25 MMOL/L (ref 23–29)
CREAT SERPL-MCNC: 0.7 MG/DL (ref 0.5–1.4)
EST. GFR  (AFRICAN AMERICAN): >60 ML/MIN/1.73 M^2
EST. GFR  (NON AFRICAN AMERICAN): >60 ML/MIN/1.73 M^2
GLUCOSE SERPL-MCNC: 87 MG/DL (ref 70–110)
HDLC SERPL-MCNC: 58 MG/DL (ref 40–75)
HDLC SERPL: 26.2 % (ref 20–50)
LDLC SERPL CALC-MCNC: 125.4 MG/DL (ref 63–159)
NONHDLC SERPL-MCNC: 163 MG/DL
POTASSIUM SERPL-SCNC: 3.2 MMOL/L (ref 3.5–5.1)
PROT SERPL-MCNC: 7.6 G/DL (ref 6–8.4)
SODIUM SERPL-SCNC: 139 MMOL/L (ref 136–145)
T4 FREE SERPL-MCNC: 0.88 NG/DL (ref 0.71–1.51)
TRIGL SERPL-MCNC: 188 MG/DL (ref 30–150)
TSH SERPL DL<=0.005 MIU/L-ACNC: 2.99 UIU/ML (ref 0.4–4)

## 2020-10-31 PROCEDURE — 84439 ASSAY OF FREE THYROXINE: CPT

## 2020-10-31 PROCEDURE — 84443 ASSAY THYROID STIM HORMONE: CPT

## 2020-10-31 PROCEDURE — 82941 ASSAY OF GASTRIN: CPT

## 2020-10-31 PROCEDURE — 82306 VITAMIN D 25 HYDROXY: CPT

## 2020-10-31 PROCEDURE — 80053 COMPREHEN METABOLIC PANEL: CPT

## 2020-10-31 PROCEDURE — 82542 COL CHROMOTOGRAPHY QUAL/QUAN: CPT

## 2020-10-31 PROCEDURE — 84260 ASSAY OF SEROTONIN: CPT

## 2020-10-31 PROCEDURE — 80061 LIPID PANEL: CPT

## 2020-10-31 PROCEDURE — 36415 COLL VENOUS BLD VENIPUNCTURE: CPT

## 2020-10-31 PROCEDURE — 86316 IMMUNOASSAY TUMOR OTHER: CPT

## 2020-11-04 ENCOUNTER — PATIENT MESSAGE (OUTPATIENT)
Dept: FAMILY MEDICINE | Facility: CLINIC | Age: 50
End: 2020-11-04

## 2020-11-05 LAB — GASTRIN SERPL-MCNC: <10 PG/ML

## 2020-11-06 ENCOUNTER — PATIENT MESSAGE (OUTPATIENT)
Dept: FAMILY MEDICINE | Facility: CLINIC | Age: 50
End: 2020-11-06

## 2020-11-06 LAB — SEROTONIN: <30 NG/ML

## 2020-11-12 LAB — 5OH-INDOLEACETATE SERPL-MCNC: 5 NG/ML

## 2020-12-02 ENCOUNTER — PATIENT MESSAGE (OUTPATIENT)
Dept: OBSTETRICS AND GYNECOLOGY | Facility: CLINIC | Age: 50
End: 2020-12-02

## 2020-12-02 ENCOUNTER — TELEPHONE (OUTPATIENT)
Dept: OBSTETRICS AND GYNECOLOGY | Facility: CLINIC | Age: 50
End: 2020-12-02

## 2020-12-02 DIAGNOSIS — Z12.31 SCREENING MAMMOGRAM, ENCOUNTER FOR: Primary | ICD-10-CM

## 2020-12-14 ENCOUNTER — PATIENT MESSAGE (OUTPATIENT)
Dept: FAMILY MEDICINE | Facility: CLINIC | Age: 50
End: 2020-12-14

## 2020-12-15 ENCOUNTER — HOSPITAL ENCOUNTER (OUTPATIENT)
Dept: RADIOLOGY | Facility: HOSPITAL | Age: 50
Discharge: HOME | End: 2020-12-15
Attending: OBSTETRICS & GYNECOLOGY
Payer: COMMERCIAL

## 2020-12-15 DIAGNOSIS — Z12.31 SCREENING MAMMOGRAM, ENCOUNTER FOR: ICD-10-CM

## 2020-12-15 PROCEDURE — 77067 SCR MAMMO BI INCL CAD: CPT | Mod: TC,PO

## 2020-12-15 PROCEDURE — 77063 BREAST TOMOSYNTHESIS BI: CPT | Mod: 26,,, | Performed by: RADIOLOGY

## 2020-12-15 PROCEDURE — 77067 SCR MAMMO BI INCL CAD: CPT | Mod: 26,,, | Performed by: RADIOLOGY

## 2020-12-15 PROCEDURE — 77063 MAMMO DIGITAL SCREENING BILAT WITH TOMO: ICD-10-PCS | Mod: 26,,, | Performed by: RADIOLOGY

## 2020-12-15 PROCEDURE — 77067 MAMMO DIGITAL SCREENING BILAT WITH TOMO: ICD-10-PCS | Mod: 26,,, | Performed by: RADIOLOGY

## 2020-12-17 ENCOUNTER — PATIENT MESSAGE (OUTPATIENT)
Dept: OBSTETRICS AND GYNECOLOGY | Facility: CLINIC | Age: 50
End: 2020-12-17

## 2020-12-17 ENCOUNTER — PATIENT MESSAGE (OUTPATIENT)
Dept: ALLERGY | Facility: CLINIC | Age: 50
End: 2020-12-17

## 2020-12-24 RX ORDER — METOPROLOL TARTRATE 50 MG/1
50 TABLET ORAL 3 TIMES DAILY
Qty: 30 TABLET | Refills: 0
Start: 2020-12-24 | End: 2022-07-08

## 2020-12-24 NOTE — PROGRESS NOTES
Digital Medicine: Clinician Follow-Up    Called patient to follow up on HDMP. She reports that she has been noticing some cramping in hands and legs. She states that her healthcare provider suggested prescription potassium supplement how she did not take it because her insurance did not cover the medication. She states that she is now taking OTC potassium supplement. She reports that she also attempts to supplement some potassium in her diet with lunch. She has a smooth with almond butter, blueberries, and greek yogurt.     She states that her swings in BP are due to POTS but denies any symptoms associated with the elevated or low readings.       The history is provided by the patient.      Review of patient's allergies indicates:   -- Benadryl allergy decongestant -- Anaphylaxis    --  Other reaction(s): Anaphylaxis   -- Fludrocortisone -- Hives   -- Gluten -- Other (See Comments)    --  Other reaction(s) GI upset   -- Diphenhydramine hcl    -- Hydromorphone hcl    -- Indomethacin -- Hives   -- Penicillins -- Rash   -- Sulfa (sulfonamide antibiotics) -- Rash   -- Vancomycin analogues -- Rash  Follow-up reason(s): routine follow up.     Hypertension    Patient's blood pressure is stable.   Patient is not experiencing signs/symptoms of hypotension.  Patient is not experiencing signs/symptoms of hypertension.        Last 5 Patient Entered Readings                                      Current 30 Day Average: 118/73     Recent Readings 12/14/2020 12/14/2020 12/13/2020 12/13/2020 11/8/2020    SBP (mmHg) 91 91 144 144 82    DBP (mmHg) 60 60 85 85 58    Pulse 69 69 84 84 67          Depression Screening  Did not address depression screening.    Sleep Apnea Screening    Did not address sleep apnea screening.     Medication Affordability Screening  Did not address medication affordability screening.     Medication Adherence-Medication adherence was assessed.          ASSESSMENT(S)  Patients BP average is 118/73 mmHg, which  is at goal. Patient's BP goal is less than or equal to 130/80.  BP average is at goal however unable to assess complete control of BP due to POTS history.   HTN is appropriately managed with first line antihypertensive agents - thiazide diuretic and ARB - along with cardioselective beta blocker (mainly for POTS management).       Hypertension Plan  Additional monitoring needed.  Continue current therapy.  Continue current diet/physical activity routine.  Instructed to charge device.  Would like to avoid severe changes in antihypertensive therapy due to sudden declines in BP. Encouraged patient to continue to monitor if she has symptoms associated with her readings. Will follow up at future encounter.     Addressed patient questions and patient has my contact information if needed prior to next outreach. Patient verbalizes understanding.      Explained the importance of self-monitoring and medication adherence. Encouraged the patient to communicate with their health  for lifestyle modifications to help improve or maintain a healthy lifestyle.               There are no preventive care reminders to display for this patient.  There are no preventive care reminders to display for this patient.      Hypertension Medications             indapamide (LOZOL) 1.25 MG Tab TAKE 1 TABLET BY MOUTH ONCE DAILY    metoprolol tartrate (LOPRESSOR) 50 MG tablet Take 1 tablet (50 mg total) by mouth 3 (three) times daily.    valsartan (DIOVAN) 80 MG tablet Take 80 mg by mouth.

## 2020-12-29 ENCOUNTER — PATIENT OUTREACH (OUTPATIENT)
Dept: OTHER | Facility: OTHER | Age: 50
End: 2020-12-29

## 2021-01-01 NOTE — PROGRESS NOTES
[facility] - NEUROLOGY  [facility] [place]    Date: May 28, 2018   Patient Name: Kianna Zuleta   MRN: [id number]   PCP: Gigi Celis  Referring Provider: No ref. provider found    Assessment:      This is Kianna Zuleta, 47 y.o. female with a history of carcinoid tumor of appendix s/p resection, CVID who presents for abnormal sensation most consistent with trigeminal neuralgia.  Images of MRI brain reviewed and without pathology to explain symptoms.      Plan:      Tegretol XR 400mg bid  GBP 300mg bid  B12 level    Follow up 12 months       I discussed side effects of the medications. I asked the patient to  stop the medication if she notices serious adverse effects as we discussed and to seek immediate medical attention at an ER.     Ismael Almanzar MD  [facility]   Department of Neurology    Subjective:   Symptoms controlled on CBZ 400mg bid, they will completely subside for about a week after monthly B12 injection, never very painful, recently started IVIG    2/2018  No improvement, no changes, currently taking CMP only 200mg daily    HPI 1/2018:   Ms. Kianna Zuleta is a 47 y.o. female who presents with a chief complaint of facial paresthesias    Approximately November 2017 she developed tingling paresthesias in right>left V2 distribution which are bothersome but not extremely painful.  These will come and go without clear provocation, she has not had any associated visual changes, tearing, facial weakness, or headaches.    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    MVP (mitral valve prolapse)     Pneumonia     POTS (postural orthostatic  "tachycardia syndrome)     Recurrent upper respiratory infection (URI)     Ulcer     Vitamin D deficiency disease 11/6/2012       PAST SURGICAL HISTORY:  Past Surgical History:   Procedure Laterality Date    ANKLE SURGERY      lt    APPENDECTOMY      BACK SURGERY      CHOLECYSTECTOMY      choley & ovarian cyst removed  12/2005    cystoscope      multiple    HYSTERECTOMY  4/8/2004    BUBBA BS&O     interstim      OOPHORECTOMY  4/8/2004    with hysterectomy     PELVIC LAPAROSCOPY      ID EXPLORATORY OF ABDOMEN      SINUS SURGERY  03/01/2016    SPINE SURGERY         CURRENT MEDS:  Current Outpatient Prescriptions   Medication Sig Dispense Refill    ALBUTEROL INHL Inhale into the lungs as needed.       albuterol-ipratropium 2.5mg-0.5mg/3mL (DUO-NEB) 0.5 mg-3 mg(2.5 mg base)/3 mL nebulizer solution Take 3 mLs by nebulization every 6 (six) hours as needed for Wheezing. Rescue 1 Box 2    azelastine (ASTELIN) 137 mcg (0.1 %) nasal spray 2 SPRAYS each nostril Q 12 H PRN 30 mL 6    BD LUER-NANCY SYRINGE 3 mL 22 gauge x 1" Syrg INJECT QD FOR 5 DAYS THEN WEEKLY FOR 5 WEEKS THEN MONTHLY  3    BRINTELLIX 20 mg Tab once daily.   2    carBAMazepine (TEGRETOL XR) 200 MG 12 hr tablet Take 1 tab twice daily.  If no improvement after 2 weeks, take 2 tabs twice daily. 120 tablet 5    cloNIDine (CATAPRES) 0.1 MG tablet TAKE 1 TABLET BY MOUTH EVERY 6 HOURS AS NEEDED FOR FLUSHING 20 tablet 0    cyanocobalamin, vitamin B-12, 1,000 mcg/mL Kit Inject 1,000 mcg as directed every 28 days. Take daily for 5 days then weekly for 5 weeks then monthly 12 kit 1    desoximetasone (TOPICORT) 0.05 % cream Apply topically.      dicyclomine (BENTYL) 10 MG capsule Take by mouth.      doxycycline (MONODOX) 100 MG capsule Take 1 capsule (100 mg total) by mouth 2 (two) times daily. 20 capsule 0    ergocalciferol (ERGOCALCIFEROL) 50,000 unit Cap Take 1 capsule (50,000 Units total) by mouth every 7 days. 12 capsule 3    estrogens, " "conjugated, (PREMARIN) 1.25 MG tablet Take by mouth.      estropipate (OGEN) 1.5 MG tablet Take 1 tablet (1.5 mg total) by mouth once daily. 90 tablet 3    gabapentin (NEURONTIN) 300 MG capsule Take 300 mg by mouth 2 (two) times daily. 2-300 mg cap in AM and 3-300 mg cap in PM      hydrOXYzine HCl (ATARAX) 25 MG tablet Take 1 tablet (25 mg total) by mouth 3 (three) times daily. 45 tablet 1    immun glob G,IgG,-pro-IgA 0-50 (HIZENTRA) 10 gram/50 mL (20 %) Soln Inject 200 mg/kg into the skin every 7 days. 4 vial 5    levocetirizine (XYZAL) 5 MG tablet TK 1 T PO  ONCE Day  9    metoclopramide HCl (REGLAN) 10 MG tablet Take 1 tablet (10 mg total) by mouth every 6 (six) hours. 30 tablet 0    metoprolol tartrate (LOPRESSOR) 50 MG tablet Take 50 mg by mouth 4 (four) times daily. 1 Tablet Oral Three times a day      montelukast (SINGULAIR) 10 mg tablet Take by mouth.      ondansetron (ZOFRAN-ODT) 4 MG TbDL Take 1 tablet (4 mg total) by mouth every 8 (eight) hours as needed (nausea). 20 tablet 0    oxyCODONE-acetaminophen (PERCOCET)  mg per tablet Take by mouth.      oxycodone-acetaminophen  mg (PERCOCET)  mg per tablet Take 1 tablet by mouth every 4 (four) hours as needed.      syringe with needle, safety 3 mL 22 gauge x 1 1/2" Syrg B12 injection every day for 5 days then weekly for 5 weeks then monthly. 50 Syringe 3    temazepam (RESTORIL) 15 mg Cap Take 15 mg by mouth every evening.  3    tiZANidine (ZANAFLEX) 4 mg Cap Take by mouth.      tiZANidine 4 mg Cap TK 2 TO 3 CS PO TID WITH FOOD  1    traZODone (DESYREL) 100 MG tablet TK 1 T PO  HS  1    triamcinolone acetonide 0.1% (KENALOG) 0.1 % cream 2 (two) times daily. Apply to affected area  0    valsartan (DIOVAN) 80 MG tablet TK 1 T PO QD  8    vortioxetine (BRINTELLIX) 10 mg Tab Take by mouth.      zaleplon (SONATA) 10 MG capsule Take 10 mg by mouth once daily.  2     No current facility-administered medications for this visit.  " "      ALLERGIES:  Review of patient's allergies indicates:   Allergen Reactions    Benadryl allergy decongestant Anaphylaxis     Other reaction(s): Anaphylaxis    Dilaudid [hydromorphone (bulk)] Anaphylaxis     Other reaction(s): Anaphylaxis    Fludrocortisone Hives    Gluten Other (See Comments)     Other reaction(s) GI upset    Indomethacin Hives    Penicillins Rash    Sulfa (sulfonamide antibiotics) Rash    Vancomycin analogues Rash       FAMILY HISTORY:  Family History   Problem Relation Age of Onset    Hypertension Father     Alcohol abuse Brother     Cancer Paternal Uncle     Colon cancer Paternal Uncle     Depression Maternal Grandmother     Hearing loss Maternal Grandmother     Heart disease Maternal Grandmother     Kidney disease Maternal Grandmother     Cancer Paternal Grandmother     Arthritis Paternal Grandfather     Diabetes Paternal Grandfather     Stroke Paternal Grandfather     Breast cancer Neg Hx     Ovarian cancer Neg Hx     Asthma Neg Hx     Emphysema Neg Hx        SOCIAL HISTORY:  Social History   Substance Use Topics    Smoking status: Never Smoker    Smokeless tobacco: Never Used    Alcohol use No       Review of Systems:  12 review of systems is negative except for the symptoms mentioned in HPI.        Objective:     Vitals:    05/28/18 1018   BP: (!) 122/90   Pulse: 78   Weight: 116.7 kg (257 lb 4.4 oz)   Height: 5' 4" (1.626 m)       General: NAD, well nourished   Eyes: no tearing, discharge, no erythema   ENT: moist mucous membranes of the oral cavity, nares patent    Neck: Supple, full range of motion  Cardiovascular: Warm and well perfused, pulses equal and symmetrical  Lungs: Normal work of breathing, normal chest wall excursions  Skin: No rash, lesions, or breakdown on exposed skin  Psychiatry: Mood and affect are appropriate   Abdomen: soft, non tender, non distended  Extremeties: No cyanosis, clubbing or edema.    Neurological   MENTAL STATUS: Alert and " oriented to person, place, and time. Attention and concentration within normal limits. Speech without dysarthria, able to name and repeat without difficulty. Recent and remote memory within normal limits   CRANIAL NERVES: Visual fields intact. PERRL. EOMI. . Face symmetrical. Hearing grossly intact. Full shoulder shrug bilaterally. Tongue protrudes midline   SENSORY: Sensation is intact to light touch throughout.  Negative Romberg.   MOTOR: Normal bulk and tone. No pronator drift.  5/5 deltoid, biceps, triceps, interosseous, hand  bilaterally. 5/5 iliopsoas, knee extension/flexion, foot dorsi/plantarflexion bilaterally.    REFLEXES: Symmetric and 2+ throughout.    CEREBELLAR/COORDINATION/GAIT: Gait steady with normal arm swing and stride length.  Heel to shin intact. Finger to nose intact. Normal rapid alternating movements.    22

## 2021-01-10 ENCOUNTER — OFFICE VISIT (OUTPATIENT)
Dept: URGENT CARE | Facility: CLINIC | Age: 51
End: 2021-01-10
Payer: COMMERCIAL

## 2021-01-10 VITALS
OXYGEN SATURATION: 97 % | TEMPERATURE: 98 F | HEIGHT: 63 IN | DIASTOLIC BLOOD PRESSURE: 93 MMHG | HEART RATE: 101 BPM | WEIGHT: 280 LBS | BODY MASS INDEX: 49.61 KG/M2 | SYSTOLIC BLOOD PRESSURE: 179 MMHG

## 2021-01-10 DIAGNOSIS — Z87.898 HISTORY OF VERTIGO: ICD-10-CM

## 2021-01-10 DIAGNOSIS — Z20.822 SUSPECTED COVID-19 VIRUS INFECTION: Primary | ICD-10-CM

## 2021-01-10 DIAGNOSIS — Z20.822 CLOSE EXPOSURE TO COVID-19 VIRUS: ICD-10-CM

## 2021-01-10 LAB
CTP QC/QA: YES
SARS-COV-2 RDRP RESP QL NAA+PROBE: NEGATIVE

## 2021-01-10 PROCEDURE — U0002: ICD-10-PCS | Mod: QW,S$GLB,, | Performed by: PHYSICIAN ASSISTANT

## 2021-01-10 PROCEDURE — 3008F BODY MASS INDEX DOCD: CPT | Mod: CPTII,S$GLB,, | Performed by: PHYSICIAN ASSISTANT

## 2021-01-10 PROCEDURE — 99214 OFFICE O/P EST MOD 30 MIN: CPT | Mod: S$GLB,,, | Performed by: PHYSICIAN ASSISTANT

## 2021-01-10 PROCEDURE — 3008F PR BODY MASS INDEX (BMI) DOCUMENTED: ICD-10-PCS | Mod: CPTII,S$GLB,, | Performed by: PHYSICIAN ASSISTANT

## 2021-01-10 PROCEDURE — U0002 COVID-19 LAB TEST NON-CDC: HCPCS | Mod: QW,S$GLB,, | Performed by: PHYSICIAN ASSISTANT

## 2021-01-10 PROCEDURE — 99214 PR OFFICE/OUTPT VISIT, EST, LEVL IV, 30-39 MIN: ICD-10-PCS | Mod: S$GLB,,, | Performed by: PHYSICIAN ASSISTANT

## 2021-01-10 RX ORDER — AZELASTINE 1 MG/ML
1 SPRAY, METERED NASAL 2 TIMES DAILY
Qty: 120 ML | Refills: 0 | Status: SHIPPED | OUTPATIENT
Start: 2021-01-10 | End: 2022-06-23

## 2021-01-10 RX ORDER — MECLIZINE HCL 12.5 MG 12.5 MG/1
12.5 TABLET ORAL 3 TIMES DAILY PRN
Qty: 15 TABLET | Refills: 0 | Status: SHIPPED | OUTPATIENT
Start: 2021-01-10 | End: 2022-04-25

## 2021-01-10 RX ORDER — PROMETHAZINE HYDROCHLORIDE AND DEXTROMETHORPHAN HYDROBROMIDE 6.25; 15 MG/5ML; MG/5ML
5 SYRUP ORAL NIGHTLY PRN
Qty: 150 ML | Refills: 0 | Status: SHIPPED | OUTPATIENT
Start: 2021-01-10 | End: 2021-01-20

## 2021-01-10 RX ORDER — BENZONATATE 100 MG/1
100 CAPSULE ORAL 3 TIMES DAILY PRN
Qty: 30 CAPSULE | Refills: 0 | Status: SHIPPED | OUTPATIENT
Start: 2021-01-10 | End: 2021-01-20

## 2021-02-11 ENCOUNTER — OFFICE VISIT (OUTPATIENT)
Dept: INTERNAL MEDICINE | Facility: CLINIC | Age: 51
End: 2021-02-11
Payer: COMMERCIAL

## 2021-02-11 ENCOUNTER — PATIENT MESSAGE (OUTPATIENT)
Dept: INTERNAL MEDICINE | Facility: CLINIC | Age: 51
End: 2021-02-11

## 2021-02-11 DIAGNOSIS — K52.9 GASTROENTERITIS: ICD-10-CM

## 2021-02-11 DIAGNOSIS — R42 VERTIGO: Primary | ICD-10-CM

## 2021-02-11 PROCEDURE — 99213 OFFICE O/P EST LOW 20 MIN: CPT | Mod: 95,,, | Performed by: PHYSICIAN ASSISTANT

## 2021-02-11 PROCEDURE — 99213 PR OFFICE/OUTPT VISIT, EST, LEVL III, 20-29 MIN: ICD-10-PCS | Mod: 95,,, | Performed by: PHYSICIAN ASSISTANT

## 2021-02-21 RX ORDER — ALBUTEROL SULFATE 0.83 MG/ML
SOLUTION RESPIRATORY (INHALATION)
Qty: 180 ML | Refills: 12 | Status: SHIPPED | OUTPATIENT
Start: 2021-02-21 | End: 2022-02-14 | Stop reason: SDUPTHER

## 2021-02-23 ENCOUNTER — PATIENT MESSAGE (OUTPATIENT)
Dept: RHEUMATOLOGY | Facility: CLINIC | Age: 51
End: 2021-02-23

## 2021-04-13 ENCOUNTER — PATIENT MESSAGE (OUTPATIENT)
Dept: FAMILY MEDICINE | Facility: CLINIC | Age: 51
End: 2021-04-13

## 2021-04-16 ENCOUNTER — PATIENT MESSAGE (OUTPATIENT)
Dept: RESEARCH | Facility: HOSPITAL | Age: 51
End: 2021-04-16

## 2021-04-16 RX ORDER — LIDOCAINE AND PRILOCAINE 25; 25 MG/G; MG/G
CREAM TOPICAL
Qty: 30 G | Refills: 0 | Status: SHIPPED | OUTPATIENT
Start: 2021-04-16

## 2021-04-20 ENCOUNTER — TELEPHONE (OUTPATIENT)
Dept: ALLERGY | Facility: CLINIC | Age: 51
End: 2021-04-20

## 2021-04-30 ENCOUNTER — PATIENT MESSAGE (OUTPATIENT)
Dept: ALLERGY | Facility: CLINIC | Age: 51
End: 2021-04-30

## 2021-05-13 ENCOUNTER — TELEPHONE (OUTPATIENT)
Dept: ALLERGY | Facility: CLINIC | Age: 51
End: 2021-05-13

## 2021-05-25 ENCOUNTER — TELEPHONE (OUTPATIENT)
Dept: HEPATOLOGY | Facility: CLINIC | Age: 51
End: 2021-05-25

## 2021-05-25 ENCOUNTER — PATIENT MESSAGE (OUTPATIENT)
Dept: HEPATOLOGY | Facility: CLINIC | Age: 51
End: 2021-05-25

## 2021-05-26 ENCOUNTER — TELEPHONE (OUTPATIENT)
Dept: HEPATOLOGY | Facility: CLINIC | Age: 51
End: 2021-05-26

## 2021-05-26 ENCOUNTER — PATIENT MESSAGE (OUTPATIENT)
Dept: HEPATOLOGY | Facility: CLINIC | Age: 51
End: 2021-05-26

## 2021-06-17 ENCOUNTER — OFFICE VISIT (OUTPATIENT)
Dept: ALLERGY | Facility: CLINIC | Age: 51
End: 2021-06-17
Payer: COMMERCIAL

## 2021-06-17 DIAGNOSIS — D83.9 COMMON VARIABLE IMMUNODEFICIENCY: Primary | ICD-10-CM

## 2021-06-17 DIAGNOSIS — Z29.89 NEED FOR PROPHYLACTIC IMMUNOTHERAPY: ICD-10-CM

## 2021-06-17 PROCEDURE — 99214 PR OFFICE/OUTPT VISIT, EST, LEVL IV, 30-39 MIN: ICD-10-PCS | Mod: 95,,, | Performed by: STUDENT IN AN ORGANIZED HEALTH CARE EDUCATION/TRAINING PROGRAM

## 2021-06-17 PROCEDURE — 99214 OFFICE O/P EST MOD 30 MIN: CPT | Mod: 95,,, | Performed by: STUDENT IN AN ORGANIZED HEALTH CARE EDUCATION/TRAINING PROGRAM

## 2021-06-18 DIAGNOSIS — D83.9 COMMON VARIABLE IMMUNODEFICIENCY: ICD-10-CM

## 2021-06-18 RX ORDER — HUMAN IMMUNOGLOBULIN G 0.2 G/ML
1 LIQUID SUBCUTANEOUS
Qty: 4 VIAL | Refills: 5 | Status: SHIPPED | OUTPATIENT
Start: 2021-06-18 | End: 2023-05-29 | Stop reason: SDUPTHER

## 2021-07-14 ENCOUNTER — TELEPHONE (OUTPATIENT)
Dept: HEPATOLOGY | Facility: CLINIC | Age: 51
End: 2021-07-14

## 2021-07-14 DIAGNOSIS — K76.0 FATTY LIVER DISEASE, NONALCOHOLIC: Primary | ICD-10-CM

## 2021-07-23 ENCOUNTER — TELEPHONE (OUTPATIENT)
Dept: HEPATOLOGY | Facility: CLINIC | Age: 51
End: 2021-07-23

## 2021-07-28 RX ORDER — ERGOCALCIFEROL 1.25 MG/1
50000 CAPSULE ORAL
Qty: 12 CAPSULE | Refills: 3 | Status: SHIPPED | OUTPATIENT
Start: 2021-07-28 | End: 2024-02-20

## 2021-07-30 ENCOUNTER — PATIENT MESSAGE (OUTPATIENT)
Dept: HEPATOLOGY | Facility: CLINIC | Age: 51
End: 2021-07-30

## 2021-09-19 ENCOUNTER — PATIENT MESSAGE (OUTPATIENT)
Dept: FAMILY MEDICINE | Facility: CLINIC | Age: 51
End: 2021-09-19

## 2021-09-20 ENCOUNTER — TELEPHONE (OUTPATIENT)
Dept: FAMILY MEDICINE | Facility: CLINIC | Age: 51
End: 2021-09-20

## 2021-09-20 ENCOUNTER — PATIENT OUTREACH (OUTPATIENT)
Dept: ADMINISTRATIVE | Facility: OTHER | Age: 51
End: 2021-09-20

## 2021-09-21 ENCOUNTER — OFFICE VISIT (OUTPATIENT)
Dept: OBSTETRICS AND GYNECOLOGY | Facility: CLINIC | Age: 51
End: 2021-09-21
Payer: COMMERCIAL

## 2021-09-21 VITALS
DIASTOLIC BLOOD PRESSURE: 90 MMHG | BODY MASS INDEX: 48.74 KG/M2 | SYSTOLIC BLOOD PRESSURE: 130 MMHG | WEIGHT: 275.13 LBS

## 2021-09-21 DIAGNOSIS — N95.1 MENOPAUSAL SYMPTOMS: ICD-10-CM

## 2021-09-21 DIAGNOSIS — Z12.31 BREAST CANCER SCREENING BY MAMMOGRAM: ICD-10-CM

## 2021-09-21 DIAGNOSIS — Z01.419 WELL WOMAN EXAM: Primary | ICD-10-CM

## 2021-09-21 DIAGNOSIS — R30.0 DYSURIA: ICD-10-CM

## 2021-09-21 PROCEDURE — 3080F DIAST BP >= 90 MM HG: CPT | Mod: CPTII,S$GLB,, | Performed by: PHYSICIAN ASSISTANT

## 2021-09-21 PROCEDURE — 3075F SYST BP GE 130 - 139MM HG: CPT | Mod: CPTII,S$GLB,, | Performed by: PHYSICIAN ASSISTANT

## 2021-09-21 PROCEDURE — 87186 SC STD MICRODIL/AGAR DIL: CPT | Performed by: PHYSICIAN ASSISTANT

## 2021-09-21 PROCEDURE — 3008F PR BODY MASS INDEX (BMI) DOCUMENTED: ICD-10-PCS | Mod: CPTII,S$GLB,, | Performed by: PHYSICIAN ASSISTANT

## 2021-09-21 PROCEDURE — 1160F RVW MEDS BY RX/DR IN RCRD: CPT | Mod: CPTII,S$GLB,, | Performed by: PHYSICIAN ASSISTANT

## 2021-09-21 PROCEDURE — 1159F PR MEDICATION LIST DOCUMENTED IN MEDICAL RECORD: ICD-10-PCS | Mod: CPTII,S$GLB,, | Performed by: PHYSICIAN ASSISTANT

## 2021-09-21 PROCEDURE — 99999 PR PBB SHADOW E&M-EST. PATIENT-LVL III: ICD-10-PCS | Mod: PBBFAC,,, | Performed by: PHYSICIAN ASSISTANT

## 2021-09-21 PROCEDURE — 3008F BODY MASS INDEX DOCD: CPT | Mod: CPTII,S$GLB,, | Performed by: PHYSICIAN ASSISTANT

## 2021-09-21 PROCEDURE — 4010F PR ACE/ARB THEARPY RXD/TAKEN: ICD-10-PCS | Mod: CPTII,S$GLB,, | Performed by: PHYSICIAN ASSISTANT

## 2021-09-21 PROCEDURE — 87088 URINE BACTERIA CULTURE: CPT | Performed by: PHYSICIAN ASSISTANT

## 2021-09-21 PROCEDURE — 3080F PR MOST RECENT DIASTOLIC BLOOD PRESSURE >= 90 MM HG: ICD-10-PCS | Mod: CPTII,S$GLB,, | Performed by: PHYSICIAN ASSISTANT

## 2021-09-21 PROCEDURE — 99999 PR PBB SHADOW E&M-EST. PATIENT-LVL III: CPT | Mod: PBBFAC,,, | Performed by: PHYSICIAN ASSISTANT

## 2021-09-21 PROCEDURE — 99396 PR PREVENTIVE VISIT,EST,40-64: ICD-10-PCS | Mod: S$GLB,,, | Performed by: PHYSICIAN ASSISTANT

## 2021-09-21 PROCEDURE — 4010F ACE/ARB THERAPY RXD/TAKEN: CPT | Mod: CPTII,S$GLB,, | Performed by: PHYSICIAN ASSISTANT

## 2021-09-21 PROCEDURE — 99396 PREV VISIT EST AGE 40-64: CPT | Mod: S$GLB,,, | Performed by: PHYSICIAN ASSISTANT

## 2021-09-21 PROCEDURE — 1159F MED LIST DOCD IN RCRD: CPT | Mod: CPTII,S$GLB,, | Performed by: PHYSICIAN ASSISTANT

## 2021-09-21 PROCEDURE — 87077 CULTURE AEROBIC IDENTIFY: CPT | Performed by: PHYSICIAN ASSISTANT

## 2021-09-21 PROCEDURE — 87086 URINE CULTURE/COLONY COUNT: CPT | Performed by: PHYSICIAN ASSISTANT

## 2021-09-21 PROCEDURE — 1160F PR REVIEW ALL MEDS BY PRESCRIBER/CLIN PHARMACIST DOCUMENTED: ICD-10-PCS | Mod: CPTII,S$GLB,, | Performed by: PHYSICIAN ASSISTANT

## 2021-09-21 PROCEDURE — 3075F PR MOST RECENT SYSTOLIC BLOOD PRESS GE 130-139MM HG: ICD-10-PCS | Mod: CPTII,S$GLB,, | Performed by: PHYSICIAN ASSISTANT

## 2021-09-21 RX ORDER — NITROFURANTOIN 25; 75 MG/1; MG/1
100 CAPSULE ORAL 2 TIMES DAILY
Qty: 10 CAPSULE | Refills: 0 | Status: SHIPPED | OUTPATIENT
Start: 2021-09-21 | End: 2021-09-26

## 2021-09-21 RX ORDER — ESTRADIOL 2 MG/1
2 TABLET ORAL DAILY
Qty: 30 TABLET | Refills: 11 | Status: SHIPPED | OUTPATIENT
Start: 2021-09-21 | End: 2021-10-21

## 2021-09-24 ENCOUNTER — PATIENT MESSAGE (OUTPATIENT)
Dept: OBSTETRICS AND GYNECOLOGY | Facility: CLINIC | Age: 51
End: 2021-09-24

## 2021-09-24 LAB — BACTERIA UR CULT: ABNORMAL

## 2021-10-08 ENCOUNTER — PATIENT MESSAGE (OUTPATIENT)
Dept: OTHER | Facility: OTHER | Age: 51
End: 2021-10-08

## 2021-12-15 ENCOUNTER — PATIENT MESSAGE (OUTPATIENT)
Dept: ADMINISTRATIVE | Facility: HOSPITAL | Age: 51
End: 2021-12-15
Payer: COMMERCIAL

## 2022-01-13 ENCOUNTER — PATIENT MESSAGE (OUTPATIENT)
Dept: ADMINISTRATIVE | Facility: OTHER | Age: 52
End: 2022-01-13
Payer: COMMERCIAL

## 2022-01-14 ENCOUNTER — PATIENT MESSAGE (OUTPATIENT)
Dept: FAMILY MEDICINE | Facility: CLINIC | Age: 52
End: 2022-01-14
Payer: COMMERCIAL

## 2022-01-18 ENCOUNTER — PATIENT MESSAGE (OUTPATIENT)
Dept: FAMILY MEDICINE | Facility: CLINIC | Age: 52
End: 2022-01-18
Payer: COMMERCIAL

## 2022-01-21 ENCOUNTER — TELEPHONE (OUTPATIENT)
Dept: FAMILY MEDICINE | Facility: CLINIC | Age: 52
End: 2022-01-21
Payer: COMMERCIAL

## 2022-01-21 NOTE — TELEPHONE ENCOUNTER
----- Message from Clarisse Frye MA sent at 1/21/2022 11:02 AM CST -----  Regarding: returning missed call  Type:  Patient Returning Call         Who Called: BLANCA GUTHRIE [[id number]]         Who Left Message for Patient: mitch          Does the patient know what this is regarding? Prior message that was left          Best Call Back Number:[phone]         Additional Information:

## 2022-01-21 NOTE — TELEPHONE ENCOUNTER
Spoke with pt about what has she been doing about her diarrhea and headache. She stated that she has IBS an eating mash potatoes and apple sauce. She just started taking imodium yesterday since last Friday. She also takes 2 percocet's at night. I told her she needed some bulk added to her diet and she can take 2 tylenol in the morning. She also wants a doctors note tfrom Friday to Wednesday of this week. Now as far as the covid test she has had several this week which was negative and awaiting the results of a final one at this time.

## 2022-02-14 NOTE — TELEPHONE ENCOUNTER
Care Due:                  Date            Visit Type   Department     Provider  --------------------------------------------------------------------------------                                [facility]                              FOLLOWUP/OF  MED/ INTERNAL  Gigi Linares  Last Visit: 07-      FICE VISIT   MED/ PEDS      [place]  Next Visit: None Scheduled  None         None Found                                                            Last  Test          Frequency    Reason                     Performed    Due Date  --------------------------------------------------------------------------------    Office Visit  12 months..  indapamide, metoprolol...  07- 07-    CMP.........  12 months..  indapamide...............  10-   10-    Powered by [employer]. Reference number: [id number].   2/14/2022 8:28:42 AM CST

## 2022-02-15 RX ORDER — ALBUTEROL SULFATE 0.83 MG/ML
SOLUTION RESPIRATORY (INHALATION)
Qty: 180 ML | Refills: 12 | Status: SHIPPED | OUTPATIENT
Start: 2022-02-15 | End: 2023-04-21

## 2022-04-05 ENCOUNTER — TELEPHONE (OUTPATIENT)
Dept: FAMILY MEDICINE | Facility: CLINIC | Age: 52
End: 2022-04-05
Payer: COMMERCIAL

## 2022-04-05 ENCOUNTER — OFFICE VISIT (OUTPATIENT)
Dept: FAMILY MEDICINE | Facility: CLINIC | Age: 52
End: 2022-04-05
Payer: COMMERCIAL

## 2022-04-05 DIAGNOSIS — J06.9 UPPER RESPIRATORY TRACT INFECTION, UNSPECIFIED TYPE: Primary | ICD-10-CM

## 2022-04-05 DIAGNOSIS — I10 ESSENTIAL HYPERTENSION: ICD-10-CM

## 2022-04-05 DIAGNOSIS — D80.3 IGG DEFICIENCY: ICD-10-CM

## 2022-04-05 PROCEDURE — 99214 PR OFFICE/OUTPT VISIT, EST, LEVL IV, 30-39 MIN: ICD-10-PCS | Mod: 95,,, | Performed by: INTERNAL MEDICINE

## 2022-04-05 PROCEDURE — 1159F PR MEDICATION LIST DOCUMENTED IN MEDICAL RECORD: ICD-10-PCS | Mod: CPTII,95,, | Performed by: INTERNAL MEDICINE

## 2022-04-05 PROCEDURE — 1160F PR REVIEW ALL MEDS BY PRESCRIBER/CLIN PHARMACIST DOCUMENTED: ICD-10-PCS | Mod: CPTII,95,, | Performed by: INTERNAL MEDICINE

## 2022-04-05 PROCEDURE — 1159F MED LIST DOCD IN RCRD: CPT | Mod: CPTII,95,, | Performed by: INTERNAL MEDICINE

## 2022-04-05 PROCEDURE — 99214 OFFICE O/P EST MOD 30 MIN: CPT | Mod: 95,,, | Performed by: INTERNAL MEDICINE

## 2022-04-05 PROCEDURE — 1160F RVW MEDS BY RX/DR IN RCRD: CPT | Mod: CPTII,95,, | Performed by: INTERNAL MEDICINE

## 2022-04-05 RX ORDER — CODEINE PHOSPHATE AND GUAIFENESIN 10; 100 MG/5ML; MG/5ML
5 SOLUTION ORAL EVERY 8 HOURS PRN
Qty: 150 ML | Refills: 0 | Status: SHIPPED | OUTPATIENT
Start: 2022-04-05 | End: 2022-04-15

## 2022-04-05 RX ORDER — AZITHROMYCIN 250 MG/1
TABLET, FILM COATED ORAL
Qty: 6 TABLET | Refills: 0 | Status: SHIPPED | OUTPATIENT
Start: 2022-04-05 | End: 2022-04-10

## 2022-04-05 NOTE — LETTER
[id number]  ? [place], [address] ? Phone [phone] ? Fax [phone]           Return to Work    Patient: Kianna Zuleta  YOB: 1970   Date: 04/05/2022      To Whom It May Concern:     Kianna Zuleta was in contact with me on 04/05/2022. Please excuse her today due to this appointment. She will be out of work from today until 04/07/2022, with return to work on 04/08/2022.     If you have any questions or concerns, or if I can be of further assistance, please do not hesitate to contact me.     Sincerely,    Aleida Nichols MD

## 2022-04-05 NOTE — PROGRESS NOTES
Patient ID: Kianna Zuleta is a pleasant 51 y.o. White female.    Chief Complaint: URI      Patient is a new pt to me but established pt from Dr. Celis.     This visit is a Telemedicine Video Visit due to the COVID-19 epidemics.  The patient location is: [facility]  The chief complaint leading to consultation is: URI    Visit type: audiovisual    Face to Face time with patient: 10 min  30 minutes of total time spent on the encounter, which includes face to face time and non-face to face time preparing to see the patient (eg, review of tests), Obtaining and/or reviewing separately obtained history, Documenting clinical information in the electronic or other health record, Independently interpreting results (not separately reported) and communicating results to the patient/family/caregiver, or Care coordination (not separately reported).       Each patient to whom he or she provides medical services by telemedicine is:  (1) informed of the relationship between the physician and patient and the respective role of any other health care provider with respect to management of the patient; and (2) notified that he or she may decline to receive medical services by telemedicine and may withdraw from such care at any time.      HPI     Reports that she started to have symptoms of URI with runny nose and postnasal drip one week ago, also cough with yellow secretions, no real SOB. She has a sore throat and some headache. She lost her voice for a while. She was however told when she went for RADHA one month ago that she had wheezing? Was not symptomatic. She lost her voice for a while.  She denies fever but did not check, fell a bit feverish yesterday.  Does not seem she is improving.  She has a H/O pneumonia.  She tried OTC medications with no improvement.  She has been out of work for one month after [facility]HA due to back pain, went back last Monday. She is a  in a school. They do not wear a mask. She had the COVID  vaccine but not the booster. She did not have the flu shot.  She got tested for COVID on Friday, negative.    Patient Active Problem List   Diagnosis    Vitamin D deficiency    Hypothyroidism    Irritable bowel syndrome    Elevated alkaline phosphatase level    Malignant carcinoid tumor of appendix    Fever    Elevated sed rate    CRP elevated    Hypertension    Morbid obesity    Abnormal CT of the chest    Trigeminal neuralgia of right side of face    IgG deficiency    POTS (postural orthostatic tachycardia syndrome)    Fatty liver disease, nonalcoholic        Review of Systems   Constitutional: Negative for chills and fever.   HENT: Positive for postnasal drip, rhinorrhea and sore throat. Negative for ear pain.    Respiratory: Positive for cough and wheezing. Negative for shortness of breath.    Cardiovascular: Negative for chest pain.   Musculoskeletal: Negative for myalgias.   Skin: Negative for rash.   Allergic/Immunologic: Positive for environmental allergies.   Neurological: Positive for headaches.       Objective:      Physical Exam    There were no vitals filed for this visit.  There is no height or weight on file to calculate BMI.    Pleasant, smiling, NAD, but productive cough in bouts.    RESULTS: Reviewed labs from last 12 months    Last Lab Results:     Lab Results   Component Value Date    WBC 3.83 (L) 11/27/2019    HGB 13.7 11/27/2019    HCT 42.8 11/27/2019     11/27/2019     10/31/2020    K 3.2 (L) 10/31/2020     10/31/2020    CO2 25 10/31/2020    BUN 11 10/31/2020    CREATININE 0.7 10/31/2020    CALCIUM 8.6 (L) 10/31/2020    ALBUMIN 3.4 (L) 10/31/2020    AST 17 10/31/2020    ALT 20 10/31/2020    CHOL 221 (H) 10/31/2020    TRIG 188 (H) 10/31/2020    HDL 58 10/31/2020    LDLCALC 125.4 10/31/2020    HGBA1C 5.5 07/29/2020    TSH 2.989 10/31/2020       Assessment:       1. Upper respiratory tract infection, unspecified type    2. Essential hypertension    3. IgG  deficiency        Plan:   Kianna was seen today for uri.    Diagnoses and all orders for this visit:    Upper respiratory tract infection, unspecified type  -     azithromycin (Z-JARAD) 250 MG tablet; Take 2 tablets by mouth on day 1; Take 1 tablet by mouth on days 2-5  -     guaiFENesin-codeine 100-10 mg/5 ml (GUAIFENESIN AC)  mg/5 mL syrup; Take 5 mLs by mouth every 8 (eight) hours as needed for Cough. No driving if taking this medication.    See HPI and PE. Allergic to penicillins. Will treat with Z-Jarad. No steroids as states she received 2 shots already for pain management this month. Will also give cough syrup with codeine. Discussed symptoms and signs of concern that may prompt her to seek for medical attention, advised her to drink plenty of fluid, and to rest. She needs to monitor her fever among others and contact me if question or concern.    Essential hypertension    Will f-up with usual PCP.    IgG deficiency    She has common variable immunodeficiency.    Advised to take the COVID booster when she feels better!    No follow-ups on file.    This note was created by combination of typed  and M-Modal dictation.  Transcription errors may be present.  If there are any questions, please contact me.

## 2022-04-06 ENCOUNTER — PATIENT MESSAGE (OUTPATIENT)
Dept: FAMILY MEDICINE | Facility: CLINIC | Age: 52
End: 2022-04-06
Payer: COMMERCIAL

## 2022-04-25 ENCOUNTER — OFFICE VISIT (OUTPATIENT)
Dept: FAMILY MEDICINE | Facility: CLINIC | Age: 52
End: 2022-04-25
Payer: COMMERCIAL

## 2022-04-25 DIAGNOSIS — R11.10 VOMITING, INTRACTABILITY OF VOMITING NOT SPECIFIED, PRESENCE OF NAUSEA NOT SPECIFIED, UNSPECIFIED VOMITING TYPE: ICD-10-CM

## 2022-04-25 DIAGNOSIS — R42 VERTIGO: Primary | ICD-10-CM

## 2022-04-25 PROCEDURE — 1159F MED LIST DOCD IN RCRD: CPT | Mod: CPTII,95,, | Performed by: PHYSICIAN ASSISTANT

## 2022-04-25 PROCEDURE — 99214 PR OFFICE/OUTPT VISIT, EST, LEVL IV, 30-39 MIN: ICD-10-PCS | Mod: 95,,, | Performed by: PHYSICIAN ASSISTANT

## 2022-04-25 PROCEDURE — 1160F RVW MEDS BY RX/DR IN RCRD: CPT | Mod: CPTII,95,, | Performed by: PHYSICIAN ASSISTANT

## 2022-04-25 PROCEDURE — 99214 OFFICE O/P EST MOD 30 MIN: CPT | Mod: 95,,, | Performed by: PHYSICIAN ASSISTANT

## 2022-04-25 PROCEDURE — 1159F PR MEDICATION LIST DOCUMENTED IN MEDICAL RECORD: ICD-10-PCS | Mod: CPTII,95,, | Performed by: PHYSICIAN ASSISTANT

## 2022-04-25 PROCEDURE — 1160F PR REVIEW ALL MEDS BY PRESCRIBER/CLIN PHARMACIST DOCUMENTED: ICD-10-PCS | Mod: CPTII,95,, | Performed by: PHYSICIAN ASSISTANT

## 2022-04-25 RX ORDER — ONDANSETRON 4 MG/1
4 TABLET, ORALLY DISINTEGRATING ORAL EVERY 6 HOURS PRN
Qty: 30 TABLET | Refills: 0 | Status: SHIPPED | OUTPATIENT
Start: 2022-04-25 | End: 2022-07-08

## 2022-04-25 RX ORDER — MECLIZINE HYDROCHLORIDE 25 MG/1
25 TABLET ORAL 3 TIMES DAILY PRN
Qty: 30 TABLET | Refills: 0 | Status: SHIPPED | OUTPATIENT
Start: 2022-04-25 | End: 2023-04-21

## 2022-04-25 NOTE — PROGRESS NOTES
Subjective:       Patient ID: Kianna Zuleta is a 51 y.o. female.    Chief Complaint: Dizziness    The patient location is: [place]  The chief complaint leading to consultation is: vomiting, dizziness    Visit type: audiovisual    Face to Face time with patient: 10 min  10 minutes of total time spent on the encounter, which includes face to face time and non-face to face time preparing to see the patient (eg, review of tests), Obtaining and/or reviewing separately obtained history, Documenting clinical information in the electronic or other health record, Independently interpreting results (not separately reported) and communicating results to the patient/family/caregiver, or Care coordination (not separately reported).         Each patient to whom he or she provides medical services by telemedicine is:  (1) informed of the relationship between the physician and patient and the respective role of any other health care provider with respect to management of the patient; and (2) notified that he or she may decline to receive medical services by telemedicine and may withdraw from such care at any time.    Notes:       Emesis   This is a new problem. The current episode started yesterday. The problem occurs 5 to 10 times per day. The problem has been resolved. The emesis has an appearance of stomach contents. There has been no fever. Associated symptoms include chills, dizziness and sweats. Pertinent negatives include no abdominal pain or diarrhea. Treatments tried: dramamine.     Dizziness: + h/o vertgo. + room spinning. Tried dramamine with some relief    Social History     Socioeconomic History    Marital status: Single   Tobacco Use    Smoking status: Never Smoker    Smokeless tobacco: Never Used   Substance and Sexual Activity    Alcohol use: Yes    Drug use: No    Sexual activity: Not Currently   Other Topics Concern    Are you pregnant or think you may be? No    Breast-feeding No     Social  Determinants of Health     Financial Resource Strain: Low Risk     Difficulty of Paying Living Expenses: Not hard at all   Food Insecurity: No Food Insecurity    Worried About Running Out of Food in the Last Year: Never true    Ran Out of Food in the Last Year: Never true   Transportation Needs: No Transportation Needs    Lack of Transportation (Medical): No    Lack of Transportation (Non-Medical): No   Physical Activity: Inactive    Days of Exercise per Week: 0 days    Minutes of Exercise per Session: 20 min   Stress: No Stress Concern Present    Feeling of Stress : Not at all   Social Connections: Unknown    Frequency of Communication with Friends and Family: More than three times a week    Frequency of Social Gatherings with Friends and Family: More than three times a week    Active Member of Clubs or Organizations: No    Attends Club or Organization Meetings: Never    Marital Status: Never    Housing Stability: Low Risk     Unable to Pay for Housing in the Last Year: No    Number of Places Lived in the Last Year: 1    Unstable Housing in the Last Year: No       Review of Systems   Constitutional: Positive for chills.   Gastrointestinal: Positive for vomiting. Negative for abdominal pain and diarrhea.   Neurological: Positive for dizziness.         Objective:      Physical Exam  Constitutional:       Appearance: Normal appearance.   HENT:      Head: Normocephalic and atraumatic.   Neurological:      Mental Status: She is alert and oriented to person, place, and time.   Psychiatric:         Mood and Affect: Mood normal.         Behavior: Behavior normal.         Assessment:       Problem List Items Addressed This Visit    None     Visit Diagnoses     Vertigo    -  Primary    Relevant Medications    meclizine (ANTIVERT) 25 mg tablet    Vomiting, intractability of vomiting not specified, presence of nausea not specified, unspecified vomiting type        Relevant Medications    ondansetron  (ZOFRAN-ODT) 4 MG TbDL          Plan:         Kianna was seen today for dizziness.    Diagnoses and all orders for this visit:    Vertigo  -     meclizine (ANTIVERT) 25 mg tablet; Take 1 tablet (25 mg total) by mouth 3 (three) times daily as needed for Dizziness.    Vomiting, intractability of vomiting not specified, presence of nausea not specified, unspecified vomiting type  -     ondansetron (ZOFRAN-ODT) 4 MG TbDL; Take 1 tablet (4 mg total) by mouth every 6 (six) hours as needed.  -     Push fluids, bland diet as tolerated

## 2022-04-25 NOTE — LETTER
April 25, 2022    Kianna Zuleta  [address] [address]             [facility]  [address] [place] [address]  Phone: [phone]  Fax: [phone] Kianna Zuleta was seen in my clinic on 4/25/22. Please excuse her absence on 4/25/22.  She may return to work on 4/26/22.    If you have any questions or concerns, please don't hesitate to call.    Sincerely,       Any Lyn PA-C

## 2022-04-25 NOTE — PROGRESS NOTES
Answers for HPI/ROS submitted by the patient on 4/25/2022  activity change: No  unexpected weight change: No  neck pain: No  hearing loss: No  rhinorrhea: No  trouble swallowing: No  eye discharge: No  visual disturbance: No  chest tightness: No  wheezing: No  chest pain: No  palpitations: No  blood in stool: No  constipation: No  vomiting: Yes  diarrhea: No  polydipsia: No  polyuria: No  difficulty urinating: No  hematuria: No  menstrual problem: No  dysuria: No  joint swelling: No  arthralgias: No  headaches: No  weakness: No  confusion: No  dysphoric mood: No

## 2022-05-12 ENCOUNTER — TELEPHONE (OUTPATIENT)
Dept: ALLERGY | Facility: CLINIC | Age: 52
End: 2022-05-12
Payer: COMMERCIAL

## 2022-05-12 ENCOUNTER — PATIENT MESSAGE (OUTPATIENT)
Dept: ALLERGY | Facility: CLINIC | Age: 52
End: 2022-05-12
Payer: COMMERCIAL

## 2022-05-12 NOTE — TELEPHONE ENCOUNTER
----- Message from Isaias Ervin sent at 5/11/2022  2:50 PM CDT -----  Contact: Ronan Ferraro with [facility] calling in regards to receiving clinic notes. Requesting call back       Ronan@ [phone] (direct line)

## 2022-05-12 NOTE — TELEPHONE ENCOUNTER
Spoke to Ronan.  Informed Ronan I will fax lov which was 6/17/21 to [phone]. Sent message to patient that she needs to schedule a follow up.

## 2022-05-16 ENCOUNTER — DOCUMENTATION ONLY (OUTPATIENT)
Dept: ALLERGY | Facility: CLINIC | Age: 52
End: 2022-05-16
Payer: COMMERCIAL

## 2022-05-19 ENCOUNTER — TELEPHONE (OUTPATIENT)
Dept: ALLERGY | Facility: CLINIC | Age: 52
End: 2022-05-19
Payer: COMMERCIAL

## 2022-05-19 NOTE — TELEPHONE ENCOUNTER
----- Message from Jasmin Walls sent at 5/19/2022  3:59 PM CDT -----  Regarding: speak with nurse  Contact: adrianne  [phone]  please call [facility] need orders to continue hizentra waiting on a call back thanks.  Or Fax [phone] said information was faxed over to the office have not heard back.

## 2022-05-25 ENCOUNTER — TELEPHONE (OUTPATIENT)
Dept: ALLERGY | Facility: CLINIC | Age: 52
End: 2022-05-25
Payer: COMMERCIAL

## 2022-05-25 ENCOUNTER — PATIENT MESSAGE (OUTPATIENT)
Dept: FAMILY MEDICINE | Facility: CLINIC | Age: 52
End: 2022-05-25
Payer: COMMERCIAL

## 2022-05-25 NOTE — TELEPHONE ENCOUNTER
----- Message from Belkis Vegas sent at 5/25/2022  9:22 AM CDT -----  Contact: @ [phone]  Lee Ann calling from [employer] in regards to getting continue orders for immun glob G,IgG,-pro-IgA 0-50 (HIZENTRA) 10 gram/50 mL (20 %) Soln she also faxed over a form that could be signed and sent back or someone call give verbal please call @ [phone]     Patient Carmelita Muse:  [de-identified]  Primary AUTH/CERT:    [address] Name:   [facility]  Primary Insurance Plan Name:  [address] [place] [address] HMO/POS  Primary Insurance Group Number:    Primary Insurance Plan Type: [facility] Insurance Policy Number:  [id number]

## 2022-05-26 ENCOUNTER — LAB VISIT (OUTPATIENT)
Dept: LAB | Facility: HOSPITAL | Age: 52
End: 2022-05-26
Attending: STUDENT IN AN ORGANIZED HEALTH CARE EDUCATION/TRAINING PROGRAM
Payer: COMMERCIAL

## 2022-05-26 ENCOUNTER — OFFICE VISIT (OUTPATIENT)
Dept: ALLERGY | Facility: CLINIC | Age: 52
End: 2022-05-26
Payer: COMMERCIAL

## 2022-05-26 ENCOUNTER — TELEPHONE (OUTPATIENT)
Dept: ALLERGY | Facility: CLINIC | Age: 52
End: 2022-05-26

## 2022-05-26 DIAGNOSIS — Z29.89 NEED FOR PROPHYLACTIC IMMUNOTHERAPY: ICD-10-CM

## 2022-05-26 DIAGNOSIS — Z51.81 THERAPEUTIC DRUG MONITORING: ICD-10-CM

## 2022-05-26 DIAGNOSIS — D83.9 COMMON VARIABLE IMMUNODEFICIENCY: Primary | ICD-10-CM

## 2022-05-26 DIAGNOSIS — D83.9 COMMON VARIABLE IMMUNODEFICIENCY: ICD-10-CM

## 2022-05-26 LAB — IGG SERPL-MCNC: 1161 MG/DL (ref 650–1600)

## 2022-05-26 PROCEDURE — 36415 COLL VENOUS BLD VENIPUNCTURE: CPT | Mod: PO | Performed by: STUDENT IN AN ORGANIZED HEALTH CARE EDUCATION/TRAINING PROGRAM

## 2022-05-26 PROCEDURE — 99215 PR OFFICE/OUTPT VISIT, EST, LEVL V, 40-54 MIN: ICD-10-PCS | Mod: 95,,, | Performed by: STUDENT IN AN ORGANIZED HEALTH CARE EDUCATION/TRAINING PROGRAM

## 2022-05-26 PROCEDURE — 82784 ASSAY IGA/IGD/IGG/IGM EACH: CPT | Performed by: STUDENT IN AN ORGANIZED HEALTH CARE EDUCATION/TRAINING PROGRAM

## 2022-05-26 PROCEDURE — 99215 OFFICE O/P EST HI 40 MIN: CPT | Mod: 95,,, | Performed by: STUDENT IN AN ORGANIZED HEALTH CARE EDUCATION/TRAINING PROGRAM
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
## 2022-05-26 NOTE — TELEPHONE ENCOUNTER
----- Message from Dwayne Kaur sent at 5/26/2022 12:20 PM CDT -----  Contact: [facility]  Damon from  [facility] requesting script for RX below to be signed and faxed over.    Hizentra    Confirmed contact below:  Contact Name: Damon  Phone Number: [phone]  fax number :[phone]

## 2022-05-27 DIAGNOSIS — Z51.81 THERAPEUTIC DRUG MONITORING: Primary | ICD-10-CM

## 2022-05-27 NOTE — PROGRESS NOTES
# IVIg    CMP not among labs drawn yesterday.  Reordered.  No rush.  OK to re-request IVIg with what we have.

## 2022-06-01 ENCOUNTER — TELEPHONE (OUTPATIENT)
Dept: ALLERGY | Facility: CLINIC | Age: 52
End: 2022-06-01
Payer: COMMERCIAL

## 2022-06-01 ENCOUNTER — PATIENT MESSAGE (OUTPATIENT)
Dept: ALLERGY | Facility: CLINIC | Age: 52
End: 2022-06-01
Payer: COMMERCIAL

## 2022-06-01 NOTE — TELEPHONE ENCOUNTER
----- Message from Joslyn Mayberry, Patient Care Assistant sent at 5/31/2022  2:51 PM CDT -----  Regarding: medication  Contact: Jaye Nance calling from [employer] is calling in regards to a new prescription for pt's medication.      Medication: immun glob G,IgG,-pro-IgA 0-50 (HIZENTRA) 10 gram/50 mL (20 %) Soln      Pharmacy: [facility] [address]

## 2022-06-02 ENCOUNTER — TELEPHONE (OUTPATIENT)
Dept: ALLERGY | Facility: CLINIC | Age: 52
End: 2022-06-02
Payer: COMMERCIAL

## 2022-06-02 NOTE — TELEPHONE ENCOUNTER
----- Message from Kaylin Miller MD sent at 5/27/2022  6:08 AM CDT -----  Regarding: IVIg paperwork  Good Morning, Joanie    You can fax off Kianna's IVIg paperwork now.    Her IgG was fine.  For some reason the CMP was not drawn, but I can live with that, as long as she gets it done in the next few months.  I put in another order.    Kaylin

## 2022-06-02 NOTE — TELEPHONE ENCOUNTER
Hizentra order, pre meds order and ancillary order from [facility] form has been signed by Dr. Miller and faxed to [phone].  Also faxed recent labs and clinicals. Pt notified. Database updated.  Rx form sent to scanning.

## 2022-06-17 ENCOUNTER — HOSPITAL ENCOUNTER (EMERGENCY)
Facility: HOSPITAL | Age: 52
Discharge: HOME | End: 2022-06-18
Attending: EMERGENCY MEDICINE
Payer: COMMERCIAL

## 2022-06-17 DIAGNOSIS — N20.0 NEPHROLITHIASIS: ICD-10-CM

## 2022-06-17 DIAGNOSIS — N30.91 HEMORRHAGIC CYSTITIS: ICD-10-CM

## 2022-06-17 DIAGNOSIS — R16.2 HEPATOSPLENOMEGALY: ICD-10-CM

## 2022-06-17 DIAGNOSIS — N20.1 LEFT URETERAL STONE: Primary | ICD-10-CM

## 2022-06-17 LAB
ALBUMIN SERPL BCP-MCNC: 3.3 G/DL (ref 3.5–5.2)
ALP SERPL-CCNC: 129 U/L (ref 55–135)
ALT SERPL W/O P-5'-P-CCNC: 14 U/L (ref 10–44)
ANION GAP SERPL CALC-SCNC: 13 MMOL/L (ref 8–16)
AST SERPL-CCNC: 20 U/L (ref 10–40)
BACTERIA #/AREA URNS HPF: ABNORMAL /HPF
BASOPHILS # BLD AUTO: 0.02 K/UL (ref 0–0.2)
BASOPHILS NFR BLD: 0.3 % (ref 0–1.9)
BILIRUB SERPL-MCNC: 0.4 MG/DL (ref 0.1–1)
BILIRUB UR QL STRIP: ABNORMAL
BUN SERPL-MCNC: 9 MG/DL (ref 6–20)
CALCIUM SERPL-MCNC: 9.6 MG/DL (ref 8.7–10.5)
CHLORIDE SERPL-SCNC: 101 MMOL/L (ref 95–110)
CLARITY UR: ABNORMAL
CO2 SERPL-SCNC: 26 MMOL/L (ref 23–29)
COLOR UR: YELLOW
CREAT SERPL-MCNC: 0.8 MG/DL (ref 0.5–1.4)
DIFFERENTIAL METHOD: ABNORMAL
EOSINOPHIL # BLD AUTO: 0.1 K/UL (ref 0–0.5)
EOSINOPHIL NFR BLD: 1.2 % (ref 0–8)
ERYTHROCYTE [DISTWIDTH] IN BLOOD BY AUTOMATED COUNT: 13.2 % (ref 11.5–14.5)
EST. GFR  (AFRICAN AMERICAN): >60 ML/MIN/1.73 M^2
EST. GFR  (NON AFRICAN AMERICAN): >60 ML/MIN/1.73 M^2
GLUCOSE SERPL-MCNC: 101 MG/DL (ref 70–110)
GLUCOSE UR QL STRIP: NEGATIVE
HCT VFR BLD AUTO: 40.1 % (ref 37–48.5)
HGB BLD-MCNC: 13.5 G/DL (ref 12–16)
HGB UR QL STRIP: ABNORMAL
HYALINE CASTS #/AREA URNS LPF: 0 /LPF
IMM GRANULOCYTES # BLD AUTO: 0.03 K/UL (ref 0–0.04)
IMM GRANULOCYTES NFR BLD AUTO: 0.4 % (ref 0–0.5)
KETONES UR QL STRIP: NEGATIVE
LEUKOCYTE ESTERASE UR QL STRIP: ABNORMAL
LIPASE SERPL-CCNC: 18 U/L (ref 4–60)
LYMPHOCYTES # BLD AUTO: 1 K/UL (ref 1–4.8)
LYMPHOCYTES NFR BLD: 14.4 % (ref 18–48)
MCH RBC QN AUTO: 29.5 PG (ref 27–31)
MCHC RBC AUTO-ENTMCNC: 33.7 G/DL (ref 32–36)
MCV RBC AUTO: 88 FL (ref 82–98)
MICROSCOPIC COMMENT: ABNORMAL
MONOCYTES # BLD AUTO: 0.4 K/UL (ref 0.3–1)
MONOCYTES NFR BLD: 5.6 % (ref 4–15)
NEUTROPHILS # BLD AUTO: 5.3 K/UL (ref 1.8–7.7)
NEUTROPHILS NFR BLD: 78.1 % (ref 38–73)
NITRITE UR QL STRIP: POSITIVE
NRBC BLD-RTO: 0 /100 WBC
PH UR STRIP: 6 [PH] (ref 5–8)
PLATELET # BLD AUTO: 233 K/UL (ref 150–450)
PMV BLD AUTO: 8.7 FL (ref 9.2–12.9)
POTASSIUM SERPL-SCNC: 3.6 MMOL/L (ref 3.5–5.1)
PROT SERPL-MCNC: 7.8 G/DL (ref 6–8.4)
PROT UR QL STRIP: ABNORMAL
RBC # BLD AUTO: 4.58 M/UL (ref 4–5.4)
RBC #/AREA URNS HPF: 43 /HPF (ref 0–4)
SODIUM SERPL-SCNC: 140 MMOL/L (ref 136–145)
SP GR UR STRIP: 1.01 (ref 1–1.03)
SQUAMOUS #/AREA URNS HPF: 1 /HPF
URN SPEC COLLECT METH UR: ABNORMAL
UROBILINOGEN UR STRIP-ACNC: ABNORMAL EU/DL
WBC # BLD AUTO: 6.8 K/UL (ref 3.9–12.7)
WBC #/AREA URNS HPF: >100 /HPF (ref 0–5)
WBC CLUMPS URNS QL MICRO: ABNORMAL

## 2022-06-17 PROCEDURE — 87491 CHLMYD TRACH DNA AMP PROBE: CPT | Performed by: EMERGENCY MEDICINE

## 2022-06-17 PROCEDURE — 80053 COMPREHEN METABOLIC PANEL: CPT | Performed by: EMERGENCY MEDICINE

## 2022-06-17 PROCEDURE — 96375 TX/PRO/DX INJ NEW DRUG ADDON: CPT

## 2022-06-17 PROCEDURE — 87591 N.GONORRHOEAE DNA AMP PROB: CPT | Performed by: EMERGENCY MEDICINE

## 2022-06-17 PROCEDURE — 87088 URINE BACTERIA CULTURE: CPT | Performed by: EMERGENCY MEDICINE

## 2022-06-17 PROCEDURE — 63600175 PHARM REV CODE 636 W HCPCS: Performed by: PHYSICIAN ASSISTANT

## 2022-06-17 PROCEDURE — 87086 URINE CULTURE/COLONY COUNT: CPT | Performed by: EMERGENCY MEDICINE

## 2022-06-17 PROCEDURE — 96376 TX/PRO/DX INJ SAME DRUG ADON: CPT

## 2022-06-17 PROCEDURE — 87186 SC STD MICRODIL/AGAR DIL: CPT | Performed by: EMERGENCY MEDICINE

## 2022-06-17 PROCEDURE — 99284 EMERGENCY DEPT VISIT MOD MDM: CPT | Mod: 25

## 2022-06-17 PROCEDURE — 81000 URINALYSIS NONAUTO W/SCOPE: CPT | Performed by: EMERGENCY MEDICINE

## 2022-06-17 PROCEDURE — 96361 HYDRATE IV INFUSION ADD-ON: CPT

## 2022-06-17 PROCEDURE — 25000003 PHARM REV CODE 250: Performed by: PHYSICIAN ASSISTANT

## 2022-06-17 PROCEDURE — 87077 CULTURE AEROBIC IDENTIFY: CPT | Performed by: EMERGENCY MEDICINE

## 2022-06-17 PROCEDURE — 85025 COMPLETE CBC W/AUTO DIFF WBC: CPT | Performed by: EMERGENCY MEDICINE

## 2022-06-17 PROCEDURE — 83690 ASSAY OF LIPASE: CPT | Performed by: EMERGENCY MEDICINE

## 2022-06-17 PROCEDURE — 96365 THER/PROPH/DIAG IV INF INIT: CPT

## 2022-06-17 RX ORDER — ONDANSETRON 2 MG/ML
8 INJECTION INTRAMUSCULAR; INTRAVENOUS
Status: COMPLETED | OUTPATIENT
Start: 2022-06-17 | End: 2022-06-17

## 2022-06-17 RX ORDER — CEPHALEXIN 500 MG/1
1000 CAPSULE ORAL EVERY 12 HOURS
Qty: 40 CAPSULE | Refills: 0 | Status: SHIPPED | OUTPATIENT
Start: 2022-06-17 | End: 2022-06-27

## 2022-06-17 RX ORDER — KETOROLAC TROMETHAMINE 30 MG/ML
15 INJECTION, SOLUTION INTRAMUSCULAR; INTRAVENOUS
Status: COMPLETED | OUTPATIENT
Start: 2022-06-17 | End: 2022-06-17

## 2022-06-17 RX ORDER — KETOROLAC TROMETHAMINE 10 MG/1
10 TABLET, FILM COATED ORAL EVERY 6 HOURS
Qty: 20 TABLET | Refills: 0 | Status: SHIPPED | OUTPATIENT
Start: 2022-06-17 | End: 2022-06-23

## 2022-06-17 RX ORDER — TAMSULOSIN HYDROCHLORIDE 0.4 MG/1
0.4 CAPSULE ORAL DAILY
Qty: 10 CAPSULE | Refills: 0 | Status: SHIPPED | OUTPATIENT
Start: 2022-06-17 | End: 2022-07-08 | Stop reason: ALTCHOICE

## 2022-06-17 RX ORDER — MORPHINE SULFATE 4 MG/ML
4 INJECTION, SOLUTION INTRAMUSCULAR; INTRAVENOUS
Status: COMPLETED | OUTPATIENT
Start: 2022-06-17 | End: 2022-06-17

## 2022-06-17 RX ORDER — HYDROCODONE BITARTRATE AND ACETAMINOPHEN 5; 325 MG/1; MG/1
1 TABLET ORAL EVERY 6 HOURS PRN
Qty: 12 TABLET | Refills: 0 | Status: SHIPPED | OUTPATIENT
Start: 2022-06-17 | End: 2022-06-17 | Stop reason: CLARIF

## 2022-06-17 RX ORDER — ONDANSETRON 4 MG/1
8 TABLET, FILM COATED ORAL EVERY 6 HOURS PRN
Qty: 30 TABLET | Refills: 0 | Status: SHIPPED | OUTPATIENT
Start: 2022-06-17 | End: 2022-07-17

## 2022-06-17 RX ORDER — MORPHINE SULFATE 4 MG/ML
INJECTION, SOLUTION INTRAMUSCULAR; INTRAVENOUS
Status: COMPLETED
Start: 2022-06-17 | End: 2022-06-17

## 2022-06-17 RX ORDER — MORPHINE SULFATE 4 MG/ML
5 INJECTION, SOLUTION INTRAMUSCULAR; INTRAVENOUS
Status: COMPLETED | OUTPATIENT
Start: 2022-06-17 | End: 2022-06-17

## 2022-06-17 RX ADMIN — MORPHINE SULFATE 4 MG: 4 INJECTION INTRAVENOUS at 08:06

## 2022-06-17 RX ADMIN — SODIUM CHLORIDE 1000 ML: 0.9 INJECTION, SOLUTION INTRAVENOUS at 08:06

## 2022-06-17 RX ADMIN — ONDANSETRON 8 MG: 2 INJECTION INTRAMUSCULAR; INTRAVENOUS at 08:06

## 2022-06-17 RX ADMIN — CEFTRIAXONE 2 G: 2 INJECTION, SOLUTION INTRAVENOUS at 11:06

## 2022-06-17 RX ADMIN — MORPHINE SULFATE 5 MG: 4 INJECTION INTRAVENOUS at 11:06

## 2022-06-17 RX ADMIN — KETOROLAC TROMETHAMINE 15 MG: 30 INJECTION, SOLUTION INTRAMUSCULAR at 08:06

## 2022-06-17 NOTE — Clinical Note
"Kianna Delgadoa" Merlyn was seen and treated in our emergency department on 6/17/2022.  She may return to work on 06/20/2022.       If you have any questions or concerns, please don't hesitate to call.      Herson Barrios PA-C"

## 2022-06-18 VITALS
BODY MASS INDEX: 47.29 KG/M2 | HEIGHT: 64 IN | HEART RATE: 82 BPM | WEIGHT: 277 LBS | RESPIRATION RATE: 18 BRPM | SYSTOLIC BLOOD PRESSURE: 155 MMHG | DIASTOLIC BLOOD PRESSURE: 95 MMHG | OXYGEN SATURATION: 100 % | TEMPERATURE: 99 F

## 2022-06-18 NOTE — ED PROVIDER NOTES
Encounter Date: 6/17/2022    SCRIBE #1 NOTE: I, Svetlana Keane, am scribing for, and in the presence of,  Herson Barrios PA-C. I have scribed the following portions of the note - Other sections scribed: HPI, ROS.       History     Chief Complaint   Patient presents with    Vomiting    Back Pain    Abdominal Pain     Pt c/o lower abdominal pain, lower back pain, vomiting and painful urination x3 days. Pt has hx of kidney stones.     Kianna Zuleta, a 52 y.o. female with a pertinent past medical history of hypothyroidism, interstitial cystitis, kidney stones, malignant tumor of the appendix, and MVP, presents to the ED with intermittent lower abdominal pain that began 3 days ago. Pt reports associated symptoms of lower back pain, nausea, vomiting, dysuria, and frequency. She vomited 5 times and her symptoms worsened today. The left side of her back hurts more than her right. She says that this experience feels similar to previous experiences of kidney stones. Reports getting septic during one experience with kidney stones. No other exacerbating or alleviating factors. Patient denies any other associated symptoms.     The history is provided by the patient. No  was used.     Review of patient's allergies indicates:   Allergen Reactions    Benadryl allergy decongestant Anaphylaxis     Other reaction(s): Anaphylaxis    Fludrocortisone Hives    Gluten Other (See Comments)     Other reaction(s) GI upset    Diphenhydramine hcl     Hydromorphone hcl      No issue with morphine in the past per pt.     Indomethacin Hives     No issue with Toradol in the past per pt.     Penicillins Rash     No issue with cephalosporins in the past per pt.    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    MVP (mitral valve prolapse)     Pneumonia     POTS (postural orthostatic tachycardia syndrome)     Recurrent upper respiratory infection (URI)     Ulcer     Vitamin D deficiency disease 11/6/2012     Past Surgical History:   Procedure Laterality Date    ANKLE SURGERY      lt    APPENDECTOMY      BACK SURGERY      CHOLECYSTECTOMY      choley & ovarian cyst removed  12/2005    cystoscope      multiple    HYSTERECTOMY  4/8/2004    BUBBA BS&O     interstim      OOPHORECTOMY  4/8/2004    with hysterectomy     PELVIC LAPAROSCOPY      MS EXPLORATORY OF ABDOMEN      SINUS SURGERY  03/01/2016    SPINE SURGERY       Family History   Problem Relation Age of Onset    Hypertension Father     Alcohol abuse Brother     Cancer Paternal Uncle     Colon cancer Paternal Uncle     Depression Maternal Grandmother     Hearing loss Maternal Grandmother     Heart disease Maternal Grandmother     Kidney disease Maternal Grandmother     Cancer Paternal Grandmother     Arthritis Paternal Grandfather     Diabetes Paternal Grandfather     Stroke Paternal Grandfather     Breast cancer Neg Hx     Ovarian cancer Neg Hx     Asthma Neg Hx     Emphysema Neg Hx      Social History     Tobacco Use    Smoking status: Never Smoker    Smokeless tobacco: Never Used   Substance Use Topics    Alcohol use: Yes    Drug use: No     Review of Systems   Constitutional: Negative for fever.   HENT: Negative for congestion.    Eyes: Negative for photophobia.   Respiratory: Negative for cough.    Cardiovascular: Negative for chest pain.   Gastrointestinal: Positive for abdominal pain (lower), nausea and vomiting.   Genitourinary: Positive for dysuria and frequency.   Musculoskeletal: Positive for back pain (lower).   Skin: Negative for rash.   Neurological: Negative for  headaches.   All other systems reviewed and are negative.      Physical Exam     Initial Vitals [06/17/22 1822]   BP Pulse Resp Temp SpO2   (!) 142/94 86 20 99.1 °F (37.3 °C) 95 %      MAP       --         Physical Exam    Nursing note and vitals reviewed.  Constitutional: She appears well-developed and well-nourished. She is not diaphoretic. No distress.   HENT:   Head: Atraumatic.   Right Ear: External ear normal.   Left Ear: External ear normal.   Eyes: Conjunctivae and EOM are normal.   Neck: No tracheal deviation present.   Normal range of motion.  Cardiovascular: Normal rate and regular rhythm.   Pulmonary/Chest: No accessory muscle usage or stridor. No tachypnea. No respiratory distress.   Abdominal: Abdomen is soft. There is abdominal tenderness.   No right CVA tenderness.  No left CVA tenderness. There is no rebound, no guarding, no tenderness at McBurney's point and negative Luke's sign. negative Rovsing's sign  Musculoskeletal:         General: No edema. Normal range of motion.      Cervical back: Normal range of motion.     Neurological: She is alert and oriented to person, place, and time. She displays no tremor. She displays no seizure activity. Coordination and gait normal.   Skin: Skin is intact. Capillary refill takes less than 2 seconds. No rash noted. No erythema.         ED Course   Procedures  Labs Reviewed   CBC W/ AUTO DIFFERENTIAL - Abnormal; Notable for the following components:       Result Value    MPV 8.7 (*)     Gran % 78.1 (*)     Lymph % 14.4 (*)     All other components within normal limits   COMPREHENSIVE METABOLIC PANEL - Abnormal; Notable for the following components:    Albumin 3.3 (*)     All other components within normal limits   URINALYSIS, REFLEX TO URINE CULTURE - Abnormal; Notable for the following components:    Appearance, UA Hazy (*)     Protein, UA 1+ (*)     Bilirubin (UA) 1+ (*)     Occult Blood UA 2+ (*)     Nitrite, UA Positive (*)     Urobilinogen, UA 2.0-3.0 (*)      Leukocytes, UA 3+ (*)     All other components within normal limits    Narrative:     Specimen Source->Urine   URINALYSIS MICROSCOPIC - Abnormal; Notable for the following components:    RBC, UA 43 (*)     WBC, UA >100 (*)     WBC Clumps, UA Occasional (*)     All other components within normal limits    Narrative:     Specimen Source->Urine   C. TRACHOMATIS/N. GONORRHOEAE BY AMP DNA   CULTURE, URINE   C. TRACHOMATIS/N. GONORRHOEAE BY AMP DNA   LIPASE          Imaging Results           CT Renal Stone Study ABD Pelvis WO (Final result)  Result time 06/17/22 20:44:32    Final result by Michele Sage MD (06/17/22 20:44:32)                 Impression:      1. Mild hydronephrosis and hydroureter on the left secondary to a 3 mm stone at the left UVJ.  See above comments.  Follow-up recommended.  2. Nonobstructing nephrolithiasis of the right kidney also.  3. Hepatosplenomegaly.  4.  This report was flagged in Epic as abnormal.      Electronically signed by: Michele Sage  Date:    06/17/2022  Time:    20:44             Narrative:    EXAMINATION:  CT RENAL STONE STUDY ABD PELVIS WO    CLINICAL HISTORY:  Flank pain, kidney stone suspected;    TECHNIQUE:  Low dose axial images, sagittal and coronal reformations were obtained from the lung bases to the pubic symphysis.  Contrast was not administered.    COMPARISON:  07/24/2019    FINDINGS:  Heart: Normal in size. No pericardial effusion.    Lung Bases: Well aerated, without consolidation or pleural fluid.    Liver: The liver is mildly enlarged.  No focal abnormality.    Gallbladder: Status post cholecystectomy.    Bile Ducts: No evidence of dilated ducts.    Pancreas: No mass or peripancreatic fat stranding.    Spleen: Spleen is mildly enlarged.  No focal abnormality.    Large body habitus.    Adrenals: Unremarkable.    Kidneys/ Ureters: Mild hydronephrosis and hydroureter on the left secondary to a 3 mm stone at the left UVJ.  Follow-up recommended.  Mild left  perinephric and periureteral stranding.  No other stones on the left.    There are 2 small nonobstructing stones in the lower pole the right kidney.  No hydronephrosis or hydroureter on the right.    Bladder: No evidence of wall thickening.    Reproductive organs: Status post hysterectomy.    GI Tract/Mesentery: No evidence of bowel obstruction or inflammation.    Peritoneal Space: No ascites. No free air.    Retroperitoneum: No significant adenopathy.    Abdominal wall: Unremarkable.    Vasculature: No significant atherosclerosis or aneurysm.    Bones: No acute fracture.                                 Medications   sodium chloride 0.9% bolus 1,000 mL (1,000 mLs Intravenous Not Given 6/17/22 2115)   morphine injection 5 mg (has no administration in time range)   cefTRIAXone (ROCEPHIN) 2 g/50 mL D5W IVPB (has no administration in time range)   ondansetron injection 8 mg (8 mg Intravenous Given 6/17/22 2008)   sodium chloride 0.9% bolus 1,000 mL (0 mLs Intravenous Stopped 6/17/22 2123)   ketorolac injection 15 mg (15 mg Intravenous Given 6/17/22 2046)   morphine injection 4 mg (4 mg Intravenous Given 6/17/22 2046)     Medical Decision Making:   History:   Old Medical Records: I decided to obtain old medical records.  ED Management:  3 mm stone to the left UVJ.  Also noted to have hemorrhagic cystitis that is likely causing her right-sided flank pain.  Clinically not consistent with pyelonephritis at this time but likely has ascending UTI.  No urosepsis.  Normal renal function.  Pain controllable in the ED.  Tolerating p.o..  Past urine culture pansensitive.  History of interstitial cystitis could be playing a role today.  May benefit from further investigation with Urology as an outpatient.  She has Percocet available to her at home.  Otherwise, recommending follow-up with PCP.  Strict return precautions discussed with patient who is agreeable to the plan.          Scribe Attestation:   Scribe #1: I performed the  above scribed service and the documentation accurately describes the services I performed. I attest to the accuracy of the note.                 Clinical Impression:   Final diagnoses:  [N20.1] Left ureteral stone. 3mm at UVJ. (Primary)  [N20.0] Nephrolithiasis  [R16.2] Hepatosplenomegaly  [N30.91] Hemorrhagic cystitis          ED Disposition Condition    Discharge Stable        ED Prescriptions     Medication Sig Dispense Start Date End Date Auth. Provider    tamsulosin (FLOMAX) 0.4 mg Cap Take 1 capsule (0.4 mg total) by mouth once daily. 10 capsule 6/17/2022 6/17/2023 Herson Barrios PA-C    ondansetron (ZOFRAN) 4 MG tablet Take 2 tablets (8 mg total) by mouth every 6 (six) hours as needed for Nausea. 30 tablet 6/17/2022 7/17/2022 Herson Barrios PA-C    HYDROcodone-acetaminophen (NORCO) 5-325 mg per tablet  (Status: Discontinued) Take 1 tablet by mouth every 6 (six) hours as needed for Pain. 12 tablet 6/17/2022 6/17/2022 Herson Barrios PA-C    ketorolac (TORADOL) 10 mg tablet Take 1 tablet (10 mg total) by mouth every 6 (six) hours. for 5 days 20 tablet 6/17/2022 6/22/2022 Herson Barrios PA-C    cephALEXin (KEFLEX) 500 MG capsule Take 2 capsules (1,000 mg total) by mouth every 12 (twelve) hours. for 10 days 40 capsule 6/17/2022 6/27/2022 Herson Barrios PA-C        Follow-up Information     Follow up With Specialties Details Why Contact Info Additional Information    Gigi Celis MD Internal Medicine Schedule an appointment as soon as possible for a visit in 3 days For re-evaluation [address] [address] [phone]       [facility] Urology Schedule an appointment as soon as possible for a visit in 3 days For further evaluation, For more definitive management of your symptoms [address] [facility] [id number] [phone] 2nd Floor    [facility] [place] [facility] Emergency Medicine Go to  If symptoms worsen [address]  [facility]  [address]  [phone]        I, Herson Barrios PA-C, personally performed the services described in this documentation. All medical record entries made by the scribe were at my direction and in my presence. I have reviewed the chart and agree that the record reflects my personal performance and is accurate and complete.       Herson Barrios PA-C  06/17/22 [id number]

## 2022-06-18 NOTE — DISCHARGE INSTRUCTIONS

## 2022-06-19 LAB — BACTERIA UR CULT: ABNORMAL

## 2022-06-20 ENCOUNTER — OFFICE VISIT (OUTPATIENT)
Dept: UROLOGY | Facility: CLINIC | Age: 52
End: 2022-06-20
Payer: COMMERCIAL

## 2022-06-20 VITALS — WEIGHT: 285.06 LBS | BODY MASS INDEX: 48.93 KG/M2

## 2022-06-20 DIAGNOSIS — N20.1 LEFT URETERAL STONE: Primary | ICD-10-CM

## 2022-06-20 DIAGNOSIS — R10.9 LEFT FLANK PAIN: ICD-10-CM

## 2022-06-20 PROCEDURE — 81001 PR  URINALYSIS, AUTO, W/SCOPE: ICD-10-PCS | Mod: S$GLB,,, | Performed by: UROLOGY

## 2022-06-20 PROCEDURE — 3008F PR BODY MASS INDEX (BMI) DOCUMENTED: ICD-10-PCS | Mod: CPTII,S$GLB,, | Performed by: UROLOGY

## 2022-06-20 PROCEDURE — 99204 OFFICE O/P NEW MOD 45 MIN: CPT | Mod: S$GLB,,, | Performed by: UROLOGY

## 2022-06-20 PROCEDURE — 99999 PR PBB SHADOW E&M-EST. PATIENT-LVL III: ICD-10-PCS | Mod: PBBFAC,,, | Performed by: UROLOGY

## 2022-06-20 PROCEDURE — 1160F RVW MEDS BY RX/DR IN RCRD: CPT | Mod: CPTII,S$GLB,, | Performed by: UROLOGY

## 2022-06-20 PROCEDURE — 99999 PR PBB SHADOW E&M-EST. PATIENT-LVL III: CPT | Mod: PBBFAC,,, | Performed by: UROLOGY

## 2022-06-20 PROCEDURE — 3008F BODY MASS INDEX DOCD: CPT | Mod: CPTII,S$GLB,, | Performed by: UROLOGY

## 2022-06-20 PROCEDURE — 1159F MED LIST DOCD IN RCRD: CPT | Mod: CPTII,S$GLB,, | Performed by: UROLOGY

## 2022-06-20 PROCEDURE — 81001 URINALYSIS AUTO W/SCOPE: CPT | Mod: S$GLB,,, | Performed by: UROLOGY

## 2022-06-20 PROCEDURE — 1160F PR REVIEW ALL MEDS BY PRESCRIBER/CLIN PHARMACIST DOCUMENTED: ICD-10-PCS | Mod: CPTII,S$GLB,, | Performed by: UROLOGY

## 2022-06-20 PROCEDURE — 1159F PR MEDICATION LIST DOCUMENTED IN MEDICAL RECORD: ICD-10-PCS | Mod: CPTII,S$GLB,, | Performed by: UROLOGY

## 2022-06-20 PROCEDURE — 99204 PR OFFICE/OUTPT VISIT, NEW, LEVL IV, 45-59 MIN: ICD-10-PCS | Mod: S$GLB,,, | Performed by: UROLOGY

## 2022-06-20 NOTE — PROGRESS NOTES
Subjective:       Patient ID: Kianna Zuleta is a 52 y.o. female who was referred by No ref. provider found    Chief Complaint:   Chief Complaint   Patient presents with    Nephrolithiasis    Urinary Tract Infection       Urolithiasis  Patient complains of left flank pain with radiation to the groin. Onset of symptoms was abrupt starting a few days ago with stable course since that time. Patient describes the pain as aching and colicky, intermittent and rated as moderate. The patient has had nausea and vomiting and no diaphoresis. There has been no fever or chills. The patient is not complaining of dysuria or frequency. Risk factors for urolithiasis: history of stone disease.      ACTIVE MEDICAL ISSUES:  Patient Active Problem List   Diagnosis    Vitamin D deficiency    Hypothyroidism    Irritable bowel syndrome    Elevated alkaline phosphatase level    Malignant carcinoid tumor of appendix    Fever    Elevated sed rate    CRP elevated    Hypertension    Morbid obesity    Abnormal CT of the chest    Trigeminal neuralgia of right side of face    IgG deficiency    POTS (postural orthostatic tachycardia syndrome)    Fatty liver disease, nonalcoholic       PAST MEDICAL HISTORY  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    MVP (mitral valve prolapse)     Pneumonia     POTS (postural orthostatic tachycardia syndrome)     Recurrent upper respiratory infection (URI)     Ulcer     Vitamin D deficiency disease 11/6/2012       PAST SURGICAL HISTORY:  Past Surgical History:   Procedure Laterality Date    ANKLE SURGERY      lt    APPENDECTOMY      BACK SURGERY      CHOLECYSTECTOMY      choley & ovarian cyst  removed  12/2005    cystoscope      multiple    HYSTERECTOMY  4/8/2004    BUBBA BS&O     interstim      OOPHORECTOMY  4/8/2004    with hysterectomy     PELVIC LAPAROSCOPY      [place] EXPLORATORY OF ABDOMEN      SINUS SURGERY  03/01/2016    SPINE SURGERY         SOCIAL HISTORY:  Social History     Tobacco Use    Smoking status: Never Smoker    Smokeless tobacco: Never Used   Substance Use Topics    Alcohol use: Yes    Drug use: No       FAMILY HISTORY:  Family History   Problem Relation Age of Onset    Hypertension Father     Alcohol abuse Brother     Cancer Paternal Uncle     Colon cancer Paternal Uncle     Depression Maternal Grandmother     Hearing loss Maternal Grandmother     Heart disease Maternal Grandmother     Kidney disease Maternal Grandmother     Cancer Paternal Grandmother     Arthritis Paternal Grandfather     Diabetes Paternal Grandfather     Stroke Paternal Grandfather     Breast cancer Neg Hx     Ovarian cancer Neg Hx     Asthma Neg Hx     Emphysema Neg Hx        ALLERGIES AND MEDICATIONS: updated and reviewed.  Review of patient's allergies indicates:   Allergen Reactions    Benadryl allergy decongestant Anaphylaxis     Other reaction(s): Anaphylaxis    Fludrocortisone Hives    Gluten Other (See Comments)     Other reaction(s) GI upset    Diphenhydramine hcl     Hydromorphone hcl      No issue with morphine in the past per pt.     Indomethacin Hives     No issue with Toradol in the past per pt.     Penicillins Rash     No issue with cephalosporins in the past per pt.     Sulfa (sulfonamide antibiotics) Rash    Vancomycin analogues Rash     Current Outpatient Medications   Medication Sig    albuterol (PROVENTIL) 2.5 mg /3 mL (0.083 %) nebulizer solution USE 3 MLS BY NEBULIZATION EVERY 6 HOURS AS NEEDED FOR WHEEZING. RESCUE.    cephALEXin (KEFLEX) 500 MG capsule Take 2 capsules (1,000 mg total) by mouth every 12 (twelve) hours. for 10 days    cloNIDine (CATAPRES) 0.1  MG tablet Take 0.1 mg by mouth daily as needed. Take as needed for BP > 150/100 mmHg per outside cardiologist    ergocalciferol (ERGOCALCIFEROL) 50,000 unit Cap Take 1 capsule (50,000 Units total) by mouth every 7 days.    estradioL (ESTRACE) 2 MG tablet TAKE 1 TABLET BY MOUTH EVERY DAY    gabapentin (NEURONTIN) 300 MG capsule Take 600 mg by mouth 2 (two) times daily. 2-300 mg cap in AM and 3-300 mg cap in PM    hydrOXYzine HCL (ATARAX) 25 MG tablet Take 1 tablet (25 mg total) by mouth 3 (three) times daily.    immun glob G,IgG,-pro-IgA 0-50 (HIZENTRA) 10 gram/50 mL (20 %) Soln Inject 5 mLs (1 g total) into the skin every 7 days.    indapamide (LOZOL) 1.25 MG Tab TAKE 1 TABLET BY MOUTH DAILY    ketorolac (TORADOL) 10 mg tablet Take 1 tablet (10 mg total) by mouth every 6 (six) hours. for 5 days    levocetirizine (XYZAL) 5 MG tablet TK 1 T PO  ONCE Day    LIDOcaine-prilocaine (EMLA) cream Apply topically as needed (apply to infusion site).    meclizine (ANTIVERT) 25 mg tablet Take 1 tablet (25 mg total) by mouth 3 (three) times daily as needed for Dizziness.    metoprolol tartrate (LOPRESSOR) 50 MG tablet Take 1 tablet (50 mg total) by mouth 3 (three) times daily.    montelukast (SINGULAIR) 10 mg tablet 10 mg once daily.     ondansetron (ZOFRAN) 4 MG tablet Take 2 tablets (8 mg total) by mouth every 6 (six) hours as needed for Nausea.    ondansetron (ZOFRAN-ODT) 4 MG TbDL Take 1 tablet (4 mg total) by mouth every 6 (six) hours as needed.    oxyCODONE-acetaminophen (PERCOCET)  mg per tablet TK 1 T PO Q 6 H PRN    tamsulosin (FLOMAX) 0.4 mg Cap Take 1 capsule (0.4 mg total) by mouth once daily.    tiZANidine 4 mg Cap TK 2 TO 3 C XD PO TID WF    TRINTELLIX 20 mg Tab TK 1 T PO QAM    VENTOLIN HFA 90 mcg/actuation inhaler INHALE 2 PUFFS INTO LUNGS EVERY 6 HOURS AS NEEDED FOR WHEEZING RESCUE    vitamin D (VITAMIN D3) 1000 units Tab Take 1,000 Units by mouth once daily.    zaleplon (SONATA) 10 MG  capsule Take 10 mg by mouth once daily.    azelastine (ASTELIN) 137 mcg (0.1 %) nasal spray 1 spray (137 mcg total) by Nasal route 2 (two) times daily.    CHOLESTYRAMINE LIGHT 4 gram packet DIS CNTS IN WATER AND DRK PO BID    EPINEPHrine (AUVI-Q) 0.3 mg/0.3 mL AtIn Inject 0.3 mLs (0.3 mg total) into the muscle once.    QUEtiapine (SEROQUEL) 50 MG tablet TK 1 T PO Q NIGHT     No current facility-administered medications for this visit.       Review of Systems   Constitutional: Negative for activity change, fatigue, fever and unexpected weight change.   Eyes: Negative for redness and visual disturbance.   Respiratory: Negative for chest tightness and shortness of breath.    Cardiovascular: Negative for chest pain and leg swelling.   Gastrointestinal: Negative for abdominal distention, abdominal pain, constipation, diarrhea, nausea and vomiting.   Genitourinary: Positive for flank pain. Negative for difficulty urinating, dysuria, frequency, hematuria, pelvic pain, urgency and vaginal bleeding.   Musculoskeletal: Negative for arthralgias and joint swelling.   Neurological: Negative for dizziness, weakness and headaches.   Psychiatric/Behavioral: Negative for confusion. The patient is not nervous/anxious.    All other systems reviewed and are negative.      Objective:      Vitals:    06/20/22 1421   Weight: 129.3 kg (285 lb 0.9 oz)     Physical Exam  Vitals and nursing note reviewed.   Constitutional:       Appearance: She is well-developed.   HENT:      Head: Normocephalic.   Eyes:      Conjunctiva/sclera: Conjunctivae normal.   Neck:      Thyroid: No thyromegaly.      Trachea: No tracheal deviation.   Cardiovascular:      Rate and Rhythm: Normal rate.      Pulses: Normal pulses.      Heart sounds: Normal heart sounds.   Pulmonary:      Effort: Pulmonary effort is normal. No respiratory distress.      Breath sounds: Normal breath sounds. No wheezing.   Abdominal:      General: There is no distension.       Palpations: Abdomen is soft. There is no mass.      Tenderness: There is no abdominal tenderness. There is no guarding or rebound.      Hernia: No hernia is present.   Musculoskeletal:         General: No tenderness. Normal range of motion.      Cervical back: Normal range of motion.   Lymphadenopathy:      Cervical: No cervical adenopathy.   Skin:     General: Skin is warm and dry.      Findings: No erythema or rash.   Neurological:      Mental Status: She is alert and oriented to person, place, and time.   Psychiatric:         Behavior: Behavior normal.         Thought Content: Thought content normal.         Judgment: Judgment normal.         Urine dipstick shows negative for all components.  Micro exam: negative for WBC's or RBC's.  Contains abnormal data CT Renal Stone Study ABD Pelvis WO  Order: [id number]   Status: Final result     Visible to patient: Yes (seen)     Next appt: Today at 02:30 PM in Urology (JAIME Gillis MD)     0 Result Notes    Details    Reading Physician Reading Date Result Priority   Michele Sage MD  [phone]  [phone] 6/17/2022 STAT     Narrative & Impression  EXAMINATION:  CT RENAL STONE STUDY ABD PELVIS WO     CLINICAL HISTORY:  Flank pain, kidney stone suspected;     TECHNIQUE:  Low dose axial images, sagittal and coronal reformations were obtained from the lung bases to the pubic symphysis.  Contrast was not administered.     COMPARISON:  07/24/2019     FINDINGS:  Heart: Normal in size. No pericardial effusion.     Lung Bases: Well aerated, without consolidation or pleural fluid.     Liver: The liver is mildly enlarged.  No focal abnormality.     Gallbladder: Status post cholecystectomy.     Bile Ducts: No evidence of dilated ducts.     Pancreas: No mass or peripancreatic fat stranding.     Spleen: Spleen is mildly enlarged.  No focal abnormality.     Large body habitus.     Adrenals: Unremarkable.     Kidneys/ Ureters: Mild hydronephrosis and hydroureter on the left  secondary to a 3 mm stone at the left UVJ.  Follow-up recommended.  Mild left perinephric and periureteral stranding.  No other stones on the left.     There are 2 small nonobstructing stones in the lower pole the right kidney.  No hydronephrosis or hydroureter on the right.     Bladder: No evidence of wall thickening.     Reproductive organs: Status post hysterectomy.     GI Tract/Mesentery: No evidence of bowel obstruction or inflammation.     Peritoneal Space: No ascites. No free air.     Retroperitoneum: No significant adenopathy.     Abdominal wall: Unremarkable.     Vasculature: No significant atherosclerosis or aneurysm.     Bones: No acute fracture.     Impression:     1. Mild hydronephrosis and hydroureter on the left secondary to a 3 mm stone at the left UVJ.  See above comments.  Follow-up recommended.  2. Nonobstructing nephrolithiasis of the right kidney also.  3. Hepatosplenomegaly.  4.  This report was flagged in Epic as abnormal.        Electronically signed by: Michele Luna  Date:                                            06/17/2022  Time:                                           20:44         Assessment:       1. Left ureteral stone    2. Left flank pain          Plan:       1. Left ureteral stone  She is likely going to pass this stone.  She was cautioned to go to the ED for fever or worsening pain.    - US Retroperitoneal Complete (Kidney and; Future  - POCT urinalysis, dipstick or tablet reag  - Urine culture    2. Left flank pain    - US Retroperitoneal Complete (Kidney and; Future            Follow up in about 1 week (around 6/27/2022) for Review X-ray.

## 2022-06-22 LAB
C TRACH DNA SPEC QL NAA+PROBE: NOT DETECTED
N GONORRHOEA DNA SPEC QL NAA+PROBE: NOT DETECTED

## 2022-06-23 ENCOUNTER — OFFICE VISIT (OUTPATIENT)
Dept: FAMILY MEDICINE | Facility: CLINIC | Age: 52
End: 2022-06-23
Payer: COMMERCIAL

## 2022-06-23 VITALS
HEART RATE: 78 BPM | DIASTOLIC BLOOD PRESSURE: 88 MMHG | TEMPERATURE: 99 F | HEIGHT: 64 IN | BODY MASS INDEX: 48.37 KG/M2 | SYSTOLIC BLOOD PRESSURE: 132 MMHG | WEIGHT: 283.31 LBS | OXYGEN SATURATION: 98 %

## 2022-06-23 DIAGNOSIS — E03.4 HYPOTHYROIDISM DUE TO ACQUIRED ATROPHY OF THYROID: ICD-10-CM

## 2022-06-23 DIAGNOSIS — C7A.020 MALIGNANT CARCINOID TUMOR OF APPENDIX: ICD-10-CM

## 2022-06-23 DIAGNOSIS — N20.0 NEPHROLITHIASIS: Primary | ICD-10-CM

## 2022-06-23 DIAGNOSIS — Z13.220 SCREENING FOR HYPERLIPIDEMIA: ICD-10-CM

## 2022-06-23 DIAGNOSIS — E66.01 MORBID OBESITY: ICD-10-CM

## 2022-06-23 PROCEDURE — 99999 PR PBB SHADOW E&M-EST. PATIENT-LVL V: CPT | Mod: PBBFAC,,, | Performed by: INTERNAL MEDICINE

## 2022-06-23 PROCEDURE — 1160F PR REVIEW ALL MEDS BY PRESCRIBER/CLIN PHARMACIST DOCUMENTED: ICD-10-PCS | Mod: CPTII,S$GLB,, | Performed by: INTERNAL MEDICINE

## 2022-06-23 PROCEDURE — 3075F SYST BP GE 130 - 139MM HG: CPT | Mod: CPTII,S$GLB,, | Performed by: INTERNAL MEDICINE

## 2022-06-23 PROCEDURE — 1160F RVW MEDS BY RX/DR IN RCRD: CPT | Mod: CPTII,S$GLB,, | Performed by: INTERNAL MEDICINE

## 2022-06-23 PROCEDURE — 99999 PR PBB SHADOW E&M-EST. PATIENT-LVL V: ICD-10-PCS | Mod: PBBFAC,,, | Performed by: INTERNAL MEDICINE

## 2022-06-23 PROCEDURE — 1159F PR MEDICATION LIST DOCUMENTED IN MEDICAL RECORD: ICD-10-PCS | Mod: CPTII,S$GLB,, | Performed by: INTERNAL MEDICINE

## 2022-06-23 PROCEDURE — 99214 OFFICE O/P EST MOD 30 MIN: CPT | Mod: S$GLB,,, | Performed by: INTERNAL MEDICINE

## 2022-06-23 PROCEDURE — 3079F PR MOST RECENT DIASTOLIC BLOOD PRESSURE 80-89 MM HG: ICD-10-PCS | Mod: CPTII,S$GLB,, | Performed by: INTERNAL MEDICINE

## 2022-06-23 PROCEDURE — 3008F PR BODY MASS INDEX (BMI) DOCUMENTED: ICD-10-PCS | Mod: CPTII,S$GLB,, | Performed by: INTERNAL MEDICINE

## 2022-06-23 PROCEDURE — 3079F DIAST BP 80-89 MM HG: CPT | Mod: CPTII,S$GLB,, | Performed by: INTERNAL MEDICINE

## 2022-06-23 PROCEDURE — 3075F PR MOST RECENT SYSTOLIC BLOOD PRESS GE 130-139MM HG: ICD-10-PCS | Mod: CPTII,S$GLB,, | Performed by: INTERNAL MEDICINE

## 2022-06-23 PROCEDURE — 3008F BODY MASS INDEX DOCD: CPT | Mod: CPTII,S$GLB,, | Performed by: INTERNAL MEDICINE

## 2022-06-23 PROCEDURE — 1159F MED LIST DOCD IN RCRD: CPT | Mod: CPTII,S$GLB,, | Performed by: INTERNAL MEDICINE

## 2022-06-23 PROCEDURE — 99214 PR OFFICE/OUTPT VISIT, EST, LEVL IV, 30-39 MIN: ICD-10-PCS | Mod: S$GLB,,, | Performed by: INTERNAL MEDICINE

## 2022-06-23 RX ORDER — AZELASTINE 1 MG/ML
1 SPRAY, METERED NASAL
COMMUNITY

## 2022-06-23 RX ORDER — HYOSCYAMINE SULFATE 0.38 MG/1
0.38 TABLET, EXTENDED RELEASE ORAL DAILY PRN
COMMUNITY
Start: 2022-05-12 | End: 2023-04-21

## 2022-06-23 NOTE — PROGRESS NOTES
Assessment & Plan (all problems are new to me)  Problem List Items Addressed This Visit        Oncology    Malignant carcinoid tumor of appendix    Current Assessment & Plan     Known to surg onc.  Monitor              Endocrine    Morbid obesity    Overview     BMI 44.43           Hypothyroidism    Overview     Results for KIANNA GUTHRIE (MRN [id number]) as of 11/26/2019 07:42   Ref. Range 7/24/2019 16:45   TSH Latest Ref Range: 0.400 - 4.000 uIU/mL 2.978              Relevant Orders    TSH      Other Visit Diagnoses     Nephrolithiasis    -  Primary  -   Stable.  Keep follow up    Screening for hyperlipidemia    -  Screen lipids prior to next OV    Relevant Orders    Lipid Panel              Follow-up: Follow up for Routine Physical with PCP.  ______________________________________________________________________    Chief Complaint  Chief Complaint   Patient presents with    Nephrolithiasis     Follow-up from ER.Symptoms started a week ago.       HPI  Kianna Guthrie is a 52 y.o. female with multiple medical diagnoses as listed in the medical history and problem list that presents for kidney stone follow up.  Pt is new to me but is known to this clinic/department with their last appointment being 04/2022.      Seen in the ED 6/17.  She is known to urology as well with LOV 6/20/2022.      Feeling well.  No current pain.  No fevers.       Generally reassuring CBC and CMP in the ED.  Has follow up with urology.      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    MVP (mitral valve prolapse)     Pneumonia     POTS (postural orthostatic tachycardia syndrome)     Recurrent upper respiratory infection  (URI)     Ulcer     Vitamin D deficiency disease 11/6/2012       PAST SURGICAL HISTORY:  Past Surgical History:   Procedure Laterality Date    ANKLE SURGERY      lt    APPENDECTOMY      BACK SURGERY      CHOLECYSTECTOMY      choley & ovarian cyst removed  12/2005    cystoscope      multiple    HYSTERECTOMY  4/8/2004    BUBBA BS&O     interstim      OOPHORECTOMY  4/8/2004    with hysterectomy     PELVIC LAPAROSCOPY      AL EXPLORATORY OF ABDOMEN      SINUS SURGERY  03/01/2016    SPINE SURGERY         SOCIAL HISTORY:  Social History     Socioeconomic History    Marital status: Single    Number of children: 0   Occupational History    Occupation: Teacher     Employer: [employer]   Tobacco Use    Smoking status: Never Smoker    Smokeless tobacco: Never Used   Substance and Sexual Activity    Alcohol use: Yes     Comment: Rarely    Drug use: No    Sexual activity: Not Currently   Other Topics Concern    Are you pregnant or think you may be? No    Breast-feeding No     Social Determinants of Health     Financial Resource Strain: Low Risk     Difficulty of Paying Living Expenses: Not hard at all   Food Insecurity: No Food Insecurity    Worried About Running Out of Food in the Last Year: Never true    Ran Out of Food in the Last Year: Never true   Transportation Needs: No Transportation Needs    Lack of Transportation (Medical): No    Lack of Transportation (Non-Medical): No   Physical Activity: Inactive    Days of Exercise per Week: 0 days    Minutes of Exercise per Session: 20 min   Stress: No Stress Concern Present    Feeling of Stress : Not at all   Social Connections: Unknown    Frequency of Communication with Friends and Family: More than three times a week    Frequency of Social Gatherings with Friends and Family: More than three times a week    Active Member of Clubs or Organizations: No    Attends Club or Organization Meetings: Never    Marital Status: Never     Housing Stability: Low Risk     Unable to Pay for Housing in the Last Year: No    Number of Places Lived in the Last Year: 1    Unstable Housing in the Last Year: No       FAMILY HISTORY:  Family History   Problem Relation Age of Onset    Hypertension Father     Alcohol abuse Brother     Cancer Paternal Uncle     Colon cancer Paternal Uncle     Depression Maternal Grandmother     Hearing loss Maternal Grandmother     Heart disease Maternal Grandmother     Kidney disease Maternal Grandmother     Cancer Paternal Grandmother     Arthritis Paternal Grandfather     Diabetes Paternal Grandfather     Stroke Paternal Grandfather     Breast cancer Neg Hx     Ovarian cancer Neg Hx     Asthma Neg Hx     Emphysema Neg Hx        ALLERGIES AND MEDICATIONS: updated and reviewed.  Review of patient's allergies indicates:   Allergen Reactions    Benadryl allergy decongestant Anaphylaxis     Other reaction(s): Anaphylaxis    Fludrocortisone Hives    Gluten Other (See Comments)     Other reaction(s) GI upset    Diphenhydramine hcl     Hydromorphone hcl      No issue with morphine in the past per pt.     Indomethacin Hives     No issue with Toradol in the past per pt.     Penicillins Rash     No issue with cephalosporins in the past per pt.     Sulfa (sulfonamide antibiotics) Rash    Vancomycin analogues Rash     Current Outpatient Medications   Medication Sig Dispense Refill    albuterol (PROVENTIL) 2.5 mg /3 mL (0.083 %) nebulizer solution USE 3 MLS BY NEBULIZATION EVERY 6 HOURS AS NEEDED FOR WHEEZING. RESCUE. 180 mL 12    cephALEXin (KEFLEX) 500 MG capsule Take 2 capsules (1,000 mg total) by mouth every 12 (twelve) hours. for 10 days 40 capsule 0    cloNIDine (CATAPRES) 0.1 MG tablet Take 0.1 mg by mouth daily as needed. Take as needed for BP > 150/100 mmHg per outside cardiologist      ergocalciferol (ERGOCALCIFEROL) 50,000 unit Cap Take 1 capsule (50,000 Units total) by mouth every 7 days.  12 capsule 3    estradioL (ESTRACE) 2 MG tablet TAKE 1 TABLET BY MOUTH EVERY DAY 30 tablet 5    gabapentin (NEURONTIN) 300 MG capsule Take 600 mg by mouth 2 (two) times daily. 2-300 mg cap in AM and 3-300 mg cap in PM      hydrOXYzine HCL (ATARAX) 25 MG tablet Take 1 tablet (25 mg total) by mouth 3 (three) times daily. 45 tablet 1    immun glob G,IgG,-pro-IgA 0-50 (HIZENTRA) 10 gram/50 mL (20 %) Soln Inject 5 mLs (1 g total) into the skin every 7 days. 4 vial 5    indapamide (LOZOL) 1.25 MG Tab TAKE 1 TABLET BY MOUTH DAILY 30 tablet 1    ketorolac (TORADOL) 10 mg tablet Take 1 tablet (10 mg total) by mouth every 6 (six) hours. for 5 days 20 tablet 0    levocetirizine (XYZAL) 5 MG tablet TK 1 T PO  ONCE Day  9    LIDOcaine-prilocaine (EMLA) cream Apply topically as needed (apply to infusion site). 30 g 0    meclizine (ANTIVERT) 25 mg tablet Take 1 tablet (25 mg total) by mouth 3 (three) times daily as needed for Dizziness. 30 tablet 0    metoprolol tartrate (LOPRESSOR) 50 MG tablet Take 1 tablet (50 mg total) by mouth 3 (three) times daily. 30 tablet 0    ondansetron (ZOFRAN) 4 MG tablet Take 2 tablets (8 mg total) by mouth every 6 (six) hours as needed for Nausea. 30 tablet 0    oxyCODONE-acetaminophen (PERCOCET)  mg per tablet TK 1 T PO Q 6 H PRN  0    QUEtiapine (SEROQUEL) 50 MG tablet TK 1 T PO Q NIGHT      tamsulosin (FLOMAX) 0.4 mg Cap Take 1 capsule (0.4 mg total) by mouth once daily. 10 capsule 0    tiZANidine 4 mg Cap TK 2 TO 3 C XD PO TID WF  0    TRINTELLIX 20 mg Tab TK 1 T PO QAM      VENTOLIN HFA 90 mcg/actuation inhaler INHALE 2 PUFFS INTO LUNGS EVERY 6 HOURS AS NEEDED FOR WHEEZING RESCUE 18 g 12    vitamin D (VITAMIN D3) 1000 units Tab Take 1,000 Units by mouth once daily.      zaleplon (SONATA) 10 MG capsule Take 10 mg by mouth once daily.  2    azelastine (ASTELIN) 137 mcg (0.1 %) nasal spray 1 spray by Nasal route.      CHOLESTYRAMINE LIGHT 4 gram packet DIS CNTS IN WATER  "AND DRK PO BID      EPINEPHrine (AUVI-Q) 0.3 mg/0.3 mL AtIn Inject 0.3 mLs (0.3 mg total) into the muscle once. 2 Device 0    hyoscyamine (LEVBID) 0.375 mg Tb12 Take 0.375 mg by mouth daily as needed.      montelukast (SINGULAIR) 10 mg tablet 10 mg once daily.       ondansetron (ZOFRAN-ODT) 4 MG TbDL Take 1 tablet (4 mg total) by mouth every 6 (six) hours as needed. (Patient not taking: Reported on 6/23/2022) 30 tablet 0     No current facility-administered medications for this visit.         ROS  Review of Systems   Constitutional: Negative for activity change and unexpected weight change.   HENT: Negative for hearing loss, rhinorrhea and trouble swallowing.    Eyes: Negative for discharge and visual disturbance.   Respiratory: Negative for chest tightness and wheezing.    Cardiovascular: Negative for chest pain and palpitations.   Gastrointestinal: Positive for vomiting. Negative for blood in stool, constipation and diarrhea.   Endocrine: Positive for polyuria. Negative for polydipsia.   Genitourinary: Positive for difficulty urinating, dysuria and hematuria. Negative for menstrual problem.   Musculoskeletal: Negative for arthralgias, joint swelling and neck pain.   Neurological: Negative for weakness and headaches.   Psychiatric/Behavioral: Negative for confusion and dysphoric mood.         Physical Exam  Vitals:    06/23/22 0915   BP: 132/88   BP Location: Right arm   Patient Position: Sitting   BP Method: Large (Manual)   Pulse: 78   Temp: 98.6 °F (37 °C)   TempSrc: Oral   SpO2: 98%   Weight: 128.5 kg (283 lb 4.7 oz)   Height: 5' 4" (1.626 m)    Body mass index is 48.63 kg/m².  Weight: 128.5 kg (283 lb 4.7 oz)   Height: 5' 4" (162.6 cm)   Physical Exam  Constitutional:       General: She is not in acute distress.     Appearance: She is well-developed.   HENT:      Head: Normocephalic and atraumatic.   Eyes:      Conjunctiva/sclera: Conjunctivae normal.   Cardiovascular:      Rate and Rhythm: Normal rate and " regular rhythm.      Pulses: Normal pulses.      Heart sounds: Normal heart sounds.   Pulmonary:      Effort: Pulmonary effort is normal. No respiratory distress.      Breath sounds: Normal breath sounds.   Neurological:      Mental Status: She is alert and oriented to person, place, and time.      Comments: Symmetric facial movements   Psychiatric:         Behavior: Behavior normal.         Thought Content: Thought content normal.             Health Maintenance       Date Due Completion Date    Shingles Vaccine (1 of 2) Never done ---    COVID-19 Vaccine (2 - Booster for Veda series) 05/04/2021 3/9/2021    Mammogram 12/15/2021 12/15/2020    Influenza Vaccine (Season Ended) 09/01/2022 9/5/2020    Pneumococcal Vaccines (Age 0-64) (3 - PPSV23 or PCV20) 01/22/2023 3/16/2018    Colorectal Cancer Screening 06/27/2024 6/27/2019    Lipid Panel 06/18/2026 6/18/2021    TETANUS VACCINE 11/02/2028 11/2/2018

## 2022-06-24 ENCOUNTER — HOSPITAL ENCOUNTER (OUTPATIENT)
Dept: RADIOLOGY | Facility: HOSPITAL | Age: 52
Discharge: HOME | End: 2022-06-24
Attending: UROLOGY
Payer: COMMERCIAL

## 2022-06-24 DIAGNOSIS — R10.9 LEFT FLANK PAIN: ICD-10-CM

## 2022-06-24 DIAGNOSIS — N20.1 LEFT URETERAL STONE: ICD-10-CM

## 2022-06-24 PROCEDURE — 76770 US EXAM ABDO BACK WALL COMP: CPT | Mod: TC

## 2022-06-24 PROCEDURE — 76770 US RETROPERITONEAL COMPLETE: ICD-10-PCS | Mod: 26,,, | Performed by: RADIOLOGY

## 2022-06-24 PROCEDURE — 76770 US EXAM ABDO BACK WALL COMP: CPT | Mod: 26,,, | Performed by: RADIOLOGY

## 2022-06-28 ENCOUNTER — OFFICE VISIT (OUTPATIENT)
Dept: UROLOGY | Facility: CLINIC | Age: 52
End: 2022-06-28
Payer: COMMERCIAL

## 2022-06-28 VITALS — WEIGHT: 283.31 LBS | BODY MASS INDEX: 48.37 KG/M2 | HEIGHT: 64 IN

## 2022-06-28 DIAGNOSIS — R10.9 LEFT FLANK PAIN: ICD-10-CM

## 2022-06-28 DIAGNOSIS — N20.1 LEFT URETERAL STONE: Primary | ICD-10-CM

## 2022-06-28 PROCEDURE — 3008F PR BODY MASS INDEX (BMI) DOCUMENTED: ICD-10-PCS | Mod: CPTII,S$GLB,, | Performed by: UROLOGY

## 2022-06-28 PROCEDURE — 1159F MED LIST DOCD IN RCRD: CPT | Mod: CPTII,S$GLB,, | Performed by: UROLOGY

## 2022-06-28 PROCEDURE — 99999 PR PBB SHADOW E&M-EST. PATIENT-LVL III: ICD-10-PCS | Mod: PBBFAC,,, | Performed by: UROLOGY

## 2022-06-28 PROCEDURE — 1159F PR MEDICATION LIST DOCUMENTED IN MEDICAL RECORD: ICD-10-PCS | Mod: CPTII,S$GLB,, | Performed by: UROLOGY

## 2022-06-28 PROCEDURE — 99213 PR OFFICE/OUTPT VISIT, EST, LEVL III, 20-29 MIN: ICD-10-PCS | Mod: S$GLB,,, | Performed by: UROLOGY

## 2022-06-28 PROCEDURE — 1160F RVW MEDS BY RX/DR IN RCRD: CPT | Mod: CPTII,S$GLB,, | Performed by: UROLOGY

## 2022-06-28 PROCEDURE — 99213 OFFICE O/P EST LOW 20 MIN: CPT | Mod: S$GLB,,, | Performed by: UROLOGY

## 2022-06-28 PROCEDURE — 1160F PR REVIEW ALL MEDS BY PRESCRIBER/CLIN PHARMACIST DOCUMENTED: ICD-10-PCS | Mod: CPTII,S$GLB,, | Performed by: UROLOGY

## 2022-06-28 PROCEDURE — 3008F BODY MASS INDEX DOCD: CPT | Mod: CPTII,S$GLB,, | Performed by: UROLOGY

## 2022-06-28 PROCEDURE — 99999 PR PBB SHADOW E&M-EST. PATIENT-LVL III: CPT | Mod: PBBFAC,,, | Performed by: UROLOGY

## 2022-06-28 NOTE — PROGRESS NOTES
Subjective:       Patient ID: Kianna Zuleta is a 52 y.o. female who was last seen in this office 6/20/2022    Chief Complaint:   Chief Complaint   Patient presents with    Follow-up     Urolithiasis  Patient complains of left flank pain with radiation to the groin. Onset of symptoms was abrupt starting a few days ago with stable course since that time. Patient describes the pain as aching and colicky, intermittent and rated as moderate. The patient has had nausea and vomiting and no diaphoresis. There has been no fever or chills. The patient is not complaining of dysuria or frequency. Risk factors for urolithiasis: history of stone disease.    06/28/2022  She denies any further pain.     ACTIVE MEDICAL ISSUES:  Patient Active Problem List   Diagnosis    Vitamin D deficiency    Hypothyroidism    Irritable bowel syndrome    Elevated alkaline phosphatase level    Malignant carcinoid tumor of appendix    Fever    Elevated sed rate    CRP elevated    Hypertension    Morbid obesity    Abnormal CT of the chest    Trigeminal neuralgia of right side of face    IgG deficiency    POTS (postural orthostatic tachycardia syndrome)    Fatty liver disease, nonalcoholic       ALLERGIES AND MEDICATIONS: updated and reviewed.  Review of patient's allergies indicates:   Allergen Reactions    Benadryl allergy decongestant Anaphylaxis     Other reaction(s): Anaphylaxis    Fludrocortisone Hives    Gluten Other (See Comments)     Other reaction(s) GI upset    Diphenhydramine hcl     Hydromorphone hcl      No issue with morphine in the past per pt.     Indomethacin Hives     No issue with Toradol in the past per pt.     Penicillins Rash     No issue with cephalosporins in the past per pt.     Sulfa (sulfonamide antibiotics) Rash    Vancomycin analogues Rash     Current Outpatient Medications   Medication Sig    albuterol (PROVENTIL) 2.5 mg /3 mL (0.083 %) nebulizer solution USE 3 MLS BY NEBULIZATION EVERY 6  HOURS AS NEEDED FOR WHEEZING. RESCUE.    azelastine (ASTELIN) 137 mcg (0.1 %) nasal spray 1 spray by Nasal route.    CHOLESTYRAMINE LIGHT 4 gram packet DIS CNTS IN WATER AND DRK PO BID    cloNIDine (CATAPRES) 0.1 MG tablet Take 0.1 mg by mouth daily as needed. Take as needed for BP > 150/100 mmHg per outside cardiologist    EPINEPHrine (AUVI-Q) 0.3 mg/0.3 mL AtIn Inject 0.3 mLs (0.3 mg total) into the muscle once.    ergocalciferol (ERGOCALCIFEROL) 50,000 unit Cap Take 1 capsule (50,000 Units total) by mouth every 7 days.    estradioL (ESTRACE) 2 MG tablet TAKE 1 TABLET BY MOUTH EVERY DAY    gabapentin (NEURONTIN) 300 MG capsule Take 600 mg by mouth 2 (two) times daily. 2-300 mg cap in AM and 3-300 mg cap in PM    hydrOXYzine HCL (ATARAX) 25 MG tablet Take 1 tablet (25 mg total) by mouth 3 (three) times daily.    hyoscyamine (LEVBID) 0.375 mg Tb12 Take 0.375 mg by mouth daily as needed.    immun glob G,IgG,-pro-IgA 0-50 (HIZENTRA) 10 gram/50 mL (20 %) Soln Inject 5 mLs (1 g total) into the skin every 7 days.    indapamide (LOZOL) 1.25 MG Tab TAKE 1 TABLET BY MOUTH DAILY    levocetirizine (XYZAL) 5 MG tablet TK 1 T PO  ONCE Day    LIDOcaine-prilocaine (EMLA) cream Apply topically as needed (apply to infusion site).    meclizine (ANTIVERT) 25 mg tablet Take 1 tablet (25 mg total) by mouth 3 (three) times daily as needed for Dizziness.    metoprolol tartrate (LOPRESSOR) 50 MG tablet Take 1 tablet (50 mg total) by mouth 3 (three) times daily.    montelukast (SINGULAIR) 10 mg tablet 10 mg once daily.     ondansetron (ZOFRAN) 4 MG tablet Take 2 tablets (8 mg total) by mouth every 6 (six) hours as needed for Nausea.    ondansetron (ZOFRAN-ODT) 4 MG TbDL Take 1 tablet (4 mg total) by mouth every 6 (six) hours as needed. (Patient not taking: Reported on 6/23/2022)    oxyCODONE-acetaminophen (PERCOCET)  mg per tablet TK 1 T PO Q 6 H PRN    QUEtiapine (SEROQUEL) 50 MG tablet TK 1 T PO Q NIGHT     "tamsulosin (FLOMAX) 0.4 mg Cap Take 1 capsule (0.4 mg total) by mouth once daily.    tiZANidine 4 mg Cap TK 2 TO 3 C XD PO TID WF    TRINTELLIX 20 mg Tab TK 1 T PO QAM    VENTOLIN HFA 90 mcg/actuation inhaler INHALE 2 PUFFS INTO LUNGS EVERY 6 HOURS AS NEEDED FOR WHEEZING RESCUE    vitamin D (VITAMIN D3) 1000 units Tab Take 1,000 Units by mouth once daily.    zaleplon (SONATA) 10 MG capsule Take 10 mg by mouth once daily.     No current facility-administered medications for this visit.       Review of Systems   Constitutional: Negative for activity change, fatigue, fever and unexpected weight change.   Eyes: Negative for redness and visual disturbance.   Respiratory: Negative for chest tightness and shortness of breath.    Cardiovascular: Negative for chest pain and leg swelling.   Gastrointestinal: Negative for abdominal distention, abdominal pain, constipation, diarrhea, nausea and vomiting.   Genitourinary: Negative for difficulty urinating, dysuria, flank pain, frequency, hematuria, pelvic pain, urgency and vaginal bleeding.   Musculoskeletal: Negative for arthralgias and joint swelling.   Neurological: Negative for dizziness, weakness and headaches.   Psychiatric/Behavioral: Negative for confusion. The patient is not nervous/anxious.    All other systems reviewed and are negative.      Objective:      Vitals:    06/28/22 1416   Weight: 128.5 kg (283 lb 4.7 oz)   Height: 5' 4" (1.626 m)     Physical Exam  Vitals and nursing note reviewed.   Constitutional:       Appearance: She is well-developed.   HENT:      Head: Normocephalic.   Eyes:      Conjunctiva/sclera: Conjunctivae normal.   Neck:      Thyroid: No thyromegaly.      Trachea: No tracheal deviation.   Cardiovascular:      Rate and Rhythm: Normal rate.      Pulses: Normal pulses.      Heart sounds: Normal heart sounds.   Pulmonary:      Effort: Pulmonary effort is normal. No respiratory distress.      Breath sounds: Normal breath sounds. No wheezing. "   Abdominal:      General: There is no distension.      Palpations: Abdomen is soft. There is no mass.      Tenderness: There is no abdominal tenderness. There is no guarding or rebound.      Hernia: No hernia is present.   Musculoskeletal:         General: No tenderness. Normal range of motion.      Cervical back: Normal range of motion.   Lymphadenopathy:      Cervical: No cervical adenopathy.   Skin:     General: Skin is warm and dry.      Findings: No erythema or rash.   Neurological:      Mental Status: She is alert and oriented to person, place, and time.   Psychiatric:         Behavior: Behavior normal.         Thought Content: Thought content normal.         Judgment: Judgment normal.         Urine dipstick shows negative for all components.  Micro exam: negative for WBC's or RBC's.    US Retroperitoneal Complete (Kidney and  Order: [id number]   Status: Final result     Visible to patient: Yes (seen)     Next appt: Today at 02:30 PM in Urology (JAIME iGllis MD)     Dx: Left flank pain; Left ureteral stone     0 Result Notes    Details    Reading Physician Reading Date Result Priority   Ernesto David MD  [phone]  [phone] 6/24/2022 Routine     Narrative & Impression  EXAMINATION:  US RETROPERITONEAL COMPLETE     CLINICAL HISTORY:  Calculus of ureter     TECHNIQUE:  Ultrasound of the kidneys and urinary bladder was performed including color flow and Doppler evaluation of the kidneys.     COMPARISON:  None.     FINDINGS:  Right kidney: The right kidney measures 11.0 x 4.6 x 5.1 cm. No cortical thinning. No loss of corticomedullary distinction. Resistive index measures 0.70.  No mass. No renal stone. No hydronephrosis.     Left kidney: The left kidney measures 11.1 x 3.7 x 4.6 cm. No cortical thinning. No loss of corticomedullary distinction. Resistive index measures 0.67.  No mass. No renal stone. No hydronephrosis.     The bladder is partially distended at the time of scanning and has an  unremarkable appearance.  Prevoid bladder or measurements suggest a prevoid bladder volume of 52 mL.  Following voiding, repeat images of the urinary bladder were obtained suggesting a postvoid residual of 27 mL.     Impression:     Unremarkable sonographic appearance of the kidneys.  No evidence for hydronephrosis on either side.     Small postvoid residual within the urinary bladder estimated to be 27 mL.        Electronically signed by: Ernesto David MD  Date:                                            06/24/2022  Time:                                           14:00             Exam Ended: 06/24/22 13:25               Assessment:       1. Left ureteral stone    2. Left flank pain          Plan:       1. Left ureteral stone  passed    2. Left flank pain  resolved  - POCT urinalysis, dipstick or tablet reag            Follow up if symptoms worsen or fail to improve.

## 2022-07-08 ENCOUNTER — OFFICE VISIT (OUTPATIENT)
Dept: INTERNAL MEDICINE | Facility: CLINIC | Age: 52
End: 2022-07-08
Payer: COMMERCIAL

## 2022-07-08 DIAGNOSIS — U07.1 COVID-19 VIRUS INFECTION: Primary | ICD-10-CM

## 2022-07-08 PROCEDURE — 99214 PR OFFICE/OUTPT VISIT, EST, LEVL IV, 30-39 MIN: ICD-10-PCS | Mod: 95,,, | Performed by: INTERNAL MEDICINE

## 2022-07-08 PROCEDURE — 1160F RVW MEDS BY RX/DR IN RCRD: CPT | Mod: CPTII,95,, | Performed by: INTERNAL MEDICINE

## 2022-07-08 PROCEDURE — 1159F MED LIST DOCD IN RCRD: CPT | Mod: CPTII,95,, | Performed by: INTERNAL MEDICINE

## 2022-07-08 PROCEDURE — 1160F PR REVIEW ALL MEDS BY PRESCRIBER/CLIN PHARMACIST DOCUMENTED: ICD-10-PCS | Mod: CPTII,95,, | Performed by: INTERNAL MEDICINE

## 2022-07-08 PROCEDURE — 1159F PR MEDICATION LIST DOCUMENTED IN MEDICAL RECORD: ICD-10-PCS | Mod: CPTII,95,, | Performed by: INTERNAL MEDICINE

## 2022-07-08 PROCEDURE — 99214 OFFICE O/P EST MOD 30 MIN: CPT | Mod: 95,,, | Performed by: INTERNAL MEDICINE

## 2022-07-08 NOTE — PROGRESS NOTES
The patient location is: [place]  The chief complaint leading to consultation is: covid 19  Visit type: Virtual visit with synchronous audio and video  Total time spent with patient: 15 minutes  Each patient to whom he or she provides medical services by telemedicine is:  (1) informed of the relationship between the physician and patient and the respective role of any other health care provider with respect to management of the patient; and (2) notified that he or she may decline to receive medical services by telemedicine and may withdraw from such care at any time.      CHIEF COMPLAINT     No chief complaint on file.  TELEMEDICINE VISIT  CC: covid 19    HPI     Kianna Zuleta is 52 y.o. female here today for   3    Covid + yesterday, symptoms started Tuesday.  Sx: fatigue, fever, congestion, ear fullness with cough    J&J x1, no prior COVID, CVID.      Answers for HPI/ROS submitted by the patient on 7/8/2022  activity change: No  unexpected weight change: No  rhinorrhea: Yes  trouble swallowing: Yes  visual disturbance: No  chest tightness: No  polyuria: No  difficulty urinating: No  menstrual problem: No  joint swelling: No  arthralgias: No  confusion: No  dysphoric mood: No        Personally Reviewed Patient's Medical, surgical, family and social hx. Changes updated in [facility].  Care Team updated in Epic    Review of Systems:  Review of Systems   Constitutional: Positive for chills and fever.   HENT: Positive for congestion. Negative for hearing loss.    Eyes: Negative for discharge.   Respiratory: Negative for wheezing.    Cardiovascular: Negative for chest pain and palpitations.   Gastrointestinal: Negative for blood in stool, constipation, diarrhea and vomiting.   Genitourinary: Negative for dysuria and hematuria.   Musculoskeletal: Negative for neck pain.   Neurological: Positive for headaches. Negative for weakness.   Endo/Heme/Allergies: Negative for polydipsia.       Health Maintenance:   Reviewed with  patient  Due for the following:      PHYSICAL EXAM       Gen: Well Appearing, NAD  HEENT: PERR, EOMI  Chest: Normal work of breathing,   Neuro: A&Ox3,  speech normal  Mood; Mood normal, behavior normal, thought process linear       LABS     Labs reviewed;   covid test +    Chemistry        Component Value Date/Time     06/17/2022 2011    K 3.6 06/17/2022 2011     06/17/2022 2011    CO2 26 06/17/2022 2011    BUN 9 06/17/2022 2011    CREATININE 0.8 06/17/2022 2011     06/17/2022 2011        Component Value Date/Time    CALCIUM 9.6 06/17/2022 2011    ALKPHOS 129 06/17/2022 2011    AST 20 06/17/2022 2011    ALT 14 06/17/2022 2011    BILITOT 0.4 06/17/2022 2011    ESTGFRAFRICA >60 06/17/2022 2011    EGFRNONAA >60 06/17/2022 2011        Lab Results   Component Value Date    WBC 6.80 06/17/2022    HGB 13.5 06/17/2022    HCT 40.1 06/17/2022    MCV 88 06/17/2022     06/17/2022         ASSESSMENT     1. COVID-19 virus infection  COVID-19 Home Symptom Monitoring  - Duration (days): 14    Ambulatory referral/consult to [facility]             Plan     Kianna Zuleta is a 52 y.o. female  1. COVID-19 virus infection  - COVID-19 Home Symptom Monitoring  - Duration (days): 14  - Ambulatory referral/consult to [facility]; Future  COVID-19 risk score 3 so warrants outpatient therapy.  And incompletely vaccinated  However patient on quetiapine which is a contraindication for paxlovid, will refer for monoclonal antibody.  Recommend symptomatic treatment.  For myalgias: tylenol   For congestion: intranasal decongestant such as afrin  For drainage and postnasal drip: 1st generation antihistamine such as benadryl, doxylamine or chlorpheniramine  Can use sinus rinse and elevated head of bed to reduce nighttime sx.         Glenn Potter MD

## 2022-07-08 NOTE — Clinical Note
COVID positive.  Risk score warrants outpatient therapy.  Medication seroquel contraindication to paxlovid.  Will refer for monoclonal antibody

## 2022-07-10 ENCOUNTER — PATIENT MESSAGE (OUTPATIENT)
Dept: ADMINISTRATIVE | Facility: OTHER | Age: 52
End: 2022-07-10
Payer: COMMERCIAL

## 2022-07-11 ENCOUNTER — INFUSION (OUTPATIENT)
Dept: INFECTIOUS DISEASES | Facility: HOSPITAL | Age: 52
End: 2022-07-11
Attending: INTERNAL MEDICINE
Payer: COMMERCIAL

## 2022-07-11 VITALS
RESPIRATION RATE: 20 BRPM | HEIGHT: 64 IN | WEIGHT: 280 LBS | HEART RATE: 89 BPM | SYSTOLIC BLOOD PRESSURE: 179 MMHG | TEMPERATURE: 98 F | BODY MASS INDEX: 47.8 KG/M2 | DIASTOLIC BLOOD PRESSURE: 85 MMHG | OXYGEN SATURATION: 97 %

## 2022-07-11 DIAGNOSIS — U07.1 COVID-19 VIRUS INFECTION: ICD-10-CM

## 2022-07-11 DIAGNOSIS — U07.1 COVID-19: Primary | ICD-10-CM

## 2022-07-11 PROCEDURE — 63600175 PHARM REV CODE 636 W HCPCS: Performed by: INTERNAL MEDICINE

## 2022-07-11 PROCEDURE — M0222 HC IV INJECTION, BEBTELOVIMAB, INCL POST ADMIN MONIT: HCPCS | Performed by: INTERNAL MEDICINE

## 2022-07-11 RX ORDER — BEBTELOVIMAB 87.5 MG/ML
175 INJECTION, SOLUTION INTRAVENOUS
Status: COMPLETED | OUTPATIENT
Start: 2022-07-11 | End: 2022-07-11

## 2022-07-11 RX ORDER — EPINEPHRINE 0.3 MG/.3ML
0.3 INJECTION SUBCUTANEOUS
Status: ACTIVE | OUTPATIENT
Start: 2022-07-11 | End: 2022-07-14

## 2022-07-11 RX ORDER — ALBUTEROL SULFATE 90 UG/1
2 AEROSOL, METERED RESPIRATORY (INHALATION)
Status: ACTIVE | OUTPATIENT
Start: 2022-07-11 | End: 2022-07-14

## 2022-07-11 RX ORDER — ONDANSETRON 4 MG/1
4 TABLET, ORALLY DISINTEGRATING ORAL
Status: ACTIVE | OUTPATIENT
Start: 2022-07-11 | End: 2022-07-12

## 2022-07-11 RX ORDER — ACETAMINOPHEN 325 MG/1
650 TABLET ORAL
Status: ACTIVE | OUTPATIENT
Start: 2022-07-11 | End: 2022-07-12

## 2022-07-11 RX ADMIN — BEBTELOVIMAB 175 MG: 87.5 INJECTION, SOLUTION INTRAVENOUS at 09:07

## 2022-07-11 NOTE — PROGRESS NOTES
0923    Bebtelovimab IV Injection administered. Pt tolerated injection well. No S/S of injection reaction noted at present time. One hour observation started.

## 2022-07-11 NOTE — PROGRESS NOTES
1023 Patient remains with no signs of complications noted. Patient received Bebtelovimab IV Injection according to FDA recommendations and [facility] SOP without complications noted and left with mask in place. Drug fact sheet provided. Pt discharged home. Ambulatory. Remains AAox3. No distress noted. RR even and unlabored.

## 2022-07-20 ENCOUNTER — PATIENT MESSAGE (OUTPATIENT)
Dept: ADMINISTRATIVE | Facility: OTHER | Age: 52
End: 2022-07-20
Payer: COMMERCIAL

## 2022-09-14 ENCOUNTER — OFFICE VISIT (OUTPATIENT)
Dept: FAMILY MEDICINE | Facility: CLINIC | Age: 52
End: 2022-09-14
Payer: COMMERCIAL

## 2022-09-14 DIAGNOSIS — U07.1 COVID-19: Primary | ICD-10-CM

## 2022-09-14 PROCEDURE — 1159F PR MEDICATION LIST DOCUMENTED IN MEDICAL RECORD: ICD-10-PCS | Mod: CPTII,95,, | Performed by: INTERNAL MEDICINE

## 2022-09-14 PROCEDURE — 1160F RVW MEDS BY RX/DR IN RCRD: CPT | Mod: CPTII,95,, | Performed by: INTERNAL MEDICINE

## 2022-09-14 PROCEDURE — 1159F MED LIST DOCD IN RCRD: CPT | Mod: CPTII,95,, | Performed by: INTERNAL MEDICINE

## 2022-09-14 PROCEDURE — 99214 OFFICE O/P EST MOD 30 MIN: CPT | Mod: 95,,, | Performed by: INTERNAL MEDICINE

## 2022-09-14 PROCEDURE — 1160F PR REVIEW ALL MEDS BY PRESCRIBER/CLIN PHARMACIST DOCUMENTED: ICD-10-PCS | Mod: CPTII,95,, | Performed by: INTERNAL MEDICINE

## 2022-09-14 PROCEDURE — 99214 PR OFFICE/OUTPT VISIT, EST, LEVL IV, 30-39 MIN: ICD-10-PCS | Mod: 95,,, | Performed by: INTERNAL MEDICINE

## 2022-09-14 RX ORDER — PROMETHAZINE HYDROCHLORIDE AND DEXTROMETHORPHAN HYDROBROMIDE 6.25; 15 MG/5ML; MG/5ML
5 SYRUP ORAL 2 TIMES DAILY PRN
Qty: 120 ML | Refills: 0 | Status: SHIPPED | OUTPATIENT
Start: 2022-09-14 | End: 2022-09-27

## 2022-09-14 NOTE — PROGRESS NOTES
SUBJECTIVE     No chief complaint on file.      HPI  Kianna Zuleta is a 52 y.o. female with multiple medical diagnoses as listed in the medical history and problem list that presents for evaluation of COVID19 x 4 days. Pt reports fatigue, diarrhea, and productive cough. +fever(102 last noted 1 hour ago), but denies any chills or night sweats. Pt has not been taking any meds for her symptoms. + sick contacts(pt is a  and has students with COVID19). Denies any recent travel. She tested positive at school today.     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    MVP (mitral valve prolapse)     Pneumonia     POTS (postural orthostatic tachycardia syndrome)     Recurrent upper respiratory infection (URI)     Ulcer     Vitamin D deficiency disease 11/6/2012       PAST SURGICAL HISTORY:  Past Surgical History:   Procedure Laterality Date    ANKLE SURGERY      lt    APPENDECTOMY      BACK SURGERY      CHOLECYSTECTOMY      choley & ovarian cyst removed  12/2005    cystoscope      multiple    HYSTERECTOMY  4/8/2004    BUBBA BS&O     interstim      OOPHORECTOMY  4/8/2004    with hysterectomy     PELVIC LAPAROSCOPY      NM EXPLORATORY OF ABDOMEN      SINUS SURGERY  03/01/2016    SPINE SURGERY         SOCIAL HISTORY:  Social History     Socioeconomic History    Marital status: Single    Number of children: 0   Occupational History    Occupation: Teacher     Employer: [employer]   Tobacco Use    Smoking status: Never    Smokeless tobacco: Never   Substance and Sexual Activity    Alcohol use: Yes     Comment: Rarely    Drug use: No    Sexual activity: Not Currently   Other Topics Concern    Are  you pregnant or think you may be? No    Breast-feeding No     Social Determinants of Health     Financial Resource Strain: Low Risk     Difficulty of Paying Living Expenses: Not hard at all   Food Insecurity: No Food Insecurity    Worried About Running Out of Food in the Last Year: Never true    Ran Out of Food in the Last Year: Never true   Transportation Needs: No Transportation Needs    Lack of Transportation (Medical): No    Lack of Transportation (Non-Medical): No   Physical Activity: Insufficiently Active    Days of Exercise per Week: 2 days    Minutes of Exercise per Session: 20 min   Stress: No Stress Concern Present    Feeling of Stress : Only a little   Social Connections: Unknown    Frequency of Communication with Friends and Family: More than three times a week    Frequency of Social Gatherings with Friends and Family: More than three times a week    Active Member of Clubs or Organizations: Yes    Attends Club or Organization Meetings: 1 to 4 times per year    Marital Status: Never    Housing Stability: Low Risk     Unable to Pay for Housing in the Last Year: No    Number of Places Lived in the Last Year: 1    Unstable Housing in the Last Year: No       FAMILY HISTORY:  Family History   Problem Relation Age of Onset    Hypertension Father     Alcohol abuse Brother     Cancer Paternal Uncle     Colon cancer Paternal Uncle     Depression Maternal Grandmother     Hearing loss Maternal Grandmother     Heart disease Maternal Grandmother     Kidney disease Maternal Grandmother     Cancer Paternal Grandmother     Arthritis Paternal Grandfather     Diabetes Paternal Grandfather     Stroke Paternal Grandfather     Breast cancer Neg Hx     Ovarian cancer Neg Hx     Asthma Neg Hx     Emphysema Neg Hx        ALLERGIES AND MEDICATIONS: updated and reviewed.  Review of patient's allergies indicates:   Allergen Reactions    Benadryl allergy decongestant Anaphylaxis     Other reaction(s): Anaphylaxis     Fludrocortisone Hives    Gluten Other (See Comments)     Other reaction(s) GI upset    Diphenhydramine hcl     Hydromorphone hcl      No issue with morphine in the past per pt.     Indomethacin Hives     No issue with Toradol in the past per pt.     Penicillins Rash     No issue with cephalosporins in the past per pt.     Sulfa (sulfonamide antibiotics) Rash    Vancomycin analogues Rash     Current Outpatient Medications   Medication Sig Dispense Refill    albuterol (PROVENTIL) 2.5 mg /3 mL (0.083 %) nebulizer solution USE 3 MLS BY NEBULIZATION EVERY 6 HOURS AS NEEDED FOR WHEEZING. RESCUE. 180 mL 12    azelastine (ASTELIN) 137 mcg (0.1 %) nasal spray 1 spray by Nasal route.      CHOLESTYRAMINE LIGHT 4 gram packet DIS CNTS IN WATER AND DRK PO BID      cloNIDine (CATAPRES) 0.1 MG tablet Take 0.1 mg by mouth daily as needed. Take as needed for BP > 150/100 mmHg per outside cardiologist      EPINEPHrine (AUVI-Q) 0.3 mg/0.3 mL AtIn Inject 0.3 mLs (0.3 mg total) into the muscle once. 2 Device 0    ergocalciferol (ERGOCALCIFEROL) 50,000 unit Cap Take 1 capsule (50,000 Units total) by mouth every 7 days. 12 capsule 3    estradioL (ESTRACE) 2 MG tablet TAKE 1 TABLET BY MOUTH EVERY DAY 30 tablet 5    gabapentin (NEURONTIN) 300 MG capsule Take 600 mg by mouth 2 (two) times daily. 2-300 mg cap in AM and 3-300 mg cap in PM      hydrOXYzine HCL (ATARAX) 25 MG tablet Take 1 tablet (25 mg total) by mouth 3 (three) times daily. 45 tablet 1    hyoscyamine (LEVBID) 0.375 mg Tb12 Take 0.375 mg by mouth daily as needed.      immun glob G,IgG,-pro-IgA 0-50 (HIZENTRA) 10 gram/50 mL (20 %) Soln Inject 5 mLs (1 g total) into the skin every 7 days. 4 vial 5    indapamide (LOZOL) 1.25 MG Tab TAKE 1 TABLET BY MOUTH DAILY 30 tablet 1    levocetirizine (XYZAL) 5 MG tablet TK 1 T PO  ONCE Day  9    LIDOcaine-prilocaine (EMLA) cream Apply topically as needed (apply to infusion site). 30 g 0    meclizine (ANTIVERT) 25 mg tablet Take 1 tablet (25 mg  total) by mouth 3 (three) times daily as needed for Dizziness. 30 tablet 0    montelukast (SINGULAIR) 10 mg tablet 10 mg once daily.       nirmatrelvir-ritonavir 300 mg (150 mg x 2)-100 mg copackaged tablets (EUA) Take 3 tablets by mouth 2 (two) times daily for 5 days. Each dose contains 2 nirmatrelvir (pink tablets) and 1 ritonavir (white tablet). Take all 3 tablets together 30 tablet 0    oxyCODONE-acetaminophen (PERCOCET)  mg per tablet TK 1 T PO Q 6 H PRN  0    promethazine-dextromethorphan (PROMETHAZINE-DM) 6.25-15 mg/5 mL Syrp Take 5 mLs by mouth 2 (two) times daily as needed (cough). 120 mL 0    QUEtiapine (SEROQUEL) 50 MG tablet TK 1 T PO Q NIGHT      tiZANidine 4 mg Cap TK 2 TO 3 C XD PO TID WF  0    TRINTELLIX 20 mg Tab TK 1 T PO QAM      VENTOLIN HFA 90 mcg/actuation inhaler INHALE 2 PUFFS INTO LUNGS EVERY 6 HOURS AS NEEDED FOR WHEEZING RESCUE 18 g 12    vitamin D (VITAMIN D3) 1000 units Tab Take 1,000 Units by mouth once daily.      zaleplon (SONATA) 10 MG capsule Take 10 mg by mouth once daily.  2     No current facility-administered medications for this visit.       ROS  Review of Systems   Constitutional:  Positive for fatigue. Negative for activity change and unexpected weight change.   HENT:  Positive for rhinorrhea. Negative for hearing loss and trouble swallowing.    Eyes:  Negative for discharge and visual disturbance.   Respiratory:  Positive for cough. Negative for chest tightness and wheezing.    Cardiovascular:  Negative for chest pain and palpitations.   Gastrointestinal:  Positive for diarrhea. Negative for blood in stool, constipation and vomiting.   Endocrine: Negative for polydipsia and polyuria.   Genitourinary:  Negative for difficulty urinating, dysuria and hematuria.   Musculoskeletal:  Negative for arthralgias, joint swelling and neck pain.   Skin:  Negative for rash and wound.   Neurological:  Positive for headaches. Negative for weakness.   Psychiatric/Behavioral:  Negative  for confusion and dysphoric mood.        OBJECTIVE     Physical Exam  There were no vitals filed for this visit. There is no height or weight on file to calculate BMI.            Physical Exam  Constitutional:       General: She is not in acute distress.     Appearance: She is well-developed.   HENT:      Head: Normocephalic and atraumatic.      Right Ear: External ear normal.      Left Ear: External ear normal.      Nose: Nose normal.   Eyes:      General: No scleral icterus.        Right eye: No discharge.         Left eye: No discharge.      Conjunctiva/sclera: Conjunctivae normal.   Neck:      Vascular: No JVD.      Trachea: No tracheal deviation.   Pulmonary:      Effort: Pulmonary effort is normal. No respiratory distress.   Musculoskeletal:         General: No deformity. Normal range of motion.      Cervical back: Normal range of motion and neck supple.   Skin:     General: Skin is dry.      Findings: No erythema or rash.   Neurological:      Mental Status: She is alert and oriented to person, place, and time.      Motor: No abnormal muscle tone.      Coordination: Coordination normal.   Psychiatric:         Behavior: Behavior normal.         Thought Content: Thought content normal.         Judgment: Judgment normal.         Health Maintenance         Date Due Completion Date    Shingles Vaccine (1 of 2) Never done ---    COVID-19 Vaccine (2 - Booster for Veda series) 05/04/2021 3/9/2021    Mammogram 12/15/2021 12/15/2020    Influenza Vaccine (1) 09/01/2022 9/5/2020    Pneumococcal Vaccines (Age 0-64) (3 - PPSV23 or PCV20) 01/22/2023 3/16/2018    Colorectal Cancer Screening 06/27/2024 6/27/2019    Lipid Panel 06/18/2026 6/18/2021    TETANUS VACCINE 11/02/2028 11/2/2018              ASSESSMENT     52 y.o. female with     1. COVID-19        PLAN:     1. COVID-19  - Pt to quarantine for next 5 days and advised to get booster upon exiting quarantine  - Continue symptomatic treatment with rest, increase fluid  intake, tylenol or ibuprofen PRN fever(temp >/= 100.4) or body aches. Okay to take OTC antihistamines, i.e. Bendaryl, Claritin, Allegra, etc. as needed.  - Okay to gargle with warm, salt water or use throat lozenges as needed  - Pt okay to take Immodium for diarrhea  - nirmatrelvir-ritonavir 300 mg (150 mg x 2)-100 mg copackaged tablets (EUA); Take 3 tablets by mouth 2 (two) times daily for 5 days. Each dose contains 2 nirmatrelvir (pink tablets) and 1 ritonavir (white tablet). Take all 3 tablets together  Dispense: 30 tablet; Refill: 0  - promethazine-dextromethorphan (PROMETHAZINE-DM) 6.25-15 mg/5 mL Syrp; Take 5 mLs by mouth 2 (two) times daily as needed (cough).  Dispense: 120 mL; Refill: 0      RTC in 1-2 weeks as needed for any acute worsening of current condition or failure to improve     The patient location is: [place]  The chief complaint leading to consultation is: COVID19    Visit type: audiovisual    Face to Face time with patient: 11 min  12 minutes of total time spent on the encounter, which includes face to face time and non-face to face time preparing to see the patient (eg, review of tests), Obtaining and/or reviewing separately obtained history, Documenting clinical information in the electronic or other health record, Independently interpreting results (not separately reported) and communicating results to the patient/family/caregiver, or Care coordination (not separately reported).         Each patient to whom he or she provides medical services by telemedicine is:  (1) informed of the relationship between the physician and patient and the respective role of any other health care provider with respect to management of the patient; and (2) notified that he or she may decline to receive medical services by telemedicine and may withdraw from such care at any time.    Notes:        Natacha Frye MD  09/14/2022 3:31 PM        No follow-ups on file.

## 2022-09-15 ENCOUNTER — PATIENT MESSAGE (OUTPATIENT)
Dept: ADMINISTRATIVE | Facility: OTHER | Age: 52
End: 2022-09-15
Payer: COMMERCIAL

## 2022-09-16 ENCOUNTER — TELEPHONE (OUTPATIENT)
Dept: FAMILY MEDICINE | Facility: CLINIC | Age: 52
End: 2022-09-16
Payer: COMMERCIAL

## 2022-09-16 NOTE — LETTER
[address] [address] [address]  Phone: [phone]  Fax: [phone]          Return to Work/School    Patient: Kianna Zuleta  YOB: 1970   Date: 09/16/2022     To Whom It May Concern:     Kianna Zuleta was in contact with/seen in my office on 9/14/2022. COVID-19 is present in our communities across the state. There is limited testing for COVID at this time, so not all patients can be tested. In this situation, your employee meets the following criteria:     Kianna Zuleta has met the criteria for COVID-19 testing and has a POSITIVE result. She can return to work once they are asymptomatic for 24 hours without the use of fever reducing medications AND at least five days from the start of symptoms (or from the first positive result if they have no symptoms). Please excuse her absence from 9/14/2022-9/19/2022.     If you have any questions or concerns, or if I can be of further assistance, please do not hesitate to contact me.     Sincerely,    Natacha Frye MD

## 2022-09-16 NOTE — TELEPHONE ENCOUNTER
----- Message from Pretty Summers sent at 9/16/2022  9:01 AM CDT -----  .Type: Patient Call Back    Who called:self    What is the request in detail: pt needs work excuse note for being out with Covid on 9/14-9/19. Please call    Can the clinic reply by [facility]?    Would the patient rather a call back or a response via [facility]? call    Best call back number:.[phone] (home)

## 2022-09-16 NOTE — TELEPHONE ENCOUNTER
Patient notified. Letter at  if patient is not able to get it from [facility]. Patient began to scream and say she [facility] not [facility] and she was waiting since 900 am.

## 2022-09-17 ENCOUNTER — NURSE TRIAGE (OUTPATIENT)
Dept: ADMINISTRATIVE | Facility: CLINIC | Age: 52
End: 2022-09-17
Payer: COMMERCIAL

## 2022-09-17 NOTE — TELEPHONE ENCOUNTER
Reason for Disposition   Health Information question, no triage required and triager able to answer question    Protocols used: Information Only Call - No Triage-A-AH

## 2022-09-17 NOTE — TELEPHONE ENCOUNTER
Pt responded to HS text message. Pt reports that she develops rash to bilateral arm pits, and to inner upper arms at night. Pt denies rash at this time. Temp of 100.0. no further needs at this time.

## 2022-09-19 ENCOUNTER — OFFICE VISIT (OUTPATIENT)
Dept: FAMILY MEDICINE | Facility: CLINIC | Age: 52
End: 2022-09-19
Payer: COMMERCIAL

## 2022-09-19 DIAGNOSIS — J01.90 ACUTE BACTERIAL SINUSITIS: Primary | ICD-10-CM

## 2022-09-19 DIAGNOSIS — B96.89 ACUTE BACTERIAL SINUSITIS: Primary | ICD-10-CM

## 2022-09-19 PROCEDURE — 1159F MED LIST DOCD IN RCRD: CPT | Mod: CPTII,95,, | Performed by: NURSE PRACTITIONER

## 2022-09-19 PROCEDURE — 1160F RVW MEDS BY RX/DR IN RCRD: CPT | Mod: CPTII,95,, | Performed by: NURSE PRACTITIONER

## 2022-09-19 PROCEDURE — 99214 PR OFFICE/OUTPT VISIT, EST, LEVL IV, 30-39 MIN: ICD-10-PCS | Mod: 95,,, | Performed by: NURSE PRACTITIONER

## 2022-09-19 PROCEDURE — 1160F PR REVIEW ALL MEDS BY PRESCRIBER/CLIN PHARMACIST DOCUMENTED: ICD-10-PCS | Mod: CPTII,95,, | Performed by: NURSE PRACTITIONER

## 2022-09-19 PROCEDURE — 1159F PR MEDICATION LIST DOCUMENTED IN MEDICAL RECORD: ICD-10-PCS | Mod: CPTII,95,, | Performed by: NURSE PRACTITIONER

## 2022-09-19 PROCEDURE — 99214 OFFICE O/P EST MOD 30 MIN: CPT | Mod: 95,,, | Performed by: NURSE PRACTITIONER

## 2022-09-19 RX ORDER — DOXYCYCLINE HYCLATE 100 MG
100 TABLET ORAL 2 TIMES DAILY
Qty: 20 TABLET | Refills: 0 | Status: SHIPPED | OUTPATIENT
Start: 2022-09-19 | End: 2023-04-21 | Stop reason: ALTCHOICE

## 2022-09-19 NOTE — LETTER
September 19, 2022      [facility]  [address], [place] [address]  [place] [address]  Phone: [phone]       Patient: Kianna Zuleta   YOB: 1970  Date of Visit: 09/19/2022    To Whom It May Concern:    Mitra Zuleta  was at [facility] on 09/19/2022. The patient may return to work/school at a later date pending further evaluation. If you have any questions or concerns, or if I can be of further assistance, please do not hesitate to contact me.    Sincerely,    Marty Rey, NP

## 2022-09-19 NOTE — PROGRESS NOTES
Answers submitted by the patient for this visit:  Review of Systems Questionnaire (Submitted on 2022)  activity change: No  unexpected weight change: No  neck pain: No  hearing loss: No  rhinorrhea: No  trouble swallowing: No  eye discharge: No  visual disturbance: No  chest tightness: No  wheezing: No  chest pain: No  palpitations: No  blood in stool: No  constipation: No  vomiting: No  diarrhea: No  polydipsia: No  polyuria: No  difficulty urinating: No  hematuria: No  menstrual problem: No  dysuria: No  joint swelling: No  arthralgias: No  headaches: Yes  weakness: No  confusion: No  dysphoric mood: No    The patient location is: [place], [place]  The chief complaint leading to consultation is:  Sinus congestion, fevers, headaches    Visit type: audiovisual    Face to Face time with patient: 13  30 minutes of total time spent on the encounter, which includes face to face time and non-face to face time preparing to see the patient (eg, review of tests), Obtaining and/or reviewing separately obtained history, Documenting clinical information in the electronic or other health record, Independently interpreting results (not separately reported) and communicating results to the patient/family/caregiver, or Care coordination (not separately reported).         Each patient to whom he or she provides medical services by telemedicine is:  (1) informed of the relationship between the physician and patient and the respective role of any other health care provider with respect to management of the patient; and (2) notified that he or she may decline to receive medical services by telemedicine and may withdraw from such care at any time.    Notes:     Patient Name: Kianna Zuleta    : 1970  MRN: [id number]    Chief Complaint:  Sinus congestion, fevers, headaches    Subjective:  Kianna is a 52 y.o. female who presents today for:    1. 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  8-9 days ago she began to have COVID symptoms and tested positive.  At that time she had diarrhea, sore throat, nasal symptoms.  She did see a provider for this and was given promethazine DM as well as paxlovid; she states that she was not able to take the PAXLOvid because of interactions with her other medications.  Since then, she is still having a sore throat, nasal congestion, sinus pain, green sputum, headache, and intermittent fevers.  Last time she had a fever was yesterday and her temperature was 100.5° at that time.  For the symptoms, she has also tried Zyrtec and Tylenol which does help with the fevers.    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    MVP (mitral valve prolapse)     Pneumonia     POTS (postural orthostatic tachycardia syndrome)     Recurrent upper respiratory infection (URI)     Ulcer     Vitamin D deficiency disease 11/6/2012       Past Surgical History  Past Surgical History:   Procedure Laterality Date    ANKLE SURGERY      lt    APPENDECTOMY      BACK SURGERY      CHOLECYSTECTOMY      choley & ovarian cyst removed  12/2005    cystoscope      multiple    HYSTERECTOMY  4/8/2004    BUBBA BS&O     interstim      OOPHORECTOMY  4/8/2004    with hysterectomy     PELVIC LAPAROSCOPY      UT EXPLORATORY OF ABDOMEN      SINUS SURGERY  03/01/2016    SPINE SURGERY         Family History  Family History   Problem Relation Age of Onset    Hypertension Father     Alcohol abuse Brother     Cancer Paternal Uncle     Colon cancer Paternal Uncle     Depression Maternal Grandmother     Hearing loss Maternal Grandmother      Heart disease Maternal Grandmother     Kidney disease Maternal Grandmother     Cancer Paternal Grandmother     Arthritis Paternal Grandfather     Diabetes Paternal Grandfather     Stroke Paternal Grandfather     Breast cancer Neg Hx     Ovarian cancer Neg Hx     Asthma Neg Hx     Emphysema Neg Hx        Social History  Social History     Socioeconomic History    Marital status: Single    Number of children: 0   Occupational History    Occupation: Teacher     Employer: [employer]   Tobacco Use    Smoking status: Never    Smokeless tobacco: Never   Substance and Sexual Activity    Alcohol use: Yes     Comment: Rarely    Drug use: No    Sexual activity: Not Currently   Other Topics Concern    Are you pregnant or think you may be? No    Breast-feeding No     Social Determinants of Health     Financial Resource Strain: Low Risk     Difficulty of Paying Living Expenses: Not hard at all   Food Insecurity: No Food Insecurity    Worried About Running Out of Food in the Last Year: Never true    Ran Out of Food in the Last Year: Never true   Transportation Needs: No Transportation Needs    Lack of Transportation (Medical): No    Lack of Transportation (Non-Medical): No   Physical Activity: Insufficiently Active    Days of Exercise per Week: 2 days    Minutes of Exercise per Session: 20 min   Stress: No Stress Concern Present    Feeling of Stress : Only a little   Social Connections: Unknown    Frequency of Communication with Friends and Family: More than three times a week    Frequency of Social Gatherings with Friends and Family: More than three times a week    Active Member of Clubs or Organizations: Yes    Attends Club or Organization Meetings: Never    Marital Status: Never    Housing Stability: Low Risk     Unable to Pay for Housing in the Last Year: No    Number of Places Lived in the Last Year: 1    Unstable Housing in the Last Year: No       Current Medications  Current Outpatient  Medications on File Prior to Visit   Medication Sig Dispense Refill    azelastine (ASTELIN) 137 mcg (0.1 %) nasal spray 1 spray by Nasal route.      cloNIDine (CATAPRES) 0.1 MG tablet Take 0.1 mg by mouth daily as needed. Take as needed for BP > 150/100 mmHg per outside cardiologist      ergocalciferol (ERGOCALCIFEROL) 50,000 unit Cap Take 1 capsule (50,000 Units total) by mouth every 7 days. 12 capsule 3    estradioL (ESTRACE) 2 MG tablet TAKE 1 TABLET BY MOUTH EVERY DAY 30 tablet 5    gabapentin (NEURONTIN) 300 MG capsule Take 600 mg by mouth 2 (two) times daily. 2-300 mg cap in AM and 3-300 mg cap in PM      hyoscyamine (LEVBID) 0.375 mg Tb12 Take 0.375 mg by mouth daily as needed.      immun glob G,IgG,-pro-IgA 0-50 (HIZENTRA) 10 gram/50 mL (20 %) Soln Inject 5 mLs (1 g total) into the skin every 7 days. 4 vial 5    indapamide (LOZOL) 1.25 MG Tab TAKE 1 TABLET BY MOUTH DAILY 30 tablet 1    oxyCODONE-acetaminophen (PERCOCET)  mg per tablet TK 1 T PO Q 6 H PRN  0    promethazine-dextromethorphan (PROMETHAZINE-DM) 6.25-15 mg/5 mL Syrp Take 5 mL by mouth 2 (two) times daily as needed (cough). 120 mL 0    QUEtiapine (SEROQUEL) 50 MG tablet TK 1 T PO Q NIGHT      tiZANidine 4 mg Cap TK 2 TO 3 C XD PO TID WF  0    TRINTELLIX 20 mg Tab TK 1 T PO QAM      albuterol (PROVENTIL) 2.5 mg /3 mL (0.083 %) nebulizer solution USE 3 MLS BY NEBULIZATION EVERY 6 HOURS AS NEEDED FOR WHEEZING. RESCUE. (Patient not taking: Reported on 9/19/2022) 180 mL 12    CHOLESTYRAMINE LIGHT 4 gram packet DIS CNTS IN WATER AND DRK PO BID      EPINEPHrine (AUVI-Q) 0.3 mg/0.3 mL AtIn Inject 0.3 mLs (0.3 mg total) into the muscle once. (Patient not taking: Reported on 9/19/2022) 2 Device 0    hydrOXYzine HCL (ATARAX) 25 MG tablet Take 1 tablet (25 mg total) by mouth 3 (three) times daily. (Patient not taking: Reported on 9/19/2022) 45 tablet 1    levocetirizine (XYZAL) 5 MG tablet TK 1 T PO  ONCE Day  9    LIDOcaine-prilocaine (EMLA) cream  Apply topically as needed (apply to infusion site). (Patient not taking: Reported on 9/19/2022) 30 g 0    meclizine (ANTIVERT) 25 mg tablet Take 1 tablet (25 mg total) by mouth 3 (three) times daily as needed for Dizziness. (Patient not taking: Reported on 9/19/2022) 30 tablet 0    montelukast (SINGULAIR) 10 mg tablet 10 mg once daily.       VENTOLIN HFA 90 mcg/actuation inhaler INHALE 2 PUFFS INTO LUNGS EVERY 6 HOURS AS NEEDED FOR WHEEZING RESCUE 18 g 12    vitamin D (VITAMIN D3) 1000 units Tab Take 1,000 Units by mouth once daily.      zaleplon (SONATA) 10 MG capsule Take 10 mg by mouth once daily.  2    [DISCONTINUED] nirmatrelvir-ritonavir 300 mg (150 mg x 2)-100 mg copackaged tablets (EUA) Take 3 tablets by mouth 2 (two) times daily for 5 days. Each dose contains 2 nirmatrelvir (pink tablets) and 1 ritonavir (white tablet). Take all 3 tablets together 30 tablet 0     No current facility-administered medications on file prior to visit.       Allergies   Review of patient's allergies indicates:   Allergen Reactions    Benadryl allergy decongestant Anaphylaxis     Other reaction(s): Anaphylaxis    Fludrocortisone Hives    Gluten Other (See Comments)     Other reaction(s) GI upset    Diphenhydramine hcl     Hydromorphone hcl      No issue with morphine in the past per pt.     Indomethacin Hives     No issue with Toradol in the past per pt.     Penicillins Rash     No issue with cephalosporins in the past per pt.     Sulfa (sulfonamide antibiotics) Rash    Vancomycin analogues Rash       Review of Systems (Pertinent positives)  Review of Systems   Constitutional:  Positive for fever and malaise/fatigue. Negative for chills, diaphoresis and weight loss.   HENT:  Positive for congestion, sinus pain and sore throat. Negative for ear pain, hearing loss and tinnitus.    Eyes: Negative.  Negative for discharge.   Respiratory:  Positive for cough and sputum production. Negative for hemoptysis, shortness of breath, wheezing  and stridor.    Cardiovascular:  Negative for chest pain and palpitations.   Gastrointestinal: Negative.  Negative for blood in stool, constipation, diarrhea and vomiting.   Genitourinary:  Negative for dysuria and hematuria.   Musculoskeletal: Negative.  Negative for neck pain.   Skin: Negative.    Neurological:  Positive for headaches. Negative for dizziness, tingling and weakness.   Endo/Heme/Allergies:  Negative for polydipsia.   Psychiatric/Behavioral: Negative.       There were no vitals taken for this visit.    Physical Exam  Constitutional:       General: She is not in acute distress.     Appearance: Normal appearance. She is not ill-appearing, toxic-appearing or diaphoretic.   HENT:      Head: Normocephalic and atraumatic.   Eyes:      General: No scleral icterus.        Right eye: No discharge.         Left eye: No discharge.      Conjunctiva/sclera: Conjunctivae normal.   Pulmonary:      Effort: Pulmonary effort is normal. No tachypnea, accessory muscle usage or respiratory distress.      Breath sounds: No wheezing.   Skin:     Findings: No bruising, erythema, lesion or rash.   Neurological:      Mental Status: She is alert and oriented to person, place, and time.   Psychiatric:         Mood and Affect: Mood normal.         Behavior: Behavior normal.         Thought Content: Thought content normal.         Judgment: Judgment normal.        Assessment/Plan:  Kianna Zuleta is a 52 y.o. female who presents today for :    Diagnoses and all orders for this visit:    Acute bacterial sinusitis  -     doxycycline (VIBRA-TABS) 100 MG tablet; Take 1 tablet (100 mg total) by mouth 2 (two) times daily.    Given length of symptoms with history of immune deficiency, likely secondary sinus infection.  Treat with doxycycline today as patient is pen allergic.    Work note provided for patient.  She can return when she is fever free for 2 days without the use of temperature reducing medications.    Continue  promethazine DM for cough.    Follow-up as needed.        This office note has been dictated.  This dictation has been generated using M-Modal Fluency Direct dictation; some phonetic errors may occur.   My collaborating physician is Dr. Mehrdad Valle.

## 2022-09-21 ENCOUNTER — PATIENT MESSAGE (OUTPATIENT)
Dept: FAMILY MEDICINE | Facility: CLINIC | Age: 52
End: 2022-09-21
Payer: COMMERCIAL

## 2022-09-28 ENCOUNTER — PATIENT MESSAGE (OUTPATIENT)
Dept: FAMILY MEDICINE | Facility: CLINIC | Age: 52
End: 2022-09-28

## 2022-09-28 ENCOUNTER — NURSE TRIAGE (OUTPATIENT)
Dept: ADMINISTRATIVE | Facility: CLINIC | Age: 52
End: 2022-09-28
Payer: COMMERCIAL

## 2022-09-28 ENCOUNTER — OFFICE VISIT (OUTPATIENT)
Dept: FAMILY MEDICINE | Facility: CLINIC | Age: 52
End: 2022-09-28
Payer: COMMERCIAL

## 2022-09-28 DIAGNOSIS — J32.9 SINUSITIS, UNSPECIFIED CHRONICITY, UNSPECIFIED LOCATION: Primary | ICD-10-CM

## 2022-09-28 DIAGNOSIS — E03.4 HYPOTHYROIDISM DUE TO ACQUIRED ATROPHY OF THYROID: ICD-10-CM

## 2022-09-28 DIAGNOSIS — E66.01 MORBID OBESITY: ICD-10-CM

## 2022-09-28 DIAGNOSIS — R11.0 NAUSEA: ICD-10-CM

## 2022-09-28 DIAGNOSIS — Z12.31 ENCOUNTER FOR SCREENING MAMMOGRAM FOR BREAST CANCER: ICD-10-CM

## 2022-09-28 PROCEDURE — 99214 OFFICE O/P EST MOD 30 MIN: CPT | Mod: 95,,, | Performed by: NURSE PRACTITIONER

## 2022-09-28 PROCEDURE — 99214 PR OFFICE/OUTPT VISIT, EST, LEVL IV, 30-39 MIN: ICD-10-PCS | Mod: 95,,, | Performed by: NURSE PRACTITIONER

## 2022-09-28 RX ORDER — ONDANSETRON 4 MG/1
4 TABLET, ORALLY DISINTEGRATING ORAL 2 TIMES DAILY
Qty: 12 TABLET | Refills: 0 | Status: SHIPPED | OUTPATIENT
Start: 2022-09-28 | End: 2024-02-20 | Stop reason: SDUPTHER

## 2022-09-28 NOTE — TELEPHONE ENCOUNTER
Congestion since CV+ also put on abx, sudafed, coricidin, Astelin, afrin xyzal,  and zyrtec with no relief. Has also tried nasal washes. Had fever yesterday. None today. States nose is running continuously and she can barely breathe through nose. Care advice per protocol. Advised would route to provider asking for care directives/ possible appt. Advised to call back with concerns or worsening symptoms. Verbalized understanding. Pt adds she may do VV if unable to get in to see provider.         Reason for Disposition   Sinus congestion (pressure, fullness) present > 10 days    Additional Information   Negative: Sounds like a life-threatening emergency to the triager   Negative: Difficulty breathing, and not from stuffy nose (e.g., not relieved by cleaning out the nose)   Negative: SEVERE headache and has fever   Negative: Patient sounds very sick or weak to the triager   Negative: SEVERE sinus pain   Negative: Severe headache   Negative: Redness or swelling on the cheek, forehead, or around the eye   Negative: Fever > 103 F (39.4 C)   Negative: Fever > 101 F (38.3 C) and over 60 years of age   Negative: Fever > 100.0 F (37.8 C) and has diabetes mellitus or a weak immune system (e.g., HIV positive, cancer chemotherapy, organ transplant, splenectomy, chronic steroids)   Negative: Fever > 100.0 F (37.8 C) and bedridden (e.g., nursing home patient, stroke, chronic illness, recovering from surgery)   Negative: Fever present > 3 days (72 hours)   Negative: Fever returns after gone for over 24 hours and symptoms worse or not improved   Negative: Sinus pain (not just congestion) and fever   Negative: Earache    Protocols used: Sinus Pain or Congestion-A-[place]

## 2022-09-28 NOTE — PATIENT INSTRUCTIONS
//////////PHONE NUMBERS//////////    Bariatrics---[phone]  Breast Surgery---[phone]  Case Management---[phone]  Colonoscopy---[phone]  Imaging, Xray, CT, MRI, Ultrasound---[phone]  Infectious Disease---[phone]  Interventional Radiology---[phone]  Medical Records---[phone]  [facility]---[phone]  DME---[phone]  Optometry/Ophthalmology---[phone]  [facility]---[phone]  Physical Therapy---[phone]  Psychiatry---[phone]  Plastic Surgery---[phone]  Sleep Study---[phone]  Smoking Cessation---[phone]  Vaccine Hotline---[phone]  Wound Care---[phone]  COVID Vaccine center---[phone]  Referral Desk---[phone]    /////////////////////////////////////////////////

## 2022-09-28 NOTE — LETTER
September 28, 2022    Kianna Zuleta  [address] [address]         [facility]  [address] [address]  Phone: [phone]  Fax: [phone] September 28, 2022     Patient: Kianna Zuleta   YOB: 1970   Date of Visit: 9/28/2022       To Whom It May Concern:    It is my medical opinion that Kianna Zuleta may return to work on 10/3/22 .    If you have any questions or concerns, please don't hesitate to call.    Sincerely,        Osorio Santo NP

## 2022-09-28 NOTE — PROGRESS NOTES
The patient location is:  Patient Home  The chief complaint leading to consultation is: as below  Visit type: Virtual visit with synchronous audio and video  Total time spent with patient: 15 minutes  Each patient to whom he or she provides medical services by telemedicine is:  (1) informed of the relationship between the physician and patient and the respective role of any other health care provider with respect to management of the patient; and (2) notified that she may decline to receive medical services by telemedicine and may withdraw from such care at any time.      HPI     Chief Complaint:  Sinus congestion      Kianna Zuleta is a 52 y.o. female with multiple medical diagnoses as listed in the medical history and problem list that presents for COVID19/sinusitis follow up.  Pt is new to me but is known to this clinic with her last appointment being 6/23/2022.      Sinus Problem  Chronicity: 9/12/22. The problem is unchanged. The fever has been present for Less than 1 day. The pain is mild. Associated symptoms include congestion, coughing and sinus pressure. Pertinent negatives include no headaches, neck pain or shortness of breath. (Nausea  )     Pt states she was Dx with COVID19 on 9/12/22.     See below phone encounter note from 9/28/22:    Congestion since CV+ also put on abx, sudafed, coricidin, Astelin, afrin xyzal,  and zyrtec with no relief. Has also tried nasal washes. Had fever yesterday. None today. States nose is running continuously and she can barely breathe through nose. Care advice per protocol. Advised would route to provider asking for care directives/ possible appt. Advised to call back with concerns or worsening symptoms. Verbalized understanding. Pt adds she may do VV if unable to get in to see provider.     Pt has also used flonase.     Recent clinic encounter. See below encounter note from 9/19/2022.    1. Sinus congestion, fevers, headaches - patient who is new to me with a history  of hypertension, immuno deficiency on weekly immunoglobulin reports today for evaluation.  8-9 days ago she began to have COVID symptoms and tested positive.  At that time she had diarrhea, sore throat, nasal symptoms.  She did see a provider for this and was given promethazine DM as well as paxlovid; she states that she was not able to take the PAXLOvid because of interactions with her other medications.  Since then, she is still having a sore throat, nasal congestion, sinus pain, green sputum, headache, and intermittent fevers.  Last time she had a fever was yesterday and her temperature was 100.5° at that time.  For the symptoms, she has also tried Zyrtec and Tylenol which does help with the fevers.    Assessment/Plan:  Kianna Guthrie is a 52 y.o. female who presents today for :     Diagnoses and all orders for this visit:     Acute bacterial sinusitis  -     doxycycline (VIBRA-TABS) 100 MG tablet; Take 1 tablet (100 mg total) by mouth 2 (two) times daily.     Given length of symptoms with history of immune deficiency, likely secondary sinus infection.  Treat with doxycycline today as patient is pen allergic.     Work note provided for patient.  She can return when she is fever free for 2 days without the use of temperature reducing medications.     Continue promethazine DM for cough.     Follow-up as needed.             she is compliant with medications daily without any adverse side effects.    Assessment & Plan     Problem List Items Addressed This Visit          Endocrine    Hypothyroidism    The current medical regimen is effective;  continue present plan      Overview     Results for KIANNA GUTHRIE (MRN [id number]) as of 11/26/2019 07:42   Ref. Range 7/24/2019 16:45   TSH Latest Ref Range: 0.400 - 4.000 uIU/mL 2.978            Morbid obesity      1) diet:  low carbohydrate, low fat diet, stay away from fast food, fried and processed food, use whole grain, lot of fruits and vegetables, use healthy fat  such as avocado, nuts and olive oil in reasonable quantity, stay away from sodas. Regular meals with lean proteins.  2) physical activity: ideally 150 min a week, with cardiovascular and resistance activity.  Patient was encouraged to set realistic attainable goals for weight loss, and we will follow up periodically.      Overview     BMI 44.43          Other Visit Diagnoses       Sinusitis, unspecified chronicity, unspecified location    -  Primary    Pt has a Hx of recurrent sinusitis. She was treated most recently on 9/19 with Doxycycline which she will complete soon. Pt reports she continues to experience symptoms despite taking several allergy and sinus medications. Denies dyspnea or chest pain. Denies new symptoms. Prior to Dx of sinusitis she was treated for COVID19. Pt is also a  and reports recent exposure to several sick individuals at work. Of note pt has relevant Hx of common variable immunodeficiency (CVID). Pt appears sickly on exam. No accessory muscle use or cough noted during encounter.     Provided work note.     -Warm tea with honey and lemon, and/or warm salt water gargles every 4 hours PRN for sore throat. May try OTC Cepacol if pt desires.  -advised frequent hand washing, rest, and plenty of fluids. Increase water intake to 64-80 oz daily to help thin mucus.  -Tylenol tablets as needed for fever, headaches, sore throat, ear pain, bodyaches, and/or nasal/sinus inflammation.   -Nasal Saline spray (Over the counter [place] spray or [place])  2 sprays each nostril 2-3 times a day for nasal congestion.     I counseled the patient on fluids, rest, OTC medications that can safely be used, hand/cough hygiene, expected course of illness, and when further medical attention would be warranted.      Discussed DDx, condition, and treatment.   Education sent to patient portal/included in after visit summary.  ED precautions given.   Notify provider if symptoms do not resolve or increase in severity.    Patient verbalizes understanding and agrees with plan of care.      Nausea        Reports nausea, denies emesis. States she has taken zofran in the past for similar symptoms with good effect.     Zofran prn      Relevant Medications    ondansetron (ZOFRAN-ODT) 4 MG TbDL    Encounter for screening mammogram for breast cancer        Relevant Orders    Mammo Digital Screening Bilat            --------------------------------------------      Health Maintenance:  Health Maintenance         Date Due Completion Date    Shingles Vaccine (1 of 2) Never done ---    COVID-19 Vaccine (2 - Booster for Veda series) 05/04/2021 3/9/2021    Mammogram 12/15/2021 12/15/2020    Influenza Vaccine (1) 09/01/2022 9/5/2020    Pneumococcal Vaccines (Age 0-64) (3 - PPSV23 or PCV20) 01/22/2023 3/16/2018    Colorectal Cancer Screening 06/27/2024 6/27/2019    Lipid Panel 06/18/2026 6/18/2021    TETANUS VACCINE 11/02/2028 11/2/2018            Health maintenance reviewed.      Follow Up:  Follow up in about 2 weeks (around 10/12/2022), or if symptoms worsen or fail to improve.    Exam     Review of Systems:  (as noted above)  Review of Systems   Constitutional:  Negative for activity change and unexpected weight change.   HENT:  Positive for congestion, rhinorrhea and sinus pressure. Negative for hearing loss and trouble swallowing.    Eyes:  Negative for discharge and visual disturbance.   Respiratory:  Positive for cough and wheezing. Negative for chest tightness and shortness of breath.    Cardiovascular:  Positive for palpitations. Negative for chest pain.   Gastrointestinal:  Negative for blood in stool, constipation, diarrhea and vomiting.   Endocrine: Negative for polydipsia and polyuria.   Genitourinary:  Negative for difficulty urinating, dysuria, hematuria and menstrual problem.   Musculoskeletal:  Negative for arthralgias, joint swelling and neck pain.   Neurological:  Negative for weakness and headaches.    Psychiatric/Behavioral:  Negative for confusion and dysphoric mood.      Physical Exam:   Physical Exam  Constitutional:       General: She is not in acute distress.     Appearance: She is ill-appearing. She is not toxic-appearing or diaphoretic.   Pulmonary:      Effort: Pulmonary effort is normal.   Psychiatric:         Mood and Affect: Mood is anxious.     There were no vitals filed for this visit.   There is no height or weight on file to calculate BMI.        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    MVP (mitral valve prolapse)     Pneumonia     POTS (postural orthostatic tachycardia syndrome)     Recurrent upper respiratory infection (URI)     Ulcer     Vitamin D deficiency disease 11/6/2012       Past Surgical History:  Past Surgical History:   Procedure Laterality Date    ANKLE SURGERY      lt    APPENDECTOMY      BACK SURGERY      CHOLECYSTECTOMY      choley & ovarian cyst removed  12/2005    cystoscope      multiple    HYSTERECTOMY  4/8/2004    BUBBA BS&O     interstim      OOPHORECTOMY  4/8/2004    with hysterectomy     PELVIC LAPAROSCOPY      VT EXPLORATORY OF ABDOMEN      SINUS SURGERY  03/01/2016    SPINE SURGERY         Social History:  Social History     Socioeconomic History    Marital status: Single    Number of children: 0   Occupational History    Occupation: Teacher     Employer: [employer]   Tobacco Use    Smoking status: Never    Smokeless tobacco: Never   Substance and Sexual Activity    Alcohol use: Yes     Comment: Rarely    Drug use: No    Sexual activity: Not Currently   Other Topics Concern    Are you pregnant or think you may be? No    Breast-feeding  No     Social Determinants of Health     Financial Resource Strain: Low Risk     Difficulty of Paying Living Expenses: Not hard at all   Food Insecurity: No Food Insecurity    Worried About Running Out of Food in the Last Year: Never true    Ran Out of Food in the Last Year: Never true   Transportation Needs: No Transportation Needs    Lack of Transportation (Medical): No    Lack of Transportation (Non-Medical): No   Physical Activity: Insufficiently Active    Days of Exercise per Week: 2 days    Minutes of Exercise per Session: 20 min   Stress: No Stress Concern Present    Feeling of Stress : Only a little   Social Connections: Unknown    Frequency of Communication with Friends and Family: More than three times a week    Frequency of Social Gatherings with Friends and Family: More than three times a week    Active Member of Clubs or Organizations: Yes    Attends Club or Organization Meetings: Never    Marital Status: Never    Housing Stability: Low Risk     Unable to Pay for Housing in the Last Year: No    Number of Places Lived in the Last Year: 1    Unstable Housing in the Last Year: No       Family History:  Family History   Problem Relation Age of Onset    Hypertension Father     Alcohol abuse Brother     Cancer Paternal Uncle     Colon cancer Paternal Uncle     Depression Maternal Grandmother     Hearing loss Maternal Grandmother     Heart disease Maternal Grandmother     Kidney disease Maternal Grandmother     Cancer Paternal Grandmother     Arthritis Paternal Grandfather     Diabetes Paternal Grandfather     Stroke Paternal Grandfather     Breast cancer Neg Hx     Ovarian cancer Neg Hx     Asthma Neg Hx     Emphysema Neg Hx        Allergies and Medications: (updated and reviewed)  Review of patient's allergies indicates:   Allergen Reactions    Benadryl allergy decongestant Anaphylaxis     Other reaction(s): Anaphylaxis    Fludrocortisone Hives    Gluten Other (See Comments)     Other reaction(s) GI  upset    Diphenhydramine hcl     Hydromorphone hcl      No issue with morphine in the past per pt.     Indomethacin Hives     No issue with Toradol in the past per pt.     Penicillins Rash     No issue with cephalosporins in the past per pt.     Sulfa (sulfonamide antibiotics) Rash    Vancomycin analogues Rash     Current Outpatient Medications   Medication Sig Dispense Refill    albuterol (PROVENTIL) 2.5 mg /3 mL (0.083 %) nebulizer solution USE 3 MLS BY NEBULIZATION EVERY 6 HOURS AS NEEDED FOR WHEEZING. RESCUE. (Patient not taking: Reported on 9/19/2022) 180 mL 12    azelastine (ASTELIN) 137 mcg (0.1 %) nasal spray 1 spray by Nasal route.      CHOLESTYRAMINE LIGHT 4 gram packet DIS CNTS IN WATER AND DRK PO BID      cloNIDine (CATAPRES) 0.1 MG tablet Take 0.1 mg by mouth daily as needed. Take as needed for BP > 150/100 mmHg per outside cardiologist      doxycycline (VIBRA-TABS) 100 MG tablet Take 1 tablet (100 mg total) by mouth 2 (two) times daily. 20 tablet 0    EPINEPHrine (AUVI-Q) 0.3 mg/0.3 mL AtIn Inject 0.3 mLs (0.3 mg total) into the muscle once. (Patient not taking: Reported on 9/19/2022) 2 Device 0    ergocalciferol (ERGOCALCIFEROL) 50,000 unit Cap Take 1 capsule (50,000 Units total) by mouth every 7 days. 12 capsule 3    estradioL (ESTRACE) 2 MG tablet TAKE 1 TABLET BY MOUTH EVERY DAY 30 tablet 5    gabapentin (NEURONTIN) 300 MG capsule Take 600 mg by mouth 2 (two) times daily. 2-300 mg cap in AM and 3-300 mg cap in PM      hydrOXYzine HCL (ATARAX) 25 MG tablet Take 1 tablet (25 mg total) by mouth 3 (three) times daily. (Patient not taking: Reported on 9/19/2022) 45 tablet 1    hyoscyamine (LEVBID) 0.375 mg Tb12 Take 0.375 mg by mouth daily as needed.      immun glob G,IgG,-pro-IgA 0-50 (HIZENTRA) 10 gram/50 mL (20 %) Soln Inject 5 mLs (1 g total) into the skin every 7 days. 4 vial 5    indapamide (LOZOL) 1.25 MG Tab TAKE 1 TABLET BY MOUTH DAILY 30 tablet 1    levocetirizine (XYZAL) 5 MG tablet TK 1 T PO   ONCE Day  9    LIDOcaine-prilocaine (EMLA) cream Apply topically as needed (apply to infusion site). (Patient not taking: Reported on 9/19/2022) 30 g 0    meclizine (ANTIVERT) 25 mg tablet Take 1 tablet (25 mg total) by mouth 3 (three) times daily as needed for Dizziness. (Patient not taking: Reported on 9/19/2022) 30 tablet 0    montelukast (SINGULAIR) 10 mg tablet 10 mg once daily.       ondansetron (ZOFRAN-ODT) 4 MG TbDL Take 1 tablet (4 mg total) by mouth 2 (two) times daily. 12 tablet 0    oxyCODONE-acetaminophen (PERCOCET)  mg per tablet TK 1 T PO Q 6 H PRN  0    QUEtiapine (SEROQUEL) 50 MG tablet TK 1 T PO Q NIGHT      tiZANidine 4 mg Cap TK 2 TO 3 C XD PO TID WF  0    TRINTELLIX 20 mg Tab TK 1 T PO QAM      VENTOLIN HFA 90 mcg/actuation inhaler INHALE 2 PUFFS INTO LUNGS EVERY 6 HOURS AS NEEDED FOR WHEEZING RESCUE 18 g 12    vitamin D (VITAMIN D3) 1000 units Tab Take 1,000 Units by mouth once daily.      zaleplon (SONATA) 10 MG capsule Take 10 mg by mouth once daily.  2     No current facility-administered medications for this visit.       Patient Care Team:  Gigi Celis MD as PCP - General (Internal Medicine)  Nnacy Noonan LPN as Licensed Practical Nurse  Arthur Mejias II, MD as Consulting Physician (Gastroenterology)  Gigi Celis MD as Hypertension Digital Medicine Responsible Provider (Internal Medicine)  Cathryn Wellington as Digital Medicine Health   Enmanuel North III, MD as Consulting Physician (Pain Medicine)  Maddison ReynosoD as Hypertension Digital Medicine Clinician      The patient expressed understanding and no barriers to adherence were identified.      - The patient indicates understanding of these issues and agrees with the plan. Brief care plan is updated and reviewed with the patient as applicable.      - The patient was sent an After Visit Summary virtually that lists all medications with directions, allergies, education, orders placed during this  encounter and follow-up instructions.      - I have reviewed the patient's medical information including past medical, family, and social history sections including the medications and allergies.      - We discussed the patient's current medications.     This note was created by combination of typed  and MModal dictation.  Transcription errors may be present.  If there are any questions, please contact me.       Osorio Santo NP

## 2022-10-04 ENCOUNTER — PATIENT MESSAGE (OUTPATIENT)
Dept: FAMILY MEDICINE | Facility: CLINIC | Age: 52
End: 2022-10-04
Payer: COMMERCIAL

## 2022-10-06 ENCOUNTER — OFFICE VISIT (OUTPATIENT)
Dept: FAMILY MEDICINE | Facility: CLINIC | Age: 52
End: 2022-10-06
Payer: COMMERCIAL

## 2022-10-06 VITALS
HEART RATE: 88 BPM | TEMPERATURE: 99 F | RESPIRATION RATE: 18 BRPM | SYSTOLIC BLOOD PRESSURE: 146 MMHG | WEIGHT: 278.69 LBS | OXYGEN SATURATION: 99 % | DIASTOLIC BLOOD PRESSURE: 94 MMHG | BODY MASS INDEX: 47.58 KG/M2 | HEIGHT: 64 IN

## 2022-10-06 DIAGNOSIS — J20.5 ACUTE BRONCHITIS DUE TO RESPIRATORY SYNCYTIAL VIRUS (RSV): Primary | ICD-10-CM

## 2022-10-06 PROCEDURE — 3008F PR BODY MASS INDEX (BMI) DOCUMENTED: ICD-10-PCS | Mod: CPTII,S$GLB,, | Performed by: NURSE PRACTITIONER

## 2022-10-06 PROCEDURE — 3080F PR MOST RECENT DIASTOLIC BLOOD PRESSURE >= 90 MM HG: ICD-10-PCS | Mod: CPTII,S$GLB,, | Performed by: NURSE PRACTITIONER

## 2022-10-06 PROCEDURE — 1160F PR REVIEW ALL MEDS BY PRESCRIBER/CLIN PHARMACIST DOCUMENTED: ICD-10-PCS | Mod: CPTII,S$GLB,, | Performed by: NURSE PRACTITIONER

## 2022-10-06 PROCEDURE — 3077F SYST BP >= 140 MM HG: CPT | Mod: CPTII,S$GLB,, | Performed by: NURSE PRACTITIONER

## 2022-10-06 PROCEDURE — 3008F BODY MASS INDEX DOCD: CPT | Mod: CPTII,S$GLB,, | Performed by: NURSE PRACTITIONER

## 2022-10-06 PROCEDURE — 99999 PR PBB SHADOW E&M-EST. PATIENT-LVL V: CPT | Mod: PBBFAC,,, | Performed by: NURSE PRACTITIONER

## 2022-10-06 PROCEDURE — 99214 PR OFFICE/OUTPT VISIT, EST, LEVL IV, 30-39 MIN: ICD-10-PCS | Mod: S$GLB,,, | Performed by: NURSE PRACTITIONER

## 2022-10-06 PROCEDURE — 1160F RVW MEDS BY RX/DR IN RCRD: CPT | Mod: CPTII,S$GLB,, | Performed by: NURSE PRACTITIONER

## 2022-10-06 PROCEDURE — 1159F MED LIST DOCD IN RCRD: CPT | Mod: CPTII,S$GLB,, | Performed by: NURSE PRACTITIONER

## 2022-10-06 PROCEDURE — 3080F DIAST BP >= 90 MM HG: CPT | Mod: CPTII,S$GLB,, | Performed by: NURSE PRACTITIONER

## 2022-10-06 PROCEDURE — 99214 OFFICE O/P EST MOD 30 MIN: CPT | Mod: S$GLB,,, | Performed by: NURSE PRACTITIONER

## 2022-10-06 PROCEDURE — 99999 PR PBB SHADOW E&M-EST. PATIENT-LVL V: ICD-10-PCS | Mod: PBBFAC,,, | Performed by: NURSE PRACTITIONER

## 2022-10-06 PROCEDURE — 1159F PR MEDICATION LIST DOCUMENTED IN MEDICAL RECORD: ICD-10-PCS | Mod: CPTII,S$GLB,, | Performed by: NURSE PRACTITIONER

## 2022-10-06 PROCEDURE — 3077F PR MOST RECENT SYSTOLIC BLOOD PRESSURE >= 140 MM HG: ICD-10-PCS | Mod: CPTII,S$GLB,, | Performed by: NURSE PRACTITIONER

## 2022-10-06 RX ORDER — PROMETHAZINE HYDROCHLORIDE AND DEXTROMETHORPHAN HYDROBROMIDE 6.25; 15 MG/5ML; MG/5ML
5 SYRUP ORAL NIGHTLY PRN
Qty: 118 ML | Refills: 0 | Status: SHIPPED | OUTPATIENT
Start: 2022-10-06 | End: 2022-10-16

## 2022-10-06 RX ORDER — FLUTICASONE PROPIONATE 50 MCG
1 SPRAY, SUSPENSION (ML) NASAL DAILY
Qty: 15.8 ML | Refills: 0 | Status: SHIPPED | OUTPATIENT
Start: 2022-10-06

## 2022-10-06 NOTE — LETTER
October 6, 2022      [facility]  [address], [address]  [place] [address]  Phone: [phone]       Patient: Kianna Zuleta   YOB: 1970  Date of Visit: 10/06/2022    To Whom It May Concern:    Mitra Zuleta  was at [facility] on 10/06/2022. The patient may return to work/school on 10/11/22 with no restrictions. If you have any questions or concerns, or if I can be of further assistance, please do not hesitate to contact me.    Sincerely,    Marty Rey, NP

## 2022-10-06 NOTE — PROGRESS NOTES
Routine Office Visit    Patient Name: Kianna Zuleta    : 1970  MRN: [id number]    Chief Complaint:  Follow-up, cough    Subjective:  Kianna is a 52 y.o. female who presents today for:    1. Follow-up, cough - patient who is known to me with a history of POTS, hypertension, immune deficiency reports today for evaluation.  Since our last visit, she did go to urgent care 6 days ago and was diagnosed with RSV.  She was given Medrol Dosepak as well as a Z-Levi and Tessalon Perles.  Since then her cough has been improving but she still has right ear congestion as well as neck pain and myalgias.  She has intermittent low-grade fevers (around 100.3) at night only for which she takes Tylenol or Advil.  She endorses wheezing but denies any shortness of breath or chest pain.  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  She is wondering what to do next since she has been on so many medications already in the last couple of weeks.  She does take Astelin every day for allergy symptoms.  She has used Flonase in the past with out problems.    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    MVP (mitral valve prolapse)     Pneumonia     POTS (postural orthostatic tachycardia syndrome)     Recurrent upper respiratory infection (URI)     Ulcer     Vitamin D deficiency disease 2012       Past Surgical History  Past Surgical History:   Procedure Laterality Date    ANKLE SURGERY      lt    APPENDECTOMY      BACK SURGERY      CHOLECYSTECTOMY      choley & ovarian cyst removed  2005    cystoscope      multiple    HYSTERECTOMY  2004    BUBBA BS&O     interstim      OOPHORECTOMY  2004     with hysterectomy     PELVIC LAPAROSCOPY      [facility]      SINUS SURGERY  03/01/2016    SPINE SURGERY         Family History  Family History   Problem Relation Age of Onset    Hypertension Father     Alcohol abuse Brother     Cancer Paternal Uncle     Colon cancer Paternal Uncle     Depression Maternal Grandmother     Hearing loss Maternal Grandmother     Heart disease Maternal Grandmother     Kidney disease Maternal Grandmother     Cancer Paternal Grandmother     Arthritis Paternal Grandfather     Diabetes Paternal Grandfather     Stroke Paternal Grandfather     Breast cancer Neg Hx     Ovarian cancer Neg Hx     Asthma Neg Hx     Emphysema Neg Hx        Social History  Social History     Socioeconomic History    Marital status: Single    Number of children: 0   Occupational History    Occupation: Teacher     Employer: [employer]   Tobacco Use    Smoking status: Never    Smokeless tobacco: Never   Substance and Sexual Activity    Alcohol use: Yes     Comment: Rarely    Drug use: No    Sexual activity: Not Currently   Other Topics Concern    Are you pregnant or think you may be? No    Breast-feeding No     Social Determinants of Health     Financial Resource Strain: Low Risk     Difficulty of Paying Living Expenses: Not hard at all   Food Insecurity: Food Insecurity Present    Worried About Running Out of Food in the Last Year: Never true    Ran Out of Food in the Last Year: Often true   Transportation Needs: No Transportation Needs    Lack of Transportation (Medical): No    Lack of Transportation (Non-Medical): No   Physical Activity: Insufficiently Active    Days of Exercise per Week: 4 days    Minutes of Exercise per Session: 20 min   Stress: No Stress Concern Present    Feeling of Stress : Not at all   Social Connections: Unknown    Frequency of Communication with Friends and Family: More than three times a week    Frequency of Social Gatherings with Friends and Family: More  than three times a week    Active Member of Clubs or Organizations: Yes    Attends Club or Organization Meetings: Never    Marital Status: Never    Housing Stability: Low Risk     Unable to Pay for Housing in the Last Year: No    Number of Places Lived in the Last Year: 1    Unstable Housing in the Last Year: No       Current Medications  Current Outpatient Medications on File Prior to Visit   Medication Sig Dispense Refill    albuterol (PROVENTIL) 2.5 mg /3 mL (0.083 %) nebulizer solution USE 3 MLS BY NEBULIZATION EVERY 6 HOURS AS NEEDED FOR WHEEZING. RESCUE. 180 mL 12    azelastine (ASTELIN) 137 mcg (0.1 %) nasal spray 1 spray by Nasal route.      CHOLESTYRAMINE LIGHT 4 gram packet DIS CNTS IN WATER AND DRK PO BID      cloNIDine (CATAPRES) 0.1 MG tablet Take 0.1 mg by mouth daily as needed. Take as needed for BP > 150/100 mmHg per outside cardiologist      doxycycline (VIBRA-TABS) 100 MG tablet Take 1 tablet (100 mg total) by mouth 2 (two) times daily. 20 tablet 0    ergocalciferol (ERGOCALCIFEROL) 50,000 unit Cap Take 1 capsule (50,000 Units total) by mouth every 7 days. 12 capsule 3    estradioL (ESTRACE) 2 MG tablet TAKE 1 TABLET BY MOUTH EVERY DAY 30 tablet 5    gabapentin (NEURONTIN) 300 MG capsule Take 600 mg by mouth 2 (two) times daily. 2-300 mg cap in AM and 3-300 mg cap in PM      hydrOXYzine HCL (ATARAX) 25 MG tablet Take 1 tablet (25 mg total) by mouth 3 (three) times daily. 45 tablet 1    hyoscyamine (LEVBID) 0.375 mg Tb12 Take 0.375 mg by mouth daily as needed.      immun glob G,IgG,-pro-IgA 0-50 (HIZENTRA) 10 gram/50 mL (20 %) Soln Inject 5 mLs (1 g total) into the skin every 7 days. 4 vial 5    indapamide (LOZOL) 1.25 MG Tab TAKE 1 TABLET BY MOUTH DAILY 30 tablet 1    levocetirizine (XYZAL) 5 MG tablet TK 1 T PO  ONCE Day  9    LIDOcaine-prilocaine (EMLA) cream Apply topically as needed (apply to infusion site). 30 g 0    meclizine (ANTIVERT) 25 mg tablet Take 1 tablet (25 mg total) by mouth  3 (three) times daily as needed for Dizziness. 30 tablet 0    montelukast (SINGULAIR) 10 mg tablet 10 mg once daily.       ondansetron (ZOFRAN-ODT) 4 MG TbDL Take 1 tablet (4 mg total) by mouth 2 (two) times daily. 12 tablet 0    oxyCODONE-acetaminophen (PERCOCET)  mg per tablet TK 1 T PO Q 6 H PRN  0    QUEtiapine (SEROQUEL) 50 MG tablet TK 1 T PO Q NIGHT      tiZANidine 4 mg Cap TK 2 TO 3 C XD PO TID WF  0    TRINTELLIX 20 mg Tab TK 1 T PO QAM      VENTOLIN HFA 90 mcg/actuation inhaler INHALE 2 PUFFS INTO LUNGS EVERY 6 HOURS AS NEEDED FOR WHEEZING RESCUE 18 g 12    vitamin D (VITAMIN D3) 1000 units Tab Take 1,000 Units by mouth once daily.      zaleplon (SONATA) 10 MG capsule Take 10 mg by mouth once daily.  2    EPINEPHrine (AUVI-Q) 0.3 mg/0.3 mL AtIn Inject 0.3 mLs (0.3 mg total) into the muscle once. (Patient not taking: Reported on 9/19/2022) 2 Device 0     No current facility-administered medications on file prior to visit.       Allergies   Review of patient's allergies indicates:   Allergen Reactions    Benadryl allergy decongestant Anaphylaxis     Other reaction(s): Anaphylaxis    Fludrocortisone Hives    Gluten Other (See Comments)     Other reaction(s) GI upset    Diphenhydramine hcl     Hydromorphone hcl      No issue with morphine in the past per pt.     Indomethacin Hives     No issue with Toradol in the past per pt.     Penicillins Rash     No issue with cephalosporins in the past per pt.     Sulfa (sulfonamide antibiotics) Rash    Vancomycin analogues Rash       Review of Systems (Pertinent positives)  Review of Systems   Constitutional:  Positive for fever and malaise/fatigue. Negative for chills, diaphoresis and weight loss.   HENT:  Positive for congestion and ear pain. Negative for ear discharge, hearing loss, nosebleeds, sinus pain, sore throat and tinnitus.         +postnasal drip   Eyes: Negative.    Respiratory:  Positive for cough, sputum production and wheezing. Negative for  "hemoptysis, shortness of breath and stridor.    Cardiovascular: Negative.    Gastrointestinal: Negative.    Genitourinary: Negative.    Musculoskeletal:  Positive for neck pain. Negative for back pain, falls, joint pain and myalgias.   Skin: Negative.    Neurological:  Positive for headaches. Negative for dizziness, tingling and tremors.   Endo/Heme/Allergies: Negative.    Psychiatric/Behavioral: Negative.       BP (!) 146/94 (BP Location: Right arm, Patient Position: Sitting, BP Method: Large (Manual))   Pulse 88   Temp 98.6 °F (37 °C) (Oral)   Resp 18   Ht 5' 4" (1.626 m)   Wt 126.4 kg (278 lb 10.6 oz)   SpO2 99%   BMI 47.83 kg/m²     Physical Exam  Vitals reviewed.   Constitutional:       General: She is not in acute distress.     Appearance: Normal appearance. She is obese. She is not ill-appearing, toxic-appearing or diaphoretic.   HENT:      Head: Normocephalic and atraumatic.      Right Ear: Ear canal and external ear normal. A middle ear effusion is present.      Left Ear: Tympanic membrane, ear canal and external ear normal.      Nose: Mucosal edema, congestion and rhinorrhea present. Rhinorrhea is clear.      Right Turbinates: Pale.      Left Turbinates: Pale.      Mouth/Throat:      Lips: Pink.      Mouth: Mucous membranes are moist.      Pharynx: Oropharynx is clear. Uvula midline. No pharyngeal swelling, oropharyngeal exudate or posterior oropharyngeal erythema.      Tonsils: No tonsillar exudate or tonsillar abscesses.     Cardiovascular:      Rate and Rhythm: Normal rate and regular rhythm.      Pulses: Normal pulses.      Heart sounds: Normal heart sounds.   Pulmonary:      Effort: Pulmonary effort is normal. No respiratory distress.      Breath sounds: Normal breath sounds. No wheezing.   Abdominal:      General: Bowel sounds are normal. There is no distension.      Palpations: Abdomen is soft.      Tenderness: There is no abdominal tenderness.   Musculoskeletal:         General: No swelling, " tenderness or deformity. Normal range of motion.   Skin:     General: Skin is warm and dry.      Capillary Refill: Capillary refill takes less than 2 seconds.   Neurological:      General: No focal deficit present.      Mental Status: She is alert and oriented to person, place, and time.   Psychiatric:         Mood and Affect: Mood normal.         Behavior: Behavior normal.        Assessment/Plan:  Kianna Zuleta is a 52 y.o. female who presents today for :    Diagnoses and all orders for this visit:    Acute bronchitis due to respiratory syncytial virus (RSV)  -     X-Ray Chest PA And Lateral; Future  -     promethazine-dextromethorphan (PROMETHAZINE-DM) 6.25-15 mg/5 mL Syrp; Take 5 mLs by mouth nightly as needed.  -     fluticasone propionate (FLONASE) 50 mcg/actuation nasal spray; 1 spray (50 mcg total) by Each Nostril route once daily.    I had a lengthy discussion with the patient regarding her symptoms.  She has been on multiple antibiotics lately for sinus symptoms as well as cough medicine and other treatments.    The promethazine has helped with the cough.  I will continue this.  Will add Flonase spray to help with postnasal drip and ear congestion.    No bacterial nidus exhibited on examination.  Patient is afebrile today.  Last temperature 100.3° last night for which she took Tylenol.  She has not had a fever yet today.    No need to repeat antibiotics at this time.  However given persistent cough, will check chest x-ray today.    Discussed with patient that RSV can take about a week to resolve completely.  Patient's symptoms have been improving.  Will provide work note for patient to return next Tuesday as she is already off this Friday and Monday.    She can correspond with me as needed via my chart if necessary.  All questions answered.  She can also use her p.r.n. albuterol inhaler as needed for any wheezing.    Patient verbalized understanding of instructions.        This office note has been  dictated.  This dictation has been generated using M-Modal Fluency Direct dictation; some phonetic errors may occur.   My collaborating physician is Dr. Mehrdad Valle.

## 2022-10-29 ENCOUNTER — PATIENT MESSAGE (OUTPATIENT)
Dept: OBSTETRICS AND GYNECOLOGY | Facility: CLINIC | Age: 52
End: 2022-10-29
Payer: COMMERCIAL

## 2022-10-31 DIAGNOSIS — N95.1 MENOPAUSAL SYMPTOMS: ICD-10-CM

## 2022-10-31 RX ORDER — ESTRADIOL 2 MG/1
2 TABLET ORAL DAILY
Qty: 30 TABLET | Refills: 0 | Status: SHIPPED | OUTPATIENT
Start: 2022-10-31 | End: 2022-11-22 | Stop reason: SDUPTHER

## 2022-11-03 ENCOUNTER — OFFICE VISIT (OUTPATIENT)
Dept: PODIATRY | Facility: CLINIC | Age: 52
End: 2022-11-03
Payer: COMMERCIAL

## 2022-11-03 VITALS — BODY MASS INDEX: 47.58 KG/M2 | HEIGHT: 64 IN | WEIGHT: 278.69 LBS

## 2022-11-03 DIAGNOSIS — M79.672 LEFT FOOT PAIN: Primary | ICD-10-CM

## 2022-11-03 DIAGNOSIS — M72.2 PLANTAR FASCIITIS: ICD-10-CM

## 2022-11-03 PROCEDURE — 99203 PR OFFICE/OUTPT VISIT, NEW, LEVL III, 30-44 MIN: ICD-10-PCS | Mod: S$GLB,,, | Performed by: PODIATRIST

## 2022-11-03 PROCEDURE — 99203 OFFICE O/P NEW LOW 30 MIN: CPT | Mod: S$GLB,,, | Performed by: PODIATRIST

## 2022-11-03 PROCEDURE — 99999 PR PBB SHADOW E&M-EST. PATIENT-LVL V: CPT | Mod: PBBFAC,,, | Performed by: PODIATRIST

## 2022-11-03 PROCEDURE — 1159F PR MEDICATION LIST DOCUMENTED IN MEDICAL RECORD: ICD-10-PCS | Mod: CPTII,S$GLB,, | Performed by: PODIATRIST

## 2022-11-03 PROCEDURE — 1159F MED LIST DOCD IN RCRD: CPT | Mod: CPTII,S$GLB,, | Performed by: PODIATRIST

## 2022-11-03 PROCEDURE — 3008F PR BODY MASS INDEX (BMI) DOCUMENTED: ICD-10-PCS | Mod: CPTII,S$GLB,, | Performed by: PODIATRIST

## 2022-11-03 PROCEDURE — 99999 PR PBB SHADOW E&M-EST. PATIENT-LVL V: ICD-10-PCS | Mod: PBBFAC,,, | Performed by: PODIATRIST

## 2022-11-03 PROCEDURE — 3008F BODY MASS INDEX DOCD: CPT | Mod: CPTII,S$GLB,, | Performed by: PODIATRIST

## 2022-11-03 RX ORDER — DICLOFENAC SODIUM 10 MG/G
2 GEL TOPICAL DAILY
Qty: 100 G | Refills: 3 | Status: SHIPPED | OUTPATIENT
Start: 2022-11-03

## 2022-11-04 ENCOUNTER — PATIENT MESSAGE (OUTPATIENT)
Dept: PODIATRY | Facility: CLINIC | Age: 52
End: 2022-11-04
Payer: COMMERCIAL

## 2022-11-04 ENCOUNTER — HOSPITAL ENCOUNTER (OUTPATIENT)
Dept: RADIOLOGY | Facility: HOSPITAL | Age: 52
Discharge: HOME | End: 2022-11-04
Attending: PODIATRIST
Payer: COMMERCIAL

## 2022-11-04 DIAGNOSIS — M79.672 LEFT FOOT PAIN: ICD-10-CM

## 2022-11-04 PROCEDURE — 73630 X-RAY EXAM OF FOOT: CPT | Mod: TC,FY,PO,LT

## 2022-11-04 PROCEDURE — 73630 XR FOOT COMPLETE 3 VIEW LEFT: ICD-10-PCS | Mod: 26,LT,, | Performed by: RADIOLOGY

## 2022-11-04 PROCEDURE — 73630 X-RAY EXAM OF FOOT: CPT | Mod: 26,LT,, | Performed by: RADIOLOGY

## 2022-11-06 NOTE — PROGRESS NOTES
Subjective:      Patient ID: Kianna Zuleta is a 52 y.o. female.    Chief Complaint: Heel Pain (left)  2  Kianna is a 52 y.o. female who presents to the podiatry clinic  with complaint of  left foot pain. Onset of the symptoms was several months ago. Precipitating event: none known. Current symptoms include: ability to bear weight, but with some pain. Aggravating factors: any weight bearing. Symptoms have progressed to a point and plateaued. Patient has had no prior foot problems. Evaluation to date: none. Treatment to date: none. Patients rates pain 5/10 on pain scale.    Review of Systems   Constitutional: Negative for chills.   Cardiovascular:  Negative for chest pain and claudication.   Respiratory:  Negative for cough.    Skin:  Positive for color change, dry skin and nail changes.   Musculoskeletal:  Positive for joint pain.   Gastrointestinal:  Negative for nausea.   Neurological:  Positive for paresthesias. Negative for numbness.   Psychiatric/Behavioral:  The patient is not nervous/anxious.          Objective:      Physical Exam  Constitutional:       Appearance: She is well-developed.      Comments: Oriented to time, place, and person.   Cardiovascular:      Comments: DP and PT pulses are palpable bilaterally. 3 sec capillary refill time and toes and feet are warm to touch proximally .  There is  hair growth on the feet and toes b/l. There is no edema b/l. No spider veins or varicosities present b/l.     Musculoskeletal:      Comments: Equinus noted b/l ankles with < 10 deg DF noted. MMT 5/5 in DF/PF/Inv/Ev resistance with no reproduction of pain in any direction. Passive range of motion of ankle and pedal joints is painless b/l.  Pain on palpation plantar medial left heel, no pain with ROM or MMT or medial and lateral compression of heel, - tinel's sign     Feet:      Right foot:      Skin integrity: No callus or dry skin.      Left foot:      Skin integrity: No callus or dry skin.   Lymphadenopathy:       Comments: Negative lymphadenopathy bilateral popliteal fossa and tarsal tunnel.   Skin:     Comments: No open lesions, lacerations or wounds noted.Interdigital spaces clean, dry and intact b/l. No erythema noted to b/l foot.  Nails normal color and trophic qualities.     Neurological:      Mental Status: She is alert.      Comments: Light touch, proprioception, and sharp/dull sensation are all intact bilaterally. Protective threshold with the Bucklin-Wienstein monofilament is intact bilaterally.    Psychiatric:         Behavior: Behavior is cooperative.             Assessment:       Encounter Diagnoses   Name Primary?    Left foot pain Yes    Plantar fasciitis          Plan:       Kianna was seen today for heel pain.    Diagnoses and all orders for this visit:    Left foot pain  -     X-Ray Foot Complete Left; Future  -     Ambulatory referral/consult to Physical/Occupational Therapy; Future    Plantar fasciitis  -     Ambulatory referral/consult to Physical/Occupational Therapy; Future    Other orders  -     diclofenac sodium (VOLTAREN) 1 % Gel; Apply 2 g topically once daily.      I counseled the patient on her conditions, their implications and medical management.    Discussed different treatment options for heel pain. including conservative and interventional.  I gave written and verbal instructions on heel cord stretching and this was demonstrated for the patient. Patient expressed understanding. Discussed wearing appropriate shoe gear and avoiding flats, slippers, sandals, barefoot, and sockfeet. Recommended arch supports. My recommendation for OTC supports is [employer], [employer] tennis shoes.       Patient instructed on adequate icing techniques. Patient should ice the affected area at least once per day x 10 minutes for 10 days . I advised the  patient that extra icing would also be beneficial to ensure adequate anti inflammatory effect     Stretching handout dispensed to patient. Instructions on adequate  stretching reviewed in clinic      Rx Voltaren gel to be applied to affected area up to 3-4 x daily as needed for pain    Referral placed to PT     Left foot xray to assess underlying deformity and for underlying osseous pathology.     CAM boot dispensed and applied to affected foot. Advised to use at all times while weightbearing and ambulating even for few minutes. Advised to use sneaker style shoe in opposite foot to maintain balance. Ok to remove  at night but reapply if patient is to get up in the middle of the night. Verbalized understanding. Advised to use for 3 -4 weeks.     Information given on night splint.         RTC PRN nkb

## 2022-11-11 ENCOUNTER — TELEPHONE (OUTPATIENT)
Dept: PODIATRY | Facility: CLINIC | Age: 52
End: 2022-11-11
Payer: COMMERCIAL

## 2022-11-11 DIAGNOSIS — M72.2 PLANTAR FASCIITIS: Primary | ICD-10-CM

## 2022-11-22 ENCOUNTER — OFFICE VISIT (OUTPATIENT)
Dept: OBSTETRICS AND GYNECOLOGY | Facility: CLINIC | Age: 52
End: 2022-11-22
Payer: COMMERCIAL

## 2022-11-22 VITALS
BODY MASS INDEX: 48.55 KG/M2 | HEIGHT: 64 IN | SYSTOLIC BLOOD PRESSURE: 148 MMHG | WEIGHT: 284.38 LBS | DIASTOLIC BLOOD PRESSURE: 94 MMHG

## 2022-11-22 DIAGNOSIS — Z12.31 VISIT FOR SCREENING MAMMOGRAM: ICD-10-CM

## 2022-11-22 DIAGNOSIS — Z01.419 ENCOUNTER FOR GYNECOLOGICAL EXAMINATION WITHOUT ABNORMAL FINDING: Primary | ICD-10-CM

## 2022-11-22 DIAGNOSIS — N95.1 MENOPAUSAL SYMPTOMS: ICD-10-CM

## 2022-11-22 PROCEDURE — 99999 PR PBB SHADOW E&M-EST. PATIENT-LVL IV: ICD-10-PCS | Mod: PBBFAC,,, | Performed by: OBSTETRICS & GYNECOLOGY

## 2022-11-22 PROCEDURE — 3077F SYST BP >= 140 MM HG: CPT | Mod: CPTII,S$GLB,, | Performed by: OBSTETRICS & GYNECOLOGY

## 2022-11-22 PROCEDURE — 3008F BODY MASS INDEX DOCD: CPT | Mod: CPTII,S$GLB,, | Performed by: OBSTETRICS & GYNECOLOGY

## 2022-11-22 PROCEDURE — 3008F PR BODY MASS INDEX (BMI) DOCUMENTED: ICD-10-PCS | Mod: CPTII,S$GLB,, | Performed by: OBSTETRICS & GYNECOLOGY

## 2022-11-22 PROCEDURE — 3077F PR MOST RECENT SYSTOLIC BLOOD PRESSURE >= 140 MM HG: ICD-10-PCS | Mod: CPTII,S$GLB,, | Performed by: OBSTETRICS & GYNECOLOGY

## 2022-11-22 PROCEDURE — 1159F PR MEDICATION LIST DOCUMENTED IN MEDICAL RECORD: ICD-10-PCS | Mod: CPTII,S$GLB,, | Performed by: OBSTETRICS & GYNECOLOGY

## 2022-11-22 PROCEDURE — 3080F PR MOST RECENT DIASTOLIC BLOOD PRESSURE >= 90 MM HG: ICD-10-PCS | Mod: CPTII,S$GLB,, | Performed by: OBSTETRICS & GYNECOLOGY

## 2022-11-22 PROCEDURE — 99999 PR PBB SHADOW E&M-EST. PATIENT-LVL IV: CPT | Mod: PBBFAC,,, | Performed by: OBSTETRICS & GYNECOLOGY

## 2022-11-22 PROCEDURE — 99396 PREV VISIT EST AGE 40-64: CPT | Mod: S$GLB,,, | Performed by: OBSTETRICS & GYNECOLOGY

## 2022-11-22 PROCEDURE — 99396 PR PREVENTIVE VISIT,EST,40-64: ICD-10-PCS | Mod: S$GLB,,, | Performed by: OBSTETRICS & GYNECOLOGY

## 2022-11-22 PROCEDURE — 1159F MED LIST DOCD IN RCRD: CPT | Mod: CPTII,S$GLB,, | Performed by: OBSTETRICS & GYNECOLOGY

## 2022-11-22 PROCEDURE — 3080F DIAST BP >= 90 MM HG: CPT | Mod: CPTII,S$GLB,, | Performed by: OBSTETRICS & GYNECOLOGY

## 2022-11-22 RX ORDER — METOPROLOL TARTRATE 50 MG/1
50 TABLET ORAL 3 TIMES DAILY
COMMUNITY
Start: 2022-08-30 | End: 2023-10-04

## 2022-11-22 RX ORDER — ESTRADIOL 2 MG/1
2 TABLET ORAL DAILY
Qty: 90 TABLET | Refills: 3 | Status: SHIPPED | OUTPATIENT
Start: 2022-11-22 | End: 2023-11-20

## 2022-11-22 RX ORDER — CETIRIZINE HYDROCHLORIDE 10 MG/1
10 TABLET ORAL DAILY
COMMUNITY
Start: 2022-07-15

## 2022-11-22 NOTE — PROGRESS NOTES
Well-woman exam    Kianna Zuleta  is a 52 y.o. year old  patient who presents for a well-woman exam.  She is S/P hysterectomy/bilateral salpingo-oophorectomy.  No gynecologic complaints today.    Patient reports no significant menopausal symptoms on current ERT regimen.    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    MVP (mitral valve prolapse)     Pneumonia     POTS (postural orthostatic tachycardia syndrome)     Recurrent upper respiratory infection (URI)     Ulcer     Vitamin D deficiency disease 2012       Past Surgical History:   Procedure Laterality Date    ANKLE SURGERY      lt    APPENDECTOMY      BACK SURGERY      CHOLECYSTECTOMY      choley & ovarian cyst removed  2005    cystoscope      multiple    HYSTERECTOMY  2004    BUBBA BS&O     interstim      OOPHORECTOMY  2004    with hysterectomy     PELVIC LAPAROSCOPY      [facility]      SINUS SURGERY  2016    SPINE SURGERY         OB History          0    Para        Term   0            AB        Living             SAB        IAB        Ectopic        Multiple        Live Births                     ROS:  GENERAL: Feeling well overall.   SKIN: Denies rash or lesions.   HEAD: Denies head injury or headache.   NODES: Denies enlarged lymph nodes.   CHEST: Denies chest pain or shortness of breath.   CARDIOVASCULAR: Denies palpitations or left sided chest pain.   ABDOMEN: No abdominal pain, nausea, vomiting or rectal bleeding.   URINARY: No dysuria, hematuria, or burning on urination.  REPRODUCTIVE: See HPI.   BREASTS: Denies pain, lumps, or nipple discharge.   HEMATOLOGIC: No easy bruisability or excessive bleeding.    MUSCULOSKELETAL: Denies joint pain or swelling.   NEUROLOGIC: Denies syncope or weakness.   PSYCHIATRIC: Denies depression.    PE:   APPEARANCE: Well nourished, well developed, in no acute distress.  NECK: Neck symmetric without masses or thyromegaly.  NODES: No inguinal lymph node enlargement.  ABDOMEN: Soft. No tenderness or masses. No hernias.  BREASTS: Symmetrical, no skin changes or visible lesions. No palpable masses, nipple discharge or adenopathy bilaterally.  PELVIC: Normal external female genitalia without lesions. Normal hair distribution. Adequate perineal body, normal urethral meatus. Vagina without lesions or discharge.  Vaginal cuff with good support.  No significant cystocele or rectocele. Uterus and cervix surgically absent. Bimanual exam revealed no masses, tenderness or abnormality.  ANUS: Normal.    Diagnosis:  1. Encounter for gynecological examination without abnormal finding    2. Menopausal symptoms    3. Visit for screening mammogram        PLAN:    Patient was counseled today on postmenopausal issues.     Risk of continued ERT use reviewed.  Patient to continue current regimen.    Screening mammogram ordered today.    Cancer screening recommendations reviewed with patient today.  Patient verbalized understanding of these recommendations.    Follow up in about 1 year (around 11/22/2023) for Annual exam.

## 2022-12-15 ENCOUNTER — PATIENT OUTREACH (OUTPATIENT)
Dept: ADMINISTRATIVE | Facility: HOSPITAL | Age: 52
End: 2022-12-15
Payer: COMMERCIAL

## 2023-01-08 ENCOUNTER — PATIENT MESSAGE (OUTPATIENT)
Dept: ADMINISTRATIVE | Facility: OTHER | Age: 53
End: 2023-01-08
Payer: COMMERCIAL

## 2023-01-09 ENCOUNTER — OFFICE VISIT (OUTPATIENT)
Dept: FAMILY MEDICINE | Facility: CLINIC | Age: 53
End: 2023-01-09
Payer: COMMERCIAL

## 2023-01-09 DIAGNOSIS — J01.90 ACUTE SINUSITIS, RECURRENCE NOT SPECIFIED, UNSPECIFIED LOCATION: Primary | ICD-10-CM

## 2023-01-09 PROCEDURE — 1159F MED LIST DOCD IN RCRD: CPT | Mod: CPTII,95,, | Performed by: INTERNAL MEDICINE

## 2023-01-09 PROCEDURE — 1160F PR REVIEW ALL MEDS BY PRESCRIBER/CLIN PHARMACIST DOCUMENTED: ICD-10-PCS | Mod: CPTII,95,, | Performed by: INTERNAL MEDICINE

## 2023-01-09 PROCEDURE — 99213 PR OFFICE/OUTPT VISIT, EST, LEVL III, 20-29 MIN: ICD-10-PCS | Mod: 95,,, | Performed by: INTERNAL MEDICINE

## 2023-01-09 PROCEDURE — 1159F PR MEDICATION LIST DOCUMENTED IN MEDICAL RECORD: ICD-10-PCS | Mod: CPTII,95,, | Performed by: INTERNAL MEDICINE

## 2023-01-09 PROCEDURE — 99213 OFFICE O/P EST LOW 20 MIN: CPT | Mod: 95,,, | Performed by: INTERNAL MEDICINE

## 2023-01-09 PROCEDURE — 1160F RVW MEDS BY RX/DR IN RCRD: CPT | Mod: CPTII,95,, | Performed by: INTERNAL MEDICINE

## 2023-01-09 RX ORDER — METHYLPREDNISOLONE 4 MG/1
TABLET ORAL
Qty: 1 EACH | Refills: 0 | Status: SHIPPED | OUTPATIENT
Start: 2023-01-09 | End: 2023-04-21 | Stop reason: ALTCHOICE

## 2023-01-09 NOTE — PROGRESS NOTES
HISTORY OF PRESENT ILLNESS:  Kianna Zuleta is a 52 y.o. female who presents to the clinic today for fever and body aches.  The patient location is: [place]  The chief complaint leading to consultation is: fever and body aches    Visit type: audiovisual    Face to Face time with patient: 5 min  10 minutes of total time spent on the encounter, which includes face to face time and non-face to face time preparing to see the patient (eg, review of tests), Obtaining and/or reviewing separately obtained history, Documenting clinical information in the electronic or other health record, Independently interpreting results (not separately reported) and communicating results to the patient/family/caregiver, or Care coordination (not separately reported).     Each patient to whom he or she provides medical services by telemedicine is:  (1) informed of the relationship between the physician and patient and the respective role of any other health care provider with respect to management of the patient; and (2) notified that he or she may decline to receive medical services by telemedicine and may withdraw from such care at any time.    Notes:   She has CVID, POTS, HTN and obesity.    Symptoms began Friday  Negative flu and COVID yesterday at [facility].  Negative COVID last Tuesday  as well.  Fever, HA, body aches since Friday.  Dizziness last night  No shortness of breath but no cough.  No sore throat.  Feels like she has sinus tenderness and light sensitivity with HA.  .  Remedies: Advil, Tylenol.      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    MVP (mitral valve prolapse)     Pneumonia      POTS (postural orthostatic tachycardia syndrome)     Recurrent upper respiratory infection (URI)     Ulcer     Vitamin D deficiency disease 11/6/2012       PAST SURGICAL HISTORY:  Past Surgical History:   Procedure Laterality Date    ANKLE SURGERY      lt    APPENDECTOMY      BACK SURGERY      CHOLECYSTECTOMY      choley & ovarian cyst removed  12/2005    cystoscope      multiple    HYSTERECTOMY  4/8/2004    BUBBA BS&O     interstim      OOPHORECTOMY  4/8/2004    with hysterectomy     PELVIC LAPAROSCOPY      [facility]EN      SINUS SURGERY  03/01/2016    SPINE SURGERY         SOCIAL HISTORY:  Social History     Socioeconomic History    Marital status: Single    Number of children: 0   Occupational History    Occupation: Teacher     Employer: [employer]   Tobacco Use    Smoking status: Never    Smokeless tobacco: Never   Substance and Sexual Activity    Alcohol use: Yes     Comment: Rarely    Drug use: No    Sexual activity: Not Currently   Other Topics Concern    Are you pregnant or think you may be? No    Breast-feeding No     Social Determinants of Health     Financial Resource Strain: Low Risk     Difficulty of Paying Living Expenses: Not hard at all   Food Insecurity: No Food Insecurity    Worried About Running Out of Food in the Last Year: Never true    Ran Out of Food in the Last Year: Never true   Transportation Needs: No Transportation Needs    Lack of Transportation (Medical): No    Lack of Transportation (Non-Medical): No   Physical Activity: Insufficiently Active    Days of Exercise per Week: 3 days    Minutes of Exercise per Session: 20 min   Stress: No Stress Concern Present    Feeling of Stress : Only a little   Social Connections: Unknown    Frequency of Communication with Friends and Family: More than three times a week    Frequency of Social Gatherings with Friends and Family: More than three times a week    Active Member of Clubs or Organizations: Yes    Attends Club  or Organization Meetings: 1 to 4 times per year    Marital Status: Never    Housing Stability: Low Risk     Unable to Pay for Housing in the Last Year: No    Number of Places Lived in the Last Year: 1    Unstable Housing in the Last Year: No       FAMILY HISTORY:  Family History   Problem Relation Age of Onset    Hypertension Father     Alcohol abuse Brother     Cancer Paternal Uncle     Colon cancer Paternal Uncle     Depression Maternal Grandmother     Hearing loss Maternal Grandmother     Heart disease Maternal Grandmother     Kidney disease Maternal Grandmother     Cancer Paternal Grandmother     Arthritis Paternal Grandfather     Diabetes Paternal Grandfather     Stroke Paternal Grandfather     Breast cancer Neg Hx     Ovarian cancer Neg Hx     Asthma Neg Hx     Emphysema Neg Hx        ALLERGIES AND MEDICATIONS: updated and reviewed.  Review of patient's allergies indicates:   Allergen Reactions    Benadryl allergy decongestant Anaphylaxis     Other reaction(s): Anaphylaxis    Fludrocortisone Hives    Gluten Other (See Comments)     Other reaction(s) GI upset    Diphenhydramine hcl     Hydromorphone hcl      No issue with morphine in the past per pt.     Indomethacin Hives     No issue with Toradol in the past per pt.     Penicillins Rash     No issue with cephalosporins in the past per pt.     Sulfa (sulfonamide antibiotics) Rash    Vancomycin analogues Rash     Medication List with Changes/Refills   Current Medications    ALBUTEROL (PROVENTIL) 2.5 MG /3 ML (0.083 %) NEBULIZER SOLUTION    USE 3 MLS BY NEBULIZATION EVERY 6 HOURS AS NEEDED FOR WHEEZING. RESCUE.    AZELASTINE (ASTELIN) 137 MCG (0.1 %) NASAL SPRAY    1 spray by Nasal route.    CETIRIZINE (ZYRTEC) 10 MG TABLET    Take 10 mg by mouth once daily.    CLONIDINE (CATAPRES) 0.1 MG TABLET    Take 0.1 mg by mouth daily as needed. Take as needed for BP > 150/100 mmHg per outside cardiologist    DICLOFENAC SODIUM (VOLTAREN) 1 % GEL    Apply 2 g  topically once daily.    DOXYCYCLINE (VIBRA-TABS) 100 MG TABLET    Take 1 tablet (100 mg total) by mouth 2 (two) times daily.    EPINEPHRINE (AUVI-Q) 0.3 MG/0.3 ML ATIN    Inject 0.3 mLs (0.3 mg total) into the muscle once.    ERGOCALCIFEROL (ERGOCALCIFEROL) 50,000 UNIT CAP    Take 1 capsule (50,000 Units total) by mouth every 7 days.    ESTRADIOL (ESTRACE) 2 MG TABLET    Take 1 tablet (2 mg total) by mouth once daily.    FLUTICASONE PROPIONATE (FLONASE) 50 MCG/ACTUATION NASAL SPRAY    1 spray (50 mcg total) by Each Nostril route once daily.    GABAPENTIN (NEURONTIN) 300 MG CAPSULE    Take 600 mg by mouth 2 (two) times daily. 2-300 mg cap in AM and 3-300 mg cap in PM    HYDROXYZINE HCL (ATARAX) 25 MG TABLET    Take 1 tablet (25 mg total) by mouth 3 (three) times daily.    HYOSCYAMINE (LEVBID) 0.375 MG TB12    Take 0.375 mg by mouth daily as needed.    IMMUN GLOB G,IGG,-PRO-IGA 0-50 (HIZENTRA) 10 GRAM/50 ML (20 %) SOLN    Inject 5 mLs (1 g total) into the skin every 7 days.    INDAPAMIDE (LOZOL) 1.25 MG TAB    TAKE 1 TABLET BY MOUTH DAILY    LEVOCETIRIZINE (XYZAL) 5 MG TABLET    TK 1 T PO  ONCE Day    LIDOCAINE-PRILOCAINE (EMLA) CREAM    Apply topically as needed (apply to infusion site).    MECLIZINE (ANTIVERT) 25 MG TABLET    Take 1 tablet (25 mg total) by mouth 3 (three) times daily as needed for Dizziness.    METOPROLOL TARTRATE (LOPRESSOR) 50 MG TABLET    Take 50 mg by mouth 3 (three) times daily.    MONTELUKAST (SINGULAIR) 10 MG TABLET    10 mg once daily.     ONDANSETRON (ZOFRAN-ODT) 4 MG TBDL    Take 1 tablet (4 mg total) by mouth 2 (two) times daily.    OXYCODONE-ACETAMINOPHEN (PERCOCET)  MG PER TABLET    TK 1 T PO Q 6 H PRN    QUETIAPINE (SEROQUEL) 50 MG TABLET    TK 1 T PO Q NIGHT    TIZANIDINE 4 MG CAP    TK 2 TO 3 C XD PO TID WF    TRINTELLIX 20 MG TAB    TK 1 T PO QAM    VENTOLIN HFA 90 MCG/ACTUATION INHALER    INHALE 2 PUFFS INTO LUNGS EVERY 6 HOURS AS NEEDED FOR WHEEZING RESCUE    VITAMIN D  (VITAMIN D3) 1000 UNITS TAB    Take 1,000 Units by mouth once daily.    ZALEPLON (SONATA) 10 MG CAPSULE    Take 10 mg by mouth once daily.          CARE TEAM:  Patient Care Team:  Gigi Celis MD as PCP - General (Internal Medicine)  Nancy Noonan LPN as Licensed Practical Nurse  Arthur Mejias II, MD as Consulting Physician (Gastroenterology)  Gigi Celis MD as Hypertension Digital Medicine Responsible Provider (Internal Medicine)  Cathryn Wellington as Digital Medicine Health   Enmanuel North III, MD as Consulting Physician (Pain Medicine)  Becka Bassett PharmD as Hypertension Digital Medicine Clinician         REVIEW OF SYSTEMS:  Review of Systems   Constitutional:  Positive for fatigue and fever.   HENT:  Negative for congestion, ear pain and sore throat.    Eyes:  Negative for photophobia and visual disturbance.   Respiratory:  Positive for wheezing. Negative for cough.    Cardiovascular:  Negative for chest pain.   Gastrointestinal:  Negative for abdominal pain, diarrhea, nausea and vomiting.   Genitourinary:  Negative for dysuria and frequency.   Musculoskeletal:  Negative for arthralgias and back pain.   Skin:  Negative for rash.   Neurological:  Positive for headaches. Negative for light-headedness.   Psychiatric/Behavioral:  Negative for dysphoric mood. The patient is not nervous/anxious.        PHYSICAL EXAM:      General appearance - alert, well appearing, and in no distress      ASSESSMENT AND PLAN:  Acute sinusitis, recurrence not specified, unspecified location  -     methylPREDNISolone (MEDROL DOSEPACK) 4 mg tablet; use as directed  Dispense: 1 each; Refill: 0  - Advised for supportive care for viral illness.  Advised for adequate oral hydration and as needed acetaminophen and ibuprofen.  Advised to go to ED or urgent care for any worsening of symptoms.              Follow up in upcoming week if not better or sooner as needed.  Answers submitted by the patient for this  visit:  Fever Questionnaire (Submitted on 1/8/2023)  Chief Complaint: Fever  Chronicity: new  Onset: 1 to 4 weeks ago  Frequency: constantly  Progression since onset: waxing and waning  Max temp prior to arrival: 101 to 101.9 F  Temperature source: an oral thermometer  muscle aches: Yes  sleepiness: Yes  Treatments tried: NSAIDs, acetaminophen, cool baths, fluids  Improvement on treatment: mild

## 2023-01-10 ENCOUNTER — PATIENT MESSAGE (OUTPATIENT)
Dept: ADMINISTRATIVE | Facility: OTHER | Age: 53
End: 2023-01-10
Payer: COMMERCIAL

## 2023-01-10 ENCOUNTER — PATIENT MESSAGE (OUTPATIENT)
Dept: FAMILY MEDICINE | Facility: CLINIC | Age: 53
End: 2023-01-10
Payer: COMMERCIAL

## 2023-01-10 ENCOUNTER — TELEPHONE (OUTPATIENT)
Dept: FAMILY MEDICINE | Facility: CLINIC | Age: 53
End: 2023-01-10
Payer: COMMERCIAL

## 2023-01-10 NOTE — TELEPHONE ENCOUNTER
----- Message from Lee Ann Valenzuela sent at 1/10/2023 12:23 PM CST -----  Regarding: pt  back to work note  Name of Who is Calling:BLANCA GUTHRIE [[id number]]          What is the request in detail: Pt is calling asking if she can have a return to work note sent to her Mychart. Please leave a note on her Mychart           Can the clinic reply by [facility]:yes           What [facility][phone]

## 2023-02-06 ENCOUNTER — PATIENT OUTREACH (OUTPATIENT)
Dept: ADMINISTRATIVE | Facility: HOSPITAL | Age: 53
End: 2023-02-06
Payer: COMMERCIAL

## 2023-02-06 ENCOUNTER — TELEPHONE (OUTPATIENT)
Dept: FAMILY MEDICINE | Facility: CLINIC | Age: 53
End: 2023-02-06
Payer: COMMERCIAL

## 2023-02-06 NOTE — TELEPHONE ENCOUNTER
[facility] is requesting clearance on the patient. Please advise if the patient is able to stop any anti-coagulant therapy and if so, for how many days prior to surgery. Also, advise if the patient is able to undergo MAC anesthesia using Diprivan or General anesthesia.

## 2023-02-06 NOTE — TELEPHONE ENCOUNTER
Received request by [facility] to clear [facility]ac [facility]  Looks like I have not seen her since 2020.  She is followed by Allergy receiving regular immunotherapy for her immunodeficiency.  I do not see that she is on any aspirin or antiplatelet or anticoagulant and there was a question about stopping that if on it.  I did say that she was cleared from medical standpoint at an unknown risk with the comments that I have not seen her since 2020 so may want to get okay from allergist who gives immunotherapy to make sure there are no issues related to the timing of her immunotherapy and so forth.  Next para also appears [facility] and that may be who she should get clearance with as well

## 2023-02-20 ENCOUNTER — TELEPHONE (OUTPATIENT)
Dept: FAMILY MEDICINE | Facility: CLINIC | Age: 53
End: 2023-02-20
Payer: COMMERCIAL

## 2023-02-22 ENCOUNTER — OFFICE VISIT (OUTPATIENT)
Dept: FAMILY MEDICINE | Facility: CLINIC | Age: 53
End: 2023-02-22
Payer: COMMERCIAL

## 2023-02-22 VITALS
HEIGHT: 64 IN | WEIGHT: 284.38 LBS | BODY MASS INDEX: 48.55 KG/M2 | TEMPERATURE: 99 F | SYSTOLIC BLOOD PRESSURE: 138 MMHG | OXYGEN SATURATION: 98 % | HEART RATE: 89 BPM | DIASTOLIC BLOOD PRESSURE: 84 MMHG

## 2023-02-22 DIAGNOSIS — D80.3 IGG DEFICIENCY: ICD-10-CM

## 2023-02-22 DIAGNOSIS — K58.9 IRRITABLE BOWEL SYNDROME, UNSPECIFIED TYPE: ICD-10-CM

## 2023-02-22 DIAGNOSIS — Z00.00 ROUTINE MEDICAL EXAM: Primary | ICD-10-CM

## 2023-02-22 DIAGNOSIS — E55.9 VITAMIN D DEFICIENCY: ICD-10-CM

## 2023-02-22 DIAGNOSIS — C7A.020 MALIGNANT CARCINOID TUMOR OF APPENDIX: ICD-10-CM

## 2023-02-22 DIAGNOSIS — E66.01 MORBID OBESITY: ICD-10-CM

## 2023-02-22 DIAGNOSIS — R42 VERTIGO: ICD-10-CM

## 2023-02-22 DIAGNOSIS — E03.4 HYPOTHYROIDISM DUE TO ACQUIRED ATROPHY OF THYROID: ICD-10-CM

## 2023-02-22 DIAGNOSIS — Z12.11 SCREENING FOR COLORECTAL CANCER: ICD-10-CM

## 2023-02-22 DIAGNOSIS — Z12.12 SCREENING FOR COLORECTAL CANCER: ICD-10-CM

## 2023-02-22 DIAGNOSIS — I10 ESSENTIAL HYPERTENSION: ICD-10-CM

## 2023-02-22 DIAGNOSIS — G90.A POTS (POSTURAL ORTHOSTATIC TACHYCARDIA SYNDROME): Chronic | ICD-10-CM

## 2023-02-22 DIAGNOSIS — K76.0 FATTY LIVER DISEASE, NONALCOHOLIC: ICD-10-CM

## 2023-02-22 DIAGNOSIS — G50.0 TRIGEMINAL NEURALGIA OF RIGHT SIDE OF FACE: ICD-10-CM

## 2023-02-22 DIAGNOSIS — N20.0 NEPHROLITHIASIS: ICD-10-CM

## 2023-02-22 DIAGNOSIS — Z12.31 ENCOUNTER FOR SCREENING MAMMOGRAM FOR BREAST CANCER: ICD-10-CM

## 2023-02-22 PROCEDURE — 3075F PR MOST RECENT SYSTOLIC BLOOD PRESS GE 130-139MM HG: ICD-10-PCS | Mod: CPTII,S$GLB,, | Performed by: INTERNAL MEDICINE

## 2023-02-22 PROCEDURE — 3079F PR MOST RECENT DIASTOLIC BLOOD PRESSURE 80-89 MM HG: ICD-10-PCS | Mod: CPTII,S$GLB,, | Performed by: INTERNAL MEDICINE

## 2023-02-22 PROCEDURE — 3008F PR BODY MASS INDEX (BMI) DOCUMENTED: ICD-10-PCS | Mod: CPTII,S$GLB,, | Performed by: INTERNAL MEDICINE

## 2023-02-22 PROCEDURE — 1159F PR MEDICATION LIST DOCUMENTED IN MEDICAL RECORD: ICD-10-PCS | Mod: CPTII,S$GLB,, | Performed by: INTERNAL MEDICINE

## 2023-02-22 PROCEDURE — 1159F MED LIST DOCD IN RCRD: CPT | Mod: CPTII,S$GLB,, | Performed by: INTERNAL MEDICINE

## 2023-02-22 PROCEDURE — 99396 PREV VISIT EST AGE 40-64: CPT | Mod: S$GLB,,, | Performed by: INTERNAL MEDICINE

## 2023-02-22 PROCEDURE — 3075F SYST BP GE 130 - 139MM HG: CPT | Mod: CPTII,S$GLB,, | Performed by: INTERNAL MEDICINE

## 2023-02-22 PROCEDURE — 3079F DIAST BP 80-89 MM HG: CPT | Mod: CPTII,S$GLB,, | Performed by: INTERNAL MEDICINE

## 2023-02-22 PROCEDURE — 99999 PR PBB SHADOW E&M-EST. PATIENT-LVL V: ICD-10-PCS | Mod: PBBFAC,,, | Performed by: INTERNAL MEDICINE

## 2023-02-22 PROCEDURE — 3008F BODY MASS INDEX DOCD: CPT | Mod: CPTII,S$GLB,, | Performed by: INTERNAL MEDICINE

## 2023-02-22 PROCEDURE — 99396 PR PREVENTIVE VISIT,EST,40-64: ICD-10-PCS | Mod: S$GLB,,, | Performed by: INTERNAL MEDICINE

## 2023-02-22 PROCEDURE — 99999 PR PBB SHADOW E&M-EST. PATIENT-LVL V: CPT | Mod: PBBFAC,,, | Performed by: INTERNAL MEDICINE

## 2023-02-22 RX ORDER — AMOXICILLIN AND CLAVULANATE POTASSIUM 875; 125 MG/1; MG/1
1 TABLET, FILM COATED ORAL EVERY 12 HOURS
COMMUNITY
Start: 2023-02-20 | End: 2023-04-21 | Stop reason: ALTCHOICE

## 2023-02-22 RX ORDER — ONDANSETRON 4 MG/1
TABLET, FILM COATED ORAL
COMMUNITY
Start: 2022-12-16

## 2023-02-22 RX ORDER — BENZONATATE 100 MG/1
100 CAPSULE ORAL
COMMUNITY
Start: 2023-02-20 | End: 2023-04-21

## 2023-02-22 RX ORDER — EPINEPHRINE 0.3 MG/.3ML
1 INJECTION SUBCUTANEOUS ONCE
Qty: 2 EACH | Refills: 12 | Status: SHIPPED | OUTPATIENT
Start: 2023-02-22 | End: 2024-02-20

## 2023-02-22 RX ORDER — AZITHROMYCIN 250 MG/1
TABLET, FILM COATED ORAL
COMMUNITY
Start: 2022-12-16 | End: 2023-04-21 | Stop reason: ALTCHOICE

## 2023-02-22 RX ORDER — MUPIROCIN 20 MG/G
OINTMENT TOPICAL
COMMUNITY
Start: 2023-02-03 | End: 2023-04-21

## 2023-02-22 RX ORDER — DICYCLOMINE HYDROCHLORIDE 20 MG/1
20 TABLET ORAL 2 TIMES DAILY PRN
COMMUNITY
Start: 2023-01-25

## 2023-02-22 NOTE — PROGRESS NOTES
Chief complaint physical exam      52-year-old white female with irritable bowel syndrome and hypothyroidism.  She also has an immuno deficiency.  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     She is working as an .  She does have difficulty walking 200 ft related to her back pain and so asks about a handicap tag which would be appropriate     She continues to follow with her GYN.  She follows with GI as well    Mammo 2020, she knows she is overdue and blames it on some COVID interval issues but will put in that order and she can schedule it online.      She is going to have her SI joint fusion and has some preop labs to be done at [facility] and so will add her yearly labs to that.  She recently went to urgent ENT and is on steroids and Augmentin for some maxillary sinus pain.  We discussed that the steroids may interfere with her timing of her SI joint fusion and she probably should inform them about that and she will do so.    ROS:   CONST: weight stable. EYES: no vision change. ENT: no sore throat. CV: no chest pain w/ exertion. RESP: no shortness of breath. GI: no nausea, vomiting, diarrhea. No dysphagia. : no urinary issues. MUSCULOSKELETAL: no new myalgias or arthralgias. SKIN: no new changes. NEURO: no focal deficits. PSYCH: no new issues. ENDOCRINE: no polyuria. HEME: no lymph nodes. ALLERGY: no general pruritis.    PAST MEDICAL HISTORY:                                                        1. Intersitial cystitis.                                                     2. Fibromyalgia.                                                             3. Mitral valve prolapse.                                                    4. IBS. Dr Rowe , now Dr. Mckeon                                                                 5. L-spine disk bulging.  L4-5 -Dr Singh at                                                    6. Total hysterectomy in 2004.   7.   Hypothyroid.                                                             8.  Incidental carcinoid tumor of the appendix.                              9.  Now on gluten-free diet.                                                 10.  Vitamin D deficiency, followed by outside Rheumatology.                  11.  Neurogenic bladder, followed by outside urologist. Ranjeet Vasquez  12.  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 Trigeminal neuralgia of right side of face  14. POTS (postural orthostatic tachycardia syndrome)  15. HTN  16. sacroiliac joint fusion           PAST SURGICAL HISTORY: Laparotomy x5 Left pelvic mass 12/20/05, pelvic pain 07/31/03, pelvic pain 06/13/02, endometriosis Laparoscopy 10/01,BUBBA and BS&O 04/08/04, Lap cholecystectomy 12/20/05, Appendectomy 12/20/05,Letitia surgery 5/08,Placement of new right InterStim lead and Ablation 1/09, Da Char-assisted lysis of severe adhesions, resection of severe adhesions/possible ovarian remnant.    SOCIAL HISTORY: Never smoked. The patient seldom drinks alcohol. Doesn't use recreational drugs. Lives alone. Single.     FAMILY HISTORY: The patient's family members had Ovarian cancer.No family history of breast cancer. No family history of colon cancer    Vitals as above,   Gen: no distress  EYES: conjunctiva clear, non-icteric, PERRL  ENT: nose clear, nasal mucosa normal, oropharynx clear and moist, teeth good  NECK:supple, thyroid non-palpable  RESP: effort is good, lungs clear  CV: heart RRR w/o murmur, gallops or rubs; no carotid bruits, no edema  GI: abdomen soft, non-distended, non-tender, no hepatosplenomegaly  MS: gait normal, no clubbing or cyanosis of the digits  SKIN: no rashes, warm to touch , skin generally red which she says is all the time and not from sunburn and not from the current steroids.    Kianna was seen today for annual exam.    Diagnoses and all orders for this visit:    Routine medical exam, looks  like colonoscopy will be due 2024, overdue for mammogram which gyn has recommended to her as well.  Will put in the order.  -     Hemoglobin A1C; Future  -     Comprehensive Metabolic Panel; Future  -     Vitamin D; Future  -     TSH; Future  -     T4, Free; Future  -     Lipid Panel; Future  -     CBC Auto Differential; Future  -     Hemoglobin A1C  -     Comprehensive Metabolic Panel  -     Vitamin D  -     TSH  -     T4, Free  -     Lipid Panel  -     CBC Auto Differential    Encounter for screening mammogram for breast cancer  -     Mammo Digital Screening Bilat; Future    Screening for colorectal cancer    Hypothyroidism due to acquired atrophy of thyroid, reassess  -     Hemoglobin A1C; Future  -     Comprehensive Metabolic Panel; Future  -     Vitamin D; Future  -     TSH; Future  -     T4, Free; Future  -     Lipid Panel; Future  -     CBC Auto Differential; Future  -     Hemoglobin A1C  -     Comprehensive Metabolic Panel  -     Vitamin D  -     TSH  -     T4, Free  -     Lipid Panel  -     CBC Auto Differential    Morbid obesity    Essential hypertension, chronic and stable    Vertigo, chronic and stable    Malignant carcinoid tumor of appendix    Nephrolithiasis    IgG deficiency, has appointment with new allergist    POTS (postural orthostatic tachycardia syndrome), follows with Cardiology    Fatty liver disease, nonalcoholic    Trigeminal neuralgia of right side of face    Vitamin D deficiency  -     Vitamin D; Future  -     Vitamin D    Irritable bowel syndrome, unspecified type, follows with GI    Other orders  -     EPINEPHrine (AUVI-Q) 0.3 mg/0.3 mL AtIn; Inject 0.3 mLs (0.3 mg total) into the muscle once. for 1 dose

## 2023-02-24 LAB
25(OH)D3 SERPL-MCNC: 29 NG/ML (ref 30–100)
ALBUMIN SERPL-MCNC: 3.9 G/DL (ref 3.6–5.1)
ALBUMIN/GLOB SERPL: 1.1 (CALC) (ref 1–2.5)
ALP SERPL-CCNC: 111 U/L (ref 37–153)
ALT SERPL-CCNC: 12 U/L (ref 6–29)
AST SERPL-CCNC: 15 U/L (ref 10–35)
BASOPHILS # BLD AUTO: 41 CELLS/UL (ref 0–200)
BASOPHILS NFR BLD AUTO: 0.6 %
BILIRUB SERPL-MCNC: 0.3 MG/DL (ref 0.2–1.2)
BUN SERPL-MCNC: 14 MG/DL (ref 7–25)
BUN/CREAT SERPL: ABNORMAL (CALC) (ref 6–22)
CALCIUM SERPL-MCNC: 9.1 MG/DL (ref 8.6–10.4)
CHLORIDE SERPL-SCNC: 100 MMOL/L (ref 98–110)
CHOLEST SERPL-MCNC: 220 MG/DL
CHOLEST/HDLC SERPL: 3.9 (CALC)
CO2 SERPL-SCNC: 28 MMOL/L (ref 20–32)
CREAT SERPL-MCNC: 0.65 MG/DL (ref 0.5–1.03)
EGFR: 107 ML/MIN/1.73M2
EOSINOPHIL # BLD AUTO: 152 CELLS/UL (ref 15–500)
EOSINOPHIL NFR BLD AUTO: 2.2 %
ERYTHROCYTE [DISTWIDTH] IN BLOOD BY AUTOMATED COUNT: 13.6 % (ref 11–15)
GLOBULIN SER CALC-MCNC: 3.5 G/DL (CALC) (ref 1.9–3.7)
GLUCOSE SERPL-MCNC: 76 MG/DL (ref 65–99)
HBA1C MFR BLD: 5.4 % OF TOTAL HGB
HCT VFR BLD AUTO: 41.6 % (ref 35–45)
HDLC SERPL-MCNC: 57 MG/DL
HGB BLD-MCNC: 14 G/DL (ref 11.7–15.5)
LDLC SERPL CALC-MCNC: 121 MG/DL (CALC)
LYMPHOCYTES # BLD AUTO: 1663 CELLS/UL (ref 850–3900)
LYMPHOCYTES NFR BLD AUTO: 24.1 %
MCH RBC QN AUTO: 29 PG (ref 27–33)
MCHC RBC AUTO-ENTMCNC: 33.7 G/DL (ref 32–36)
MCV RBC AUTO: 86.1 FL (ref 80–100)
MONOCYTES # BLD AUTO: 469 CELLS/UL (ref 200–950)
MONOCYTES NFR BLD AUTO: 6.8 %
NEUTROPHILS # BLD AUTO: 4575 CELLS/UL (ref 1500–7800)
NEUTROPHILS NFR BLD AUTO: 66.3 %
NONHDLC SERPL-MCNC: 163 MG/DL (CALC)
PLATELET # BLD AUTO: 250 THOUSAND/UL (ref 140–400)
PMV BLD REES-ECKER: 8.8 FL (ref 7.5–12.5)
POTASSIUM SERPL-SCNC: 3.4 MMOL/L (ref 3.5–5.3)
PROT SERPL-MCNC: 7.4 G/DL (ref 6.1–8.1)
RBC # BLD AUTO: 4.83 MILLION/UL (ref 3.8–5.1)
SODIUM SERPL-SCNC: 140 MMOL/L (ref 135–146)
T4 FREE SERPL-MCNC: 1 NG/DL (ref 0.8–1.8)
TRIGL SERPL-MCNC: 276 MG/DL
TSH SERPL-ACNC: 10.32 MIU/L
WBC # BLD AUTO: 6.9 THOUSAND/UL (ref 3.8–10.8)

## 2023-02-28 ENCOUNTER — PATIENT MESSAGE (OUTPATIENT)
Dept: FAMILY MEDICINE | Facility: CLINIC | Age: 53
End: 2023-02-28
Payer: COMMERCIAL

## 2023-02-28 DIAGNOSIS — E03.4 HYPOTHYROIDISM DUE TO ACQUIRED ATROPHY OF THYROID: Primary | ICD-10-CM

## 2023-03-01 RX ORDER — LEVOTHYROXINE SODIUM 25 UG/1
25 TABLET ORAL
Qty: 30 TABLET | Refills: 11 | Status: SHIPPED | OUTPATIENT
Start: 2023-03-01 | End: 2023-05-23

## 2023-04-05 ENCOUNTER — TELEPHONE (OUTPATIENT)
Dept: ALLERGY | Facility: CLINIC | Age: 53
End: 2023-04-05
Payer: COMMERCIAL

## 2023-04-05 NOTE — TELEPHONE ENCOUNTER
Contacted patient to assist with reschedulling her appointment, janeseint informed me she has had major back surgery and unable to come into the Office for a visit, she has scheduled a Virtual appointment for tomorrow at 10:20am

## 2023-04-10 ENCOUNTER — DOCUMENTATION ONLY (OUTPATIENT)
Dept: ALLERGY | Facility: CLINIC | Age: 53
End: 2023-04-10

## 2023-04-21 ENCOUNTER — OFFICE VISIT (OUTPATIENT)
Dept: ALLERGY | Facility: CLINIC | Age: 53
End: 2023-04-21
Payer: COMMERCIAL

## 2023-04-21 DIAGNOSIS — D83.9 COMMON VARIABLE IMMUNODEFICIENCY: Primary | ICD-10-CM

## 2023-04-21 DIAGNOSIS — Z51.81 THERAPEUTIC DRUG MONITORING: ICD-10-CM

## 2023-04-21 PROCEDURE — 3044F PR MOST RECENT HEMOGLOBIN A1C LEVEL <7.0%: ICD-10-PCS | Mod: CPTII,95,, | Performed by: STUDENT IN AN ORGANIZED HEALTH CARE EDUCATION/TRAINING PROGRAM

## 2023-04-21 PROCEDURE — 99214 OFFICE O/P EST MOD 30 MIN: CPT | Mod: 95,,, | Performed by: STUDENT IN AN ORGANIZED HEALTH CARE EDUCATION/TRAINING PROGRAM

## 2023-04-21 PROCEDURE — 1160F RVW MEDS BY RX/DR IN RCRD: CPT | Mod: CPTII,95,, | Performed by: STUDENT IN AN ORGANIZED HEALTH CARE EDUCATION/TRAINING PROGRAM

## 2023-04-21 PROCEDURE — 3044F HG A1C LEVEL LT 7.0%: CPT | Mod: CPTII,95,, | Performed by: STUDENT IN AN ORGANIZED HEALTH CARE EDUCATION/TRAINING PROGRAM

## 2023-04-21 PROCEDURE — 1159F MED LIST DOCD IN RCRD: CPT | Mod: CPTII,95,, | Performed by: STUDENT IN AN ORGANIZED HEALTH CARE EDUCATION/TRAINING PROGRAM

## 2023-04-21 PROCEDURE — 1160F PR REVIEW ALL MEDS BY PRESCRIBER/CLIN PHARMACIST DOCUMENTED: ICD-10-PCS | Mod: CPTII,95,, | Performed by: STUDENT IN AN ORGANIZED HEALTH CARE EDUCATION/TRAINING PROGRAM

## 2023-04-21 PROCEDURE — 1159F PR MEDICATION LIST DOCUMENTED IN MEDICAL RECORD: ICD-10-PCS | Mod: CPTII,95,, | Performed by: STUDENT IN AN ORGANIZED HEALTH CARE EDUCATION/TRAINING PROGRAM

## 2023-04-21 PROCEDURE — 99214 PR OFFICE/OUTPT VISIT, EST, LEVL IV, 30-39 MIN: ICD-10-PCS | Mod: 95,,, | Performed by: STUDENT IN AN ORGANIZED HEALTH CARE EDUCATION/TRAINING PROGRAM

## 2023-04-21 RX ORDER — HYDROXYZINE HYDROCHLORIDE 25 MG/1
25 TABLET, FILM COATED ORAL 3 TIMES DAILY PRN
Qty: 45 TABLET | Refills: 1 | Status: SHIPPED | OUTPATIENT
Start: 2023-04-21

## 2023-04-21 NOTE — PROGRESS NOTES
Allergy Telehealth Note  [facility]    The patient location is: [place]  The chief complaint leading to consultation is: immune deficiency    Visit type: audiovisual    Face to Face time with patient: 7 min    Aprox 24 minutes of total time spent on the encounter, which includes face to face time and non-face to face time preparing to see the patient (eg, review of tests), Obtaining and/or reviewing separately obtained history, Documenting clinical information in the electronic or other health record, Independently interpreting results (not separately reported) and communicating results to the patient/family/caregiver, or Care coordination (not separately reported).     Each patient to whom he or she provides medical services by telemedicine is:  (1) informed of the relationship between the physician and patient and the respective role of any other health care provider with respect to management of the patient; and (2) notified that he or she may decline to receive medical services by telemedicine and may withdraw from such care at any time.      This note was created by combination of typed  and M-Modal dictation. Transcription errors may be present.  If there are any questions, please contact me.    Subjective:      Chief Complaint: No chief complaint on file.    Allergy Problem List  Common variable immunodeficiency          On Hizentra since June 2018,  200 mg/kg weekly  Nonallergic rhinitis         normal CT sinus    History of Present Illness: Kianna Zuleta is a 52 y.o. female with common variable immunodeficiency (CVID) was last seen 6/17/2021.  She is seen by televisit today to decide whether or or not to renew her SQ immunoglobulin therapy.    Related medications  Hizentra 10gram/50ml (20% soln):  200mg/kg every week.          Started June 2018  Auvi-Q epi if needed  Albuterol MDI as needed  DuoNeb as needed   (beta-blocker)    04/21/2023:  Client reports 3-4 episodes  sinusitis associated with low-grade fever in the  range.  No history of high fever or rigors.  No episodes of pneumonia or otitis.  She does report fever following back surgery specifically coulee placement of spacers, which she says is complicated by a collection of fluid on her back.  She reports that her Hizentra causes some itching and she requests a refill of Atarax.  Overall, she remains happy with Hizentra and would like to continue.  Ordered IgG and lymphocyte subsets.  After reviewing labs, will reorder Hizentra    5/26/2022:  Client reports no pneumonia or other serious infections since last visit.  She does report 2 episodes of sinusitis treated at ENT with antibiotics.  She says 1 of the episodes was complicated by vertigo.  She denies COVID or other infections.  She says her Hizentra treatments are complicated by some nausea and burning, and itching but that this is tolerable.  Pt did not get labs (including IgG) drawn following last visit.      6/17/2021:  She reported no definite infections in the previous 12 months.  She had 1 episode of vertigo of uncertain etiology for which she may been given antibiotics at ENT.  She had had no episodes of sinusitis, bronchitis, or pneumonia. She has had no adverse reactions to her Hizentra or problems with delivery from [facility].    5/29/2020:  Client was continuing to do well on Hizentra.  She reported one febrile illness (COVID negative) but no recent sinus infections or antibiotics.  She is very happy with her improvement on immunoglobulin replacement and would like to continue.      Additional History:   Interval history is unremarkable.   Past medical history is significant for malignant carcinoid tumor of the appendix, hypertension, and thyroid disease.  She is status post sinus surgery.  She has not had tonsillectomy, adenoidectomy or PE tubes.  Family history is significant for hypertension.  Client reports that she has never smoked. She has never  used smokeless tobacco.  Exposures are notable for elementary age schoolchildren and shelly books.  No exposure to pets, smoke, molds, or unusual substances.   Past medical, family, and social histories are unchanged.      Patient Active Problem List   Diagnosis    Vitamin D deficiency    Hypothyroidism    Irritable bowel syndrome    Elevated alkaline phosphatase level    Malignant carcinoid tumor of appendix    Fever    Elevated sed rate    CRP elevated    Hypertension    Morbid obesity    Abnormal CT of the chest    Trigeminal neuralgia of right side of face    IgG deficiency    POTS (postural orthostatic tachycardia syndrome)    Fatty liver disease, nonalcoholic    COVID     Current Outpatient Medications on File Prior to Visit   Medication Sig Dispense Refill    albuterol (PROVENTIL) 2.5 mg /3 mL (0.083 %) nebulizer solution USE 3 MLS BY NEBULIZATION EVERY 6 HOURS AS NEEDED FOR WHEEZING. RESCUE. 180 mL 12    amoxicillin-clavulanate 875-125mg (AUGMENTIN) 875-125 mg per tablet Take 1 tablet by mouth every 12 (twelve) hours.      azelastine (ASTELIN) 137 mcg (0.1 %) nasal spray 1 spray by Nasal route.      azithromycin (Z-JARAD) 250 MG tablet       benzonatate (TESSALON) 100 MG capsule Take 100 mg by mouth.      cetirizine (ZYRTEC) 10 MG tablet Take 10 mg by mouth once daily.      cloNIDine (CATAPRES) 0.1 MG tablet Take 0.1 mg by mouth daily as needed. Take as needed for BP > 150/100 mmHg per outside cardiologist      diclofenac sodium (VOLTAREN) 1 % Gel Apply 2 g topically once daily. 100 g 3    dicyclomine (BENTYL) 20 mg tablet Take 20 mg by mouth 2 (two) times daily as needed.      doxycycline (VIBRA-TABS) 100 MG tablet Take 1 tablet (100 mg total) by mouth 2 (two) times daily. 20 tablet 0    EPINEPHrine (AUVI-Q) 0.3 mg/0.3 mL AtIn Inject 0.3 mLs (0.3 mg total) into the muscle once. for 1 dose 2 each 12    ergocalciferol (ERGOCALCIFEROL) 50,000 unit Cap Take 1 capsule (50,000 Units total) by mouth every 7 days. 12  capsule 3    estradioL (ESTRACE) 2 MG tablet Take 1 tablet (2 mg total) by mouth once daily. 90 tablet 3    fluticasone propionate (FLONASE) 50 mcg/actuation nasal spray 1 spray (50 mcg total) by Each Nostril route once daily. 15.8 mL 0    gabapentin (NEURONTIN) 300 MG capsule Take 600 mg by mouth 2 (two) times daily. 2-300 mg cap in AM and 3-300 mg cap in PM      hyoscyamine (LEVBID) 0.375 mg Tb12 Take 0.375 mg by mouth daily as needed.      immun glob G,IgG,-pro-IgA 0-50 (HIZENTRA) 10 gram/50 mL (20 %) Soln Inject 5 mLs (1 g total) into the skin every 7 days. 4 vial 5    indapamide (LOZOL) 1.25 MG Tab TAKE 1 TABLET BY MOUTH DAILY 30 tablet 1    levocetirizine (XYZAL) 5 MG tablet TK 1 T PO  ONCE Day  9    levothyroxine (SYNTHROID) 25 MCG tablet Take 1 tablet (25 mcg total) by mouth before breakfast. 30 tablet 11    LIDOcaine-prilocaine (EMLA) cream Apply topically as needed (apply to infusion site). 30 g 0    meclizine (ANTIVERT) 25 mg tablet Take 1 tablet (25 mg total) by mouth 3 (three) times daily as needed for Dizziness. 30 tablet 0    methylPREDNISolone (MEDROL DOSEPACK) 4 mg tablet use as directed 1 each 0    metoprolol tartrate (LOPRESSOR) 50 MG tablet Take 50 mg by mouth 3 (three) times daily.      montelukast (SINGULAIR) 10 mg tablet 10 mg once daily.       mupirocin (BACTROBAN) 2 % ointment APPLY TO NASAL MUCOSA 1 TIME EVERY DAY 7 DAYS PRIOR TO SURGERY.      ondansetron (ZOFRAN) 4 MG tablet       ondansetron (ZOFRAN-ODT) 4 MG TbDL Take 1 tablet (4 mg total) by mouth 2 (two) times daily. 12 tablet 0    oxyCODONE-acetaminophen (PERCOCET)  mg per tablet TK 1 T PO Q 6 H PRN  0    QUEtiapine (SEROQUEL) 50 MG tablet TK 1 T PO Q NIGHT      tiZANidine 4 mg Cap TK 2 TO 3 C XD PO TID WF  0    TRINTELLIX 20 mg Tab TK 1 T PO QAM      VENTOLIN HFA 90 mcg/actuation inhaler INHALE 2 PUFFS INTO LUNGS EVERY 6 HOURS AS NEEDED FOR WHEEZING RESCUE 18 g 12    vitamin D (VITAMIN D3) 1000 units Tab Take 1,000 Units by  mouth once daily.      zaleplon (SONATA) 10 MG capsule Take 10 mg by mouth once daily.  2    [DISCONTINUED] hydrOXYzine HCL (ATARAX) 25 MG tablet Take 1 tablet (25 mg total) by mouth 3 (three) times daily. 45 tablet 1     No current facility-administered medications on file prior to visit.         Review of Systems   HENT:  Positive for congestion. Negative for nosebleeds.    Eyes:  Positive for redness. Negative for photophobia and discharge.   Respiratory:  Positive for cough and wheezing. Negative for shortness of breath.    Musculoskeletal: Negative.    Skin:  Negative for rash.   Answers submitted by the patient for this visit:  Allergy and Asthma Questionnaire  (Submitted on 4/20/2023)  postnasal drip: Yes  sneezing: Yes  runny nose: Yes  eye itching: Yes  apnea: No  choking: No  chest tightness: No  color change : No    Objective:   There were no vitals taken for this visit.      Physical Exam   Awake and alert  No respiratory distress  Speech fluent and logical    Data:   2022 labs  IgG 1161 on replacement  CMP notable for albumin 3.3 with normal LFTs    IgG 600 (01/25/2018)  Poor response to pneumococcal vaccines x2    Immunoglobulins before replacement  Component      Latest Ref Rng & Units 1/22/2018 1/11/2018   IgG - Serum      650 - 1600 mg/dL 600 (L) 557 (L)   IgA      40 - 350 mg/dL 343 347   IgM      50 - 300 mg/dL 78 90       Pneumococcal titers  Component      Latest Ref Rng & Units 4/6/2018 3/1/2018   S.pneumoniae Type 1      mcg/mL 1.2 1.1   S.pneumoniae Type 3      mcg/mL 3.5 3.3   Strep pneumo Type 4      mcg/mL <0.3 <0.3   S.pneumoniae Type 5      mcg/mL <0.3 <0.3   S.pneumoniae Type 8      mcg/mL <0.3 <0.3   S.pneumoniae Type 9N      mcg/mL 1.0 0.8   S.pneumoniae Type 12F      mcg/mL 0.3 <0.3   Strep pneumo Type 14      mcg/mL 0.4 0.5   S.pneumoniae Type 19F      mcg/mL 0.6 0.6   S.pneumoniae Type 23F      mcg/mL <0.3 <0.3   S.pneumoniae Type 6B      mcg/mL 0.6 0.9   S.pneumoniae Type 7F       "mcg/mL <0.3 0.3   S.pneumoniae Type 18C      mcg/mL <0.3 <0.3   S.pneumoniae Type 9V Abs      mcg/mL <0.3 <0.3   Status post pneumococcal polysaccharide vaccine 01/22/2018  Status post pneumococcal protein conjugated vaccine 03/16/2018    Other immune titers  Normal diphtheria and Hib titers (01/22/2018  Low rubella IgG (120 to/2018), now normal (04/06/2018)  Normal varicella IgG (01/22/2018    Lymphocyte subsets (04/06/2018)  notable for low B cells at 61 (normal 100-500).  She also had decrease number and proportion of NK cell at (41, normal ).  CD4 and CD8 T-cell numbers were normal    Other labs  CBC unremarkable (11/02/2018) with Eo count 100  IgG subsets (01/11/2018) low IgG 1, IgG 2, and IgG 3    Aeroallergen testing by the Immunocap method (122/2018) was entirely negative.  Total serum IgE < 35     Chest x-ray (PA and lateral, 10/24/2017):  Normal    CT sinus (01/22/2018): "Unremarkable noncontrast CT of the paranasal sinuses without significant paranasal sinus opacification or air-fluid level."         Assessment:     1. Common variable immunodeficiency    2. Therapeutic drug monitoring          Plan:     Medical decision making:  Ms. Zuleta's common variable immune deficiency continue to be adequately controlled on replacement IgG.  Her bouts of sinusitis may have been viral in nature.  We decided to continue Hizentra.  Await IgG level before deciding on dose.  Also checking interval number of B-cell lymphocytes    CVID  Need for prophylactic immunotherapy  If IgG nad LFTs acceptable, renew authorization for Hizentra at same dose.  200mg/kg --> 1 gram SQ every 7 days.  Continue with Bioscripts.        Patient Instructions   Testing  Blood work for allergy testing today       Check [facility] in one week for results or call [phone]       Contact me with questions or concerns       I will contact you if anything needs immediate attention.        Treatment    If labs look good, will reorder " Hizentra    Refilled hydroxyzine (Atarax) as needed for itching.        Follow up in person in 6 months    Follow up in about 6 months (around 10/21/2023).    Kaylin Miller MD       .

## 2023-04-21 NOTE — PATIENT INSTRUCTIONS
Testing  Blood work for allergy testing today       Check [facility] in one week for results or call [phone]       Contact me with questions or concerns       I will contact you if anything needs immediate attention.        Treatment    If labs look good, will reorder Hizentra    Refilled hydroxyzine (Atarax) as needed for itching.        Follow up in person in 6 months

## 2023-04-27 ENCOUNTER — TELEPHONE (OUTPATIENT)
Dept: ALLERGY | Facility: CLINIC | Age: 53
End: 2023-04-27
Payer: COMMERCIAL

## 2023-04-27 NOTE — TELEPHONE ENCOUNTER
----- Message from Mundo Ontiveros sent at 4/27/2023 10:40 AM CDT -----  Contact: @[phone]  Caller is calling in stating that a fax was sent over for prescription immun glob G,IgG,-pro-IgA 0-50 (HIZENTRA) 10 gram/50 mL (20 %) Soln) to be refilled. Please call to discuss further. Fax [phone]

## 2023-04-27 NOTE — TELEPHONE ENCOUNTER
"Rx orders signed by Dr. Miller and faxed back to [facility] with [facility]. Form sent to scanning.      Your fax has been successfully sent to [phone] at [phone].  ------------------------------------------------------------  From: [id number]  ------------------------------------------------------------  4/27/2023 3:29:19 PM Transmission Record          Sent to [phone] with remote ID "[id number]"          Result: (0/339;0/0) Success          Page record: 1 - 5          Elapsed time: 01:41 on channel 60  "

## 2023-05-19 ENCOUNTER — LAB VISIT (OUTPATIENT)
Dept: LAB | Facility: HOSPITAL | Age: 53
End: 2023-05-19
Attending: INTERNAL MEDICINE
Payer: COMMERCIAL

## 2023-05-19 ENCOUNTER — PATIENT MESSAGE (OUTPATIENT)
Dept: FAMILY MEDICINE | Facility: CLINIC | Age: 53
End: 2023-05-19
Payer: COMMERCIAL

## 2023-05-19 DIAGNOSIS — E03.4 HYPOTHYROIDISM DUE TO ACQUIRED ATROPHY OF THYROID: ICD-10-CM

## 2023-05-19 DIAGNOSIS — E03.4 HYPOTHYROIDISM DUE TO ACQUIRED ATROPHY OF THYROID: Primary | ICD-10-CM

## 2023-05-19 DIAGNOSIS — Z51.81 THERAPEUTIC DRUG MONITORING: ICD-10-CM

## 2023-05-19 DIAGNOSIS — D83.9 COMMON VARIABLE IMMUNODEFICIENCY: ICD-10-CM

## 2023-05-19 LAB
ALBUMIN SERPL BCP-MCNC: 3.4 G/DL (ref 3.5–5.2)
ALBUMIN SERPL BCP-MCNC: 3.4 G/DL (ref 3.5–5.2)
ALP SERPL-CCNC: 139 U/L (ref 55–135)
ALP SERPL-CCNC: 139 U/L (ref 55–135)
ALT SERPL W/O P-5'-P-CCNC: 10 U/L (ref 10–44)
ALT SERPL W/O P-5'-P-CCNC: 10 U/L (ref 10–44)
ANION GAP SERPL CALC-SCNC: 13 MMOL/L (ref 8–16)
ANION GAP SERPL CALC-SCNC: 13 MMOL/L (ref 8–16)
AST SERPL-CCNC: 17 U/L (ref 10–40)
AST SERPL-CCNC: 17 U/L (ref 10–40)
BILIRUB SERPL-MCNC: 0.2 MG/DL (ref 0.1–1)
BILIRUB SERPL-MCNC: 0.2 MG/DL (ref 0.1–1)
BUN SERPL-MCNC: 12 MG/DL (ref 6–20)
BUN SERPL-MCNC: 12 MG/DL (ref 6–20)
CALCIUM SERPL-MCNC: 9.6 MG/DL (ref 8.7–10.5)
CALCIUM SERPL-MCNC: 9.6 MG/DL (ref 8.7–10.5)
CHLORIDE SERPL-SCNC: 102 MMOL/L (ref 95–110)
CHLORIDE SERPL-SCNC: 102 MMOL/L (ref 95–110)
CO2 SERPL-SCNC: 25 MMOL/L (ref 23–29)
CO2 SERPL-SCNC: 25 MMOL/L (ref 23–29)
CREAT SERPL-MCNC: 0.8 MG/DL (ref 0.5–1.4)
CREAT SERPL-MCNC: 0.8 MG/DL (ref 0.5–1.4)
EST. GFR  (NO RACE VARIABLE): >60 ML/MIN/1.73 M^2
EST. GFR  (NO RACE VARIABLE): >60 ML/MIN/1.73 M^2
GLUCOSE SERPL-MCNC: 90 MG/DL (ref 70–110)
GLUCOSE SERPL-MCNC: 90 MG/DL (ref 70–110)
IGG SERPL-MCNC: 981 MG/DL (ref 650–1600)
POTASSIUM SERPL-SCNC: 3.8 MMOL/L (ref 3.5–5.1)
POTASSIUM SERPL-SCNC: 3.8 MMOL/L (ref 3.5–5.1)
PROT SERPL-MCNC: 7.7 G/DL (ref 6–8.4)
PROT SERPL-MCNC: 7.7 G/DL (ref 6–8.4)
SODIUM SERPL-SCNC: 140 MMOL/L (ref 136–145)
SODIUM SERPL-SCNC: 140 MMOL/L (ref 136–145)
T4 FREE SERPL-MCNC: 0.88 NG/DL (ref 0.71–1.51)
TSH SERPL DL<=0.005 MIU/L-ACNC: 13.41 UIU/ML (ref 0.4–4)

## 2023-05-19 PROCEDURE — 80053 COMPREHEN METABOLIC PANEL: CPT | Performed by: STUDENT IN AN ORGANIZED HEALTH CARE EDUCATION/TRAINING PROGRAM

## 2023-05-19 PROCEDURE — 86357 NK CELLS TOTAL COUNT: CPT | Performed by: STUDENT IN AN ORGANIZED HEALTH CARE EDUCATION/TRAINING PROGRAM

## 2023-05-19 PROCEDURE — 84439 ASSAY OF FREE THYROXINE: CPT | Performed by: INTERNAL MEDICINE

## 2023-05-19 PROCEDURE — 36415 COLL VENOUS BLD VENIPUNCTURE: CPT | Mod: PN | Performed by: INTERNAL MEDICINE

## 2023-05-19 PROCEDURE — 86359 T CELLS TOTAL COUNT: CPT | Performed by: STUDENT IN AN ORGANIZED HEALTH CARE EDUCATION/TRAINING PROGRAM

## 2023-05-19 PROCEDURE — 84443 ASSAY THYROID STIM HORMONE: CPT | Performed by: INTERNAL MEDICINE

## 2023-05-19 PROCEDURE — 86355 B CELLS TOTAL COUNT: CPT | Performed by: STUDENT IN AN ORGANIZED HEALTH CARE EDUCATION/TRAINING PROGRAM

## 2023-05-19 PROCEDURE — 82784 ASSAY IGA/IGD/IGG/IGM EACH: CPT | Performed by: STUDENT IN AN ORGANIZED HEALTH CARE EDUCATION/TRAINING PROGRAM

## 2023-05-19 PROCEDURE — 86360 T CELL ABSOLUTE COUNT/RATIO: CPT | Performed by: STUDENT IN AN ORGANIZED HEALTH CARE EDUCATION/TRAINING PROGRAM

## 2023-05-22 LAB
CD3+CD4+ CELLS # BLD: 943 CELLS/UL (ref 300–1400)
CD3+CD4+ CELLS NFR BLD: 49.5 % (ref 28–57)
LYMPHOCYTES NFR CSF MANUAL: 1.23 % (ref 0.9–3.6)
LYMPHOCYTES NFR CSF MANUAL: 110 CELLS/UL (ref 100–500)
LYMPHOCYTES NFR CSF MANUAL: 1737 CELLS/UL (ref 700–2100)
LYMPHOCYTES NFR CSF MANUAL: 3.2 % (ref 7–31)
LYMPHOCYTES NFR CSF MANUAL: 40.2 % (ref 10–39)
LYMPHOCYTES NFR CSF MANUAL: 55 CELLS/UL (ref 90–600)
LYMPHOCYTES NFR CSF MANUAL: 6.4 % (ref 6–19)
LYMPHOCYTES NFR CSF MANUAL: 765 CELLS/UL (ref 200–900)
LYMPHOCYTES NFR CSF MANUAL: 91.2 % (ref 55–83)

## 2023-05-23 RX ORDER — LEVOTHYROXINE SODIUM 50 UG/1
50 TABLET ORAL
Qty: 90 TABLET | Refills: 11 | Status: SHIPPED | OUTPATIENT
Start: 2023-05-23 | End: 2023-09-18 | Stop reason: SDUPTHER

## 2023-05-29 DIAGNOSIS — D83.9 COMMON VARIABLE IMMUNODEFICIENCY: ICD-10-CM

## 2023-05-29 NOTE — TELEPHONE ENCOUNTER
----- Message from Kaylin Miller MD sent at 5/23/2023  1:12 PM CDT -----  Regarding: Is it time for Ig renewal?    Lauri Nair,    I just got labs back on Kianna Zuleta.  This usually means it's time to renew her Ig authorization.  Is it?    Kaylin

## 2023-05-30 RX ORDER — HUMAN IMMUNOGLOBULIN G 0.2 G/ML
1 LIQUID SUBCUTANEOUS
Qty: 4 EACH | Refills: 5 | Status: SHIPPED | OUTPATIENT
Start: 2023-05-30

## 2023-06-23 NOTE — TELEPHONE ENCOUNTER
Please advise  
Please advise.    I have uploaded Auvi-q for patient.  
Normal rate, regular rhythm.  Heart sounds S1, S2.  No murmurs, rubs or gallops.

## 2023-08-04 ENCOUNTER — TELEPHONE (OUTPATIENT)
Dept: ALLERGY | Facility: CLINIC | Age: 53
End: 2023-08-04
Payer: COMMERCIAL

## 2023-08-04 NOTE — TELEPHONE ENCOUNTER
----- Message from Elizabeth A Bosworth, LPN sent at 7/7/2023 11:52 AM CDT -----  Regarding: FW: Is it time for Ig renewal?  Please call pt and schedule f/u with Dr. Miller  ----- Message -----  From: Kaylin Miller MD  Sent: 6/30/2023  12:56 PM CDT  To: Elizabeth A Bosworth, LPN  Subject: FW: Is it time for Ig renewal?                     ----- Message -----  From: Kaylin Miller MD  Sent: 5/23/2023   1:13 PM CDT  To: Kaylin Miller MD, Elizabeth A Bosworth, LPN  Subject: Is it time for Ig renewal?                         Lauri Nair,    I just got labs back on Kianna Zuleta.  This usually means it's time to renew her Ig authorization.  Is it?    Kaylin

## 2023-08-04 NOTE — TELEPHONE ENCOUNTER
Daria pt to assist with scheduling an appt she advised me that she had already seen the provider an her IGG was already renewed.

## 2023-08-30 ENCOUNTER — PATIENT MESSAGE (OUTPATIENT)
Dept: FAMILY MEDICINE | Facility: CLINIC | Age: 53
End: 2023-08-30
Payer: COMMERCIAL

## 2023-08-30 DIAGNOSIS — E03.4 HYPOTHYROIDISM DUE TO ACQUIRED ATROPHY OF THYROID: Primary | ICD-10-CM

## 2023-08-31 NOTE — TELEPHONE ENCOUNTER
Please advise,      Dr. Celis,     Do I need to schedule an appointment with you to get my thyroid checked. Just wanting to see if its at the correct level yet.  Thanks  Kianna Zuleta

## 2023-09-12 ENCOUNTER — LAB VISIT (OUTPATIENT)
Dept: LAB | Facility: HOSPITAL | Age: 53
End: 2023-09-12
Payer: COMMERCIAL

## 2023-09-12 DIAGNOSIS — E03.4 HYPOTHYROIDISM DUE TO ACQUIRED ATROPHY OF THYROID: ICD-10-CM

## 2023-09-12 LAB
T3 SERPL-MCNC: 121 NG/DL (ref 60–180)
T4 FREE SERPL-MCNC: 0.81 NG/DL (ref 0.71–1.51)
TSH SERPL DL<=0.005 MIU/L-ACNC: 6.52 UIU/ML (ref 0.4–4)

## 2023-09-12 PROCEDURE — 84439 ASSAY OF FREE THYROXINE: CPT | Performed by: NURSE PRACTITIONER

## 2023-09-12 PROCEDURE — 84443 ASSAY THYROID STIM HORMONE: CPT | Performed by: NURSE PRACTITIONER

## 2023-09-12 PROCEDURE — 36415 COLL VENOUS BLD VENIPUNCTURE: CPT | Mod: PO | Performed by: NURSE PRACTITIONER

## 2023-09-12 PROCEDURE — 84480 ASSAY TRIIODOTHYRONINE (T3): CPT | Performed by: NURSE PRACTITIONER

## 2023-09-13 ENCOUNTER — PATIENT MESSAGE (OUTPATIENT)
Dept: FAMILY MEDICINE | Facility: CLINIC | Age: 53
End: 2023-09-13
Payer: COMMERCIAL

## 2023-09-13 DIAGNOSIS — I10 ESSENTIAL HYPERTENSION: Primary | ICD-10-CM

## 2023-09-13 DIAGNOSIS — E03.4 HYPOTHYROIDISM DUE TO ACQUIRED ATROPHY OF THYROID: ICD-10-CM

## 2023-09-18 RX ORDER — LEVOTHYROXINE SODIUM 75 UG/1
75 TABLET ORAL
Qty: 90 TABLET | Refills: 3 | Status: SHIPPED | OUTPATIENT
Start: 2023-09-18

## 2023-09-28 RX ORDER — ALBUTEROL SULFATE 0.83 MG/ML
SOLUTION RESPIRATORY (INHALATION)
Qty: 180 ML | Refills: 12 | OUTPATIENT
Start: 2023-09-28

## 2023-09-28 NOTE — TELEPHONE ENCOUNTER
Refill Decision Note   Kianna Zuleta  is requesting a refill authorization.  Brief Assessment and Rationale for Refill:  Quick Discontinue     Medication Therapy Plan:  Proventil nebulizer solution dc'd by pcp on 4/21/23 for reason Pt no longer taking      Comments:     Note composed:2:37 PM 09/28/2023

## 2023-09-28 NOTE — TELEPHONE ENCOUNTER
No care due was identified.  [facility] Embedded Care Due Messages. Reference number: [id number].   9/28/2023 3:28:15 AM CDT

## 2023-10-04 ENCOUNTER — TELEPHONE (OUTPATIENT)
Dept: ALLERGY | Facility: CLINIC | Age: 53
End: 2023-10-04
Payer: COMMERCIAL

## 2023-10-04 ENCOUNTER — OFFICE VISIT (OUTPATIENT)
Dept: ALLERGY | Facility: CLINIC | Age: 53
End: 2023-10-04
Payer: COMMERCIAL

## 2023-10-04 DIAGNOSIS — D84.89 PRIMARY IMMUNODEFICIENCY DISORDER: Primary | ICD-10-CM

## 2023-10-04 PROBLEM — F41.9 ANXIETY: Status: ACTIVE | Noted: 2021-04-06

## 2023-10-04 PROBLEM — R00.2 PALPITATIONS: Status: ACTIVE | Noted: 2023-10-04

## 2023-10-04 PROBLEM — M54.9 BACK PAIN: Status: ACTIVE | Noted: 2023-10-04

## 2023-10-04 PROBLEM — R07.89 CHEST PAIN, ATYPICAL: Status: ACTIVE | Noted: 2023-10-04

## 2023-10-04 PROBLEM — M79.7 FIBROMYALGIA: Status: ACTIVE | Noted: 2023-10-04

## 2023-10-04 PROBLEM — H92.01 OTALGIA OF RIGHT EAR: Status: ACTIVE | Noted: 2023-10-04

## 2023-10-04 PROBLEM — F32.A DEPRESSION: Status: ACTIVE | Noted: 2023-10-04

## 2023-10-04 PROBLEM — R00.0 SINUS TACHYCARDIA: Status: ACTIVE | Noted: 2023-10-04

## 2023-10-04 PROBLEM — E87.6 HYPOKALEMIA: Status: ACTIVE | Noted: 2020-12-02

## 2023-10-04 PROCEDURE — 4010F ACE/ARB THERAPY RXD/TAKEN: CPT | Mod: CPTII,95,, | Performed by: STUDENT IN AN ORGANIZED HEALTH CARE EDUCATION/TRAINING PROGRAM

## 2023-10-04 PROCEDURE — 4010F PR ACE/ARB THEARPY RXD/TAKEN: ICD-10-PCS | Mod: CPTII,95,, | Performed by: STUDENT IN AN ORGANIZED HEALTH CARE EDUCATION/TRAINING PROGRAM

## 2023-10-04 PROCEDURE — 3044F PR MOST RECENT HEMOGLOBIN A1C LEVEL <7.0%: ICD-10-PCS | Mod: CPTII,95,, | Performed by: STUDENT IN AN ORGANIZED HEALTH CARE EDUCATION/TRAINING PROGRAM

## 2023-10-04 PROCEDURE — 1160F PR REVIEW ALL MEDS BY PRESCRIBER/CLIN PHARMACIST DOCUMENTED: ICD-10-PCS | Mod: CPTII,95,, | Performed by: STUDENT IN AN ORGANIZED HEALTH CARE EDUCATION/TRAINING PROGRAM

## 2023-10-04 PROCEDURE — 3044F HG A1C LEVEL LT 7.0%: CPT | Mod: CPTII,95,, | Performed by: STUDENT IN AN ORGANIZED HEALTH CARE EDUCATION/TRAINING PROGRAM

## 2023-10-04 PROCEDURE — 1159F MED LIST DOCD IN RCRD: CPT | Mod: CPTII,95,, | Performed by: STUDENT IN AN ORGANIZED HEALTH CARE EDUCATION/TRAINING PROGRAM

## 2023-10-04 PROCEDURE — 99214 PR OFFICE/OUTPT VISIT, EST, LEVL IV, 30-39 MIN: ICD-10-PCS | Mod: 95,,, | Performed by: STUDENT IN AN ORGANIZED HEALTH CARE EDUCATION/TRAINING PROGRAM

## 2023-10-04 PROCEDURE — 99214 OFFICE O/P EST MOD 30 MIN: CPT | Mod: 95,,, | Performed by: STUDENT IN AN ORGANIZED HEALTH CARE EDUCATION/TRAINING PROGRAM

## 2023-10-04 PROCEDURE — 1160F RVW MEDS BY RX/DR IN RCRD: CPT | Mod: CPTII,95,, | Performed by: STUDENT IN AN ORGANIZED HEALTH CARE EDUCATION/TRAINING PROGRAM

## 2023-10-04 PROCEDURE — 1159F PR MEDICATION LIST DOCUMENTED IN MEDICAL RECORD: ICD-10-PCS | Mod: CPTII,95,, | Performed by: STUDENT IN AN ORGANIZED HEALTH CARE EDUCATION/TRAINING PROGRAM

## 2023-10-04 RX ORDER — ALPRAZOLAM 0.5 MG/1
.25-.5 TABLET ORAL DAILY PRN
COMMUNITY
Start: 2023-06-06 | End: 2024-02-20

## 2023-10-04 RX ORDER — AMLODIPINE BESYLATE 2.5 MG/1
1 TABLET ORAL DAILY
COMMUNITY
Start: 2023-07-05

## 2023-10-04 RX ORDER — FAMOTIDINE 40 MG/1
40 TABLET, FILM COATED ORAL DAILY PRN
COMMUNITY
Start: 2023-04-19

## 2023-10-04 RX ORDER — MONTELUKAST SODIUM 10 MG/1
10 TABLET ORAL
COMMUNITY
Start: 2023-08-10

## 2023-10-04 RX ORDER — CELECOXIB 400 MG/1
400 CAPSULE ORAL
COMMUNITY
Start: 2023-08-03 | End: 2024-02-20

## 2023-10-04 RX ORDER — CALCITONIN SALMON 200 [IU]/.09ML
SPRAY, METERED NASAL
COMMUNITY
Start: 2023-09-12 | End: 2024-02-20

## 2023-10-04 RX ORDER — LISINOPRIL 10 MG/1
10 TABLET ORAL
COMMUNITY
Start: 2023-07-07 | End: 2024-02-20

## 2023-10-04 RX ORDER — HYDROCODONE BITARTRATE AND ACETAMINOPHEN 10; 325 MG/1; MG/1
1 TABLET ORAL
COMMUNITY
Start: 2023-07-28

## 2023-10-04 RX ORDER — METOPROLOL SUCCINATE 100 MG/1
TABLET, EXTENDED RELEASE ORAL
COMMUNITY
Start: 2023-10-02

## 2023-10-04 NOTE — PATIENT INSTRUCTIONS
Labs from April look great.  Number of B cells is now normal.    Plan to recheck labs in 6 months.  If you are still doing this well, may consider a trial off of Hizentra.    F/U 6 months  Lab orders placed for April 2024

## 2023-10-04 NOTE — PROGRESS NOTES
Allergy Telehealth Note  [facility]    The patient location is:  [place]  The chief complaint leading to consultation is:  Immune deficiency    Visit type: audiovisual    Face to Face time with patient: 4 min  30 min minutes of total time spent on the encounter, which includes face to face time and non-face to face time preparing to see the patient (eg, review of tests), Obtaining and/or reviewing separately obtained history, Documenting clinical information in the electronic or other health record, Independently interpreting results (not separately reported) and communicating results to the patient/family/caregiver, or Care coordination (not separately reported).     Each patient to whom he or she provides medical services by telemedicine is:  (1) informed of the relationship between the physician and patient and the respective role of any other health care provider with respect to management of the patient; and (2) notified that he or she may decline to receive medical services by telemedicine and may withdraw from such care at any time.    This note was created by combination of typed  and M-Modal dictation. Transcription errors may be present.  If there are any questions, please contact me.    Subjective:      Chief Complaint: No chief complaint on file.    Allergy Problem List  Common variable immunodeficiency          On Hizentra since June 2018,  200 mg/kg weekly  Nonallergic rhinitis         normal CT sinus    History of Present Illness: Kianna Zuleta is a 53 y.o. female with common variable immunodeficiency (CVID) was last seen 6/17/2021.  She is seen by televisit today to follow-up interval symptoms.  Related medications  Hizentra 10 gram/50 ml (20% soln):  200 mg/kg every week.          Started June 2018  Auvi-Q epi if needed  Albuterol MDI as needed  DuoNeb as needed   (beta-blocker)    10/4/23:  Client had 1 episode URI versus viral sinusitis since last visit.  No evidence of  any bacterial infections.  No side effects or other problems with Hizentra.  Interval history is significant for back pain and broken tailbone but no autoimmune diseases or unexplained symptoms.  Labs from April 2023 are notable for a improved number of B cells, which now falls within the normal range, ideal IgG on replacement around 1000, and no significant liver function abnormalities.  Continue Hizentra at same dose follow-up in person in 6 months.  Although I did not discuss this with the client, with normal number of B cells now and if normal of B cells 6 months from now,, may consider trial off Hizentra      04/21/2023:  Client reports 3-4 episodes sinusitis associated with low-grade fever in the  range.  No history of high fever or rigors.  No episodes of pneumonia or otitis.  She does report fever following back surgery specifically coulee placement of spacers, which she says is complicated by a collection of fluid on her back.  She reports that her Hizentra causes some itching and she requests a refill of Atarax.  Overall, she remains happy with Hizentra and would like to continue.  Ordered IgG and lymphocyte subsets.  After reviewing labs, will reorder Hizentra    5/26/2022:  Client reports no pneumonia or other serious infections since last visit.  She does report 2 episodes of sinusitis treated at ENT with antibiotics.  She says 1 of the episodes was complicated by vertigo.  She denies COVID or other infections.  She says her Hizentra treatments are complicated by some nausea and burning, and itching but that this is tolerable.  Pt did not get labs (including IgG) drawn following last visit.      6/17/2021:  She reported no definite infections in the previous 12 months.  She had 1 episode of vertigo of uncertain etiology for which she may been given antibiotics at ENT.  She had had no episodes of sinusitis, bronchitis, or pneumonia. She has had no adverse reactions to her Hizentra or problems with  delivery from [facility].    5/29/2020:  Client was continuing to do well on Hizentra.  She reported one febrile illness (COVID negative) but no recent sinus infections or antibiotics.  She is very happy with her improvement on immunoglobulin replacement and would like to continue.      Additional History:   Interval history is unremarkable.   Past medical history is significant for malignant carcinoid tumor of the appendix, hypertension, and thyroid disease.  She is status post sinus surgery.  She has not had tonsillectomy, adenoidectomy or PE tubes.  Family history is significant for hypertension.  Client reports that she has never smoked. She has never used smokeless tobacco.  Exposures are notable for elementary age schoolchildren and shelly books.  No exposure to pets, smoke, molds, or unusual substances.   Past medical, family, and social histories are unchanged.      Patient Active Problem List   Diagnosis    Vitamin D deficiency    Hypothyroidism    Irritable bowel syndrome    Elevated alkaline phosphatase level    Malignant carcinoid tumor of appendix    Fever    Elevated sed rate    CRP elevated    Hypertension    Morbid obesity    Abnormal CT of the chest    Trigeminal neuralgia of right side of face    IgG deficiency    POTS (postural orthostatic tachycardia syndrome)    Fatty liver disease, nonalcoholic    COVID     Current Outpatient Medications on File Prior to Visit   Medication Sig Dispense Refill    azelastine (ASTELIN) 137 mcg (0.1 %) nasal spray 1 spray by Nasal route.      cetirizine (ZYRTEC) 10 MG tablet Take 10 mg by mouth once daily.      cloNIDine (CATAPRES) 0.1 MG tablet Take 0.1 mg by mouth daily as needed. Take as needed for BP > 150/100 mmHg per outside cardiologist      diclofenac sodium (VOLTAREN) 1 % Gel Apply 2 g topically once daily. 100 g 3    dicyclomine (BENTYL) 20 mg tablet Take 20 mg by mouth 2 (two) times daily as needed.      EPINEPHrine (AUVI-Q) 0.3 mg/0.3 mL AtIn Inject 0.3  mLs (0.3 mg total) into the muscle once. for 1 dose 2 each 12    ergocalciferol (ERGOCALCIFEROL) 50,000 unit Cap Take 1 capsule (50,000 Units total) by mouth every 7 days. 12 capsule 3    estradioL (ESTRACE) 2 MG tablet Take 1 tablet (2 mg total) by mouth once daily. 90 tablet 3    fluticasone propionate (FLONASE) 50 mcg/actuation nasal spray 1 spray (50 mcg total) by Each Nostril route once daily. 15.8 mL 0    gabapentin (NEURONTIN) 300 MG capsule Take 600 mg by mouth 2 (two) times daily. 2-300 mg cap in AM and 3-300 mg cap in PM      hydrOXYzine HCL (ATARAX) 25 MG tablet Take 1 tablet (25 mg total) by mouth 3 (three) times daily as needed for Itching. 45 tablet 1    immun glob G,IgG,-pro-IgA 0-50 (HIZENTRA) 10 gram/50 mL (20 %) Soln Inject 5 mLs (1 g total) into the skin every 7 days. 4 each 5    indapamide (LOZOL) 1.25 MG Tab TAKE 1 TABLET BY MOUTH DAILY 30 tablet 1    levocetirizine (XYZAL) 5 MG tablet TK 1 T PO  ONCE Day  9    levothyroxine (SYNTHROID) 75 MCG tablet Take 1 tablet (75 mcg total) by mouth before breakfast. 90 tablet 3    LIDOcaine-prilocaine (EMLA) cream Apply topically as needed (apply to infusion site). 30 g 0    metoprolol tartrate (LOPRESSOR) 50 MG tablet Take 50 mg by mouth 3 (three) times daily.      ondansetron (ZOFRAN) 4 MG tablet       ondansetron (ZOFRAN-ODT) 4 MG TbDL Take 1 tablet (4 mg total) by mouth 2 (two) times daily. 12 tablet 0    oxyCODONE-acetaminophen (PERCOCET)  mg per tablet TK 1 T PO Q 6 H PRN  0    QUEtiapine (SEROQUEL) 50 MG tablet TK 1 T PO Q NIGHT      tiZANidine 4 mg Cap TK 2 TO 3 C XD PO TID WF  0    TRINTELLIX 20 mg Tab TK 1 T PO QAM      VENTOLIN HFA 90 mcg/actuation inhaler INHALE 2 PUFFS INTO LUNGS EVERY 6 HOURS AS NEEDED FOR WHEEZING RESCUE 18 g 12    vitamin D (VITAMIN D3) 1000 units Tab Take 1,000 Units by mouth once daily.      zaleplon (SONATA) 10 MG capsule Take 10 mg by mouth once daily.  2     No current facility-administered medications on file  prior to visit.         Review of Systems   HENT:  Positive for congestion. Negative for nosebleeds.    Eyes:  Positive for redness. Negative for photophobia and discharge.   Respiratory:  Positive for cough and wheezing. Negative for shortness of breath.    Musculoskeletal: Negative.    Skin:  Negative for rash.     Answers submitted by the patient for this visit:  Allergy and Asthma Questionnaire  (Submitted on 4/20/2023)  postnasal drip: Yes  sneezing: Yes  runny nose: Yes  eye itching: Yes  apnea: No  choking: No  chest tightness: No  color change : No    Objective:   There were no vitals taken for this visit.      Physical Exam   Awake and alert  No respiratory distress  Speech fluent and logical    Data:   2023 labs  Mildly elevated alk-phos otherwise LFTs normal  Normal B cell number -- improved from 2018  Total IgG 981 on weekly Hizentra    Component      Latest Ref Rng 4/6/2018 5/19/2023   CD3 % Total T Cell      55 - 83 % 88.6 (H)  91.2 (H)    Absolute CD3      700 - 2100 cells/ul 893  1737    CD8 % Suppressor T Cell      10 - 39 % 43.1 (H)  40.2 (H)    Absolute CD8      200 - 900 cells/ul 434  765    CD4 % [place] T Cell      28 - 57 % 43.4  49.5    Absolute CD4      300 - 1400 cells/ul 437  943    CD4/CD8 Ratio      0.9 - 3.6  1.01  1.23    CD56 + CD16 Natural Killers      7 - 31 % 4.1 (L)  3.2 (L)    Absolute CD56 + CD16      90 - 600 cells/ul 41 (L)  55 (L)    CD19 B Cells      6 - 19 % 6.1  6.4    Absolute CD19      100 - 500 cells/ul 61 (L)  110           2022 labs  IgG 1161 on replacement  CMP notable for albumin 3.3 with normal LFTs    IgG 600 (01/25/2018)  Poor response to pneumococcal vaccines x2    Immunoglobulins before replacement  Component      Latest Ref Rng & Units 1/22/2018 1/11/2018   IgG - Serum      650 - 1600 mg/dL 600 (L) 557 (L)   IgA      40 - 350 mg/dL 343 347   IgM      50 - 300 mg/dL 78 90       Pneumococcal titers  Component      Latest Ref Rng & Units 4/6/2018 3/1/2018  "  S.pneumoniae Type 1      mcg/mL 1.2 1.1   S.pneumoniae Type 3      mcg/mL 3.5 3.3   Strep pneumo Type 4      mcg/mL <0.3 <0.3   S.pneumoniae Type 5      mcg/mL <0.3 <0.3   S.pneumoniae Type 8      mcg/mL <0.3 <0.3   S.pneumoniae Type 9N      mcg/mL 1.0 0.8   S.pneumoniae Type 12F      mcg/mL 0.3 <0.3   Strep pneumo Type 14      mcg/mL 0.4 0.5   S.pneumoniae Type 19F      mcg/mL 0.6 0.6   S.pneumoniae Type 23F      mcg/mL <0.3 <0.3   S.pneumoniae Type 6B      mcg/mL 0.6 0.9   S.pneumoniae Type 7F      mcg/mL <0.3 0.3   S.pneumoniae Type 18C      mcg/mL <0.3 <0.3   S.pneumoniae Type 9V Abs      mcg/mL <0.3 <0.3   Status post pneumococcal polysaccharide vaccine 01/22/2018  Status post pneumococcal protein conjugated vaccine 03/16/2018    Other immune titers  Normal diphtheria and Hib titers (01/22/2018  Low rubella IgG (120 to/2018), now normal (04/06/2018)  Normal varicella IgG (01/22/2018    Lymphocyte subsets (04/06/2018)  notable for low B cells at 61 (normal 100-500).  She also had decrease number and proportion of NK cell at (41, normal ).  CD4 and CD8 T-cell numbers were normal    Other labs  CBC unremarkable (11/02/2018) with Eo count 100  IgG subsets (01/11/2018) low IgG 1, IgG 2, and IgG 3    Aeroallergen testing by the Immunocap method (122/2018) was entirely negative.  Total serum IgE < 35     Chest x-ray (PA and lateral, 10/24/2017):  Normal    CT sinus (01/22/2018): "Unremarkable noncontrast CT of the paranasal sinuses without significant paranasal sinus opacification or air-fluid level."         Assessment:     1. Primary immunodeficiency disorder            Plan:   Client with immunodeficiency continues to do well on weekly Hizentra.  No interval bacterial infections.  No autoimmune or other new symptoms.  B-cell number improved.  If still improved 6 months from now, may discuss trial off Hizentra.  Follow-up in 6 months, preferably in person    Primary immunodeficiency  Continue Hizentra at " same dose.  200 mg/kg --> 1 gram SQ every 7 days.  Continue with Bioscripts.  Follow-up 6 months with lymphocyte subsets and total immunoglobulins      Patient Instructions     Labs from April look great.  Number of B cells is now normal.    Plan to recheck labs in 6 months.  If you are still doing this well, may consider a trial off of Hizentra.    F/U 6 months  Lab orders placed for April 2024        Kaylin Miller MD         .

## 2023-10-25 ENCOUNTER — PATIENT MESSAGE (OUTPATIENT)
Dept: ADMINISTRATIVE | Facility: HOSPITAL | Age: 53
End: 2023-10-25
Payer: COMMERCIAL

## 2023-10-25 ENCOUNTER — PATIENT OUTREACH (OUTPATIENT)
Dept: ADMINISTRATIVE | Facility: HOSPITAL | Age: 53
End: 2023-10-25
Payer: COMMERCIAL

## 2023-11-18 DIAGNOSIS — N95.1 MENOPAUSAL SYMPTOMS: ICD-10-CM

## 2023-11-20 RX ORDER — ESTRADIOL 2 MG/1
2 TABLET ORAL
Qty: 90 TABLET | Refills: 1 | Status: SHIPPED | OUTPATIENT
Start: 2023-11-20 | End: 2024-02-20

## 2023-12-15 NOTE — PROGRESS NOTES
At baseline- continue to monitor    Lab Results   Component Value Date    PLT 90 (L) 12/14/2023        This dictation has been generated using Dragon Dictation some phonetic errors may occur.     Problem List Items Addressed This Visit     None      Visit Diagnoses     Rash    -  Primary    Itching              Orders Placed This Encounter    hydrOXYzine HCl (ATARAX) 25 MG tablet     Rash follow up with derm. Add atarax for the itching. Continue Singulair.   Palpitations follow up with cardiology.     No Follow-up on file.    ________________________________________________________________  ________________________________________________________________      Chief Complaint   Patient presents with    [facility]    Rash     arms, back     History of present illness  This 47 y.o. presents today for complaint of rash.  Patient notes erythematous rash present to the forearms and face.  Patient complains of a flushing-like reaction.  She does note itching.  She does have papules a little red dots on the affected area.  She has seen [facility] immunology.  She has seen rheumatology with negative lupus and NIKOLAY making lupus unlikely.  She has an appointment with Liliana Garsia MD dermatologist in [place].  She knows this provider.  It is a distant cousin of hers.   Answers for HPI/ROS submitted by the patient on 4/5/2018   activity change: No  unexpected weight change: No  neck pain: No  hearing loss: No  rhinorrhea: Yes  trouble swallowing: No  eye discharge: No  visual disturbance: No  chest tightness: Yes  wheezing: No  chest pain: Yes  palpitations: Yes  blood in stool: No  constipation: No  vomiting: No  diarrhea: No  polydipsia: No  polyuria: No  difficulty urinating: No  hematuria: No  menstrual problem: No  dysuria: No  joint swelling: No  arthralgias: Yes  headaches: Yes  weakness: No  confusion: No  dysphoric mood: No    She does have the palpitations and flushing at nighttime.  I've asked that she follow-up with cardiology.  Catapres as not been helpful.  She also has a beta blocker.    Past Medical  History:   Diagnosis Date    Allergy     seasonal    Anxiety     Bulging disc neck and back    Depression     Elevated alkaline phosphatase level 7/17/2013    Hypothyroidism 11/6/2012    Interstitial cystitis     Irritable bowel syndrome 11/6/2012    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    MVP (mitral valve prolapse)     Pneumonia     POTS (postural orthostatic tachycardia syndrome)     Recurrent upper respiratory infection (URI)     Ulcer     Vitamin D deficiency disease 11/6/2012       Past Surgical History:   Procedure Laterality Date    ANKLE SURGERY      lt    APPENDECTOMY      BACK SURGERY      CHOLECYSTECTOMY      choley & ovarian cyst removed  12/2005    cystoscope      multiple    HYSTERECTOMY  4/8/2004    BUBBA BS&O     interstim      OOPHORECTOMY  4/8/2004    with hysterectomy     PELVIC LAPAROSCOPY      [place] EXPLORATORY OF ABDOMEN      SINUS SURGERY  03/01/2016    SPINE SURGERY         Family History   Problem Relation Age of Onset    Hypertension Father     Alcohol abuse Brother     Cancer Paternal Uncle     Colon cancer Paternal Uncle     Depression Maternal Grandmother     Hearing loss Maternal Grandmother     Heart disease Maternal Grandmother     Kidney disease Maternal Grandmother     Cancer Paternal Grandmother     Arthritis Paternal Grandfather     Diabetes Paternal Grandfather     Stroke Paternal Grandfather     Breast cancer Neg Hx     Ovarian cancer Neg Hx     Asthma Neg Hx     Emphysema Neg Hx        Social History     Social History    Marital status: Single     Spouse name: N/A    Number of children: N/A    Years of education: N/A     Social History Main Topics    Smoking status: Never Smoker    Smokeless tobacco: Never Used    Alcohol use No    Drug use: No    Sexual activity: No     Other Topics Concern    Are You Pregnant Or Think You May Be? No    Breast-Feeding No     Social History Narrative    None  "      Current Outpatient Prescriptions   Medication Sig Dispense Refill    ALBUTEROL INHL Inhale into the lungs as needed.       albuterol-ipratropium 2.5mg-0.5mg/3mL (DUO-NEB) 0.5 mg-3 mg(2.5 mg base)/3 mL nebulizer solution Take 3 mLs by nebulization every 6 (six) hours as needed for Wheezing. Rescue 1 Box 2    azelastine (ASTELIN) 137 mcg (0.1 %) nasal spray 2 SPRAYS each nostril Q 12 H PRN 30 mL 6    BD LUER-NANCY SYRINGE 3 mL 22 gauge x 1" Syrg INJECT QD FOR 5 DAYS THEN WEEKLY FOR 5 WEEKS THEN MONTHLY  3    BRINTELLIX 20 mg Tab once daily.   2    carBAMazepine (TEGRETOL XR) 200 MG 12 hr tablet Take 1 tab twice daily.  If no improvement after 2 weeks, take 2 tabs twice daily. 120 tablet 5    cloNIDine (CATAPRES) 0.1 MG tablet TAKE 1 TABLET BY MOUTH EVERY 6 HOURS AS NEEDED FOR FLUSHING 20 tablet 0    cyanocobalamin, vitamin B-12, 1,000 mcg/mL Kit Inject 1,000 mcg as directed every 28 days. Take daily for 5 days then weekly for 5 weeks then monthly 12 kit 1    ergocalciferol (ERGOCALCIFEROL) 50,000 unit Cap Take 1 capsule (50,000 Units total) by mouth every 7 days. 12 capsule 3    estropipate (OGEN) 1.5 MG tablet Take 1 tablet (1.5 mg total) by mouth once daily. 90 tablet 3    gabapentin (NEURONTIN) 300 MG capsule Take 300 mg by mouth 2 (two) times daily. 2-300 mg cap in AM and 3-300 mg cap in PM      levocetirizine (XYZAL) 5 MG tablet TK 1 T PO  ONCE Day  9    metoclopramide HCl (REGLAN) 10 MG tablet Take 1 tablet (10 mg total) by mouth every 6 (six) hours. 30 tablet 0    metoprolol tartrate (LOPRESSOR) 50 MG tablet Take 50 mg by mouth 4 (four) times daily. 1 Tablet Oral Three times a day      montelukast (SINGULAIR) 10 mg tablet TK 1 T PO  ONCE D  3    ondansetron (ZOFRAN-ODT) 4 MG TbDL Take 1 tablet (4 mg total) by mouth every 8 (eight) hours as needed (nausea). 20 tablet 0    oxycodone-acetaminophen  mg (PERCOCET)  mg per tablet Take 1 tablet by mouth every 4 (four) hours as needed.  " "    syringe with needle, safety 3 mL 22 gauge x 1 1/2" Syrg B12 injection every day for 5 days then weekly for 5 weeks then monthly. 50 Syringe 3    tiZANidine 4 mg Cap TK 2 TO 3 CS PO TID WITH FOOD  1    traZODone (DESYREL) 100 MG tablet TK 1 T PO  HS  1    valsartan (DIOVAN) 80 MG tablet TK 1 T PO QD  8    hydrOXYzine HCl (ATARAX) 25 MG tablet Take 1 tablet (25 mg total) by mouth 3 (three) times daily. 45 tablet 1     No current facility-administered medications for this visit.        Review of patient's allergies indicates:   Allergen Reactions    Benadryl allergy decongestant Anaphylaxis     Other reaction(s): Anaphylaxis    Dilaudid [hydromorphone (bulk)] Anaphylaxis     Other reaction(s): Anaphylaxis    Fludrocortisone Hives    Gluten Other (See Comments)     Other reaction(s) GI upset    Indomethacin Hives    Penicillins Rash    Sulfa (sulfonamide antibiotics) Rash    Vancomycin analogues Rash       Physical examination  Vitals Reviewed  Gen. Well-dressed well-nourished no apparent distress  Skin warm dry and intact. Erythematous rash is present on the arms and face.  Somewhat of a masked appearance on the face.    Neuro. Awake alert oriented x4.  Normal judgment and cognition noted.  Extremities no clubbing cyanosis or edema noted.     Call or return to clinic prn if these symptoms worsen or fail to improve as anticipated.    "

## 2024-02-06 ENCOUNTER — PATIENT MESSAGE (OUTPATIENT)
Dept: ADMINISTRATIVE | Facility: HOSPITAL | Age: 54
End: 2024-02-06
Payer: COMMERCIAL

## 2024-02-06 ENCOUNTER — PATIENT OUTREACH (OUTPATIENT)
Dept: ADMINISTRATIVE | Facility: HOSPITAL | Age: 54
End: 2024-02-06
Payer: COMMERCIAL

## 2024-02-20 ENCOUNTER — LAB VISIT (OUTPATIENT)
Dept: LAB | Facility: HOSPITAL | Age: 54
End: 2024-02-20
Attending: INTERNAL MEDICINE
Payer: COMMERCIAL

## 2024-02-20 ENCOUNTER — OFFICE VISIT (OUTPATIENT)
Dept: FAMILY MEDICINE | Facility: CLINIC | Age: 54
End: 2024-02-20
Payer: COMMERCIAL

## 2024-02-20 VITALS
WEIGHT: 281.31 LBS | OXYGEN SATURATION: 97 % | DIASTOLIC BLOOD PRESSURE: 78 MMHG | BODY MASS INDEX: 48.03 KG/M2 | TEMPERATURE: 99 F | SYSTOLIC BLOOD PRESSURE: 132 MMHG | HEIGHT: 64 IN | HEART RATE: 91 BPM

## 2024-02-20 DIAGNOSIS — I10 ESSENTIAL HYPERTENSION: ICD-10-CM

## 2024-02-20 DIAGNOSIS — G47.00 INSOMNIA, UNSPECIFIED TYPE: ICD-10-CM

## 2024-02-20 DIAGNOSIS — E55.9 VITAMIN D DEFICIENCY: ICD-10-CM

## 2024-02-20 DIAGNOSIS — Z12.31 ENCOUNTER FOR SCREENING MAMMOGRAM FOR BREAST CANCER: ICD-10-CM

## 2024-02-20 DIAGNOSIS — K76.0 FATTY LIVER DISEASE, NONALCOHOLIC: ICD-10-CM

## 2024-02-20 DIAGNOSIS — C7A.020 MALIGNANT CARCINOID TUMOR OF APPENDIX: ICD-10-CM

## 2024-02-20 DIAGNOSIS — Z12.12 SCREENING FOR COLORECTAL CANCER: ICD-10-CM

## 2024-02-20 DIAGNOSIS — D83.2 COMMON VARIABLE IMMUNODEFICIENCY WITH AUTOANTIBODIES TO B- OR T-CELLS: ICD-10-CM

## 2024-02-20 DIAGNOSIS — Z00.00 ROUTINE MEDICAL EXAM: ICD-10-CM

## 2024-02-20 DIAGNOSIS — G90.A POTS (POSTURAL ORTHOSTATIC TACHYCARDIA SYNDROME): ICD-10-CM

## 2024-02-20 DIAGNOSIS — K58.9 IRRITABLE BOWEL SYNDROME, UNSPECIFIED TYPE: ICD-10-CM

## 2024-02-20 DIAGNOSIS — Z12.11 SCREENING FOR COLORECTAL CANCER: ICD-10-CM

## 2024-02-20 DIAGNOSIS — E03.4 HYPOTHYROIDISM DUE TO ACQUIRED ATROPHY OF THYROID: ICD-10-CM

## 2024-02-20 DIAGNOSIS — N95.1 MENOPAUSAL SYMPTOMS: ICD-10-CM

## 2024-02-20 DIAGNOSIS — F13.20 SEDATIVE DEPENDENCE: ICD-10-CM

## 2024-02-20 DIAGNOSIS — M79.7 FIBROMYALGIA: ICD-10-CM

## 2024-02-20 DIAGNOSIS — E66.01 MORBID OBESITY: ICD-10-CM

## 2024-02-20 DIAGNOSIS — M46.1 SACROILIITIS, NOT ELSEWHERE CLASSIFIED: ICD-10-CM

## 2024-02-20 DIAGNOSIS — Z00.00 ROUTINE MEDICAL EXAM: Primary | ICD-10-CM

## 2024-02-20 DIAGNOSIS — D80.3 IGG DEFICIENCY: ICD-10-CM

## 2024-02-20 LAB
25(OH)D3+25(OH)D2 SERPL-MCNC: 13 NG/ML (ref 30–96)
ALBUMIN SERPL BCP-MCNC: 3.5 G/DL (ref 3.5–5.2)
ALP SERPL-CCNC: 128 U/L (ref 55–135)
ALT SERPL W/O P-5'-P-CCNC: 16 U/L (ref 10–44)
ANION GAP SERPL CALC-SCNC: 13 MMOL/L (ref 8–16)
AST SERPL-CCNC: 23 U/L (ref 10–40)
BASOPHILS # BLD AUTO: 0.03 K/UL (ref 0–0.2)
BASOPHILS NFR BLD: 0.6 % (ref 0–1.9)
BILIRUB SERPL-MCNC: 0.3 MG/DL (ref 0.1–1)
BUN SERPL-MCNC: 11 MG/DL (ref 6–20)
CALCIUM SERPL-MCNC: 9.6 MG/DL (ref 8.7–10.5)
CHLORIDE SERPL-SCNC: 103 MMOL/L (ref 95–110)
CHOLEST SERPL-MCNC: 215 MG/DL (ref 120–199)
CHOLEST/HDLC SERPL: 4.7 {RATIO} (ref 2–5)
CO2 SERPL-SCNC: 23 MMOL/L (ref 23–29)
CREAT SERPL-MCNC: 0.6 MG/DL (ref 0.5–1.4)
DIFFERENTIAL METHOD BLD: ABNORMAL
EOSINOPHIL # BLD AUTO: 0.1 K/UL (ref 0–0.5)
EOSINOPHIL NFR BLD: 1.7 % (ref 0–8)
ERYTHROCYTE [DISTWIDTH] IN BLOOD BY AUTOMATED COUNT: 13.8 % (ref 11.5–14.5)
EST. GFR  (NO RACE VARIABLE): >60 ML/MIN/1.73 M^2
ESTIMATED AVG GLUCOSE: 114 MG/DL (ref 68–131)
GLUCOSE SERPL-MCNC: 82 MG/DL (ref 70–110)
HBA1C MFR BLD: 5.6 % (ref 4–5.6)
HCT VFR BLD AUTO: 43.9 % (ref 37–48.5)
HDLC SERPL-MCNC: 46 MG/DL (ref 40–75)
HDLC SERPL: 21.4 % (ref 20–50)
HGB BLD-MCNC: 14.6 G/DL (ref 12–16)
IMM GRANULOCYTES # BLD AUTO: 0.02 K/UL (ref 0–0.04)
IMM GRANULOCYTES NFR BLD AUTO: 0.4 % (ref 0–0.5)
LDLC SERPL CALC-MCNC: 101.8 MG/DL (ref 63–159)
LYMPHOCYTES # BLD AUTO: 1.2 K/UL (ref 1–4.8)
LYMPHOCYTES NFR BLD: 23.3 % (ref 18–48)
MCH RBC QN AUTO: 29.7 PG (ref 27–31)
MCHC RBC AUTO-ENTMCNC: 33.3 G/DL (ref 32–36)
MCV RBC AUTO: 89 FL (ref 82–98)
MONOCYTES # BLD AUTO: 0.2 K/UL (ref 0.3–1)
MONOCYTES NFR BLD: 4.1 % (ref 4–15)
NEUTROPHILS # BLD AUTO: 3.6 K/UL (ref 1.8–7.7)
NEUTROPHILS NFR BLD: 69.9 % (ref 38–73)
NONHDLC SERPL-MCNC: 169 MG/DL
NRBC BLD-RTO: 0 /100 WBC
PLATELET # BLD AUTO: 240 K/UL (ref 150–450)
PMV BLD AUTO: 9.1 FL (ref 9.2–12.9)
POTASSIUM SERPL-SCNC: 3.7 MMOL/L (ref 3.5–5.1)
PROT SERPL-MCNC: 7.6 G/DL (ref 6–8.4)
RBC # BLD AUTO: 4.91 M/UL (ref 4–5.4)
SODIUM SERPL-SCNC: 139 MMOL/L (ref 136–145)
T4 FREE SERPL-MCNC: 0.81 NG/DL (ref 0.71–1.51)
TRIGL SERPL-MCNC: 336 MG/DL (ref 30–150)
TSH SERPL DL<=0.005 MIU/L-ACNC: 2.2 UIU/ML (ref 0.4–4)
WBC # BLD AUTO: 5.16 K/UL (ref 3.9–12.7)

## 2024-02-20 PROCEDURE — 99396 PREV VISIT EST AGE 40-64: CPT | Mod: S$GLB,,, | Performed by: INTERNAL MEDICINE

## 2024-02-20 PROCEDURE — 99999 PR PBB SHADOW E&M-EST. PATIENT-LVL V: CPT | Mod: PBBFAC,,, | Performed by: INTERNAL MEDICINE

## 2024-02-20 PROCEDURE — 84439 ASSAY OF FREE THYROXINE: CPT | Performed by: INTERNAL MEDICINE

## 2024-02-20 PROCEDURE — 3078F DIAST BP <80 MM HG: CPT | Mod: CPTII,S$GLB,, | Performed by: INTERNAL MEDICINE

## 2024-02-20 PROCEDURE — 82306 VITAMIN D 25 HYDROXY: CPT | Performed by: INTERNAL MEDICINE

## 2024-02-20 PROCEDURE — 83036 HEMOGLOBIN GLYCOSYLATED A1C: CPT | Performed by: INTERNAL MEDICINE

## 2024-02-20 PROCEDURE — 80061 LIPID PANEL: CPT | Performed by: INTERNAL MEDICINE

## 2024-02-20 PROCEDURE — 80053 COMPREHEN METABOLIC PANEL: CPT | Performed by: INTERNAL MEDICINE

## 2024-02-20 PROCEDURE — 84443 ASSAY THYROID STIM HORMONE: CPT | Performed by: INTERNAL MEDICINE

## 2024-02-20 PROCEDURE — 85025 COMPLETE CBC W/AUTO DIFF WBC: CPT | Performed by: INTERNAL MEDICINE

## 2024-02-20 PROCEDURE — 3075F SYST BP GE 130 - 139MM HG: CPT | Mod: CPTII,S$GLB,, | Performed by: INTERNAL MEDICINE

## 2024-02-20 PROCEDURE — 3008F BODY MASS INDEX DOCD: CPT | Mod: CPTII,S$GLB,, | Performed by: INTERNAL MEDICINE

## 2024-02-20 PROCEDURE — 1159F MED LIST DOCD IN RCRD: CPT | Mod: CPTII,S$GLB,, | Performed by: INTERNAL MEDICINE

## 2024-02-20 PROCEDURE — 36415 COLL VENOUS BLD VENIPUNCTURE: CPT | Mod: PO | Performed by: INTERNAL MEDICINE

## 2024-02-20 RX ORDER — CELECOXIB 200 MG/1
CAPSULE ORAL
COMMUNITY
Start: 2024-01-26

## 2024-02-20 RX ORDER — CLINDAMYCIN HYDROCHLORIDE 300 MG/1
300 CAPSULE ORAL EVERY 6 HOURS
COMMUNITY
Start: 2024-02-08 | End: 2024-02-20

## 2024-02-20 RX ORDER — DARIDOREXANT 25 MG/1
TABLET, FILM COATED ORAL
COMMUNITY
Start: 2024-02-01

## 2024-02-20 RX ORDER — LIDOCAINE HYDROCHLORIDE 20 MG/ML
SOLUTION OROPHARYNGEAL
COMMUNITY
Start: 2024-02-08

## 2024-02-20 RX ORDER — ESTRADIOL 2 MG/1
2 TABLET ORAL
Qty: 30 TABLET | Refills: 0 | Status: SHIPPED | OUTPATIENT
Start: 2024-02-20 | End: 2024-06-17

## 2024-02-20 RX ORDER — TIZANIDINE 4 MG/1
TABLET ORAL
COMMUNITY
Start: 2023-11-04

## 2024-02-20 NOTE — PROGRESS NOTES
Chief complaint physical exam      53-year-old white female with irritable bowel syndrome and hypothyroidism.  She also has an immuno deficiency.  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     She is working as an .  She does receive her weekly immunoglobulin infusion in his not been sick lately.    Colon due 6/2024.  Advised her of that and she follows with [facility] who presumably will notify her when ready to schedule.     She continues to follow with her GYN.  She follows with GI as well    Mammo 2020, she knows she is overdue and blames it on some COVID interval issues but will put in that order and she can schedule it online but never did?.  Again explained she is four years overdue.  She has an upcoming appointment with her gyn and she declines to schedule it here the office today but she might get gyn to schedule it and also advise her she can schedule it all line.  Discussed the importance of early detection of breast cancer through mammogram.    She apparently went to have an SI joint fusion.  She also is in the process of establishing with a neurosurgeon through workman's comp as she might need to have back surgery referable to a fall as far back as 2008.  Workman's comp.  Patient is on her inability to walk greater than 200 ft without having to stop and intermittently using a cane, she does again qualify for a handicap tag and I will give her a permanent application tag at this point    Vit D low in past    TG sl eleveted, HDL and LDL actually okay a year ago    TSH up but trending down as of 9/23.  Currently on Synthroid 75 and discussed probable need to make an adjustment based on lab results and that we can then recheck TSH six weeks later    ROS:   CONST: weight stable. EYES: no vision change. ENT: no sore throat. CV: no chest pain w/ exertion. RESP: no shortness of breath. GI: no nausea, vomiting, diarrhea. No dysphagia. : no urinary issues.  MUSCULOSKELETAL: no new myalgias or arthralgias. SKIN: no new changes. NEURO: no focal deficits. PSYCH: no new issues. ENDOCRINE: no polyuria. HEME: no lymph nodes. ALLERGY: no general pruritis.    PAST MEDICAL HISTORY:                                                        1. Intersitial cystitis.                                                     2. Fibromyalgia.                                                             3. Mitral valve prolapse.                                                    4. IBS. Dr Rowe , now Dr. Mckeon  , colon 6/24 -5 yrs                                                               5. L-spine disk bulging.  L4-5 -Dr Singh at                                                    6. Total hysterectomy in 2004.   7.  Hypothyroid.                                                             8.  Incidental carcinoid tumor of the appendix.                              9.  Now on gluten-free diet.                                                 10.  Vitamin D deficiency, followed by outside Rheumatology.                  11.  Neurogenic bladder, followed by outside urologist. Ranjeet Vasquez  12.  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 Trigeminal neuralgia of right side of face  14. POTS (postural orthostatic tachycardia syndrome)  15. HTN  16. sacroiliac joint fusion           PAST SURGICAL HISTORY: Laparotomy x5 Left pelvic mass 12/20/05, pelvic pain 07/31/03, pelvic pain 06/13/02, endometriosis Laparoscopy 10/01,BUBBA and BS&O 04/08/04, Lap cholecystectomy 12/20/05, Appendectomy 12/20/05,Letitia surgery 5/08,Placement of new right InterStim lead and Ablation 1/09, Da Char-assisted lysis of severe adhesions, resection of severe adhesions/possible ovarian remnant.    SOCIAL HISTORY: Never smoked. The patient seldom drinks alcohol. Doesn't use recreational drugs. Lives alone. Single.     FAMILY HISTORY: The patient's family members had Ovarian  cancer.No family history of breast cancer. No family history of colon cancer    Vitals as above,   Gen: no distress  EYES: conjunctiva clear, non-icteric, PERRL  ENT: nose clear, nasal mucosa normal, oropharynx clear and moist, teeth good  NECK:supple, thyroid non-palpable  RESP: effort is good, lungs clear  CV: heart RRR w/o murmur, gallops or rubs; no carotid bruits, no edema  GI: abdomen soft, non-distended, non-tender, no hepatosplenomegaly  MS: gait normal, no clubbing or cyanosis of the digits  SKIN: no rashes, warm to touch ,       Diagnoses and all orders for this visit:    Routine medical exam, overdue for mammogram and likely upcoming due on colonoscopy.  Encouraged her to follow through with these as above  -     TSH; Future  -     T4, Free; Future  -     Vitamin D; Future  -     Comprehensive Metabolic Panel; Future  -     CBC Auto Differential; Future  -     Hemoglobin A1C; Future  -     Lipid Panel; Future    Encounter for screening mammogram for breast cancer  -     Mammo Digital Screening Bilat; Future    Screening for colorectal cancer    Hypothyroidism due to acquired atrophy of thyroid, reassess and make adjustments  -     TSH; Future  -     T4, Free; Future    Essential hypertension, chronic and stable    Morbid obesity, work on weight loss    Malignant carcinoid tumor of appendix, noted    IgG deficiency, continues on infusion    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

## 2024-02-29 ENCOUNTER — OFFICE VISIT (OUTPATIENT)
Dept: OBSTETRICS AND GYNECOLOGY | Facility: CLINIC | Age: 54
End: 2024-02-29
Payer: COMMERCIAL

## 2024-02-29 VITALS
DIASTOLIC BLOOD PRESSURE: 72 MMHG | SYSTOLIC BLOOD PRESSURE: 128 MMHG | BODY MASS INDEX: 48.25 KG/M2 | HEIGHT: 64 IN | WEIGHT: 282.63 LBS

## 2024-02-29 DIAGNOSIS — Z01.419 ENCOUNTER FOR GYNECOLOGICAL EXAMINATION WITHOUT ABNORMAL FINDING: Primary | ICD-10-CM

## 2024-02-29 PROCEDURE — 3008F BODY MASS INDEX DOCD: CPT | Mod: CPTII,S$GLB,, | Performed by: OBSTETRICS & GYNECOLOGY

## 2024-02-29 PROCEDURE — 3074F SYST BP LT 130 MM HG: CPT | Mod: CPTII,S$GLB,, | Performed by: OBSTETRICS & GYNECOLOGY

## 2024-02-29 PROCEDURE — 99396 PREV VISIT EST AGE 40-64: CPT | Mod: S$GLB,,, | Performed by: OBSTETRICS & GYNECOLOGY

## 2024-02-29 PROCEDURE — 1159F MED LIST DOCD IN RCRD: CPT | Mod: CPTII,S$GLB,, | Performed by: OBSTETRICS & GYNECOLOGY

## 2024-02-29 PROCEDURE — 99999 PR PBB SHADOW E&M-EST. PATIENT-LVL IV: CPT | Mod: PBBFAC,,, | Performed by: OBSTETRICS & GYNECOLOGY

## 2024-02-29 PROCEDURE — 3078F DIAST BP <80 MM HG: CPT | Mod: CPTII,S$GLB,, | Performed by: OBSTETRICS & GYNECOLOGY

## 2024-02-29 PROCEDURE — 3044F HG A1C LEVEL LT 7.0%: CPT | Mod: CPTII,S$GLB,, | Performed by: OBSTETRICS & GYNECOLOGY

## 2024-02-29 NOTE — PROGRESS NOTES
Well-woman exam    Kianna Zuleta  is a 53 y.o. year old  who presents for a routine gyn exam/well-woman exam.  She is S/P BUBBA/bilateral salpingo-oophorectomy in .  No gynecologic issues or complaints today.    Patient reports that she will be undergoing back surgery in the near future.    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    MVP (mitral valve prolapse)     Pneumonia     POTS (postural orthostatic tachycardia syndrome)     Recurrent upper respiratory infection (URI)     Ulcer     Vitamin D deficiency disease 2012       Past Surgical History:   Procedure Laterality Date    ANKLE SURGERY      lt    APPENDECTOMY      BACK SURGERY      CHOLECYSTECTOMY      choley & ovarian cyst removed  2005    cystoscope      multiple    HYSTERECTOMY  2004    BUBBA BS&O     interstim      OOPHORECTOMY  2004    with hysterectomy     PELVIC LAPAROSCOPY      AR EXPLORATORY OF ABDOMEN      SINUS SURGERY  2016    SPINE SURGERY         OB History          0    Para        Term   0            AB        Living             SAB        IAB        Ectopic        Multiple        Live Births                     ROS:  GENERAL: Feeling well overall.   SKIN: Denies rash or lesions.   HEAD: Denies head injury or headache.   NODES: Denies enlarged lymph nodes.   CHEST: Denies chest pain or shortness of breath.   CARDIOVASCULAR: Denies palpitations or left sided chest pain.   ABDOMEN: No abdominal pain, nausea, vomiting or rectal bleeding.   URINARY: No dysuria, hematuria, or burning on urination.  REPRODUCTIVE: See HPI.   BREASTS: Denies pain, lumps, or nipple discharge.   HEMATOLOGIC: No easy bruisability or excessive  bleeding.   MUSCULOSKELETAL: Denies joint pain or swelling.   NEUROLOGIC: Denies syncope or weakness.   PSYCHIATRIC: Denies depression.    PE:   APPEARANCE: Well nourished, well developed, in no acute distress.  NECK: Neck symmetric without masses or thyromegaly.  NODES: No inguinal lymph node enlargement.  ABDOMEN: Soft. No tenderness or masses. No hernias.  BREASTS: Symmetrical, no skin changes or visible lesions. No palpable masses, nipple discharge or adenopathy bilaterally.  PELVIC: Normal external female genitalia without lesions. Normal hair distribution. Adequate perineal body, normal urethral meatus. Vagina with rugae and without lesions or discharge. No significant cystocele or rectocele.  Vaginal cuff with good support.  Uterus and cervix surgically absent. Bimanual exam revealed no masses, tenderness or abnormality.  ANUS: Normal.    Diagnosis:  1. Encounter for gynecological examination without abnormal finding      PLAN:    Age specific counseling.    Patient was counseled today on postmenopausal issues.     Cancer screening recommendations reviewed with patient today.  Compliance with annual mammogram recommendation reviewed in detail with patient today.  Patient verbalized understanding of this discussion and recommendation.  Mammogram order has been placed by Dr. Gigi Celis.    Risk and benefits continued ERT use reviewed.  Patient to continue current regimen.  ERx placed today.    Follow up in about 1 year (around 2/28/2025) for Annual exam.     Alan Borrero IV, MD

## 2024-04-01 ENCOUNTER — DOCUMENTATION ONLY (OUTPATIENT)
Dept: ALLERGY | Facility: CLINIC | Age: 54
End: 2024-04-01
Payer: COMMERCIAL

## 2024-04-01 NOTE — PROGRESS NOTES
RECEIVED VIA FAX FROM BEAM FROM [facility] REQUESTING UPDATED ORDER FOR HIZENTRA. ORDER PLACED ON DR. GUERRERO'S DESK FOR SIGNATURE.

## 2024-04-04 ENCOUNTER — HOSPITAL ENCOUNTER (OUTPATIENT)
Dept: RADIOLOGY | Facility: HOSPITAL | Age: 54
Discharge: HOME | End: 2024-04-04
Attending: INTERNAL MEDICINE
Payer: COMMERCIAL

## 2024-04-04 ENCOUNTER — TELEPHONE (OUTPATIENT)
Dept: NEUROSURGERY | Facility: CLINIC | Age: 54
End: 2024-04-04
Payer: COMMERCIAL

## 2024-04-04 DIAGNOSIS — Z12.31 ENCOUNTER FOR SCREENING MAMMOGRAM FOR BREAST CANCER: ICD-10-CM

## 2024-04-04 PROCEDURE — 77063 BREAST TOMOSYNTHESIS BI: CPT | Mod: 26,,, | Performed by: RADIOLOGY

## 2024-04-04 PROCEDURE — 77067 SCR MAMMO BI INCL CAD: CPT | Mod: 26,,, | Performed by: RADIOLOGY

## 2024-04-04 PROCEDURE — 77063 BREAST TOMOSYNTHESIS BI: CPT | Mod: TC,PO

## 2024-04-04 NOTE — TELEPHONE ENCOUNTER
"Patient has been called. Patient was only inquiring about the availably for Dr. Melton/Salomón. Patient has been advised that she will need approval through her worker's comp . Patient states" she will call back when she makes a decision on which provider she wants to establish care with.  "

## 2024-04-04 NOTE — TELEPHONE ENCOUNTER
----- Message from Janina Lin sent at 4/3/2024 10:17 AM CDT -----  Contact: [phone]  PATIENTCALL     Pt is calling to speak with someone in provider office regarding she is calling to see when is the doctors next available appt in epic she states she needs to send to workman comp. She is asking for a return call please call.     Dr Lorenzana and Dr Melton

## 2024-04-13 ENCOUNTER — PATIENT MESSAGE (OUTPATIENT)
Dept: ADMINISTRATIVE | Facility: OTHER | Age: 54
End: 2024-04-13
Payer: COMMERCIAL

## 2024-04-15 ENCOUNTER — TELEPHONE (OUTPATIENT)
Dept: NEUROSURGERY | Facility: CLINIC | Age: 54
End: 2024-04-15
Payer: COMMERCIAL

## 2024-04-15 ENCOUNTER — PATIENT MESSAGE (OUTPATIENT)
Dept: NEUROSURGERY | Facility: CLINIC | Age: 54
End: 2024-04-15
Payer: COMMERCIAL

## 2024-04-15 NOTE — TELEPHONE ENCOUNTER
----- Message from Abimbola Burr sent at 4/10/2024  4:12 PM CDT -----  Contact: [phone]  Kianna Zuleta calling regarding Patient Advice (message) for #Pt is calling to get a list of provider's that accept worker's comp. She says she needs a provider's name before  will move forward. Patient Requesting Call Back @ [phone] or a responds in the portal      What Type Of Note Output Would You Prefer (Optional)?: Standard Output On A Scale Of 0 To 10 How Would You Rate Your Itching?: 8 How Severe Is Your Itching?: moderate

## 2024-04-16 ENCOUNTER — TELEPHONE (OUTPATIENT)
Dept: ALLERGY | Facility: CLINIC | Age: 54
End: 2024-04-16
Payer: COMMERCIAL

## 2024-04-16 ENCOUNTER — TELEPHONE (OUTPATIENT)
Dept: NEUROSURGERY | Facility: CLINIC | Age: 54
End: 2024-04-16
Payer: COMMERCIAL

## 2024-04-16 NOTE — TELEPHONE ENCOUNTER
----- Message from Belkis Vegas sent at 4/15/2024 11:26 AM CDT -----  Regarding: update  Contact: @ [phone]  [facility] is calling to get a update on the following immun glob G,IgG,-pro-IgA 0-50 (HIZENTRA) 10 gram/50 mL (20 %) Soln please call and adv @ [phone]

## 2024-04-16 NOTE — TELEPHONE ENCOUNTER
PATIENT HAS BEEN CALLED APPOINTMENT HAS BEEN MADE WITH DR. JEREZ @ [facility]. PATIENT HAS BEEN INFORMED VISIT MUST BE APPROVED THROUGH HER WORKER'S COMP . PATIENT VERBALIZED CLEAR UNDERSTANDING.

## 2024-04-16 NOTE — TELEPHONE ENCOUNTER
----- Message from Dayan Moses sent at 4/16/2024  9:58 AM CDT -----  Regarding: APPT  Contact: PT@[phone]  Pt is calling to speak to someone in the office to schedule an appt. Pt States Referral has been sent over  Please call to advise. [phone] Thanks.

## 2024-04-17 ENCOUNTER — PATIENT MESSAGE (OUTPATIENT)
Dept: ALLERGY | Facility: CLINIC | Age: 54
End: 2024-04-17
Payer: COMMERCIAL

## 2024-04-24 ENCOUNTER — OFFICE VISIT (OUTPATIENT)
Dept: NEUROSURGERY | Facility: CLINIC | Age: 54
End: 2024-04-24
Payer: COMMERCIAL

## 2024-04-24 ENCOUNTER — LAB VISIT (OUTPATIENT)
Dept: LAB | Facility: HOSPITAL | Age: 54
End: 2024-04-24
Attending: STUDENT IN AN ORGANIZED HEALTH CARE EDUCATION/TRAINING PROGRAM
Payer: COMMERCIAL

## 2024-04-24 DIAGNOSIS — M54.16 LUMBAR RADICULITIS: ICD-10-CM

## 2024-04-24 DIAGNOSIS — D84.89 PRIMARY IMMUNODEFICIENCY DISORDER: ICD-10-CM

## 2024-04-24 DIAGNOSIS — M51.9 LUMBAR DISC DISEASE: Primary | ICD-10-CM

## 2024-04-24 DIAGNOSIS — M54.9 DORSALGIA, UNSPECIFIED: ICD-10-CM

## 2024-04-24 LAB
ALBUMIN SERPL BCP-MCNC: 3.5 G/DL (ref 3.5–5.2)
ALP SERPL-CCNC: 140 U/L (ref 55–135)
ALT SERPL W/O P-5'-P-CCNC: 35 U/L (ref 10–44)
ANION GAP SERPL CALC-SCNC: 17 MMOL/L (ref 8–16)
AST SERPL-CCNC: 45 U/L (ref 10–40)
BILIRUB SERPL-MCNC: 0.3 MG/DL (ref 0.1–1)
BUN SERPL-MCNC: 10 MG/DL (ref 6–20)
CALCIUM SERPL-MCNC: 9.9 MG/DL (ref 8.7–10.5)
CHLORIDE SERPL-SCNC: 100 MMOL/L (ref 95–110)
CO2 SERPL-SCNC: 23 MMOL/L (ref 23–29)
CREAT SERPL-MCNC: 0.7 MG/DL (ref 0.5–1.4)
EST. GFR  (NO RACE VARIABLE): >60 ML/MIN/1.73 M^2
GLUCOSE SERPL-MCNC: 94 MG/DL (ref 70–110)
IGA SERPL-MCNC: 412 MG/DL (ref 40–350)
IGG SERPL-MCNC: 1095 MG/DL (ref 650–1600)
IGM SERPL-MCNC: 85 MG/DL (ref 50–300)
POTASSIUM SERPL-SCNC: 3.7 MMOL/L (ref 3.5–5.1)
PROT SERPL-MCNC: 7.9 G/DL (ref 6–8.4)
SODIUM SERPL-SCNC: 140 MMOL/L (ref 136–145)

## 2024-04-24 PROCEDURE — 82784 ASSAY IGA/IGD/IGG/IGM EACH: CPT | Mod: 59 | Performed by: STUDENT IN AN ORGANIZED HEALTH CARE EDUCATION/TRAINING PROGRAM

## 2024-04-24 PROCEDURE — 86355 B CELLS TOTAL COUNT: CPT | Performed by: STUDENT IN AN ORGANIZED HEALTH CARE EDUCATION/TRAINING PROGRAM

## 2024-04-24 PROCEDURE — 86360 T CELL ABSOLUTE COUNT/RATIO: CPT | Performed by: STUDENT IN AN ORGANIZED HEALTH CARE EDUCATION/TRAINING PROGRAM

## 2024-04-24 PROCEDURE — 80053 COMPREHEN METABOLIC PANEL: CPT | Performed by: STUDENT IN AN ORGANIZED HEALTH CARE EDUCATION/TRAINING PROGRAM

## 2024-04-24 PROCEDURE — 82784 ASSAY IGA/IGD/IGG/IGM EACH: CPT | Performed by: STUDENT IN AN ORGANIZED HEALTH CARE EDUCATION/TRAINING PROGRAM

## 2024-04-24 PROCEDURE — 99204 OFFICE O/P NEW MOD 45 MIN: CPT | Mod: S$PBB,,, | Performed by: NEUROLOGICAL SURGERY

## 2024-04-24 PROCEDURE — 86357 NK CELLS TOTAL COUNT: CPT | Performed by: STUDENT IN AN ORGANIZED HEALTH CARE EDUCATION/TRAINING PROGRAM

## 2024-04-24 PROCEDURE — 99214 OFFICE O/P EST MOD 30 MIN: CPT | Mod: PBBFAC | Performed by: NEUROLOGICAL SURGERY

## 2024-04-24 PROCEDURE — 86359 T CELLS TOTAL COUNT: CPT | Performed by: STUDENT IN AN ORGANIZED HEALTH CARE EDUCATION/TRAINING PROGRAM

## 2024-04-24 PROCEDURE — 99999 PR PBB SHADOW E&M-EST. PATIENT-LVL IV: CPT | Mod: PBBFAC,,, | Performed by: NEUROLOGICAL SURGERY

## 2024-04-24 PROCEDURE — 36415 COLL VENOUS BLD VENIPUNCTURE: CPT | Performed by: STUDENT IN AN ORGANIZED HEALTH CARE EDUCATION/TRAINING PROGRAM

## 2024-04-24 RX ORDER — TRAZODONE HYDROCHLORIDE 100 MG/1
TABLET ORAL
COMMUNITY
Start: 2024-04-03

## 2024-04-24 RX ORDER — ESZOPICLONE 1 MG/1
TABLET, FILM COATED ORAL
COMMUNITY
Start: 2024-04-24
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
## 2024-04-25 ENCOUNTER — TELEPHONE (OUTPATIENT)
Dept: NEUROSURGERY | Facility: CLINIC | Age: 54
End: 2024-04-25
Payer: COMMERCIAL

## 2024-04-25 LAB
CD3+CD4+ CELLS # BLD: 802 CELLS/UL (ref 300–1400)
CD3+CD4+ CELLS NFR BLD: 44.2 % (ref 28–57)
LYMPHOCYTES NFR CSF MANUAL: 1.01 % (ref 0.9–3.6)
LYMPHOCYTES NFR CSF MANUAL: 1625 CELLS/UL (ref 700–2100)
LYMPHOCYTES NFR CSF MANUAL: 4.8 % (ref 6–19)
LYMPHOCYTES NFR CSF MANUAL: 43.6 % (ref 10–39)
LYMPHOCYTES NFR CSF MANUAL: 5 % (ref 7–31)
LYMPHOCYTES NFR CSF MANUAL: 791 CELLS/UL (ref 200–900)
LYMPHOCYTES NFR CSF MANUAL: 82 CELLS/UL (ref 100–500)
LYMPHOCYTES NFR CSF MANUAL: 84 CELLS/UL (ref 90–600)
LYMPHOCYTES NFR CSF MANUAL: 89.7 % (ref 55–83)

## 2024-04-25 NOTE — TELEPHONE ENCOUNTER
----- Message from Lori Zhou sent at 4/5/2024  2:05 PM CDT -----  Regarding: Appt  Contact: [phone]  Kianna Zuleta calling regarding Appointment Access  (message) for # pt is calling to see which  see pt that has Workmans comp. please call to advise

## 2024-05-06 ENCOUNTER — PATIENT MESSAGE (OUTPATIENT)
Dept: ALLERGY | Facility: CLINIC | Age: 54
End: 2024-05-06
Payer: COMMERCIAL

## 2024-05-06 DIAGNOSIS — D84.89 PRIMARY IMMUNODEFICIENCY DISORDER: Primary | ICD-10-CM

## 2024-05-06 RX ORDER — HUMAN IMMUNOGLOBULIN G 0.2 G/ML
1 LIQUID SUBCUTANEOUS
Qty: 130 ML | Refills: 1 | Status: SHIPPED | OUTPATIENT
Start: 2024-05-06 | End: 2024-05-06

## 2024-05-06 RX ORDER — HUMAN IMMUNOGLOBULIN G 0.2 G/ML
1 LIQUID SUBCUTANEOUS
Qty: 130 ML | Refills: 1 | Status: SHIPPED | OUTPATIENT
Start: 2024-05-06

## 2024-05-06 NOTE — PROGRESS NOTES
Requests Hizentra in prefilled syringes for remainder of current authorization.  Sent to fitogram in [place].     Asked pt for clarification of pharmacy, as she mentioned [facility] in her request

## 2024-05-17 ENCOUNTER — DOCUMENTATION ONLY (OUTPATIENT)
Dept: ALLERGY | Facility: CLINIC | Age: 54
End: 2024-05-17
Payer: COMMERCIAL

## 2024-05-17 NOTE — PROGRESS NOTES
"  [facility] Prescriber order form for Hizentra 14 grams weekly has been signed by Dr. Miller and refaxed to Danish Franklin  at [phone].      our fax has been successfully sent to [phone] at [phone].  ------------------------------------------------------------  From: [id number]  ------------------------------------------------------------  5/17/2024 9:12:08 AM Transmission Record          Sent to [phone] with remote ID "[id number]"          Result: (0/339;0/0) Success          Page record: 1 - 4          Elapsed time: 01:32 on channel 4    Form sent to scanning      "

## 2024-06-13 RX ORDER — LEVOTHYROXINE SODIUM 75 UG/1
75 TABLET ORAL
Qty: 90 TABLET | Refills: 3 | Status: SHIPPED | OUTPATIENT
Start: 2024-06-13

## 2024-06-13 NOTE — TELEPHONE ENCOUNTER
No care due was identified.  [facility] Embedded Care Due Messages. Reference number: [id number].   6/13/2024 8:02:17 AM CDT

## 2024-06-13 NOTE — TELEPHONE ENCOUNTER
Refill Decision Note   Kianna Zuleta  is requesting a refill authorization.  Brief Assessment and Rationale for Refill:  Approve     Medication Therapy Plan: TSH-WNL      Comments:     Note composed:8:12 AM 06/13/2024

## 2024-06-15 DIAGNOSIS — N95.1 MENOPAUSAL SYMPTOMS: ICD-10-CM

## 2024-06-17 RX ORDER — ESTRADIOL 2 MG/1
2 TABLET ORAL
Qty: 30 TABLET | Refills: 11 | Status: SHIPPED | OUTPATIENT
Start: 2024-06-17

## 2024-06-21 ENCOUNTER — TELEPHONE (OUTPATIENT)
Dept: NEUROSURGERY | Facility: CLINIC | Age: 54
End: 2024-06-21
Payer: COMMERCIAL

## 2024-06-25 ENCOUNTER — TELEPHONE (OUTPATIENT)
Dept: NEUROSURGERY | Facility: CLINIC | Age: 54
End: 2024-06-25
Payer: COMMERCIAL

## 2024-06-25 DIAGNOSIS — Z98.890 HX OF MICRODISCECTOMY: ICD-10-CM

## 2024-06-25 DIAGNOSIS — M51.9 LUMBAR DISC DISEASE: ICD-10-CM

## 2024-06-25 DIAGNOSIS — M54.9 DORSALGIA, UNSPECIFIED: Primary | ICD-10-CM

## 2024-06-25 NOTE — TELEPHONE ENCOUNTER
Called and scheduled patient for an MRI Lumbar on 07/17/2024 at 8:15 AM. Patient verbalized confirmation.

## 2024-06-26 ENCOUNTER — TELEPHONE (OUTPATIENT)
Dept: NEUROSURGERY | Facility: CLINIC | Age: 54
End: 2024-06-26
Payer: COMMERCIAL

## 2024-06-26 NOTE — TELEPHONE ENCOUNTER
Ernestina Pickard Staff  Please disregard previous message MRI Lumbar is not approved only the ct scan.    Ernestina

## 2024-06-26 NOTE — TELEPHONE ENCOUNTER
----- Message from Leno Lord LPN sent at 6/25/2024  3:05 PM CDT -----  Regarding: RE: Approval  She is.    Son  ----- Message -----  From: Dania Costello  Sent: 6/25/2024   2:08 PM CDT  To: Leno Lord LPN  Subject: Approval                                         Good afternoon,     Has this patient been approved through workers' comp? I just need clarification before scheduling. I reached out to the department and have not heard back from them yet.    DANIA WICK MA  Department of Neurosurgery   [facility]  ----- Message -----  From: Leno Lord LPN  Sent: 6/21/2024   2:30 PM CDT  To: Theron Melton MD; Linh BAUGH Staff    The nurse  was asking about a follow up appointment.  Please advise.    Thanks  Son

## 2024-06-26 NOTE — TELEPHONE ENCOUNTER
Ernestina Pickard Staff  Received Faxed [facility] 1015 Form signed by Worker Comp Pre-cert # [id number] Approved valid 6/24/2024 - 8/8/2024 Worker Comp  ADJ: VERONA KRAFT [phone]/EFRAIN [phone], OMER# [id number]  E-mail: [email or web address], OKAY TO [email or web address] patient.

## 2024-06-26 NOTE — TELEPHONE ENCOUNTER
Ernestina Pickard Staff  Received Faxed [facility] 1011 Form signed by Worker Comp Pre-cert # [id number] Approved valid 6/24/2024 - 8/8/2024 Worker Comp  ADJ: VERONA KRAFT [phone]/EFRAIN [phone], OMER# [id number]  E-mail: [email or web address], OKAY TO SCHEDULE patient.    ThanksErnestina

## 2024-07-09 ENCOUNTER — TELEPHONE (OUTPATIENT)
Dept: INTERVENTIONAL RADIOLOGY/VASCULAR | Facility: CLINIC | Age: 54
End: 2024-07-09
Payer: COMMERCIAL

## 2024-07-09 NOTE — TELEPHONE ENCOUNTER
Call and left patient voice message to rtc to [phone] to schedule NeuroIR discogram ref by Dr. Melton. Pls fwd call

## 2024-07-10 ENCOUNTER — TELEPHONE (OUTPATIENT)
Dept: INTERVENTIONAL RADIOLOGY/VASCULAR | Facility: CLINIC | Age: 54
End: 2024-07-10
Payer: COMMERCIAL

## 2024-07-10 NOTE — TELEPHONE ENCOUNTER
Call and left patient voice message to return call to [phone] to schedule NeuroIR clinic consult to discuss discogram procedure. Ref by Dr. Melton.  Pls fwd call.

## 2024-07-12 ENCOUNTER — TELEPHONE (OUTPATIENT)
Dept: INTERVENTIONAL RADIOLOGY/VASCULAR | Facility: CLINIC | Age: 54
End: 2024-07-12
Payer: COMMERCIAL

## 2024-07-12 NOTE — TELEPHONE ENCOUNTER
Call and spoke with patient to schedule NeuroIR clinic consult for discogram, ref by Dr. Melton.  Patient stated she is worker comp and they are not approving anything at this time. She contacted Dr. Melton's office to let them know. Once approved patient will reach out to us for scheduling.

## 2024-07-19 ENCOUNTER — PATIENT MESSAGE (OUTPATIENT)
Dept: NEUROSURGERY | Facility: CLINIC | Age: 54
End: 2024-07-19
Payer: COMMERCIAL

## 2024-07-29 ENCOUNTER — OFFICE VISIT (OUTPATIENT)
Dept: INTERVENTIONAL RADIOLOGY/VASCULAR | Facility: CLINIC | Age: 54
End: 2024-07-29
Payer: COMMERCIAL

## 2024-07-29 DIAGNOSIS — M54.9 BACK PAIN, UNSPECIFIED BACK LOCATION, UNSPECIFIED BACK PAIN LATERALITY, UNSPECIFIED CHRONICITY: Primary | ICD-10-CM

## 2024-07-29 PROCEDURE — 99202 OFFICE O/P NEW SF 15 MIN: CPT | Mod: 95,,,
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
## 2024-08-06 ENCOUNTER — TELEPHONE (OUTPATIENT)
Dept: NEUROSURGERY | Facility: CLINIC | Age: 54
End: 2024-08-06
Payer: COMMERCIAL

## 2024-08-06 ENCOUNTER — TELEPHONE (OUTPATIENT)
Dept: INTERVENTIONAL RADIOLOGY/VASCULAR | Facility: CLINIC | Age: 54
End: 2024-08-06
Payer: COMMERCIAL

## 2024-08-26 ENCOUNTER — TELEPHONE (OUTPATIENT)
Dept: INTERVENTIONAL RADIOLOGY/VASCULAR | Facility: CLINIC | Age: 54
End: 2024-08-26
Payer: COMMERCIAL

## 2024-10-01 ENCOUNTER — OFFICE VISIT (OUTPATIENT)
Dept: FAMILY MEDICINE | Facility: CLINIC | Age: 54
End: 2024-10-01
Payer: COMMERCIAL

## 2024-10-01 VITALS
HEART RATE: 114 BPM | WEIGHT: 285.5 LBS | SYSTOLIC BLOOD PRESSURE: 130 MMHG | TEMPERATURE: 99 F | DIASTOLIC BLOOD PRESSURE: 82 MMHG | HEIGHT: 64 IN | OXYGEN SATURATION: 96 % | BODY MASS INDEX: 48.74 KG/M2

## 2024-10-01 DIAGNOSIS — D80.3 IGG DEFICIENCY: ICD-10-CM

## 2024-10-01 DIAGNOSIS — D83.2 COMMON VARIABLE IMMUNODEFICIENCY WITH AUTOANTIBODIES TO B- OR T-CELLS: ICD-10-CM

## 2024-10-01 DIAGNOSIS — E55.9 VITAMIN D DEFICIENCY DISEASE: ICD-10-CM

## 2024-10-01 DIAGNOSIS — K58.9 IRRITABLE BOWEL SYNDROME, UNSPECIFIED TYPE: ICD-10-CM

## 2024-10-01 DIAGNOSIS — C7A.020 MALIGNANT CARCINOID TUMOR OF APPENDIX: ICD-10-CM

## 2024-10-01 DIAGNOSIS — Z12.12 SCREENING FOR COLORECTAL CANCER: ICD-10-CM

## 2024-10-01 DIAGNOSIS — M79.7 FIBROMYALGIA: ICD-10-CM

## 2024-10-01 DIAGNOSIS — I10 ESSENTIAL HYPERTENSION: ICD-10-CM

## 2024-10-01 DIAGNOSIS — E66.01 MORBID OBESITY: Primary | ICD-10-CM

## 2024-10-01 DIAGNOSIS — E03.4 HYPOTHYROIDISM DUE TO ACQUIRED ATROPHY OF THYROID: ICD-10-CM

## 2024-10-01 DIAGNOSIS — Z12.11 SCREENING FOR COLORECTAL CANCER: ICD-10-CM

## 2024-10-01 PROCEDURE — 1159F MED LIST DOCD IN RCRD: CPT | Mod: CPTII,S$GLB,, | Performed by: INTERNAL MEDICINE

## 2024-10-01 PROCEDURE — 99999 PR PBB SHADOW E&M-EST. PATIENT-LVL V: CPT | Mod: PBBFAC,,, | Performed by: INTERNAL MEDICINE

## 2024-10-01 PROCEDURE — 3079F DIAST BP 80-89 MM HG: CPT | Mod: CPTII,S$GLB,, | Performed by: INTERNAL MEDICINE

## 2024-10-01 PROCEDURE — 3044F HG A1C LEVEL LT 7.0%: CPT | Mod: CPTII,S$GLB,, | Performed by: INTERNAL MEDICINE

## 2024-10-01 PROCEDURE — 99214 OFFICE O/P EST MOD 30 MIN: CPT | Mod: S$GLB,,, | Performed by: INTERNAL MEDICINE

## 2024-10-01 PROCEDURE — 3008F BODY MASS INDEX DOCD: CPT | Mod: CPTII,S$GLB,, | Performed by: INTERNAL MEDICINE

## 2024-10-01 PROCEDURE — 3077F SYST BP >= 140 MM HG: CPT | Mod: CPTII,S$GLB,, | Performed by: INTERNAL MEDICINE

## 2024-10-01 RX ORDER — ONDANSETRON 4 MG/1
4 TABLET, ORALLY DISINTEGRATING ORAL EVERY 6 HOURS PRN
COMMUNITY

## 2024-10-01 RX ORDER — HYDROCODONE BITARTRATE AND ACETAMINOPHEN 10; 325 MG/1; MG/1
1 TABLET ORAL EVERY 8 HOURS
COMMUNITY
Start: 2024-09-23

## 2024-10-01 RX ORDER — ERGOCALCIFEROL 1.25 MG/1
50000 CAPSULE ORAL
Qty: 12 CAPSULE | Refills: 12 | Status: SHIPPED | OUTPATIENT
Start: 2024-10-01

## 2024-10-01 RX ORDER — ZALEPLON 10 MG/1
CAPSULE ORAL
COMMUNITY

## 2024-10-01 NOTE — PROGRESS NOTES
Chief complaint discuss weight loss medications, review labs and interval events       physical exam 2/24      54-year-old white female with irritable bowel syndrome and hypothyroidism.  TSH up but trending down as of 9/23.  Currently on Synthroid 75 and discussed probable need to make an adjustment based on lab results but TSH ok 2/24  She does inquire about medication for weight loss.  We discussed she does not likely have a diagnosis that would cover weekly injections on her insurance but she can call her particular plan to double check.  Otherwise she will need to get semaglutide from a compounding pharmacy.  I wrote a hand written prescription for the [facility] pharmacy discussing dosages and potential side effects.  She has no history of pancreatitis or no known symptoms and or diagnosis of gastroparesis.  Discussed she still needs to eat less in order to lose weight.    She was seeing a neurosurgeon.  She has pain and numbness going down both legs with prolonged standing and apparently he is going to get a myelogram type of procedure and eventually have surgery.      [facility] her labile blood pressure going up and down at times.  She recently had her metoprolol increased from 25 mg twice a day to 50 twice a day    She also has an immuno deficiency.  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     She is working as an .  She does receive her weekly immunoglobulin infusion in his not been sick lately.    Colon due 6/2024.  Advised her of that and she follows with [facility] who presumably will notify her when ready to schedule.     She continues to follow with her GYN.  She follows with GI as well    Mammo 2020 then 4/24,      She had appointment with her gyn 2/24    She apparently went to have an SI joint fusion.  She also established with a neurosurgeon through workman's comp as she might need to have back  surgery referable to a fall as far back as 2008.  Workman's comp.  Patient is on her inability to walk greater than 200 ft without having to stop and intermittently using a cane, she does again qualify for a handicap tag and I previously gait her a permanent application tag at this point    Vit D low in past- was on weekly so restart the weekly by prescription    TG sl eleveted, HDL and LDL actually okay a year ago        ROS:   CONST: weight stable. EYES: no vision change. ENT: no sore throat. CV: no chest pain w/ exertion. RESP: no shortness of breath. GI: no nausea, vomiting, diarrhea. No dysphagia. : no urinary issues. MUSCULOSKELETAL: no new myalgias or arthralgias. SKIN: no new changes. NEURO: no focal deficits. PSYCH: no new issues. ENDOCRINE: no polyuria. HEME: no lymph nodes. ALLERGY: no general pruritis.    PAST MEDICAL HISTORY:                                                        1. Intersitial cystitis.                                                     2. Fibromyalgia.                                                             3. Mitral valve prolapse.                                                    4. IBS. Dr Rowe , now Dr. Mckeon  , colon 6/24 -5 yrs                                                               5. L-spine disk bulging.  L4-5 -Dr Singh at                                                    6. Total hysterectomy in 2004.   7.  Hypothyroid.                                                             8.  Incidental carcinoid tumor of the appendix.                              9.  Now on gluten-free diet.                                                 10.  Vitamin D deficiency, followed by outside Rheumatology.                  11.  Neurogenic bladder, followed by outside urologist. Ranjeet Vasquez  12.  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 Trigeminal neuralgia of right side of face  14. POTS (postural orthostatic  tachycardia syndrome)  15. HTN  16. sacroiliac joint fusion           PAST SURGICAL HISTORY: Laparotomy x5 Left pelvic mass 12/20/05, pelvic pain 07/31/03, pelvic pain 06/13/02, endometriosis Laparoscopy 10/01,BUBBA and BS&O 04/08/04, Lap cholecystectomy 12/20/05, Appendectomy 12/20/05,Letitia surgery 5/08,Placement of new right InterStim lead and Ablation 1/09, Da Char-assisted lysis of severe adhesions, resection of severe adhesions/possible ovarian remnant.    SOCIAL HISTORY: Never smoked. The patient seldom drinks alcohol. Doesn't use recreational drugs. Lives alone. Single.     FAMILY HISTORY: The patient's family members had Ovarian cancer.No family history of breast cancer. No family history of colon cancer    Vitals as above,   Gen: no distress, exam otherwise deferred         Diagnoses and all orders for this visit:    Morbid obesity, initiate semaglutide discussing potential side effects and expectations    Hypothyroidism due to acquired atrophy of thyroid, euthyroid    Essential hypertension, followed by Cardiology and appears to fluctuate up and down so I will defer making any medication adjustments    Irritable bowel syndrome, unspecified type, chronic and stable, keep follow-up with GI    Screening for colorectal cancer, may very well be due for colonoscopy or overdue    Vitamin D deficiency disease, restart weekly vitamin-D    IgG deficiency    Malignant carcinoid tumor of appendix    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       Answers submitted by the patient for this visit:  Review of Systems Questionnaire (Submitted on 9/30/2024)  activity change: No  unexpected weight change: Yes  neck pain: No  hearing loss: No  rhinorrhea: No  trouble swallowing: No  eye discharge: No  visual disturbance: No  chest  tightness: No  wheezing: No  chest pain: No  palpitations: No  blood in stool: No  constipation: No  vomiting: No  diarrhea: No  polydipsia: No  polyuria: No  difficulty urinating: No  hematuria: No  menstrual problem: No  dysuria: No  joint swelling: No  arthralgias: Yes  headaches: No  weakness: No  confusion: No  dysphoric mood: No

## 2024-10-03 ENCOUNTER — TELEPHONE (OUTPATIENT)
Dept: INTERVENTIONAL RADIOLOGY/VASCULAR | Facility: HOSPITAL | Age: 54
End: 2024-10-03
Payer: COMMERCIAL

## 2024-10-03 NOTE — NURSING
Pre-procedure call complete.  2 patient identifier used (name and ).  Pt instructed not to eat or drink anything after midnight the night before procedure.  Pt aware will need someone to provide transport home and monitor pt 8 hours post procedure.  No driving for at least 24 hours after procedure.   Patient advised to take blood pressure, heart medications,  with a sip of water morning of procedure.  Patient verbalized aware of which medications to take.  Do not take  sleep medication (including OTC) and anxiety medication the night before procedure.  Arrival time and location given.  Expected length of stay reviewed.  Covid screening completed.  Pt verbalized understanding of all pre-procedure instructions.  Written instructions and directions sent to patient in [employer]portal.

## 2024-10-07 ENCOUNTER — LAB VISIT (OUTPATIENT)
Dept: LAB | Facility: HOSPITAL | Age: 54
End: 2024-10-07
Payer: COMMERCIAL

## 2024-10-07 DIAGNOSIS — M54.9 DORSALGIA, UNSPECIFIED: ICD-10-CM

## 2024-10-07 LAB
BASOPHILS # BLD AUTO: 0.04 K/UL (ref 0–0.2)
BASOPHILS NFR BLD: 0.7 % (ref 0–1.9)
DIFFERENTIAL METHOD BLD: ABNORMAL
EOSINOPHIL # BLD AUTO: 0.1 K/UL (ref 0–0.5)
EOSINOPHIL NFR BLD: 1.8 % (ref 0–8)
ERYTHROCYTE [DISTWIDTH] IN BLOOD BY AUTOMATED COUNT: 13.4 % (ref 11.5–14.5)
HCT VFR BLD AUTO: 43.6 % (ref 37–48.5)
HGB BLD-MCNC: 14.2 G/DL (ref 12–16)
IMM GRANULOCYTES # BLD AUTO: 0.02 K/UL (ref 0–0.04)
IMM GRANULOCYTES NFR BLD AUTO: 0.4 % (ref 0–0.5)
INR PPP: 0.9 (ref 0.8–1.2)
LYMPHOCYTES # BLD AUTO: 1.4 K/UL (ref 1–4.8)
LYMPHOCYTES NFR BLD: 25.1 % (ref 18–48)
MCH RBC QN AUTO: 29.5 PG (ref 27–31)
MCHC RBC AUTO-ENTMCNC: 32.6 G/DL (ref 32–36)
MCV RBC AUTO: 91 FL (ref 82–98)
MONOCYTES # BLD AUTO: 0.2 K/UL (ref 0.3–1)
MONOCYTES NFR BLD: 4.4 % (ref 4–15)
NEUTROPHILS # BLD AUTO: 3.7 K/UL (ref 1.8–7.7)
NEUTROPHILS NFR BLD: 67.6 % (ref 38–73)
NRBC BLD-RTO: 0 /100 WBC
PLATELET # BLD AUTO: 222 K/UL (ref 150–450)
PLATELET BLD QL SMEAR: ABNORMAL
PMV BLD AUTO: 9.4 FL (ref 9.2–12.9)
PROTHROMBIN TIME: 10.1 SEC (ref 9–12.5)
RBC # BLD AUTO: 4.81 M/UL (ref 4–5.4)
WBC # BLD AUTO: 5.42 K/UL (ref 3.9–12.7)

## 2024-10-07 PROCEDURE — 85025 COMPLETE CBC W/AUTO DIFF WBC: CPT

## 2024-10-07 PROCEDURE — 36415 COLL VENOUS BLD VENIPUNCTURE: CPT | Mod: PN

## 2024-10-07 PROCEDURE — 85610 PROTHROMBIN TIME: CPT

## 2024-10-16 ENCOUNTER — DOCUMENTATION ONLY (OUTPATIENT)
Dept: ALLERGY | Facility: CLINIC | Age: 54
End: 2024-10-16
Payer: COMMERCIAL

## 2024-10-16 NOTE — PROGRESS NOTES
Received fax from [facility] requesting clinical notes and current BUN and creatinine levels.    I have faxed this as requested to [phone]

## 2024-10-22 ENCOUNTER — TELEPHONE (OUTPATIENT)
Dept: NEUROSURGERY | Facility: CLINIC | Age: 54
End: 2024-10-22
Payer: COMMERCIAL

## 2024-10-22 NOTE — TELEPHONE ENCOUNTER
Called and advised patient that approval for patient's tests have been uploaded on 10/08/2024. Patient requesting documents be sent and coordinated with her , Mary Shaw with [facility]. Sent message to  to assist.     ----- Message from DALE Aviles sent at 10/21/2024  4:04 PM CDT -----  Contact: [phone]  Workers [facility] department does 1010 forms  ----- Message -----  From: Ruby Costello  Sent: 10/21/2024   3:47 PM CDT  To: Traci Morel, DALE Grissom,     Who would I contact to assist with this? Or is this a form we do on our end?  ----- Message -----  From: Abimbola Burr  Sent: 10/21/2024   2:49 PM CDT  To: [facility] Staff    BLANCA GUTHRIE calling regarding Patient Advice (message) Pt states she needs a new 1010 form to be sent in for her to have her test done Pt states the one she had

## 2024-10-23 ENCOUNTER — TELEPHONE (OUTPATIENT)
Dept: NEUROSURGERY | Facility: CLINIC | Age: 54
End: 2024-10-23
Payer: COMMERCIAL

## 2024-10-23 ENCOUNTER — PATIENT MESSAGE (OUTPATIENT)
Dept: FAMILY MEDICINE | Facility: CLINIC | Age: 54
End: 2024-10-23
Payer: COMMERCIAL

## 2024-10-23 DIAGNOSIS — E66.01 MORBID OBESITY: Primary | ICD-10-CM

## 2024-10-23 NOTE — TELEPHONE ENCOUNTER
"Attempted to return patient's call. No answer. Left VM advising call back.    ----- Message from Elzbieta sent at 10/23/2024  3:10 PM CDT -----  Regarding: pt adivce  Contact: [phone]  .Name Of Caller: Self    [phone]  Contact Preference?:     What is the nature of the call?: in reference to having to ask the nurse questions. Pls call      Additional Notes:  "Thank you for all that you do for our patients"  "

## 2024-10-28 ENCOUNTER — TELEPHONE (OUTPATIENT)
Dept: NEUROSURGERY | Facility: CLINIC | Age: 54
End: 2024-10-28
Payer: COMMERCIAL

## 2024-10-28 DIAGNOSIS — M51.9 LUMBAR DISC DISEASE: Primary | ICD-10-CM

## 2024-10-28 DIAGNOSIS — M54.9 DORSALGIA, UNSPECIFIED: ICD-10-CM

## 2024-11-01 ENCOUNTER — TELEPHONE (OUTPATIENT)
Dept: NEUROSURGERY | Facility: CLINIC | Age: 54
End: 2024-11-01
Payer: COMMERCIAL

## 2024-11-27 ENCOUNTER — PATIENT MESSAGE (OUTPATIENT)
Dept: INTERVENTIONAL RADIOLOGY/VASCULAR | Facility: HOSPITAL | Age: 54
End: 2024-11-27
Payer: COMMERCIAL

## 2024-11-29 ENCOUNTER — TELEPHONE (OUTPATIENT)
Dept: INTERVENTIONAL RADIOLOGY/VASCULAR | Facility: HOSPITAL | Age: 54
End: 2024-11-29
Payer: COMMERCIAL

## 2024-11-29 NOTE — NURSING
Pre-procedure call complete.  2 patient identifier used (name and ).  Pt instructed not to eat or drink anything after midnight the night before procedure.  Pt aware will need someone to provide transport home and monitor pt 8 hours post procedure.  No driving for at least 24 hours after procedure.   Patient advised to take blood pressure, heart medications,  with a sip of water morning of procedure.  Patient verbalized aware of which medications to take.  Do not take  sleep medication (including OTC) and anxiety medication the night before procedure.  Arrival time and location given.  Expected length of stay reviewed.  Covid screening completed.  Pt verbalized understanding of all pre-procedure instructions.  Written instructions and directions sent to patient in [employer]/portal.

## 2024-12-02 ENCOUNTER — HOSPITAL ENCOUNTER (OUTPATIENT)
Dept: RADIOLOGY | Facility: HOSPITAL | Age: 54
Discharge: HOME | End: 2024-12-02
Attending: NEUROLOGICAL SURGERY
Payer: COMMERCIAL

## 2024-12-02 DIAGNOSIS — M54.9 DORSALGIA, UNSPECIFIED: ICD-10-CM

## 2024-12-02 DIAGNOSIS — M51.9 LUMBAR DISC DISEASE: ICD-10-CM

## 2024-12-02 PROCEDURE — 72148 MRI LUMBAR SPINE W/O DYE: CPT | Mod: TC

## 2024-12-02 PROCEDURE — 72148 MRI LUMBAR SPINE W/O DYE: CPT | Mod: 26,,, | Performed by: RADIOLOGY

## 2024-12-03 ENCOUNTER — HOSPITAL ENCOUNTER (OUTPATIENT)
Dept: INTERVENTIONAL RADIOLOGY/VASCULAR | Facility: HOSPITAL | Age: 54
Discharge: HOME | End: 2024-12-03
Attending: NEUROLOGICAL SURGERY
Payer: COMMERCIAL

## 2024-12-03 VITALS
WEIGHT: 273 LBS | HEIGHT: 64 IN | SYSTOLIC BLOOD PRESSURE: 149 MMHG | BODY MASS INDEX: 46.61 KG/M2 | DIASTOLIC BLOOD PRESSURE: 90 MMHG | OXYGEN SATURATION: 95 % | HEART RATE: 95 BPM | TEMPERATURE: 98 F | RESPIRATION RATE: 15 BRPM

## 2024-12-03 DIAGNOSIS — M54.9 DORSALGIA, UNSPECIFIED: ICD-10-CM

## 2024-12-03 DIAGNOSIS — M54.16 LUMBAR RADICULITIS: ICD-10-CM

## 2024-12-03 DIAGNOSIS — M51.9 LUMBAR DISC DISEASE: ICD-10-CM

## 2024-12-03 PROCEDURE — 63600175 PHARM REV CODE 636 W HCPCS: Performed by: RADIOLOGY

## 2024-12-03 PROCEDURE — 25000003 PHARM REV CODE 250: Performed by: RADIOLOGY

## 2024-12-03 PROCEDURE — 63600175 PHARM REV CODE 636 W HCPCS: Performed by: STUDENT IN AN ORGANIZED HEALTH CARE EDUCATION/TRAINING PROGRAM

## 2024-12-03 RX ORDER — ONDANSETRON HYDROCHLORIDE 2 MG/ML
4 INJECTION, SOLUTION INTRAVENOUS EVERY 6 HOURS PRN
Status: DISCONTINUED | OUTPATIENT
Start: 2024-12-03 | End: 2024-12-04 | Stop reason: HOSPADM

## 2024-12-03 RX ORDER — OXYCODONE AND ACETAMINOPHEN 5; 325 MG/1; MG/1
TABLET ORAL
Status: DISCONTINUED
Start: 2024-12-03 | End: 2024-12-04 | Stop reason: HOSPADM

## 2024-12-03 RX ORDER — SODIUM CHLORIDE 9 MG/ML
INJECTION, SOLUTION INTRAVENOUS CONTINUOUS
Status: DISCONTINUED | OUTPATIENT
Start: 2024-12-03 | End: 2024-12-04 | Stop reason: HOSPADM

## 2024-12-03 RX ORDER — CEFAZOLIN SODIUM 1 G/3ML
INJECTION, POWDER, FOR SOLUTION INTRAMUSCULAR; INTRAVENOUS
Status: COMPLETED | OUTPATIENT
Start: 2024-12-03 | End: 2024-12-03

## 2024-12-03 RX ORDER — OXYCODONE AND ACETAMINOPHEN 5; 325 MG/1; MG/1
1 TABLET ORAL ONCE
Status: COMPLETED | OUTPATIENT
Start: 2024-12-03 | End: 2024-12-03

## 2024-12-03 RX ORDER — FENTANYL CITRATE 50 UG/ML
INJECTION, SOLUTION INTRAMUSCULAR; INTRAVENOUS
Status: COMPLETED | OUTPATIENT
Start: 2024-12-03 | End: 2024-12-03

## 2024-12-03 RX ORDER — LIDOCAINE HYDROCHLORIDE 10 MG/ML
1 INJECTION, SOLUTION EPIDURAL; INFILTRATION; INTRACAUDAL; PERINEURAL ONCE
Status: DISCONTINUED | OUTPATIENT
Start: 2024-12-03 | End: 2024-12-04 | Stop reason: HOSPADM

## 2024-12-03 RX ORDER — MIDAZOLAM HYDROCHLORIDE 1 MG/ML
INJECTION, SOLUTION INTRAMUSCULAR; INTRAVENOUS
Status: COMPLETED | OUTPATIENT
Start: 2024-12-03 | End: 2024-12-03

## 2024-12-03 RX ADMIN — FENTANYL CITRATE 50 MCG: 50 INJECTION, SOLUTION INTRAMUSCULAR; INTRAVENOUS at 02:12

## 2024-12-03 RX ADMIN — CEFAZOLIN 2 G: 330 INJECTION, POWDER, FOR SOLUTION INTRAMUSCULAR; INTRAVENOUS at 02:12

## 2024-12-03 RX ADMIN — OXYCODONE HYDROCHLORIDE AND ACETAMINOPHEN 1 TABLET: 5; 325 TABLET ORAL at 03:12

## 2024-12-03 RX ADMIN — FENTANYL CITRATE 25 MCG: 50 INJECTION, SOLUTION INTRAMUSCULAR; INTRAVENOUS at 01:12

## 2024-12-03 RX ADMIN — MIDAZOLAM HYDROCHLORIDE 1 MG: 2 INJECTION, SOLUTION INTRAMUSCULAR; INTRAVENOUS at 02:12

## 2024-12-03 RX ADMIN — MIDAZOLAM HYDROCHLORIDE 0.5 MG: 2 INJECTION, SOLUTION INTRAMUSCULAR; INTRAVENOUS at 01:12

## 2024-12-03 RX ADMIN — MIDAZOLAM HYDROCHLORIDE 0.5 MG: 2 INJECTION, SOLUTION INTRAMUSCULAR; INTRAVENOUS at 02:12

## 2024-12-03 RX ADMIN — FENTANYL CITRATE 25 MCG: 50 INJECTION, SOLUTION INTRAMUSCULAR; INTRAVENOUS at 02:12

## 2024-12-03 NOTE — PROCEDURES
Interventional Neuroradiology Post-Procedure Note  Pre Op Diagnosis: Lumbar Discogenic Back Pain     Post Op Diagnosis: Same     Procedure: Lumbar Discogram      Procedure performed by: Antelmo JUAN, Enmanuel; Timo Barone MD and Marcial Pena MD     Written Informed Consent Obtained: Yes     Specimen Removed: NO     Estimated Blood Loss: Minimal     Findings:    Informed consent was obtained. Patient was placed prone on the biplane angiography table. Patient was only mildly sedated, and the patient was able to answer questions and a coherent fashion throughout the examination.  The procedure was performed from a left posterior lateral approach. Curved 22-gauge Chiba needles were then used to enter the disk at each of these levels. Contrast was injected with biplane fluoroscopy, and the patient's symptoms were recorded, including the type and severity of pain, whether the pain was concordant to usual symptoms, and the intensity of pain on a zero to 10 scale. X-rays were recorded in multiple projections at each disk level.     Please see radiology report for details of lumbar discogram.       Patient tolerated this well. Needles were removed and hemostasis was achieved.           Marcial Pena MD  Fellow, NeuroEndovascular Surgery, [facility] [place] [facility]

## 2024-12-03 NOTE — SEDATION DOCUMENTATION
Pt tolerated L5 - S1 discogram. Procedure completed. NAD. VSS. Pt to recover in MPU x1 hour lying flat. Report to be given at bedside.

## 2024-12-03 NOTE — PLAN OF CARE
Patient admitted to sscu room 9 for IR procedure. Patient aao x3. Vss. Iv hep lcok placed. Pre procedure admission completed. Plan of care reviewed with patient. Call lightplaced within reach.

## 2024-12-03 NOTE — H&P
Interventional Neuroradiology Pre-Procedure Note    Chief Complaint: Low back pain    History of Present Illness:  Kianna Zuleta is a 54 y.o. female with h/o L5-S1 microdiscectomy and persistent low back pain is referred for lumbar discogram.     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    MVP (mitral valve prolapse)     Pneumonia     POTS (postural orthostatic tachycardia syndrome)     Recurrent upper respiratory infection (URI)     Ulcer     Vitamin D deficiency disease 11/6/2012     Past Surgical History:   Procedure Laterality Date    ANKLE SURGERY      lt    APPENDECTOMY      BACK SURGERY      CHOLECYSTECTOMY      choley & ovarian cyst removed  12/2005    cystoscope      multiple    HYSTERECTOMY  4/8/2004    BUBBA BS&O     interstim      OOPHORECTOMY  4/8/2004    with hysterectomy     PELVIC LAPAROSCOPY      OH EXPLORATORY OF ABDOMEN      SINUS SURGERY  03/01/2016    SPINE SURGERY         Home Meds:   Prior to Admission medications    Medication Sig Start Date End Date Taking? Authorizing Provider   amLODIPine (NORVASC) 2.5 MG tablet Take 1 tablet by mouth once daily. 7/5/23   Provider, Historical   azelastine (ASTELIN) 137 mcg (0.1 %) nasal spray 1 spray by Nasal route.    Provider, Historical   celecoxib (CELEBREX) 200 MG capsule TAKE 1 CAPSULE BY MOUTH IN THE MORNING WITH FOOD 1/26/24   Provider, Historical   cetirizine (ZYRTEC) 10 MG tablet Take 10 mg by mouth once daily. 7/15/22   Provider, Historical   cloNIDine (CATAPRES) 0.1 MG tablet Take 0.1 mg by mouth daily as needed. Take as needed for BP > 150/100 mmHg per outside cardiologist    Provider, Historical   dicyclomine (BENTYL) 20 mg tablet Take 20 mg by mouth 2 (two)  times daily as needed. 1/25/23   Provider, Historical   EPINEPHrine (AUVI-Q) 0.3 mg/0.3 mL AtIn Inject 0.3 mLs (0.3 mg total) into the muscle once. for 1 dose 2/22/23 10/1/24  Gigi Celis MD   ergocalciferol (ERGOCALCIFEROL) 50,000 unit Cap Take 1 capsule (50,000 Units total) by mouth every 7 days. 10/1/24   Gigi Celis MD   estradioL (ESTRACE) 2 MG tablet TAKE 1 TABLET(2 MG) BY MOUTH EVERY DAY 6/17/24   Eduardo Christianson NP   famotidine (PEPCID) 40 MG tablet Take 40 mg by mouth daily as needed. 4/19/23   Provider, Historical   fluticasone propionate (FLONASE) 50 mcg/actuation nasal spray 1 spray (50 mcg total) by Each Nostril route once daily. 10/6/22   Marty Rey NP   gabapentin (NEURONTIN) 300 MG capsule Take 600 mg by mouth 2 (two) times daily. 2-300 mg cap in AM and 3-300 mg cap in PM    Provider, Historical   HYDROcodone-acetaminophen (NORCO)  mg per tablet Take 1 tablet by mouth every 8 (eight) hours. 9/23/24   Provider, Historical   hydrOXYzine HCL (ATARAX) 25 MG tablet Take 1 tablet (25 mg total) by mouth 3 (three) times daily as needed for Itching. 4/21/23   Kaylin Miller MD   immun glob G,IgG,-pro-IgA 0-50 (HIZENTRA) 1 gram/5 mL (20 %) Syrg Inject 5 mLs (1 g total) into the skin every 7 days. 5/6/24   Kaylin Miller MD   immun glob G,IgG,-pro-IgA 0-50 (HIZENTRA) 10 gram/50 mL (20 %) Soln Inject 5 mLs (1 g total) into the skin every 7 days. 5/30/23   Kaylin Miller MD   indapamide (LOZOL) 1.25 MG Tab TAKE 1 TABLET BY MOUTH DAILY 5/25/21   Gigi Celis MD   levocetirizine (XYZAL) 5 MG tablet TK 1 T PO  ONCE Day 11/16/16   Provider, Historical   levothyroxine (SYNTHROID) 75 MCG tablet TAKE 1 TABLET(75 MCG) BY MOUTH BEFORE BREAKFAST 6/13/24   Gigi Celis MD   LIDOcaine-prilocaine (EMLA) cream Apply topically as needed (apply to infusion site). 4/16/21   Kaylin Miller MD   metoprolol succinate (TOPROL-XL) 100 MG 24 hr tablet  10/2/23   Provider,  Historical   ondansetron (ZOFRAN-ODT) 4 MG TbDL Take 4 mg by mouth every 6 (six) hours as needed.    Provider, Historical   oxyCODONE-acetaminophen (PERCOCET)  mg per tablet TK 1 T PO Q 6 H PRN 10/17/18   Provider, Historical   tiZANidine (ZANAFLEX) 4 MG tablet SMARTSI-3 Tablet(s) By Mouth 3 Times Daily 23   Provider, Historical   traZODone (DESYREL) 100 MG tablet Take by mouth. 4/3/24   Provider, Historical   TRINTELLIX 20 mg Tab TK 1 T PO QAM 5/3/20   Provider, Historical   vitamin D (VITAMIN D3) 1000 units Tab Take 1,000 Units by mouth once daily.    Provider, Historical   zaleplon (SONATA) 10 MG capsule Take by mouth.    Provider, Historical     Anticoagulants/Antiplatelets: no anticoagulation    Allergies:   Review of patient's allergies indicates:   Allergen Reactions    Benadryl allergy decongestant Anaphylaxis     Other reaction(s): Anaphylaxis    Fludrocortisone Hives    Gluten Other (See Comments)     Other reaction(s) GI upset    Diphenhydramine hcl     Hydromorphone hcl      No issue with morphine in the past per pt.     Indomethacin Hives     No issue with Toradol in the past per pt.     Lisinopril Nausea And Vomiting     Swelling and vomiting    Penicillins Rash     No issue with cephalosporins in the past per pt.     Sulfa (sulfonamide antibiotics) Rash    Vancomycin analogues Rash     Sedation History:  have not been any systemic reactions    Review of Systems:   Hematological: negative  no known coagulopathies  Respiratory: no cough, shortness of breath, or wheezing  no shortness of breath  Cardiovascular: no chest pain or dyspnea on exertion  no chest pain  Gastrointestinal: no abdominal pain, change in bowel habits, or black or bloody stools  no abdominal pain  Genito-Urinary: no dysuria, trouble voiding, or hematuria  no dysuria  Musculoskeletal: +back pain  Neurological: no TIA or stroke symptoms         OBJECTIVE:     Vital Signs (Most Recent)       Physical Exam:  ASA:  2  Mallampati: 2    General: no acute distress  Mental Status: alert and oriented to person, place and time  HEENT: normocephalic, atraumatic  Chest: unlabored breathing  Heart: regular heart rate  Abdomen: nondistended  Extremity: moves all extremities    Laboratory  Lab Results   Component Value Date    INR 0.9 10/07/2024       Lab Results   Component Value Date    WBC 5.42 10/07/2024    HGB 14.2 10/07/2024    HCT 43.6 10/07/2024    MCV 91 10/07/2024     10/07/2024      Lab Results   Component Value Date    GLU 94 04/24/2024     04/24/2024    K 3.7 04/24/2024     04/24/2024    CO2 23 04/24/2024    BUN 10 04/24/2024    CREATININE 0.7 04/24/2024    CALCIUM 9.9 04/24/2024    MG 2.3 08/02/2007    ALT 35 04/24/2024    AST 45 (H) 04/24/2024    ALBUMIN 3.5 04/24/2024    BILITOT 0.3 04/24/2024    BILIDIR 0.1 07/29/2020       ASSESSMENT/PLAN:   -Sedation Plan: Up to moderate   -Patient will undergo: fluoroscopy guided lumbar discogram at L5-S1 level with controls at L2-3, L3-4 and L4-5.                Timo Barone MD, MHA  Fellow, NeuroEndovascular Surgery, [facility] [place]  Neurologist, [facility] [place], [place]

## 2024-12-03 NOTE — DISCHARGE INSTRUCTIONS
Please call with any questions or concerns.      Monday through Friday 8:00 am - 5:00 pm    Interventional Radiology Clinic  [phone]    After Hours    Ask the  for the Radiology Resident on call  [phone]        You may remove the dressing over your procedure site 24 hours after your procedure. You may shower 24 hours after your procedure. Keep the procedure site clean, dry, and open to air. Do not submerge in water (bath, pool, hot tub, ect.) until the site is completely healed, usually about 7 days.

## 2024-12-05 ENCOUNTER — HOSPITAL ENCOUNTER (OUTPATIENT)
Dept: INTERVENTIONAL RADIOLOGY/VASCULAR | Facility: HOSPITAL | Age: 54
Discharge: HOME | End: 2024-12-05
Payer: COMMERCIAL

## 2024-12-05 NOTE — PROGRESS NOTES
Patient presented to the minor procedures area today to evaluate swelling noticed after having a lumbar discogram(12/3/24). Patient noticed this area after the procedure and reports some mild soreness but no severe pain. No lower extremity weakness. No fevers/chills. On exam, there is a small superficial hematoma on the skin. No surrounding erythema or fluctuance. Bedside ultrasound was performed and could not be visualized with US given that it was so superficial. No deep collections. The site was marked and patient was given instructions and the contact info for IR as well as my direct contact information. I reassured the patient and said this would likely resolve on its own. She was in agreement with plan.    Enmanuel Rodriges MD  Department of Radiology

## 2024-12-05 NOTE — DISCHARGE INSTRUCTIONS
Please call with any questions or concerns.      Monday thru Friday 8:00 am - 4:30 pm    Interventional Radiology   [phone]    After Hours    Ask for the Radiology Intern on call  [phone]       [facility]  [phone]

## 2024-12-17 ENCOUNTER — OFFICE VISIT (OUTPATIENT)
Dept: NEUROSURGERY | Facility: CLINIC | Age: 54
End: 2024-12-17
Payer: COMMERCIAL

## 2024-12-17 DIAGNOSIS — Z98.890 HX OF MICRODISCECTOMY: ICD-10-CM

## 2024-12-17 DIAGNOSIS — M54.16 LUMBAR RADICULITIS: Primary | ICD-10-CM

## 2024-12-17 DIAGNOSIS — M51.9 LUMBAR DISC DISEASE: ICD-10-CM

## 2024-12-17 PROCEDURE — 99999 PR PBB SHADOW E&M-EST. PATIENT-LVL I: CPT | Mod: PBBFAC,,, | Performed by: NEUROLOGICAL SURGERY

## 2024-12-17 PROCEDURE — 99214 OFFICE O/P EST MOD 30 MIN: CPT | Mod: S$GLB,,, | Performed by: NEUROLOGICAL SURGERY
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
## 2024-12-18 ENCOUNTER — TELEPHONE (OUTPATIENT)
Dept: NEUROSURGERY | Facility: CLINIC | Age: 54
End: 2024-12-18
Payer: COMMERCIAL

## 2024-12-18 ENCOUNTER — PATIENT MESSAGE (OUTPATIENT)
Dept: NEUROSURGERY | Facility: CLINIC | Age: 54
End: 2024-12-18
Payer: COMMERCIAL

## 2024-12-18 DIAGNOSIS — M51.9 LUMBAR DISC DISEASE: Primary | ICD-10-CM

## 2024-12-19 ENCOUNTER — PATIENT MESSAGE (OUTPATIENT)
Dept: ALLERGY | Facility: CLINIC | Age: 54
End: 2024-12-19
Payer: COMMERCIAL

## 2024-12-31 DIAGNOSIS — M51.26 HNP (HERNIATED NUCLEUS PULPOSUS), LUMBAR: Primary | ICD-10-CM

## 2024-12-31 DIAGNOSIS — M54.10 RADICULOPATHY, UNSPECIFIED SPINAL REGION: ICD-10-CM

## 2024-12-31 DIAGNOSIS — M47.817 SPONDYLOSIS OF LUMBOSACRAL REGION, UNSPECIFIED SPINAL OSTEOARTHRITIS COMPLICATION STATUS: ICD-10-CM

## 2025-01-07 ENCOUNTER — TELEPHONE (OUTPATIENT)
Dept: FAMILY MEDICINE | Facility: CLINIC | Age: 55
End: 2025-01-07
Payer: COMMERCIAL

## 2025-01-07 DIAGNOSIS — Z01.818 PREOPERATIVE TESTING: Primary | ICD-10-CM

## 2025-01-07 NOTE — PRE-PROCEDURE INSTRUCTIONS
Patient stated has gotten PONV with some surgeries in the past. With her last spine surgery she said her LOS was increased due to PONV. Will need medical clearance from your PCP, Dr. Gigi Celis. She will make an appt. Will need cardiac clearance from Dr.Brian Wylie.  She will make an appt. Will need poc appt, labs, ua, and EKG. Our  will call to set up these appts.        Preop instructions given. Hold aspirin, aspirin containing products, nsaids( Aleve, Advil, Motrin, Ibuprofen, Naprosyn, Naproxen, Voltaren, Diclofenac, Mobic, Meloxicam, Celebrex, Celecoxib), vitamins( Ergocalciferol, Vitamin D) and supplements one week prior to surgery.     May take Tylenol. Hold Semaglutide at least one week prior to surgery.( Also sent to [facility] portal)   Verbalizes understanding.
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
## 2025-01-07 NOTE — TELEPHONE ENCOUNTER
Pt realizes now that her surgery is workmen's comp. Can she still see you for clearance?         Pt already has a appt for 1/22.          ----- Message from Nurse Goode sent at 1/7/2025  1:14 PM CST -----  Patient is scheduled for anterior posterior spine fusion on 1/27 with Dr. Melton.( Approximately  500 minutes of general anesthesia)  She will need medical clearance. Please schedule a preop clearance appt.   Thanks!

## 2025-01-09 ENCOUNTER — TELEPHONE (OUTPATIENT)
Dept: NEUROSURGERY | Facility: CLINIC | Age: 55
End: 2025-01-09
Payer: COMMERCIAL

## 2025-01-09 NOTE — TELEPHONE ENCOUNTER
Called and gave pt WC dept numbers to follow up and inquire.    ----- Message from DALE Avilse sent at 1/9/2025 11:09 AM CST -----  That is up to when our workmans comp department will call them  ----- Message -----  From: Ruby Costello  Sent: 1/9/2025  10:30 AM CST  To: Traci Morel RN    Do we send them any specific paperwork?  ----- Message -----  From: Janina Lin  Sent: 1/9/2025  10:28 AM CST  To: Linh BAUGH Staff    Type:  Needs Medical Advice    Who Called: Ms Hutchison     Would the patient rather a call back or a response via [facility]? Call   Best Call Back Number:  [phone]  Additional Information: She is calling to see when her [employer]  will be getting the paperwork will be sent to them to approve her surgery. Her surgery is scheduled for 01/27

## 2025-01-10 ENCOUNTER — PATIENT MESSAGE (OUTPATIENT)
Dept: FAMILY MEDICINE | Facility: CLINIC | Age: 55
End: 2025-01-10
Payer: COMMERCIAL

## 2025-01-13 ENCOUNTER — PATIENT MESSAGE (OUTPATIENT)
Dept: NEUROSURGERY | Facility: CLINIC | Age: 55
End: 2025-01-13
Payer: COMMERCIAL

## 2025-01-13 NOTE — TELEPHONE ENCOUNTER
Please advise,    Lauri Hutchison,           I will approve for you to treat with your PCP for a one time visit since he does not accept workers compensation, in the hope to clear you! Please set up your appointment with them and ask that they send me a [phone] if it is necessary for approval for them to treat you. Please also provide them with your claim number ([id number]) and our billing information which is [address] [address] [place] [address]. Dr. Waters is also approved for you to see him to clear you as well.

## 2025-01-22 ENCOUNTER — TELEPHONE (OUTPATIENT)
Dept: NEUROSURGERY | Facility: CLINIC | Age: 55
End: 2025-01-22
Payer: COMMERCIAL

## 2025-01-22 NOTE — TELEPHONE ENCOUNTER
Informed patient that we may have to reschedule her upcoming surgery due to her not being cleared by her pcp she verbalized understanding.

## 2025-01-22 NOTE — TELEPHONE ENCOUNTER
----- Message from Belkis sent at 1/22/2025  1:55 PM CST -----  Pt calling to reschedule pre opt for Friday  due to weather , also pt clearance itzel with PCP was cancelled     due to weather , no available itzel till; after  procedure  date     Confirmed patient's contact info below:  Contact Name: Kianna Zuleta  Phone Number: [phone]

## 2025-01-23 DIAGNOSIS — Z01.818 PREOPERATIVE TESTING: Primary | ICD-10-CM

## 2025-01-24 ENCOUNTER — TELEPHONE (OUTPATIENT)
Dept: NEUROSURGERY | Facility: CLINIC | Age: 55
End: 2025-01-24
Payer: COMMERCIAL

## 2025-01-24 ENCOUNTER — PATIENT MESSAGE (OUTPATIENT)
Dept: NEUROSURGERY | Facility: CLINIC | Age: 55
End: 2025-01-24
Payer: COMMERCIAL

## 2025-01-24 NOTE — TELEPHONE ENCOUNTER
Ernestina Pickard Staff  Received Faxed [facility] 1489 Form signed by  Worker Comp  Isela Natarajanjason [phone] / [phone] claim # [id number],  APPROVED okay to proceed.     herniated nucleus pulposus), lumbar [M51.26]     Osteotomy Lumb Sp,Ant,Remv Disk [32862]  Arthrodesis, Presacral Interbody Technique; Fuse W/ [88064]  Anterior Instrumentation 2-3 Vertebral Segments [38611]  Posterior Non-Segmental Instrumentation [75849]  Posterior Non-Segmental Instrumentation [51483]     L5-S1 /IN PATIENT 1-3 DAY STAY ON 1/27/2025 @ [facility]    Ernestina Villarreal

## 2025-01-26 ENCOUNTER — ANESTHESIA EVENT (OUTPATIENT)
Dept: SURGERY | Facility: HOSPITAL | Age: 55
DRG: 402 | End: 2025-01-26
Payer: COMMERCIAL

## 2025-01-27 ENCOUNTER — ANESTHESIA (OUTPATIENT)
Dept: SURGERY | Facility: HOSPITAL | Age: 55
DRG: 402 | End: 2025-01-27
Payer: COMMERCIAL

## 2025-01-27 ENCOUNTER — HOSPITAL ENCOUNTER (INPATIENT)
Facility: HOSPITAL | Age: 55
LOS: 3 days | Discharge: HOME | DRG: 402 | End: 2025-01-30
Attending: NEUROLOGICAL SURGERY | Admitting: NEUROLOGICAL SURGERY
Payer: COMMERCIAL

## 2025-01-27 ENCOUNTER — APPOINTMENT (OUTPATIENT)
Dept: RADIOLOGY | Facility: HOSPITAL | Age: 55
End: 2025-01-27
Attending: NEUROLOGICAL SURGERY
Payer: COMMERCIAL

## 2025-01-27 DIAGNOSIS — R93.89 ABNORMAL CT OF THE CHEST: ICD-10-CM

## 2025-01-27 DIAGNOSIS — M46.1 SACROILIITIS, NOT ELSEWHERE CLASSIFIED: ICD-10-CM

## 2025-01-27 DIAGNOSIS — R00.0 SINUS TACHYCARDIA: ICD-10-CM

## 2025-01-27 DIAGNOSIS — E03.4 HYPOTHYROIDISM DUE TO ACQUIRED ATROPHY OF THYROID: ICD-10-CM

## 2025-01-27 DIAGNOSIS — D80.3 IGG DEFICIENCY: ICD-10-CM

## 2025-01-27 DIAGNOSIS — C7A.020 MALIGNANT CARCINOID TUMOR OF APPENDIX: ICD-10-CM

## 2025-01-27 DIAGNOSIS — R70.0 ELEVATED SED RATE: ICD-10-CM

## 2025-01-27 DIAGNOSIS — F32.A DEPRESSION, UNSPECIFIED DEPRESSION TYPE: ICD-10-CM

## 2025-01-27 DIAGNOSIS — Z85.9 HISTORY OF MALIGNANT CARCINOID TUMOR: ICD-10-CM

## 2025-01-27 DIAGNOSIS — G90.A POTS (POSTURAL ORTHOSTATIC TACHYCARDIA SYNDROME): Chronic | ICD-10-CM

## 2025-01-27 DIAGNOSIS — H92.01 OTALGIA OF RIGHT EAR: ICD-10-CM

## 2025-01-27 DIAGNOSIS — M54.9 BACK PAIN, UNSPECIFIED BACK LOCATION, UNSPECIFIED BACK PAIN LATERALITY, UNSPECIFIED CHRONICITY: Primary | ICD-10-CM

## 2025-01-27 DIAGNOSIS — R79.82 CRP ELEVATED: ICD-10-CM

## 2025-01-27 DIAGNOSIS — R00.2 PALPITATIONS: ICD-10-CM

## 2025-01-27 DIAGNOSIS — G50.0 TRIGEMINAL NEURALGIA OF RIGHT SIDE OF FACE: ICD-10-CM

## 2025-01-27 DIAGNOSIS — M79.7 FIBROMYALGIA: ICD-10-CM

## 2025-01-27 DIAGNOSIS — U07.1 COVID: ICD-10-CM

## 2025-01-27 DIAGNOSIS — E55.9 VITAMIN D DEFICIENCY: ICD-10-CM

## 2025-01-27 DIAGNOSIS — E87.6 HYPOKALEMIA: ICD-10-CM

## 2025-01-27 DIAGNOSIS — F41.9 ANXIETY: ICD-10-CM

## 2025-01-27 DIAGNOSIS — M51.360 DISCOGENIC LOW BACK PAIN: ICD-10-CM

## 2025-01-27 DIAGNOSIS — Z01.818 PREOPERATIVE TESTING: ICD-10-CM

## 2025-01-27 DIAGNOSIS — R74.8 ELEVATED ALKALINE PHOSPHATASE LEVEL: ICD-10-CM

## 2025-01-27 DIAGNOSIS — D83.2 COMMON VARIABLE IMMUNODEFICIENCY WITH AUTOANTIBODIES TO B- OR T-CELLS: ICD-10-CM

## 2025-01-27 DIAGNOSIS — R07.89 CHEST PAIN, ATYPICAL: ICD-10-CM

## 2025-01-27 DIAGNOSIS — I10 ESSENTIAL HYPERTENSION: ICD-10-CM

## 2025-01-27 DIAGNOSIS — K76.0 FATTY LIVER DISEASE, NONALCOHOLIC: ICD-10-CM

## 2025-01-27 DIAGNOSIS — E66.01 MORBID OBESITY: ICD-10-CM

## 2025-01-27 DIAGNOSIS — K58.9 IRRITABLE BOWEL SYNDROME, UNSPECIFIED TYPE: ICD-10-CM

## 2025-01-27 LAB
ABO + RH BLD: NORMAL
ANION GAP SERPL CALC-SCNC: 14 MMOL/L (ref 8–16)
APTT PPP: 22.7 SEC (ref 21–32)
BASOPHILS # BLD AUTO: 0.04 K/UL (ref 0–0.2)
BASOPHILS NFR BLD: 0.7 % (ref 0–1.9)
BLD GP AB SCN CELLS X3 SERPL QL: NORMAL
BUN SERPL-MCNC: 12 MG/DL (ref 6–20)
CALCIUM SERPL-MCNC: 9.6 MG/DL (ref 8.7–10.5)
CHLORIDE SERPL-SCNC: 101 MMOL/L (ref 95–110)
CO2 SERPL-SCNC: 22 MMOL/L (ref 23–29)
CREAT SERPL-MCNC: 0.7 MG/DL (ref 0.5–1.4)
DIFFERENTIAL METHOD BLD: ABNORMAL
EOSINOPHIL # BLD AUTO: 0.1 K/UL (ref 0–0.5)
EOSINOPHIL NFR BLD: 2 % (ref 0–8)
ERYTHROCYTE [DISTWIDTH] IN BLOOD BY AUTOMATED COUNT: 13.2 % (ref 11.5–14.5)
EST. GFR  (NO RACE VARIABLE): >60 ML/MIN/1.73 M^2
GLUCOSE SERPL-MCNC: 84 MG/DL (ref 70–110)
HCT VFR BLD AUTO: 41.5 % (ref 37–48.5)
HGB BLD-MCNC: 14 G/DL (ref 12–16)
IMM GRANULOCYTES # BLD AUTO: 0.04 K/UL (ref 0–0.04)
IMM GRANULOCYTES NFR BLD AUTO: 0.7 % (ref 0–0.5)
INR PPP: 1 (ref 0.8–1.2)
LYMPHOCYTES # BLD AUTO: 1.5 K/UL (ref 1–4.8)
LYMPHOCYTES NFR BLD: 24 % (ref 18–48)
MCH RBC QN AUTO: 28.8 PG (ref 27–31)
MCHC RBC AUTO-ENTMCNC: 33.7 G/DL (ref 32–36)
MCV RBC AUTO: 85 FL (ref 82–98)
MONOCYTES # BLD AUTO: 0.3 K/UL (ref 0.3–1)
MONOCYTES NFR BLD: 5.3 % (ref 4–15)
NEUTROPHILS # BLD AUTO: 4.1 K/UL (ref 1.8–7.7)
NEUTROPHILS NFR BLD: 67.3 % (ref 38–73)
NRBC BLD-RTO: 0 /100 WBC
PLATELET # BLD AUTO: 223 K/UL (ref 150–450)
PMV BLD AUTO: 9 FL (ref 9.2–12.9)
POTASSIUM SERPL-SCNC: 3.8 MMOL/L (ref 3.5–5.1)
PROTHROMBIN TIME: 10.6 SEC (ref 9–12.5)
RBC # BLD AUTO: 4.86 M/UL (ref 4–5.4)
SODIUM SERPL-SCNC: 137 MMOL/L (ref 136–145)
SPECIMEN OUTDATE: NORMAL
T4 FREE SERPL-MCNC: 0.91 NG/DL (ref 0.71–1.51)
TSH SERPL DL<=0.005 MIU/L-ACNC: 4.29 UIU/ML (ref 0.4–4)
WBC # BLD AUTO: 6.03 K/UL (ref 3.9–12.7)

## 2025-01-27 PROCEDURE — 84439 ASSAY OF FREE THYROXINE: CPT | Performed by: ANESTHESIOLOGY

## 2025-01-27 PROCEDURE — 27201423 OPTIME MED/SURG SUP & DEVICES STERILE SUPPLY: Performed by: NEUROLOGICAL SURGERY

## 2025-01-27 PROCEDURE — 76000 FLUOROSCOPY <1 HR PHYS/QHP: CPT | Mod: TC

## 2025-01-27 PROCEDURE — 22612 ARTHRD PST TQ 1NTRSPC LUMBAR: CPT | Mod: 51,,, | Performed by: NEUROLOGICAL SURGERY

## 2025-01-27 PROCEDURE — 36000711: Performed by: NEUROLOGICAL SURGERY

## 2025-01-27 PROCEDURE — 22558 ARTHRD ANT NTRBD MIN DSC LUM: CPT | Mod: 62,,, | Performed by: SURGERY

## 2025-01-27 PROCEDURE — 86920 COMPATIBILITY TEST SPIN: CPT | Performed by: ANESTHESIOLOGY

## 2025-01-27 PROCEDURE — 25000003 PHARM REV CODE 250

## 2025-01-27 PROCEDURE — 71000016 HC POSTOP RECOV ADDL HR: Performed by: NEUROLOGICAL SURGERY

## 2025-01-27 PROCEDURE — 4A11X4G MONITORING OF PERIPHERAL NERVOUS ELECTRICAL ACTIVITY, INTRAOPERATIVE, EXTERNAL APPROACH: ICD-10-PCS | Performed by: NEUROLOGICAL SURGERY

## 2025-01-27 PROCEDURE — C1713 ANCHOR/SCREW BN/BN,TIS/BN: HCPCS | Performed by: NEUROLOGICAL SURGERY

## 2025-01-27 PROCEDURE — 37000009 HC ANESTHESIA EA ADD 15 MINS: Performed by: NEUROLOGICAL SURGERY

## 2025-01-27 PROCEDURE — 85610 PROTHROMBIN TIME: CPT

## 2025-01-27 PROCEDURE — 27800903 OPTIME MED/SURG SUP & DEVICES OTHER IMPLANTS: Performed by: NEUROLOGICAL SURGERY

## 2025-01-27 PROCEDURE — 71000015 HC POSTOP RECOV 1ST HR: Performed by: NEUROLOGICAL SURGERY

## 2025-01-27 PROCEDURE — 36000710: Performed by: NEUROLOGICAL SURGERY

## 2025-01-27 PROCEDURE — 25000003 PHARM REV CODE 250: Performed by: NEUROLOGICAL SURGERY

## 2025-01-27 PROCEDURE — D9220A PRA ANESTHESIA: Mod: ANES,,, | Performed by: ANESTHESIOLOGY

## 2025-01-27 PROCEDURE — D9220A PRA ANESTHESIA: Mod: CRNA,,, | Performed by: NURSE ANESTHETIST, CERTIFIED REGISTERED

## 2025-01-27 PROCEDURE — 37000008 HC ANESTHESIA 1ST 15 MINUTES: Performed by: NEUROLOGICAL SURGERY

## 2025-01-27 PROCEDURE — 71000033 HC RECOVERY, INTIAL HOUR: Performed by: NEUROLOGICAL SURGERY

## 2025-01-27 PROCEDURE — 61783 SCAN PROC SPINAL: CPT | Mod: ,,, | Performed by: NEUROLOGICAL SURGERY

## 2025-01-27 PROCEDURE — 63600175 PHARM REV CODE 636 W HCPCS

## 2025-01-27 PROCEDURE — 85025 COMPLETE CBC W/AUTO DIFF WBC: CPT

## 2025-01-27 PROCEDURE — 22845 INSERT SPINE FIXATION DEVICE: CPT | Mod: 59,,, | Performed by: NEUROLOGICAL SURGERY

## 2025-01-27 PROCEDURE — 11000001 HC ACUTE MED/SURG PRIVATE ROOM

## 2025-01-27 PROCEDURE — 85730 THROMBOPLASTIN TIME PARTIAL: CPT

## 2025-01-27 PROCEDURE — 20930 SP BONE ALGRFT MORSEL ADD-ON: CPT | Mod: ,,, | Performed by: NEUROLOGICAL SURGERY

## 2025-01-27 PROCEDURE — 0ST40ZZ RESECTION OF LUMBOSACRAL DISC, OPEN APPROACH: ICD-10-PCS | Performed by: NEUROLOGICAL SURGERY

## 2025-01-27 PROCEDURE — 25000003 PHARM REV CODE 250: Performed by: NURSE ANESTHETIST, CERTIFIED REGISTERED

## 2025-01-27 PROCEDURE — 25000003 PHARM REV CODE 250: Performed by: ANESTHESIOLOGY

## 2025-01-27 PROCEDURE — 22840 INSERT SPINE FIXATION DEVICE: CPT | Mod: ,,, | Performed by: NEUROLOGICAL SURGERY

## 2025-01-27 PROCEDURE — 63600175 PHARM REV CODE 636 W HCPCS: Performed by: NEUROLOGICAL SURGERY

## 2025-01-27 PROCEDURE — 94761 N-INVAS EAR/PLS OXIMETRY MLT: CPT

## 2025-01-27 PROCEDURE — 22558 ARTHRD ANT NTRBD MIN DSC LUM: CPT | Mod: 62,22,, | Performed by: NEUROLOGICAL SURGERY

## 2025-01-27 PROCEDURE — 0SG30A0 FUSION OF LUMBOSACRAL JOINT WITH INTERBODY FUSION DEVICE, ANTERIOR APPROACH, ANTERIOR COLUMN, OPEN APPROACH: ICD-10-PCS | Performed by: NEUROLOGICAL SURGERY

## 2025-01-27 PROCEDURE — 8E0W0CZ ROBOTIC ASSISTED PROCEDURE OF TRUNK REGION, OPEN APPROACH: ICD-10-PCS | Performed by: NEUROLOGICAL SURGERY

## 2025-01-27 PROCEDURE — 63600175 PHARM REV CODE 636 W HCPCS: Performed by: ANESTHESIOLOGY

## 2025-01-27 PROCEDURE — 36415 COLL VENOUS BLD VENIPUNCTURE: CPT

## 2025-01-27 PROCEDURE — 86850 RBC ANTIBODY SCREEN: CPT

## 2025-01-27 PROCEDURE — 0SG30K1 FUSION OF LUMBOSACRAL JOINT WITH NONAUTOLOGOUS TISSUE SUBSTITUTE, POSTERIOR APPROACH, POSTERIOR COLUMN, OPEN APPROACH: ICD-10-PCS | Performed by: NEUROLOGICAL SURGERY

## 2025-01-27 PROCEDURE — 22853 INSJ BIOMECHANICAL DEVICE: CPT | Mod: ,,, | Performed by: NEUROLOGICAL SURGERY

## 2025-01-27 PROCEDURE — 80048 BASIC METABOLIC PNL TOTAL CA: CPT

## 2025-01-27 PROCEDURE — 63600175 PHARM REV CODE 636 W HCPCS: Performed by: NURSE ANESTHETIST, CERTIFIED REGISTERED

## 2025-01-27 PROCEDURE — C1769 GUIDE WIRE: HCPCS | Performed by: NEUROLOGICAL SURGERY

## 2025-01-27 PROCEDURE — 84443 ASSAY THYROID STIM HORMONE: CPT | Performed by: ANESTHESIOLOGY

## 2025-01-27 DEVICE — SCREW SNIPER 6.5MM X 45MM: Type: IMPLANTABLE DEVICE | Site: SPINE LUMBAR | Status: FUNCTIONAL

## 2025-01-27 DEVICE — SCREW SNIPER 6.5MM X 40MM: Type: IMPLANTABLE DEVICE | Site: SPINE LUMBAR | Status: FUNCTIONAL

## 2025-01-27 DEVICE — PLATE, 26 MM/30 MM (W) X 11 MM (H)
Type: IMPLANTABLE DEVICE | Site: SPINE LUMBAR | Status: FUNCTIONAL
Brand: LEVA® ANTERIOR INTERBODY SYSTEM

## 2025-01-27 DEVICE — BONE GRAFT KIT 7510100 INFUSE X SMALL
Type: IMPLANTABLE DEVICE | Site: SPINE LUMBAR | Status: FUNCTIONAL
Brand: INFUSE® BONE GRAFT

## 2025-01-27 DEVICE — SET SCREW SPINAL LOCKING: Type: IMPLANTABLE DEVICE | Site: SPINE LUMBAR | Status: FUNCTIONAL

## 2025-01-27 DEVICE — GWIRE SPINAL BLNT TIP 1.6X19: Type: IMPLANTABLE DEVICE | Site: SPINE LUMBAR | Status: FUNCTIONAL

## 2025-01-27 DEVICE — PUTTY OSTEOSELECT DBM SYR 5CC: Type: IMPLANTABLE DEVICE | Site: SPINE LUMBAR | Status: FUNCTIONAL

## 2025-01-27 DEVICE — SCREW SCHANZ 4.0 X150: Type: IMPLANTABLE DEVICE | Site: SPINE LUMBAR | Status: FUNCTIONAL

## 2025-01-27 DEVICE — ROD SPINAL PERC 40MM LORDOSED: Type: IMPLANTABLE DEVICE | Site: SPINE LUMBAR | Status: FUNCTIONAL

## 2025-01-27 RX ORDER — REMIFENTANIL HYDROCHLORIDE 1 MG/ML
INJECTION, POWDER, LYOPHILIZED, FOR SOLUTION INTRAVENOUS
Status: DISCONTINUED | OUTPATIENT
Start: 2025-01-27 | End: 2025-01-27

## 2025-01-27 RX ORDER — ACETAMINOPHEN 10 MG/ML
INJECTION, SOLUTION INTRAVENOUS
Status: DISCONTINUED | OUTPATIENT
Start: 2025-01-27 | End: 2025-01-27

## 2025-01-27 RX ORDER — DICYCLOMINE HYDROCHLORIDE 20 MG/1
20 TABLET ORAL 2 TIMES DAILY PRN
Status: DISCONTINUED | OUTPATIENT
Start: 2025-01-27 | End: 2025-01-30 | Stop reason: HOSPADM

## 2025-01-27 RX ORDER — CETIRIZINE HYDROCHLORIDE 10 MG/1
10 TABLET ORAL NIGHTLY
Status: DISCONTINUED | OUTPATIENT
Start: 2025-01-27 | End: 2025-01-30 | Stop reason: HOSPADM

## 2025-01-27 RX ORDER — DEXAMETHASONE SODIUM PHOSPHATE 4 MG/ML
INJECTION, SOLUTION INTRA-ARTICULAR; INTRALESIONAL; INTRAMUSCULAR; INTRAVENOUS; SOFT TISSUE
Status: DISCONTINUED | OUTPATIENT
Start: 2025-01-27 | End: 2025-01-27

## 2025-01-27 RX ORDER — FENTANYL CITRATE 50 UG/ML
INJECTION, SOLUTION INTRAMUSCULAR; INTRAVENOUS
Status: DISCONTINUED | OUTPATIENT
Start: 2025-01-27 | End: 2025-01-27

## 2025-01-27 RX ORDER — MUPIROCIN 20 MG/G
OINTMENT TOPICAL 2 TIMES DAILY
Status: DISCONTINUED | OUTPATIENT
Start: 2025-01-27 | End: 2025-01-30 | Stop reason: HOSPADM

## 2025-01-27 RX ORDER — OXYCODONE HYDROCHLORIDE 10 MG/1
10 TABLET ORAL EVERY 6 HOURS PRN
Status: DISCONTINUED | OUTPATIENT
Start: 2025-01-27 | End: 2025-01-30 | Stop reason: HOSPADM

## 2025-01-27 RX ORDER — CEFAZOLIN 2 G/1
2 INJECTION, POWDER, FOR SOLUTION INTRAMUSCULAR; INTRAVENOUS
Status: CANCELLED | OUTPATIENT
Start: 2025-01-27

## 2025-01-27 RX ORDER — ALUMINUM HYDROXIDE, MAGNESIUM HYDROXIDE, AND SIMETHICONE 1200; 120; 1200 MG/30ML; MG/30ML; MG/30ML
30 SUSPENSION ORAL EVERY 4 HOURS PRN
Status: DISCONTINUED | OUTPATIENT
Start: 2025-01-27 | End: 2025-01-30 | Stop reason: HOSPADM

## 2025-01-27 RX ORDER — METOPROLOL SUCCINATE 100 MG/1
100 TABLET, EXTENDED RELEASE ORAL 2 TIMES DAILY
Status: DISCONTINUED | OUTPATIENT
Start: 2025-01-27 | End: 2025-01-30 | Stop reason: HOSPADM

## 2025-01-27 RX ORDER — CEFAZOLIN 2 G/1
INJECTION, POWDER, FOR SOLUTION INTRAMUSCULAR; INTRAVENOUS
Status: DISCONTINUED | OUTPATIENT
Start: 2025-01-27 | End: 2025-01-27

## 2025-01-27 RX ORDER — MIDAZOLAM HYDROCHLORIDE 1 MG/ML
INJECTION INTRAMUSCULAR; INTRAVENOUS
Status: DISCONTINUED | OUTPATIENT
Start: 2025-01-27 | End: 2025-01-27

## 2025-01-27 RX ORDER — MAGNESIUM SULFATE HEPTAHYDRATE 40 MG/ML
INJECTION, SOLUTION INTRAVENOUS
Status: DISCONTINUED | OUTPATIENT
Start: 2025-01-27 | End: 2025-01-27

## 2025-01-27 RX ORDER — METHOCARBAMOL 100 MG/ML
1000 INJECTION, SOLUTION INTRAMUSCULAR; INTRAVENOUS ONCE
Status: COMPLETED | OUTPATIENT
Start: 2025-01-27 | End: 2025-01-27

## 2025-01-27 RX ORDER — LEVOTHYROXINE SODIUM 75 UG/1
75 TABLET ORAL
Status: DISCONTINUED | OUTPATIENT
Start: 2025-01-28 | End: 2025-01-30 | Stop reason: HOSPADM

## 2025-01-27 RX ORDER — ESTRADIOL 1 MG/1
2 TABLET ORAL NIGHTLY
Status: DISCONTINUED | OUTPATIENT
Start: 2025-01-27 | End: 2025-01-30 | Stop reason: HOSPADM

## 2025-01-27 RX ORDER — KETAMINE HCL IN 0.9 % NACL 50 MG/5 ML
SYRINGE (ML) INTRAVENOUS
Status: DISCONTINUED | OUTPATIENT
Start: 2025-01-27 | End: 2025-01-27

## 2025-01-27 RX ORDER — PROCHLORPERAZINE EDISYLATE 5 MG/ML
5 INJECTION INTRAMUSCULAR; INTRAVENOUS EVERY 6 HOURS PRN
Status: DISCONTINUED | OUTPATIENT
Start: 2025-01-27 | End: 2025-01-30 | Stop reason: HOSPADM

## 2025-01-27 RX ORDER — SCOPOLAMINE 1 MG/3D
1 PATCH, EXTENDED RELEASE TRANSDERMAL ONCE
Status: COMPLETED | OUTPATIENT
Start: 2025-01-27 | End: 2025-01-27

## 2025-01-27 RX ORDER — PROPOFOL 10 MG/ML
VIAL (ML) INTRAVENOUS
Status: DISCONTINUED | OUTPATIENT
Start: 2025-01-27 | End: 2025-01-27

## 2025-01-27 RX ORDER — FENTANYL CITRATE 50 UG/ML
25 INJECTION, SOLUTION INTRAMUSCULAR; INTRAVENOUS EVERY 5 MIN PRN
Status: DISCONTINUED | OUTPATIENT
Start: 2025-01-27 | End: 2025-01-27 | Stop reason: HOSPADM

## 2025-01-27 RX ORDER — CLONIDINE HYDROCHLORIDE 0.1 MG/1
0.1 TABLET ORAL 2 TIMES DAILY
Status: DISCONTINUED | OUTPATIENT
Start: 2025-01-27 | End: 2025-01-30 | Stop reason: HOSPADM

## 2025-01-27 RX ORDER — HEPARIN SODIUM 5000 [USP'U]/ML
7500 INJECTION, SOLUTION INTRAVENOUS; SUBCUTANEOUS EVERY 8 HOURS
Status: DISCONTINUED | OUTPATIENT
Start: 2025-01-28 | End: 2025-01-28

## 2025-01-27 RX ORDER — GLUCAGON 1 MG
1 KIT INJECTION
Status: DISCONTINUED | OUTPATIENT
Start: 2025-01-27 | End: 2025-01-27 | Stop reason: HOSPADM

## 2025-01-27 RX ORDER — GABAPENTIN 300 MG/1
600 CAPSULE ORAL 3 TIMES DAILY
Status: DISCONTINUED | OUTPATIENT
Start: 2025-01-27 | End: 2025-01-30 | Stop reason: HOSPADM

## 2025-01-27 RX ORDER — MEPERIDINE HYDROCHLORIDE 50 MG/ML
12.5 INJECTION INTRAMUSCULAR; INTRAVENOUS; SUBCUTANEOUS EVERY 10 MIN PRN
Status: DISCONTINUED | OUTPATIENT
Start: 2025-01-27 | End: 2025-01-27 | Stop reason: HOSPADM

## 2025-01-27 RX ORDER — ONDANSETRON HYDROCHLORIDE 2 MG/ML
INJECTION, SOLUTION INTRAVENOUS
Status: DISCONTINUED | OUTPATIENT
Start: 2025-01-27 | End: 2025-01-27

## 2025-01-27 RX ORDER — MUPIROCIN 20 MG/G
1 OINTMENT TOPICAL 2 TIMES DAILY
Status: DISCONTINUED | OUTPATIENT
Start: 2025-01-27 | End: 2025-01-27 | Stop reason: HOSPADM

## 2025-01-27 RX ORDER — SODIUM CHLORIDE 9 MG/ML
INJECTION, SOLUTION INTRAVENOUS CONTINUOUS
Status: DISCONTINUED | OUTPATIENT
Start: 2025-01-27 | End: 2025-01-30

## 2025-01-27 RX ORDER — AMOXICILLIN 250 MG
2 CAPSULE ORAL NIGHTLY PRN
Status: DISCONTINUED | OUTPATIENT
Start: 2025-01-27 | End: 2025-01-30 | Stop reason: HOSPADM

## 2025-01-27 RX ORDER — HALOPERIDOL 5 MG/ML
0.5 INJECTION INTRAMUSCULAR EVERY 10 MIN PRN
Status: COMPLETED | OUTPATIENT
Start: 2025-01-27 | End: 2025-01-27

## 2025-01-27 RX ORDER — ONDANSETRON 8 MG/1
8 TABLET, ORALLY DISINTEGRATING ORAL EVERY 6 HOURS PRN
Status: DISCONTINUED | OUTPATIENT
Start: 2025-01-27 | End: 2025-01-30 | Stop reason: HOSPADM

## 2025-01-27 RX ORDER — DEXMEDETOMIDINE HYDROCHLORIDE 100 UG/ML
INJECTION, SOLUTION INTRAVENOUS
Status: DISCONTINUED | OUTPATIENT
Start: 2025-01-27 | End: 2025-01-27

## 2025-01-27 RX ORDER — LIDOCAINE HYDROCHLORIDE 20 MG/ML
INJECTION, SOLUTION EPIDURAL; INFILTRATION; INTRACAUDAL; PERINEURAL
Status: DISCONTINUED | OUTPATIENT
Start: 2025-01-27 | End: 2025-01-27

## 2025-01-27 RX ORDER — ROCURONIUM BROMIDE 10 MG/ML
INJECTION, SOLUTION INTRAVENOUS
Status: DISCONTINUED | OUTPATIENT
Start: 2025-01-27 | End: 2025-01-27

## 2025-01-27 RX ORDER — LIDOCAINE HYDROCHLORIDE AND EPINEPHRINE 10; 10 UG/ML; MG/ML
INJECTION, SOLUTION INFILTRATION; PERINEURAL
Status: DISCONTINUED | OUTPATIENT
Start: 2025-01-27 | End: 2025-01-27 | Stop reason: HOSPADM

## 2025-01-27 RX ORDER — DIAZEPAM 10 MG/2ML
2.5 INJECTION INTRAMUSCULAR
Status: DISCONTINUED | OUTPATIENT
Start: 2025-01-27 | End: 2025-01-27 | Stop reason: HOSPADM

## 2025-01-27 RX ORDER — BISACODYL 10 MG/1
10 SUPPOSITORY RECTAL DAILY
Status: DISCONTINUED | OUTPATIENT
Start: 2025-01-27 | End: 2025-01-28

## 2025-01-27 RX ORDER — HYDRALAZINE HYDROCHLORIDE 20 MG/ML
10 INJECTION INTRAMUSCULAR; INTRAVENOUS EVERY 6 HOURS PRN
Status: DISCONTINUED | OUTPATIENT
Start: 2025-01-27 | End: 2025-01-30 | Stop reason: HOSPADM

## 2025-01-27 RX ORDER — FAMOTIDINE 20 MG/1
40 TABLET, FILM COATED ORAL DAILY
Status: DISCONTINUED | OUTPATIENT
Start: 2025-01-28 | End: 2025-01-30 | Stop reason: HOSPADM

## 2025-01-27 RX ORDER — ACETAMINOPHEN 325 MG/1
650 TABLET ORAL EVERY 6 HOURS
Status: DISCONTINUED | OUTPATIENT
Start: 2025-01-27 | End: 2025-01-30 | Stop reason: HOSPADM

## 2025-01-27 RX ORDER — MORPHINE SULFATE 2 MG/ML
4 INJECTION, SOLUTION INTRAMUSCULAR; INTRAVENOUS EVERY 4 HOURS PRN
Status: DISCONTINUED | OUTPATIENT
Start: 2025-01-27 | End: 2025-01-30 | Stop reason: HOSPADM

## 2025-01-27 RX ORDER — TRAZODONE HYDROCHLORIDE 100 MG/1
100 TABLET ORAL NIGHTLY PRN
Status: DISCONTINUED | OUTPATIENT
Start: 2025-01-27 | End: 2025-01-30 | Stop reason: HOSPADM

## 2025-01-27 RX ORDER — MUPIROCIN 20 MG/G
OINTMENT TOPICAL
Status: DISCONTINUED | OUTPATIENT
Start: 2025-01-27 | End: 2025-01-27 | Stop reason: HOSPADM

## 2025-01-27 RX ORDER — AMLODIPINE BESYLATE 2.5 MG/1
2.5 TABLET ORAL DAILY
Status: DISCONTINUED | OUTPATIENT
Start: 2025-01-28 | End: 2025-01-30 | Stop reason: HOSPADM

## 2025-01-27 RX ORDER — METHOCARBAMOL 500 MG/1
1000 TABLET, FILM COATED ORAL 3 TIMES DAILY
Status: DISCONTINUED | OUTPATIENT
Start: 2025-01-27 | End: 2025-01-30 | Stop reason: HOSPADM

## 2025-01-27 RX ADMIN — HALOPERIDOL LACTATE 0.5 MG: 5 INJECTION, SOLUTION INTRAMUSCULAR at 02:01

## 2025-01-27 RX ADMIN — PROPOFOL 150 MG: 10 INJECTION, EMULSION INTRAVENOUS at 07:01

## 2025-01-27 RX ADMIN — SCOPALAMINE 1 PATCH: 1 PATCH, EXTENDED RELEASE TRANSDERMAL at 07:01

## 2025-01-27 RX ADMIN — MAGNESIUM SULFATE 2 G: 2 INJECTION INTRAVENOUS at 08:01

## 2025-01-27 RX ADMIN — PROPOFOL 50 MCG/KG/MIN: 10 INJECTION, EMULSION INTRAVENOUS at 07:01

## 2025-01-27 RX ADMIN — ACETAMINOPHEN 1000 MG: 10 INJECTION INTRAVENOUS at 01:01

## 2025-01-27 RX ADMIN — GABAPENTIN 600 MG: 300 CAPSULE ORAL at 05:01

## 2025-01-27 RX ADMIN — MUPIROCIN: 20 OINTMENT TOPICAL at 05:01

## 2025-01-27 RX ADMIN — DEXMEDETOMIDINE 4 MCG: 100 INJECTION, SOLUTION, CONCENTRATE INTRAVENOUS at 08:01

## 2025-01-27 RX ADMIN — ESTRADIOL 2 MG: 1 TABLET ORAL at 08:01

## 2025-01-27 RX ADMIN — DEXMEDETOMIDINE 8 MCG: 100 INJECTION, SOLUTION, CONCENTRATE INTRAVENOUS at 08:01

## 2025-01-27 RX ADMIN — SODIUM CHLORIDE: 9 INJECTION, SOLUTION INTRAVENOUS at 10:01

## 2025-01-27 RX ADMIN — HALOPERIDOL LACTATE 0.5 MG: 5 INJECTION, SOLUTION INTRAMUSCULAR at 03:01

## 2025-01-27 RX ADMIN — OXYCODONE HYDROCHLORIDE 15 MG: 10 TABLET ORAL at 03:01

## 2025-01-27 RX ADMIN — MUPIROCIN: 20 OINTMENT TOPICAL at 09:01

## 2025-01-27 RX ADMIN — ACETAMINOPHEN 650 MG: 325 TABLET ORAL at 05:01

## 2025-01-27 RX ADMIN — DIAZEPAM 2.5 MG: 10 INJECTION, SOLUTION INTRAMUSCULAR; INTRAVENOUS at 02:01

## 2025-01-27 RX ADMIN — FENTANYL CITRATE 25 MCG: 50 INJECTION, SOLUTION INTRAMUSCULAR; INTRAVENOUS at 03:01

## 2025-01-27 RX ADMIN — MORPHINE SULFATE 4 MG: 2 INJECTION, SOLUTION INTRAMUSCULAR; INTRAVENOUS at 11:01

## 2025-01-27 RX ADMIN — PROCHLORPERAZINE EDISYLATE 5 MG: 5 INJECTION INTRAMUSCULAR; INTRAVENOUS at 02:01

## 2025-01-27 RX ADMIN — CETIRIZINE HYDROCHLORIDE 10 MG: 10 TABLET, FILM COATED ORAL at 08:01

## 2025-01-27 RX ADMIN — METHOCARBAMOL 1000 MG: 500 TABLET ORAL at 08:01

## 2025-01-27 RX ADMIN — ONDANSETRON 8 MG: 8 TABLET, ORALLY DISINTEGRATING ORAL at 10:01

## 2025-01-27 RX ADMIN — Medication 20 MG: at 08:01

## 2025-01-27 RX ADMIN — SODIUM CHLORIDE: 0.9 INJECTION, SOLUTION INTRAVENOUS at 07:01

## 2025-01-27 RX ADMIN — METHOCARBAMOL 1000 MG: 1000 INJECTION, SOLUTION INTRAMUSCULAR; INTRAVENOUS at 03:01

## 2025-01-27 RX ADMIN — CLONIDINE HYDROCHLORIDE 0.1 MG: 0.1 TABLET ORAL at 08:01

## 2025-01-27 RX ADMIN — SODIUM CHLORIDE: 0.9 INJECTION, SOLUTION INTRAVENOUS at 09:01

## 2025-01-27 RX ADMIN — ONDANSETRON 4 MG: 2 INJECTION INTRAMUSCULAR; INTRAVENOUS at 01:01

## 2025-01-27 RX ADMIN — FENTANYL CITRATE 50 MCG: 50 INJECTION, SOLUTION INTRAMUSCULAR; INTRAVENOUS at 02:01

## 2025-01-27 RX ADMIN — DEXMEDETOMIDINE 4 MCG: 100 INJECTION, SOLUTION, CONCENTRATE INTRAVENOUS at 07:01

## 2025-01-27 RX ADMIN — DEXAMETHASONE SODIUM PHOSPHATE 8 MG: 4 INJECTION, SOLUTION INTRAMUSCULAR; INTRAVENOUS at 08:01

## 2025-01-27 RX ADMIN — CEFAZOLIN 3 G: 2 INJECTION, POWDER, FOR SOLUTION INTRAMUSCULAR; INTRAVENOUS at 08:01

## 2025-01-27 RX ADMIN — MEPERIDINE HYDROCHLORIDE 12.5 MG: 50 INJECTION INTRAMUSCULAR; INTRAVENOUS; SUBCUTANEOUS at 04:01

## 2025-01-27 RX ADMIN — OXYCODONE HYDROCHLORIDE 15 MG: 10 TABLET ORAL at 09:01

## 2025-01-27 RX ADMIN — METOPROLOL SUCCINATE 100 MG: 100 TABLET, EXTENDED RELEASE ORAL at 08:01

## 2025-01-27 RX ADMIN — ROCURONIUM BROMIDE 50 MG: 10 INJECTION, SOLUTION INTRAVENOUS at 08:01

## 2025-01-27 RX ADMIN — PROPOFOL 50 MG: 10 INJECTION, EMULSION INTRAVENOUS at 07:01

## 2025-01-27 RX ADMIN — FENTANYL CITRATE 100 MCG: 50 INJECTION, SOLUTION INTRAMUSCULAR; INTRAVENOUS at 07:01

## 2025-01-27 RX ADMIN — ROCURONIUM BROMIDE 50 MG: 10 INJECTION, SOLUTION INTRAVENOUS at 07:01

## 2025-01-27 RX ADMIN — REMIFENTANIL HYDROCHLORIDE 0.4 MCG/KG/MIN: 1 INJECTION, POWDER, LYOPHILIZED, FOR SOLUTION INTRAVENOUS at 07:01

## 2025-01-27 RX ADMIN — CEFAZOLIN 3 G: 2 INJECTION, POWDER, FOR SOLUTION INTRAMUSCULAR; INTRAVENOUS at 12:01

## 2025-01-27 RX ADMIN — LIDOCAINE HYDROCHLORIDE 100 MG: 20 INJECTION, SOLUTION EPIDURAL; INFILTRATION; INTRACAUDAL; PERINEURAL at 07:01

## 2025-01-27 RX ADMIN — PHENYLEPHRINE HYDROCHLORIDE 0.2 MCG/KG/MIN: 10 INJECTION INTRAVENOUS at 07:01

## 2025-01-27 RX ADMIN — GABAPENTIN 600 MG: 300 CAPSULE ORAL at 08:01

## 2025-01-27 RX ADMIN — MORPHINE SULFATE 4 MG: 2 INJECTION, SOLUTION INTRAMUSCULAR; INTRAVENOUS at 06:01

## 2025-01-27 RX ADMIN — DIAZEPAM 2.5 MG: 10 INJECTION, SOLUTION INTRAMUSCULAR; INTRAVENOUS at 04:01

## 2025-01-27 RX ADMIN — MIDAZOLAM HYDROCHLORIDE 2 MG: 2 INJECTION, SOLUTION INTRAMUSCULAR; INTRAVENOUS at 07:01

## 2025-01-27 NOTE — NURSING TRANSFER
Nursing Transfer Note      1/27/2025   5:37 PM    Nurse giving handoff:Liliana HUNTER PACU  Nurse receiving handoff:Freddie HUNTER POSS    Reason patient is being transferred: s/p anesthesia recovery    Transfer To: Room 529    Transfer via bed    Transfer with 2L to O2    Transported by PACU PCT    Transfer Vital Signs: see flowsheets    Additional Lines: Oxygen and Zuleta Catheter    Medicines sent: none    Any special needs or follow-up needed: none    Patient belongings transferred with patient: No    Chart send with patient: Yes    Notified: sister and sister in law    Patient reassessed at: 1/27/25 4290 (date, time)    Upon arrival to floor: patient oriented to room, call bell in reach, and bed in lowest position

## 2025-01-27 NOTE — TRANSFER OF CARE
"Anesthesia Transfer of Care Note    Patient: Kianna Zuleta    Procedure(s) Performed: Procedure(s) (LRB):  **LOOP X** FUSION, SPINE, LUMBAR, ALIF (N/A)  FUSION, SPINE, LUMBAR MIS POSTERIOR SCREWS LOOP X for POSTERIOR (N/A)    Patient location: PACU    Anesthesia Type: general    Transport from OR: Transported from OR on 6-10 L/min O2 by face mask with adequate spontaneous ventilation    Post pain: adequate analgesia    Post assessment: no apparent anesthetic complications    Post vital signs: stable    Level of consciousness: sedated    Nausea/Vomiting: no nausea/vomiting    Complications: none    Transfer of care protocol was followed    Last vitals: Visit Vitals  /69 (BP Location: Right arm, Patient Position: Sitting)   Pulse 89   Temp 36.8 °C (98.2 °F) (Temporal)   Resp 18   Ht 5' 4" (1.626 m)   Wt 122.5 kg (270 lb)   SpO2 98%   Breastfeeding No   BMI 46.35 kg/m²     "

## 2025-01-27 NOTE — ANESTHESIA PROCEDURE NOTES
Intubation    Date/Time: 1/27/2025 7:15 AM    Performed by: Jerson Rdz CRNA  Authorized by: Curt Rodriguez MD    Intubation:     Induction:  Intravenous    Intubated:  Postinduction    Mask Ventilation:  Moderately difficult with oral airway    Attempts:  1    Attempted By:  Student    Method of Intubation:  Video laryngoscopy    Blade:  Duarte 3    Laryngeal View Grade: Grade I - full view of cords      Difficult Airway Encountered?: No      Complications:  None    Airway Device:  Oral endotracheal tube    Airway Device Size:  7.5    Style/Cuff Inflation:  Cuffed (inflated to minimal occlusive pressure)    Inflation Amount (mL):  7    Tube secured:  21    Secured at:  The lips    Placement Verified By:  Capnometry and Revisualization with laryngoscopy    Complicating Factors:  None    Findings Post-Intubation:  BS equal bilateral and atraumatic/condition of teeth unchanged
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
## 2025-01-27 NOTE — BRIEF OP NOTE
[facility] - Surgery ([facility])  Brief Operative Note    SUMMARY     Surgery Date: 1/27/2025     Surgeons and Role:     * Theron Melton MD - Primary     * Liliana Smith MD - Resident - Assisting     * Herson Neal MD - Fellow     * SHARMILA Parker II, MD - Co-Surgeon        Pre-op Diagnosis:  HNP (herniated nucleus pulposus), lumbar [M51.26]  Radiculopathy, unspecified spinal region [M54.10]  Spondylosis of lumbosacral region, unspecified spinal osteoarthritis complication status [M47.817]    Post-op Diagnosis:  Post-Op Diagnosis Codes:     * HNP (herniated nucleus pulposus), lumbar [M51.26]     * Radiculopathy, unspecified spinal region [M54.10]     * Spondylosis of lumbosacral region, unspecified spinal osteoarthritis complication status [M47.817]    Procedure(s) (LRB):  **LOOP X** FUSION, SPINE, LUMBAR, ALIF (N/A)  FUSION, SPINE, LUMBAR MIS POSTERIOR SCREWS LOOP X for POSTERIOR (N/A)    Anesthesia: General    Implants:  Implant Name Type Inv. Item Serial No.  Lot No. LRB No. Used Action   KIT BONE GRFT BMP XS - [id number]  KIT BONE GRFT BMP XS  [employer] [facility] [id number] N/A 1 Implanted   PUTTY OSTEOSELECT DBM SYR 5CC - [id number]  PUTTY OSTEOSELECT DBM SYR 5C [id number] BACTERIN  N/A 1 Implanted   LEVA ANTERIOR INTERBODY SYSTEM PLATE, 26MM/30MM (W) X 11MM (H)    [employer] [id number] N/A 1 Implanted   LEVA ANTERIOR INTERBODY SYSTEM MODULAR SPACER 26 MM (W) X 26 MM (L) X 11 MM (H), 15 DEGREE LORDOTIC    [employer] [id number] N/A 1 Implanted   LEVA ANTERIOR INTERBODY SYSTEM SCREW, 3.5 MM HEX, 5 MM X 30 MM    [employer] [id number] N/A 1 Implanted   LEVA ANTERIOR INTERBODY SYSTEM SCREW, 3.5 MM HEX, 5 MM X 30 MM    [employer] [id number] N/A 1 Implanted   LEVA ANTERIOR INTERBODY SYSTEM SCREW, 3.5 MM HEX, 5 MM X 30 MM    [employer] [id number] N/A 1 Implanted   SCREW SNIPER 6.5MM X 45MM - [id number]  SCREW SNIPER  6.5MM X 45MM  [employer]  N/A 2 Implanted   SCREW SNIPER 6.5MM X 40MM - [id number]  SCREW SNIPER 6.5MM X 40MM  [employer]  N/A 2 Implanted   ERICA SPINAL PERC 40MM LORDOSED - [id number]  ERICA SPINAL PERC 40MM LORDOSED  [employer]  N/A 2 Implanted   SET SCREW SPINAL LOCKING - [id number]  SET SCREW SPINAL LOCKING  [employer]  N/A 4 Implanted   SCREW SNIPER 6.5MM X 45MM - [id number]  SCREW SNIPER 6.5MM X 45MM  [employer]  N/A 1 Explanted       Operative Findings: L5-S1 ALIF, vascular surgery assistance for exposure, percutaneous posterior instrumentation.     Estimated Blood Loss: * No values recorded between 1/27/2025  8:06 AM and 1/27/2025  2:18 PM *    Estimated Blood Loss has not been documented. EBL = 300.         Specimens:   Specimen (24h ago, onward)      None            [id number]

## 2025-01-27 NOTE — ANESTHESIA PREPROCEDURE EVALUATION
01/27/2025  Kianna Zuleta is a 54 y.o., female.      Pre-op Assessment          Review of Systems  Cardiovascular:     Hypertension                                    Hypertension         Pulmonary:  Pneumonia     Recent URI   Pulmonary Symptoms:              Pulmonary Infection:  Pneumonia.     Hepatic/GI:      Liver Disease,   Taking GLP-1 Agonists Instructed to Hold for 7 Days No Reported GI Symptoms       Liver Disease        Neurological:    Neuromuscular Disease,                                 Neuromuscular Disease   Endocrine:   Hypothyroidism       Hypothyroidism          Psych:  Psychiatric History              Physical Exam  General: Well nourished and Cooperative    Airway:  Mallampati: III / II  Mouth Opening: Normal  TM Distance: Normal  Tongue: Normal  Neck ROM: Normal ROM    Dental:  Intact    Chest/Lungs:  Clear to auscultation, Normal Respiratory Rate    Heart:  Rate: Normal    Anesthesia Plan  Type of Anesthesia, risks & benefits discussed:    Anesthesia Type: Gen ETT  Intra-op Monitoring Plan: Standard ASA Monitors and Art Line  Post Op Pain Control Plan: multimodal analgesia and IV/PO Opioids PRN  Induction:  IV  Airway Plan: , Post-Induction  Informed Consent: Informed consent signed with the Patient and all parties understand the risks and agree with anesthesia plan.  All questions answered.   ASA Score: 3  Day of Surgery Review of History & Physical: H&P Update referred to the surgeon/provider.    Ready For Surgery From Anesthesia Perspective.   .

## 2025-01-28 ENCOUNTER — APPOINTMENT (OUTPATIENT)
Dept: RADIOLOGY | Facility: HOSPITAL | Age: 55
End: 2025-01-28
Payer: COMMERCIAL

## 2025-01-28 LAB
ANION GAP SERPL CALC-SCNC: 12 MMOL/L (ref 8–16)
BASOPHILS # BLD AUTO: 0.02 K/UL (ref 0–0.2)
BASOPHILS NFR BLD: 0.2 % (ref 0–1.9)
BUN SERPL-MCNC: 7 MG/DL (ref 6–20)
CALCIUM SERPL-MCNC: 8.1 MG/DL (ref 8.7–10.5)
CHLORIDE SERPL-SCNC: 107 MMOL/L (ref 95–110)
CO2 SERPL-SCNC: 18 MMOL/L (ref 23–29)
CREAT SERPL-MCNC: 0.7 MG/DL (ref 0.5–1.4)
DIFFERENTIAL METHOD BLD: ABNORMAL
EOSINOPHIL # BLD AUTO: 0 K/UL (ref 0–0.5)
EOSINOPHIL NFR BLD: 0 % (ref 0–8)
ERYTHROCYTE [DISTWIDTH] IN BLOOD BY AUTOMATED COUNT: 13.3 % (ref 11.5–14.5)
EST. GFR  (NO RACE VARIABLE): >60 ML/MIN/1.73 M^2
GLUCOSE SERPL-MCNC: 104 MG/DL (ref 70–110)
HCT VFR BLD AUTO: 34.3 % (ref 37–48.5)
HGB BLD-MCNC: 11.8 G/DL (ref 12–16)
IMM GRANULOCYTES # BLD AUTO: 0.04 K/UL (ref 0–0.04)
IMM GRANULOCYTES NFR BLD AUTO: 0.5 % (ref 0–0.5)
LYMPHOCYTES # BLD AUTO: 0.8 K/UL (ref 1–4.8)
LYMPHOCYTES NFR BLD: 9.3 % (ref 18–48)
MCH RBC QN AUTO: 29.2 PG (ref 27–31)
MCHC RBC AUTO-ENTMCNC: 34.4 G/DL (ref 32–36)
MCV RBC AUTO: 85 FL (ref 82–98)
MONOCYTES # BLD AUTO: 0.8 K/UL (ref 0.3–1)
MONOCYTES NFR BLD: 8.6 % (ref 4–15)
NEUTROPHILS # BLD AUTO: 7.2 K/UL (ref 1.8–7.7)
NEUTROPHILS NFR BLD: 81.4 % (ref 38–73)
NRBC BLD-RTO: 0 /100 WBC
PLATELET # BLD AUTO: 240 K/UL (ref 150–450)
PMV BLD AUTO: 9.3 FL (ref 9.2–12.9)
POTASSIUM SERPL-SCNC: 3.8 MMOL/L (ref 3.5–5.1)
RBC # BLD AUTO: 4.04 M/UL (ref 4–5.4)
SODIUM SERPL-SCNC: 137 MMOL/L (ref 136–145)
WBC # BLD AUTO: 8.86 K/UL (ref 3.9–12.7)

## 2025-01-28 PROCEDURE — 25000003 PHARM REV CODE 250: Performed by: PHYSICIAN ASSISTANT

## 2025-01-28 PROCEDURE — 97161 PT EVAL LOW COMPLEX 20 MIN: CPT

## 2025-01-28 PROCEDURE — 85025 COMPLETE CBC W/AUTO DIFF WBC: CPT

## 2025-01-28 PROCEDURE — 11000001 HC ACUTE MED/SURG PRIVATE ROOM

## 2025-01-28 PROCEDURE — 25000003 PHARM REV CODE 250

## 2025-01-28 PROCEDURE — 63600175 PHARM REV CODE 636 W HCPCS

## 2025-01-28 PROCEDURE — 63600175 PHARM REV CODE 636 W HCPCS: Performed by: PHYSICIAN ASSISTANT

## 2025-01-28 PROCEDURE — 97530 THERAPEUTIC ACTIVITIES: CPT

## 2025-01-28 PROCEDURE — 97112 NEUROMUSCULAR REEDUCATION: CPT

## 2025-01-28 PROCEDURE — 27201037 HC PRESSURE MONITORING SET UP

## 2025-01-28 PROCEDURE — 97535 SELF CARE MNGMENT TRAINING: CPT

## 2025-01-28 PROCEDURE — 72100 X-RAY EXAM L-S SPINE 2/3 VWS: CPT | Mod: 26,,, | Performed by: RADIOLOGY

## 2025-01-28 PROCEDURE — 99024 POSTOP FOLLOW-UP VISIT: CPT | Mod: ,,, | Performed by: PHYSICIAN ASSISTANT

## 2025-01-28 PROCEDURE — 97116 GAIT TRAINING THERAPY: CPT

## 2025-01-28 PROCEDURE — 36415 COLL VENOUS BLD VENIPUNCTURE: CPT

## 2025-01-28 PROCEDURE — 72100 X-RAY EXAM L-S SPINE 2/3 VWS: CPT | Mod: TC

## 2025-01-28 PROCEDURE — 80048 BASIC METABOLIC PNL TOTAL CA: CPT

## 2025-01-28 PROCEDURE — 97165 OT EVAL LOW COMPLEX 30 MIN: CPT

## 2025-01-28 RX ORDER — POLYETHYLENE GLYCOL 3350 17 G/17G
17 POWDER, FOR SOLUTION ORAL DAILY
Status: DISCONTINUED | OUTPATIENT
Start: 2025-01-28 | End: 2025-01-30 | Stop reason: HOSPADM

## 2025-01-28 RX ORDER — ENOXAPARIN SODIUM 100 MG/ML
40 INJECTION SUBCUTANEOUS EVERY 12 HOURS
Status: DISCONTINUED | OUTPATIENT
Start: 2025-01-28 | End: 2025-01-30 | Stop reason: HOSPADM

## 2025-01-28 RX ORDER — DEXAMETHASONE SODIUM PHOSPHATE 4 MG/ML
10 INJECTION, SOLUTION INTRA-ARTICULAR; INTRALESIONAL; INTRAMUSCULAR; INTRAVENOUS; SOFT TISSUE ONCE
Status: COMPLETED | OUTPATIENT
Start: 2025-01-28 | End: 2025-01-28

## 2025-01-28 RX ORDER — AMOXICILLIN 250 MG
1 CAPSULE ORAL DAILY
Status: DISCONTINUED | OUTPATIENT
Start: 2025-01-28 | End: 2025-01-30 | Stop reason: HOSPADM

## 2025-01-28 RX ORDER — DEXAMETHASONE SODIUM PHOSPHATE 4 MG/ML
4 INJECTION, SOLUTION INTRA-ARTICULAR; INTRALESIONAL; INTRAMUSCULAR; INTRAVENOUS; SOFT TISSUE EVERY 6 HOURS
Status: DISCONTINUED | OUTPATIENT
Start: 2025-01-28 | End: 2025-01-30

## 2025-01-28 RX ADMIN — CLONIDINE HYDROCHLORIDE 0.1 MG: 0.1 TABLET ORAL at 09:01

## 2025-01-28 RX ADMIN — DEXAMETHASONE SODIUM PHOSPHATE 4 MG: 4 INJECTION INTRA-ARTICULAR; INTRALESIONAL; INTRAMUSCULAR; INTRAVENOUS; SOFT TISSUE at 06:01

## 2025-01-28 RX ADMIN — AMLODIPINE BESYLATE 2.5 MG: 2.5 TABLET ORAL at 09:01

## 2025-01-28 RX ADMIN — METOPROLOL SUCCINATE 100 MG: 100 TABLET, EXTENDED RELEASE ORAL at 09:01

## 2025-01-28 RX ADMIN — SENNOSIDES AND DOCUSATE SODIUM 1 TABLET: 50; 8.6 TABLET ORAL at 09:01

## 2025-01-28 RX ADMIN — ACETAMINOPHEN 650 MG: 325 TABLET ORAL at 05:01

## 2025-01-28 RX ADMIN — ACETAMINOPHEN 650 MG: 325 TABLET ORAL at 12:01

## 2025-01-28 RX ADMIN — GABAPENTIN 600 MG: 300 CAPSULE ORAL at 09:01

## 2025-01-28 RX ADMIN — OXYCODONE HYDROCHLORIDE 15 MG: 10 TABLET ORAL at 04:01

## 2025-01-28 RX ADMIN — MUPIROCIN: 20 OINTMENT TOPICAL at 09:01

## 2025-01-28 RX ADMIN — GABAPENTIN 600 MG: 300 CAPSULE ORAL at 02:01

## 2025-01-28 RX ADMIN — OXYCODONE HYDROCHLORIDE 15 MG: 10 TABLET ORAL at 05:01

## 2025-01-28 RX ADMIN — TRAZODONE HYDROCHLORIDE 100 MG: 100 TABLET ORAL at 09:01

## 2025-01-28 RX ADMIN — METHOCARBAMOL 1000 MG: 500 TABLET ORAL at 09:01

## 2025-01-28 RX ADMIN — ACETAMINOPHEN 650 MG: 325 TABLET ORAL at 11:01

## 2025-01-28 RX ADMIN — POLYETHYLENE GLYCOL 3350 17 G: 17 POWDER, FOR SOLUTION ORAL at 09:01

## 2025-01-28 RX ADMIN — MORPHINE SULFATE 4 MG: 2 INJECTION, SOLUTION INTRAMUSCULAR; INTRAVENOUS at 06:01

## 2025-01-28 RX ADMIN — LEVOTHYROXINE SODIUM 75 MCG: 75 TABLET ORAL at 05:01

## 2025-01-28 RX ADMIN — OXYCODONE HYDROCHLORIDE 15 MG: 10 TABLET ORAL at 10:01

## 2025-01-28 RX ADMIN — MORPHINE SULFATE 4 MG: 2 INJECTION, SOLUTION INTRAMUSCULAR; INTRAVENOUS at 07:01

## 2025-01-28 RX ADMIN — CETIRIZINE HYDROCHLORIDE 10 MG: 10 TABLET, FILM COATED ORAL at 09:01

## 2025-01-28 RX ADMIN — VORTIOXETINE 20 MG: 10 TABLET, FILM COATED ORAL at 09:01

## 2025-01-28 RX ADMIN — DEXAMETHASONE SODIUM PHOSPHATE 10 MG: 4 INJECTION INTRA-ARTICULAR; INTRALESIONAL; INTRAMUSCULAR; INTRAVENOUS; SOFT TISSUE at 09:01

## 2025-01-28 RX ADMIN — MORPHINE SULFATE 4 MG: 2 INJECTION, SOLUTION INTRAMUSCULAR; INTRAVENOUS at 02:01

## 2025-01-28 RX ADMIN — ENOXAPARIN SODIUM 40 MG: 40 INJECTION SUBCUTANEOUS at 05:01

## 2025-01-28 RX ADMIN — METHOCARBAMOL 1000 MG: 500 TABLET ORAL at 02:01

## 2025-01-28 RX ADMIN — ESTRADIOL 2 MG: 1 TABLET ORAL at 09:01

## 2025-01-28 RX ADMIN — FAMOTIDINE 40 MG: 20 TABLET, FILM COATED ORAL at 05:01

## 2025-01-28 NOTE — ASSESSMENT & PLAN NOTE
54F w/ hx obesity, HTN, trigeminal neuralgia, POTS, IBS, depression, fibromyalgia, malignant carcinoid of appendix, CVID who was s/p L5-S1 microdiscectomy in 2004, now with degenerative disc disease and discogenic pain who presented on 1/27 for elective L5-S1 ALIF with posterior instrumentation.      S/p ALIF on 1/27 (vascular surgery required for approach)    -Pt doing well post op   -Will make sure pt tolerating diet  -Incision cdi

## 2025-01-28 NOTE — ASSESSMENT & PLAN NOTE
Ms Zuleat is a 54F w/ hx obesity, HTN, trigeminal neuralgia, POTS, IBS, depression, fibromyalgia, malignant carcinoid of appendix, CVID who was s/p L5-S1 microdiscectomy in 2004, now with degenerative disc disease and discogenic pain who presented on 1/27 for elective L5-S1 ALIF with posterior instrumentation.      Post op motor exam consistent with preop baseline, BLE 5/5 except for L DF 4+, L EHL 4.      Plan:   - admitted to NSGY floor  - q4h neuro checks/vitals  - PT/OT/OOB with LSO brace  - upright L sp XR AP/lateral  - multimodal pain control  - dex 10 mg then 4 q 6 for rash/allergic reaction.    Discussed with Dr. Melton

## 2025-01-28 NOTE — NURSING
Cat catheter removed at this time per orders.  7cc removed from intact balloon tip.  Pt tolerated fine.  Pt placed on purerwick and instructed to void within 6 hours of cat removal.

## 2025-01-28 NOTE — ANESTHESIA POSTPROCEDURE EVALUATION
Anesthesia Post Evaluation    Patient: Kianna Zuleta    Procedure(s) Performed: Procedure(s) (LRB):  **LOOP X** FUSION, SPINE, LUMBAR, ALIF (N/A)  FUSION, SPINE, LUMBAR MIS POSTERIOR SCREWS LOOP X for POSTERIOR (N/A)    Final Anesthesia Type: general      Patient location during evaluation: PACU  Patient participation: Yes- Able to Participate  Level of consciousness: awake and alert  Post-procedure vital signs: reviewed and stable  Pain management: pain needs to be addressed  Airway patency: patent    PONV status at discharge: No PONV  Anesthetic complications: no      Cardiovascular status: blood pressure returned to baseline  Respiratory status: unassisted  Hydration status: euvolemic  Follow-up not needed.          Vitals Value Taken Time   /59 01/28/25 0755   Temp 37.1 °C (98.8 °F) 01/28/25 0755   Pulse 92 01/28/25 0755   Resp 18 01/28/25 0755   SpO2 94 % 01/28/25 0755         Event Time   Out of Recovery 14:45:00         Pain/Tania Score: Pain Rating Prior to Med Admin: 8 (1/28/2025  6:22 AM)  Pain Rating Post Med Admin: 5 (1/28/2025  6:25 AM)  Tania Score: 9 (1/27/2025  2:45 PM)

## 2025-01-28 NOTE — HOSPITAL COURSE
1/28/25: [id number] ALIF with posterior instrumentation. Patient reports rash to chest with submandibular swelling. Dex 10 mg with 4 q 6 to follow. Denies SOB, difficulty swallowing, chest pain, pruritus. Reports back pain and stable left foot paresthesias. Passing flatus and tolerating diet. Post op XR pending. PT/OT

## 2025-01-28 NOTE — PT/OT/SLP EVAL
Occupational Therapy  Co- Evaluation    Name: Kianna Zuleta  MRN: [id number]  Admitting Diagnosis: <principal problem not specified>  Recent Surgery: Procedure(s) (LRB):  **LOOP X** FUSION, SPINE, LUMBAR, ALIF (N/A)  FUSION, SPINE, LUMBAR MIS POSTERIOR SCREWS LOOP X for POSTERIOR (N/A) 1 Day Post-Op    Recommendations:     Discharge Recommendations: Low Intensity Therapy  Discharge Equipment Recommendations:  walker, rolling  Barriers to discharge:  Decreased caregiver support    Assessment:     Kianna Zuleta is a 54 y.o. female with a medical diagnosis of <principal problem not specified>. Performance deficits affecting function: weakness, impaired endurance, impaired self care skills, impaired functional mobility, gait instability, impaired balance, decreased lower extremity function, impaired coordination, decreased ROM, orthopedic precautions.  Pt agreeable to therapy and tolerated well. Pt performed bed mobility, functional mobility, grooming, and UB/LB dressing. Pt with good maintenance of spinal precautions.. Pt remains limited in ADLs, functional mobility, and functional transfers. Patient currently demonstrates a need for low intensity therapy on a scheduled basis secondary to a decline in functional status due to surgical procedure    Co-treat performed due to acuity and complexity of pt's medical status with 2 skilled disciplines needed to optimize pts occupational performance and to improve activity tolerance.     Justification for Walker HME   The mobility limitation cannot be sufficiently resolved by the use of a cane.   Patient's functional mobility deficit can be sufficiently resolved with the use of a walker.  Patient's mobility limitation significantly impairs their ability to participate in one of more activities of daily living.  The use of a walker will significantly improve the patients ability to participate in MRADLS and the patient will use it on regular basis in the home.     Rehab  "Prognosis: Good; patient would benefit from acute skilled OT services to address these deficits and reach maximum level of function.       Plan:     Patient to be seen 4 x/week to address the above listed problems via self-care/home management, therapeutic activities, therapeutic exercises, neuromuscular re-education  Plan of Care Expires: 02/28/25  Plan of Care Reviewed with: patient    Subjective     Chief Complaint: none  Patient/Family Comments/goals: "I like to watch TV."    Occupational Profile:  Living Environment: Pt lives alone in [facility] with L HR, t/s combo with shower chair  Previous level of function: (I) ADLs and mobility. Driving   Roles and Routines: not working. Enjoys TV  Equipment Used at Home: blood pressure machine, shower chair  Assistance upon Discharge: limited    Pain/Comfort:  Pain Rating 1: 0/10  Pain Rating Post-Intervention 1: 0/10    Patients cultural, spiritual, L[employer] conflicts given the current situation: no    Objective:     Communicated with: Nurse prior to session.  Patient found HOB elevated with peripheral IV, PureWick, telemetry upon OT entry to room.    General Precautions: Standard, fall  Orthopedic Precautions: spinal precautions  Braces: LSO  Respiratory Status: Room air    Occupational Performance:    Bed Mobility:    Patient completed Rolling/Turning to Left with  moderate assistance  Educated pt on performing log roll technique to maintain spinal precautions during bed mobility   Patient completed Scooting/Bridging with stand by assistance  Patient completed Supine to Sit with stand by assistance    Functional Mobility/Transfers:  Patient completed Sit <> Stand Transfer with minimum assistance  with  rolling walker. Pt attempted 1 stand with no AD but reported she wanted to use RW  Patient completed Bed <> Chair Transfer using Step Transfer technique with contact guard assistance with rolling walker  Functional Mobility: Pt engaged in functional mobility throughout " hospital room and bathroom ~ 15 ft x 2 with RW and  CGA to maximize functional endurance and standing balance required for home/community mobility and occupational engagement.     Activities of Daily Living:  Grooming: contact guard assistance pt stood at  bathroom sink in RW to perform oral hygiene   Upper Body Dressing: moderate assistance donned hospital gown over back sitting EOB assist with line management.  OT educated pt on and modeled proper LSO donning/doffing technique. Pt performed with assist and donned correctly  Lower Body Dressing: total assistance pt attempted to perform figure 4 dressing but unable  to 2/2 pain     Cognitive/Visual Perceptual:  Cognitive/Psychosocial Skills:     -       Oriented to: Person, Place, Time, and Situation   -       Follows Commands/attention:Follows multistep  commands  -       Communication: clear/fluent  -       Memory: No Deficits noted  -       Safety awareness/insight to disability: intact   -       Mood/Affect/Coping skills/emotional control: Appropriate to situation    Physical Exam:  Balance:    -       SBA sitting  EOB   Sensation:    -       Intact  Dominant hand:    -       Right  Upper Extremity Range of Motion:     -       Right Upper Extremity: WNL  -       Left Upper Extremity: WNL  Upper Extremity Strength:     Strength:    -       Right Upper Extremity: WNL  -       Left Upper Extremity: WNL  Fine Motor Coordination:    -       Intact    AMPAC 6 Click ADL:  AMPAC Total Score: 19    Treatment & Education:  -Education on energy conservation and task modification to maximize safety and (I) during ADLs and mobility  -Education on importance of OOB activity to improve overall activity tolerance and promote recovery  -Pt educated to call for assistance and to transfer with hospital staff only  -Education on spinal precautions. Pt able to recall 3/3 precautions without verbal cues  -Provided education regarding role of OT, POC, & discharge recommendations  with pt verbalizing understanding.  Pt had no further questions & when asked whether there were any concerns pt reported none.      Patient left up in chair with all lines intact, call button in reach, and nurse notified    GOALS:   Multidisciplinary Problems       Occupational Therapy Goals          Problem: Occupational Therapy    Goal Priority Disciplines Outcome Interventions   Occupational Therapy Goal     OT, PT/OT Progressing    Description: Goals to be met by: 2/28/2025     Patient will increase functional independence with ADLs by performing:    UE Dressing with Supervision.  LE Dressing with Supervision.  Grooming while standing at sink with Supervision.  Toileting from toilet with Supervision for hygiene and clothing management.   Toilet transfer to toilet with Supervision.    DME  Justification for Walker HME   The mobility limitation cannot be sufficiently resolved by the use of a cane.   Patient's functional mobility deficit can be sufficiently resolved with the use of a walker.  Patient's mobility limitation significantly impairs their ability to participate in one of more activities of daily living.  The use of a walker will significantly improve the patient's ability to participate in MRADLS and the patient will use it on regular basis in the home.                            DME Justifications:   Kianna's mobility limitation cannot be sufficiently resolved by the use of a cane. Her functional mobility deficit can be sufficiently resolved with the use of a Rolling Walker. Patient's mobility limitation significantly impairs their ability to participate in one of more activities of daily living.  The use of a RW will significantly improve the patient's ability to participate in MRADLS and the patient will use it on regular basis in the home.    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    MVP (mitral valve prolapse)     Pneumonia     PONV (postoperative nausea and vomiting)     POTS (postural orthostatic tachycardia syndrome)     Recurrent upper respiratory infection (URI)     Ulcer     Vitamin D deficiency disease 11/06/2012         Past Surgical History:   Procedure Laterality Date    ANKLE SURGERY      lt    ANTERIOR LUMBAR INTERBODY FUSION (ALIF) N/A 1/27/2025    Procedure: **LOOP X** FUSION, SPINE, LUMBAR, ALIF;  Surgeon: Theron Melton MD;  Location: [facility] OR [address] [place] [address];  Service: Neurosurgery;  Laterality: N/A;    APPENDECTOMY      BACK SURGERY      CHOLECYSTECTOMY      choley & ovarian cyst removed  12/2005    cystoscope      multiple    HYSTERECTOMY  4/8/2004    BUBBA BS&O     interstim      LUMBAR FUSION N/A 1/27/2025    Procedure: FUSION, SPINE, LUMBAR MIS POSTERIOR SCREWS LOOP X for POSTERIOR;  Surgeon: Theron Melton MD;  Location: [facility] OR [address] [place] [address];  Service: Neurosurgery;  Laterality: N/A;    OOPHORECTOMY  4/8/2004    with hysterectomy     PELVIC LAPAROSCOPY      NV EXPLORATORY OF ABDOMEN      SINUS SURGERY  03/01/2016    SPINE SURGERY         Time Tracking:     OT Date of Treatment: 01/28/25  OT Start Time: 0952  OT Stop Time: 1025  OT Total Time (min): 33 min    Billable Minutes:Evaluation 10 min  Self Care/Home Management 12 min  Neuromuscular Re-education 11 min    1/28/2025

## 2025-01-28 NOTE — SUBJECTIVE & OBJECTIVE
Interval History:  PUT3O3-Y3 ALIF with posterior instrumentation. Patient reports rash to chest with submandibular swelling. Dex 10 mg with 4 q 6 to follow. Denies SOB, difficulty swallowing, chest pain, pruritus. Reports back pain and stable left foot paresthesias. Passing flatus and tolerating diet. Post op XR pending. PT/OT    Medications:  Continuous Infusions:   0.9% NaCl   Intravenous Continuous 75 mL/hr at 01/27/25 2251 New Bag at 01/27/25 2251     Scheduled Meds:   acetaminophen  650 mg Oral Q6H    amLODIPine  2.5 mg Oral Daily    cetirizine  10 mg Oral QHS    cloNIDine  0.1 mg Oral BID    dexAMETHasone injection  4 mg Intravenous Q6H    enoxparin  40 mg Subcutaneous Q12H    estradioL  2 mg Oral QHS    famotidine  40 mg Oral Daily    gabapentin  600 mg Oral TID    levothyroxine  75 mcg Oral Before breakfast    methocarbamoL  1,000 mg Oral TID    metoprolol succinate  100 mg Oral BID    mupirocin   Nasal BID    polyethylene glycol  17 g Oral Daily    senna-docusate 8.6-50 mg  1 tablet Oral Daily    vortioxetine  20 mg Oral Daily     PRN Meds:  Current Facility-Administered Medications:     aluminum-magnesium hydroxide-simethicone, 30 mL, Oral, Q4H PRN    dicyclomine, 20 mg, Oral, BID PRN    hydrALAZINE, 10 mg, Intravenous, Q6H PRN    morphine, 4 mg, Intravenous, Q4H PRN    ondansetron, 8 mg, Oral, Q6H PRN    oxyCODONE, 15 mg, Oral, Q6H PRN    oxyCODONE, 10 mg, Oral, Q6H PRN    prochlorperazine, 5 mg, Intravenous, Q6H PRN    senna-docusate 8.6-50 mg, 2 tablet, Oral, Nightly PRN    traZODone, 100 mg, Oral, Nightly PRN     Review of Systems  Objective:     Weight: 122.5 kg (270 lb)  Body mass index is 46.35 kg/m².  Vital Signs (Most Recent):  Temp: 99.2 °F (37.3 °C) (01/28/25 1130)  Pulse: 87 (01/28/25 1130)  Resp: 18 (01/28/25 1130)  BP: 115/63 (01/28/25 1130)  SpO2: (!) 92 % (01/28/25 1130) Vital Signs (24h Range):  Temp:  [98.8 °F (37.1 °C)-100.9 °F (38.3 °C)] 99.2 °F (37.3 °C)  Pulse:  [] 87  Resp:   [16-30] 18  SpO2:  [89 %-99 %] 92 %  BP: (115-176)/(59-85) 115/63                         Female External Urinary Catheter w/ Suction 01/28/25 0615 (Active)          Physical Exam         Neurosurgery Physical Exam  General: well developed, well nourished, no distress.   Head: normocephalic, atraumatic  Neurologic: Alert and oriented. Thought content appropriate.  GCS: Motor: 6/Verbal: 5/Eyes: 4 GCS Total: 15  Mental Status: Awake, Alert, Oriented x 4  Language: No aphasia  Speech: No dysarthria  Cranial nerves: face symmetric, tongue midline, CN II-XII grossly intact.   Eyes: pupils equal, round, reactive to light with accommodation, EOMI.   Pulmonary: normal respirations, no signs of respiratory distress  Abdomen: soft, non-distended, not tender to palpation. Dressing in place.   Skin: Skin is warm, dry and intact. Rash noted to chest.   Sensory: intact to light touch throughout    Motor Strength:Moves all extremities spontaneously with good tone. No abnormal movements seen.     Strength  Deltoids Triceps Biceps Wrist Extension Wrist Flexion Hand    Upper: R 5/5 5/5 5/5 5/5 5/5 5/5    L 5/5 5/5 5/5 5/5 5/5 5/5     Iliopsoas Quadriceps Knee  Flexion Tibialis  anterior Gastro- cnemius EHL   Lower: R 5/5 5/5 5/5 4/5 5/5 4/5    L 5/5 5/5 5/5 5/5 5/5 5/5     Abdominal incision with bandage intact. Scan amount of drainage noted.    Lumbar incision dressing c/d/I/         Significant Labs:  Recent Labs   Lab 01/27/25  0657 01/28/25  0302   GLU 84 104    137   K 3.8 3.8    107   CO2 22* 18*   BUN 12 7   CREATININE 0.7 0.7   CALCIUM 9.6 8.1*     Recent Labs   Lab 01/27/25  0657 01/28/25  0302   WBC 6.03 8.86   HGB 14.0 11.8*   HCT 41.5 34.3*    240     Recent Labs   Lab 01/27/25  0657   INR 1.0   APTT 22.7     Microbiology Results (last 7 days)       ** No results found for the last 168 hours. **          All pertinent labs from the last 24 hours have been reviewed.    Significant Diagnostics:

## 2025-01-28 NOTE — PLAN OF CARE
Problem: Occupational Therapy  Goal: Occupational Therapy Goal  Description: Goals to be met by: 2/28/2025     Patient will increase functional independence with ADLs by performing:    UE Dressing with Supervision.  LE Dressing with Supervision.  Grooming while standing at sink with Supervision.  Toileting from toilet with Supervision for hygiene and clothing management.   Toilet transfer to toilet with Supervision.    DME  Justification for Walker HME   The mobility limitation cannot be sufficiently resolved by the use of a cane.   Patient's functional mobility deficit can be sufficiently resolved with the use of a walker.  Patient's mobility limitation significantly impairs their ability to participate in one of more activities of daily living.  The use of a walker will significantly improve the patient's ability to participate in MRADLS and the patient will use it on regular basis in the home.       Outcome: Progressing

## 2025-01-28 NOTE — PROGRESS NOTES
[facility]  Vascular Surgery  Progress Note    Patient Name: Kianna Zuleta  MRN: [id number]  Admission Date: 1/27/2025  Primary Care Provider: Gigi Celis MD    Subjective:     Interval History: POD1 L5-S1 ALIF. Pt doing well this morning. Incision cdi, some expected incisional tenderness post op. Pt has not eaten after surgery but is conversing, drinking fluids, voiding appropriately. No major neuro deficits. Oriented, AG x 4.     Post-Op Info:  Procedure(s) (LRB):  **LOOP X** FUSION, SPINE, LUMBAR, ALIF (N/A)  FUSION, SPINE, LUMBAR MIS POSTERIOR SCREWS LOOP X for POSTERIOR (N/A)   1 Day Post-Op     Medications:  Continuous Infusions:   0.9% NaCl   Intravenous Continuous 75 mL/hr at 01/27/25 2251 New Bag at 01/27/25 2251     Scheduled Meds:   acetaminophen  650 mg Oral Q6H    amLODIPine  2.5 mg Oral Daily    bisacodyL  10 mg Rectal Daily    cetirizine  10 mg Oral QHS    cloNIDine  0.1 mg Oral BID    estradioL  2 mg Oral QHS    famotidine  40 mg Oral Daily    gabapentin  600 mg Oral TID    heparin (porcine)  7,500 Units Subcutaneous Q8H    levothyroxine  75 mcg Oral Before breakfast    methocarbamoL  1,000 mg Oral TID    metoprolol succinate  100 mg Oral BID    mupirocin   Nasal BID    vortioxetine  20 mg Oral Daily     PRN Meds:  Current Facility-Administered Medications:     aluminum-magnesium hydroxide-simethicone, 30 mL, Oral, Q4H PRN    dicyclomine, 20 mg, Oral, BID PRN    hydrALAZINE, 10 mg, Intravenous, Q6H PRN    morphine, 4 mg, Intravenous, Q4H PRN    ondansetron, 8 mg, Oral, Q6H PRN    oxyCODONE, 15 mg, Oral, Q6H PRN    oxyCODONE, 10 mg, Oral, Q6H PRN    prochlorperazine, 5 mg, Intravenous, Q6H PRN    senna-docusate 8.6-50 mg, 2 tablet, Oral, Nightly PRN    traZODone, 100 mg, Oral, Nightly PRN     Objective:     Vital Signs (Most Recent):  Temp: 99 °F (37.2 °C) (01/28/25 0442)  Pulse: 95 (01/28/25 0442)  Resp: 18 (01/28/25 0622)  BP: (!) 155/75 (01/28/25 0442)  SpO2: (!) 94 % (01/28/25  0442) Vital Signs (24h Range):  Temp:  [98.8 °F (37.1 °C)-100.9 °F (38.3 °C)] 99 °F (37.2 °C)  Pulse:  [] 95  Resp:  [16-30] 18  SpO2:  [89 %-99 %] 94 %  BP: (124-176)/(64-85) 155/75          Physical Exam  Constitutional:       General: She is not in acute distress.     Appearance: She is obese.   HENT:      Head: Normocephalic.   Eyes:      Extraocular Movements: Extraocular movements intact.      Pupils: Pupils are equal, round, and reactive to light.   Cardiovascular:      Rate and Rhythm: Normal rate and regular rhythm.      Pulses: Normal pulses.      Heart sounds: Normal heart sounds.   Pulmonary:      Effort: No respiratory distress.   Chest:      Chest wall: No tenderness.   Abdominal:      Palpations: Abdomen is soft.      Tenderness: There is abdominal tenderness.      Comments: Some incisional tenderness   Musculoskeletal:         General: No swelling, deformity or signs of injury.      Cervical back: No rigidity or tenderness.   Skin:     Findings: No bruising.      Comments: Dressing on mid-abdomen   Neurological:      General: No focal deficit present.      Mental Status: She is alert.   Psychiatric:         Mood and Affect: Mood normal.         Behavior: Behavior normal.          Significant Labs:  All pertinent labs from the last 24 hours have been reviewed.    Significant Diagnostics:  I have reviewed all pertinent imaging results/findings within the past 24 hours.  Assessment/Plan:     Back pain  54F w/ hx obesity, HTN, trigeminal neuralgia, POTS, IBS, depression, fibromyalgia, malignant carcinoid of appendix, CVID who was s/p L5-S1 microdiscectomy in 2004, now with degenerative disc disease and discogenic pain who presented on 1/27 for elective L5-S1 ALIF with posterior instrumentation.      S/p ALIF on 1/27 (vascular surgery required for approach)    -Pt doing well post op   -Will make sure pt tolerating diet  -Incision cdi      Geovany Sparrow MD  Vascular Surgery  [facility] - Surgery

## 2025-01-28 NOTE — OP NOTE
DATE OF PROCEDURE: 1/27/25    SURGEON: Mariusz Parker MD     CO-SURGEON: Theron Melton MD    Fellow: Herson Neal MD PGY7    Pre-op Diagnosis:  HNP (herniated nucleus pulposus), lumbar [M51.26]  Radiculopathy, unspecified spinal region [M54.10]  Spondylosis of lumbosacral region, unspecified spinal osteoarthritis complication status [M47.817]     Post-op Diagnosis:  Post-Op Diagnosis Codes:     * HNP (herniated nucleus pulposus), lumbar [M51.26]     * Radiculopathy, unspecified spinal region [M54.10]     * Spondylosis of lumbosacral region, unspecified spinal osteoarthritis complication status [M47.817]    PROCEDURES PERFORMED:   Anterior lumbar interbody fusion L5-S1  2.   Application of intervertebral spacer device L5-S1 disc defect  3.   Infuse bone graft, extra-small     ANESTHESIA: General endotracheal anesthesia.     ESTIMATED BLOOD LOSS: 100ml     DRAINS: None.     COMPLICATIONS: None.     SPONGE AND NEEDLE COUNT: Correct x2.    PROCEDURE IN DETAIL:   The patient was brought to the operating room in supine position.  Appropriate monitors were put in place.  General anesthesia was administered by the anesthesia team.  The abdomen was prepped and draped in normal sterile fashion from the costal margin down to the pubis.  We marked our planned incision.  We began by making a vertical midline incision centered over the estimated level of the L5-S1 disc space.  The incision was deepened down to the anterior rectus fascia with a combination of electrocautery and blunt dissection.  The fascia was divided longitudinally just to the left midline.  The rectus abdominus muscle fibers were retracted laterally and a plane was developed posterior to the muscle moving laterally along the posterior rectus fascia out to the transversalis fascia.  As we moved laterally we came through the transversalis fascia onto the peritoneum.  The peritoneal sac was retracted medially and the psoas muscle was identified as well as the  external iliac artery.  We took care to leave the inferior epigastric vessels laterally.  We continued to develop a plane in the retroperitoneal space as we retracted the peritoneum medially.  At this point the sacral promontory could be identified as well as the left iliac vein and artery. The ureter could be identified and was retracted medially with the peritoneal sac. An Omni retractor was put in place to retain the peritoneum medially. A small area of bleeidng from the left common iliac vein was repaired with two medium hemoclips. We then placed a needle in the L5-S1 disc and confirmed its placement with fluoroscopy. This confirmed appropriate level of exposure. The small middle sacral vessels were ligated and divided with bipolar cautery.  The left iliac vein was gently retracted superiorly/laterally to the left and the right iliac artery to the right.  At this point Dr. Melton began his portion of the case.  I remained available throughout the case to readjust the retractor during Dr. Melton's portion of the case to ensure adequate and safe exposure.    After Dr. Melton completed his portion of the case the field was thoroughly irrigated and checked for hemostasis.  Retractor blades were removed individually.  There was no apparent bleeding.    We then allowed the peritoneum to shift back into place.  The anterior rectus fascia was closed with running 0 looped PDS suture.  An x-ray was performed after all retractor blades were removed which confirmed no retained instruments.The David's fascia was closed with interrupted 2 0 Vicryl suture.  The deep dermis was closed with interrupted 3 0 Vicryl suture.  The skin was closed with staples and the incision was dressed with island dressing.      SHARMILA Parker II, MD, [facility]  Vascular Surgery  [facility]

## 2025-01-28 NOTE — SUBJECTIVE & OBJECTIVE
Medications:  Continuous Infusions:   0.9% NaCl   Intravenous Continuous 75 mL/hr at 01/27/25 2251 New Bag at 01/27/25 2251     Scheduled Meds:   acetaminophen  650 mg Oral Q6H    amLODIPine  2.5 mg Oral Daily    bisacodyL  10 mg Rectal Daily    cetirizine  10 mg Oral QHS    cloNIDine  0.1 mg Oral BID    estradioL  2 mg Oral QHS    famotidine  40 mg Oral Daily    gabapentin  600 mg Oral TID    heparin (porcine)  7,500 Units Subcutaneous Q8H    levothyroxine  75 mcg Oral Before breakfast    methocarbamoL  1,000 mg Oral TID    metoprolol succinate  100 mg Oral BID    mupirocin   Nasal BID    vortioxetine  20 mg Oral Daily     PRN Meds:  Current Facility-Administered Medications:     aluminum-magnesium hydroxide-simethicone, 30 mL, Oral, Q4H PRN    dicyclomine, 20 mg, Oral, BID PRN    hydrALAZINE, 10 mg, Intravenous, Q6H PRN    morphine, 4 mg, Intravenous, Q4H PRN    ondansetron, 8 mg, Oral, Q6H PRN    oxyCODONE, 15 mg, Oral, Q6H PRN    oxyCODONE, 10 mg, Oral, Q6H PRN    prochlorperazine, 5 mg, Intravenous, Q6H PRN    senna-docusate 8.6-50 mg, 2 tablet, Oral, Nightly PRN    traZODone, 100 mg, Oral, Nightly PRN     Objective:     Vital Signs (Most Recent):  Temp: 99 °F (37.2 °C) (01/28/25 0442)  Pulse: 95 (01/28/25 0442)  Resp: 18 (01/28/25 0622)  BP: (!) 155/75 (01/28/25 0442)  SpO2: (!) 94 % (01/28/25 0442) Vital Signs (24h Range):  Temp:  [98.8 °F (37.1 °C)-100.9 °F (38.3 °C)] 99 °F (37.2 °C)  Pulse:  [] 95  Resp:  [16-30] 18  SpO2:  [89 %-99 %] 94 %  BP: (124-176)/(64-85) 155/75          Physical Exam  Constitutional:       General: She is not in acute distress.     Appearance: She is obese.   HENT:      Head: Normocephalic.   Eyes:      Extraocular Movements: Extraocular movements intact.      Pupils: Pupils are equal, round, and reactive to light.   Cardiovascular:      Rate and Rhythm: Normal rate and regular rhythm.      Pulses: Normal pulses.      Heart sounds: Normal heart sounds.   Pulmonary:       Effort: No respiratory distress.   Chest:      Chest wall: No tenderness.   Abdominal:      Palpations: Abdomen is soft.      Tenderness: There is abdominal tenderness.      Comments: Some incisional tenderness   Musculoskeletal:         General: No swelling, deformity or signs of injury.      Cervical back: No rigidity or tenderness.   Skin:     Findings: No bruising.      Comments: Dressing on mid-abdomen   Neurological:      General: No focal deficit present.      Mental Status: She is alert.   Psychiatric:         Mood and Affect: Mood normal.         Behavior: Behavior normal.          Significant Labs:  All pertinent labs from the last 24 hours have been reviewed.    Significant Diagnostics:  I have reviewed all pertinent imaging results/findings within the past 24 hours.

## 2025-01-28 NOTE — PROGRESS NOTES
[facility]  Neurosurgery  Progress Note    Subjective:     History of Present Illness: Ms Zuleta is a 54F w/ hx obesity, HTN, trigeminal neuralgia, POTS, IBS, depression, fibromyalgia, malignant carcinoid of appendix, CVID who was s/p L5-S1 microdiscectomy in 2004, now with degenerative disc disease and discogenic pain who presented on 1/27 for elective L5-S1 ALIF with posterior instrumentation. Patient states she has severe low back pain as well as pain mostly in the left thigh and down her leg. Mild 4+ and 4 EHL weakness on the left noted in preop. No blood thinners.    Post-Op Info:  Procedure(s) (LRB):  **LOOP X** FUSION, SPINE, LUMBAR, ALIF (N/A)  FUSION, SPINE, LUMBAR MIS POSTERIOR SCREWS LOOP X for POSTERIOR (N/A)   1 Day Post-Op   Interval History:  DQE2E5-N5 ALIF with posterior instrumentation. Patient reports rash to chest with submandibular swelling. Dex 10 mg with 4 q 6 to follow. Denies SOB, difficulty swallowing, chest pain, pruritus. Reports back pain and stable left foot paresthesias. Passing flatus and tolerating diet. Post op XR pending. PT/OT    Medications:  Continuous Infusions:   0.9% NaCl   Intravenous Continuous 75 mL/hr at 01/27/25 2251 New Bag at 01/27/25 2251     Scheduled Meds:   acetaminophen  650 mg Oral Q6H    amLODIPine  2.5 mg Oral Daily    cetirizine  10 mg Oral QHS    cloNIDine  0.1 mg Oral BID    dexAMETHasone injection  4 mg Intravenous Q6H    enoxparin  40 mg Subcutaneous Q12H    estradioL  2 mg Oral QHS    famotidine  40 mg Oral Daily    gabapentin  600 mg Oral TID    levothyroxine  75 mcg Oral Before breakfast    methocarbamoL  1,000 mg Oral TID    metoprolol succinate  100 mg Oral BID    mupirocin   Nasal BID    polyethylene glycol  17 g Oral Daily    senna-docusate 8.6-50 mg  1 tablet Oral Daily    vortioxetine  20 mg Oral Daily     PRN Meds:  Current Facility-Administered Medications:     aluminum-magnesium hydroxide-simethicone, 30 mL, Oral, Q4H PRN    dicyclomine,  20 mg, Oral, BID PRN    hydrALAZINE, 10 mg, Intravenous, Q6H PRN    morphine, 4 mg, Intravenous, Q4H PRN    ondansetron, 8 mg, Oral, Q6H PRN    oxyCODONE, 15 mg, Oral, Q6H PRN    oxyCODONE, 10 mg, Oral, Q6H PRN    prochlorperazine, 5 mg, Intravenous, Q6H PRN    senna-docusate 8.6-50 mg, 2 tablet, Oral, Nightly PRN    traZODone, 100 mg, Oral, Nightly PRN     Review of Systems  Objective:     Weight: 122.5 kg (270 lb)  Body mass index is 46.35 kg/m².  Vital Signs (Most Recent):  Temp: 99.2 °F (37.3 °C) (01/28/25 1130)  Pulse: 87 (01/28/25 1130)  Resp: 18 (01/28/25 1130)  BP: 115/63 (01/28/25 1130)  SpO2: (!) 92 % (01/28/25 1130) Vital Signs (24h Range):  Temp:  [98.8 °F (37.1 °C)-100.9 °F (38.3 °C)] 99.2 °F (37.3 °C)  Pulse:  [] 87  Resp:  [16-30] 18  SpO2:  [89 %-99 %] 92 %  BP: (115-176)/(59-85) 115/63                         Female External Urinary Catheter w/ Suction 01/28/25 0615 (Active)          Physical Exam         Neurosurgery Physical Exam  General: well developed, well nourished, no distress.   Head: normocephalic, atraumatic  Neurologic: Alert and oriented. Thought content appropriate.  GCS: Motor: 6/Verbal: 5/Eyes: 4 GCS Total: 15  Mental Status: Awake, Alert, Oriented x 4  Language: No aphasia  Speech: No dysarthria  Cranial nerves: face symmetric, tongue midline, CN II-XII grossly intact.   Eyes: pupils equal, round, reactive to light with accommodation, EOMI.   Pulmonary: normal respirations, no signs of respiratory distress  Abdomen: soft, non-distended, not tender to palpation. Dressing in place.   Skin: Skin is warm, dry and intact. Rash noted to chest.   Sensory: intact to light touch throughout    Motor Strength:Moves all extremities spontaneously with good tone. No abnormal movements seen.     Strength  Deltoids Triceps Biceps Wrist Extension Wrist Flexion Hand    Upper: R 5/5 5/5 5/5 5/5 5/5 5/5    L 5/5 5/5 5/5 5/5 5/5 5/5     Iliopsoas Quadriceps Knee  Flexion Tibialis  anterior  Gastro- cnemius EHL   Lower: R 5/5 5/5 5/5 4/5 5/5 4/5    L 5/5 5/5 5/5 5/5 5/5 5/5     Abdominal incision with bandage intact. Scan amount of drainage noted.    Lumbar incision dressing c/d/I/         Significant Labs:  Recent Labs   Lab 01/27/25  0657 01/28/25  0302   GLU 84 104    137   K 3.8 3.8    107   CO2 22* 18*   BUN 12 7   CREATININE 0.7 0.7   CALCIUM 9.6 8.1*     Recent Labs   Lab 01/27/25  0657 01/28/25  0302   WBC 6.03 8.86   HGB 14.0 11.8*   HCT 41.5 34.3*    240     Recent Labs   Lab 01/27/25  0657   INR 1.0   APTT 22.7     Microbiology Results (last 7 days)       ** No results found for the last 168 hours. **          All pertinent labs from the last 24 hours have been reviewed.    Significant Diagnostics:    Assessment/Plan:     Back pain  Ms Zuleta is a 54F w/ hx obesity, HTN, trigeminal neuralgia, POTS, IBS, depression, fibromyalgia, malignant carcinoid of appendix, CVID who was s/p L5-S1 microdiscectomy in 2004, now with degenerative disc disease and discogenic pain who presented on 1/27 for elective L5-S1 ALIF with posterior instrumentation.      Post op motor exam consistent with preop baseline, BLE 5/5 except for L DF 4+, L EHL 4.      Plan:   - admitted to NSGY floor  - q4h neuro checks/vitals  - PT/OT/OOB with LSO brace  - upright L sp XR AP/lateral  - multimodal pain control  - dex 10 mg then 4 q 6 for rash/allergic reaction.    Discussed with Dr. Linh Juarez PA-C  Neurosurgery  [facility]

## 2025-01-28 NOTE — NURSING
Patient arrived to room 529 via bed, VS taken and documented, admission documentation completed, SCD's applied, instructed on IS use, oriented to room and equipment, allowed time for questions, all questions answered, no further concerns voiced at this time, safety measures in place, call light within reach, instructed to call with any needs, verbalized understanding, continue current plan of care.

## 2025-01-28 NOTE — HPI
Ms Zuleta is a 54F w/ hx obesity, HTN, trigeminal neuralgia, POTS, IBS, depression, fibromyalgia, malignant carcinoid of appendix, CVID who was s/p L5-S1 microdiscectomy in 2004, now with degenerative disc disease and discogenic pain who presented on 1/27 for elective L5-S1 ALIF with posterior instrumentation. Patient states she has severe low back pain as well as pain mostly in the left thigh and down her leg. Mild 4+ and 4 EHL weakness on the left noted in preop. No blood thinners.

## 2025-01-28 NOTE — PLAN OF CARE
[facility]  Initial Discharge Assessment       Primary Care Provider: Gigi Celis MD    Admission Diagnosis: HNP (herniated nucleus pulposus), lumbar [M51.26]  Radiculopathy, unspecified spinal region [M54.10]  Spondylosis of lumbosacral region, unspecified spinal osteoarthritis complication status [M47.817]  Discogenic low back pain [M51.360]    Admission Date: 1/27/2025  Expected Discharge Date: 1/29/2025    Transition of Care Barriers: None    Payor: [employer]ANCE / Plan: [employer] / Product Type: Worker's Comp /     Extended Emergency Contact Information  Primary Emergency Contact: Wayne Zuleta  Address: [address] [place] [address] [place] [address] [facility]  Home Phone: [phone]  Mobile Phone: [phone]  Relation: Father    Discharge Plan A: Home Health, Home with family  Discharge Plan B: Skilled Nursing Facility      [id number]  [place] [address]  [place] [address]  Phone: [phone] Fax: [phone]    [facility] [address]   [address] [place]  [facility] [address]  Phone: [phone] Fax: [phone]    [facility] [place]  [address]  [address] [facility]  [place] [address]  Phone: [phone] Fax: [phone]      Initial Assessment (most recent)       Adult Discharge Assessment - 01/28/25 1301          Discharge Assessment    Assessment Type Discharge Planning Assessment     Confirmed/corrected address, phone number and insurance Yes     Confirmed Demographics Correct on Facesheet     Source of Information patient     Communicated NANCY with patient/caregiver Yes     People in Home alone     Do you expect to return to your current living situation? Yes     Prior to hospitilization cognitive status: Alert/Oriented     Home Layout Able to live on 1st floor     Equipment Currently Used at Home blood  pressure machine     Do you currently have service(s) that help you manage your care at home? No     Do you take prescription medications? Yes     Do you have prescription coverage? Yes     Do you have any problems affording any of your prescribed medications? No     Is the patient taking medications as prescribed? yes     How do you get to doctors appointments? family or friend will provide     Are you on dialysis? No     Do you take coumadin? No     Discharge Plan A Home Health;Home with family     Discharge Plan B Skilled Nursing Facility     DME Needed Upon Discharge  walker, rolling     Discharge Plan discussed with: Patient     Transition of Care Barriers None                   Patient lives alone in a single story home with five entry steps with railing on one side. Patient amenable to therapy recommendations - HH/RW

## 2025-01-29 PROCEDURE — 97535 SELF CARE MNGMENT TRAINING: CPT | Mod: CO

## 2025-01-29 PROCEDURE — 63600175 PHARM REV CODE 636 W HCPCS: Performed by: PHYSICIAN ASSISTANT

## 2025-01-29 PROCEDURE — 25000003 PHARM REV CODE 250: Performed by: PHYSICIAN ASSISTANT

## 2025-01-29 PROCEDURE — 99024 POSTOP FOLLOW-UP VISIT: CPT | Mod: ,,, | Performed by: PHYSICIAN ASSISTANT

## 2025-01-29 PROCEDURE — 97116 GAIT TRAINING THERAPY: CPT

## 2025-01-29 PROCEDURE — 97530 THERAPEUTIC ACTIVITIES: CPT | Mod: CO

## 2025-01-29 PROCEDURE — 25000003 PHARM REV CODE 250

## 2025-01-29 PROCEDURE — 11000001 HC ACUTE MED/SURG PRIVATE ROOM

## 2025-01-29 PROCEDURE — 63600175 PHARM REV CODE 636 W HCPCS

## 2025-01-29 RX ADMIN — OXYCODONE HYDROCHLORIDE 15 MG: 10 TABLET ORAL at 05:01

## 2025-01-29 RX ADMIN — METOPROLOL SUCCINATE 100 MG: 100 TABLET, EXTENDED RELEASE ORAL at 08:01

## 2025-01-29 RX ADMIN — SENNOSIDES AND DOCUSATE SODIUM 1 TABLET: 50; 8.6 TABLET ORAL at 08:01

## 2025-01-29 RX ADMIN — DEXAMETHASONE SODIUM PHOSPHATE 4 MG: 4 INJECTION INTRA-ARTICULAR; INTRALESIONAL; INTRAMUSCULAR; INTRAVENOUS; SOFT TISSUE at 05:01

## 2025-01-29 RX ADMIN — METHOCARBAMOL 1000 MG: 500 TABLET ORAL at 08:01

## 2025-01-29 RX ADMIN — MORPHINE SULFATE 4 MG: 2 INJECTION, SOLUTION INTRAMUSCULAR; INTRAVENOUS at 09:01

## 2025-01-29 RX ADMIN — FAMOTIDINE 40 MG: 20 TABLET, FILM COATED ORAL at 04:01

## 2025-01-29 RX ADMIN — ENOXAPARIN SODIUM 40 MG: 40 INJECTION SUBCUTANEOUS at 05:01

## 2025-01-29 RX ADMIN — ENOXAPARIN SODIUM 40 MG: 40 INJECTION SUBCUTANEOUS at 04:01

## 2025-01-29 RX ADMIN — OXYCODONE HYDROCHLORIDE 15 MG: 10 TABLET ORAL at 07:01

## 2025-01-29 RX ADMIN — ACETAMINOPHEN 650 MG: 325 TABLET ORAL at 12:01

## 2025-01-29 RX ADMIN — AMLODIPINE BESYLATE 2.5 MG: 2.5 TABLET ORAL at 08:01

## 2025-01-29 RX ADMIN — METHOCARBAMOL 1000 MG: 500 TABLET ORAL at 02:01

## 2025-01-29 RX ADMIN — GABAPENTIN 600 MG: 300 CAPSULE ORAL at 08:01

## 2025-01-29 RX ADMIN — VORTIOXETINE 20 MG: 10 TABLET, FILM COATED ORAL at 08:01

## 2025-01-29 RX ADMIN — MUPIROCIN: 20 OINTMENT TOPICAL at 08:01

## 2025-01-29 RX ADMIN — ACETAMINOPHEN 650 MG: 325 TABLET ORAL at 05:01

## 2025-01-29 RX ADMIN — DEXAMETHASONE SODIUM PHOSPHATE 4 MG: 4 INJECTION INTRA-ARTICULAR; INTRALESIONAL; INTRAMUSCULAR; INTRAVENOUS; SOFT TISSUE at 12:01

## 2025-01-29 RX ADMIN — SODIUM CHLORIDE: 9 INJECTION, SOLUTION INTRAVENOUS at 10:01

## 2025-01-29 RX ADMIN — ESTRADIOL 2 MG: 1 TABLET ORAL at 08:01

## 2025-01-29 RX ADMIN — CLONIDINE HYDROCHLORIDE 0.1 MG: 0.1 TABLET ORAL at 08:01

## 2025-01-29 RX ADMIN — LEVOTHYROXINE SODIUM 75 MCG: 75 TABLET ORAL at 05:01

## 2025-01-29 RX ADMIN — MORPHINE SULFATE 4 MG: 2 INJECTION, SOLUTION INTRAMUSCULAR; INTRAVENOUS at 04:01

## 2025-01-29 RX ADMIN — CETIRIZINE HYDROCHLORIDE 10 MG: 10 TABLET, FILM COATED ORAL at 08:01

## 2025-01-29 RX ADMIN — OXYCODONE HYDROCHLORIDE 15 MG: 10 TABLET ORAL at 12:01

## 2025-01-29 RX ADMIN — GABAPENTIN 600 MG: 300 CAPSULE ORAL at 02:01

## 2025-01-29 RX ADMIN — POLYETHYLENE GLYCOL 3350 17 G: 17 POWDER, FOR SOLUTION ORAL at 08:01

## 2025-01-29 NOTE — ASSESSMENT & PLAN NOTE
Ms Zuleta is a 54F w/ hx obesity, HTN, trigeminal neuralgia, POTS, IBS, depression, fibromyalgia, malignant carcinoid of appendix, CVID who was s/p L5-S1 microdiscectomy in 2004, now with degenerative disc disease and discogenic pain who presented on 1/27 for elective L5-S1 ALIF with posterior instrumentation.      Post op motor exam consistent with preop baseline, BLE 5/5 except for L DF 4+, L EHL 4.      Plan:   - admitted to NSGY floor  - q4h neuro checks/vitals  - PT/OT/OOB with LSO brace  - post-op xrays reviewed  - multimodal pain control  - dex 10 mg then 4 q 6 for rash/allergic reaction. Rash improving  - Vascular surgery signed off  - Therapy recommending LIT     Discussed with Dr. Melton

## 2025-01-29 NOTE — ASSESSMENT & PLAN NOTE
54F w/ hx obesity, HTN, trigeminal neuralgia, POTS, IBS, depression, fibromyalgia, malignant carcinoid of appendix, CVID who was s/p L5-S1 microdiscectomy in 2004, now with degenerative disc disease and discogenic pain who presented on 1/27 for elective L5-S1 ALIF with posterior instrumentation.      S/p ALIF on 1/27 (vascular surgery required for approach)    - Pt doing well post op   - Dressing removed and incision c/d/I  - Rest of care per primary

## 2025-01-29 NOTE — PLAN OF CARE
Problem: Adult Inpatient Plan of Care  Goal: Plan of Care Review  Outcome: Progressing  Goal: Patient-Specific Goal (Individualized)  Outcome: Progressing  Goal: Absence of Hospital-Acquired Illness or Injury  Outcome: Progressing  Goal: Optimal Comfort and Wellbeing  Outcome: Progressing  Goal: Readiness for Transition of Care  Outcome: Progressing     Problem: Bariatric Environmental Safety  Goal: Safety Maintained with Care  Outcome: Progressing     Problem: Infection  Goal: Absence of Infection Signs and Symptoms  Outcome: Progressing     Problem: Fall Injury Risk  Goal: Absence of Fall and Fall-Related Injury  Outcome: Progressing     Problem: Wound  Goal: Optimal Coping  Outcome: Progressing  Goal: Optimal Functional Ability  Outcome: Progressing  Goal: Absence of Infection Signs and Symptoms  Outcome: Progressing  Goal: Improved Oral Intake  Outcome: Progressing  Goal: Optimal Pain Control and Function  Outcome: Progressing  Goal: Skin Health and Integrity  Outcome: Progressing  Goal: Optimal Wound Healing  Outcome: Progressing

## 2025-01-29 NOTE — PROGRESS NOTES
[facility]  Neurosurgery  Progress Note    Subjective:     History of Present Illness: Ms Zuleta is a 54F w/ hx obesity, HTN, trigeminal neuralgia, POTS, IBS, depression, fibromyalgia, malignant carcinoid of appendix, CVID who was s/p L5-S1 microdiscectomy in 2004, now with degenerative disc disease and discogenic pain who presented on 1/27 for elective L5-S1 ALIF with posterior instrumentation. Patient states she has severe low back pain as well as pain mostly in the left thigh and down her leg. Mild 4+ and 4 EHL weakness on the left noted in preop. No blood thinners.    Post-Op Info:  Procedure(s) (LRB):  **LOOP X** FUSION, SPINE, LUMBAR, ALIF (N/A)  FUSION, SPINE, LUMBAR MIS POSTERIOR SCREWS LOOP X for POSTERIOR (N/A)   2 Days Post-Op   Interval History:  POD2 s/p L5-S1 ALIF with posterior instrumentation. Rash improved today s/p steroids. Reports stable back pain but notes imcreased paresthesias in left foot post therapy today. Passing flatus and tolerating diet. Post op XR reviewed.     Medications:  Continuous Infusions:   0.9% NaCl   Intravenous Continuous 75 mL/hr at 01/29/25 1048 New Bag at 01/29/25 1048     Scheduled Meds:   acetaminophen  650 mg Oral Q6H    amLODIPine  2.5 mg Oral Daily    cetirizine  10 mg Oral QHS    cloNIDine  0.1 mg Oral BID    dexAMETHasone injection  4 mg Intravenous Q6H    enoxparin  40 mg Subcutaneous Q12H    estradioL  2 mg Oral QHS    famotidine  40 mg Oral Daily    gabapentin  600 mg Oral TID    levothyroxine  75 mcg Oral Before breakfast    methocarbamoL  1,000 mg Oral TID    metoprolol succinate  100 mg Oral BID    mupirocin   Nasal BID    polyethylene glycol  17 g Oral Daily    senna-docusate 8.6-50 mg  1 tablet Oral Daily    vortioxetine  20 mg Oral Daily     PRN Meds:  Current Facility-Administered Medications:     aluminum-magnesium hydroxide-simethicone, 30 mL, Oral, Q4H PRN    dicyclomine, 20 mg, Oral, BID PRN    hydrALAZINE, 10 mg, Intravenous, Q6H PRN     morphine, 4 mg, Intravenous, Q4H PRN    ondansetron, 8 mg, Oral, Q6H PRN    oxyCODONE, 15 mg, Oral, Q6H PRN    oxyCODONE, 10 mg, Oral, Q6H PRN    prochlorperazine, 5 mg, Intravenous, Q6H PRN    senna-docusate 8.6-50 mg, 2 tablet, Oral, Nightly PRN    traZODone, 100 mg, Oral, Nightly PRN     Review of Systems  Objective:     Weight: 122.5 kg (270 lb 1 oz)  Body mass index is 46.36 kg/m².  Vital Signs (Most Recent):  Temp: 99.3 °F (37.4 °C) (01/29/25 0741)  Pulse: 102 (01/29/25 1048)  Resp: 16 (01/29/25 0922)  BP: (!) 143/83 (01/29/25 0741)  SpO2: (!) 93 % (01/29/25 0900) Vital Signs (24h Range):  Temp:  [98.3 °F (36.8 °C)-99.3 °F (37.4 °C)] 99.3 °F (37.4 °C)  Pulse:  [] 102  Resp:  [16-18] 16  SpO2:  [92 %-98 %] 93 %  BP: (115-145)/(63-90) 143/83       Female External Urinary Catheter w/ Suction 01/28/25 0615 (Active)       Neurosurgery Physical Exam  General: well developed, well nourished, no distress.   Head: normocephalic, atraumatic  Neurologic: Alert and oriented. Thought content appropriate.  GCS: Motor: 6/Verbal: 5/Eyes: 4 GCS Total: 15  Mental Status: Awake, Alert, Oriented x 4  Language: No aphasia  Speech: No dysarthria  Cranial nerves: face symmetric, tongue midline, CN II-XII grossly intact.   Eyes: pupils equal, round, reactive to light with accommodation, EOMI.   Pulmonary: normal respirations, no signs of respiratory distress  Abdomen: soft, non-distended, not tender to palpation. Dressing in place.   Sensory: intact to light touch throughout  Motor Strength:Moves all extremities spontaneously with good tone. No abnormal movements seen.     Strength  Deltoids Triceps Biceps Wrist Extension Wrist Flexion Hand    Upper: R 5/5 5/5 5/5 5/5 5/5 5/5    L 5/5 5/5 5/5 5/5 5/5 5/5     Iliopsoas Quadriceps Knee  Flexion Tibialis  anterior Gastro- cnemius EHL   Lower: R 5/5 5/5 5/5 4/5 5/5 4/5    L 5/5 5/5 5/5 5/5 5/5 5/5     Abdominal incision with bandage intact. Scan amount of drainage  "noted.  Lumbar incision dressing c/d/I/     Significant Labs:  Recent Labs   Lab 01/28/25  0302         K 3.8      CO2 18*   BUN 7   CREATININE 0.7   CALCIUM 8.1*     Recent Labs   Lab 01/28/25  0302   WBC 8.86   HGB 11.8*   HCT 34.3*        No results for input(s): "LABPT", "INR", "APTT" in the last 48 hours.    Microbiology Results (last 7 days)       ** No results found for the last 168 hours. **          All pertinent labs from the last 24 hours have been reviewed.    Significant Diagnostics:  Xray lumbar spine dated 1/28 reviewed and shows stable hardware at L5-S1  Assessment/Plan:     Back pain  Ms Zuleta is a 54F w/ hx obesity, HTN, trigeminal neuralgia, POTS, IBS, depression, fibromyalgia, malignant carcinoid of appendix, CVID who was s/p L5-S1 microdiscectomy in 2004, now with degenerative disc disease and discogenic pain who presented on 1/27 for elective L5-S1 ALIF with posterior instrumentation.      Post op motor exam consistent with preop baseline, BLE 5/5 except for L DF 4+, L EHL 4.      Plan:   - admitted to NSGY floor  - q4h neuro checks/vitals  - PT/OT/OOB with LSO brace  - post-op xrays reviewed  - multimodal pain control  - dex 10 mg then 4 q 6 for rash/allergic reaction. Rash improving  - Vascular surgery signed off  - Therapy recommending LIT     Discussed with Dr. Linh Mcnulty PASherleyC  Neurosurgery  [facility] - Surgery  "

## 2025-01-29 NOTE — PLAN OF CARE
01/29/25 1143   Post-Acute Status   Post-Acute Authorization Placement   Post-Acute Placement Status Set-up Complete/Auth obtained   Discharge Plan   Discharge Plan A Home Health     Patient assigned to [facility], awaiting  order.    Sallie Valencia LCSW  Case Management   [facility]   Ext. [phone]

## 2025-01-29 NOTE — PROGRESS NOTES
[facility]  Vascular Surgery  Progress Note    Patient Name: Kianna Zuleta  MRN: [id number]  Admission Date: 1/27/2025  Primary Care Provider: Gigi Celis MD    Subjective:     Interval History: POD2 ALIF/PLIF. Doing well. Dressing removed and incision c/d/I. Tolerating diet, transitioned to regular diet yesterday.    Post-Op Info:  Procedure(s) (LRB):  **LOOP X** FUSION, SPINE, LUMBAR, ALIF (N/A)  FUSION, SPINE, LUMBAR MIS POSTERIOR SCREWS LOOP X for POSTERIOR (N/A)   2 Days Post-Op     Medications:  Continuous Infusions:   0.9% NaCl   Intravenous Continuous 75 mL/hr at 01/27/25 2251 New Bag at 01/27/25 2251     Scheduled Meds:   acetaminophen  650 mg Oral Q6H    amLODIPine  2.5 mg Oral Daily    cetirizine  10 mg Oral QHS    cloNIDine  0.1 mg Oral BID    dexAMETHasone injection  4 mg Intravenous Q6H    enoxparin  40 mg Subcutaneous Q12H    estradioL  2 mg Oral QHS    famotidine  40 mg Oral Daily    gabapentin  600 mg Oral TID    levothyroxine  75 mcg Oral Before breakfast    methocarbamoL  1,000 mg Oral TID    metoprolol succinate  100 mg Oral BID    mupirocin   Nasal BID    polyethylene glycol  17 g Oral Daily    senna-docusate 8.6-50 mg  1 tablet Oral Daily    vortioxetine  20 mg Oral Daily     PRN Meds:  Current Facility-Administered Medications:     aluminum-magnesium hydroxide-simethicone, 30 mL, Oral, Q4H PRN    dicyclomine, 20 mg, Oral, BID PRN    hydrALAZINE, 10 mg, Intravenous, Q6H PRN    morphine, 4 mg, Intravenous, Q4H PRN    ondansetron, 8 mg, Oral, Q6H PRN    oxyCODONE, 15 mg, Oral, Q6H PRN    oxyCODONE, 10 mg, Oral, Q6H PRN    prochlorperazine, 5 mg, Intravenous, Q6H PRN    senna-docusate 8.6-50 mg, 2 tablet, Oral, Nightly PRN    traZODone, 100 mg, Oral, Nightly PRN     Objective:     Vital Signs (Most Recent):  Temp: 99.3 °F (37.4 °C) (01/29/25 0741)  Pulse: 95 (01/29/25 0741)  Resp: 17 (01/29/25 0741)  BP: (!) 143/83 (01/29/25 0741)  SpO2: (!) 93 % (01/29/25 0741) Vital Signs (24h  Range):  Temp:  [98.3 °F (36.8 °C)-99.3 °F (37.4 °C)] 99.3 °F (37.4 °C)  Pulse:  [] 95  Resp:  [17-18] 17  SpO2:  [92 %-98 %] 93 %  BP: (115-145)/(63-90) 143/83          Physical Exam  Constitutional:       General: She is not in acute distress.     Appearance: She is obese.   HENT:      Head: Normocephalic.   Eyes:      Extraocular Movements: Extraocular movements intact.      Pupils: Pupils are equal, round, and reactive to light.   Cardiovascular:      Rate and Rhythm: Normal rate and regular rhythm.      Pulses: Normal pulses.      Heart sounds: Normal heart sounds.   Pulmonary:      Effort: No respiratory distress.   Chest:      Chest wall: No tenderness.   Abdominal:      Palpations: Abdomen is soft.      Tenderness: There is abdominal tenderness.      Comments: Some incisional tenderness   Musculoskeletal:         General: No swelling, deformity or signs of injury.      Cervical back: No rigidity or tenderness.   Skin:     Findings: No bruising.      Comments: Dressing on mid-abdomen   Neurological:      General: No focal deficit present.      Mental Status: She is alert.   Psychiatric:         Mood and Affect: Mood normal.         Behavior: Behavior normal.          Significant Labs:  All pertinent labs from the last 24 hours have been reviewed.    Significant Diagnostics:  I have reviewed and interpreted all pertinent imaging results/findings within the past 24 hours.  Assessment/Plan:     Back pain  54F w/ hx obesity, HTN, trigeminal neuralgia, POTS, IBS, depression, fibromyalgia, malignant carcinoid of appendix, CVID who was s/p L5-S1 microdiscectomy in 2004, now with degenerative disc disease and discogenic pain who presented on 1/27 for elective L5-S1 ALIF with posterior instrumentation.      S/p ALIF on 1/27 (vascular surgery required for approach)    - Pt doing well post op   - Dressing removed and incision c/d/I  - Vascular surgery will sign off. Please reach out with any questions or  concerns  - Rest of care per primary        Ata Adamson MD  Vascular Surgery  [facility]

## 2025-01-29 NOTE — SUBJECTIVE & OBJECTIVE
Interval History:  POD2 s/p L5-S1 ALIF with posterior instrumentation. Rash improved today s/p steroids. Reports stable back pain but notes imcreased paresthesias in left foot post therapy today. Passing flatus and tolerating diet. Post op XR reviewed.     Medications:  Continuous Infusions:   0.9% NaCl   Intravenous Continuous 75 mL/hr at 01/29/25 1048 New Bag at 01/29/25 1048     Scheduled Meds:   acetaminophen  650 mg Oral Q6H    amLODIPine  2.5 mg Oral Daily    cetirizine  10 mg Oral QHS    cloNIDine  0.1 mg Oral BID    dexAMETHasone injection  4 mg Intravenous Q6H    enoxparin  40 mg Subcutaneous Q12H    estradioL  2 mg Oral QHS    famotidine  40 mg Oral Daily    gabapentin  600 mg Oral TID    levothyroxine  75 mcg Oral Before breakfast    methocarbamoL  1,000 mg Oral TID    metoprolol succinate  100 mg Oral BID    mupirocin   Nasal BID    polyethylene glycol  17 g Oral Daily    senna-docusate 8.6-50 mg  1 tablet Oral Daily    vortioxetine  20 mg Oral Daily     PRN Meds:  Current Facility-Administered Medications:     aluminum-magnesium hydroxide-simethicone, 30 mL, Oral, Q4H PRN    dicyclomine, 20 mg, Oral, BID PRN    hydrALAZINE, 10 mg, Intravenous, Q6H PRN    morphine, 4 mg, Intravenous, Q4H PRN    ondansetron, 8 mg, Oral, Q6H PRN    oxyCODONE, 15 mg, Oral, Q6H PRN    oxyCODONE, 10 mg, Oral, Q6H PRN    prochlorperazine, 5 mg, Intravenous, Q6H PRN    senna-docusate 8.6-50 mg, 2 tablet, Oral, Nightly PRN    traZODone, 100 mg, Oral, Nightly PRN     Review of Systems  Objective:     Weight: 122.5 kg (270 lb 1 oz)  Body mass index is 46.36 kg/m².  Vital Signs (Most Recent):  Temp: 99.3 °F (37.4 °C) (01/29/25 0741)  Pulse: 102 (01/29/25 1048)  Resp: 16 (01/29/25 0922)  BP: (!) 143/83 (01/29/25 0741)  SpO2: (!) 93 % (01/29/25 0900) Vital Signs (24h Range):  Temp:  [98.3 °F (36.8 °C)-99.3 °F (37.4 °C)] 99.3 °F (37.4 °C)  Pulse:  [] 102  Resp:  [16-18] 16  SpO2:  [92 %-98 %] 93 %  BP: (115-145)/(63-90) 143/83  "      Female External Urinary Catheter w/ Suction 01/28/25 0615 (Active)       Neurosurgery Physical Exam  General: well developed, well nourished, no distress.   Head: normocephalic, atraumatic  Neurologic: Alert and oriented. Thought content appropriate.  GCS: Motor: 6/Verbal: 5/Eyes: 4 GCS Total: 15  Mental Status: Awake, Alert, Oriented x 4  Language: No aphasia  Speech: No dysarthria  Cranial nerves: face symmetric, tongue midline, CN II-XII grossly intact.   Eyes: pupils equal, round, reactive to light with accommodation, EOMI.   Pulmonary: normal respirations, no signs of respiratory distress  Abdomen: soft, non-distended, not tender to palpation. Dressing in place.   Sensory: intact to light touch throughout  Motor Strength:Moves all extremities spontaneously with good tone. No abnormal movements seen.     Strength  Deltoids Triceps Biceps Wrist Extension Wrist Flexion Hand    Upper: R 5/5 5/5 5/5 5/5 5/5 5/5    L 5/5 5/5 5/5 5/5 5/5 5/5     Iliopsoas Quadriceps Knee  Flexion Tibialis  anterior Gastro- cnemius EHL   Lower: R 5/5 5/5 5/5 4/5 5/5 4/5    L 5/5 5/5 5/5 5/5 5/5 5/5     Abdominal incision with bandage intact. Scan amount of drainage noted.  Lumbar incision dressing c/d/I/     Significant Labs:  Recent Labs   Lab 01/28/25  0302         K 3.8      CO2 18*   BUN 7   CREATININE 0.7   CALCIUM 8.1*     Recent Labs   Lab 01/28/25  0302   WBC 8.86   HGB 11.8*   HCT 34.3*        No results for input(s): "LABPT", "INR", "APTT" in the last 48 hours.    Microbiology Results (last 7 days)       ** No results found for the last 168 hours. **          All pertinent labs from the last 24 hours have been reviewed.    Significant Diagnostics:  Xray lumbar spine dated 1/28 reviewed and shows stable hardware at L5-S1  "

## 2025-01-29 NOTE — SUBJECTIVE & OBJECTIVE
Medications:  Continuous Infusions:   0.9% NaCl   Intravenous Continuous 75 mL/hr at 01/27/25 2251 New Bag at 01/27/25 2251     Scheduled Meds:   acetaminophen  650 mg Oral Q6H    amLODIPine  2.5 mg Oral Daily    cetirizine  10 mg Oral QHS    cloNIDine  0.1 mg Oral BID    dexAMETHasone injection  4 mg Intravenous Q6H    enoxparin  40 mg Subcutaneous Q12H    estradioL  2 mg Oral QHS    famotidine  40 mg Oral Daily    gabapentin  600 mg Oral TID    levothyroxine  75 mcg Oral Before breakfast    methocarbamoL  1,000 mg Oral TID    metoprolol succinate  100 mg Oral BID    mupirocin   Nasal BID    polyethylene glycol  17 g Oral Daily    senna-docusate 8.6-50 mg  1 tablet Oral Daily    vortioxetine  20 mg Oral Daily     PRN Meds:  Current Facility-Administered Medications:     aluminum-magnesium hydroxide-simethicone, 30 mL, Oral, Q4H PRN    dicyclomine, 20 mg, Oral, BID PRN    hydrALAZINE, 10 mg, Intravenous, Q6H PRN    morphine, 4 mg, Intravenous, Q4H PRN    ondansetron, 8 mg, Oral, Q6H PRN    oxyCODONE, 15 mg, Oral, Q6H PRN    oxyCODONE, 10 mg, Oral, Q6H PRN    prochlorperazine, 5 mg, Intravenous, Q6H PRN    senna-docusate 8.6-50 mg, 2 tablet, Oral, Nightly PRN    traZODone, 100 mg, Oral, Nightly PRN     Objective:     Vital Signs (Most Recent):  Temp: 99.3 °F (37.4 °C) (01/29/25 0741)  Pulse: 95 (01/29/25 0741)  Resp: 17 (01/29/25 0741)  BP: (!) 143/83 (01/29/25 0741)  SpO2: (!) 93 % (01/29/25 0741) Vital Signs (24h Range):  Temp:  [98.3 °F (36.8 °C)-99.3 °F (37.4 °C)] 99.3 °F (37.4 °C)  Pulse:  [] 95  Resp:  [17-18] 17  SpO2:  [92 %-98 %] 93 %  BP: (115-145)/(63-90) 143/83          Physical Exam  Constitutional:       General: She is not in acute distress.     Appearance: She is obese.   HENT:      Head: Normocephalic.   Eyes:      Extraocular Movements: Extraocular movements intact.      Pupils: Pupils are equal, round, and reactive to light.   Cardiovascular:      Rate and Rhythm: Normal rate and regular  rhythm.      Pulses: Normal pulses.      Heart sounds: Normal heart sounds.   Pulmonary:      Effort: No respiratory distress.   Chest:      Chest wall: No tenderness.   Abdominal:      Palpations: Abdomen is soft.      Tenderness: There is abdominal tenderness.      Comments: Some incisional tenderness   Musculoskeletal:         General: No swelling, deformity or signs of injury.      Cervical back: No rigidity or tenderness.   Skin:     Findings: No bruising.      Comments: Dressing on mid-abdomen   Neurological:      General: No focal deficit present.      Mental Status: She is alert.   Psychiatric:         Mood and Affect: Mood normal.         Behavior: Behavior normal.          Significant Labs:  All pertinent labs from the last 24 hours have been reviewed.    Significant Diagnostics:  I have reviewed and interpreted all pertinent imaging results/findings within the past 24 hours.

## 2025-01-29 NOTE — PT/OT/SLP EVAL
Physical Therapy Co-Evaluation  Co-eval performed to appropriately and safely assess patient's strength and endurance while facilitating functional tasks in addition to accommodating for patient's activity/pain tolerance.      Patient Name:  Kianna Zuleta   MRN:  [id number]    Recommendations:     Discharge Recommendations: Low Intensity Therapy   Discharge Equipment Recommendations: walker, rolling   Barriers to discharge: Inaccessible home    Assessment:     Kianna Zuleta is a 54 y.o. female admitted with a medical diagnosis of <principal problem not specified>.  She presents with the following impairments/functional limitations: weakness, impaired endurance, impaired sensation, impaired self care skills, impaired functional mobility, gait instability, impaired balance, decreased lower extremity function, pain, orthopedic precautions.    Rehab Prognosis: Good; patient would benefit from acute skilled PT services to address these deficits and reach maximum level of function.    Recent Surgery: Procedure(s) (LRB):  **LOOP X** FUSION, SPINE, LUMBAR, ALIF (N/A)  FUSION, SPINE, LUMBAR MIS POSTERIOR SCREWS LOOP X for POSTERIOR (N/A) 1 Day Post-Op    Plan:     During this hospitalization, patient to be seen 4 x/week to address the identified rehab impairments via gait training, therapeutic activities, therapeutic exercises, neuromuscular re-education and progress toward the following goals:    Plan of Care Expires:  02/27/25    Subjective     Chief Complaint: back pain, but not rated  Patient/Family Comments/goals: return to PLOF  Pain/Comfort:  Pain Rating 1:  (not rated)  Location 1: back  Pain Addressed 1: Reposition, Distraction  Pain Rating Post-Intervention 1: other (see comments) (not rated)    Patients cultural, spiritual, [employer] conflicts given the current situation: no    Living Environment:  Living Environment: Pt lives alone in [facility] [address] with L HR, t/s combo with shower chair  Previous level of  function: (I) ADLs and mobility. Pt is an active   Roles and Routines: not working. Enjoys TV  Equipment Used at Home: shower chair  Assistance upon Discharge: limited    Objective:     Communicated with RN prior to session.  Patient found HOB elevated with peripheral IV, PureWick, telemetry  upon PT entry to room.    General Precautions: Standard, fall  Orthopedic Precautions:spinal precautions   Braces: LSO  Respiratory Status: Room air    Exams:  Cognitive Exam:  Patient is oriented to Person, Place, Time, and Situation  Gross Motor Coordination:  WFL  Sensation:  LLE impaired from the knee down, RLE intact  RLE ROM: WFL  RLE Strength: hip flexion 4/5, knee extension 5/5, knee flexion 5/5, DF 5/5  LLE ROM: WFL  LLE Strength: hip flexion 4/5, knee extension 4/5, knee flexion 4/5, DF 3/5    Functional Mobility:  Bed Mobility:     Rolling Left:  moderate assistance  Scooting: stand by assistance  Supine to Sit: minimum assistance  Transfers:     Sit to Stand:  minimum assistance with rolling walker  Attempted with no AD, but pt preferred with RW  Gait: 15 ft x2, CGA, RW; decreased gait speed, mild anterior lean      AM-PAC 6 CLICK MOBILITY  Total Score:18       Treatment & Education:  Pt educated on spinal precautions and donning LSO brace reviewed.   Patient educated on role of therapy, goals of session, and benefits of mobilizing.   Discussed PT plan of care during hospitalization.   Patient educated on calling for assistance.   Patient educated on how their diagnosis impacts their mobility within PT scope of practice.   Communication board up to date.  All questions answered within PT scope of practice.    Patient left up in chair with all lines intact, call button in reach, and RN notified.    GOALS:   Multidisciplinary Problems       Physical Therapy Goals          Problem: Physical Therapy    Goal Priority Disciplines Outcome Interventions   Physical Therapy Goal     PT, PT/OT Progressing    Description:  Goals to be met by: 2025     Patient will increase functional independence with mobility by performin. Supine to sit with supervision  2. Sit to supine with supervision  3. Rolling to Left and Right with Supervision.  4. Sit to stand transfer with Supervision using RW  5. Bed to chair transfer with Supervision using Rolling Walker  6. Gait  x 200 feet with Supervision using Rolling Walker.   7. Ascend/descend 5 stair with left Handrails Stand-by Assistance using No Assistive Device.                          DME Justifications:   Kianna's mobility limitation cannot be sufficiently resolved by the use of a cane. Her functional mobility deficit can be sufficiently resolved with the use of a Rolling Walker. Patient's mobility limitation significantly impairs their ability to participate in one of more activities of daily living.  The use of a RW will significantly improve the patient's ability to participate in MRADLS and the patient will use it on regular basis in the home.    History:     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    MVP (mitral valve prolapse)     Pneumonia     PONV (postoperative nausea and vomiting)     POTS (postural orthostatic tachycardia syndrome)     Recurrent upper respiratory infection (URI)     Ulcer     Vitamin D deficiency disease 2012       Past Surgical History:   Procedure Laterality Date    ANKLE SURGERY      lt    ANTERIOR LUMBAR INTERBODY FUSION (ALIF) N/A 2025    Procedure: **LOOP X** FUSION, SPINE, LUMBAR, ALIF;  Surgeon: Theron Melton MD;  Location: [facility] OR [address] [place] [address];  Service: Neurosurgery;  Laterality: N/A;    APPENDECTOMY      BACK SURGERY      CHOLECYSTECTOMY      choley &  ovarian cyst removed  12/2005    cystoscope      multiple    HYSTERECTOMY  4/8/2004    BUBBA BS&O     interstim      LUMBAR FUSION N/A 1/27/2025    Procedure: FUSION, SPINE, LUMBAR MIS POSTERIOR SCREWS LOOP X for POSTERIOR;  Surgeon: Theron Melton MD;  Location: [facility] OR [address] [place] [address];  Service: Neurosurgery;  Laterality: N/A;    OOPHORECTOMY  4/8/2004    with hysterectomy     PELVIC LAPAROSCOPY      IL EXPLORATORY OF ABDOMEN      SINUS SURGERY  03/01/2016    SPINE SURGERY         Time Tracking:     PT Received On: 01/28/25  PT Start Time: 0952     PT Stop Time: 1025  PT Total Time (min): 33 min     Billable Minutes: Evaluation 10, Gait Training 15, and Therapeutic Activity 8      01/28/2025

## 2025-01-29 NOTE — PT/OT/SLP PROGRESS
Occupational Therapy   Treatment    Name: Kianna Zuleta  MRN: [id number]  Admitting Diagnosis:  <principal problem not specified>  2 Days Post-Op    Recommendations:     Discharge Recommendations: Low Intensity Therapy  Discharge Equipment Recommendations:  walker, rolling  Barriers to discharge:  Decreased caregiver support    Assessment:     Kianna Zuleta is a 54 y.o. female with a medical diagnosis of <principal problem not specified>.  She presents with the following performance deficits affecting function: weakness, gait instability, impaired endurance, orthopedic precautions, decreased lower extremity function.     Rehab Prognosis:  Good; patient would benefit from acute skilled OT services to address these deficits and reach maximum level of function.       Plan:     Patient to be seen 4 x/week to address the above listed problems via self-care/home management, therapeutic activities, therapeutic exercises, neuromuscular re-education  Plan of Care Expires: 02/28/25  Plan of Care Reviewed with: patient    Subjective     Patient/Family Comments/goals: to improve function  Pain/Comfort:  Pain Rating 1:  (unrated)  Location - Orientation 1: generalized  Location 1: back  Pain Addressed 1: Reposition, Distraction  Pain Rating Post-Intervention 1:  (unrated)    Objective:     Communicated with: RN prior to session.  Patient found up in chair with peripheral IV, PureWick, telemetry upon OT entry to room.  A client care conference was completed by the OTR and the TOMLINSON prior to treatment by the TOMLINSON to discuss the patient's POC and current status.    General Precautions: Standard, fall    Orthopedic Precautions:spinal precautions  Braces: LSO  Respiratory Status: Room air     Occupational Performance:     Bed Mobility:    Pt up in chair     Functional Mobility/Transfers:  Patient completed Sit <> Stand Transfer with stand by assistance  with  rolling walker   Functional Mobility: pt ambulating ~50ft with CGA  using RW.     Activities of Daily Living:  Lower Body Dressing: stand by assistance to don/doff socks  with sock aide and reacher      AMPA 6 Click ADL: 20    Treatment & Education:  Pt educated on OT POC and frequency during hospital stay.   Pt educated on proper hand placement and techniques for RW mgmt to improve safety awareness.   Pt educated on importance of OOB activity to improve function and activity tolerance.  Pt educated on spinal precautions and hip kit for lower body dressing.   Addressed all patient questions/concerns within TOMLINSON scope of practice.     Patient left up in chair with all lines intact, call button in reach, and RN notified    GOALS:   Multidisciplinary Problems       Occupational Therapy Goals          Problem: Occupational Therapy    Goal Priority Disciplines Outcome Interventions   Occupational Therapy Goal     OT, PT/OT Progressing    Description: Goals to be met by: 2/28/2025     Patient will increase functional independence with ADLs by performing:    UE Dressing with Supervision.  LE Dressing with Supervision.  Grooming while standing at sink with Supervision.  Toileting from toilet with Supervision for hygiene and clothing management.   Toilet transfer to toilet with Supervision.    DME  Justification for Walker HME   The mobility limitation cannot be sufficiently resolved by the use of a cane.   Patient's functional mobility deficit can be sufficiently resolved with the use of a walker.  Patient's mobility limitation significantly impairs their ability to participate in one of more activities of daily living.  The use of a walker will significantly improve the patient's ability to participate in MRADLS and the patient will use it on regular basis in the home.                            DME Justifications:   Kianna's mobility limitation cannot be sufficiently resolved by the use of a cane. Her functional mobility deficit can be sufficiently resolved with the use of a Rolling  Walker. Patient's mobility limitation significantly impairs their ability to participate in one of more activities of daily living.  The use of a RW will significantly improve the patient's ability to participate in MRADLS and the patient will use it on regular basis in the home.    Time Tracking:     OT Date of Treatment: 01/29/25  OT Start Time: 1030  OT Stop Time: 1106  OT Total Time (min): 36 min    Billable Minutes:Self Care/Home Management 11  Therapeutic Activity 25    OT/HENRY: HENRY     Number of HENRY visits since last OT visit: 1 1/29/2025

## 2025-01-29 NOTE — PT/OT/SLP PROGRESS
Physical Therapy Treatment    Patient Name:  Kianna Zuleta   MRN:  [id number]    Recommendations:     Discharge Recommendations: Low Intensity Therapy  Discharge Equipment Recommendations: walker, rolling  Barriers to discharge: Inaccessible home    Assessment:     Kianna Zuleta is a 54 y.o. female admitted with a medical diagnosis of <principal problem not specified>.  She presents with the following impairments/functional limitations: weakness, impaired endurance, impaired self care skills, impaired functional mobility, gait instability, impaired balance, decreased lower extremity function, pain, orthopedic precautions Pt was able to progress well with PT today and ambulated 160' with RW and CGA on the hallway today. She amb with decrease rickey and step length with slow guarded pace, but she had no LOB and no dizziness  during gait.  She is motivated and wants to return to [facility] with continued skilled PT.     Rehab Prognosis: Good; patient would benefit from acute skilled PT services to address these deficits and reach maximum level of function.    Recent Surgery: Procedure(s) (LRB):  **LOOP X** FUSION, SPINE, LUMBAR, ALIF (N/A)  FUSION, SPINE, LUMBAR MIS POSTERIOR SCREWS LOOP X for POSTERIOR (N/A) 2 Days Post-Op    Plan:     During this hospitalization, patient to be seen 4 x/week to address the identified rehab impairments via gait training, therapeutic activities, therapeutic exercises, neuromuscular re-education and progress toward the following goals:    Plan of Care Expires:  02/27/25    Subjective     Chief Complaint: pt c/o increasing numbness in her L LE, states MD is aware and said could be due to swelling and post op status.   Patient/Family Comments/goals: to get stronger and go home  Pain/Comfort:  Pain Rating 1: 7/10  Location - Orientation 1: lower  Location 1: back  Pain Addressed 1: Pre-medicate for activity, Reposition, Distraction  Pain Rating Post-Intervention 1: 7/10      Objective:      Communicated with RN prior to session.  Patient found up in chair with peripheral IV, telemetry (LSO) upon PT entry to room.     General Precautions: Standard, fall  Orthopedic Precautions: spinal precautions  Braces: LSO  Respiratory Status: Room air     Functional Mobility:  Transfers:     Sit to Stand:  contact guard assistance with rolling walker  Gait: Pt amb 160' with RW and CGA on level surfaces. Pt required cues for posture, sequencing and body mechanics to increase safety and indep with gait.  She amb with decrease rickey, decrease step length and slow cautious pace but no LOB and no dizziness during gait.   Balance: stand static with RW: SBA  stand dynamic with RW: CGA      AM-PAC 6 CLICK MOBILITY  Turning over in bed (including adjusting bedclothes, sheets and blankets)?: 3  Sitting down on and standing up from a chair with arms (e.g., wheelchair, bedside commode, etc.): 3  Moving from lying on back to sitting on the side of the bed?: 3  Moving to and from a bed to a chair (including a wheelchair)?: 3  Need to walk in hospital room?: 3  Climbing 3-5 steps with a railing?: 3  Basic Mobility Total Score: 18       Treatment & Education:  Patient educated on role of acute care PT and PT POC, safety while in hospital including calling nurse for mobility, and call light usage.  Educated about importance of OOB mobility and remaining up in chair most of the day.  PT ed pt on spinal precautions,( including avoidance of bending, lifting, and twisting), log rolling, posture and body mechanics during mobility and pt verbalized good understanding back to PT.  Pt with good recall of spinal precautions.   Pt ed on LSO management and she verbalized good understanding back to PT.   PT ed on progress with PT today and to amb again this evening with nsg staff and pt in agreement  White board updated in pt's room with therapist and current mobility/transfer level and assist required      Patient left up in chair  to  eat her lunch with all lines intact, call button in reach, and RN notified.  Pt wanted to remain in chair to eat her lunch.     GOALS:   Multidisciplinary Problems       Physical Therapy Goals          Problem: Physical Therapy    Goal Priority Disciplines Outcome Interventions   Physical Therapy Goal     PT, PT/OT Progressing    Description: Goals to be met by: 2025     Patient will increase functional independence with mobility by performin. Supine to sit with supervision  2. Sit to supine with supervision  3. Rolling to Left and Right with Supervision.  4. Sit to stand transfer with Supervision using RW  5. Bed to chair transfer with Supervision using Rolling Walker  6. Gait  x 200 feet with Supervision using Rolling Walker.   7. Ascend/descend 5 stair with left Handrails Stand-by Assistance using No Assistive Device.                          DME Justifications:   Kianna's mobility limitation cannot be sufficiently resolved by the use of a cane. Her functional mobility deficit can be sufficiently resolved with the use of a Rolling Walker. Patient's mobility limitation significantly impairs their ability to participate in one of more activities of daily living.  The use of a RW will significantly improve the patient's ability to participate in MRADLS and the patient will use it on regular basis in the home.    Time Tracking:     PT Received On: 25  PT Start Time: 1110     PT Stop Time: 1126  PT Total Time (min): 16 min     Billable Minutes: Gait Training 16    Treatment Type: Treatment  PT/PTA: PT     Number of PTA visits since last PT visit: 0     2025

## 2025-01-29 NOTE — PLAN OF CARE
Problem: Physical Therapy  Goal: Physical Therapy Goal  Description: Goals to be met by: 2025     Patient will increase functional independence with mobility by performin. Supine to sit with supervision  2. Sit to supine with supervision  3. Rolling to Left and Right with Supervision.  4. Sit to stand transfer with Supervision using RW  5. Bed to chair transfer with Supervision using Rolling Walker  6. Gait  x 200 feet with Supervision using Rolling Walker.   7. Ascend/descend 5 stair with left Handrails Stand-by Assistance using No Assistive Device.     Outcome: Progressing

## 2025-01-30 VITALS
DIASTOLIC BLOOD PRESSURE: 87 MMHG | HEIGHT: 64 IN | BODY MASS INDEX: 46.11 KG/M2 | SYSTOLIC BLOOD PRESSURE: 133 MMHG | TEMPERATURE: 99 F | WEIGHT: 270.06 LBS | HEART RATE: 96 BPM | OXYGEN SATURATION: 95 % | RESPIRATION RATE: 18 BRPM

## 2025-01-30 PROCEDURE — 25000003 PHARM REV CODE 250: Performed by: PHYSICIAN ASSISTANT

## 2025-01-30 PROCEDURE — 97112 NEUROMUSCULAR REEDUCATION: CPT | Mod: CO

## 2025-01-30 PROCEDURE — 97116 GAIT TRAINING THERAPY: CPT | Mod: CQ

## 2025-01-30 PROCEDURE — 97535 SELF CARE MNGMENT TRAINING: CPT | Mod: CO

## 2025-01-30 PROCEDURE — 94761 N-INVAS EAR/PLS OXIMETRY MLT: CPT

## 2025-01-30 PROCEDURE — 63600175 PHARM REV CODE 636 W HCPCS: Performed by: PHYSICIAN ASSISTANT

## 2025-01-30 PROCEDURE — 99024 POSTOP FOLLOW-UP VISIT: CPT | Mod: ,,, | Performed by: PHYSICIAN ASSISTANT

## 2025-01-30 PROCEDURE — 25000003 PHARM REV CODE 250

## 2025-01-30 PROCEDURE — 63600175 PHARM REV CODE 636 W HCPCS

## 2025-01-30 PROCEDURE — 97530 THERAPEUTIC ACTIVITIES: CPT | Mod: CO

## 2025-01-30 RX ORDER — CYCLOBENZAPRINE HCL 10 MG
10 TABLET ORAL 3 TIMES DAILY
Qty: 30 TABLET | Refills: 0 | Status: SHIPPED | OUTPATIENT
Start: 2025-01-30 | End: 2025-02-10 | Stop reason: SDUPTHER

## 2025-01-30 RX ORDER — OXYCODONE HYDROCHLORIDE 15 MG/1
15 TABLET ORAL EVERY 6 HOURS PRN
Qty: 42 TABLET | Refills: 0 | Status: SHIPPED | OUTPATIENT
Start: 2025-01-30 | End: 2025-02-10

## 2025-01-30 RX ADMIN — LEVOTHYROXINE SODIUM 75 MCG: 75 TABLET ORAL at 05:01

## 2025-01-30 RX ADMIN — ACETAMINOPHEN 650 MG: 325 TABLET ORAL at 12:01

## 2025-01-30 RX ADMIN — OXYCODONE HYDROCHLORIDE 15 MG: 10 TABLET ORAL at 09:01

## 2025-01-30 RX ADMIN — MUPIROCIN: 20 OINTMENT TOPICAL at 08:01

## 2025-01-30 RX ADMIN — ACETAMINOPHEN 650 MG: 325 TABLET ORAL at 05:01

## 2025-01-30 RX ADMIN — ENOXAPARIN SODIUM 40 MG: 40 INJECTION SUBCUTANEOUS at 05:01

## 2025-01-30 RX ADMIN — POLYETHYLENE GLYCOL 3350 17 G: 17 POWDER, FOR SOLUTION ORAL at 08:01

## 2025-01-30 RX ADMIN — TRAZODONE HYDROCHLORIDE 100 MG: 100 TABLET ORAL at 12:01

## 2025-01-30 RX ADMIN — DEXAMETHASONE SODIUM PHOSPHATE 4 MG: 4 INJECTION INTRA-ARTICULAR; INTRALESIONAL; INTRAMUSCULAR; INTRAVENOUS; SOFT TISSUE at 12:01

## 2025-01-30 RX ADMIN — MORPHINE SULFATE 4 MG: 2 INJECTION, SOLUTION INTRAMUSCULAR; INTRAVENOUS at 05:01

## 2025-01-30 RX ADMIN — GABAPENTIN 600 MG: 300 CAPSULE ORAL at 08:01

## 2025-01-30 RX ADMIN — OXYCODONE HYDROCHLORIDE 15 MG: 10 TABLET ORAL at 01:01

## 2025-01-30 RX ADMIN — DEXAMETHASONE SODIUM PHOSPHATE 4 MG: 4 INJECTION INTRA-ARTICULAR; INTRALESIONAL; INTRAMUSCULAR; INTRAVENOUS; SOFT TISSUE at 05:01

## 2025-01-30 RX ADMIN — METOPROLOL SUCCINATE 100 MG: 100 TABLET, EXTENDED RELEASE ORAL at 08:01

## 2025-01-30 RX ADMIN — SENNOSIDES AND DOCUSATE SODIUM 1 TABLET: 50; 8.6 TABLET ORAL at 08:01

## 2025-01-30 RX ADMIN — MORPHINE SULFATE 4 MG: 2 INJECTION, SOLUTION INTRAMUSCULAR; INTRAVENOUS at 12:01

## 2025-01-30 RX ADMIN — CLONIDINE HYDROCHLORIDE 0.1 MG: 0.1 TABLET ORAL at 08:01

## 2025-01-30 RX ADMIN — METHOCARBAMOL 1000 MG: 500 TABLET ORAL at 08:01

## 2025-01-30 RX ADMIN — VORTIOXETINE 20 MG: 10 TABLET, FILM COATED ORAL at 08:01

## 2025-01-30 RX ADMIN — AMLODIPINE BESYLATE 2.5 MG: 2.5 TABLET ORAL at 08:01

## 2025-01-30 NOTE — PLAN OF CARE
Problem: Adult Inpatient Plan of Care  Goal: Plan of Care Review  Outcome: Progressing  Goal: Patient-Specific Goal (Individualized)  Outcome: Progressing  Goal: Absence of Hospital-Acquired Illness or Injury  Outcome: Progressing  Intervention: Identify and Manage Fall Risk  Flowsheets (Taken 1/30/2025 0408)  Safety Promotion/Fall Prevention:   assistive device/personal item within reach   side rails raised x 2   patient expresses understanding of fall risk and prevention   nonskid shoes/socks when out of bed   instructed to call staff for mobility  Intervention: Prevent Skin Injury  Flowsheets (Taken 1/30/2025 0408)  Body Position: position changed independently  Skin Protection: incontinence pads utilized  Device Skin Pressure Protection: adhesive use limited     Problem: Infection  Goal: Absence of Infection Signs and Symptoms  Outcome: Progressing     Problem: Wound  Goal: Optimal Coping  Outcome: Progressing     Problem: Fall Injury Risk  Goal: Absence of Fall and Fall-Related Injury  Outcome: Progressing

## 2025-01-30 NOTE — PLAN OF CARE
Problem: Adult Inpatient Plan of Care  Goal: Plan of Care Review  1/29/2025 1827 by Freddie Orantes RN  Outcome: Progressing     Problem: Adult Inpatient Plan of Care  Goal: Patient-Specific Goal (Individualized)  1/29/2025 1827 by Freddie Orantes RN  Outcome: Progressing     Problem: Adult Inpatient Plan of Care  Goal: Absence of Hospital-Acquired Illness or Injury  1/29/2025 1827 by Freddie Orantes RN  Outcome: Progressing  1/29/2025 1827 by Freddie Orantes RN  Outcome: Progressing  1/29/2025 1826 by Freddie Orantes RN  Outcome: Progressing  1/29/2025 1823 by Freddie Orantes RN  Outcome: Progressing     Problem: Adult Inpatient Plan of Care  Goal: Optimal Comfort and Wellbeing  1/29/2025 1827 by Freddie Orantes RN  Outcome: Progressing  1/29/2025 1827 by Freddie Orantes RN  Outcome: Progressing  1/29/2025 1826 by Freddie Orantes RN  Outcome: Progressing  1/29/2025 1823 by Freddie Orantes RN  Outcome: Progressing     Problem: Adult Inpatient Plan of Care  Goal: Readiness for Transition of Care  1/29/2025 1827 by Freddie Orantes RN  Outcome: Progressing  1/29/2025 1827 by Freddie Orantes RN  Outcome: Progressing  1/29/2025 1826 by Freddie Orantes RN  Outcome: Progressing  1/29/2025 1823 by Freddie Orantes RN  Outcome: Progressing     Problem: Bariatric Environmental Safety  Goal: Safety Maintained with Care  1/29/2025 1827 by Freddie Orantes RN  Outcome: Progressing  1/29/2025 1827 by Freddie Orantes RN  Outcome: Progressing  1/29/2025 1826 by Freddie Orantes RN  Outcome: Progressing  1/29/2025 1823 by Freddie Orantes RN  Outcome: Progressing     Problem: Infection  Goal: Absence of Infection Signs and Symptoms  1/29/2025 1827 by Freddie Orantes RN  Outcome: Progressing  1/29/2025 1827 by Freddie Orantes, RN  Outcome:  Progressing  1/29/2025 1826 by Freddie Orantes RN  Outcome: Progressing  1/29/2025 1823 by Freddie Orantes RN  Outcome: Progressing     Problem: Wound  Goal: Optimal Coping  1/29/2025 1827 by Freddie Ornates, RN  Outcome: Progressing  1/29/2025 1827 by Freddie Orantes, RN  Outcome: Progressing  1/29/2025 1826 by Freddie Orantes, RN  Outcome: Progressing  1/29/2025 1823 by Freddie Orantes RN  Outcome: Progressing  Goal: Optimal Functional Ability  1/29/2025 1827 by Freddie Oranets RN  Outcome: Progressing  1/29/2025 1827 by Freddie Orantes RN  Outcome: Progressing  1/29/2025 1826 by Freddie Orantes RN  Outcome: Progressing  1/29/2025 1823 by Freddie Orantes RN  Outcome: Progressing  Goal: Absence of Infection Signs and Symptoms  1/29/2025 1827 by Freddie Orantes RN  Outcome: Progressing  1/29/2025 1827 by Freddie Orantes, RN  Outcome: Progressing  1/29/2025 1826 by Freddie Orantes, RN  Outcome: Progressing  1/29/2025 1823 by Freddie Orantes RN  Outcome: Progressing  Goal: Improved Oral Intake  1/29/2025 1827 by Freddie Orantes, RN  Outcome: Progressing  1/29/2025 1827 by Freddie Orantes RN  Outcome: Progressing  1/29/2025 1826 by Freddie Orantes, RN  Outcome: Progressing  1/29/2025 1823 by Freddie Orantes RN  Outcome: Progressing  Goal: Optimal Pain Control and Function  1/29/2025 1827 by Freddie Orantes, RN  Outcome: Progressing  1/29/2025 1827 by Freddie Orantes, RN  Outcome: Progressing  1/29/2025 1826 by Freddie Orantes, RN  Outcome: Progressing  1/29/2025 1823 by Freddie Orantes, RN  Outcome: Progressing  Goal: Skin Health and Integrity  1/29/2025 1827 by Freddie Orantes RN  Outcome: Progressing  1/29/2025 1827 by Freddie Orantes, RN  Outcome: Progressing  1/29/2025 1826 by Freddie Orantes, RN  Outcome:  Progressing  1/29/2025 1823 by Freddie Orantes, RN  Outcome: Progressing  Goal: Optimal Wound Healing  1/29/2025 1827 by Freddie Orantes RN  Outcome: Progressing  1/29/2025 1827 by Freddie Orantes RN  Outcome: Progressing  1/29/2025 1826 by Freddie Orantes, RN  Outcome: Progressing  1/29/2025 1823 by Freddie Orantes RN  Outcome: Progressing     Problem: Fall Injury Risk  Goal: Absence of Fall and Fall-Related Injury  1/29/2025 1827 by Freddie Orantes RN  Outcome: Progressing  1/29/2025 1827 by Frdedie Orantes RN  Outcome: Progressing  1/29/2025 1826 by Freddie Orantes RN  Outcome: Progressing  1/29/2025 1823 by Freddie Orantes, RN  Outcome: Progressing

## 2025-01-30 NOTE — PT/OT/SLP PROGRESS
Occupational Therapy   Co-Treatment    Name: Kianna Zuleta  MRN: [id number]  Admitting Diagnosis:  Back pain  3 Days Post-Op    Recommendations:     Discharge Recommendations: Low Intensity Therapy  Discharge Equipment Recommendations:  walker, rolling  Barriers to discharge:  Decreased caregiver support    Assessment:     Kianna Zuleta is a 54 y.o. female with a medical diagnosis of Back pain.  She presents with the following performance deficits affecting function: weakness, impaired endurance, impaired balance, impaired self care skills, impaired functional mobility, gait instability, decreased lower extremity function, orthopedic precautions, decreased upper extremity function, decreased safety awareness.     Rehab Prognosis:  Good; patient would benefit from acute skilled OT services to address these deficits and reach maximum level of function.       Plan:     Patient to be seen 4 x/week to address the above listed problems via self-care/home management, therapeutic exercises, neuromuscular re-education, therapeutic activities  Plan of Care Expires: 02/28/25  Plan of Care Reviewed with: patient    Subjective     Patient/Family Comments/goals: to improve function  Pain/Comfort:  Pain Rating 1: 0/10    Objective:     Communicated with: RN prior to session.  Patient found HOB elevated with peripheral IV, telemetry (LSO) upon OT entry to room.    General Precautions: Standard, fall    Orthopedic Precautions:spinal precautions  Braces: LSO  Respiratory Status: Room air     Occupational Performance:     Bed Mobility:    Patient completed Supine to Sit with stand by assistance     Functional Mobility/Transfers:  Patient completed Sit <> Stand Transfer with stand by assistance  with  rolling walker   Patient completed Bed <> Chair Transfer using Step Transfer technique with stand by assistance with rolling walker  Functional Mobility: pt ambulating community distances and ascending/descending 5 stairs with CGA  using RW. No LOB or SOB noted.     Activities of Daily Living:  Upper Body Dressing: stand by assistance to don LSO brace      AMPA 6 Click ADL: 20    Treatment & Education:  Pt educated on OT POC and frequency during hospital stay.   Pt educated on proper hand placement and techniques for RW mgmt to improve safety awareness.   Pt educated on importance of OOB activity to improve function and activity tolerance.  Pt educated on spinal precautions and log rolling techniques to improve safety awareness.   Pt educated on home modification to improve safety and functional independence  Addressed all patient questions/concerns within [facility] scope of practice.     Patient left up in chair with all lines intact, call button in reach, and family present    GOALS:   Multidisciplinary Problems       Occupational Therapy Goals          Problem: Occupational Therapy    Goal Priority Disciplines Outcome Interventions   Occupational Therapy Goal     OT, PT/OT Progressing    Description: Goals to be met by: 2/28/2025     Patient will increase functional independence with ADLs by performing:    UE Dressing with Supervision.  LE Dressing with Supervision.  Grooming while standing at sink with Supervision.  Toileting from toilet with Supervision for hygiene and clothing management.   Toilet transfer to toilet with Supervision.    DME  Justification for Walker HME   The mobility limitation cannot be sufficiently resolved by the use of a cane.   Patient's functional mobility deficit can be sufficiently resolved with the use of a walker.  Patient's mobility limitation significantly impairs their ability to participate in one of more activities of daily living.  The use of a walker will significantly improve the patient's ability to participate in MRADLS and the patient will use it on regular basis in the home.                            DME Justifications:   Kianna's mobility limitation cannot be sufficiently resolved by the use of a cane.  Her functional mobility deficit can be sufficiently resolved with the use of a Rolling Walker. Patient's mobility limitation significantly impairs their ability to participate in one of more activities of daily living.  The use of a RW will significantly improve the patient's ability to participate in MRADLS and the patient will use it on regular basis in the home.    Time Tracking:     OT Date of Treatment: 01/30/25  OT Start Time: 1408  OT Stop Time: 1420  OT Total Time (min): 12 min    Billable Minutes:Therapeutic Activity 12    OT/HENRY: HENRY     Number of HENRY visits since last OT visit: 2    1/30/2025

## 2025-01-30 NOTE — PLAN OF CARE
[facility]  Discharge Final Note    Primary Care Provider: Gigi Celis MD    Expected Discharge Date: 1/30/2025    Final Discharge Note (most recent)       Final Note - 01/30/25 1532          Final Note    Assessment Type Final Discharge Note     Anticipated Discharge Disposition [facility] Resources/Appts/Education Provided Provided patient/caregiver with written discharge plan information;Provided education on problems/symptoms using teachback                   Future Appointments   Date Time Provider Department Center   2/10/2025  8:30 AM Traci Morel RN [facility]   3/11/2025  9:00 AM [facility]   3/11/2025 10:00 AM Theron Melton MD [facility]

## 2025-01-30 NOTE — PLAN OF CARE
Problem: Adult Inpatient Plan of Care  Goal: Plan of Care Review  Outcome: Progressing  Goal: Patient-Specific Goal (Individualized)  Outcome: Progressing  Goal: Absence of Hospital-Acquired Illness or Injury  Outcome: Progressing  Goal: Optimal Comfort and Wellbeing  Outcome: Progressing  Goal: Readiness for Transition of Care  Outcome: Progressing     Problem: Bariatric Environmental Safety  Goal: Safety Maintained with Care  Outcome: Progressing     Problem: Infection  Goal: Absence of Infection Signs and Symptoms  Outcome: Progressing     Problem: Wound  Goal: Optimal Coping  Outcome: Progressing  Goal: Optimal Functional Ability  Outcome: Progressing  Goal: Absence of Infection Signs and Symptoms  Outcome: Progressing  Goal: Improved Oral Intake  Outcome: Progressing  Goal: Optimal Pain Control and Function  Outcome: Progressing  Goal: Skin Health and Integrity  Outcome: Progressing  Goal: Optimal Wound Healing  Outcome: Progressing     Problem: Fall Injury Risk  Goal: Absence of Fall and Fall-Related Injury  Outcome: Progressing   Pt is aaox4, currently resting in bed with no signs of acute distress. Pt still reports pain despite pain regimen. Dressing remains clean, dry, and intact. Pt also reporting some numbess and tingling that's traveling up the LLE, BOB Mcnulty was notified. Bed is in the lowest position with wheels locked, call light and personal belongings within reach, side rails up x2, Continue plan of care.

## 2025-01-30 NOTE — PT/OT/SLP PROGRESS
Physical Therapy Treatment    Patient Name:  Kianna Zuleta   MRN:  [id number]    Recommendations:     Discharge Recommendations: Low Intensity Therapy  Discharge Equipment Recommendations: walker, rolling  Barriers to discharge: None    Assessment:     Kianna Zuleta is a 54 y.o. female admitted with a medical diagnosis of Back pain.  She presents with the following impairments/functional limitations: weakness, impaired endurance, impaired self care skills, impaired functional mobility, gait instability, decreased safety awareness, orthopedic precautions .    Rehab Prognosis: Good; patient would benefit from acute skilled PT services to address these deficits and reach maximum level of function.    Recent Surgery: Procedure(s) (LRB):  **LOOP X** FUSION, SPINE, LUMBAR, ALIF (N/A)  FUSION, SPINE, LUMBAR MIS POSTERIOR SCREWS LOOP X for POSTERIOR (N/A) 3 Days Post-Op    Plan:     During this hospitalization, patient to be seen 4 x/week to address the identified rehab impairments via gait training, therapeutic activities, therapeutic exercises, neuromuscular re-education and progress toward the following goals:    Plan of Care Expires:  02/27/25    Subjective     Chief Complaint: pt eager to do stairs  Patient/Family Comments/goals: none   Pain/Comfort:  Pain Rating 1: 0/10      Objective:     Communicated with RN prior to session.  Patient found HOB elevated with telemetry upon PT entry to room.     General Precautions: Standard, fall  Orthopedic Precautions: spinal precautions  Braces: LSO  Respiratory Status: Room air     Functional Mobility:  Bed Mobility:     Supine to Sit: contact guard assistance  Transfers:     Sit to Stand:  contact guard assistance with rolling walker  Gait: 100 feet with RW SBA   Stairs:  Pt ascended/descended 5 stair(s) with No Assistive Device with left handrail with Contact Guard Assistance.       AM-PAC 6 CLICK MOBILITY  Turning over in bed (including adjusting bedclothes, sheets and  blankets)?: 4  Sitting down on and standing up from a chair with arms (e.g., wheelchair, bedside commode, etc.): 4  Moving from lying on back to sitting on the side of the bed?: 3  Moving to and from a bed to a chair (including a wheelchair)?: 3  Need to walk in hospital room?: 3  Climbing 3-5 steps with a railing?: 3  Basic Mobility Total Score: 20       Treatment & Education:  Bedside table in front of patient and area set up for function, convenience, and safety. RN aware of patient's mobility needs and status. Questions/concerns addressed within PTA scope of practice; patient  with no further questions. Time was provided for active listening, discussion of health disposition, and discussion of safe discharge.    Patient left up in chair with all lines intact and call button in reach..    GOALS:   Multidisciplinary Problems       Physical Therapy Goals          Problem: Physical Therapy    Goal Priority Disciplines Outcome Interventions   Physical Therapy Goal     PT, PT/OT Progressing    Description: Goals to be met by: 2025     Patient will increase functional independence with mobility by performin. Supine to sit with supervision  2. Sit to supine with supervision  3. Rolling to Left and Right with Supervision.  4. Sit to stand transfer with Supervision using RW  5. Bed to chair transfer with Supervision using Rolling Walker  6. Gait  x 200 feet with Supervision using Rolling Walker.   7. Ascend/descend 5 stair with left Handrails Stand-by Assistance using No Assistive Device.                          DME Justifications:      Time Tracking:     PT Received On: 25  PT Start Time: 1408     PT Stop Time: 1420  PT Total Time (min): 12 min     Billable Minutes: Gait Training 12    Treatment Type: Treatment  PT/PTA: PTA     Number of PTA visits since last PT visit: 2025

## 2025-01-30 NOTE — DISCHARGE INSTRUCTIONS
Please follow ONLY the instructions that are checked below.    Activity Restrictions:  [x]  Return to work will be determined on an individual basis.  [x]  No lifting greater than 10 pounds.  [x]  Avoid bending and twisting the area of your surgery more than 45 degrees from neutral position in any direction.  [x]  No driving or operating machinery:  [x]  until cleared by your surgeon.  [x]  while taking narcotic pain medications or muscle relaxants.  []  No cervical collar, soft collar, or lumbar brace required.  []  Wear your brace at all times. You may be given an extra brace or soft collar to wear when showering.  [x]  Wear your brace at all times except when flat in bed.  []  Wear brace for comfort only.  [x]  Increase ambulation over the next 2 weeks so that you are walking 2 miles per day at 2 weeks post-operatively.  [x]  Walk on paved surfaces only. It is okay to walk up and down stairs while holding onto a side rail.  [x]  No sexual activity for 2-3 weeks.    Discharge Medication/Follow-up:  [x]  Please refer to discharge medication reconciliation form.  [x]  Do not take ANY non-steroidal anti-inflammatory drugs (NSAIDS), including the following: ibuprofen, naprosyn, Aleve, Advil, Indocin, Mobic, or Celebrex for:  []  4 weeks  [x]  8 weeks  []  6 months  [x]  Prescriptions for appropriate medication will be given upon discharge.  [x]  Take docusate (Colace 100 mg): take one capsule a day as needed for constipation. You can get this over the counter.  [x]  Follow-up appointment:  [x]  10-14 days post-op for wound check by physician assistant/nurse  [x]  4-6 weeks with MD:  [x]  with x-rays  []  without x-rays  [x]  An appointment will be mailed to you.    Wound Care:  []  Remove dressing or bandaid in    days.  [x]  No bandage required. Keep your incision open to the air.  [x]  You may shower on the 2nd day after your surgery. Have the force of water hit you opposite from the incision. Pat the incision dry  after your shower; do not scrub the incision.  [x]  You cannot take a bath until 8 weeks after surgery.  []  Apply bacitracin to incision twice a day for    more days.    Call your doctor or go to the Emergency Room for any signs of infection, including: increased redness, drainage, pain, or fever (temperature >=101.5 for 24 hours). Call your doctor or go to the Emergency Room if there are any localized neurological changes; problems with speech, vision, numbness, tingling, weakness, or severe headache; or for other concerns.    Special Instructions:  [x]  No use of tobacco products.  [x]  Diet: Please eat a regular diet as tolerated.  []  Other diet:              Specific physician instructions:           Physicians need 3 days' notice for pain medicine to be refilled. Pain medicine will only be refilled between 8 AM and 5 PM, Monday through Friday, due to Food and Drug Administration regulation of documentation.    If you have any questions about this form, please call [phone].    Form No. 29298 (Revised 10/31/2013)

## 2025-01-30 NOTE — PLAN OF CARE
Problem: Adult Inpatient Plan of Care  Goal: Plan of Care Review  Outcome: Met  Goal: Patient-Specific Goal (Individualized)  Outcome: Met  Goal: Absence of Hospital-Acquired Illness or Injury  Outcome: Met  Goal: Optimal Comfort and Wellbeing  Outcome: Met  Goal: Readiness for Transition of Care  Outcome: Met     Problem: Bariatric Environmental Safety  Goal: Safety Maintained with Care  Outcome: Met     Problem: Infection  Goal: Absence of Infection Signs and Symptoms  Outcome: Met     Problem: Wound  Goal: Optimal Coping  Outcome: Met  Goal: Optimal Functional Ability  Outcome: Met  Goal: Absence of Infection Signs and Symptoms  Outcome: Met  Goal: Improved Oral Intake  Outcome: Met  Goal: Optimal Pain Control and Function  Outcome: Met  Goal: Skin Health and Integrity  Outcome: Met  Goal: Optimal Wound Healing  Outcome: Met     Problem: Fall Injury Risk  Goal: Absence of Fall and Fall-Related Injury  Outcome: Met  Patient discharging home, walker for home use has been delivered to bedside by DME, discharge instructions reviewed in detail with patient, allowed time for questions, all questions answered, IV removed, gauze and tape applied, bleeding controlled, discharge medications have been delivered to bedside, awaiting transportation .

## 2025-01-30 NOTE — OP NOTE
[facility]  Neurosurgery  Operative Note    OP Note      Date of Procedure: 1/27/2025       Pre-Operative Diagnosis: HNP (herniated nucleus pulposus), lumbar [M51.26]  Radiculopathy, unspecified spinal region [M54.10]  Spondylosis of lumbosacral region, unspecified spinal osteoarthritis complication status [M47.817]    Post-Operative Diagnosis: Post-Op Diagnosis Codes:     * HNP (herniated nucleus pulposus), lumbar [M51.26]     * Radiculopathy, unspecified spinal region [M54.10]     * Spondylosis of lumbosacral region, unspecified spinal osteoarthritis complication status [M47.817]    Anesthesia: General    Procedures performed: 360 degree front/back fusion with anterior approach for L5-S1 complete diskectomy with anterior interbody placement with anterior instrumentation plating followed by posterior nonsegmental instrumentation use of morselized allograft and spinal neuro navigation    Surgeon: Theron Melton MD    Assistant::  Liliana Smith MD    Indication for Procedure:  This is a patient with advanced L5-S1 disc disease that has failed conservative management.  Given patient's weight body habitus we did not feel that a posterior interbody fusion with a solid enough construct felt patient would benefit from front and back construct    Operative Note:  Patient was anesthetized intubated by anesthesia.  Was placed in supine position.  The belly was prepped and draped in sterile fashion x-ray was used to localize the appropriate level.  The vascular team started and did an anterior retroperitoneal approach to the L5-S1 space.  We are able to get exposure between the iliac veins and arteries.  The placed in a self-retaining retractor after the exposure.  I had scrubbed in and we we we localized with x-rays.  Patient was extremely challenging anterior approach given the patient's having a BMI of over 46.  Once we able to to get the appropriate exposure a localize confirmed L5-S1 we then cleared space above  below the disc space into vertebral body of L4 and L5 we used a long blade to cut into the annulus.  We used a not a combination of curettes pituitaries Kerrisons to clear the L5-S1 disc space.  Once we got the disc space cleared we then used distracting blades to be able to distract.  We drilled through backfilled the disc room to the PLL.  We cleaned the disc out posteriorly and laterally.  We then placed a titanium cage that was a lordotic cage stuffed with morselized allograft into position then we placed the anterior plate over the anterior lip of the cage then we used angled awl in drills to be able to place 2 long 35 mm screws through the plate into vertebral body of 5 and S1.  Once were happy with the position we checked neuro monitoring was stable we obtained hemostasis.  Vascular team came back in and did a closure of the anterior approach after appropriate dressing was put in place patient was flipped onto a prone approach x-ray was used to localize the appropriate level we then did a small stab incision and placed a reference array into the iliac crest we then did a 3D spin with a loop X we then Alonzo that with the with the [employer] navigation system we used neuro navigation and then make 2 small paramedian skin incision then used a navigated Jamshidi needle to cannulate both pedicles at L5 and S1 bilaterally we followed this up by placing the guidewire then placing minimally invasive screws using 6.5 x 45 mm in 6.5 x 40 mm screws down both pedicles at L5 and S1.  Revision of the 35 mm flor which put in place we elected now end caps and secured it with the into position we decorticate slightly dura out laterally to the TP the placed some posterolateral allograft then we irrigated the wound we did not feel we needed a drain we closed both wounds in layers sterile dressing put in place patient was extubated brought to the recovery room without any problems or complication    EBL:  100 cc  Specimen  Sent:  None    Case warranted a 22 modifier with the patient's BMI a 46 making both the front and the back exposure very challenging and made the quality of the x-ray done navigation very poor which meant we had quite a bit of struggle and took much longer than normal.

## 2025-01-30 NOTE — PLAN OF CARE
Notified by Suzan with [facility]  that they are working on obtaining a SHU with covering insurance [facility]'s comp..    Patient [facility]'s comp  is [place] at [phone].

## 2025-01-31 ENCOUNTER — PATIENT MESSAGE (OUTPATIENT)
Dept: ADMINISTRATIVE | Facility: CLINIC | Age: 55
End: 2025-01-31
Payer: COMMERCIAL

## 2025-01-31 ENCOUNTER — PATIENT OUTREACH (OUTPATIENT)
Dept: ADMINISTRATIVE | Facility: CLINIC | Age: 55
End: 2025-01-31
Payer: COMMERCIAL

## 2025-01-31 DIAGNOSIS — M47.817 SPONDYLOSIS OF LUMBOSACRAL REGION, UNSPECIFIED SPINAL OSTEOARTHRITIS COMPLICATION STATUS: Primary | ICD-10-CM

## 2025-01-31 DIAGNOSIS — Z98.1 S/P LUMBAR SPINAL FUSION: Primary | ICD-10-CM

## 2025-01-31 LAB
BLD PROD TYP BPU: NORMAL
BLD PROD TYP BPU: NORMAL
BLOOD UNIT EXPIRATION DATE: NORMAL
BLOOD UNIT EXPIRATION DATE: NORMAL
BLOOD UNIT TYPE CODE: 5100
BLOOD UNIT TYPE CODE: 5100
BLOOD UNIT TYPE: NORMAL
BLOOD UNIT TYPE: NORMAL
CODING SYSTEM: NORMAL
CODING SYSTEM: NORMAL
CROSSMATCH INTERPRETATION: NORMAL
CROSSMATCH INTERPRETATION: NORMAL
DISPENSE STATUS: NORMAL
DISPENSE STATUS: NORMAL
NUM UNITS TRANS PACKED RBC: NORMAL
NUM UNITS TRANS PACKED RBC: NORMAL

## 2025-01-31 NOTE — PROGRESS NOTES
C3 nurse attempted to contact Kianna Zuleta  for a TCC post hospital discharge follow up call. No answer. Left voicemail with callback information. The patient does not have a scheduled HOSFU appointment. Message sent to PCP staff for assistance with scheduling visit with patient.

## 2025-01-31 NOTE — DISCHARGE SUMMARY
[facility]  Neurosurgery  Discharge Summary      Patient Name: Kianna Zuleta  MRN: [id number]  Admission Date: 1/27/2025  Hospital Length of Stay: 3 days  Discharge Date and Time: 1/30/2025  3:32 PM  Attending Physician: No att. providers found   Discharging Provider: Maddison Mcnulty PA-C  Primary Care Provider: Gigi Celis MD    HPI:   Ms Zuleta is a 54F w/ hx obesity, HTN, trigeminal neuralgia, POTS, IBS, depression, fibromyalgia, malignant carcinoid of appendix, CVID who was s/p L5-S1 microdiscectomy in 2004, now with degenerative disc disease and discogenic pain who presented on 1/27 for elective L5-S1 ALIF with posterior instrumentation. Patient states she has severe low back pain as well as pain mostly in the left thigh and down her leg. Mild 4+ and 4 EHL weakness on the left noted in preop. No blood thinners.    Procedure(s) (LRB):  **LOOP X** FUSION, SPINE, LUMBAR, ALIF (N/A)  FUSION, SPINE, LUMBAR MIS POSTERIOR SCREWS LOOP X for POSTERIOR (N/A)     Hospital Course:   1/28/25: [id number] ALIF with posterior instrumentation. Patient reports rash to chest with submandibular swelling. Dex 10 mg with 4 q 6 to follow. Denies SOB, difficulty swallowing, chest pain, pruritus. Reports back pain and stable left foot paresthesias. Passing flatus and tolerating diet. Post op XR pending. PT/OT  1/29/25: POD2 s/p L5-S1 ALIF with posterior instrumentation. Rash improved today s/p steroids. Reports stable back pain but notes imcreased paresthesias in left foot post therapy today. Passing flatus and tolerating diet. Post op XR reviewed.   1/30/25: NAEON. Stable for DC home today. Discharge instructions given verbally to patient. All of her questions were answered. She was encouraged to call the clinic with any questions or concerns prior to follow up appt.     Physical Exam:  General: well developed, well nourished, no distress.   Head: normocephalic, atraumatic  Neurologic: Alert and oriented. Thought  "content appropriate.  GCS: Motor: 6/Verbal: 5/Eyes: 4 GCS Total: 15  Mental Status: Awake, Alert, Oriented x 4  Language: No aphasia  Speech: No dysarthria  Cranial nerves: face symmetric, tongue midline, CN II-XII grossly intact.   Eyes: pupils equal, round, reactive to light with accommodation, EOMI.   Pulmonary: normal respirations, no signs of respiratory distress  Abdomen: soft, non-distended, not tender to palpation. Dressing in place.   Sensory: intact to light touch throughout  Motor Strength:Moves all extremities spontaneously with good tone. No abnormal movements seen.      Strength   Deltoids Triceps Biceps Wrist Extension Wrist Flexion Hand    Upper: R 5/5 5/5 5/5 5/5 5/5 5/5     L 5/5 5/5 5/5 5/5 5/5 5/5       Iliopsoas Quadriceps Knee  Flexion Tibialis  anterior Gastro- cnemius EHL   Lower: R 5/5 5/5 5/5 4/5 5/5 4/5     L 5/5 5/5 5/5 5/5 5/5 5/5      Abdominal incision c/d/I with staples  Lumbar incision c/d/I      Goals of Care Treatment Preferences:  Code Status: Full Code      Consults:   Consults (From admission, onward)          Status Ordering Provider     Inpatient consult to Midline team  Once        Provider:  (Not yet assigned)    TYRON Brunson            Significant Diagnostic Studies: Labs: BMP: No results for input(s): "GLU", "NA", "K", "CL", "CO2", "BUN", "CREATININE", "CALCIUM", "MG" in the last 48 hours. and CBC No results for input(s): "WBC", "HGB", "HCT", "PLT" in the last 48 hours.  Radiology: X-Ray: lumbar spine     Pending Diagnostic Studies:       None          Final Active Diagnoses:    Diagnosis Date Noted POA    PRINCIPAL PROBLEM:  Back pain [M54.9] 10/04/2023 Yes      Problems Resolved During this Admission:      Discharged Condition: good     Disposition: Home-Health Care [facility]    Follow Up:   Future Appointments   Date Time Provider Department Center   2/10/2025  8:30 AM Traci Morel RN [facility] NEUROS8 [facility]   3/11/2025  9:00 AM [facility]   3/11/2025 10:00 AM Theron Melton MD [facility] NEUROS8 [facility]        Patient Instructions:      WALKER FOR HOME USE     Order Specific Question Answer Comments   Type of Walker: Adult (5'4"-6'6")    With wheels? Yes    Height: 5' 4" (1.626 m)    Weight: 122.5 kg (270 lb 1 oz)    Length of need (1-99 months): 99    Does patient have medical equipment at home? shower chair    Please check all that apply: Patient's condition impairs ambulation.      Notify your health care provider if you experience any of the following:  temperature >100.4     Notify your health care provider if you experience any of the following:  persistent nausea and vomiting or diarrhea     Notify your health care provider if you experience any of the following:  severe uncontrolled pain     Notify your health care provider if you experience any of the following:  redness, tenderness, or signs of infection (pain, swelling, redness, odor or green/yellow discharge around incision site)     Notify your health care provider if you experience any of the following:  difficulty breathing or increased cough     Notify your health care provider if you experience any of the following:  severe persistent headache     Notify your health care provider if you experience any of the following:  worsening rash     Notify your health care provider if you experience any of the following:  persistent dizziness, light-headedness, or visual disturbances     Notify your health care provider if you experience any of the following:  increased confusion or weakness     Medications:  Reconciled Home Medications:      Medication List        START taking these medications      cyclobenzaprine 10 MG tablet  Commonly known as: FLEXERIL  Take 1 tablet (10 mg total) by mouth 3 (three) times daily. for 10 days     oxyCODONE 15 MG Tab  Commonly known as: ROXICODONE  Take 1 tablet (15 mg total) by mouth every 6 (six) hours as needed for Pain.            CHANGE how you " take these medications      estradioL 2 MG tablet  Commonly known as: ESTRACE  TAKE 1 TABLET(2 MG) BY MOUTH EVERY DAY  What changed: when to take this            CONTINUE taking these medications      amLODIPine 2.5 MG tablet  Commonly known as: NORVASC  Take 1 tablet by mouth once daily.     azelastine 137 mcg (0.1 %) nasal spray  Commonly known as: ASTELIN  1 spray by Nasal route.     cetirizine 10 MG tablet  Commonly known as: ZYRTEC  Take 10 mg by mouth once daily.     cloNIDine 0.1 MG tablet  Commonly known as: CATAPRES  Take 0.1 mg by mouth 2 (two) times daily. Take two tabs BID     dicyclomine 20 mg tablet  Commonly known as: BENTYL  Take 20 mg by mouth 2 (two) times daily as needed.     EPINEPHrine 0.3 mg/0.3 mL Atin  Commonly known as: AUVI-Q  Inject 0.3 mLs (0.3 mg total) into the muscle once. for 1 dose     ergocalciferol 50,000 unit Cap  Commonly known as: ERGOCALCIFEROL  Take 1 capsule (50,000 Units total) by mouth every 7 days.     famotidine 40 MG tablet  Commonly known as: PEPCID  Take 40 mg by mouth Daily.     fluticasone propionate 50 mcg/actuation nasal spray  Commonly known as: FLONASE  1 spray (50 mcg total) by Each Nostril route once daily.     gabapentin 300 MG capsule  Commonly known as: NEURONTIN  Take 600 mg by mouth 2 (two) times daily. 2-300 mg cap in AM and 3-300 mg cap in PM     HIZENTRA 1 gram/5 mL (20 %) Syrg  Generic drug: immun glob G(IgG)-pro-IgA 0-50  Inject 5 mLs (1 g total) into the skin every 7 days.     hydrOXYzine HCL 25 MG tablet  Commonly known as: ATARAX  Take 1 tablet (25 mg total) by mouth 3 (three) times daily as needed for Itching.     indapamide 1.25 MG Tab  Commonly known as: LOZOL  TAKE 1 TABLET BY MOUTH DAILY     levocetirizine 5 MG tablet  Commonly known as: XYZAL  Take 5 mg by mouth every evening.     levothyroxine 75 MCG tablet  Commonly known as: SYNTHROID  TAKE 1 TABLET(75 MCG) BY MOUTH BEFORE BREAKFAST     LIDOcaine-prilocaine cream  Commonly known as:  EMLA  Apply topically as needed (apply to infusion site).     metoprolol succinate 100 MG 24 hr tablet  Commonly known as: TOPROL-XL  100 mg 2 (two) times daily.     ondansetron 4 MG Tbdl  Commonly known as: ZOFRAN-ODT  Take 4 mg by mouth every 6 (six) hours as needed.     SEMAGLUTIDE (WEIGHT LOSS) SUBQ  Inject 1 mg into the skin every Thursday.     tiZANidine 4 MG tablet  Commonly known as: ZANAFLEX  SMARTSI-3 Tablet(s) By Mouth 3 Times Daily     traZODone 100 MG tablet  Commonly known as: DESYREL  Take by mouth.     TRINTELLIX 20 mg Tab  Generic drug: vortioxetine  TK 1 T PO QAM     vitamin D 1000 units Tab  Commonly known as: VITAMIN D3  Take 1,000 Units by mouth once daily.     zaleplon 10 MG capsule  Commonly known as: SONATA  Take by mouth nightly as needed.            STOP taking these medications      celecoxib 200 MG capsule  Commonly known as: CeleBREX     HYDROcodone-acetaminophen  mg per tablet  Commonly known as: NORCO     oxyCODONE-acetaminophen  mg per tablet  Commonly known as: PERCOCET              Maddison Mcnulty PA-C  Neurosurgery  [facility] - Surgery

## 2025-02-03 ENCOUNTER — TELEPHONE (OUTPATIENT)
Dept: HOME HEALTH SERVICES | Facility: CLINIC | Age: 55
End: 2025-02-03
Payer: COMMERCIAL

## 2025-02-03 NOTE — TELEPHONE ENCOUNTER
Attempted to contact pt to schedule an appointment regarding a referral placed for a tcc home visit with a nurse practitioner.

## 2025-02-06 ENCOUNTER — PATIENT MESSAGE (OUTPATIENT)
Dept: OBSTETRICS AND GYNECOLOGY | Facility: CLINIC | Age: 55
End: 2025-02-06
Payer: COMMERCIAL

## 2025-02-07 ENCOUNTER — TELEPHONE (OUTPATIENT)
Dept: HOME HEALTH SERVICES | Facility: CLINIC | Age: 55
End: 2025-02-07
Payer: COMMERCIAL

## 2025-02-07 ENCOUNTER — PATIENT MESSAGE (OUTPATIENT)
Dept: HOME HEALTH SERVICES | Facility: CLINIC | Age: 55
End: 2025-02-07
Payer: COMMERCIAL

## 2025-02-10 ENCOUNTER — CLINICAL SUPPORT (OUTPATIENT)
Dept: NEUROSURGERY | Facility: CLINIC | Age: 55
End: 2025-02-10
Payer: COMMERCIAL

## 2025-02-10 DIAGNOSIS — M54.10 RADICULOPATHY, UNSPECIFIED SPINAL REGION: ICD-10-CM

## 2025-02-10 DIAGNOSIS — Z98.1 S/P LUMBAR SPINAL FUSION: Primary | ICD-10-CM

## 2025-02-10 DIAGNOSIS — N95.1 MENOPAUSAL SYMPTOMS: ICD-10-CM

## 2025-02-10 DIAGNOSIS — M51.26 HNP (HERNIATED NUCLEUS PULPOSUS), LUMBAR: Primary | ICD-10-CM

## 2025-02-10 DIAGNOSIS — M51.26 HNP (HERNIATED NUCLEUS PULPOSUS), LUMBAR: ICD-10-CM

## 2025-02-10 PROCEDURE — 99999 PR PBB SHADOW E&M-EST. PATIENT-LVL II: CPT | Mod: PBBFAC,,,

## 2025-02-10 RX ORDER — GABAPENTIN 300 MG/1
900 CAPSULE ORAL 3 TIMES DAILY
Qty: 270 CAPSULE | Refills: 11 | Status: SHIPPED | OUTPATIENT
Start: 2025-02-10 | End: 2026-02-10

## 2025-02-10 RX ORDER — CYCLOBENZAPRINE HCL 10 MG
10 TABLET ORAL 3 TIMES DAILY
Qty: 30 TABLET | Refills: 0 | Status: SHIPPED | OUTPATIENT
Start: 2025-02-10 | End: 2025-02-20

## 2025-02-10 RX ORDER — ESTRADIOL 2 MG/1
2 TABLET ORAL DAILY
Qty: 30 TABLET | Refills: 6 | Status: SHIPPED | OUTPATIENT
Start: 2025-02-10

## 2025-02-10 RX ORDER — OXYCODONE AND ACETAMINOPHEN 10; 325 MG/1; MG/1
1 TABLET ORAL EVERY 4 HOURS PRN
Qty: 45 TABLET | Refills: 0 | Status: SHIPPED | OUTPATIENT
Start: 2025-02-10

## 2025-02-10 NOTE — PROGRESS NOTES
CC: 2-week post op wound check      HPI:  Patient seen in clinic for 2 week post op s/p L5- S1 ALIF/ TLIF with Dr. Melton and Dr. Parker 01/27/2025       Theron Melton MD   Physician  Neurosurgery     Op Note     Signed     Creation Time: 1/30/2025  2:18 PM       [facility] - Surgery  Neurosurgery  Operative Note     OP Note      Date of Procedure: 1/27/2025         Pre-Operative Diagnosis: HNP (herniated nucleus pulposus), lumbar [M51.26]  Radiculopathy, unspecified spinal region [M54.10]  Spondylosis of lumbosacral region, unspecified spinal osteoarthritis complication status [M47.817]     Post-Operative Diagnosis: Post-Op Diagnosis Codes:     * HNP (herniated nucleus pulposus), lumbar [M51.26]     * Radiculopathy, unspecified spinal region [M54.10]     * Spondylosis of lumbosacral region, unspecified spinal osteoarthritis complication status [M47.817]     Anesthesia: General     Procedures performed: 360 degree front/back fusion with anterior approach for L5-S1 complete diskectomy with anterior interbody placement with anterior instrumentation plating followed by posterior nonsegmental instrumentation use of morselized allograft and spinal neuro navigation     Surgeon: Theron Melton MD     Assistant::  Liliana Smith MD     Indication for Procedure:  This is a patient with advanced L5-S1 disc disease that has failed conservative management.  Given patient's weight body habitus we did not feel that a posterior interbody fusion with a solid enough construct felt patient would benefit from front and back construct     Operative Note:  Patient was anesthetized intubated by anesthesia.  Was placed in supine position.  The belly was prepped and draped in sterile fashion x-ray was used to localize the appropriate level.  The vascular team started and did an anterior retroperitoneal approach to the L5-S1 space.  We are able to get exposure between the iliac veins and arteries.  The placed in a self-retaining  retractor after the exposure.  I had scrubbed in and we we we localized with x-rays.  Patient was extremely challenging anterior approach given the patient's having a BMI of over 46.  Once we able to to get the appropriate exposure a localize confirmed L5-S1 we then cleared space above below the disc space into vertebral body of L4 and L5 we used a long blade to cut into the annulus.  We used a not a combination of curettes pituitaries Kerrisons to clear the L5-S1 disc space.  Once we got the disc space cleared we then used distracting blades to be able to distract.  We drilled through backfilled the disc room to the PLL.  We cleaned the disc out posteriorly and laterally.  We then placed a titanium cage that was a lordotic cage stuffed with morselized allograft into position then we placed the anterior plate over the anterior lip of the cage then we used angled awl in drills to be able to place 2 long 35 mm screws through the plate into vertebral body of 5 and S1.  Once were happy with the position we checked neuro monitoring was stable we obtained hemostasis.  Vascular team came back in and did a closure of the anterior approach after appropriate dressing was put in place patient was flipped onto a prone approach x-ray was used to localize the appropriate level we then did a small stab incision and placed a reference array into the iliac crest we then did a 3D spin with a loop X we then Alonzo that with the with the [employer] navigation system we used neuro navigation and then make 2 small paramedian skin incision then used a navigated Jamshidi needle to cannulate both pedicles at L5 and S1 bilaterally we followed this up by placing the guidewire then placing minimally invasive screws using 6.5 x 45 mm in 6.5 x 40 mm screws down both pedicles at L5 and S1.  Revision of the 35 mm flor which put in place we elected now end caps and secured it with the into position we decorticate slightly dura out laterally to the  TP the placed some posterolateral allograft then we irrigated the wound we did not feel we needed a drain we closed both wounds in layers sterile dressing put in place patient was extubated brought to the recovery room without any problems or complication         Patient states she has been having a constant stabbing feeling in her Right low back and left sided stabbing and burning pain down her leg. Denies any LE weakness. Patient is wearing her brace.      Lumbar incision assessed, dissolvable sutures and dermabond prineo used for closure, no redness, swelling, or drainage, edges well approximated.   Abdominal incision closed with staples, done by vascular surgery. Unfortunately the abdominal incision is located on the panniculus that overlaps and is moist. One or 2 staples of concern that appear to be opening. Spoke with BOB Mcnulty who advised this patient should follow up with vascular surgery for the abdominal incision as it was their closure. I sent a message to Dr. Parker staff to schedule.     Patient was instructed as follows:   Discontinue Bacitracin after tonight.  May shower normally but pat dry after shower.  Do not submerge wound in bath tub or go swimming until released by the physician  Keep incision clean, dry and open to air as much as possible.  Patient encouraged to walk as much as possible but advised to walk with family member or friend and rest as necessary.  No lifting >10lbs.  Avoid bending and twisting the area of your surgery more than 45 degrees from neutral position in any direction.  Return to work will be determined on an individual basis.  No driving or operating machinery while taking narcotic pain medication or muscle relaxants and until cleared by a surgeon.  Wear brace at all times except when shower, lay flat in bed, sitting on couch/ recliner, or eating.  Avoid NSAIDs such as Celebrex, naproxen, and Mobic for 3 months post op.    Maddison Mcnulty is not concerned with the  patients pain as it is well controlled with no new weakness. While the patient is waiting on vascular appointment, I gave her medihoney to apply to the incision once daily and cover with gauze to provide a barrier to the moisture in the abdominal fold.     Patient verbalized understanding of all instructions.    All questions were answered. Patient will follow up with Dr. Melton 03/11/2025 with lumbar xray 1st.  Patient was encouraged to call clinic with any future concerns prior to follow up appt. If any worsening symptoms, patient should report to ED.       Traci Morel, MSN, BSN, RN  Neurosurgery Nurse Navigator

## 2025-02-11 ENCOUNTER — PATIENT MESSAGE (OUTPATIENT)
Dept: NEUROSURGERY | Facility: CLINIC | Age: 55
End: 2025-02-11
Payer: COMMERCIAL

## 2025-02-11 ENCOUNTER — TELEPHONE (OUTPATIENT)
Dept: HOME HEALTH SERVICES | Facility: CLINIC | Age: 55
End: 2025-02-11
Payer: COMMERCIAL

## 2025-02-12 ENCOUNTER — TELEPHONE (OUTPATIENT)
Dept: NEUROSURGERY | Facility: CLINIC | Age: 55
End: 2025-02-12
Payer: COMMERCIAL

## 2025-02-12 ENCOUNTER — TELEPHONE (OUTPATIENT)
Dept: HOME HEALTH SERVICES | Facility: CLINIC | Age: 55
End: 2025-02-12
Payer: COMMERCIAL

## 2025-02-12 NOTE — TELEPHONE ENCOUNTER
Patient called she stated she called the billing dept to inquire if her home visit would be covered under workers comp and spk with someone named Glida.     She says the person was rude and told her I don't know why they gave you our number because we do not handle that.     I let her know I would send message to inform the NP to see how to proceed. She then called back- told me to cancel the visit.

## 2025-02-12 NOTE — TELEPHONE ENCOUNTER
Called and spoke to Isela informing that Home Health was for pt in the interm of vascular appt and request for WC auth was sent in. Isela mccarty/arnold.    ----- Message from DALE Avlies sent at 2/12/2025 10:33 AM CST -----  Regarding: RE: Advise  Contact: [phone]  Yes in the interim of the vascular appointment. I didn't realized she was WC so I just messaged that department to obtain auth  ----- Message -----  From: Ruby Costello  Sent: 2/12/2025  10:18 AM CST  To: Traci Morel RN  Subject: FW: Advise                                       Praveena,     I saw you put in orders for Home Health. Was this so they can check on patient for incision?  ----- Message -----  From: Lori Zhou  Sent: 2/12/2025  10:07 AM CST  To: Linh [facility] Staff  Subject: Advise                                           BLANCA GUTHRIE calling regarding Patient Advice (message) for Juan R Isela with [facility] [employer] is calling to speak with nurse regarding pt incision opening and that there is someone coming to home to look at it and caller is needing to know what is going on and that they were not aware of nurse going to home pls advise [phone]

## 2025-02-19 ENCOUNTER — TELEPHONE (OUTPATIENT)
Dept: VASCULAR SURGERY | Facility: CLINIC | Age: 55
End: 2025-02-19
Payer: COMMERCIAL

## 2025-02-19 ENCOUNTER — TELEPHONE (OUTPATIENT)
Dept: NEUROSURGERY | Facility: CLINIC | Age: 55
End: 2025-02-19
Payer: COMMERCIAL

## 2025-02-19 NOTE — TELEPHONE ENCOUNTER
"Called and spoke to pt who states that everything has been resolved.     ----- Message from Elzbieta sent at 2/19/2025  3:03 PM CST -----  Regarding: orders  Contact: [phone]  .Name Of Caller: Self Contact Preference?: [phone] What is the nature of the call?:needing to speak to Traci in regards to needing orders to be seem by Vascular Surgery on tomorrow 2/20/25 Dr. Parker, [facility] call Additional Notes:"Thank you for all that you do for our patients"  "

## 2025-02-19 NOTE — TELEPHONE ENCOUNTER
----- Message from Rupa sent at 2/19/2025 11:09 AM CST -----  Regarding: pt called  Name of Who is Calling: BLANCA GUTHRIE [[id number]]What is the request in detail: Patient is requesting  to speak with office . The workers comp  stated they did not have any information on  her upcomming appt that is scheduled for tomorrow . : Isela Barraganone: [phone] Fax: [phone]# [phone] Requesting  orders and medical notes on the patient .Can the clinic reply by [facility]: NoWhat Number to Call Back if not in [facility]: Telephone Information:Mobile          [phone]

## 2025-02-19 NOTE — TELEPHONE ENCOUNTER
"Returned call spoke with Ms Zuleta, informed her she need to speak with her  in regards to her claim. I do not work with claims." Patient states, " I will call my  to figure it out."  "

## 2025-02-20 ENCOUNTER — OFFICE VISIT (OUTPATIENT)
Dept: VASCULAR SURGERY | Facility: CLINIC | Age: 55
End: 2025-02-20
Attending: SURGERY
Payer: COMMERCIAL

## 2025-02-20 ENCOUNTER — TELEPHONE (OUTPATIENT)
Dept: NEUROSURGERY | Facility: CLINIC | Age: 55
End: 2025-02-20
Payer: COMMERCIAL

## 2025-02-20 VITALS
BODY MASS INDEX: 46.1 KG/M2 | DIASTOLIC BLOOD PRESSURE: 70 MMHG | SYSTOLIC BLOOD PRESSURE: 103 MMHG | HEART RATE: 80 BPM | WEIGHT: 270 LBS | HEIGHT: 64 IN | TEMPERATURE: 98 F

## 2025-02-20 DIAGNOSIS — Z98.890 POST-OPERATIVE STATE: Primary | ICD-10-CM

## 2025-02-20 PROCEDURE — 99999 PR PBB SHADOW E&M-EST. PATIENT-LVL V: CPT | Mod: PBBFAC,,, | Performed by: SURGERY

## 2025-02-20 PROCEDURE — 99499 UNLISTED E&M SERVICE: CPT | Mod: S$GLB,,, | Performed by: SURGERY

## 2025-02-20 NOTE — TELEPHONE ENCOUNTER
----- Message from Ruby Costello sent at 2/19/2025  2:39 PM CST -----    ----- Message -----  From: Belkis Wright  Sent: 2/19/2025   2:39 PM CST  To: [facility] Staff    Home health nurse calling in regard to pt incision , its red with a rash around it , pt might be allergic to the Telfa dressing , pt needs her orders changed to a different dressing Call back [phone] Meri

## 2025-02-20 NOTE — TELEPHONE ENCOUNTER
Spoke to the HHN , advised this patient has an appt today with vascular surgery Dr. Parker to assess the wound d/t concerns of dehiscence. HHN voiced concerns as well. Gave her that office number to retrieve wound care orders after they see the patient

## 2025-02-24 ENCOUNTER — PATIENT MESSAGE (OUTPATIENT)
Dept: NEUROSURGERY | Facility: CLINIC | Age: 55
End: 2025-02-24
Payer: COMMERCIAL

## 2025-02-24 DIAGNOSIS — M54.10 RADICULOPATHY, UNSPECIFIED SPINAL REGION: ICD-10-CM

## 2025-02-24 DIAGNOSIS — M51.26 HNP (HERNIATED NUCLEUS PULPOSUS), LUMBAR: ICD-10-CM

## 2025-02-24 DIAGNOSIS — Z98.1 S/P LUMBAR SPINAL FUSION: Primary | ICD-10-CM

## 2025-02-24 NOTE — PROGRESS NOTES
Patient seen and examined by resident.    Slight skin separation with no evidence of infection. Staples removed. Woundcare instructions provided.     SHARMILA Parker II, MD, [facility]  Vascular Surgery  [facility]

## 2025-02-25 ENCOUNTER — PATIENT MESSAGE (OUTPATIENT)
Dept: VASCULAR SURGERY | Facility: CLINIC | Age: 55
End: 2025-02-25
Payer: COMMERCIAL

## 2025-02-25 RX ORDER — OXYCODONE AND ACETAMINOPHEN 10; 325 MG/1; MG/1
1 TABLET ORAL EVERY 6 HOURS PRN
Qty: 45 TABLET | Refills: 0 | Status: SHIPPED | OUTPATIENT
Start: 2025-02-25

## 2025-02-25 RX ORDER — CYCLOBENZAPRINE HCL 10 MG
10 TABLET ORAL 3 TIMES DAILY PRN
Qty: 30 TABLET | Refills: 0 | Status: SHIPPED | OUTPATIENT
Start: 2025-02-25 | End: 2025-03-07

## 2025-03-05 ENCOUNTER — TELEPHONE (OUTPATIENT)
Dept: NEUROSURGERY | Facility: CLINIC | Age: 55
End: 2025-03-05
Payer: COMMERCIAL

## 2025-03-05 NOTE — TELEPHONE ENCOUNTER
"Called and spoke to Sylvia informing her to reach out to our WC dept regarding auth. Gave her numbers to try and call. Sylvia v/u.    ----- Message from Elzbieta sent at 3/5/2025  2:19 PM CST -----  Regarding: pt adivce  Contact: :[phone]  ..Name Of Caller: Sylvia , Intergret  Contact Preference?:[phone] What is the nature of the call?: I reference to confirming  a  authorization has been sent and received for pt xrays and appt 3/11/25, pls call  Additional Notes:"Thank you for all that you do for our patients"  "

## 2025-03-11 ENCOUNTER — HOSPITAL ENCOUNTER (OUTPATIENT)
Dept: RADIOLOGY | Facility: HOSPITAL | Age: 55
Discharge: HOME | End: 2025-03-11
Attending: NEUROLOGICAL SURGERY
Payer: COMMERCIAL

## 2025-03-11 ENCOUNTER — HOSPITAL ENCOUNTER (EMERGENCY)
Facility: HOSPITAL | Age: 55
Discharge: HOME | End: 2025-03-11
Attending: EMERGENCY MEDICINE
Payer: COMMERCIAL

## 2025-03-11 ENCOUNTER — OFFICE VISIT (OUTPATIENT)
Dept: NEUROSURGERY | Facility: CLINIC | Age: 55
End: 2025-03-11
Payer: COMMERCIAL

## 2025-03-11 ENCOUNTER — NURSE TRIAGE (OUTPATIENT)
Dept: ADMINISTRATIVE | Facility: CLINIC | Age: 55
End: 2025-03-11
Payer: COMMERCIAL

## 2025-03-11 VITALS
DIASTOLIC BLOOD PRESSURE: 69 MMHG | RESPIRATION RATE: 18 BRPM | WEIGHT: 270 LBS | BODY MASS INDEX: 46.1 KG/M2 | OXYGEN SATURATION: 98 % | HEART RATE: 78 BPM | SYSTOLIC BLOOD PRESSURE: 137 MMHG | HEIGHT: 64 IN | TEMPERATURE: 99 F

## 2025-03-11 VITALS — TEMPERATURE: 98 F

## 2025-03-11 DIAGNOSIS — N30.00 ACUTE CYSTITIS WITHOUT HEMATURIA: Primary | ICD-10-CM

## 2025-03-11 DIAGNOSIS — M54.9 DORSALGIA, UNSPECIFIED: ICD-10-CM

## 2025-03-11 DIAGNOSIS — Z98.1 S/P LUMBAR SPINAL FUSION: Primary | ICD-10-CM

## 2025-03-11 DIAGNOSIS — M51.9 LUMBAR DISC DISEASE: ICD-10-CM

## 2025-03-11 LAB
ALBUMIN SERPL BCP-MCNC: 3.6 G/DL (ref 3.5–5.2)
ALP SERPL-CCNC: 146 U/L (ref 40–150)
ALT SERPL W/O P-5'-P-CCNC: 14 U/L (ref 10–44)
ANION GAP SERPL CALC-SCNC: 13 MMOL/L (ref 8–16)
AST SERPL-CCNC: 19 U/L (ref 10–40)
BACTERIA #/AREA URNS HPF: ABNORMAL /HPF
BASOPHILS # BLD AUTO: 0.03 K/UL (ref 0–0.2)
BASOPHILS NFR BLD: 0.4 % (ref 0–1.9)
BILIRUB SERPL-MCNC: 0.3 MG/DL (ref 0.1–1)
BILIRUB UR QL STRIP: NEGATIVE
BUN SERPL-MCNC: 12 MG/DL (ref 6–20)
CALCIUM SERPL-MCNC: 9.4 MG/DL (ref 8.7–10.5)
CHLORIDE SERPL-SCNC: 100 MMOL/L (ref 95–110)
CLARITY UR: CLEAR
CO2 SERPL-SCNC: 25 MMOL/L (ref 23–29)
COLOR UR: YELLOW
CREAT SERPL-MCNC: 0.7 MG/DL (ref 0.5–1.4)
CRP SERPL-MCNC: 36.4 MG/L (ref 0–8.2)
DIFFERENTIAL METHOD BLD: ABNORMAL
EOSINOPHIL # BLD AUTO: 0.1 K/UL (ref 0–0.5)
EOSINOPHIL NFR BLD: 1.6 % (ref 0–8)
ERYTHROCYTE [DISTWIDTH] IN BLOOD BY AUTOMATED COUNT: 12.9 % (ref 11.5–14.5)
EST. GFR  (NO RACE VARIABLE): >60 ML/MIN/1.73 M^2
GLUCOSE SERPL-MCNC: 95 MG/DL (ref 70–110)
GLUCOSE UR QL STRIP: NEGATIVE
HCT VFR BLD AUTO: 42.7 % (ref 37–48.5)
HGB BLD-MCNC: 14.2 G/DL (ref 12–16)
HGB UR QL STRIP: NEGATIVE
IMM GRANULOCYTES # BLD AUTO: 0.04 K/UL (ref 0–0.04)
IMM GRANULOCYTES NFR BLD AUTO: 0.5 % (ref 0–0.5)
KETONES UR QL STRIP: NEGATIVE
LEUKOCYTE ESTERASE UR QL STRIP: ABNORMAL
LIPASE SERPL-CCNC: 21 U/L (ref 4–60)
LYMPHOCYTES # BLD AUTO: 1.3 K/UL (ref 1–4.8)
LYMPHOCYTES NFR BLD: 18.2 % (ref 18–48)
MCH RBC QN AUTO: 28.3 PG (ref 27–31)
MCHC RBC AUTO-ENTMCNC: 33.3 G/DL (ref 32–36)
MCV RBC AUTO: 85 FL (ref 82–98)
MICROSCOPIC COMMENT: ABNORMAL
MONOCYTES # BLD AUTO: 0.4 K/UL (ref 0.3–1)
MONOCYTES NFR BLD: 5.2 % (ref 4–15)
NEUTROPHILS # BLD AUTO: 5.4 K/UL (ref 1.8–7.7)
NEUTROPHILS NFR BLD: 74.1 % (ref 38–73)
NITRITE UR QL STRIP: NEGATIVE
NRBC BLD-RTO: 0 /100 WBC
PH UR STRIP: 7 [PH] (ref 5–8)
PLATELET # BLD AUTO: ABNORMAL K/UL (ref 150–450)
PLATELET BLD QL SMEAR: ABNORMAL
PMV BLD AUTO: ABNORMAL FL (ref 9.2–12.9)
POTASSIUM SERPL-SCNC: 3.9 MMOL/L (ref 3.5–5.1)
PROT SERPL-MCNC: 8.5 G/DL (ref 6–8.4)
PROT UR QL STRIP: NEGATIVE
RBC # BLD AUTO: 5.02 M/UL (ref 4–5.4)
RBC #/AREA URNS HPF: 2 /HPF (ref 0–4)
SODIUM SERPL-SCNC: 138 MMOL/L (ref 136–145)
SP GR UR STRIP: 1.02 (ref 1–1.03)
SQUAMOUS #/AREA URNS HPF: 5 /HPF
URN SPEC COLLECT METH UR: ABNORMAL
UROBILINOGEN UR STRIP-ACNC: NEGATIVE EU/DL
WBC # BLD AUTO: 7.32 K/UL (ref 3.9–12.7)
WBC #/AREA URNS HPF: 32 /HPF (ref 0–5)

## 2025-03-11 PROCEDURE — 87088 URINE BACTERIA CULTURE: CPT

## 2025-03-11 PROCEDURE — 72100 X-RAY EXAM L-S SPINE 2/3 VWS: CPT | Mod: TC

## 2025-03-11 PROCEDURE — 87086 URINE CULTURE/COLONY COUNT: CPT

## 2025-03-11 PROCEDURE — 80053 COMPREHEN METABOLIC PANEL: CPT | Performed by: PHYSICIAN ASSISTANT

## 2025-03-11 PROCEDURE — 87186 SC STD MICRODIL/AGAR DIL: CPT

## 2025-03-11 PROCEDURE — 86140 C-REACTIVE PROTEIN: CPT | Performed by: PHYSICIAN ASSISTANT

## 2025-03-11 PROCEDURE — 99024 POSTOP FOLLOW-UP VISIT: CPT | Mod: S$GLB,,, | Performed by: NEUROLOGICAL SURGERY

## 2025-03-11 PROCEDURE — 25000003 PHARM REV CODE 250

## 2025-03-11 PROCEDURE — 99284 EMERGENCY DEPT VISIT MOD MDM: CPT | Mod: 25

## 2025-03-11 PROCEDURE — 85025 COMPLETE CBC W/AUTO DIFF WBC: CPT | Performed by: PHYSICIAN ASSISTANT

## 2025-03-11 PROCEDURE — 99999 PR PBB SHADOW E&M-EST. PATIENT-LVL III: CPT | Mod: PBBFAC,,, | Performed by: NEUROLOGICAL SURGERY

## 2025-03-11 PROCEDURE — 81000 URINALYSIS NONAUTO W/SCOPE: CPT

## 2025-03-11 PROCEDURE — 72100 X-RAY EXAM L-S SPINE 2/3 VWS: CPT | Mod: 26,,, | Performed by: RADIOLOGY

## 2025-03-11 PROCEDURE — 87077 CULTURE AEROBIC IDENTIFY: CPT

## 2025-03-11 PROCEDURE — 83690 ASSAY OF LIPASE: CPT | Performed by: PHYSICIAN ASSISTANT

## 2025-03-11 PROCEDURE — 96372 THER/PROPH/DIAG INJ SC/IM: CPT

## 2025-03-11 PROCEDURE — 63600175 PHARM REV CODE 636 W HCPCS: Mod: JZ,TB

## 2025-03-11 RX ORDER — PHENAZOPYRIDINE HYDROCHLORIDE 100 MG/1
100 TABLET, FILM COATED ORAL
Status: COMPLETED | OUTPATIENT
Start: 2025-03-11 | End: 2025-03-11

## 2025-03-11 RX ORDER — CEPHALEXIN 500 MG/1
1000 CAPSULE ORAL
Status: COMPLETED | OUTPATIENT
Start: 2025-03-11 | End: 2025-03-11

## 2025-03-11 RX ORDER — ALUMINUM HYDROXIDE, MAGNESIUM HYDROXIDE, AND SIMETHICONE 1200; 120; 1200 MG/30ML; MG/30ML; MG/30ML
30 SUSPENSION ORAL ONCE
Status: DISCONTINUED | OUTPATIENT
Start: 2025-03-11 | End: 2025-03-11

## 2025-03-11 RX ORDER — PHENAZOPYRIDINE HYDROCHLORIDE 200 MG/1
200 TABLET, FILM COATED ORAL 3 TIMES DAILY
Qty: 15 TABLET | Refills: 0 | Status: SHIPPED | OUTPATIENT
Start: 2025-03-11 | End: 2025-03-16

## 2025-03-11 RX ORDER — LIDOCAINE HYDROCHLORIDE 20 MG/ML
15 SOLUTION OROPHARYNGEAL ONCE
Status: DISCONTINUED | OUTPATIENT
Start: 2025-03-11 | End: 2025-03-11

## 2025-03-11 RX ORDER — KETOROLAC TROMETHAMINE 30 MG/ML
15 INJECTION, SOLUTION INTRAMUSCULAR; INTRAVENOUS
Status: COMPLETED | OUTPATIENT
Start: 2025-03-11 | End: 2025-03-11

## 2025-03-11 RX ORDER — CEPHALEXIN 500 MG/1
1000 CAPSULE ORAL EVERY 12 HOURS
Qty: 20 CAPSULE | Refills: 0 | Status: SHIPPED | OUTPATIENT
Start: 2025-03-11 | End: 2025-03-13 | Stop reason: ALTCHOICE

## 2025-03-11 RX ADMIN — KETOROLAC TROMETHAMINE 15 MG: 30 INJECTION, SOLUTION INTRAMUSCULAR; INTRAVENOUS at 05:03

## 2025-03-11 RX ADMIN — PHENAZOPYRIDINE HYDROCHLORIDE 100 MG: 100 TABLET ORAL at 06:03

## 2025-03-11 RX ADMIN — CEPHALEXIN 1000 MG: 500 CAPSULE ORAL at 05:03

## 2025-03-11 NOTE — ED PROVIDER NOTES
Encounter Date: 3/11/2025       History     Chief Complaint   Patient presents with    Flank Pain     Pt to ED c/o right flank pain x's 2 days. Denies any urinary symptoms. Hx of kidney stones in the past.      Patient is a 54-year-old female with a past medical history of hypothyroidism, anxiety, chronic back pain, pots, who presents to the emergency department with complaints of right flank and right lower quadrant pain x2 days.  Patient states that she has a history of kidney stones and states that her symptoms feel similar.  Of note patient recently had a L4-S1 complete diskectomy with anterior interbody placement with anterior instrumentation plating followed by posterior nonsegmental instrumentation on 1/27/2025 with Dr. Melton.  She followed up with his office today, and states that she was healing well.  She denies dysuria, hematuria, urgency, frequency, nausea, vomiting, fever, chest pain or shortness of breath.        Review of patient's allergies indicates:   Allergen Reactions    Benadryl allergy decongestant Anaphylaxis     Other reaction(s): Anaphylaxis    Fludrocortisone Hives    Gluten Other (See Comments)     Other reaction(s) GI upset    Diphenhydramine hcl     Hydromorphone hcl      No issue with morphine in the past per pt.     Indomethacin Hives     No issue with Toradol in the past per pt.     Lisinopril Nausea And Vomiting     Swelling and vomiting    Penicillins Rash     No issue with cephalosporins in the past per pt.     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    MVP (mitral valve prolapse)     Pneumonia     PONV (postoperative nausea and  vomiting)     POTS (postural orthostatic tachycardia syndrome)     Recurrent upper respiratory infection (URI)     Ulcer     Vitamin D deficiency disease 11/06/2012     Past Surgical History:   Procedure Laterality Date    ANKLE SURGERY      lt    ANTERIOR LUMBAR INTERBODY FUSION (ALIF) N/A 1/27/2025    Procedure: **LOOP X** FUSION, SPINE, LUMBAR, ALIF;  Surgeon: Theron Melton MD;  Location: [facility] OR [address] [place] [address];  Service: Neurosurgery;  Laterality: N/A;    APPENDECTOMY      BACK SURGERY      CHOLECYSTECTOMY      choley & ovarian cyst removed  12/2005    cystoscope      multiple    HYSTERECTOMY  4/8/2004    BUBBA BS&O     interstim      LUMBAR FUSION N/A 1/27/2025    Procedure: FUSION, SPINE, LUMBAR MIS POSTERIOR SCREWS LOOP X for POSTERIOR;  Surgeon: Theron Melton MD;  Location: [facility] OR [address] [place] [address];  Service: Neurosurgery;  Laterality: N/A;    OOPHORECTOMY  4/8/2004    with hysterectomy     PELVIC LAPAROSCOPY      LA EXPLORATORY OF ABDOMEN      SINUS SURGERY  03/01/2016    SPINE SURGERY       Family History   Problem Relation Name Age of Onset    Hypertension Father      Alcohol abuse Brother      Cancer Paternal Uncle      Colon cancer Paternal Uncle      Depression Maternal Grandmother      Hearing loss Maternal Grandmother      Heart disease Maternal Grandmother      Kidney disease Maternal Grandmother      Cancer Paternal Grandmother      Arthritis Paternal Grandfather      Diabetes Paternal Grandfather      Stroke Paternal Grandfather      Breast cancer Neg Hx      Ovarian cancer Neg Hx      Asthma Neg Hx      Emphysema Neg Hx       Social History[1]  Review of Systems   Constitutional:  Negative for chills and fever.   Respiratory:  Negative for chest tightness and shortness of breath.    Cardiovascular:  Negative for chest pain and leg swelling.   Gastrointestinal:  Positive for abdominal pain. Negative for nausea.   Neurological:  Negative for dizziness and weakness.       Physical Exam     Initial Vitals [03/11/25  1246]   BP Pulse Resp Temp SpO2   (!) 142/83 100 18 98.3 °F (36.8 °C) 96 %      MAP       --         Physical Exam    Nursing note and vitals reviewed.  Constitutional: She appears well-developed and well-nourished. She is not diaphoretic. No distress.   HENT:   Head: Normocephalic and atraumatic.   Right Ear: External ear normal.   Left Ear: External ear normal.   Eyes: Conjunctivae and EOM are normal.   Neck: No tracheal deviation present. No JVD present.   Normal range of motion.  Cardiovascular:  Normal rate.           Pulmonary/Chest: No stridor. No respiratory distress.   Abdominal: She exhibits no distension. There is no abdominal tenderness.   No CVA tenderness bilaterally  Well healing surgical wound to anterior lower abdomen, clean dry and intact There is no rebound and no guarding.   Musculoskeletal:      Cervical back: Normal range of motion.      Comments: Well healing posterior surgical wound, clean dry and intact   No midline C, T or L-spine tenderness, step-offs or crepitus     Neurological: She is alert and oriented to person, place, and time.   Skin: No rash noted. No erythema.   Psychiatric: She has a normal mood and affect.         ED Course   Procedures  Labs Reviewed   URINALYSIS, REFLEX TO URINE CULTURE - Abnormal       Result Value    Specimen UA Urine, Clean Catch      Color, UA Yellow      Appearance, UA Clear      pH, UA 7.0      Specific Gravity, UA 1.020      Protein, UA Negative      Glucose, UA Negative      Ketones, UA Negative      Bilirubin (UA) Negative      Occult Blood UA Negative      Nitrite, UA Negative      Urobilinogen, UA Negative      Leukocytes, UA 1+ (*)     Narrative:     Specimen Source->Urine   CBC W/ AUTO DIFFERENTIAL - Abnormal    WBC 7.32      RBC 5.02      Hemoglobin 14.2      Hematocrit 42.7      MCV 85      MCH 28.3      MCHC 33.3      RDW 12.9      Platelets SEE COMMENT      MPV SEE COMMENT      Immature Granulocytes 0.5      Gran # (ANC) 5.4      Immature  Grans (Abs) 0.04      Lymph # 1.3      Mono # 0.4      Eos # 0.1      Baso # 0.03      nRBC 0      Gran % 74.1 (*)     Lymph % 18.2      Mono % 5.2      Eosinophil % 1.6      Basophil % 0.4      Platelet Estimate Clumped (*)     Differential Method Automated     COMPREHENSIVE METABOLIC PANEL - Abnormal    Sodium 138      Potassium 3.9      Chloride 100      CO2 25      Glucose 95      BUN 12      Creatinine 0.7      Calcium 9.4      Total Protein 8.5 (*)     Albumin 3.6      Total Bilirubin 0.3      Alkaline Phosphatase 146      AST 19      ALT 14      eGFR >60      Anion Gap 13     URINALYSIS MICROSCOPIC - Abnormal    RBC, UA 2      WBC, UA 32 (*)     Bacteria Few (*)     Squam Epithel, UA 5      Microscopic Comment SEE COMMENT      Narrative:     Specimen Source->Urine   C-REACTIVE PROTEIN - Abnormal    CRP 36.4 (*)    CULTURE, URINE   LIPASE    Lipase 21     C-REACTIVE PROTEIN   PROCALCITONIN          Imaging Results              CT Abdomen Pelvis  Without Contrast (Edited Result - FINAL)  Result time 03/11/25 14:29:22      Addendum (preliminary) 1 of 1 by Liu Herrera MD (03/11/25 14:29:22)      Please add to the impression, peripherally oriented ground-glass attenuation within the bilateral lower lobes is concerning for infectious or inflammatory process, COVID infection can present in this fashion.  Correlation is needed.      Electronically signed by: Liu Herrera MD  Date:    03/11/2025  Time:    14:29                 Final result by Liu Herrera MD (03/11/25 13:41:17)                   Impression:      This report was flagged in Epic as abnormal.    1. Surgical change of the anterior abdominal wall noting fluid layers along the incision line.  Infected collection not excluded.  Correlation with surgical site advised.  2. There is a fluid collection along the anterior aspect of the left psoas, possibly in communication with the above collection.  Differential would include postoperative  seroma or hematoma however abscess is a consideration.  Follow-up advised.  3. Right nonobstructive nephrolithiasis.  4. Please see above for several additional findings.      Electronically signed by: Liu Herrera MD  Date:    03/11/2025  Time:    13:41               Narrative:    EXAMINATION:  CT ABDOMEN PELVIS WITHOUT CONTRAST    CLINICAL HISTORY:  Flank pain, kidney stone suspected;    TECHNIQUE:  Low dose axial images, sagittal and coronal reformations were obtained from the lung bases to the pubic symphysis.  Oral contrast was not administered.    COMPARISON:  CT pelvis 04/11/2023, CT renal stone study 06/17/2022    FINDINGS:  Images of the lower thorax are remarkable for patchy ground-glass attenuation bilaterally, primarily along the periphery of the bilateral lower lobes, right greater than left.    The liver, spleen, pancreas and adrenal glands have a grossly unremarkable noncontrast appearance.  The gallbladder is surgically absent, no significant biliary dilation status post cholecystectomy.  The stomach is decompressed without wall thickening noting minimal hiatal hernia.  No significant abdominal lymphadenopathy.    There is right nonobstructive nephrolithiasis.  No left nephrolithiasis.  No hydronephrosis.  The bilateral ureters are unremarkable without calculi seen.  The urinary bladder is decompressed without wall thickening.  The uterus is absent.    The large bowel is grossly unremarkable noting a few scattered colonic diverticula without inflammation to suggest diverticulitis.  The terminal ileum is unremarkable.  The appendix is not confidently identified, no pericecal inflammation.  The small bowel is grossly unremarkable.  There are a few scattered shotty periaortic, pericaval, and mesenteric lymph nodes.    There is surgical change of the anterior abdominopelvic wall noting scattered fluid along the incision.  There is a fluid collection along the anterior aspect of the left psoas  measuring 4.8 x 2.0 cm noting inflammation about the collection.  There appears to be a tract extending to the anterior abdominal wall, this collection may communicate with the anterior abdominal wall incision collection.    There are surgical changes of the spine and sacroiliac joints.  Surgical changes are noted of the posterior paraspinous soft tissues without focal organized collection.                                       Medications   ketorolac injection 15 mg (15 mg Intramuscular Given 3/11/25 1707)   cephALEXin capsule 1,000 mg (1,000 mg Oral Given 3/11/25 1707)   phenazopyridine tablet 100 mg (100 mg Oral Given by Other 3/11/25 1833)     Medical Decision Making  Patient is a 54-year-old female with a past medical history of hypothyroidism, anxiety, chronic back pain, pots, who presents to the emergency department with complaints of right flank and right lower quadrant pain x2 days.  Patient states that she has a history of kidney stones and states that her symptoms feel similar.  Of note patient recently had a L4-S1 complete diskectomy with anterior interbody placement with anterior instrumentation plating followed by posterior nonsegmental instrumentation on 1/27/2025 with Dr. Melton.  She followed up with his office today, and states that she was healing well.  She denies dysuria, hematuria, urgency, frequency, nausea, vomiting, fever, chest pain or shortness of breath.    On physical exam, patient is a non-toxic, afebrile, and well appearing. Benign abdominal exam without focal tenderness, CVA tenderness, guarding, or rebound. Denies chest pain and shortness of breath. Abdomen soft and non tender. Patient is tolerating PO without difficulty. Physical exam otherwise as above.      Considerations include but not limited to-  AAA: location inconsistent, no bruits, no history of HTN  Cholecystitis: location inconsistent, no relation with meals, negative plata's  SBO: normal BM and flatus. No  vomiting  Appendicitis: location inconsistent, no fever, no rebound/guarding  Mesenteric ischemia: HPI inconsistent, does not coincide with meals, other dx more likely  Kidney stone: no radiation to back or cva tenderness, no dysuria, no hematuria  Pyelonephritis: no cva tenderness, no dysuria, no fever  Pancreatitis: no history of alcohol abuse, unlikely gallstone obstructing, location inconsistent  Diverticulitis: age and location not most common, no history of diverticulitis, no fever, no wbc    Findings are consistent wth a diagnosis of UTI.   CT with surgical change of the anterior abdominal wall noting fluid layers along the incision line.  Infected collection not excluded. There is a fluid collection along the anterior aspect of the left psoas, possibly in communication with the above collection.  Differential would include postoperative seroma or hematoma however abscess is a consideration.  CBC without leukocytosis, CRP mildly elevated at 32.  Discussed with on-call neurosurgery, Nicole Kim PA-C, who feels that symptoms are more consistent with a seroma versus hematoma given lab values.  Discussed importance with the patient with following up with vascular surgery and neurosurgery. There is no rebound/guarding or other peritoneal signs to suggest perforation or other emergent surgical process. There is no fever or leukocytosis to suggest acute bacterial infection. There is no significant focal abdominal tenderness to suggest cholecystitis, appendicitis, diverticulitis, or  source.     There are no concerning features on physical exam to suggest an emergent or life threatening condition. No further intervention is indicated at this time after having taken into account the patient's history, physical exam findings, and empirical and objective data obtained during the patient's emergency department workup. The patient is at low risk for an emergent/life threatening medical condition at this time, and  I am of the belief that that it is safe to discharge the patient from the emergency department.     I discussed with the patient the diagnosis, treatment plan, indications for return to the emergency department, and for expected follow-up. The patient/guardian verbalized an understanding. The patient/guardian is asked if there are any questions or concerns. We discuss the case, until all issues are addressed to the patient/guardian's satisfaction. Patient/guardian understands and is agreeable to the plan. Instructed follow up with primary care provider within 1 week.  Patient is very well appearing, and in no acute distress. Vital signs are reassuring here in the emergency department, patient is afebrile, breathing comfortable, sating 98 % on room air. Patient is stable for discharge at this time.     Amount and/or Complexity of Data Reviewed  Labs: ordered.    Risk  Prescription drug management.                                      Clinical Impression:  Final diagnoses:  [N30.00] Acute cystitis without hematuria (Primary)          ED Disposition Condition    Discharge Stable          ED Prescriptions       Medication Sig Dispense Start Date End Date Auth. Provider    cephALEXin (KEFLEX) 500 MG capsule Take 2 capsules (1,000 mg total) by mouth every 12 (twelve) hours. for 5 days 20 capsule 3/11/2025 3/16/2025 Susana Barber PA-C    phenazopyridine (PYRIDIUM) 200 MG tablet Take 1 tablet (200 mg total) by mouth 3 (three) times daily. for 5 days 15 tablet 3/11/2025 3/16/2025 Susana Barber PA-C          Follow-up Information       Follow up With Specialties Details Why Contact Info    Gigi Celis MD Internal Medicine   [address] [facility]  [place] [address] [phone]      [facility] Emergency Medicine Go to  for new or worsening symptoms [address] [facility] [place] [address] [phone]               [1]   Social History  Tobacco Use     Smoking status: Never    Smokeless tobacco: Never   Substance Use Topics    Alcohol use: Yes     Comment: Rarely    Drug use: No        Susana Barber PA-C  03/12/25 0709

## 2025-03-11 NOTE — PROGRESS NOTES
Neurosurgery  Established Patient    SUBJECTIVE:     History of Present Illness:  Kianna Zuleta is a 55 yo female who received L5-S1 complete diskectomy with anterior interbody placement with anterior instrumentation plating followed by posterior nonsegmental instrumentation on 1/27/2025. Post-operativelly, she is doing ok, but still complaints of low back pain and left sciatic pain started last week with left calf numbness and tingling. She denies any other new neurological symptoms.    Review of patient's allergies indicates:   Allergen Reactions    Benadryl allergy decongestant Anaphylaxis     Other reaction(s): Anaphylaxis    Fludrocortisone Hives    Gluten Other (See Comments)     Other reaction(s) GI upset    Diphenhydramine hcl     Hydromorphone hcl      No issue with morphine in the past per pt.     Indomethacin Hives     No issue with Toradol in the past per pt.     Lisinopril Nausea And Vomiting     Swelling and vomiting    Penicillins Rash     No issue with cephalosporins in the past per pt.     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    MVP (mitral valve prolapse)     Pneumonia     PONV (postoperative nausea and vomiting)     POTS (postural orthostatic tachycardia syndrome)     Recurrent upper respiratory infection (URI)     Ulcer     Vitamin D deficiency disease 11/06/2012     Past Surgical History:   Procedure Laterality Date    ANKLE SURGERY      lt    ANTERIOR LUMBAR INTERBODY FUSION (ALIF) N/A 1/27/2025    Procedure: **LOOP X** FUSION, SPINE, LUMBAR, ALIF;  Surgeon: Theron Melton MD;  Location: [facility] OR [address] [place] [address];   Service: Neurosurgery;  Laterality: N/A;    APPENDECTOMY      BACK SURGERY      CHOLECYSTECTOMY      choley & ovarian cyst removed  12/2005    cystoscope      multiple    HYSTERECTOMY  4/8/2004    BUBBA BS&O     interstim      LUMBAR FUSION N/A 1/27/2025    Procedure: FUSION, SPINE, LUMBAR MIS POSTERIOR SCREWS LOOP X for POSTERIOR;  Surgeon: Theron Melton MD;  Location: [facility] OR [address] [place] [address];  Service: Neurosurgery;  Laterality: N/A;    OOPHORECTOMY  4/8/2004    with hysterectomy     PELVIC LAPAROSCOPY      HI EXPLORATORY OF ABDOMEN      SINUS SURGERY  03/01/2016    SPINE SURGERY       Family History       Problem Relation (Age of Onset)    Alcohol abuse Brother    Arthritis Paternal Grandfather    Cancer Paternal Uncle, Paternal Grandmother    Colon cancer Paternal Uncle    Depression Maternal Grandmother    Diabetes Paternal Grandfather    Hearing loss Maternal Grandmother    Heart disease Maternal Grandmother    Hypertension Father    Kidney disease Maternal Grandmother    Stroke Paternal Grandfather          Social History     Socioeconomic History    Marital status: Single    Number of children: 0   Occupational History    Occupation: Teacher     Employer: [employer]   Tobacco Use    Smoking status: Never    Smokeless tobacco: Never   Substance and Sexual Activity    Alcohol use: Yes     Comment: Rarely    Drug use: No    Sexual activity: Not Currently   Other Topics Concern    Are you pregnant or think you may be? No    Breast-feeding No     Social Drivers of Health     Financial Resource Strain: Low Risk  (1/30/2025)    Overall Financial Resource Strain (CARDIA)     Difficulty of Paying Living Expenses: Not hard at all   Food Insecurity: No Food Insecurity (1/30/2025)    Hunger Vital Sign     Worried About Running Out of Food in the Last Year: Never true     Ran Out of Food in the Last Year: Never true   Transportation Needs: No Transportation Needs (1/27/2025)    TRANSPORTATION NEEDS      Transportation : No   Physical Activity: Inactive (1/30/2025)    Exercise Vital Sign     Days of Exercise per Week: 0 days     Minutes of Exercise per Session: 0 min   Stress: No Stress Concern Present (1/30/2025)    [place] [employer] - Occupational Stress Questionnaire     Feeling of Stress : Only a little   Housing Stability: Unknown (1/30/2025)    Housing Stability Vital Sign     Unable to Pay for Housing in the Last Year: No     Homeless in the Last Year: No       Review of Systems    OBJECTIVE:     Vital Signs  Temp: 97.5 °F (36.4 °C)  Pain Score:   6  There is no height or weight on file to calculate BMI.    Neurosurgery Physical Exam    Neurosurgery Physical Exam 03/11/2025       General: well developed, well nourished, no acute distress  HEENT: normocephalic, atraumatic  CV: regular rate   Pulmonary: normal respirations, no signs of respiratory distress  Abdomen: soft, non-distended, not tender to palpation  Skin: unremarkable  Heme: no bruising     Neuro:   Mental Status: AO x4  CN II-XII intact  Sensory: intact to light touch throughout  Motor:    Strength   Deltoids Biceps Triceps Wrist Extension Wrist Flexion    Upper: R 5/5 5/5 5/5 5/5 5/5 5/5     L 5/5 5/5 5/5 5/5 5/5 5/5       Hip Flex Knee Ext Knee Flex DF PF EHL   Lower: R 5/5 5/5 5/5 5/5 5/5 5/5     L 5/5 5/5 5/5 5/5 5/5 5/5   Reflexes: -Chen's, -Babinski, no clonus. Patellar: 2+ bilaterally   Gait: deferred  Back: no tenderness to palpation along spinous processes  Rectal: tone intact       Imaging:    XR Lsp 1/27/2025:  Impression: postop change L5-S1 above. No hardware failure.     ASSESSMENT/PLAN:   Patient is doing well post operatively. Post-op X-ray looks good. Back pain should gradually improve. Given the symptoms from her left leg, will schedule left leg ultrasound to evaluation the circulation. For her back pain, I would recommend epidural injections for conservative management, also consider aqua therapy and  bone stimulator. Can change weight restriction > 25 lbs. Keep using brace when out of bed. Will continue post-op follow up in clinic.      Itz Ennis MD  [facility] Neurosurgery            [1]   Current Outpatient Medications   Medication Sig Dispense Refill    amLODIPine (NORVASC) 2.5 MG tablet Take 1 tablet by mouth once daily.      azelastine (ASTELIN) 137 mcg (0.1 %) nasal spray 1 spray by Nasal route.      cetirizine (ZYRTEC) 10 MG tablet Take 10 mg by mouth once daily.      cloNIDine (CATAPRES) 0.1 MG tablet Take 0.1 mg by mouth 2 (two) times daily. Take two tabs BID      dicyclomine (BENTYL) 20 mg tablet Take 20 mg by mouth 2 (two) times daily as needed.      ergocalciferol (ERGOCALCIFEROL) 50,000 unit Cap Take 1 capsule (50,000 Units total) by mouth every 7 days. 12 capsule 12    estradioL (ESTRACE) 2 MG tablet Take 1 tablet (2 mg total) by mouth once daily. 30 tablet 6    famotidine (PEPCID) 40 MG tablet Take 40 mg by mouth Daily.      fluticasone propionate (FLONASE) 50 mcg/actuation nasal spray 1 spray (50 mcg total) by Each Nostril route once daily. 15.8 mL 0    gabapentin (NEURONTIN) 300 MG capsule Take 3 capsules (900 mg total) by mouth 3 (three) times daily. 270 capsule 11    hydrOXYzine HCL (ATARAX) 25 MG tablet Take 1 tablet (25 mg total) by mouth 3 (three) times daily as needed for Itching. 45 tablet 1    immun glob G,IgG,-pro-IgA 0-50 (HIZENTRA) 1 gram/5 mL (20 %) Syrg Inject 5 mLs (1 g total) into the skin every 7 days. 130 mL 1    indapamide (LOZOL) 1.25 MG Tab TAKE 1 TABLET BY MOUTH DAILY 30 tablet 1    levocetirizine (XYZAL) 5 MG tablet Take 5 mg by mouth every evening.  9    levothyroxine (SYNTHROID) 75 MCG tablet TAKE 1 TABLET(75 MCG) BY MOUTH BEFORE BREAKFAST 90 tablet 3    LIDOcaine-prilocaine (EMLA) cream Apply topically as needed (apply to infusion site). 30 g 0    metoprolol succinate (TOPROL-XL) 100 MG 24 hr tablet 100 mg 2 (two) times daily.      ondansetron (ZOFRAN-ODT) 4 MG TbDL  Take 4 mg by mouth every 6 (six) hours as needed.      oxyCODONE-acetaminophen (PERCOCET)  mg per tablet Take 1 tablet by mouth every 6 (six) hours as needed for Pain. 45 tablet 0    SEMAGLUTIDE, WEIGHT LOSS, SUBQ Inject 1 mg into the skin every Thursday.      traZODone (DESYREL) 100 MG tablet Take by mouth.      TRINTELLIX 20 mg Tab TK 1 T PO QAM      vitamin D (VITAMIN D3) 1000 units Tab Take 1,000 Units by mouth once daily.      zaleplon (SONATA) 10 MG capsule Take by mouth nightly as needed.      EPINEPHrine (AUVI-Q) 0.3 mg/0.3 mL AtIn Inject 0.3 mLs (0.3 mg total) into the muscle once. for 1 dose 2 each 12     No current facility-administered medications for this visit.

## 2025-03-11 NOTE — DISCHARGE INSTRUCTIONS
Thank you for coming to our Emergency Department today. It is important to remember that some problems or medical conditions are difficult to diagnose and may not be found or addressed during your Emergency Department visit.  These conditions often start with non-specific symptoms and can only be diagnosed on follow up visits with your primary care physician or specialist when the symptoms continue or change. Please remember that all medical conditions can change, and we cannot predict how you will be feeling tomorrow or the next day. Return to the ER with any questions/concerns, new/concerning symptoms, worsening or failure to improve.       Be sure to follow up with your primary care doctor and review all labs/imaging/tests that were performed during your ER visit with them. It is very common for us to identify non-emergent incidental findings which must be followed up with your primary care physician.  Some labs/imaging/tests may be outside of the normal range, and require non-emergent follow-up and/or further investigation/treatment/procedures/testing to help diagnose/exclude/prevent complications or other potentially serious medical conditions. Some abnormalities may not have been discussed or addressed during your ER visit.     An ER visit does not replace a primary care visit, and many screening tests or follow-up tests cannot be ordered by an ER doctor or performed by the ER. Some tests may even require pre-approval.    If you do not have a primary care doctor, you may contact the one listed on your discharge paperwork or you may also call the [facility] Appointment Desk at [phone] , or [address] [place] [address] at  [phone] to schedule an appointment, or establish care with a primary care doctor or even a specialist and to obtain information about local resources. It is important to your health that you have a primary care doctor.    Please take all medications as directed. We have done our best to select  a medication for you that will treat your condition however, all medications may potentially have side-effects and it is impossible to predict which medications may give you side-effects or what those side-effects (if any) those medications may give you.  If you feel that you are having a negative effect or side-effect of any medication you should stop taking those medications immediately and seek medical attention. If you feel that you are having a life-threatening reaction call 911.        Do not drive, swim, climb to height, take a bath, operate heavy machinery, drink alcohol or take potentially sedating medications, sign any legal documents or make any important decisions for 24 hours if you have received any pain medications, sedatives or mood altering drugs during your ER visit or within 24 hours of taking them if they have been prescribed to you.     You can find additional resources for Dentists, hearing aids, durable medical equipment, low cost pharmacies and other resources at [email or web address]

## 2025-03-11 NOTE — TELEPHONE ENCOUNTER
Pt calling with c/o abdominal pain and pt was triaged and care advice to go to the ED. Pt said that the pain is in the right upper quadrant under the ribs but in the front. She said that she has hx of kidney stones in the past and this feels like that. Pt triaged and care advice to go to the ED now and pt hung up before can say anything else,          Reason for Disposition   SEVERE abdominal pain (e.g., excruciating)    Additional Information   Negative: SEVERE difficulty breathing (e.g., struggling for each breath, speaks in single words)   Negative: Shock suspected (e.g., cold/pale/clammy skin, too weak to stand, low BP, rapid pulse)   Negative: Difficult to awaken or acting confused (e.g., disoriented, slurred speech)   Negative: Passed out (e.g., fainted, lost consciousness, blacked out and was not responding)   Negative: Visible sweat on face or sweat is dripping down   Negative: Sounds like a life-threatening emergency to the triager    Protocols used: Abdominal Pain - Upper-A-[place]

## 2025-03-12 ENCOUNTER — PATIENT MESSAGE (OUTPATIENT)
Dept: NEUROSURGERY | Facility: CLINIC | Age: 55
End: 2025-03-12
Payer: COMMERCIAL

## 2025-03-12 ENCOUNTER — RESULTS FOLLOW-UP (OUTPATIENT)
Dept: EMERGENCY MEDICINE | Facility: HOSPITAL | Age: 55
End: 2025-03-12

## 2025-03-13 ENCOUNTER — TELEPHONE (OUTPATIENT)
Dept: UROLOGY | Facility: CLINIC | Age: 55
End: 2025-03-13
Payer: COMMERCIAL

## 2025-03-13 ENCOUNTER — PATIENT MESSAGE (OUTPATIENT)
Dept: NEUROSURGERY | Facility: CLINIC | Age: 55
End: 2025-03-13
Payer: COMMERCIAL

## 2025-03-13 ENCOUNTER — TELEPHONE (OUTPATIENT)
Dept: FAMILY MEDICINE | Facility: CLINIC | Age: 55
End: 2025-03-13
Payer: COMMERCIAL

## 2025-03-13 DIAGNOSIS — N30.01 ACUTE CYSTITIS WITH HEMATURIA: Primary | ICD-10-CM

## 2025-03-13 LAB
BACTERIA UR CULT: ABNORMAL
BACTERIA UR CULT: ABNORMAL

## 2025-03-13 RX ORDER — NITROFURANTOIN 25; 75 MG/1; MG/1
100 CAPSULE ORAL 2 TIMES DAILY
Qty: 20 CAPSULE | Refills: 0 | Status: SHIPPED | OUTPATIENT
Start: 2025-03-13 | End: 2025-03-23

## 2025-03-13 NOTE — TELEPHONE ENCOUNTER
Please advise,        ----- Message from Cruz sent at 3/13/2025  1:54 PM CDT -----  Regarding: Kianna  Type: Patient Callback Who called: Kianna What is the request in detail: Pt stated that she was seen in the ER on Tuesday and she got her Urine Cultures back and they showing that there is a bacteria and the medication that she is on does not treat one of the Bacterias. She would like for the doctor to reach out to her for the next steps in care. Can the clinic reply by [facility]? Yes Would the patient rather a call back or a response via [facility]? Callback Best call back number: .[phone] Information:    1 Result Note       View Follow-Up Encounter      Component  Ref Range & Units (hover) 2 d ago   Urine Culture, Routine  Abnormal   ESCHERICHIA COLI  10,000 - 49,999 cfu/ml    Urine Culture, Routine  Abnormal   ENTEROCOCCUS FAECALIS  > 100,000 cfu/ml    Resulting Agency [facility]        Susceptibility     Escherichia coli Enterococcus faecalis     CULTURE, URINE CULTURE, URINE     Amp/Sulbactam 16/8 mcg/mL Intermediate       Ampicillin >16 mcg/mL Resistant <=2 mcg/mL Sensitive     Cefazolin <=2 mcg/mL Sensitive       Cefepime <=2 mcg/mL Sensitive       Ceftriaxone <=1 mcg/mL Sensitive       Ciprofloxacin <=0.25 mcg/mL Sensitive       Ertapenem <=0.5 mcg/mL Sensitive       Gentamicin <=2 mcg/mL Sensitive       Levofloxacin <=0.5 mcg/mL Sensitive       Meropenem <=1 mcg/mL Sensitive       Nitrofurantoin <=32 mcg/mL Sensitive <=32 mcg/mL Sensitive     Piperacillin/Tazo <=8 mcg/mL Sensitive       Tobramycin <=2 mcg/mL Sensitive       Trimeth/Sulfa <=2/38 mcg/mL Sensitive       Vancomycin   2 mcg/mL Sensitive                    Varghese Cota PA-C  3/12/2025 10:08 AM CDT Back to Top      Patient discharged on Keflex.  Sensitivities pending.

## 2025-03-13 NOTE — TELEPHONE ENCOUNTER
----- Message from DALE Loo sent at 3/13/2025  3:26 PM CDT -----    ----- Message -----  From: Yusuf Perez  Sent: 3/13/2025   3:17 PM CDT  To: [facility] Clinical Staff    Name of Caller: Nature of Call:Requesting an apptBest Call Back Number:[phone] Additional InformationLISA GUTHRIE calling regarding Appointment Access. The pt is requesting an appt due to having a UTI and kidney stones. Please call the pt back to assist

## 2025-03-13 NOTE — TELEPHONE ENCOUNTER
See other messages- spoke with patient and advised of MD's treatment recs.    DALE Strauss    ----- Message from Med Assistant Jeanine sent at 3/13/2025  3:52 PM CDT -----  Contact: [phone]  Ep of Dr Gillis stating that she needs an urgent appt or else she will need to return to the ED. Pt states that she has a UTI w/2 different bacteria's  and was advised to see her provider ASAP. Request appt sent with high priority  [phone]

## 2025-03-13 NOTE — TELEPHONE ENCOUNTER
Call ASAP and reassure patient.  She was discharged on Keflex and the antibiotic similar to Keflex that was tested in the culture shows that the infection is sensitive to Keflex     The antibiotic on the culture cefazolin is the same as Keflex so the culture does show that the Keflex should be working    While on the phone, double check with patient that her urinary tract infection symptoms should be getting better after a few days on the antibiotic so double-check that issue

## 2025-03-13 NOTE — TELEPHONE ENCOUNTER
"Pt advised appt for kidney stones is ok, given the stone is not obstructing. I also advised that antibiotics were given in ER for UTI. Pt respoonded "the type of UTI I have when I looked it up on google says the antibiotics dont work for that.  Message sent to Dr. Gillis. Awaiting response.      DALE Strauss  "

## 2025-03-14 DIAGNOSIS — M51.26 HNP (HERNIATED NUCLEUS PULPOSUS), LUMBAR: Primary | ICD-10-CM

## 2025-03-14 DIAGNOSIS — Z98.1 S/P LUMBAR SPINAL FUSION: ICD-10-CM

## 2025-03-14 RX ORDER — CYCLOBENZAPRINE HCL 5 MG
5 TABLET ORAL 3 TIMES DAILY PRN
Qty: 30 TABLET | Refills: 0 | Status: SHIPPED | OUTPATIENT
Start: 2025-03-14 | End: 2025-03-24

## 2025-03-14 RX ORDER — OXYCODONE AND ACETAMINOPHEN 5; 325 MG/1; MG/1
1 TABLET ORAL EVERY 6 HOURS PRN
Qty: 30 EACH | Refills: 0 | Status: SHIPPED | OUTPATIENT
Start: 2025-03-14

## 2025-03-17 ENCOUNTER — DOCUMENTATION ONLY (OUTPATIENT)
Dept: ALLERGY | Facility: CLINIC | Age: 55
End: 2025-03-17
Payer: COMMERCIAL

## 2025-03-17 NOTE — PROGRESS NOTES
Received fax from [employer] [facility]- Prescriber order form for Hizentra placed on Dr. Miller's desk for signature.

## 2025-03-18 ENCOUNTER — TELEPHONE (OUTPATIENT)
Dept: VASCULAR SURGERY | Facility: CLINIC | Age: 55
End: 2025-03-18
Payer: COMMERCIAL

## 2025-03-18 ENCOUNTER — TELEPHONE (OUTPATIENT)
Dept: ALLERGY | Facility: CLINIC | Age: 55
End: 2025-03-18
Payer: COMMERCIAL

## 2025-03-18 NOTE — TELEPHONE ENCOUNTER
----- Message from Brooke sent at 3/18/2025  2:16 PM CDT -----  Regarding: Refill Renewal  Contact: Sharri @[phone]  Is this a Refill or New Rx: Refill Rx Name and Strength:HIZENTRA 14grams Preferred Pharmacy with phone number:[facility] [place], [place] - [id number] [facility] [id number]: [phone] Fax: [phone]

## 2025-03-18 NOTE — TELEPHONE ENCOUNTER
"Per Dr Parker spoke with Ms Zuleta, appointment scheduled 3/20/2025 for after emergency room visit follow up. Also Ms Zuleta notified there's an  Nedra Bautista in the lobby at  present  inquiring about her next clinic appointment with Dr Parker. Ms Zuleta states," I know about her coming there and it's okay to give her my clinic appointment date. 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  "

## 2025-03-18 NOTE — TELEPHONE ENCOUNTER
"Per  Dr. Miller's sticky note on pts prescriber order form for Hizentra    "Pt needs an appointment before I can sign orders, LOV 10/23"    Sent message to patient to schedule a follow up with Dr. Miller.  "

## 2025-03-19 ENCOUNTER — TELEPHONE (OUTPATIENT)
Dept: NEUROSURGERY | Facility: CLINIC | Age: 55
End: 2025-03-19
Payer: COMMERCIAL

## 2025-03-19 ENCOUNTER — PATIENT MESSAGE (OUTPATIENT)
Dept: NEUROSURGERY | Facility: CLINIC | Age: 55
End: 2025-03-19
Payer: COMMERCIAL

## 2025-03-19 DIAGNOSIS — Z98.1 S/P LUMBAR SPINAL FUSION: Primary | ICD-10-CM

## 2025-03-19 NOTE — TELEPHONE ENCOUNTER
"----- Message from DALE Aviles sent at 3/19/2025 11:37 AM CDT -----  Regarding: RE: pt adjessica  Contact: [phone]  I spoke with her  who stopped at the clinic yesterday. She said she had a new onset of fever with abdominal pain. I advised she needed to go to the ED  ----- Message -----  From: Ruby Costello  Sent: 3/19/2025  11:13 AM CDT  To: Traci Morel RN  Subject: FW: pt adivce                                    She has been having lower back pain like before the surgery and hasn't been able to sleep. Back incision site is great overall though. Also her left buttock keeps going numb.  ----- Message -----  From: Elzbieta Schreiber  Sent: 3/19/2025  11:04 AM CDT  To: Linh BAUGH Staff  Subject: pt adivce                                        .Name Of Caller: Self Contact Preference?:[phone] What is the nature of the call?: in reference to pt can't get comfortable from the pain in lower back, needing to speak to office, pls call Additional Notes:"Thank you for all that you do for our patients"  "

## 2025-03-20 ENCOUNTER — PATIENT MESSAGE (OUTPATIENT)
Dept: NEUROSURGERY | Facility: CLINIC | Age: 55
End: 2025-03-20
Payer: COMMERCIAL

## 2025-03-20 ENCOUNTER — OFFICE VISIT (OUTPATIENT)
Dept: VASCULAR SURGERY | Facility: CLINIC | Age: 55
End: 2025-03-20
Attending: SURGERY
Payer: COMMERCIAL

## 2025-03-20 VITALS
HEIGHT: 65 IN | BODY MASS INDEX: 45.18 KG/M2 | DIASTOLIC BLOOD PRESSURE: 76 MMHG | TEMPERATURE: 99 F | SYSTOLIC BLOOD PRESSURE: 126 MMHG | WEIGHT: 271.19 LBS | HEART RATE: 89 BPM

## 2025-03-20 DIAGNOSIS — Z98.890 POST-OPERATIVE STATE: Primary | ICD-10-CM

## 2025-03-21 ENCOUNTER — TELEPHONE (OUTPATIENT)
Dept: NEUROSURGERY | Facility: CLINIC | Age: 55
End: 2025-03-21
Payer: COMMERCIAL

## 2025-03-21 NOTE — TELEPHONE ENCOUNTER
Ernestina Pickard Staff  Received Faxed [facility] 1010 Form signed by Worker Comp  Isela Ocasio     [employer]  [facility]     P [phone]  F [phone]  E [email or web address]  W [email or web address]  A [address] [address], [facility]  [address]  CLAIM # [id number], Approved 12 visit okay to schedule patient.    Thanks,    Ernestina

## 2025-03-24 ENCOUNTER — HOSPITAL ENCOUNTER (OUTPATIENT)
Dept: RADIOLOGY | Facility: HOSPITAL | Age: 55
Discharge: HOME | End: 2025-03-24
Attending: NEUROLOGICAL SURGERY
Payer: COMMERCIAL

## 2025-03-24 DIAGNOSIS — M54.9 DORSALGIA, UNSPECIFIED: ICD-10-CM

## 2025-03-24 DIAGNOSIS — Z98.1 S/P LUMBAR SPINAL FUSION: ICD-10-CM

## 2025-03-24 PROCEDURE — 72131 CT LUMBAR SPINE W/O DYE: CPT | Mod: TC

## 2025-03-24 PROCEDURE — 93970 EXTREMITY STUDY: CPT | Mod: 26,,, | Performed by: RADIOLOGY

## 2025-03-24 PROCEDURE — 93970 EXTREMITY STUDY: CPT | Mod: TC

## 2025-03-24 PROCEDURE — 72131 CT LUMBAR SPINE W/O DYE: CPT | Mod: 26,,, | Performed by: INTERNAL MEDICINE

## 2025-03-25 ENCOUNTER — CLINICAL SUPPORT (OUTPATIENT)
Dept: REHABILITATION | Facility: HOSPITAL | Age: 55
End: 2025-03-25
Payer: COMMERCIAL

## 2025-03-25 DIAGNOSIS — Z98.1 S/P LUMBAR FUSION: Primary | ICD-10-CM

## 2025-03-25 DIAGNOSIS — Z98.1 S/P LUMBAR SPINAL FUSION: ICD-10-CM

## 2025-03-25 DIAGNOSIS — M54.9 CHRONIC BACK PAIN, UNSPECIFIED BACK LOCATION, UNSPECIFIED BACK PAIN LATERALITY: ICD-10-CM

## 2025-03-25 DIAGNOSIS — G89.29 CHRONIC BACK PAIN, UNSPECIFIED BACK LOCATION, UNSPECIFIED BACK PAIN LATERALITY: ICD-10-CM

## 2025-03-25 PROCEDURE — 97162 PT EVAL MOD COMPLEX 30 MIN: CPT

## 2025-03-25 PROCEDURE — 97110 THERAPEUTIC EXERCISES: CPT

## 2025-03-26 ENCOUNTER — OFFICE VISIT (OUTPATIENT)
Dept: UROLOGY | Facility: CLINIC | Age: 55
End: 2025-03-26
Payer: COMMERCIAL

## 2025-03-26 ENCOUNTER — PATIENT MESSAGE (OUTPATIENT)
Dept: NEUROSURGERY | Facility: CLINIC | Age: 55
End: 2025-03-26
Payer: COMMERCIAL

## 2025-03-26 VITALS — BODY MASS INDEX: 46.18 KG/M2 | WEIGHT: 273.25 LBS

## 2025-03-26 DIAGNOSIS — M47.817 SPONDYLOSIS OF LUMBOSACRAL REGION, UNSPECIFIED SPINAL OSTEOARTHRITIS COMPLICATION STATUS: ICD-10-CM

## 2025-03-26 DIAGNOSIS — M51.26 HNP (HERNIATED NUCLEUS PULPOSUS), LUMBAR: Primary | ICD-10-CM

## 2025-03-26 DIAGNOSIS — N20.0 KIDNEY STONES: Primary | ICD-10-CM

## 2025-03-26 DIAGNOSIS — N30.01 ACUTE CYSTITIS WITH HEMATURIA: ICD-10-CM

## 2025-03-26 DIAGNOSIS — M54.10 RADICULOPATHY, UNSPECIFIED SPINAL REGION: ICD-10-CM

## 2025-03-26 PROCEDURE — 1159F MED LIST DOCD IN RCRD: CPT | Mod: CPTII,S$GLB,, | Performed by: UROLOGY

## 2025-03-26 PROCEDURE — 1160F RVW MEDS BY RX/DR IN RCRD: CPT | Mod: CPTII,S$GLB,, | Performed by: UROLOGY

## 2025-03-26 PROCEDURE — 3008F BODY MASS INDEX DOCD: CPT | Mod: CPTII,S$GLB,, | Performed by: UROLOGY

## 2025-03-26 PROCEDURE — 99214 OFFICE O/P EST MOD 30 MIN: CPT | Mod: S$GLB,,, | Performed by: UROLOGY

## 2025-03-26 PROCEDURE — 99999 PR PBB SHADOW E&M-EST. PATIENT-LVL IV: CPT | Mod: PBBFAC,,, | Performed by: UROLOGY

## 2025-03-26 NOTE — PROGRESS NOTES
Subjective:       Patient ID: Kianna Zuleta is a 54 y.o. female The patient's last visit with me was on 6/28/2022.     Chief Complaint:   No chief complaint on file.    Urolithiasis  Patient complains of left flank pain with radiation to the groin. Onset of symptoms was abrupt starting a few days ago with stable course since that time. Patient describes the pain as aching and colicky, intermittent and rated as moderate. The patient has had nausea and vomiting and no diaphoresis. There has been no fever or chills. The patient is not complaining of dysuria or frequency. Risk factors for urolithiasis: history of stone disease.    06/28/2022  She denies any further pain.     03/26/2025  She is back with a CT scan and some recent urine culture.  She finished her antibiotics.  She denies hematuria.     ACTIVE MEDICAL ISSUES:  Problem List[1]    ALLERGIES AND MEDICATIONS: updated and reviewed.  Review of patient's allergies indicates:   Allergen Reactions    Benadryl allergy decongestant Anaphylaxis     Other reaction(s): Anaphylaxis    Fludrocortisone Hives    Gluten Other (See Comments)     Other reaction(s) GI upset    Diphenhydramine hcl     Hydromorphone hcl      No issue with morphine in the past per pt.     Indomethacin Hives     No issue with Toradol in the past per pt.     Lisinopril Nausea And Vomiting     Swelling and vomiting    Penicillins Rash     No issue with cephalosporins in the past per pt.     Sulfa (sulfonamide antibiotics) Rash    Vancomycin analogues Rash     Current Outpatient Medications   Medication Sig    amLODIPine (NORVASC) 2.5 MG tablet Take 1 tablet by mouth once daily.    azelastine (ASTELIN) 137 mcg (0.1 %) nasal spray 1 spray by Nasal route.    cetirizine (ZYRTEC) 10 MG tablet Take 10 mg by mouth once daily.    cloNIDine (CATAPRES) 0.1 MG tablet Take 0.1 mg by mouth 2 (two) times daily. Take two tabs BID    dicyclomine (BENTYL) 20 mg tablet Take 20 mg by mouth 2 (two) times daily as  needed.    EPINEPHrine (AUVI-Q) 0.3 mg/0.3 mL AtIn Inject 0.3 mLs (0.3 mg total) into the muscle once. for 1 dose    ergocalciferol (ERGOCALCIFEROL) 50,000 unit Cap Take 1 capsule (50,000 Units total) by mouth every 7 days.    estradioL (ESTRACE) 2 MG tablet Take 1 tablet (2 mg total) by mouth once daily.    famotidine (PEPCID) 40 MG tablet Take 40 mg by mouth Daily.    fluticasone propionate (FLONASE) 50 mcg/actuation nasal spray 1 spray (50 mcg total) by Each Nostril route once daily.    gabapentin (NEURONTIN) 300 MG capsule Take 3 capsules (900 mg total) by mouth 3 (three) times daily.    hydrOXYzine HCL (ATARAX) 25 MG tablet Take 1 tablet (25 mg total) by mouth 3 (three) times daily as needed for Itching.    immun glob G,IgG,-pro-IgA 0-50 (HIZENTRA) 1 gram/5 mL (20 %) Syrg Inject 5 mLs (1 g total) into the skin every 7 days.    indapamide (LOZOL) 1.25 MG Tab TAKE 1 TABLET BY MOUTH DAILY    levocetirizine (XYZAL) 5 MG tablet Take 5 mg by mouth every evening.    levothyroxine (SYNTHROID) 75 MCG tablet TAKE 1 TABLET(75 MCG) BY MOUTH BEFORE BREAKFAST    LIDOcaine-prilocaine (EMLA) cream Apply topically as needed (apply to infusion site).    metoprolol succinate (TOPROL-XL) 100 MG 24 hr tablet 100 mg 2 (two) times daily.    ondansetron (ZOFRAN-ODT) 4 MG TbDL Take 4 mg by mouth every 6 (six) hours as needed.    oxyCODONE-acetaminophen (PERCOCET)  mg per tablet Take 1 tablet by mouth every 6 (six) hours as needed for Pain.    SEMAGLUTIDE, WEIGHT LOSS, SUBQ Inject 1 mg into the skin every Thursday.    traZODone (DESYREL) 100 MG tablet Take by mouth.    TRINTELLIX 20 mg Tab TK 1 T PO QAM    vitamin D (VITAMIN D3) 1000 units Tab Take 1,000 Units by mouth once daily.    zaleplon (SONATA) 10 MG capsule Take by mouth nightly as needed.     No current facility-administered medications for this visit.       Review of Systems   Constitutional:  Negative for chills, fatigue and fever.   Respiratory:  Negative for chest  tightness and shortness of breath.    Cardiovascular:  Negative for chest pain.   Gastrointestinal:  Negative for abdominal distention, constipation, nausea and vomiting.   Genitourinary:  Positive for flank pain. Negative for difficulty urinating, dysuria, frequency, hematuria and urgency.   Musculoskeletal:  Negative for arthralgias.   Neurological:  Negative for light-headedness.   Psychiatric/Behavioral:  Negative for confusion.        Objective:      Vitals:    03/26/25 1537   Weight: 123.9 kg (273 lb 4.2 oz)     Physical Exam  Vitals and nursing note reviewed.   Constitutional:       Appearance: She is well-developed.   HENT:      Head: Normocephalic.   Eyes:      Conjunctiva/sclera: Conjunctivae normal.   Neck:      Thyroid: No thyromegaly.      Trachea: No tracheal deviation.   Cardiovascular:      Rate and Rhythm: Normal rate.      Pulses: Normal pulses.      Heart sounds: Normal heart sounds.   Pulmonary:      Effort: Pulmonary effort is normal. No respiratory distress.      Breath sounds: Normal breath sounds. No wheezing.   Abdominal:      General: There is no distension.      Palpations: Abdomen is soft. There is no mass.      Tenderness: There is no abdominal tenderness. There is no guarding or rebound.      Hernia: No hernia is present.   Musculoskeletal:         General: No tenderness. Normal range of motion.      Cervical back: Normal range of motion.   Lymphadenopathy:      Cervical: No cervical adenopathy.   Skin:     General: Skin is warm and dry.      Findings: No erythema or rash.   Neurological:      Mental Status: She is alert and oriented to person, place, and time.   Psychiatric:         Behavior: Behavior normal.         Thought Content: Thought content normal.         Judgment: Judgment normal.         Urine dipstick shows negative for all components.  Micro exam: negative for WBC's or RBC's.    tains abnormal data Urine culture  Order: [id number]   Status: Final result       Next appt:  Today at 04:15 PM in Urology (Johnny Gillis MD)    Test Result Released: Yes (seen)    Specimen Information: Urine   2 Result Notes       View Follow-Up Encounter      Component  Ref Range & Units (hover) 2 wk ago   Urine Culture, Routine  Abnormal   ESCHERICHIA COLI  10,000 - 49,999 cfu/ml    Urine Culture, Routine  Abnormal   ENTEROCOCCUS FAECALIS  > 100,000 cfu/ml    Resulting Agency [facility]        Susceptibility     Escherichia coli Enterococcus faecalis     CULTURE, URINE CULTURE, URINE     Amp/Sulbactam 16/8 mcg/mL Intermediate       Ampicillin >16 mcg/mL Resistant <=2 mcg/mL Sensitive     Cefazolin <=2 mcg/mL Sensitive       Cefepime <=2 mcg/mL Sensitive       Ceftriaxone <=1 mcg/mL Sensitive       Ciprofloxacin <=0.25 mcg/mL Sensitive       Ertapenem <=0.5 mcg/mL Sensitive       Gentamicin <=2 mcg/mL Sensitive       Levofloxacin <=0.5 mcg/mL Sensitive       Meropenem <=1 mcg/mL Sensitive       Nitrofurantoin <=32 mcg/mL Sensitive <=32 mcg/mL Sensitive     Piperacillin/Tazo <=8 mcg/mL Sensitive       Tobramycin <=2 mcg/mL Sensitive       Trimeth/Sulfa <=2/38 mcg/mL Sensitive       Vancomycin   2 mcg/mL Sensitive                 Linear View         Narrative  Performed by: [facility]  Specimen Source->Urine   Specimen Collected: 03/11/25 13:01 CDT   CT Abdomen Pelvis  Without Contrast  Order: [id number]   Status: Edited Result - FINAL       Next appt: Today at 04:15 PM in Urology (Johnny Gillis MD)    Test Result Released: Yes (seen)    0 Result Notes  Details    Reading Physician Reading Date Result Priority   Liu Herrera MD  [phone]  3/11/2025 STAT     Addenda     Please add to the impression, peripherally oriented ground-glass attenuation within the bilateral lower lobes is concerning for infectious or inflammatory process, COVID infection can present in this fashion.  Correlation is needed.        Electronically signed by:Liu Herrera MD  Date:                                             03/11/2025  Time:                                           14:29  Signed by Liu Herrera MD on 3/11/2025 14:31  Narrative & Impression  EXAMINATION:  CT ABDOMEN PELVIS WITHOUT CONTRAST     CLINICAL HISTORY:  Flank pain, kidney stone suspected;     TECHNIQUE:  Low dose axial images, sagittal and coronal reformations were obtained from the lung bases to the pubic symphysis.  Oral contrast was not administered.     COMPARISON:  CT pelvis 04/11/2023, CT renal stone study 06/17/2022     FINDINGS:  Images of the lower thorax are remarkable for patchy ground-glass attenuation bilaterally, primarily along the periphery of the bilateral lower lobes, right greater than left.     The liver, spleen, pancreas and adrenal glands have a grossly unremarkable noncontrast appearance.  The gallbladder is surgically absent, no significant biliary dilation status post cholecystectomy.  The stomach is decompressed without wall thickening noting minimal hiatal hernia.  No significant abdominal lymphadenopathy.     There is right nonobstructive nephrolithiasis.  No left nephrolithiasis.  No hydronephrosis.  The bilateral ureters are unremarkable without calculi seen.  The urinary bladder is decompressed without wall thickening.  The uterus is absent.     The large bowel is grossly unremarkable noting a few scattered colonic diverticula without inflammation to suggest diverticulitis.  The terminal ileum is unremarkable.  The appendix is not confidently identified, no pericecal inflammation.  The small bowel is grossly unremarkable.  There are a few scattered shotty periaortic, pericaval, and mesenteric lymph nodes.     There is surgical change of the anterior abdominopelvic wall noting scattered fluid along the incision.  There is a fluid collection along the anterior aspect of the left psoas measuring 4.8 x 2.0 cm noting inflammation about the collection.  There appears to be a tract extending to the anterior abdominal wall,  this collection may communicate with the anterior abdominal wall incision collection.     There are surgical changes of the spine and sacroiliac joints.  Surgical changes are noted of the posterior paraspinous soft tissues without focal organized collection.     Impression:     This report was flagged in Epic as abnormal.     1. Surgical change of the anterior abdominal wall noting fluid layers along the incision line.  Infected collection not excluded.  Correlation with surgical site advised.  2. There is a fluid collection along the anterior aspect of the left psoas, possibly in communication with the above collection.  Differential would include postoperative seroma or hematoma however abscess is a consideration.  Follow-up advised.  3. Right nonobstructive nephrolithiasis.  4. Please see above for several additional findings.        Electronically signed by:Liu Herrera MD  Date:                                            03/11/2025  Time:                                           13:41        Exam Ended: 03/11/25 13:22 CDT       Assessment:       1. Kidney stones    2. Acute cystitis with hematuria          Plan:       1. Kidney stones  Non-obstructive, not the source of pain  - US Retroperitoneal Complete; Future    2. Acute cystitis with hematuria  resolved            Follow up in about 1 year (around 3/26/2026) for Follow up Established, Review X-ray.           [1]   Patient Active Problem List  Diagnosis    Vitamin D deficiency    Hypothyroidism    Irritable bowel syndrome    Elevated alkaline phosphatase level    Malignant carcinoid tumor of appendix    Fever    Elevated sed rate    CRP elevated    Essential hypertension    Morbid obesity    Abnormal CT of the chest    Trigeminal neuralgia of right side of face    IgG deficiency    POTS (postural orthostatic tachycardia syndrome)    Fatty liver disease, nonalcoholic    COVID    Anxiety    Back pain    Chest pain, atypical    Depression    Fibromyalgia     History of malignant carcinoid tumor    Hypokalemia    Otalgia of right ear    Palpitations    Sinus tachycardia    

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
## 2025-03-27 ENCOUNTER — CLINICAL SUPPORT (OUTPATIENT)
Dept: REHABILITATION | Facility: HOSPITAL | Age: 55
End: 2025-03-27
Payer: COMMERCIAL

## 2025-03-27 DIAGNOSIS — G89.29 CHRONIC BACK PAIN, UNSPECIFIED BACK LOCATION, UNSPECIFIED BACK PAIN LATERALITY: Primary | ICD-10-CM

## 2025-03-27 DIAGNOSIS — M54.9 CHRONIC BACK PAIN, UNSPECIFIED BACK LOCATION, UNSPECIFIED BACK PAIN LATERALITY: Primary | ICD-10-CM

## 2025-03-27 DIAGNOSIS — Z98.1 S/P LUMBAR FUSION: ICD-10-CM

## 2025-03-27 PROCEDURE — 97113 AQUATIC THERAPY/EXERCISES: CPT

## 2025-03-27 NOTE — PROGRESS NOTES
Outpatient Rehab    Physical Therapy Visit    Patient Name: Kianna Zuleta  MRN: [id number]  YOB: 1970  Encounter Date: 3/27/2025    Therapy Diagnosis:   Encounter Diagnoses   Name Primary?    Chronic back pain, unspecified back location, unspecified back pain laterality Yes    S/P lumbar fusion      Physician: Theron Melton MD    Physician Orders: Eval and Treat  Medical Diagnosis: S/P lumbar spinal fusion    Visit # / Visits Authorized:  2 / 12  Insurance Authorization Period: 3/19/2025 to 3/19/2026  Date of Evaluation: 3/25/25  Plan of Care Certification: 3/25/25 to 5/25/25     PT/PTA: PT   Number of PTA visits since last PT visit:0  Time In: 1025   Time Out: 1115  Total Time: 50   Total Billable Time: 50          Subjective   The patient reports she is in severe pain today. She went to the kidney doctor who stated the flank pain she is having is not coming from her kidney..  Pain reported as 9/10.      Objective            Treatment:  Therapeutic Exercise  TE 1: FUNCTIONAL MOBILITY TRAINING x 2 laps each at beginning and 1 lap each at end of session                                    Walk forward/backward/lateral  TE 2: Standing LE EX x 20                   Heel raise with gluteal set,  Hip abduction,  Hip flexion,  Hip Extension,  Squat, stationary march  TE 3: STRETCHES 1 x 1' each Hamstring stretch on pool ladder  TE 4: Seated on Pool stool x20 reps               Clam, LAQ, Sit to stand  TE 5: Walking marches x 2 laps with intermittnet use of hands on // bars for added support                              Tandem walking x 2 laps---ADD NEXT VISIT  TE 6: UE EX/CORE x 20 with closed paddles  Shoulder flex/ext TA activation, Shoulder horizontal abd/add TA activation, Shoulder abd/add with TA activation,      Mini squat with push/pull red kickboard  ---ADD ALL NEXT VISIT  TE 7: ENDURANCE in // bars x 1'   LTR, add bicycle next visit      Time Entry(in minutes):  Aquatic Therapy Time Entry:  50    Assessment & Plan   Assessment: The patient reported some increased L foot numbness with backwards walking but this did not limit her ability to complete reps.  The patient required max cues and demo for approriate hip hinge with squats and sit to stand. She had some difficulty correcting this posture with slight improvemenet in quality. Will continue to work on this next session.  Evaluation/Treatment Tolerance: Patient tolerated treatment well    Patient will continue to benefit from skilled outpatient physical therapy to address the deficits listed in the problem list box on initial evaluation, provide pt/family education and to maximize pt's level of independence in the home and community environment.     Patient's spiritual, cultural, and educational needs considered and patient agreeable to plan of care and goals.           Plan: Progress POC as tolerated by the patient.    Goals:   Active       LTG       HEP       Start:  03/26/25    Expected End:  05/25/25       The patient will demonstrate independence with final HEP including Aquatic HEP (she as access to a pool) and report compliance with execution.          MMT       Start:  03/26/25    Expected End:  05/25/25       Pt is able to demonstrate MMT B LE at least 4+/5 all measurements without pain reports or compensations noted.           Gait       Start:  03/26/25    Expected End:  05/25/25       The patient is able to ambulate household and community distances greater than 25 minutes without AD with good safety noted         5 times sit to stand       Start:  03/26/25    Expected End:  05/25/25       The patient will demonstrated improved 5 times sit to stand to less than 12 seconds to indicate improved functional mobility.              STG       HEP       Start:  03/26/25    Expected End:  04/25/25       The patient will be able to demonstrate independence and compliance with initial HEP.           MMT       Start:  03/26/25    Expected End:   04/25/25       The patient will demonstrate improved B LE MMT by at least 1/3 muscle grade where deficits noted.          TUG       Start:  03/26/25    Expected End:  04/25/25       The patient will demonstrate improved TUG to less than 15 seconds to indicate improved functional mobility.           5 times sit to stand       Start:  03/26/25    Expected End:  04/25/25       The patient will demonstrated improved 5 times sit to stand to less than 15 seconds to indicate improved functional mobility.               Flower Montes De Oca, PT

## 2025-03-27 NOTE — PROGRESS NOTES
VASCULAR SURGERY NOTE    Patient returns today to discuss her most recent CT scan which was performed in the ER.  They did find some small kidney stones which were not obstructive.  The patient complained of pain in her abdomen and pelvis mostly on the right side. She complains of mostly right-sided pelvic pain which comes in waves.  She also has some general abdominal pain as well.  I did explain that we went through the left side of her abdomen in order to do the exposure and right-sided abdominal pain would be very unusual to have related to the surgery. Furthermore the characterization of the undulating cholicy pain makes me actually think that her kidney stones may be one of the culprits. The CT scan that was performed in the ER did show a small fluid collection on her CT scan in the subcutaneous tissue.  This is likely a very small seroma. Her incision has healed completely and has no erythema or drainage. She complains of one area of firmness around the incision but I do not appreciate significant tenderness or erythema so this may simply be scar tissue. Regardless, the small seroma in the subcutanoeous tissue, but in the absence of any infection or signs of enlarging seroma, I would not recommend any intervention for this.  She has some fluid in the retroperitoneal space where we performed the operation; however, this is very expected after this operation and I would not recommend any exploration of this as it will resolve on its own by 3 months postop.  Neither the seroma or the RP fluid would explain her abdominal symptoms. She had some questions about work/functional restrictions. From a vascular standpoint it would be safe for her to return to work. It has been more than 6 weeks since surgery so no restrictions on heavy lifting from an incisional standpoint, but she may still have restrictions for her back/spine from a neurosurgical standpoint and she would need to talk to Dr. Melton about that.    SHARMILA Vee  Keith HAIDER MD, RPVI  Vascular Surgery  [facility]

## 2025-03-28 ENCOUNTER — TELEPHONE (OUTPATIENT)
Dept: ALLERGY | Facility: CLINIC | Age: 55
End: 2025-03-28
Payer: COMMERCIAL

## 2025-03-28 ENCOUNTER — TELEPHONE (OUTPATIENT)
Dept: FAMILY MEDICINE | Facility: CLINIC | Age: 55
End: 2025-03-28
Payer: COMMERCIAL

## 2025-03-28 NOTE — TELEPHONE ENCOUNTER
----- Message from Macy sent at 3/28/2025  2:05 PM CDT -----  Contact: Sharri @ [phone]  [facility] calling regarding Patient Advice (message) for #returning call from office, asking for call back

## 2025-03-28 NOTE — TELEPHONE ENCOUNTER
Rt call to [facility] in regards to below message. No answer lvm to give our office a call back.       status of Rx Renewal     Pharmacy Name:   [facility]   Phone # [phone] and fax #[phone]     Prescription Name: HIZENTRA       Additional Information:Want to know if the Rx renewal form was rec'd by the office?  The form was faxed on 3/18 and 3/20

## 2025-03-28 NOTE — TELEPHONE ENCOUNTER
Rt call to Sharri in regards to below message. Advise that pt needed to have a visit before new  Rx will be sign by provider. Pt has an appt on 4/1/25    [facility] calling regarding Patient Advice (message) for *returning call from office, asking for call back

## 2025-03-28 NOTE — TELEPHONE ENCOUNTER
----- Message from Genesis sent at 3/28/2025 12:11 PM CDT -----  Regarding: status of Rx renewal form  Pharmacy CallingReason for call:  status of Rx Renewal [phone] and fax #[phone] Name: HIZENTRAAdditional Information:Want to know if the Rx renewal form was rec'd by the office?  The form was faxed on 3/18 and 3/20 to fax # [phone] and [phone]

## 2025-03-31 ENCOUNTER — OFFICE VISIT (OUTPATIENT)
Dept: FAMILY MEDICINE | Facility: CLINIC | Age: 55
End: 2025-03-31
Payer: COMMERCIAL

## 2025-03-31 VITALS
HEART RATE: 92 BPM | HEIGHT: 65 IN | BODY MASS INDEX: 45.55 KG/M2 | TEMPERATURE: 99 F | SYSTOLIC BLOOD PRESSURE: 126 MMHG | DIASTOLIC BLOOD PRESSURE: 72 MMHG | WEIGHT: 273.38 LBS | OXYGEN SATURATION: 97 %

## 2025-03-31 DIAGNOSIS — M79.7 FIBROMYALGIA: ICD-10-CM

## 2025-03-31 DIAGNOSIS — K76.0 FATTY LIVER DISEASE, NONALCOHOLIC: ICD-10-CM

## 2025-03-31 DIAGNOSIS — F13.20 SEDATIVE DEPENDENCE: ICD-10-CM

## 2025-03-31 DIAGNOSIS — G90.A POTS (POSTURAL ORTHOSTATIC TACHYCARDIA SYNDROME): ICD-10-CM

## 2025-03-31 DIAGNOSIS — Z12.31 ENCOUNTER FOR SCREENING MAMMOGRAM FOR BREAST CANCER: ICD-10-CM

## 2025-03-31 DIAGNOSIS — I10 ESSENTIAL HYPERTENSION: ICD-10-CM

## 2025-03-31 DIAGNOSIS — E66.01 MORBID OBESITY: ICD-10-CM

## 2025-03-31 DIAGNOSIS — Z98.1 S/P LUMBAR FUSION: ICD-10-CM

## 2025-03-31 DIAGNOSIS — K58.9 IRRITABLE BOWEL SYNDROME, UNSPECIFIED TYPE: ICD-10-CM

## 2025-03-31 DIAGNOSIS — R10.11 RUQ PAIN: Primary | ICD-10-CM

## 2025-03-31 DIAGNOSIS — Z85.9 HISTORY OF MALIGNANT CARCINOID TUMOR: ICD-10-CM

## 2025-03-31 DIAGNOSIS — M46.1 SACROILIITIS, NOT ELSEWHERE CLASSIFIED: ICD-10-CM

## 2025-03-31 DIAGNOSIS — R00.0 SINUS TACHYCARDIA: ICD-10-CM

## 2025-03-31 DIAGNOSIS — E55.9 VITAMIN D DEFICIENCY DISEASE: ICD-10-CM

## 2025-03-31 DIAGNOSIS — R79.82 CRP ELEVATED: ICD-10-CM

## 2025-03-31 DIAGNOSIS — G47.00 INSOMNIA, UNSPECIFIED TYPE: ICD-10-CM

## 2025-03-31 DIAGNOSIS — D80.3 IGG DEFICIENCY: ICD-10-CM

## 2025-03-31 DIAGNOSIS — E55.9 VITAMIN D DEFICIENCY: ICD-10-CM

## 2025-03-31 DIAGNOSIS — C7A.020 MALIGNANT CARCINOID TUMOR OF APPENDIX: ICD-10-CM

## 2025-03-31 DIAGNOSIS — E03.4 HYPOTHYROIDISM DUE TO ACQUIRED ATROPHY OF THYROID: ICD-10-CM

## 2025-03-31 DIAGNOSIS — Z86.0100 HISTORY OF COLONIC POLYPS: ICD-10-CM

## 2025-03-31 DIAGNOSIS — D83.2 COMMON VARIABLE IMMUNODEFICIENCY WITH AUTOANTIBODIES TO B- OR T-CELLS: ICD-10-CM

## 2025-03-31 DIAGNOSIS — G50.0 TRIGEMINAL NEURALGIA OF RIGHT SIDE OF FACE: ICD-10-CM

## 2025-03-31 PROCEDURE — 3078F DIAST BP <80 MM HG: CPT | Mod: CPTII,S$GLB,, | Performed by: INTERNAL MEDICINE

## 2025-03-31 PROCEDURE — 3008F BODY MASS INDEX DOCD: CPT | Mod: CPTII,S$GLB,, | Performed by: INTERNAL MEDICINE

## 2025-03-31 PROCEDURE — 99999 PR PBB SHADOW E&M-EST. PATIENT-LVL III: CPT | Mod: PBBFAC,,, | Performed by: INTERNAL MEDICINE

## 2025-03-31 PROCEDURE — 99214 OFFICE O/P EST MOD 30 MIN: CPT | Mod: S$GLB,,, | Performed by: INTERNAL MEDICINE

## 2025-03-31 PROCEDURE — 3074F SYST BP LT 130 MM HG: CPT | Mod: CPTII,S$GLB,, | Performed by: INTERNAL MEDICINE

## 2025-03-31 PROCEDURE — G2211 COMPLEX E/M VISIT ADD ON: HCPCS | Mod: S$GLB,,, | Performed by: INTERNAL MEDICINE

## 2025-03-31 RX ORDER — MONTELUKAST SODIUM 10 MG/1
TABLET ORAL
COMMUNITY

## 2025-03-31 NOTE — PROGRESS NOTES
Chief complaint side pain    54-year-old white female with irritable bowel syndrome and hypothyroidism.     Patient is about nine weeks after having an anterior abdominal incision for some spinal surgery.  Since that time she has had some intermittent right upper quadrant pain and side pain.  She thought it might be a kidney stone.  She did have a CT scan March 11th about 20 days ago which was really unrevealing and she has two right-sided kidney stones but they are not obstructive.  It can last a few minutes.  A lot of times it occurs after eating but can occurred others random times and the duration is variable.  It does not appear to be positional no or neuropathic.  She has a problem with IBS and diarrhea and so never yet had any constipation.  She has been back on the semaglutide without problems and was hoping to get constipation to help with the diarrhea.  No rashes in that particular area.  She is on Bentyl 20 mg twice a day.  Reviewed the CT of the lumbar spine as well and it does not appear that she is having any worsening fluid collections or hematoma in the retroperitoneal or around the surgical area.  No fevers chills or night sweats, no nausea or vomiting.  She has had a cholecystectomy and there was no biliary obstruction on the recent CT.  We discussed that hopefully this is just a functional problem, perhaps a GI cramp especially given its location which is more right upper quadrant rather than flank or posterior.  Will have her assess aggravating factors such as eating again and press on her abdomen to specify location.  If she was not on the Bentyl we might give her some Bentyl or additional Bentyl but he is on a pretty good dose at present.  Otherwise she accepts reassurance this is hopefully a functional problem in CTs she has had thus far help rule out any intra abdominal or structural issue.     TSH up but trending down , 4.2 Iker.  Currently on Synthroid 75 and discussed probable need to make  an adjustment based on lab results but TSH ok 2/24 then most recently just slightly up.  Will reassess in the future and have her come back for her physical.        She does inquire about medication for weight loss.  We discussed she does not likely have a diagnosis that would cover weekly injections on her insurance but she can call her particular plan to double check.  Otherwise she will need to get semaglutide from a compounding pharmacy. 10/24  I wrote a hand written prescription for the [facility] pharmacy discussing dosages and potential side effects.  She has no history of pancreatitis or no known symptoms and or diagnosis of gastroparesis.  Discussed she still needs to eat less in order to lose weight.  She did hold the medication before surgery but has been back on it still little low-dose so not likely contributing to her symptoms    She was seeing a neurosurgeon.  She has pain and numbness going down both legs with prolonged standing and apparently he is going to get a myelogram type of procedure and eventually did have surgery.  She awoke after surgery with some numbness in the left leg which is somewhat new and I presume will be followed by surgery     She also had some swelling of the area under the neck described in the discharge summary as submandibular.  She says it when they palpated it they felt it was her submandibular glands.  She had a rash on her upper chest but she says she gets that all the time and that is nothing new.  She was given steroids and it went away but it is causing patient's some anxiety about having any future surgery.  We discussed it it does not appear to be directly related to any particular medication given.  She is concerned since she has had some anaphylaxis due to medications like Dilaudid and Benadryl after surgery in the past.  We reviewed her allergy list.    [facility] her labile blood pressure going up and down at times.   She had her metoprolol increased from 25 mg twice a day to 50 twice a day    She also has an immuno deficiency.  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     She is working as an .  She does receive her weekly immunoglobulin infusion in his not been sick lately.    2 polyps 6/19 - 5 yrs . Colon due 6/2024.  Advised her of that and she follows with [facility] who presumably will notify her when ready to schedule.     She continues to follow with her GYN.  She follows with GI as well    Mammo 2020 then 4/4/24, order put in today so that she can schedule      She had appointment with her gyn 2/24    She apparently went to have an SI joint fusion.  She also established with a neurosurgeon through workman's comp as she might need to have back surgery referable to a fall as far back as 2008.  Workman's comp.  Patient is on her inability to walk greater than 200 ft without having to stop and intermittently using a cane, she does again qualify for a handicap tag and I previously gait her a permanent application tag at this point    Vit D low in past- was on weekly so restart the weekly by prescription, last check a yr ago    TG sl eleveted, HDL and LDL actually okay a year ago        ROS:   CONST: weight stable. EYES: no vision change. ENT: no sore throat. CV: no chest pain w/ exertion. RESP: no shortness of breath. GI: no nausea, vomiting, diarrhea. No dysphagia. : no urinary issues. MUSCULOSKELETAL: no new myalgias or arthralgias. SKIN: no new changes. NEURO: no focal deficits. PSYCH: no new issues. ENDOCRINE: no polyuria. HEME: no lymph nodes. ALLERGY: no general pruritis.    PAST MEDICAL HISTORY:                                                        1. Intersitial cystitis.                                                     2. Fibromyalgia.                                                             3. Mitral valve prolapse.                                                     4. IBS. Dr Rowe , now Dr. Mckeon  , colon 6/24 -5 yrs                                                               5. L-spine disk bulging.  L4-5 -Dr Singh at                                                    6. Total hysterectomy in 2004.   7.  Hypothyroid.                                                             8.  Incidental carcinoid tumor of the appendix.                              9.  Now on gluten-free diet.                                                 10.  Vitamin D deficiency, followed by outside Rheumatology.                  11.  Neurogenic bladder, followed by outside urologist. Ranjeet Vasquez  12.  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 Trigeminal neuralgia of right side of face  14. POTS (postural orthostatic tachycardia syndrome)  15. HTN  16. sacroiliac joint fusion           PAST SURGICAL HISTORY: Laparotomy x5 Left pelvic mass 12/20/05, pelvic pain 07/31/03, pelvic pain 06/13/02, endometriosis Laparoscopy 10/01,BUBBA and BS&O 04/08/04, Lap cholecystectomy 12/20/05, Appendectomy 12/20/05,Letitia surgery 5/08,Placement of new right InterStim lead and Ablation 1/09, Da Char-assisted lysis of severe adhesions, resection of severe adhesions/possible ovarian remnant.    SOCIAL HISTORY: Never smoked. The patient seldom drinks alcohol. Doesn't use recreational drugs. Lives alone. Single.     FAMILY HISTORY: The patient's family members had Ovarian cancer.No family history of breast cancer. No family history of colon cancer    Vitals as above,   Gen: no distress  EYES: conjunctiva clear, non-icteric, PERRL  ENT: nose clear, nasal mucosa normal, oropharynx clear and moist, teeth good  NECK:supple, thyroid non-palpable  CV:  No edema  GI: abdomen soft, non-distended, non-tender, no hepatosplenomegaly  MS: gait normal, no clubbing or cyanosis of the digits  SKIN: no rashes, warm to touch      Diagnoses and all orders for this  visit:    RUQ pain, benign abdominal exam here she had might be having some intermittent functional pain since her surgery.  Can not related to constipation since still having more diarrhea which is a component of her IBS.  Will observe clinically in light of normal imaging.  It seems atypical for any renal colic but considered    Encounter for screening mammogram for breast cancer  -     Mammo Digital Screening Bilat; Future    History of colonic polyps, possibly overdue and she will need to follow up with [facility] for that    Morbid obesity, semaglutide issues discussed    Hypothyroidism due to acquired atrophy of thyroid, eventual reassessment of thyroid function as well as some other labs such as vitamin-D that need to be filled in but will hold off for a few months in light of all her other labs work recently done    Essential hypertension, chronic and stable    Irritable bowel syndrome, unspecified type, maybe contributing to the above issue    Vitamin D deficiency disease, eventual reassessment on therapy    IgG deficiency    Malignant carcinoid tumor of appendix, chronic and stable    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, chronic and stable    Fibromyalgia, chronic and stable    Fatty liver disease, nonalcoholic, chronic and stable with normal ALT recently    POTS (postural orthostatic tachycardia syndrome), no recent syncope    Sacroiliitis, not elsewhere classified, chronic and stable    Insomnia, unspecified type, apparently subjectively better since she started semaglutide but can not directly relate it to that    Sedative dependence    Vitamin D deficiency, eventual reassessment    CRP elevated, done in the ER but this was postoperative    History of malignant carcinoid tumor    Sinus tachycardia, pulse 92 today    Trigeminal neuralgia of right side of face, apparently chronic and stable    S/P lumbar fusion, postsurgical issues discussed and imaging reviewed, does not  appear clinically that she is having any fluid collections like an abscess and repeat CT of the lumbar spine apparently did not show any progressively enlarging fluid collections         Answers submitted by the patient for this visit:  Review of Systems Questionnaire (Submitted on 3/28/2025)  activity change: No  unexpected weight change: No  neck pain: No  hearing loss: No  rhinorrhea: No  trouble swallowing: No  eye discharge: No  visual disturbance: No  chest tightness: No  wheezing: No  chest pain: No  palpitations: No  blood in stool: No  constipation: No  vomiting: No  diarrhea: No  polydipsia: No  polyuria: No  difficulty urinating: No  hematuria: No  menstrual problem: No  dysuria: No  joint swelling: No  arthralgias: No  headaches: No  weakness: No  confusion: No  dysphoric mood: No

## 2025-03-31 NOTE — PROGRESS NOTES
Allergy Clinic Note  [facility]    This note was created by combination of typed  and dictation. Transcription errors may be present.  If there are any questions, please contact me.    Subjective:      Chief Complaint: Follow-up    Allergy Problem List  Common variable immunodeficiency          On Hizentra since June 2018,  200 mg/kg weekly  Nonallergic rhinitis         normal CT sinus    History of Present Illness: Kianna Zuleta is a 54 y.o. female with common variable immunodeficiency (CVID) was last seen 6/17/2021.  She is seen by televisit today to follow-up interval symptoms.    Related medications  Hizentra 10 gram/50 ml (20% soln):  200 mg/kg every week.                  Started June 2018  Auvi-Q epi if needed  Albuterol MDI as needed  DuoNeb as needed   Astelin as needed  (beta-blocker)    4/1/2025: Client continues to do well on IgG replacement therapy with no respiratory infections requiring antibiotics since last visit.  Her only antibiotic treatment was for UTI.  Interval Hx s/f back surgery and reported spinal stenosis.  Check interval IgG and B cell number.  If IgG therapeutic, I will authorize an additional year of Hizentra.  Follow up 11 months or sooner if needed.    10/4/23 Telehealth:  Client had 1 episode URI versus viral sinusitis since last visit.  No evidence of any bacterial infections.  No side effects or other problems with Hizentra.  Interval history is significant for back pain and broken tailbone but no autoimmune diseases or unexplained symptoms.  Labs from April 2023 are notable for a improved number of B cells, which now falls within the normal range, ideal IgG on replacement around 1000, and no significant liver function abnormalities.  Continue Hizentra at same dose follow-up in person in 6 months.  Although I did not discuss this with the client, with normal number of B cells now and if normal of B cells 6 months from now,, may consider trial off  Hizentra    04/21/2023:  Client reports 3-4 episodes sinusitis associated with low-grade fever in the  range.  No history of high fever or rigors.  No episodes of pneumonia or otitis.  She does report fever following back surgery specifically coulee placement of spacers, which she says is complicated by a collection of fluid on her back.  She reports that her Hizentra causes some itching and she requests a refill of Atarax.  Overall, she remains happy with Hizentra and would like to continue.  Ordered IgG and lymphocyte subsets.  After reviewing labs, will reorder Hizentra    5/26/2022:  Client reports no pneumonia or other serious infections since last visit.  She does report 2 episodes of sinusitis treated at ENT with antibiotics.  She says 1 of the episodes was complicated by vertigo.  She denies COVID or other infections.  She says her Hizentra treatments are complicated by some nausea and burning, and itching but that this is tolerable.  Pt did not get labs (including IgG) drawn following last visit.    6/17/2021:  She reported no definite infections in the previous 12 months.  She had 1 episode of vertigo of uncertain etiology for which she may been given antibiotics at ENT.  She had had no episodes of sinusitis, bronchitis, or pneumonia. She has had no adverse reactions to her Hizentra or problems with delivery from [facility].    5/29/2020:  Client continues to do well on Hizentra.  She reported one febrile illness (COVID negative) but no recent sinus infections or antibiotics.  She is very happy with her improvement on immunoglobulin replacement and would like to continue.      Additional History:   Interval history is unremarkable.   Past medical history is significant for malignant carcinoid tumor of the appendix, hypertension, and thyroid disease.  She is status post sinus surgery.  She has not had tonsillectomy, adenoidectomy or PE tubes.  Family history is significant for hypertension.  Client  "reports that she has never smoked. She has never used smokeless tobacco.  Exposures are notable for elementary age schoolchildren and shelly books.  No exposure to pets, smoke, molds, or unusual substances.   Past medical, family, and social histories are unchanged.        Review of systems     Review of Systems   HENT:  Negative for ear discharge, ear pain, hearing loss, nosebleeds, sinus pain and sore throat.    Eyes:  Negative for photophobia, pain and discharge.   Respiratory:  Negative for shortness of breath.    Musculoskeletal: Negative.    Skin:  Negative for rash.   Answers submitted by the patient for this visit:  Allergy and Asthma Questionnaire  (Submitted on 3/28/2025)  facial swelling: No  sinus pressure : No  postnasal drip: Yes  sneezing: No  runny nose: No  trouble swallowing: No  voice change: No  eye itching: No  apnea: No  choking: No  chest tightness: No  color change : No    PHYSICAL EXAM   Ht 5' 4.49" (1.638 m)   Wt 124 kg (273 lb 5.9 oz)   BMI 46.22 kg/m²   GEN: Awake and alert, no distress  DERM:  No flushing, no rashes  EYE:  No occular discharge, no redness  HEENT: No nasal discharge, no hoarseness  PULM: Normal work of breathing, no cough  NEURO:  No focal deficit, speech fluent and logical  PSYCH: appropriate affect, normal behavior    MEDICAL DECISION MAKING     Data reviewed:                             (new entries in bold face)     Allergy testing   Aeroallergen testing by the Immunocap method (122/2018) was entirely negative.  Total serum IgE < 35  Immune labs     Total IgE 954 today (4/1/2025)    Total IgG over time  Component      Latest Ref Rng 1/11/2018 1/22/2018 5/26/2022 5/19/2023 4/24/2024   IgG      650 - 1600 mg/dL 557 (L)  600 (L)  1161  981  1095       B cell number over time  Component      Latest Ref Rng 4/6/2018 5/19/2023 4/24/2024   CD19 B Cells      6 - 19 % 6.1  6.4  4.8 (L)    Absolute CD19      100 - 500 cells/ul 61 (L)  110  82 (L)       Immunoglobulins before " replacement  Component      Latest Ref Rng & Units 1/22/2018 1/11/2018   IgG - Serum      650 - 1600 mg/dL 600 (L) 557 (L)   IgA      40 - 350 mg/dL 343 347   IgM      50 - 300 mg/dL 78 90       Pneumococcal titers  Component      Latest Ref Rng & Units 4/6/2018 3/1/2018   S.pneumoniae Type 1      mcg/mL 1.2 1.1   S.pneumoniae Type 3      mcg/mL 3.5 3.3   Strep pneumo Type 4      mcg/mL <0.3 <0.3   S.pneumoniae Type 5      mcg/mL <0.3 <0.3   S.pneumoniae Type 8      mcg/mL <0.3 <0.3   S.pneumoniae Type 9N      mcg/mL 1.0 0.8   S.pneumoniae Type 12F      mcg/mL 0.3 <0.3   Strep pneumo Type 14      mcg/mL 0.4 0.5   S.pneumoniae Type 19F      mcg/mL 0.6 0.6   S.pneumoniae Type 23F      mcg/mL <0.3 <0.3   S.pneumoniae Type 6B      mcg/mL 0.6 0.9   S.pneumoniae Type 7F      mcg/mL <0.3 0.3   S.pneumoniae Type 18C      mcg/mL <0.3 <0.3   S.pneumoniae Type 9V Abs      mcg/mL <0.3 <0.3   Status post pneumococcal polysaccharide vaccine 01/22/2018  Status post pneumococcal protein conjugated vaccine 03/16/2018  Interp: Poor response to pneumococcal vaccines x2    Other immune titers from before replacement therapy   Normal IgA and IgM  Low IgG subsets : low IgG 1, IgG 2, and IgG 4 (01/11/2018)  Protective diphtheria and Hib titers (01/22/2018  Low rubella IgG (1/22/2018), nl after booster (04/06/2018)  Protective varicella IgG (01/22/2018)  Borderline protective Hib .24 (1/22/2018)      Other labs     Normal LFTs (3/11/2025)    Total IgE 35, 2018  EO count 100, 2025    Imaging and other diagnostics     Chest x-ray (PA and lateral, 10/24/2017):  Normal    CT sinus (01/22/2018): normal    Medical records review   Reviewed medication history for antibiotic use:   Macrobid 03/23/2025   Keflex 03/13/2025  No other antibiotic courses 2024 or 2025 to date  Assessment:     1. Primary immunodeficiency disorder    2. Common variable immunodeficiency    3. Therapeutic drug monitoring        Plan:   Client with immunodeficiency continues  to do well on weekly Hizentra.  No interval bacterial respiratory infections. Total IgG is therapeutic at 954.   Recent LFTs normal.  No autoimmune or other new symptoms.  Mentioned trial off Hizentra but we opted to continue.  Paperwork completed.    Primary immunodeficiency:  CVID  Continue Hizentra .  200 mg/kg --> 1 gram SQ every 7 days.  Continue with [facility].  Await B cell number        Patient Instructions       Continue Hizentra        Kaylin Miller MD     I spent a total of 46 minutes on the day of the visit. This includes face to face time and non-face to face time preparing to see the patient (eg, review of tests), obtaining and/or reviewing separately obtained history, documenting clinical information in the electronic or other health record, independently interpreting results and communicating results to the patient/family/caregiver, or care coordinator.      .

## 2025-04-01 ENCOUNTER — LAB VISIT (OUTPATIENT)
Dept: LAB | Facility: HOSPITAL | Age: 55
End: 2025-04-01
Attending: STUDENT IN AN ORGANIZED HEALTH CARE EDUCATION/TRAINING PROGRAM
Payer: COMMERCIAL

## 2025-04-01 ENCOUNTER — OFFICE VISIT (OUTPATIENT)
Dept: ALLERGY | Facility: CLINIC | Age: 55
End: 2025-04-01
Payer: COMMERCIAL

## 2025-04-01 ENCOUNTER — RESULTS FOLLOW-UP (OUTPATIENT)
Dept: ALLERGY | Facility: CLINIC | Age: 55
End: 2025-04-01

## 2025-04-01 VITALS — WEIGHT: 273.38 LBS | HEIGHT: 64 IN | BODY MASS INDEX: 46.67 KG/M2

## 2025-04-01 DIAGNOSIS — Z01.818 PREOPERATIVE TESTING: ICD-10-CM

## 2025-04-01 DIAGNOSIS — D84.89 PRIMARY IMMUNODEFICIENCY DISORDER: ICD-10-CM

## 2025-04-01 DIAGNOSIS — D83.9 COMMON VARIABLE IMMUNODEFICIENCY: ICD-10-CM

## 2025-04-01 DIAGNOSIS — D84.89 PRIMARY IMMUNODEFICIENCY DISORDER: Primary | ICD-10-CM

## 2025-04-01 DIAGNOSIS — Z51.81 THERAPEUTIC DRUG MONITORING: ICD-10-CM

## 2025-04-01 PROBLEM — N30.10 INTERSTITIAL CYSTITIS: Status: ACTIVE | Noted: 2025-04-01

## 2025-04-01 PROBLEM — D3A.029: Status: ACTIVE | Noted: 2025-04-01

## 2025-04-01 PROBLEM — D3A.020: Status: ACTIVE | Noted: 2025-04-01

## 2025-04-01 PROBLEM — Z86.0101 PERSONAL HISTORY OF ADENOMATOUS AND SERRATED COLON POLYPS: Status: ACTIVE | Noted: 2025-04-01

## 2025-04-01 PROBLEM — D3A.026: Status: ACTIVE | Noted: 2025-04-01

## 2025-04-01 LAB
IGA SERPL-MCNC: 508 MG/DL (ref 40–350)
IGG SERPL-MCNC: 954 MG/DL (ref 650–1600)
IGM SERPL-MCNC: 97 MG/DL (ref 50–300)
INDIRECT COOMBS: NORMAL
RH BLD: NORMAL
SPECIMEN OUTDATE: NORMAL

## 2025-04-01 PROCEDURE — 86901 BLOOD TYPING SEROLOGIC RH(D): CPT | Performed by: ANESTHESIOLOGY

## 2025-04-01 PROCEDURE — 99215 OFFICE O/P EST HI 40 MIN: CPT | Mod: S$GLB,,, | Performed by: STUDENT IN AN ORGANIZED HEALTH CARE EDUCATION/TRAINING PROGRAM

## 2025-04-01 PROCEDURE — 82784 ASSAY IGA/IGD/IGG/IGM EACH: CPT

## 2025-04-01 PROCEDURE — 86357 NK CELLS TOTAL COUNT: CPT

## 2025-04-01 PROCEDURE — 1159F MED LIST DOCD IN RCRD: CPT | Mod: CPTII,S$GLB,, | Performed by: STUDENT IN AN ORGANIZED HEALTH CARE EDUCATION/TRAINING PROGRAM

## 2025-04-01 PROCEDURE — 36415 COLL VENOUS BLD VENIPUNCTURE: CPT

## 2025-04-01 PROCEDURE — 99999 PR PBB SHADOW E&M-EST. PATIENT-LVL IV: CPT | Mod: PBBFAC,,, | Performed by: STUDENT IN AN ORGANIZED HEALTH CARE EDUCATION/TRAINING PROGRAM

## 2025-04-01 PROCEDURE — 1160F RVW MEDS BY RX/DR IN RCRD: CPT | Mod: CPTII,S$GLB,, | Performed by: STUDENT IN AN ORGANIZED HEALTH CARE EDUCATION/TRAINING PROGRAM

## 2025-04-01 PROCEDURE — 3008F BODY MASS INDEX DOCD: CPT | Mod: CPTII,S$GLB,, | Performed by: STUDENT IN AN ORGANIZED HEALTH CARE EDUCATION/TRAINING PROGRAM

## 2025-04-02 ENCOUNTER — DOCUMENT SCAN (OUTPATIENT)
Dept: HOME HEALTH SERVICES | Facility: HOSPITAL | Age: 55
End: 2025-04-02
Payer: COMMERCIAL

## 2025-04-02 ENCOUNTER — CLINICAL SUPPORT (OUTPATIENT)
Dept: REHABILITATION | Facility: HOSPITAL | Age: 55
End: 2025-04-02
Payer: COMMERCIAL

## 2025-04-02 DIAGNOSIS — G89.29 CHRONIC BACK PAIN, UNSPECIFIED BACK LOCATION, UNSPECIFIED BACK PAIN LATERALITY: Primary | ICD-10-CM

## 2025-04-02 DIAGNOSIS — M54.9 CHRONIC BACK PAIN, UNSPECIFIED BACK LOCATION, UNSPECIFIED BACK PAIN LATERALITY: Primary | ICD-10-CM

## 2025-04-02 DIAGNOSIS — Z98.1 S/P LUMBAR FUSION: ICD-10-CM

## 2025-04-02 LAB
CD16/56 ABSOLUTE (OHS): 22 CELLS/UL (ref 90–600)
CD16/56% NK CELLS (OHS): 1.8 % (ref 7–31)
CD19 ABSOLUTE (OHS): 67 CELLS/UL (ref 100–500)
CD19% B CELLS (OHS): 5.45 % (ref 6–19)
CD3 ABSOLUTE FLOW (OHS): 1076 CELLS/UL (ref 700–2100)
CD3 PERCENT (OHS): 92.15 % (ref 55–83)
CD3+CD4+ CELLS # SPEC: 597 CELLS/UL (ref 300–1400)
CD3+CD4+ CELLS NFR BLD: 53.72 % (ref 28–57)
CD3+CD8+ CELLS NFR SPEC: 37.77 % (ref 10–39)
CD4/CD8 RATIO FLOW (OHS): 1.42 (ref 0.9–3.6)
CD8 ABSOLUTE FLOW (OHS): 420 CELLS/UL (ref 200–900)
LABORATORY COMMENT REPORT: ABNORMAL

## 2025-04-02 PROCEDURE — 97113 AQUATIC THERAPY/EXERCISES: CPT

## 2025-04-02 NOTE — PROGRESS NOTES
"  Outpatient Rehab    Physical Therapy Visit    Patient Name: Kianna Zuleta  MRN: [id number]  YOB: 1970  Encounter Date: 4/2/2025    Therapy Diagnosis:   Encounter Diagnoses   Name Primary?    Chronic back pain, unspecified back location, unspecified back pain laterality Yes    S/P lumbar fusion      Physician: Theron Melton MD    Physician Orders: Eval and Treat  Medical Diagnosis: S/P lumbar spinal fusion    Visit # / Visits Authorized:  3 / 12  Insurance Authorization Period: 3/19/2025 to 3/19/2026  Date of Evaluation: 3/19/25  Plan of Care Certification: 3/19/25 to 5/19/25     PT/PTA:     Number of PTA visits since last PT visit:   Time In: 1025   Time Out: 1120  Total Time: 55   Total Billable Time: 55            Subjective   "My lower back is hurting" She stated it felt good for a couple of hours after treatment but the next day both sides were hurting.  She stated she has been getting more cramps in her legs also.  Pain reported as 5/10.      Objective            Treatment:  Therapeutic Exercise  TE 1: FUNCTIONAL MOBILITY TRAINING x 2 laps each at beginning and 1 lap each at end of session                                    Walk forward/backward/lateral  TE 2: Standing LE EX x 25                  Heel raise with gluteal set,  Hip abduction,  Hip flexion,  Hip Extension,  Squat, stationary march  TE 3: STRETCHES 1 x 1' each Hamstring stretch on pool ladder  TE 4: Seated on Pool stool x20 reps               Clam, LAQ, Sit to stand  TE 5: Walking marches x 2 laps with intermittnet use of hands on // bars for added support                              Tandem walking x 2 laps  TE 6: UE EX/CORE x 20 with closed paddles  Shoulder flex/ext, Shoulder horizontal abd/add TA activation added today ------Shoulder abd/add with TA activation, Mini squat with push/pull red kickboard  ---ADD ALL NEXT VISIT  TE 7: ENDURANCE in // bars x 1'   LTR and bicycle      Time Entry(in minutes):  Aquatic Therapy Time " Entry: 55    Assessment & Plan   Assessment: The patient overall tolerated the treatment progressions well. She did require some increased cues for sit to stand and squat to perform with appropriate hip hinge with fair carry over following cues. She was able to perofrm the appropriate technique of hip hinge with UE/core exercises with minimal sway noted.  Evaluation/Treatment Tolerance: Patient tolerated treatment well    Patient will continue to benefit from skilled outpatient physical therapy to address the deficits listed in the problem list box on initial evaluation, provide pt/family education and to maximize pt's level of independence in the home and community environment.     Patient's spiritual, cultural, and educational needs considered and patient agreeable to plan of care and goals.           Plan: Progress POC as tolerated by the patient.    Goals:   Active       LTG       HEP       Start:  03/26/25    Expected End:  05/25/25       The patient will demonstrate independence with final HEP including Aquatic HEP (she as access to a pool) and report compliance with execution.          MMT       Start:  03/26/25    Expected End:  05/25/25       Pt is able to demonstrate MMT B LE at least 4+/5 all measurements without pain reports or compensations noted.           Gait       Start:  03/26/25    Expected End:  05/25/25       The patient is able to ambulate household and community distances greater than 25 minutes without AD with good safety noted         5 times sit to stand       Start:  03/26/25    Expected End:  05/25/25       The patient will demonstrated improved 5 times sit to stand to less than 12 seconds to indicate improved functional mobility.              STG       HEP       Start:  03/26/25    Expected End:  04/25/25       The patient will be able to demonstrate independence and compliance with initial HEP.           MMT       Start:  03/26/25    Expected End:  04/25/25       The patient will  demonstrate improved B LE MMT by at least 1/3 muscle grade where deficits noted.          TUG       Start:  03/26/25    Expected End:  04/25/25       The patient will demonstrate improved TUG to less than 15 seconds to indicate improved functional mobility.           5 times sit to stand       Start:  03/26/25    Expected End:  04/25/25       The patient will demonstrated improved 5 times sit to stand to less than 15 seconds to indicate improved functional mobility.               Flower Montes De Oca, PT

## 2025-04-04 ENCOUNTER — CLINICAL SUPPORT (OUTPATIENT)
Dept: REHABILITATION | Facility: HOSPITAL | Age: 55
End: 2025-04-04
Payer: COMMERCIAL

## 2025-04-04 DIAGNOSIS — Z98.1 S/P LUMBAR FUSION: Primary | ICD-10-CM

## 2025-04-04 PROCEDURE — 97110 THERAPEUTIC EXERCISES: CPT | Mod: CQ

## 2025-04-04 NOTE — PROGRESS NOTES
Outpatient Rehab    Physical Therapy Visit    Patient Name: Kianna Zuleta  MRN: [id number]  YOB: 1970  Encounter Date: 4/4/2025    Therapy Diagnosis:   Encounter Diagnosis   Name Primary?    S/P lumbar fusion Yes     Physician: Theron Melton MD    Physician Orders: Eval and Treat  Medical Diagnosis: S/P lumbar spinal fusion    Visit # / Visits Authorized:  4 / 12  Insurance Authorization Period: 3/19/2025 to 3/19/2026  Date of Evaluation: 3/19/25  Plan of Care Certification: 3/19/25 to 5/19/25     PT/PTA:     Number of PTA visits since last PT visit: 1/6  Time In: 10:30    Time Out: 11:30   Total Time: 60    Total Billable Time: 60     FOTO:  Intake Score:  %  Survey Score 1:  %  Survey Score 2:  %         Subjective   Patient reports having a little more back pain today..  Pain reported as 6/10.      Objective            Treatment:  Therapeutic Exercise  TE 1: FUNCTIONAL MOBILITY TRAINING x 2 laps each at beginning and 1 lap each at end of session                                    Walk forward/backward/lateral  TE 2: Standing LE EX x 25                  Heel raise with gluteal set,  Hip abduction,  Hip flexion,  Hip Extension,  Squat, stationary march  TE 3: STRETCHES 1 x 1' each Hamstring stretch on pool ladder  TE 4: Seated on Pool stool x20 reps Clam, LAQ, Sit to stand  TE 5: Walking marches x 2 laps with intermittnet use of hands on // bars for added support                              Tandem walking x 2 laps  TE 6: UE EX/CORE x 20 with closed paddles  Shoulder flex/ext, Shoulder horizontal abd/add TA activation;  Mini squat with push/pull red kickboard  TE 7: ENDURANCE in // bars x 1'   LTR and bicycle      Time Entry(in minutes):       Assessment & Plan   Assessment: Patient was completed her therapy as noted above with no cueing required and no increase in symptoms prior to leaving the clinic.       Patient will continue to benefit from skilled outpatient physical therapy to address the  deficits listed in the problem list box on initial evaluation, provide pt/family education and to maximize pt's level of independence in the home and community environment.     Patient's spiritual, cultural, and educational needs considered and patient agreeable to plan of care and goals.           Plan: Progress POC as tolerated by the patient.    Goals:   Active       LTG       HEP       Start:  03/26/25    Expected End:  05/25/25       The patient will demonstrate independence with final HEP including Aquatic HEP (she as access to a pool) and report compliance with execution.          MMT       Start:  03/26/25    Expected End:  05/25/25       Pt is able to demonstrate MMT B LE at least 4+/5 all measurements without pain reports or compensations noted.           Gait       Start:  03/26/25    Expected End:  05/25/25       The patient is able to ambulate household and community distances greater than 25 minutes without AD with good safety noted         5 times sit to stand       Start:  03/26/25    Expected End:  05/25/25       The patient will demonstrated improved 5 times sit to stand to less than 12 seconds to indicate improved functional mobility.              STG       HEP       Start:  03/26/25    Expected End:  04/25/25       The patient will be able to demonstrate independence and compliance with initial HEP.           MMT       Start:  03/26/25    Expected End:  04/25/25       The patient will demonstrate improved B LE MMT by at least 1/3 muscle grade where deficits noted.          TUG       Start:  03/26/25    Expected End:  04/25/25       The patient will demonstrate improved TUG to less than 15 seconds to indicate improved functional mobility.           5 times sit to stand       Start:  03/26/25    Expected End:  04/25/25       The patient will demonstrated improved 5 times sit to stand to less than 15 seconds to indicate improved functional mobility.               Go Jeronimo, PTA

## 2025-04-09 ENCOUNTER — CLINICAL SUPPORT (OUTPATIENT)
Dept: REHABILITATION | Facility: HOSPITAL | Age: 55
End: 2025-04-09
Payer: COMMERCIAL

## 2025-04-09 DIAGNOSIS — G89.29 CHRONIC BACK PAIN, UNSPECIFIED BACK LOCATION, UNSPECIFIED BACK PAIN LATERALITY: Primary | ICD-10-CM

## 2025-04-09 DIAGNOSIS — Z98.1 S/P LUMBAR FUSION: ICD-10-CM

## 2025-04-09 DIAGNOSIS — M54.9 CHRONIC BACK PAIN, UNSPECIFIED BACK LOCATION, UNSPECIFIED BACK PAIN LATERALITY: Primary | ICD-10-CM

## 2025-04-09 PROCEDURE — 97113 AQUATIC THERAPY/EXERCISES: CPT

## 2025-04-09 NOTE — PROGRESS NOTES
"  Outpatient Rehab    Physical Therapy Visit    Patient Name: Kianna Zuleta  MRN: [id number]  YOB: 1970  Encounter Date: 4/9/2025    Therapy Diagnosis:   Encounter Diagnoses   Name Primary?    Chronic back pain, unspecified back location, unspecified back pain laterality Yes    S/P lumbar fusion      Physician: Theron Melton MD    Physician Orders: Eval and Treat  Medical Diagnosis: S/P lumbar spinal fusion    Visit # / Visits Authorized:  5 / 12  Insurance Authorization Period: 3/19/2025 to 3/19/2026  Date of Evaluation: 3/19/25  Plan of Care Certification: 3/19/25 to 5/19/25     PT/PTA:     Number of PTA visits since last PT visit:   Time In: 1015   Time Out: 1120  Total Time: 65   Total Billable Time: 50           Subjective   She stated the pain is "ok" today. She stated her pain is worse the day after she leaves PT for the whole next day. She staed it elevates to an 8/10. She also reproted the numbness is getting worse going into her leg with the whole lower leg numb. She stated it has gotten worse over the last week and going to the MD next week..  Pain reported as 6/10.      Objective            Treatment:  Therapeutic Exercise  TE 1: FUNCTIONAL MOBILITY TRAINING x 2 laps each at beginning and 1 lap each at end of session                                    Walk forward/backward/lateral  TE 2: Standing LE EX x 25                  Heel raise with gluteal set,  Hip abduction,  Hip flexion,  Hip Extension--trial hold ext today,  Squat, stationary march  TE 3: STRETCHES 1 x 1' each Hamstring stretch on pool ladder  TE 4: Seated on Pool stool x25 reps Clam, LAQ, Sit to stand  TE 5: Walking marches x 2 laps with intermittnet use of hands on // bars for added support                              Tandem walking x 2 laps  TE 6: UE EX/CORE x 20 with closed paddles  Shoulder flex/ext, Shoulder horizontal abd/add TA activation;  Mini squat with push/pull  kickboard  TE 7: ENDURANCE in // bars x 1'   LTR " and bicycle  TE 8: Step up on 4 inch step x 10 reps B      Time Entry(in minutes):  Aquatic Therapy Time Entry: 50    Assessment & Plan   Assessment: The patient reported a reduction of her symptoms to a 3/10 throughout the session today.  She required mod cues to incorporate TA activation with seated exercises but this improved following the cueing.       Patient will continue to benefit from skilled outpatient physical therapy to address the deficits listed in the problem list box on initial evaluation, provide pt/family education and to maximize pt's level of independence in the home and community environment.     Patient's spiritual, cultural, and educational needs considered and patient agreeable to plan of care and goals.           Plan: Progress POC as tolerated by the patient.    Goals:   Active       LTG       HEP       Start:  03/26/25    Expected End:  05/25/25       The patient will demonstrate independence with final HEP including Aquatic HEP (she as access to a pool) and report compliance with execution.          MMT       Start:  03/26/25    Expected End:  05/25/25       Pt is able to demonstrate MMT B LE at least 4+/5 all measurements without pain reports or compensations noted.           Gait       Start:  03/26/25    Expected End:  05/25/25       The patient is able to ambulate household and community distances greater than 25 minutes without AD with good safety noted         5 times sit to stand       Start:  03/26/25    Expected End:  05/25/25       The patient will demonstrated improved 5 times sit to stand to less than 12 seconds to indicate improved functional mobility.              STG       HEP       Start:  03/26/25    Expected End:  04/25/25       The patient will be able to demonstrate independence and compliance with initial HEP.           MMT       Start:  03/26/25    Expected End:  04/25/25       The patient will demonstrate improved B LE MMT by at least 1/3 muscle grade where deficits  noted.          TUG       Start:  03/26/25    Expected End:  04/25/25       The patient will demonstrate improved TUG to less than 15 seconds to indicate improved functional mobility.           5 times sit to stand       Start:  03/26/25    Expected End:  04/25/25       The patient will demonstrated improved 5 times sit to stand to less than 15 seconds to indicate improved functional mobility.               Flower Montes De Oca, PT

## 2025-04-11 ENCOUNTER — CLINICAL SUPPORT (OUTPATIENT)
Dept: REHABILITATION | Facility: HOSPITAL | Age: 55
End: 2025-04-11
Payer: COMMERCIAL

## 2025-04-11 DIAGNOSIS — M54.9 CHRONIC BACK PAIN, UNSPECIFIED BACK LOCATION, UNSPECIFIED BACK PAIN LATERALITY: Primary | ICD-10-CM

## 2025-04-11 DIAGNOSIS — G89.29 CHRONIC BACK PAIN, UNSPECIFIED BACK LOCATION, UNSPECIFIED BACK PAIN LATERALITY: Primary | ICD-10-CM

## 2025-04-11 DIAGNOSIS — Z98.1 S/P LUMBAR FUSION: ICD-10-CM

## 2025-04-11 PROCEDURE — 97113 AQUATIC THERAPY/EXERCISES: CPT

## 2025-04-11 NOTE — PROGRESS NOTES
Outpatient Rehab    Physical Therapy Visit    Patient Name: Kianna Zuleta  MRN: [id number]  YOB: 1970  Encounter Date: 4/11/2025    Therapy Diagnosis:   Encounter Diagnoses   Name Primary?    Chronic back pain, unspecified back location, unspecified back pain laterality Yes    S/P lumbar fusion      Physician: Theron Melton MD    Physician Orders: Eval and Treat  Medical Diagnosis: S/P lumbar spinal fusion    Visit # / Visits Authorized:  6 / 12  Insurance Authorization Period: 3/19/2025 to 3/19/2026  Date of Evaluation: 3/19/25  Plan of Care Certification: 3/19/25 to 5/19/25     PT/PTA:     Number of PTA visits since last PT visit:   Time In: 1015   Time Out: 1110  Total Time: 55   Total Billable Time: 55           Subjective   She reports she feels good for a few hours after PT but the pain returns. She stated her pain elevated to a 7/10 today walking around..  Pain reported as 7/10.      Objective            Treatment:  Therapeutic Exercise  TE 1: FUNCTIONAL MOBILITY TRAINING x 2 laps each at beginning and 1 lap each at end of session                                    Walk forward/backward/lateral  TE 2: Standing LE EX x 30                    Heel raise with gluteal set,  Hip abduction, Hip flexion, Squat, stationary march, Hip Extension--trial hold ext today---TRIAL ADD TBAND NEXT VISIT  TE 3: STRETCHES 1 x 1' each Hamstring stretch on pool ladder  TE 4: Seated on Pool stool x30 reps Clam, LAQ, Sit to stand--TRIAL ADD TBAND NEXT VISIT  TE 5: Walking marches x 2 laps with intermittnet use of hands on // bars for added support                              Tandem walking x 2 laps  TE 6: UE EX/CORE x 25 with closed paddles  Shoulder flex/ext, Shoulder horizontal abd/add TA activation;  Mini squat with push/pull  kickboard  TE 7: ENDURANCE in // bars x 1'   LTR and bicycle---Patient declined performance secondary to feeling like it is bothering her neck  TE 8: Step up on 4 inch step x 12 reps  B      Time Entry(in minutes):  Aquatic Therapy Time Entry: 55    Assessment & Plan   Assessment: The patient reports a reduction of pain to a 2/10 upon completion of the session today.  She stated her neck and shoulders have been bothering her and she feels it could be due to the // bar endurance exercises so opted to hold on those today. She required some slightly reduced cues for hip hinge technique with sit to stand today.  Evaluation/Treatment Tolerance: Patient tolerated treatment well    Patient will continue to benefit from skilled outpatient physical therapy to address the deficits listed in the problem list box on initial evaluation, provide pt/family education and to maximize pt's level of independence in the home and community environment.     Patient's spiritual, cultural, and educational needs considered and patient agreeable to plan of care and goals.           Plan: Progress POC as tolerated by the patient.    Goals:   Active       LTG       HEP       Start:  03/26/25    Expected End:  05/25/25       The patient will demonstrate independence with final HEP including Aquatic HEP (she as access to a pool) and report compliance with execution.          MMT       Start:  03/26/25    Expected End:  05/25/25       Pt is able to demonstrate MMT B LE at least 4+/5 all measurements without pain reports or compensations noted.           Gait       Start:  03/26/25    Expected End:  05/25/25       The patient is able to ambulate household and community distances greater than 25 minutes without AD with good safety noted         5 times sit to stand       Start:  03/26/25    Expected End:  05/25/25       The patient will demonstrated improved 5 times sit to stand to less than 12 seconds to indicate improved functional mobility.              STG       HEP       Start:  03/26/25    Expected End:  04/25/25       The patient will be able to demonstrate independence and compliance with initial HEP.           MMT        Start:  03/26/25    Expected End:  04/25/25       The patient will demonstrate improved B LE MMT by at least 1/3 muscle grade where deficits noted.          TUG       Start:  03/26/25    Expected End:  04/25/25       The patient will demonstrate improved TUG to less than 15 seconds to indicate improved functional mobility.           5 times sit to stand       Start:  03/26/25    Expected End:  04/25/25       The patient will demonstrated improved 5 times sit to stand to less than 15 seconds to indicate improved functional mobility.               Flower Montes De Oca, PT

## 2025-04-14 ENCOUNTER — TELEPHONE (OUTPATIENT)
Dept: NEUROSURGERY | Facility: CLINIC | Age: 55
End: 2025-04-14
Payer: COMMERCIAL

## 2025-04-14 ENCOUNTER — OFFICE VISIT (OUTPATIENT)
Dept: NEUROSURGERY | Facility: CLINIC | Age: 55
End: 2025-04-14
Payer: COMMERCIAL

## 2025-04-14 DIAGNOSIS — M51.26 HNP (HERNIATED NUCLEUS PULPOSUS), LUMBAR: ICD-10-CM

## 2025-04-14 DIAGNOSIS — M54.10 RADICULOPATHY, UNSPECIFIED SPINAL REGION: ICD-10-CM

## 2025-04-14 DIAGNOSIS — Z98.1 S/P LUMBAR SPINAL FUSION: Primary | ICD-10-CM

## 2025-04-14 DIAGNOSIS — M47.817 SPONDYLOSIS OF LUMBOSACRAL REGION, UNSPECIFIED SPINAL OSTEOARTHRITIS COMPLICATION STATUS: ICD-10-CM

## 2025-04-14 NOTE — PROGRESS NOTES
Neurosurgery  Established Patient    SUBJECTIVE:   History of Present Illness (per Dr. Melton's note 3/11/25):  Kianna Zuleta is a 53 yo female who received L5-S1 complete diskectomy with anterior interbody placement with anterior instrumentation plating followed by posterior nonsegmental instrumentation on 1/27/2025. Post-operativelly, she is doing ok, but still complaints of low back pain and left sciatic pain started last week with left calf numbness and tingling. She denies any other new neurological symptoms.    Interval Hx 4/14/25: After the last appointment with Dr. Melton, it was recommended that the patient obtain an updated CT lumbar spine and discuss progress with conservative treatment. She is being seen today with the workman's comp provider. The patient is currently in PT with some improvement but continues to struggle with left sided low back and leg pain as she previously described with Dr. Melton. Rates her pain as a 7/10.  Reports tingling in the left leg. She would like to restart injections with Dr. North if cleared.     Review of patient's allergies indicates:   Allergen Reactions    Benadryl allergy decongestant Anaphylaxis     Other reaction(s): Anaphylaxis    Fludrocortisone Hives    Gluten Other (See Comments)     Other reaction(s) GI upset    Diphenhydramine hcl     Hydromorphone hcl      No issue with morphine in the past per pt.     Indomethacin Hives     No issue with Toradol in the past per pt.     Lisinopril Nausea And Vomiting     Swelling and vomiting    Penicillins Rash     No issue with cephalosporins in the past per pt.     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    MVP (mitral valve prolapse)     Pneumonia     PONV (postoperative nausea and vomiting)     POTS (postural orthostatic tachycardia syndrome)     Recurrent upper respiratory infection (URI)     Ulcer     Vitamin D deficiency disease 11/06/2012     Past Surgical History:   Procedure Laterality Date    ANKLE SURGERY      lt    ANTERIOR LUMBAR INTERBODY FUSION (ALIF) N/A 1/27/2025    Procedure: **LOOP X** FUSION, SPINE, LUMBAR, ALIF;  Surgeon: Theron Melton MD;  Location: [facility] OR [address] [place] [address];  Service: Neurosurgery;  Laterality: N/A;    APPENDECTOMY      BACK SURGERY      CHOLECYSTECTOMY      choley & ovarian cyst removed  12/2005    cystoscope      multiple    HYSTERECTOMY  4/8/2004    BUBBA BS&O     interstim      LUMBAR FUSION N/A 1/27/2025    Procedure: FUSION, SPINE, LUMBAR MIS POSTERIOR SCREWS LOOP X for POSTERIOR;  Surgeon: Theron Melton MD;  Location: [facility] OR [address] [place] [address];  Service: Neurosurgery;  Laterality: N/A;    OOPHORECTOMY  4/8/2004    with hysterectomy     PELVIC LAPAROSCOPY      AK EXPLORATORY OF ABDOMEN      SINUS SURGERY  03/01/2016    SPINE SURGERY       Family History       Problem Relation (Age of Onset)    Alcohol abuse Brother    Arthritis Paternal Grandfather    Cancer Paternal Uncle, Paternal Grandmother    Colon cancer Paternal Uncle    Depression Maternal Grandmother    Diabetes Paternal Grandfather    Hearing loss Maternal Grandmother    Heart disease Maternal Grandmother    Hypertension Father    Kidney disease Maternal Grandmother    Stroke Paternal Grandfather          Social History     Socioeconomic History    Marital status: Single    Number of children: 0   Occupational History    Occupation: Teacher     Employer: [employer]   Tobacco Use    Smoking status: Never    Smokeless tobacco: Never   Substance and Sexual Activity    Alcohol use: Yes     Comment: Rarely    Drug use: No    Sexual activity: Not Currently    Other Topics Concern    Are you pregnant or think you may be? No    Breast-feeding No     Social Drivers of Health     Financial Resource Strain: Low Risk  (1/30/2025)    Overall Financial Resource Strain (CARDIA)     Difficulty of Paying Living Expenses: Not hard at all   Food Insecurity: No Food Insecurity (1/30/2025)    Hunger Vital Sign     Worried About Running Out of Food in the Last Year: Never true     Ran Out of Food in the Last Year: Never true   Transportation Needs: No Transportation Needs (1/27/2025)    TRANSPORTATION NEEDS     Transportation : No   Physical Activity: Inactive (1/30/2025)    Exercise Vital Sign     Days of Exercise per Week: 0 days     Minutes of Exercise per Session: 0 min   Stress: No Stress Concern Present (1/30/2025)    [place] [employer] [place] [employer] - Occupational Stress Questionnaire     Feeling of Stress : Only a little   Housing Stability: Unknown (1/30/2025)    Housing Stability Vital Sign     Unable to Pay for Housing in the Last Year: No     Homeless in the Last Year: No       Review of Systems    OBJECTIVE:     Vital Signs     There is no height or weight on file to calculate BMI.    Neurosurgery Physical Exam  General: well developed, well nourished, no distress.   Head: normocephalic, atraumatic  Neurologic: Alert and oriented. Thought content appropriate.  GCS: Motor: 6/Verbal: 5/Eyes: 4 GCS Total: 15  Mental Status: Awake, Alert, Oriented x 4  Language: No aphasia  Speech: No dysarthria  Cranial nerves: face symmetric, tongue midline, CN II-XII grossly intact.   Eyes: pupils equal, round, reactive to light with accomodation, EOMI.   Pulmonary: normal respirations, no signs of respiratory distress  Abdomen: soft, non-distended  Motor Strength:Moves all extremities spontaneously with good tone.       Diagnostic Results:  I have personally reviewed the CT lumbar spine dated 3/24/25 which shows ALIF L5-S1 with hardware in stable position and evidence of bony  fusion. Mild degenerative changes at L4-5 and bilateral SI joints.     ASSESSMENT/PLAN:     Kianna Zuleta is a 54 y.o. female seen today with the workman's comp provider for her continued follow-up on the ALIF L5-S1 with Dr. Melton on 1/27/2025. After reviewing the imaging, we discussed that she is cleared to obtain injections with Dr. North. Her work modifications continue as modified with weight restriction > 25 lbs, no longer needs LSO brace when OOB, walker as needed for safety, and no bending or twisting restrictions. Continue with PT.     I would like the patient to follow-up in clinic with  4-6 weeks following her injections to discuss return to work status. I have encouraged her to contact the clinic with any questions, concerns, or adverse clinical changes. She verbalized understanding.      RANDI Alas-PAULA  Neurosurgery  [facility].    Note dictated with voice recognition software, please excuse any grammatical errors.            [1]   Current Outpatient Medications   Medication Sig Dispense Refill    amLODIPine (NORVASC) 2.5 MG tablet Take 1 tablet by mouth once daily.      azelastine (ASTELIN) 137 mcg (0.1 %) nasal spray 1 spray by Nasal route 2 (two) times daily as needed.      cetirizine (ZYRTEC) 10 MG tablet Take 10 mg by mouth once daily.      cloNIDine (CATAPRES) 0.1 MG tablet Take 0.1 mg by mouth 2 (two) times daily. Take two tabs BID      dicyclomine (BENTYL) 20 mg tablet Take 20 mg by mouth 2 (two) times daily as needed.      EPINEPHrine (AUVI-Q) 0.3 mg/0.3 mL AtIn Inject 0.3 mLs (0.3 mg total) into the muscle once. for 1 dose 2 each 12    ergocalciferol (ERGOCALCIFEROL) 50,000 unit Cap Take 1 capsule (50,000 Units total) by mouth every 7 days. 12 capsule 12    estradioL (ESTRACE) 2 MG tablet Take 1 tablet (2 mg total) by mouth once daily. 30 tablet 6    famotidine (PEPCID) 40 MG tablet Take 40 mg by mouth Daily.      gabapentin (NEURONTIN) 300 MG capsule Take  3 capsules (900 mg total) by mouth 3 (three) times daily. 270 capsule 11    hydrOXYzine HCL (ATARAX) 25 MG tablet Take 1 tablet (25 mg total) by mouth 3 (three) times daily as needed for Itching. 45 tablet 1    immun glob G,IgG,-pro-IgA 0-50 (HIZENTRA) 1 gram/5 mL (20 %) Syrg Inject 5 mLs (1 g total) into the skin every 7 days. 130 mL 1    indapamide (LOZOL) 1.25 MG Tab TAKE 1 TABLET BY MOUTH DAILY 30 tablet 1    levocetirizine (XYZAL) 5 MG tablet Take 5 mg by mouth every evening.  9    levothyroxine (SYNTHROID) 75 MCG tablet TAKE 1 TABLET(75 MCG) BY MOUTH BEFORE BREAKFAST 90 tablet 3    LIDOcaine-prilocaine (EMLA) cream Apply topically as needed (apply to infusion site). 30 g 0    metoprolol succinate (TOPROL-XL) 100 MG 24 hr tablet 100 mg 2 (two) times daily.      montelukast (SINGULAIR) 10 mg tablet 30      ondansetron (ZOFRAN-ODT) 4 MG TbDL Take 4 mg by mouth every 6 (six) hours as needed.      oxyCODONE-acetaminophen (PERCOCET)  mg per tablet Take 1 tablet by mouth every 6 (six) hours as needed for Pain. 45 tablet 0    SEMAGLUTIDE, WEIGHT LOSS, SUBQ Inject 1 mg into the skin every Thursday.      traZODone (DESYREL) 100 MG tablet Take by mouth.      TRINTELLIX 20 mg Tab TK 1 T PO QAM      vitamin D (VITAMIN D3) 1000 units Tab Take 1,000 Units by mouth once daily.      zaleplon (SONATA) 10 MG capsule Take by mouth nightly as needed.       No current facility-administered medications for this visit.

## 2025-04-14 NOTE — TELEPHONE ENCOUNTER
Will send fax back informing them to reach out to medical records and disability dept.    ----- Message from Beatrizreynaldorosita sent at 4/14/2025  1:54 PM CDT -----  Regarding: Status  Contact: Axel[employer] [phone]  Axel calling from [employer] requesting a callback from nurse or provider in regards to finding out if office visit notes and disability form request was received. Please call back as soon as possible.Reference number : [id number]: [phone]

## 2025-04-15 ENCOUNTER — CLINICAL SUPPORT (OUTPATIENT)
Dept: REHABILITATION | Facility: HOSPITAL | Age: 55
End: 2025-04-15
Payer: COMMERCIAL

## 2025-04-15 ENCOUNTER — TELEPHONE (OUTPATIENT)
Dept: NEUROSURGERY | Facility: CLINIC | Age: 55
End: 2025-04-15
Payer: COMMERCIAL

## 2025-04-15 ENCOUNTER — PATIENT MESSAGE (OUTPATIENT)
Dept: NEUROSURGERY | Facility: CLINIC | Age: 55
End: 2025-04-15
Payer: COMMERCIAL

## 2025-04-15 DIAGNOSIS — Z98.1 S/P LUMBAR FUSION: Primary | ICD-10-CM

## 2025-04-15 PROCEDURE — 97110 THERAPEUTIC EXERCISES: CPT | Mod: CQ

## 2025-04-15 NOTE — TELEPHONE ENCOUNTER
Called and spoke to pt advising her that she is able to return to work w/ the ability to bend and twist. However, she cannot lift any greater than 25 lbs. She is to be reassessed at her f/u w/ Dr. Melton to fully lift restrictions.    Future Appointments   Date Time Provider Department Center   4/17/2025 10:30 AM Go Jeronimo, PTA [facility]   4/23/2025 10:30 AM Maritza Escobar, PT [facility]   4/25/2025 10:30 AM Go Jeronimo, PTA [facility]   4/28/2025 10:30 AM Go Jeronimo, PTA [facility]   4/30/2025 10:30 AM Flower Montes De Oca, PT [facility]   6/3/2025  9:15 AM Alan Borrero IV, MD [id number]   6/11/2025  9:15 AM Theron Melton MD [facility]       ----- Message from Med Assistant Carrizales sent at 4/15/2025  3:18 PM CDT -----    ----- Message -----  From: Belkis Wright  Sent: 4/15/2025   2:37 PM CDT  To: Ivanna GARCIA Staff    Pt calling to get some questions answered regarding her procedure , please call Confirmed patient's contact info below:Contact Name: Kianna ZuletaPhone Number: [phone]

## 2025-04-15 NOTE — PROGRESS NOTES
Outpatient Rehab    Physical Therapy Visit    Patient Name: Kianna Zuleta  MRN: [id number]  YOB: 1970  Encounter Date: 4/15/2025    Therapy Diagnosis:   Encounter Diagnosis   Name Primary?    S/P lumbar fusion Yes     Physician: Theron Melton MD    Physician Orders: Eval and Treat  Medical Diagnosis: S/P lumbar spinal fusion    Visit # / Visits Authorized:  7 / 12  Insurance Authorization Period: 3/19/2025 to 3/19/2026  Date of Evaluation: 3/19/25  Plan of Care Certification: 3/19/25 to 5/19/25     PT/PTA:     Number of PTA visits since last PT visit: 1/6  Time In: 10:20  am  Time Out: 11:00 am  Total Time: 40    Total Billable Time: 40     FOTO:  Intake Score:  %  Survey Score 1:  %  Survey Score 2:  %         Subjective   Patient reports her back is still hurting a lot..  Pain reported as 7/10.      Objective            Treatment:  Therapeutic Exercise  TE 1: FUNCTIONAL MOBILITY TRAINING x 2 laps each at beginning and 1 lap each at end of session                                    Walk forward/backward/lateral  TE 2: Standing LE EX x 30 Heel raise with gluteal set,  Hip abduction, Squat, stationary march with PTB. Hip Extension, Hip flexion -- held today  TE 3: STRETCHES 1 x 1' each Hamstring stretch on pool ladder  TE 4: Seated on Pool stool x30 reps Clam, LAQ, Sit to stand--TRIAL ADD TBAND NEXT VISIT  TE 5: Walking marches x 2 laps with intermittnet use of hands on // bars for added support                              Tandem walking x 2 laps  TE 6: UE EX/CORE x 25 with closed paddles  Shoulder flex/ext, Shoulder horizontal abd/add TA activation;  Mini squat with push/pull  kickboard      Time Entry(in minutes):       Assessment & Plan   Assessment: Patient did not perform // trunk rotation nor bicycle, step ups, hip flex, extension due to causing an increase in back pain.  Will attempt to resume all exercises next visit, pain permitting.       Patient will continue to benefit from skilled  outpatient physical therapy to address the deficits listed in the problem list box on initial evaluation, provide pt/family education and to maximize pt's level of independence in the home and community environment.     Patient's spiritual, cultural, and educational needs considered and patient agreeable to plan of care and goals.           Plan: Progress POC as tolerated by the patient.    Goals:   Active       LTG       HEP       Start:  03/26/25    Expected End:  05/25/25       The patient will demonstrate independence with final HEP including Aquatic HEP (she as access to a pool) and report compliance with execution.          MMT       Start:  03/26/25    Expected End:  05/25/25       Pt is able to demonstrate MMT B LE at least 4+/5 all measurements without pain reports or compensations noted.           Gait       Start:  03/26/25    Expected End:  05/25/25       The patient is able to ambulate household and community distances greater than 25 minutes without AD with good safety noted         5 times sit to stand       Start:  03/26/25    Expected End:  05/25/25       The patient will demonstrated improved 5 times sit to stand to less than 12 seconds to indicate improved functional mobility.              STG       HEP       Start:  03/26/25    Expected End:  04/25/25       The patient will be able to demonstrate independence and compliance with initial HEP.           MMT       Start:  03/26/25    Expected End:  04/25/25       The patient will demonstrate improved B LE MMT by at least 1/3 muscle grade where deficits noted.          TUG       Start:  03/26/25    Expected End:  04/25/25       The patient will demonstrate improved TUG to less than 15 seconds to indicate improved functional mobility.           5 times sit to stand       Start:  03/26/25    Expected End:  04/25/25       The patient will demonstrated improved 5 times sit to stand to less than 15 seconds to indicate improved functional mobility.                Go Jeronimo, PTA

## 2025-04-15 NOTE — TELEPHONE ENCOUNTER
Pt scheduled for:    Future Appointments   Date Time Provider Department Center   4/17/2025 10:30 AM Go Jeronimo, PTA [facility]   4/23/2025 10:30 AM Maritza Escobar, PT [facility]   4/25/2025 10:30 AM Go Jeronimo, PTA [facility]   4/28/2025 10:30 AM Go Jeronimo, PTA [facility]   4/30/2025 10:30 AM Flower Montes De Oca, PT [facility]   5/6/2025  9:45 AM Theron Melton MD [facility] [id number] [facility]   6/3/2025  9:15 AM Alan Borrero IV, MD [facility] [id number]       Will mail reminder to address on file. Portal message sent.    ----- Message from Sunny Carrizales sent at 4/15/2025 10:36 AM CDT -----  Regarding: APPOINTMENT  Micah,Can you please assist in getting this pt scheduled for an appointment?Thanks

## 2025-04-17 ENCOUNTER — CLINICAL SUPPORT (OUTPATIENT)
Dept: REHABILITATION | Facility: HOSPITAL | Age: 55
End: 2025-04-17
Payer: COMMERCIAL

## 2025-04-17 DIAGNOSIS — Z98.1 S/P LUMBAR FUSION: Primary | ICD-10-CM

## 2025-04-17 PROCEDURE — 97110 THERAPEUTIC EXERCISES: CPT | Mod: CQ

## 2025-04-17 NOTE — PROGRESS NOTES
Outpatient Rehab    Physical Therapy Visit    Patient Name: Kianna Zuleta  MRN: [id number]  YOB: 1970  Encounter Date: 4/17/2025    Therapy Diagnosis:   Encounter Diagnosis   Name Primary?    S/P lumbar fusion Yes     Physician: Theron Melton MD    Physician Orders: Eval and Treat  Medical Diagnosis: S/P lumbar spinal fusion    Visit # / Visits Authorized:  8 / 12  Insurance Authorization Period: 3/19/2025 to 3/19/2026  Date of Evaluation: 3/19/25  Plan of Care Certification: 3/19/25 to 5/19/25     PT/PTA:     Number of PTA visits since last PT visit: 1/6   Time In: 10:15 am    Time Out: 11:00 am   Total Time:  45   Total Billable Time: 45     FOTO:  Intake Score:  %  Survey Score 1:  %  Survey Score 2:  %         Subjective   Patient reports her pain is about the same, very little change..  Pain reported as 8/10.      Objective            Treatment:  Therapeutic Exercise  TE 1: FUNCTIONAL MOBILITY TRAINING x 2 laps each at beginning and 1 lap each at end of session  Walk forward/backward/lateral  TE 2: Standing LE EX x 30 Heel raise with gluteal set, Squat,  with PTB.   Hip Extension, Hip flexion, Hip abduction, stationary march -- held today  TE 3: STRETCHES 1 x 1' each Hamstring stretch on pool ladder  TE 4: Seated on Pool stool x30 reps Clam, LAQ, Sit to stand--TRIAL ADD TBAND NEXT VISIT  TE 5: Tandem walking x 2 laps with intermittnet use of hands on // bars for added support           Walking marches - held today  TE 6: UE EX/CORE x 25 with closed paddles  Shoulder flex/ext, Shoulder horizontal abd/add TA activation;    Mini squat with push/pull  kickboard - held today  TE 7: ENDURANCE in // bars x 1'   LTR and bicycle---Patient declined performance secondary to feeling like it is bothering her neck  TE 8: Step up on 4 inch step x 12 reps B      Time Entry(in minutes):       Assessment & Plan   Assessment: Patient was not able to perform all of  her exercises as noted above due to some  movements were causing an increase in back pain.  Will resume as pain allows.       Patient will continue to benefit from skilled outpatient physical therapy to address the deficits listed in the problem list box on initial evaluation, provide pt/family education and to maximize pt's level of independence in the home and community environment.     Patient's spiritual, cultural, and educational needs considered and patient agreeable to plan of care and goals.           Plan: Progress POC as tolerated by the patient.    Goals:   Active       LTG       HEP       Start:  03/26/25    Expected End:  05/25/25       The patient will demonstrate independence with final HEP including Aquatic HEP (she as access to a pool) and report compliance with execution.          MMT       Start:  03/26/25    Expected End:  05/25/25       Pt is able to demonstrate MMT B LE at least 4+/5 all measurements without pain reports or compensations noted.           Gait       Start:  03/26/25    Expected End:  05/25/25       The patient is able to ambulate household and community distances greater than 25 minutes without AD with good safety noted         5 times sit to stand       Start:  03/26/25    Expected End:  05/25/25       The patient will demonstrated improved 5 times sit to stand to less than 12 seconds to indicate improved functional mobility.              STG       HEP       Start:  03/26/25    Expected End:  04/25/25       The patient will be able to demonstrate independence and compliance with initial HEP.           MMT       Start:  03/26/25    Expected End:  04/25/25       The patient will demonstrate improved B LE MMT by at least 1/3 muscle grade where deficits noted.          TUG       Start:  03/26/25    Expected End:  04/25/25       The patient will demonstrate improved TUG to less than 15 seconds to indicate improved functional mobility.           5 times sit to stand       Start:  03/26/25    Expected End:  04/25/25       The  patient will demonstrated improved 5 times sit to stand to less than 15 seconds to indicate improved functional mobility.               Go Jeronimo, PTA

## 2025-04-23 ENCOUNTER — CLINICAL SUPPORT (OUTPATIENT)
Dept: REHABILITATION | Facility: HOSPITAL | Age: 55
End: 2025-04-23
Payer: COMMERCIAL

## 2025-04-23 DIAGNOSIS — G89.29 CHRONIC BACK PAIN, UNSPECIFIED BACK LOCATION, UNSPECIFIED BACK PAIN LATERALITY: Primary | ICD-10-CM

## 2025-04-23 DIAGNOSIS — Z98.1 S/P LUMBAR FUSION: ICD-10-CM

## 2025-04-23 DIAGNOSIS — M54.9 CHRONIC BACK PAIN, UNSPECIFIED BACK LOCATION, UNSPECIFIED BACK PAIN LATERALITY: Primary | ICD-10-CM

## 2025-04-23 PROCEDURE — 97113 AQUATIC THERAPY/EXERCISES: CPT

## 2025-04-23 NOTE — PROGRESS NOTES
Outpatient Rehab    Physical Therapy Visit    Patient Name: Kianna Zuleta  MRN: [id number]  YOB: 1970  Encounter Date: 4/23/2025    Therapy Diagnosis:   Encounter Diagnoses   Name Primary?    Chronic back pain, unspecified back location, unspecified back pain laterality Yes    S/P lumbar fusion      Physician: Theron Melton MD    Physician Orders: Eval and Treat  Medical Diagnosis: S/P lumbar spinal fusion    Visit # / Visits Authorized:  9 / 12  Insurance Authorization Period: 3/19/2025 to 3/19/2026  Date of Evaluation: 3/19/25  Plan of Care Certification: 3/19/25 to 5/19/25     PT/PTA:     Number of PTA visits since last PT visit: 1/6   Time In: 1020   Time Out: 1115  Total Time: 55  Total Billable Time: 30    FOTO:  Intake Score:  %  Survey Score 1:  %  Survey Score 2:  %         Subjective   Patient says that she's been in more pain since stopping wearing the brace..  Pain reported as 7/10.      Objective            Treatment:  Therapeutic Exercise  TE 1: FUNCTIONAL MOBILITY TRAINING x 2 laps each at beginning and 1 lap each at end of session  Walk forward/backward/lateral  TE 2: Standing LE EX x 30 Heel raise with gluteal set, Squat,  with PTB.   Hip Extension, Hip flexion, Hip abduction, stationary march  TE 3: STRETCHES 1 x 1' each Hamstring stretch on pool ladder  TE 4: Seated on Pool stool x30 reps Clam, LAQ, Sit to stand  TE 5: Tandem walking x 2 laps with intermittnet use of hands on // bars for added support           Walking marches - held today  TE 6: UE EX/CORE x 25 with closed paddles  Shoulder flex/ext, Shoulder horizontal abd/add TA activation;    Mini squat with push/pull  kickboard (min. board immersion + wall support) x 20  TE 7: ENDURANCE in // bars x 1'   LTR and bicycle---Patient declined performance secondary to feeling like it is bothering her neck  TE 8: Step up on 4 inch step x 20 reps B -- Held d/t pain  TE 9: + Lateral walks w/ PTB x 2 laps  TE 10: + ENDURANCE in //  bars x 2  Bicycle, lumbar decompression    Time Entry(in minutes):  Aquatic Therapy Time Entry: 30    Assessment & Plan   Assessment: Patient presents reporting increased back pain since removing the brace. She was able to complete most of her treatment without elevatated pain levels except step ups. During forward step ups with unilateral upper extremity support the patient experienced increase in right knee and low back pain requring cessation of exercise. Introduced squatted lateral resisted walking in which patient required frequent verbal reminders to maintain bilateral hip abduction, but tolerated the exercise well.  Evaluation/Treatment Tolerance: Patient tolerated treatment well    Patient will continue to benefit from skilled outpatient physical therapy to address the deficits listed in the problem list box on initial evaluation, provide pt/family education and to maximize pt's level of independence in the home and community environment.     Patient's spiritual, cultural, and educational needs considered and patient agreeable to plan of care and goals.           Plan: Progress POC as tolerated by the patient.    Goals:   Active       LTG       HEP       Start:  03/26/25    Expected End:  05/25/25       The patient will demonstrate independence with final HEP including Aquatic HEP (she as access to a pool) and report compliance with execution.          MMT       Start:  03/26/25    Expected End:  05/25/25       Pt is able to demonstrate MMT B LE at least 4+/5 all measurements without pain reports or compensations noted.           Gait       Start:  03/26/25    Expected End:  05/25/25       The patient is able to ambulate household and community distances greater than 25 minutes without AD with good safety noted         5 times sit to stand       Start:  03/26/25    Expected End:  05/25/25       The patient will demonstrated improved 5 times sit to stand to less than 12 seconds to indicate improved  functional mobility.              STG       HEP       Start:  03/26/25    Expected End:  04/25/25       The patient will be able to demonstrate independence and compliance with initial HEP.           MMT       Start:  03/26/25    Expected End:  04/25/25       The patient will demonstrate improved B LE MMT by at least 1/3 muscle grade where deficits noted.          TUG       Start:  03/26/25    Expected End:  04/25/25       The patient will demonstrate improved TUG to less than 15 seconds to indicate improved functional mobility.           5 times sit to stand       Start:  03/26/25    Expected End:  04/25/25       The patient will demonstrated improved 5 times sit to stand to less than 15 seconds to indicate improved functional mobility.               Maritza Escobar, PT

## 2025-04-25 ENCOUNTER — CLINICAL SUPPORT (OUTPATIENT)
Dept: REHABILITATION | Facility: HOSPITAL | Age: 55
End: 2025-04-25
Payer: COMMERCIAL

## 2025-04-25 DIAGNOSIS — Z98.1 S/P LUMBAR FUSION: Primary | ICD-10-CM

## 2025-04-25 DIAGNOSIS — M54.9 CHRONIC BACK PAIN, UNSPECIFIED BACK LOCATION, UNSPECIFIED BACK PAIN LATERALITY: ICD-10-CM

## 2025-04-25 DIAGNOSIS — G89.29 CHRONIC BACK PAIN, UNSPECIFIED BACK LOCATION, UNSPECIFIED BACK PAIN LATERALITY: ICD-10-CM

## 2025-04-25 PROCEDURE — 97113 AQUATIC THERAPY/EXERCISES: CPT | Mod: CQ

## 2025-04-25 NOTE — PROGRESS NOTES
Outpatient Rehab    Physical Therapy Visit    Patient Name: Kianna Zuleta  MRN: [id number]  YOB: 1970  Encounter Date: 4/25/2025    Therapy Diagnosis:   Encounter Diagnoses   Name Primary?    S/P lumbar fusion Yes    Chronic back pain, unspecified back location, unspecified back pain laterality      Physician: Theron Melton MD    Physician Orders: Eval and Treat  Medical Diagnosis: S/P lumbar spinal fusion    Visit # / Visits Authorized:  10 / 12  Insurance Authorization Period: 3/19/2025 to 3/19/2026  Date of Evaluation: 3/19/25  Plan of Care Certification: 3/19/25 to 5/19/25     PT/PTA:     Number of PTA visits since last PT visit: 1/6   Time In: 1005   Time Out: 1110  Total Time: 65  Total Billable Time: 30    FOTO:  Intake Score:  %  Survey Score 1:  %  Survey Score 2:  %         Subjective   Patient reports her right knee is hurting a lot today, 8/10..  Pain reported as 8/10.      Objective            Treatment:  Therapeutic Exercise  TE 1: FUNCTIONAL MOBILITY TRAINING x 2 laps each at beginning and 1 lap each at end of session  Walk forward/backward/lateral  TE 2: Standing LE EX x 30 Heel raise with gluteal set, Squat,  with PTB.   Hip Extension, Hip flexion, Hip abduction, stationary march  TE 3: STRETCHES 1 x 1' each Hamstring stretch and Gastroc stretch on pool step/ladder  TE 4: Seated on Pool stool x30 reps Clam, LAQ, Sit to stand  TE 5: Tandem walking and walking marches x 2 laps with orange dumbbell  TE 6: UE EX/CORE x 25 with closed paddles  Shoulder flex/ext, Shoulder horizontal abd/add TA activation;    Mini squat with push/pull  kickboard (min. board immersion + wall support) x 20  TE 8: Step up on 4 inch step x 20 reps B  TE 9: Lateral walks w/ PTB x 2 laps  TE 10: ENDURANCE in // bars x 2  Bicycle, lumbar decompression    Time Entry(in minutes):  Aquatic Therapy Time Entry: 30    Assessment & Plan   Assessment: Patient was able to perform step ups with no increase in pain  today.  Patient completed her therapy as noted above with no increase in symptoms prior to leaving the clinic.       Patient will continue to benefit from skilled outpatient physical therapy to address the deficits listed in the problem list box on initial evaluation, provide pt/family education and to maximize pt's level of independence in the home and community environment.     Patient's spiritual, cultural, and educational needs considered and patient agreeable to plan of care and goals.           Plan: Progress POC as tolerated by the patient.    Goals:   Active       LTG       HEP       Start:  03/26/25    Expected End:  05/25/25       The patient will demonstrate independence with final HEP including Aquatic HEP (she as access to a pool) and report compliance with execution.          MMT       Start:  03/26/25    Expected End:  05/25/25       Pt is able to demonstrate MMT B LE at least 4+/5 all measurements without pain reports or compensations noted.           Gait       Start:  03/26/25    Expected End:  05/25/25       The patient is able to ambulate household and community distances greater than 25 minutes without AD with good safety noted         5 times sit to stand       Start:  03/26/25    Expected End:  05/25/25       The patient will demonstrated improved 5 times sit to stand to less than 12 seconds to indicate improved functional mobility.              STG       HEP       Start:  03/26/25    Expected End:  04/25/25       The patient will be able to demonstrate independence and compliance with initial HEP.           MMT       Start:  03/26/25    Expected End:  04/25/25       The patient will demonstrate improved B LE MMT by at least 1/3 muscle grade where deficits noted.          TUG       Start:  03/26/25    Expected End:  04/25/25       The patient will demonstrate improved TUG to less than 15 seconds to indicate improved functional mobility.           5 times sit to stand       Start:  03/26/25     Expected End:  04/25/25       The patient will demonstrated improved 5 times sit to stand to less than 15 seconds to indicate improved functional mobility.               Go Jeronimo, PTA

## 2025-04-28 ENCOUNTER — CLINICAL SUPPORT (OUTPATIENT)
Dept: REHABILITATION | Facility: HOSPITAL | Age: 55
End: 2025-04-28
Payer: COMMERCIAL

## 2025-04-28 DIAGNOSIS — Z98.1 S/P LUMBAR FUSION: Primary | ICD-10-CM

## 2025-04-28 DIAGNOSIS — G89.29 CHRONIC BACK PAIN, UNSPECIFIED BACK LOCATION, UNSPECIFIED BACK PAIN LATERALITY: ICD-10-CM

## 2025-04-28 DIAGNOSIS — M54.9 CHRONIC BACK PAIN, UNSPECIFIED BACK LOCATION, UNSPECIFIED BACK PAIN LATERALITY: ICD-10-CM

## 2025-04-28 PROCEDURE — 97113 AQUATIC THERAPY/EXERCISES: CPT | Mod: CQ

## 2025-04-28 NOTE — PROGRESS NOTES
Outpatient Rehab    Physical Therapy Visit    Patient Name: Kianna Zuleta  MRN: [id number]  YOB: 1970  Encounter Date: 4/28/2025    Therapy Diagnosis:   Encounter Diagnoses   Name Primary?    S/P lumbar fusion Yes    Chronic back pain, unspecified back location, unspecified back pain laterality        Physician: Theron Melton MD    Physician Orders: Eval and Treat  Medical Diagnosis: S/P lumbar spinal fusion    Visit # / Visits Authorized:  11 / 12  Insurance Authorization Period: 3/19/2025 to 3/19/2026  Date of Evaluation: 3/19/25  Plan of Care Certification: 3/19/25 to 5/19/25     PT/PTA:     Number of PTA visits since last PT visit: 1/6   Time In: 1014   Time Out: 1115  Total Time: 61  Total Billable Time: 61    FOTO:  Intake Score:  %  Survey Score 1:  %  Survey Score 2:  %         Subjective   Patient reports her pain is a little less today, 7/10.  Patient states she feels like she can move better while in the pool but once she gets out her pain returns..  Pain reported as 7/10.      Objective            Treatment:  Therapeutic Exercise  TE 1: FUNCTIONAL MOBILITY TRAINING x 2 laps each at beginning and 1 lap each at end of session  Walk forward/backward/lateral  TE 2: Standing LE EX x 30 Heel raise with gluteal set, Squat, with PTB.   Hip Extension, Hip flexion, Hip abduction, stationary march  TE 3: STRETCHES 1 x 1' each Hamstring stretch and Gastroc stretch on pool step/ladder  TE 4: Seated on Pool stool x30 reps Clam, LAQ, Sit to stand  TE 5: Tandem walking and walking marches x 2 laps with orange dumbbell  TE 6: UE EX/CORE x 25 with closed paddles  Shoulder flex/ext, Shoulder horizontal abd/add TA activation;    Mini squat with push/pull  kickboard (min. board immersion + wall support) x 20  TE 8: Step up on 4 inch step x 20 reps B  TE 9: Lateral walks w/ PTB x 2 laps  TE 10: ENDURANCE in // bars x 2  Bicycle, lumbar decompression      Time Entry(in minutes):  Aquatic Therapy Time Entry:  61    Assessment & Plan   Assessment: Patient was not able to perform LTR due to causing discomfort in her low back.  Patient moves fluidly in the pool and had no difficulty completing her therapy today.  Some of her movements on land seem slightly guarded.  Patient's gait is slower on land than in the pool.       Patient will continue to benefit from skilled outpatient physical therapy to address the deficits listed in the problem list box on initial evaluation, provide pt/family education and to maximize pt's level of independence in the home and community environment.     Patient's spiritual, cultural, and educational needs considered and patient agreeable to plan of care and goals.           Plan: Progress POC as tolerated by the patient.    Goals:   Active       LTG       HEP       Start:  03/26/25    Expected End:  05/25/25       The patient will demonstrate independence with final HEP including Aquatic HEP (she as access to a pool) and report compliance with execution.          MMT       Start:  03/26/25    Expected End:  05/25/25       Pt is able to demonstrate MMT B LE at least 4+/5 all measurements without pain reports or compensations noted.           Gait       Start:  03/26/25    Expected End:  05/25/25       The patient is able to ambulate household and community distances greater than 25 minutes without AD with good safety noted         5 times sit to stand       Start:  03/26/25    Expected End:  05/25/25       The patient will demonstrated improved 5 times sit to stand to less than 12 seconds to indicate improved functional mobility.              STG       HEP       Start:  03/26/25    Expected End:  04/25/25       The patient will be able to demonstrate independence and compliance with initial HEP.           MMT       Start:  03/26/25    Expected End:  04/25/25       The patient will demonstrate improved B LE MMT by at least 1/3 muscle grade where deficits noted.          TUG       Start:  03/26/25     Expected End:  04/25/25       The patient will demonstrate improved TUG to less than 15 seconds to indicate improved functional mobility.           5 times sit to stand       Start:  03/26/25    Expected End:  04/25/25       The patient will demonstrated improved 5 times sit to stand to less than 15 seconds to indicate improved functional mobility.               Go Jeronimo, PTA

## 2025-04-30 ENCOUNTER — CLINICAL SUPPORT (OUTPATIENT)
Dept: REHABILITATION | Facility: HOSPITAL | Age: 55
End: 2025-04-30
Payer: COMMERCIAL

## 2025-04-30 ENCOUNTER — TELEPHONE (OUTPATIENT)
Dept: NEUROSURGERY | Facility: CLINIC | Age: 55
End: 2025-04-30
Payer: COMMERCIAL

## 2025-04-30 ENCOUNTER — PATIENT MESSAGE (OUTPATIENT)
Dept: NEUROSURGERY | Facility: CLINIC | Age: 55
End: 2025-04-30
Payer: COMMERCIAL

## 2025-04-30 DIAGNOSIS — Z98.1 S/P LUMBAR FUSION: ICD-10-CM

## 2025-04-30 DIAGNOSIS — G89.29 CHRONIC BACK PAIN, UNSPECIFIED BACK LOCATION, UNSPECIFIED BACK PAIN LATERALITY: Primary | ICD-10-CM

## 2025-04-30 DIAGNOSIS — Z98.1 S/P LUMBAR SPINAL FUSION: Primary | ICD-10-CM

## 2025-04-30 DIAGNOSIS — M54.9 CHRONIC BACK PAIN, UNSPECIFIED BACK LOCATION, UNSPECIFIED BACK PAIN LATERALITY: Primary | ICD-10-CM

## 2025-04-30 PROCEDURE — 97164 PT RE-EVAL EST PLAN CARE: CPT

## 2025-04-30 PROCEDURE — 97113 AQUATIC THERAPY/EXERCISES: CPT

## 2025-05-01 ENCOUNTER — PATIENT MESSAGE (OUTPATIENT)
Dept: NEUROSURGERY | Facility: CLINIC | Age: 55
End: 2025-05-01
Payer: COMMERCIAL

## 2025-05-01 ENCOUNTER — TELEPHONE (OUTPATIENT)
Dept: NEUROSURGERY | Facility: CLINIC | Age: 55
End: 2025-05-01
Payer: COMMERCIAL

## 2025-05-01 NOTE — TELEPHONE ENCOUNTER
Attempted to return call. No answer. LVM advising that her visit will need to be made in person for WC approval.    ----- Message from Brittani sent at 5/1/2025  9:37 AM CDT -----  Regarding: Appt  Contact: Pt  [phone]  Pt is calling to make sure she has workers comp approval for appt on 5/6/2025 please call

## 2025-05-01 NOTE — PROGRESS NOTES
Outpatient Rehab    Physical Therapy Progress Note : Updated Plan of Care    Patient Name: Kianna Zuleta  MRN: [id number]  YOB: 1970  Encounter Date: 4/30/2025    Therapy Diagnosis:   Encounter Diagnoses   Name Primary?    Chronic back pain, unspecified back location, unspecified back pain laterality Yes    S/P lumbar fusion      Physician: Theron Melton MD    Physician Orders: Eval and Treat  Medical Diagnosis: S/P lumbar spinal fusion    Visit # / Visits Authorized:  12 / 12  Insurance Authorization Period: 3/19/2025 to 3/19/2026  Date of Evaluation: 3/19/25  Plan of Care Certification: 4/30/25 to 5/5/25     PT/PTA: PT   Number of PTA visits since last PT visit:0  Time In: 1020   Time Out: 1130  Total Time: 70   Total Billable Time: 70         Subjective   The patient stated she saw the Pain Management MD yesterday and he thinks the pain is muscular and recommended land PT for dry needling. She stated she was able to move up her appt with Dr. Melton to next week which was orignially scheduled in June. She was glad to move the appointment up because she was cleared to return to work lifting no greater than 25# but unsure if she can lift htat.   She requested an FCE be performed.  She stated in regards to Aquatic PT everything is the same since starting PT here..  Pain reported as 6/10. Able to stand 10 minutes, walking under 5 minutes, sitting about hour. She is not doing much so hard to gauge.     Objective            Hip Strength - Planes of Motion   Right Strength Right Pain Left Strength Left  Pain   Flexion (L2) 4-   4-     Extension           ABduction 4-   3+     ADduction           Internal Rotation 4+   3+     External Rotation 4+   3+         Knee Strength   Right Strength Right Pain Left Strength Left  Pain   Flexion (S2) 5   3-     Prone Flexion           Extension (L3) 5   3                   Fall Risk  Functional mobility test results suggest the patient is: At Risk for Falls  Timed  Up & Go (TUG)  Time: 9 seconds     An older adult who takes >=12 seconds to complete the TUG is at risk for falling.       Sit to Stand Testing  The patient completed 5 sit to stand transfers in 15 sec. She performed the sit to stand with poor hip hinge and did not require use of hands for support today.                  Treatment:  Therapeutic Exercise  TE 1: FUNCTIONAL MOBILITY TRAINING x 2 laps each at beginning and 1 lap each at end of session  Walk forward/backward/lateral---1 lap due to time constraints with re-eval  TE 2: Standing LE EX x 30 with Pink tband                     Heel raise with gluteal set, Squat,  Hip Extension, Hip flexion, Hip abduction, stationary march  TE 3: STRETCHES 1 x 1' each Hamstring stretch and Gastroc stretch on pool step/ladder  TE 4: Seated on Pool stool x30 reps Clam, LAQ, Sit to stand  TE 5: Tandem walking and walking marches x 2 laps with orange dumbbell  TE 6: UE EX/CORE x 25 with closed paddles  Shoulder flex/ext, Shoulder horizontal abd/add TA activation;    Mini squat with push/pull  kickboard (min. board immersion + wall support) x 20  TE 7: ENDURANCE in // bars x 1'   LTR and bicycle---Patient declined performance secondary to feeling like it is bothering her neck  TE 8: Step up on 4 inch step x 20 reps B  TE 9: Lateral walks w/ PTB x 2 laps  TE 10: .      Time Entry(in minutes):  PT Re-Evaluation Time Entry: 15  Aquatic Therapy Time Entry: 55    Assessment & Plan   Assessment  Kianna presents with a condition of Moderate complexity.   Presentation of Symptoms: Changing  Will Comorbidities Impact Care: No       Functional Limitations: Activity tolerance, Ambulating on uneven surfaces, Bed mobility, Completing work/school activities, Decreased ambulation distance/endurance, Driving, Functional mobility, Gait limitations, Increased risk of fall, Maintaining balance, Pain with ADLs/IADLs, Painful locomotion/ambulation, Participating in leisure activities, Performing  household chores, Range of motion, Sitting tolerance, Squatting, Standing tolerance  Impairments: Abnormal gait, Abnormal or restricted range of motion, Activity intolerance, Impaired balance, Impaired physical strength, Lack of appropriate home exercise program, Pain with functional activity    Patient Goal for Therapy (PT): To reduce pain and numbness in her legs  Assessment Details: The patient has attended 12 of 12 authoized Aquatic PT visits. Subjectively she reports no change in her pain or function since starting PT. She reports continued back pain which she went to pain management yesterday and are referring her to land therapy for manual therapy to include dry needling. Objectively she did demonstrate improved functional mobility with improved time to complete TUG and 5 times sit to stand. She at this time will be d/c from Aquatic PT and will attend land physical therapy for futher evaluation and treatment,    Plan  From a physical therapy perspective, the patient would not benefit from: Skilled Rehab Services       The patient has completed all 12 authorized Aquatic PT visits and will be d/c from Aquatic PT at this time. She was referred to land Physical Therapy for further evaluation and treatment.     Patient will continue to benefit from skilled outpatient physical therapy to address the deficits listed in the problem list box on initial evaluation, provide pt/family education and to maximize pt's level of independence in the home and community environment.     Patient's spiritual, cultural, and educational needs considered and patient agreeable to plan of care and goals.           Goals:   Active       LTG       HEP (Ongoing)       Start:  03/26/25    Expected End:  05/25/25       The patient will demonstrate independence with final HEP including Aquatic HEP (she as access to a pool) and report compliance with execution.          MMT (Unable to Meet)       Start:  03/26/25    Expected End:  05/25/25        Pt is able to demonstrate MMT B LE at least 4+/5 all measurements without pain reports or compensations noted.           Gait (Not Progressing)       Start:  03/26/25    Expected End:  05/25/25       The patient is able to ambulate household and community distances greater than 25 minutes without AD with good safety noted         5 times sit to stand (Progressing)       Start:  03/26/25    Expected End:  05/25/25       The patient will demonstrated improved 5 times sit to stand to less than 12 seconds to indicate improved functional mobility.              STG       HEP (Met)       Start:  03/26/25    Expected End:  04/25/25    Resolved:  05/01/25    The patient will be able to demonstrate independence and compliance with initial HEP.           MMT (Ongoing)       Start:  03/26/25    Expected End:  04/25/25       The patient will demonstrate improved B LE MMT by at least 1/3 muscle grade where deficits noted.          TUG (Met)       Start:  03/26/25    Expected End:  04/25/25    Resolved:  05/01/25    The patient will demonstrate improved TUG to less than 15 seconds to indicate improved functional mobility.           5 times sit to stand (Met)       Start:  03/26/25    Expected End:  04/25/25    Resolved:  05/01/25    The patient will demonstrated improved 5 times sit to stand to less than 15 seconds to indicate improved functional mobility.               Flower Montes De Oca, PT

## 2025-05-05 ENCOUNTER — TELEPHONE (OUTPATIENT)
Dept: NEUROSURGERY | Facility: CLINIC | Age: 55
End: 2025-05-05
Payer: COMMERCIAL

## 2025-05-05 NOTE — TELEPHONE ENCOUNTER
Attempted to call and inform pt that appt tmrw w/ Dr. Melton will be r/s d/t clinic closure. No answer. LVM advising pt that appts have been r/s to:    Future Appointments   Date Time Provider Department Center   5/7/2025  9:00 AM Maddison Mcnulty PA-C [facility] NEUROS8 [facility]   6/3/2025  9:15 AM Alan Borrero IV, MD [facility] OBGYN64 [facility] [place], [facility]

## 2025-05-07 ENCOUNTER — OFFICE VISIT (OUTPATIENT)
Dept: NEUROSURGERY | Facility: CLINIC | Age: 55
End: 2025-05-07
Payer: COMMERCIAL

## 2025-05-07 ENCOUNTER — PATIENT MESSAGE (OUTPATIENT)
Dept: NEUROSURGERY | Facility: CLINIC | Age: 55
End: 2025-05-07
Payer: COMMERCIAL

## 2025-05-07 ENCOUNTER — TELEPHONE (OUTPATIENT)
Dept: UROLOGY | Facility: CLINIC | Age: 55
End: 2025-05-07
Payer: COMMERCIAL

## 2025-05-07 ENCOUNTER — LAB VISIT (OUTPATIENT)
Dept: LAB | Facility: HOSPITAL | Age: 55
End: 2025-05-07
Attending: UROLOGY
Payer: COMMERCIAL

## 2025-05-07 ENCOUNTER — NURSE TRIAGE (OUTPATIENT)
Dept: ADMINISTRATIVE | Facility: CLINIC | Age: 55
End: 2025-05-07
Payer: COMMERCIAL

## 2025-05-07 VITALS — DIASTOLIC BLOOD PRESSURE: 84 MMHG | HEART RATE: 109 BPM | SYSTOLIC BLOOD PRESSURE: 136 MMHG

## 2025-05-07 DIAGNOSIS — R31.0 GROSS HEMATURIA: ICD-10-CM

## 2025-05-07 DIAGNOSIS — R31.0 GROSS HEMATURIA: Primary | ICD-10-CM

## 2025-05-07 DIAGNOSIS — Z98.1 S/P LUMBAR FUSION: Primary | ICD-10-CM

## 2025-05-07 PROCEDURE — 99999 PR PBB SHADOW E&M-EST. PATIENT-LVL IV: CPT | Mod: PBBFAC,,, | Performed by: PHYSICIAN ASSISTANT

## 2025-05-07 PROCEDURE — 87086 URINE CULTURE/COLONY COUNT: CPT

## 2025-05-07 PROCEDURE — 99213 OFFICE O/P EST LOW 20 MIN: CPT | Mod: S$GLB,,, | Performed by: PHYSICIAN ASSISTANT

## 2025-05-07 NOTE — TELEPHONE ENCOUNTER
Patient from board with C/O blood in urine. Started this am, noticed.it again at her providers visit. No large clots, no fever, no pain. She does have issue with back so hard to say if she is having back pain. Triage done-dispo see provider today or tomorrow. She states the last time this happened triage sent her to ED and was told kidney stones and she follow up with Dr. Dumont. Offered PCP appointment. She would like message sent to Dr. Dumont's office for appointment.    Reason for Disposition   All other patients with blood in urine  (Exception: Could be normal menstrual bleeding.)    Additional Information   Negative: Shock suspected (e.g., cold/pale/clammy skin, too weak to stand, low BP, rapid pulse)   Negative: Sounds like a life-threatening emergency to the triager   Negative: Recent back or abdominal injury   Negative: Recent genital injury   Negative: Unable to urinate (or only a few drops) > 4 hours and bladder feels very full (e.g., palpable bladder or strong urge to urinate)   Negative: Diffuse (all over) muscle pains in the shoulders, arms, legs, and back and dark (cola or tea-colored) or red-colored urine   Negative: Passing pure blood or large blood clots (i.e., larger than a dime or grape)   Negative: Fever > 100.4 F (38.0 C)   Negative: Patient sounds very sick or weak to the triager   Negative: Known sickle cell disease   Negative: Taking Coumadin (warfarin) or other strong blood thinner, or known bleeding disorder (e.g., thrombocytopenia)   Negative: Side (flank) or back pain present   Negative: Pain or burning with passing urine (urination)   Negative: Patient wants to be seen   Negative: Pink or red-colored urine and likely from food (beets, rhubarb, red food dye) and lasts > 24 hours after stopping food    Protocols used: Urine - Blood In-A-OH

## 2025-05-07 NOTE — PROGRESS NOTES
"Neurosurgery  Established Patient    SUBJECTIVE:   History of Present Illness (per Dr. Melton's note 3/11/25):  Kianna Zuleta is a 53 yo female who received L5-S1 complete diskectomy with anterior interbody placement with anterior instrumentation plating followed by posterior nonsegmental instrumentation on 1/27/2025. Post-operativelly, she is doing ok, but still complaints of low back pain and left sciatic pain started last week with left calf numbness and tingling. She denies any other new neurological symptoms.    Interval Hx 4/14/25: After the last appointment with Dr. Melton, it was recommended that the patient obtain an updated CT lumbar spine and discuss progress with conservative treatment. She is being seen today with the [place] comp provider. The patient is currently in PT with some improvement but continues to struggle with left sided low back and leg pain as she previously described with Dr. Melton. Rates her pain as a 7/10.  Reports tingling in the left leg. She would like to restart injections with Dr. North if cleared.     Interval history:  Patient re-presented today for follow-up.  She did not obtain injections after her last visit as these have not been approved yet per her worker's comp .  She continues to report low back pain as well as left foot numbness to the bottom of the foot and lateral leg.  She notes a  shocking sensation" above the level of her incision.  She continues with left leg weakness in his walking with a walker today.  She is also suffering with kidney stones but has not called her urologist yet.  She denies any new urinary or bowel changes.  Denies new right leg symptoms.  She was referred to physical therapy for functional assessment after her last visit but this has not been completed yet.  She is requesting an external referral for physical therapy today.    Review of patient's allergies indicates:   Allergen Reactions    Benadryl allergy decongestant Anaphylaxis     " Other reaction(s): Anaphylaxis    Fludrocortisone Hives    Gluten Other (See Comments)     Other reaction(s) GI upset    Diphenhydramine hcl     Hydromorphone hcl      No issue with morphine in the past per pt.     Indomethacin Hives     No issue with Toradol in the past per pt.     Lisinopril Nausea And Vomiting     Swelling and vomiting    Penicillins Rash     No issue with cephalosporins in the past per pt.     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    MVP (mitral valve prolapse)     Pneumonia     PONV (postoperative nausea and vomiting)     POTS (postural orthostatic tachycardia syndrome)     Recurrent upper respiratory infection (URI)     Ulcer     Vitamin D deficiency disease 11/06/2012     Past Surgical History:   Procedure Laterality Date    ANKLE SURGERY      lt    ANTERIOR LUMBAR INTERBODY FUSION (ALIF) N/A 1/27/2025    Procedure: **LOOP X** FUSION, SPINE, LUMBAR, ALIF;  Surgeon: Theron Melton MD;  Location: [facility] OR [address] [place] [address];  Service: Neurosurgery;  Laterality: N/A;    APPENDECTOMY      BACK SURGERY      CHOLECYSTECTOMY      choley & ovarian cyst removed  12/2005    cystoscope      multiple    HYSTERECTOMY  4/8/2004    BUBBA BS&O     interstim      LUMBAR FUSION N/A 1/27/2025    Procedure: FUSION, SPINE, LUMBAR MIS POSTERIOR SCREWS LOOP X for POSTERIOR;  Surgeon: Theron Melton MD;  Location: [facility] OR [address] [place] [address];  Service: Neurosurgery;  Laterality: N/A;    OOPHORECTOMY  4/8/2004    with hysterectomy     PELVIC LAPAROSCOPY      IN EXPLORATORY OF ABDOMEN      SINUS SURGERY  03/01/2016    SPINE SURGERY       Family History        Problem Relation (Age of Onset)    Alcohol abuse Brother    Arthritis Paternal Grandfather    Cancer Paternal Uncle, Paternal Grandmother    Colon cancer Paternal Uncle    Depression Maternal Grandmother    Diabetes Paternal Grandfather    Hearing loss Maternal Grandmother    Heart disease Maternal Grandmother    Hypertension Father    Kidney disease Maternal Grandmother    Stroke Paternal Grandfather          Social History     Socioeconomic History    Marital status: Single    Number of children: 0   Occupational History    Occupation: Teacher     Employer: [email or web address]   Tobacco Use    Smoking status: Never    Smokeless tobacco: Never   Substance and Sexual Activity    Alcohol use: Yes     Comment: Rarely    Drug use: No    Sexual activity: Not Currently   Other Topics Concern    Are you pregnant or think you may be? No    Breast-feeding No     Social Drivers of Health     Financial Resource Strain: Low Risk  (1/30/2025)    Overall Financial Resource Strain (CARDIA)     Difficulty of Paying Living Expenses: Not hard at all   Food Insecurity: No Food Insecurity (1/30/2025)    Hunger Vital Sign     Worried About Running Out of Food in the Last Year: Never true     Ran Out of Food in the Last Year: Never true   Transportation Needs: No Transportation Needs (1/27/2025)    TRANSPORTATION NEEDS     Transportation : No   Physical Activity: Inactive (1/30/2025)    Exercise Vital Sign     Days of Exercise per Week: 0 days     Minutes of Exercise per Session: 0 min   Stress: No Stress Concern Present (1/30/2025)    [employer] [place] [employer] - Occupational Stress Questionnaire     Feeling of Stress : Only a little   Housing Stability: Unknown (1/30/2025)    Housing Stability Vital Sign     Unable to Pay for Housing in the Last Year: No     Homeless in the Last Year: No       Review of Systems    OBJECTIVE:     Vital Signs  Pulse: 109  BP: 136/84  Pain Score:   7  There is no height or weight  on file to calculate BMI.    Neurosurgery Physical Exam  General: well developed, well nourished, no distress.   Head: normocephalic, atraumatic  Neurologic: Alert and oriented. Thought content appropriate.  GCS: Motor: 6/Verbal: 5/Eyes: 4 GCS Total: 15  Mental Status: Awake, Alert, Oriented x 4  Language: No aphasia  Speech: No dysarthria  Cranial nerves: face symmetric, tongue midline, CN II-XII grossly intact.   Eyes: pupils equal, round, reactive to light with accomodation, EOMI.   Pulmonary: normal respirations, no signs of respiratory distress  Abdomen: soft, non-distended  Motor Strength:Moves all extremities spontaneously with good tone.  She has 4/5 strength in left knee extension, knee flexion, dorsiflexion and EHL.  Otherwise 5/5 strength in bilateral lower extremities  She has decreased sensation to light touch in the left lateral leg below-the-knee and bottom of the left foot    Lumbar incisions well healed  No tenderness along spinous processes    Diagnostic Results:  No interval imaging    ASSESSMENT/PLAN:     Kianna Zuleta is a 54 y.o. female seen today with the workman's comp provider for her continued follow-up on the ALIF L5-S1 with Dr. Melton on 1/27/2025.  During her last visit, she was cleared to obtain injections with Dr. North however these have not been approved yet per her . Her work modifications continue as modified with weight restriction > 25 lbs, no longer needs LSO brace when OOB, walker as needed for safety, and no bending or twisting restrictions.  She was referred for functional assessment with physical therapy but this has also not been completed yet.  She is requesting an external referral to physical therapy to begin exercises had an external clinic.  This was given to her today.  She would like to complete her functional assessment internally.  We will plan to see her back once this is complete or sooner with any new concerns.      Maddison Mcnulty,  MANE  Neurosurgery      Note dictated with voice recognition software, please excuse any grammatical errors.              [1]   Current Outpatient Medications   Medication Sig Dispense Refill    amLODIPine (NORVASC) 2.5 MG tablet Take 1 tablet by mouth once daily.      azelastine (ASTELIN) 137 mcg (0.1 %) nasal spray 1 spray by Nasal route 2 (two) times daily as needed.      cetirizine (ZYRTEC) 10 MG tablet Take 10 mg by mouth once daily.      cloNIDine (CATAPRES) 0.1 MG tablet Take 0.1 mg by mouth 2 (two) times daily. Take two tabs BID      dicyclomine (BENTYL) 20 mg tablet Take 20 mg by mouth 2 (two) times daily as needed.      ergocalciferol (ERGOCALCIFEROL) 50,000 unit Cap Take 1 capsule (50,000 Units total) by mouth every 7 days. 12 capsule 12    estradioL (ESTRACE) 2 MG tablet Take 1 tablet (2 mg total) by mouth once daily. 30 tablet 6    famotidine (PEPCID) 40 MG tablet Take 40 mg by mouth Daily.      gabapentin (NEURONTIN) 300 MG capsule Take 3 capsules (900 mg total) by mouth 3 (three) times daily. 270 capsule 11    hydrOXYzine HCL (ATARAX) 25 MG tablet Take 1 tablet (25 mg total) by mouth 3 (three) times daily as needed for Itching. 45 tablet 1    immun glob G,IgG,-pro-IgA 0-50 (HIZENTRA) 1 gram/5 mL (20 %) Syrg Inject 5 mLs (1 g total) into the skin every 7 days. 130 mL 1    indapamide (LOZOL) 1.25 MG Tab TAKE 1 TABLET BY MOUTH DAILY 30 tablet 1    levocetirizine (XYZAL) 5 MG tablet Take 5 mg by mouth every evening.  9    levothyroxine (SYNTHROID) 75 MCG tablet TAKE 1 TABLET(75 MCG) BY MOUTH BEFORE BREAKFAST 90 tablet 3    LIDOcaine-prilocaine (EMLA) cream Apply topically as needed (apply to infusion site). 30 g 0    metoprolol succinate (TOPROL-XL) 100 MG 24 hr tablet 100 mg 2 (two) times daily.      ondansetron (ZOFRAN-ODT) 4 MG TbDL Take 4 mg by mouth every 6 (six) hours as needed.      oxyCODONE-acetaminophen (PERCOCET)  mg per tablet Take 1 tablet by mouth every 6 (six) hours as needed for  Pain. 45 tablet 0    SEMAGLUTIDE, WEIGHT LOSS, SUBQ Inject 1 mg into the skin every Thursday.      traZODone (DESYREL) 100 MG tablet Take by mouth.      TRINTELLIX 20 mg Tab TK 1 T PO QAM      vitamin D (VITAMIN D3) 1000 units Tab Take 1,000 Units by mouth once daily.      zaleplon (SONATA) 10 MG capsule Take by mouth nightly as needed.      EPINEPHrine (AUVI-Q) 0.3 mg/0.3 mL AtIn Inject 0.3 mLs (0.3 mg total) into the muscle once. for 1 dose 2 each 12    montelukast (SINGULAIR) 10 mg tablet 30 (Patient not taking: Reported on 5/7/2025)       No current facility-administered medications for this visit.

## 2025-05-08 LAB — BACTERIA UR CULT: NORMAL

## 2025-05-09 ENCOUNTER — RESULTS FOLLOW-UP (OUTPATIENT)
Dept: UROLOGY | Facility: CLINIC | Age: 55
End: 2025-05-09

## 2025-05-15 ENCOUNTER — HOSPITAL ENCOUNTER (OUTPATIENT)
Dept: RADIOLOGY | Facility: HOSPITAL | Age: 55
Discharge: HOME | End: 2025-05-15
Attending: UROLOGY
Payer: COMMERCIAL

## 2025-05-15 DIAGNOSIS — N20.0 KIDNEY STONES: ICD-10-CM

## 2025-05-15 PROCEDURE — 76770 US EXAM ABDO BACK WALL COMP: CPT | Mod: 26,,, | Performed by: RADIOLOGY

## 2025-05-15 PROCEDURE — 76770 US EXAM ABDO BACK WALL COMP: CPT | Mod: TC

## 2025-06-02 ENCOUNTER — TELEPHONE (OUTPATIENT)
Dept: NEUROSURGERY | Facility: CLINIC | Age: 55
End: 2025-06-02
Payer: COMMERCIAL

## 2025-06-11 ENCOUNTER — TELEPHONE (OUTPATIENT)
Dept: NEUROSURGERY | Facility: CLINIC | Age: 55
End: 2025-06-11
Payer: COMMERCIAL

## 2025-06-11 NOTE — TELEPHONE ENCOUNTER
Attempted to return call. No answer. LVM for Betzy informing her that ppw wa alr faxed back on Fri.    Copied from CRM #[id number]. Topic: General Inquiry - Patient Advice  >> Jun 11, 2025  4:11 PM Cira Adamson wrote:  Type: Advice    Who Called: Betzy HUNTER Case Manager [employer]    Would the patient rather a call back or a response via [facility]? Call Back    Best Call Back Number: [phone] (ext: [phone])    Additional Information: Ajit vargas [employer] is calling to confirm if fax sent on last Friday asking for clarification of work restrictions was received or not. If received please have provider complete form and fax it back over to [phone].

## 2025-06-30 ENCOUNTER — PATIENT MESSAGE (OUTPATIENT)
Dept: NEUROSURGERY | Facility: CLINIC | Age: 55
End: 2025-06-30
Payer: COMMERCIAL

## 2025-07-06 DIAGNOSIS — N95.1 MENOPAUSAL SYMPTOMS: ICD-10-CM

## 2025-07-08 RX ORDER — ESTRADIOL 2 MG/1
2 TABLET ORAL
Qty: 30 TABLET | Refills: 0 | Status: SHIPPED | OUTPATIENT
Start: 2025-07-08

## 2025-07-14 ENCOUNTER — PATIENT MESSAGE (OUTPATIENT)
Dept: NEUROSURGERY | Facility: CLINIC | Age: 55
End: 2025-07-14
Payer: COMMERCIAL

## 2025-07-16 ENCOUNTER — PATIENT MESSAGE (OUTPATIENT)
Dept: FAMILY MEDICINE | Facility: CLINIC | Age: 55
End: 2025-07-16
Payer: COMMERCIAL

## 2025-07-21 RX ORDER — LEVOTHYROXINE SODIUM 75 UG/1
75 TABLET ORAL
Qty: 90 TABLET | Refills: 1 | Status: SHIPPED | OUTPATIENT
Start: 2025-07-21

## 2025-07-21 NOTE — TELEPHONE ENCOUNTER
Provider Staff:  Action required for this patient    Requires labs      Please see care gap opportunities below in Care Due Message.    Thanks!  [facility]     Appointments      Date Provider   Last Visit   3/31/2025 Gigi Celis MD   Next Visit   7/23/2025 Gigi Celis MD     Refill Decision Note   Kianna Zuleta  is requesting a refill authorization.    Brief Assessment and Rationale for Refill:   Approve       Medication Therapy Plan:  T4 WNL      Comments:     Note composed:3:01 PM 07/21/2025

## 2025-07-21 NOTE — TELEPHONE ENCOUNTER
Care Due:                  Date            Visit Type   Department     Provider  --------------------------------------------------------------------------------                                NAVIN      [facility]                              FOLLOWUP/OF  / INTERNAL MED Gigi Linares  Last Visit: 03-      FICE VISIT   / PEDS         [place]                               -         [facility]                              PRIMARY      / INTERNAL MED Gigi Linares  Next Visit: 07-      CARE ([facility])   / PEDS         [place]rensing                                                            Last  Test          Frequency    Reason                     Performed    Due Date  --------------------------------------------------------------------------------    Vitamin D...  12 months..  ergocalciferol...........  02-   02-    [employer] Embedded Care Due Messages. Reference number: [id number].   7/21/2025 3:28:11 AM CDT

## 2025-07-22 ENCOUNTER — OFFICE VISIT (OUTPATIENT)
Dept: OBSTETRICS AND GYNECOLOGY | Facility: CLINIC | Age: 55
End: 2025-07-22
Payer: COMMERCIAL

## 2025-07-22 VITALS
WEIGHT: 279.31 LBS | HEIGHT: 64 IN | SYSTOLIC BLOOD PRESSURE: 138 MMHG | DIASTOLIC BLOOD PRESSURE: 84 MMHG | BODY MASS INDEX: 47.68 KG/M2

## 2025-07-22 DIAGNOSIS — N95.1 MENOPAUSAL SYMPTOMS: ICD-10-CM

## 2025-07-22 DIAGNOSIS — Z01.419 WELL WOMAN EXAM WITH ROUTINE GYNECOLOGICAL EXAM: Primary | ICD-10-CM

## 2025-07-22 PROCEDURE — 3079F DIAST BP 80-89 MM HG: CPT | Mod: CPTII,S$GLB,, | Performed by: STUDENT IN AN ORGANIZED HEALTH CARE EDUCATION/TRAINING PROGRAM

## 2025-07-22 PROCEDURE — 1159F MED LIST DOCD IN RCRD: CPT | Mod: CPTII,S$GLB,, | Performed by: STUDENT IN AN ORGANIZED HEALTH CARE EDUCATION/TRAINING PROGRAM

## 2025-07-22 PROCEDURE — 3075F SYST BP GE 130 - 139MM HG: CPT | Mod: CPTII,S$GLB,, | Performed by: STUDENT IN AN ORGANIZED HEALTH CARE EDUCATION/TRAINING PROGRAM

## 2025-07-22 PROCEDURE — 99999 PR PBB SHADOW E&M-EST. PATIENT-LVL IV: CPT | Mod: PBBFAC,,, | Performed by: STUDENT IN AN ORGANIZED HEALTH CARE EDUCATION/TRAINING PROGRAM

## 2025-07-22 PROCEDURE — 1160F RVW MEDS BY RX/DR IN RCRD: CPT | Mod: CPTII,S$GLB,, | Performed by: STUDENT IN AN ORGANIZED HEALTH CARE EDUCATION/TRAINING PROGRAM

## 2025-07-22 PROCEDURE — 3008F BODY MASS INDEX DOCD: CPT | Mod: CPTII,S$GLB,, | Performed by: STUDENT IN AN ORGANIZED HEALTH CARE EDUCATION/TRAINING PROGRAM

## 2025-07-22 PROCEDURE — 99396 PREV VISIT EST AGE 40-64: CPT | Mod: S$GLB,,, | Performed by: STUDENT IN AN ORGANIZED HEALTH CARE EDUCATION/TRAINING PROGRAM

## 2025-07-22 RX ORDER — ESTRADIOL 2 MG/1
2 TABLET ORAL DAILY
Qty: 90 TABLET | Refills: 3 | Status: SHIPPED | OUTPATIENT
Start: 2025-07-22

## 2025-07-22 NOTE — PROGRESS NOTES
Subjective     Patient ID: Kianna Zuleta is a 55 y.o. female.    Chief Complaint:  Gynecologic Exam      History of Present Illness  54 yo G0 presents for WWE    PMHx of BUBBA BSO for endometriosis, subsequent ovarian remnant removed. More recently spinal fusion surgery, kidney stones  Has not scheduled mammogram yet this year    Taking 2mg estrace for MHT and overall doing. Feels like she gets hot/flushes sometimes. No TRACY sx, no constipation   Not currently SA, does not feel vaginal dryness. Declines STI screening today    Annual Exam-Postmenopausal  Patient presents for annual exam. The patient has no complaints today. The patient is not sexually active. GYN screening history: last mammogram: was normal. The patient is taking hormone replacement therapy. Patient denies post-menopausal vaginal bleeding. The patient wears seatbelts: no. The patient participates in regular exercise: no (injury). Has the patient ever been transfused or tattooed?: no. The patient reports that there is not domestic violence in her life.    GYN & OB History  No LMP recorded. Patient has had a hysterectomy.   Date of Last Pap: 2017    OB History    Para Term  AB Living   0  0      SAB IAB Ectopic Multiple Live Births              Review of Systems  Review of Systems   All other systems reviewed and are negative.         Objective   Physical Exam:   Constitutional: She appears well-developed and well-nourished.    HENT:   Head: Normocephalic and atraumatic.    Eyes: Conjunctivae and EOM are normal.      Pulmonary/Chest: Effort normal. Right breast exhibits no mass and no nipple discharge. Left breast exhibits no mass and no nipple discharge.        Abdominal: Soft.     Genitourinary:    Vagina, right adnexa and left adnexa normal.      Pelvic exam was performed with patient in the lithotomy position.   The external female genitalia was normal.   There is no lesion on the right labia. There is no lesion on the left  labia. Right adnexum displays no mass. Left adnexum displays no mass. Cervix is absent.Uterus is absent.    Genitourinary Comments: Female chaperone was present for duration of exam             Musculoskeletal: Normal range of motion.       Neurological: She is alert.    Skin: Skin is warm and dry.    Psychiatric: She has a normal mood and affect. Her behavior is normal.            Assessment and Plan     1. Well woman exam with routine gynecological exam    2. Menopausal symptoms        Plan:  1. Well woman exam with routine gynecological exam (Primary)  - Pap no longer indicated.  hysterectomy  - Screening tests as ordered.    Counseling: injury prevention: Seatbelts  Exercise  Health Screens: Mammography  Health Maintenance reviewed  Pt to schedule mammogram  Perimenopause/Menopause    2. Menopausal symptoms  - discussed taking until mid 50s, weaning as pt tolerates/when ready to do so  - estradioL (ESTRACE) 2 MG tablet; Take 1 tablet (2 mg total) by mouth once daily.  Dispense: 90 tablet; Refill: 3      Follow up for WWE in 1 year or sooner as needed     Andres Caldwell III, MD  [facility] [place] - OB GYN   [address]  [place] [address] [phone]

## 2025-07-23 ENCOUNTER — PATIENT MESSAGE (OUTPATIENT)
Dept: HEMATOLOGY/ONCOLOGY | Facility: CLINIC | Age: 55
End: 2025-07-23
Payer: COMMERCIAL

## 2025-07-23 ENCOUNTER — OFFICE VISIT (OUTPATIENT)
Dept: FAMILY MEDICINE | Facility: CLINIC | Age: 55
End: 2025-07-23
Payer: COMMERCIAL

## 2025-07-23 VITALS
SYSTOLIC BLOOD PRESSURE: 122 MMHG | HEIGHT: 64 IN | BODY MASS INDEX: 47.83 KG/M2 | OXYGEN SATURATION: 98 % | WEIGHT: 280.19 LBS | DIASTOLIC BLOOD PRESSURE: 80 MMHG | HEART RATE: 88 BPM | TEMPERATURE: 99 F

## 2025-07-23 DIAGNOSIS — I10 ESSENTIAL HYPERTENSION: ICD-10-CM

## 2025-07-23 DIAGNOSIS — E03.4 HYPOTHYROIDISM DUE TO ACQUIRED ATROPHY OF THYROID: ICD-10-CM

## 2025-07-23 DIAGNOSIS — K58.9 IRRITABLE BOWEL SYNDROME, UNSPECIFIED TYPE: ICD-10-CM

## 2025-07-23 DIAGNOSIS — R00.0 SINUS TACHYCARDIA: ICD-10-CM

## 2025-07-23 DIAGNOSIS — E66.01 MORBID OBESITY: Primary | ICD-10-CM

## 2025-07-23 DIAGNOSIS — M79.7 FIBROMYALGIA: ICD-10-CM

## 2025-07-23 DIAGNOSIS — Z86.0100 HISTORY OF COLONIC POLYPS: ICD-10-CM

## 2025-07-23 DIAGNOSIS — G90.A POTS (POSTURAL ORTHOSTATIC TACHYCARDIA SYNDROME): ICD-10-CM

## 2025-07-23 DIAGNOSIS — K76.0 FATTY LIVER DISEASE, NONALCOHOLIC: ICD-10-CM

## 2025-07-23 DIAGNOSIS — D83.2 COMMON VARIABLE IMMUNODEFICIENCY WITH AUTOANTIBODIES TO B- OR T-CELLS: ICD-10-CM

## 2025-07-23 PROCEDURE — 99999 PR PBB SHADOW E&M-EST. PATIENT-LVL V: CPT | Mod: PBBFAC,,, | Performed by: INTERNAL MEDICINE

## 2025-07-23 RX ORDER — BUMETANIDE 1 MG/1
0.5 TABLET ORAL DAILY
COMMUNITY
Start: 2025-07-11 | End: 2026-07-11

## 2025-07-23 RX ORDER — TIZANIDINE 4 MG/1
4 TABLET ORAL 3 TIMES DAILY
COMMUNITY
Start: 2025-07-11

## 2025-07-23 NOTE — PROGRESS NOTES
Chief complaint discuss weight loss medication, drops in blood pressure    55-year-old white female with irritable bowel syndrome and hypothyroidism.  Patient does have POTS syndrome.  Lately she has been getting some low blood pressures usually in the evening.  No positional changes.  She has been more sedentary after her back surgery.  On July 22nd yesterday I reviewed her readings where at 7:00 a.m. it was 164/99 then it 1:00 p.m. 137/83 and then later in the evening 95/65.  She did not go to sleep and at midnight it was 81/64 with a pulse of 80 and she fell lightheaded.  She is not on them you Bumex on her list, only took it once.  She does take indapamide, Norvasc 2.5 in the morning and metoprolol  mg twice a day but has been on these.  Does not appear to be hypotension related to any dosing of medication.  She does have POTS syndrome.  Advise her to contract her leg muscles and so forth before getting up if she has been sitting for some time.  She also might need to increase her salt intake being on the indapamide and so forth.  Otherwise it is not a new thing for her.    She is recovering from her spinal surgery.  They told her to quit her Ozempic for one month before surgery.  She is awaiting some procedures but wants to get back on the medication regardless to lose weight.  She was able to jump right back on Ozempic 1 mg in the past.  She was getting it from the [facility] pharmacy.  They had to change a lot of their dosages.  We discussed doing something more local even for probably improved prices and gave her information on the medical weight loss clinic locally.  She will look into that.  She will discuss with them what dose you might want to restart on and always start a little bit lower than she did previous    .     TSH up but trending down , 4.2 Iker.  Currently on Synthroid 75 and discussed probable need to make an adjustment based on lab results but TSH ok 2/24 then most recently just slightly  up.  Will reassess in the future and have her come back for her physical.          She was seeing a neurosurgeon.  She has pain and numbness going down both legs with prolonged standing and apparently he is going to get a myelogram type of procedure and eventually did have surgery.  She awoke after surgery with some numbness in the left leg which is somewhat new and I presume will be followed by surgery       [facility] her labile blood pressure going up and down at times.    She also has an immuno deficiency.  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     She is working as an .  She does receive her weekly immunoglobulin infusion in his not been sick lately.    2 polyps 6/19 - 5 yrs . Colon due 6/2024.  Advised her of that and she follows with [facility] who presumably will notify her when ready to schedule.     She continues to follow with her GYN.  She follows with GI as well    Mammo 2020 then 4/4/24, order was put in previously so she can schedule      She had appointment with her gyn 2/24    She apparently went to have an SI joint fusion.  She also established with a neurosurgeon through workman's comp as she might need to have back surgery referable to a fall as far back as 2008.  Workman's comp.  Patient is on her inability to walk greater than 200 ft without having to stop and intermittently using a cane, she does again qualify for a handicap tag and I previously gait her a permanent application tag at this point    Vit D low in past- was on weekly so restart the weekly by prescription, last check a yr ago    TG sl eleveted, HDL and LDL actually okay a year ago        ROS:   CONST: weight stable. EYES: no vision change. ENT: no sore throat. CV: no chest pain w/ exertion. RESP: no shortness of breath. GI: no nausea, vomiting, diarrhea. No dysphagia. : no urinary issues. MUSCULOSKELETAL: no new myalgias or  arthralgias. SKIN: no new changes. NEURO: no focal deficits. PSYCH: no new issues. ENDOCRINE: no polyuria. HEME: no lymph nodes. ALLERGY: no general pruritis.    PAST MEDICAL HISTORY:                                                        1. Intersitial cystitis.                                                     2. Fibromyalgia.                                                             3. Mitral valve prolapse.                                                    4. IBS. Dr Rowe , now Dr. Mckeon  , colon 6/24 -5 yrs                                                               5. L-spine disk bulging.  L4-5 -Dr Singh at                                                    6. Total hysterectomy in 2004.   7.  Hypothyroid.                                                             8.  Incidental carcinoid tumor of the appendix.                              9.  Now on gluten-free diet.                                                 10.  Vitamin D deficiency, followed by outside Rheumatology.                  11.  Neurogenic bladder, followed by outside urologist. Ranjeet Vasquez  12.  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 Trigeminal neuralgia of right side of face  14. POTS (postural orthostatic tachycardia syndrome)  15. HTN  16. sacroiliac joint fusion           PAST SURGICAL HISTORY: Laparotomy x5 Left pelvic mass 12/20/05, pelvic pain 07/31/03, pelvic pain 06/13/02, endometriosis Laparoscopy 10/01,BUBBA and BS&O 04/08/04, Lap cholecystectomy 12/20/05, Appendectomy 12/20/05,Letitia surgery 5/08,Placement of new right InterStim lead and Ablation 1/09, Da Char-assisted lysis of severe adhesions, resection of severe adhesions/possible ovarian remnant.    SOCIAL HISTORY: Never smoked. The patient seldom drinks alcohol. Doesn't use recreational drugs. Lives alone. Single.     FAMILY HISTORY: The patient's family members had Ovarian cancer.No family history of breast  cancer. No family history of colon cancer    Vitals as above,   Gen: no distress, exam otherwise deferred, a rises with no orthostasis      Diagnoses and all orders for this visit:    Morbid obesity, semaglutide issues discussed, self refer to the medical weight loss clinic    POTS (postural orthostatic tachycardia syndrome) monitor for any aggravating factors, if sitting for some time try sean leg muscles before standing.  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 As such,   is the appropriate add-on code to accompany the other E/M billing for this visit.

## 2025-07-28 ENCOUNTER — TELEPHONE (OUTPATIENT)
Dept: NEUROSURGERY | Facility: CLINIC | Age: 55
End: 2025-07-28
Payer: COMMERCIAL

## 2025-07-28 DIAGNOSIS — Z98.1 S/P LUMBAR FUSION: Primary | ICD-10-CM

## 2025-07-29 ENCOUNTER — PATIENT MESSAGE (OUTPATIENT)
Dept: NEUROSURGERY | Facility: CLINIC | Age: 55
End: 2025-07-29
Payer: COMMERCIAL

## 2025-08-07 ENCOUNTER — HOSPITAL ENCOUNTER (EMERGENCY)
Facility: HOSPITAL | Age: 55
Discharge: HOME | End: 2025-08-07
Attending: EMERGENCY MEDICINE
Payer: COMMERCIAL

## 2025-08-07 VITALS
WEIGHT: 280 LBS | TEMPERATURE: 98 F | SYSTOLIC BLOOD PRESSURE: 173 MMHG | OXYGEN SATURATION: 99 % | RESPIRATION RATE: 18 BRPM | DIASTOLIC BLOOD PRESSURE: 86 MMHG | HEART RATE: 92 BPM | BODY MASS INDEX: 48.06 KG/M2

## 2025-08-07 DIAGNOSIS — S00.83XA CONTUSION OF OTHER PART OF HEAD, INITIAL ENCOUNTER: Primary | ICD-10-CM

## 2025-08-07 DIAGNOSIS — M25.511 ACUTE PAIN OF RIGHT SHOULDER: ICD-10-CM
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
## 2025-08-07 DIAGNOSIS — M54.42 ACUTE BILATERAL LOW BACK PAIN WITH LEFT-SIDED SCIATICA: ICD-10-CM

## 2025-08-07 PROCEDURE — 25000003 PHARM REV CODE 250

## 2025-08-07 PROCEDURE — 99285 EMERGENCY DEPT VISIT HI MDM: CPT | Mod: 25

## 2025-08-07 RX ORDER — OXYCODONE AND ACETAMINOPHEN 5; 325 MG/1; MG/1
1 TABLET ORAL
Refills: 0 | Status: COMPLETED | OUTPATIENT
Start: 2025-08-07 | End: 2025-08-07

## 2025-08-07 RX ADMIN — OXYCODONE HYDROCHLORIDE AND ACETAMINOPHEN 1 TABLET: 5; 325 TABLET ORAL at 04:08

## 2025-08-12 ENCOUNTER — PATIENT MESSAGE (OUTPATIENT)
Dept: NEUROSURGERY | Facility: CLINIC | Age: 55
End: 2025-08-12

## 2025-08-12 ENCOUNTER — OFFICE VISIT (OUTPATIENT)
Dept: NEUROSURGERY | Facility: CLINIC | Age: 55
End: 2025-08-12
Payer: COMMERCIAL

## 2025-08-12 DIAGNOSIS — Z98.1 S/P LUMBAR SPINAL FUSION: ICD-10-CM

## 2025-08-12 DIAGNOSIS — M51.9 LUMBAR DISC DISEASE: Primary | ICD-10-CM

## 2025-08-12 DIAGNOSIS — Z98.1 S/P LUMBAR FUSION: ICD-10-CM

## 2025-08-12 DIAGNOSIS — M54.9 DORSALGIA, UNSPECIFIED: ICD-10-CM

## 2025-08-12 PROCEDURE — 1159F MED LIST DOCD IN RCRD: CPT | Mod: CPTII,S$GLB,, | Performed by: NEUROLOGICAL SURGERY

## 2025-08-12 PROCEDURE — 99999 PR PBB SHADOW E&M-EST. PATIENT-LVL IV: CPT | Mod: PBBFAC,,, | Performed by: NEUROLOGICAL SURGERY

## 2025-08-12 PROCEDURE — 99214 OFFICE O/P EST MOD 30 MIN: CPT | Mod: S$GLB,,, | Performed by: NEUROLOGICAL SURGERY

## 2025-08-18 ENCOUNTER — PATIENT MESSAGE (OUTPATIENT)
Dept: ADMINISTRATIVE | Facility: HOSPITAL | Age: 55
End: 2025-08-18
Payer: COMMERCIAL

## 2025-08-27 DIAGNOSIS — M51.9 LUMBAR DISC DISEASE: ICD-10-CM

## 2025-08-27 DIAGNOSIS — M54.10 RADICULOPATHY, UNSPECIFIED SPINAL REGION: Primary | ICD-10-CM

## 2025-08-27 DIAGNOSIS — Z98.1 S/P LUMBAR FUSION: ICD-10-CM

## (undated) DEVICE — KIT SPINAL PATIENT CARE JACK

## (undated) DEVICE — DRAPE STERI INSTRUMENT 1018

## (undated) DEVICE — SUT PDS II 0 CT VIL MONO 36

## (undated) DEVICE — GAUZE SPONGE PEANUT STRL

## (undated) DEVICE — DRAPE OPMI STERILE

## (undated) DEVICE — GOWN POLY REINF BRTH SLV XL

## (undated) DEVICE — DRESSING SURGICAL 1/2X1/2

## (undated) DEVICE — MARKER SKIN RULER STERILE

## (undated) DEVICE — DRAPE C-ARMOR EQUIPMENT COVER

## (undated) DEVICE — SUT VICRYL PLUS 2-0 CT1 18

## (undated) DEVICE — HOOK LONE STAR BLUNT 12MM

## (undated) DEVICE — SUT PDS II 1 TP-1 VIL

## (undated) DEVICE — DRAPE INCISE IOBAN 2 23X17IN

## (undated) DEVICE — NDL SPINAL 18GX3.5 SPINOCAN

## (undated) DEVICE — TRAY CATH 1-LYR URIMTR 16FR

## (undated) DEVICE — ELECTRODE REM PLYHSV RETURN 9

## (undated) DEVICE — PACK ECLIPSE SET-UP W/O DRAPE

## (undated) DEVICE — BUR BONE CUT MICRO TPS 3X3.8MM

## (undated) DEVICE — BELLOW CANN HEMOBLAST 1.65GR

## (undated) DEVICE — FIBRILLAR ABS HEMOSTAT 4X4

## (undated) DEVICE — SUT SILK 3-0 STRANDS 30IN

## (undated) DEVICE — PROBE RHYTHMLINK MPLR 1.5X280

## (undated) DEVICE — SPONGE PATTY SURGICAL .5X3IN

## (undated) DEVICE — SUT MCRYL PLUS 4-0 PS2 27IN

## (undated) DEVICE — BOOT SUTURE AID

## (undated) DEVICE — CORD BIPOLAR 12 FOOT

## (undated) DEVICE — Device

## (undated) DEVICE — DRAPE ABDOMINAL TIBURON 14X11

## (undated) DEVICE — STAPLER SKIN PROXIMATE WIDE

## (undated) DEVICE — KIT SURGIFLO HEMOSTATIC MATRIX

## (undated) DEVICE — TRAY NEURO OMC

## (undated) DEVICE — ADHESIVE DERMABOND ADVANCED

## (undated) DEVICE — TIP YANKAUERS BULB NO VENT

## (undated) DEVICE — ELECTRODE BLADE INSULATED 1 IN

## (undated) DEVICE — SUT SILK 2-0 STRANDS 30IN

## (undated) DEVICE — SUT SILK 3-0 SH DETACH 30IN

## (undated) DEVICE — SUT 2/0 30IN SILK BLK BRAI

## (undated) DEVICE — DURAPREP SURG SCRUB 26ML

## (undated) DEVICE — SUT VICRYL 3-0 27 SH

## (undated) DEVICE — SUT VICRYL PLUS 3-0 SH 18IN

## (undated) DEVICE — SUT D SPECIAL VICRYL 2-0

## (undated) DEVICE — BLADE ELECTRO EDGE INSULATED

## (undated) DEVICE — DRAPE THREE-QTR REINF 53X77IN

## (undated) DEVICE — SPONGE LAP 4X18 PREWASHED

## (undated) DEVICE — SPONGE COTTON TRAY 4X4IN

## (undated) DEVICE — DRESSING TRANS 8X12 TEGADERM

## (undated) DEVICE — SPHERE MARKER REFLECTIVE DISP

## (undated) DEVICE — DRAPE C-ARM ELAS CLIP 42X120IN

## (undated) DEVICE — APPLIER LIGACLIP MED 11IN

## (undated) DEVICE — SYR IRRIGATION BULB STER 60ML

## (undated) DEVICE — ELECTRODE EXTENDED BLADE

## (undated) DEVICE — DRESSING ABSRBNT ISLAND 3.6X8

## (undated) DEVICE — SPONGE LAP 18X18 PREWASHED